# Patient Record
Sex: FEMALE | Race: WHITE | Employment: OTHER | URBAN - NONMETROPOLITAN AREA
[De-identification: names, ages, dates, MRNs, and addresses within clinical notes are randomized per-mention and may not be internally consistent; named-entity substitution may affect disease eponyms.]

---

## 2017-01-09 ENCOUNTER — OFFICE VISIT (OUTPATIENT)
Dept: FAMILY MEDICINE | Facility: OTHER | Age: 57
End: 2017-01-09
Attending: PHYSICIAN ASSISTANT
Payer: COMMERCIAL

## 2017-01-09 VITALS
OXYGEN SATURATION: 98 % | WEIGHT: 195 LBS | SYSTOLIC BLOOD PRESSURE: 118 MMHG | DIASTOLIC BLOOD PRESSURE: 62 MMHG | HEART RATE: 75 BPM | TEMPERATURE: 98.1 F | HEIGHT: 65 IN | BODY MASS INDEX: 32.49 KG/M2

## 2017-01-09 DIAGNOSIS — R13.14 PHARYNGOESOPHAGEAL DYSPHAGIA: Primary | ICD-10-CM

## 2017-01-09 DIAGNOSIS — Z72.0 TOBACCO ABUSE: ICD-10-CM

## 2017-01-09 PROCEDURE — 99212 OFFICE O/P EST SF 10 MIN: CPT

## 2017-01-09 PROCEDURE — 99213 OFFICE O/P EST LOW 20 MIN: CPT | Performed by: PHYSICIAN ASSISTANT

## 2017-01-09 RX ORDER — SUCRALFATE 1 G/1
1 TABLET ORAL 4 TIMES DAILY
Qty: 40 TABLET | Refills: 1 | Status: SHIPPED | OUTPATIENT
Start: 2017-01-09 | End: 2017-03-20

## 2017-01-09 ASSESSMENT — ANXIETY QUESTIONNAIRES
5. BEING SO RESTLESS THAT IT IS HARD TO SIT STILL: MORE THAN HALF THE DAYS
IF YOU CHECKED OFF ANY PROBLEMS ON THIS QUESTIONNAIRE, HOW DIFFICULT HAVE THESE PROBLEMS MADE IT FOR YOU TO DO YOUR WORK, TAKE CARE OF THINGS AT HOME, OR GET ALONG WITH OTHER PEOPLE: VERY DIFFICULT
1. FEELING NERVOUS, ANXIOUS, OR ON EDGE: NEARLY EVERY DAY
GAD7 TOTAL SCORE: 14
7. FEELING AFRAID AS IF SOMETHING AWFUL MIGHT HAPPEN: NOT AT ALL
3. WORRYING TOO MUCH ABOUT DIFFERENT THINGS: NEARLY EVERY DAY
6. BECOMING EASILY ANNOYED OR IRRITABLE: NEARLY EVERY DAY
2. NOT BEING ABLE TO STOP OR CONTROL WORRYING: MORE THAN HALF THE DAYS

## 2017-01-09 ASSESSMENT — PATIENT HEALTH QUESTIONNAIRE - PHQ9: 5. POOR APPETITE OR OVEREATING: SEVERAL DAYS

## 2017-01-09 NOTE — NURSING NOTE
"Chief Complaint   Patient presents with     Abdominal Pain       Initial /62 mmHg  Pulse 75  Temp(Src) 98.1  F (36.7  C) (Tympanic)  Ht 5' 5\" (1.651 m)  Wt 195 lb (88.451 kg)  BMI 32.45 kg/m2  SpO2 98%  Breastfeeding? No Estimated body mass index is 32.45 kg/(m^2) as calculated from the following:    Height as of this encounter: 5' 5\" (1.651 m).    Weight as of this encounter: 195 lb (88.451 kg).  BP completed using cuff size: mitzi Braun LPN      "

## 2017-01-09 NOTE — PATIENT INSTRUCTIONS

## 2017-01-09 NOTE — MR AVS SNAPSHOT
After Visit Summary   1/9/2017    Sharlene Mccord    MRN: [id number]           Patient Information     Date Of Birth          1960        Visit Information        Provider Department      1/9/2017 10:45 AM Concha Hare PA [facility]        Today's Diagnoses     Pharyngoesophageal dysphagia    -  1     Tobacco abuse           Care Instructions      HOW TO QUIT SMOKING  Smoking is one of the hardest habits to break. About half of all those who have ever smoked have been able to quit, and most of those (about 70%) who still smoke want to quit. Here are some of the best ways to stop smoking.     KEEP TRYING:  It takes most smokers about 8 tries before they are finally able to fully quit. So, the more often you try and fail, the better your chance of quitting the next time! So, don't give up!    GO COLD TURKEY:  Most ex-smokers quit cold turkey. Trying to cut back gradually doesn't seem to work as well, perhaps because it continues the smoking habit. Also, it is possible to fool yourself by inhaling more while smoking fewer cigarettes. This results in the same amount of nicotine in your body!    GET SUPPORT:  Support programs can make an important difference, especially for the heavy smoker. These groups offer lectures, methods to change your behavior and peer support. Call the free national Quitline for more information. 800-QUIT-NOW (769-236-1617). Low-cost or free programs are offered by many hospitals, local chapters of the American Lung Association (691-535-9451) and the American Cancer Society (149-596-3085). Support at home is important too. Non-smokers can help by offering praise and encouragement. If the smoker fails to quit, encourage them to try again!    OVER-THE-COUNTER MEDICINES:  For those who can't quit on their own, Nicotine Replacement Therapy (NRT) may make quitting much easier. Certain aids such as the nicotine patch, gum and lozenge are available without a  prescription. However, it is best to use these under the guidance of your doctor. The skin patch provides a steady supply of nicotine to the body. Nicotine gum and lozenge gives temporary bursts of low levels of nicotine. Both methods take the edge off the craving for cigarettes. WARNING: If you feel symptoms of nicotine overdose, such as nausea, vomiting, dizziness, weakness, or fast heartbeat, stop using these and see your doctor.    PRESCRIPTION MEDICINES:  After evaluating your smoking patterns and prior attempts at quitting, your doctor may offer a prescription medicine such as bupropion (Zyban, Wellbutrin), varenicline (Chantix, Champix), a niocotine inhaler or nasal spray. Each has its unique advantage and side effects which your doctor can review with you.    HEALTH BENEFITS OF QUITTING:  The benefits of quitting start right away and keep improving the longer you go without smokin minutes: blood pressure and pulse return to normal  8 hours: oxygen levels return to normal  2 days: ability to smell and taste begins to improve as damaged nerves start to regrow  2-3 weeks: circulation and lung function improves  1-9 months: decreased cough, congestion and shortness of breath; less tired  1 year: risk of heart attack decreases by half  5 years: risk of lung cancer decreases by half; risk of stroke becomes the same as a non-smoker  For information about how to quit smoking, visit the following links:  National [employer] [place] ,   Clearing the Air, Quit Smoking Today   - an online booklet. http://www.smokefree.gov/pubs/clearing_the_air.pdf  Smokefree.gov http://smokefree.gov/  QuitNet http://www.quitnet.com/    8979-3916 Papo Faustin, [address] [place] [address]884. All rights reserved. This information is not intended as a substitute for professional medical care. Always follow your healthcare professional's instructions.    The Benefits of Living Smoke Free  What do you want to gain from  quitting? Check off some reasons to quit.  Health Benefits  ___ Reduce my risk of lung cancer, heart disease, chronic lung disease  ___ Have fewer wrinkles and softer skin  ___ Improve my sense of taste and smell  ___ For pregnant women--reduce the risk of having a miscarriage, stillbirth, premature birth, or low-birth-weight baby  Personal Benefits  ___ Feel more in control of my life  ___ Have better-smelling hair, breath, clothes, home, and car  ___ Save time by not having to take smoke breaks, buy cigarettes, or hunt for a light  ___ Have whiter teeth  Family Benefits  ___ Reduce my children s respiratory tract infections  ___ Set a good example for my children  ___ Reduce my family s cancer risk  Financial Benefits  ___ Save hundreds of dollars each year that would be spent on cigarettes  ___ Save money on medical bills  ___ Save on life, health, and car insurance premiums    Those Dollars Add Up!  Cigarettes are expensive, and getting more expensive all the time. Do you realize how much money you are spending on cigarettes per year? What is the average amount you spend on a pack of cigarettes? What is the average number of packs that you smoke per day? Using your answers to these questions, fill in this formula to help you find out:  ($ _____ per pack) ×  ( _____ number of packs per day) × (365 days) =  $ _____ yearly cost of smoking  Besides tobacco, there are other costs, including extra cleaning bills and replacement costs for clothing and furniture; medical expenses for smoking-related illnesses; and higher health, life, and car insurance premiums.    Cigars and Pipes Count Too!  Cigars and pipes are also dangerous. So are smokeless (chewing) tobacco and snuff. All of these products contain nicotine, a highly addictive substance that has harmful effects on your body. Quitting smoking means giving up all tobacco products.      9703-7326 Papo Faustin, [address] [facility], [place] [address]. All rights  reserved. This information is not intended as a substitute for professional medical care. Always follow your healthcare professional's instructions.        Follow-ups after your visit        Additional Services     GENERAL SURG ADULT REFERRAL       Your provider has referred you to: [facility]: [facility] [place] [phone]   [email or web address]    Please be aware that coverage of these services is subject to the terms and limitations of your health insurance plan.  Call member services at your health plan with any benefit or coverage questions.      Please bring the following with you to your appointment:    (1) Any X-Rays, CTs or MRIs which have been performed.  Contact the facility where they were done to arrange for  prior to your scheduled appointment.   (2) List of current medications   (3) This referral request   (4) Any documents/labs given to you for this referral            QUITPLAN  Referral       [place] TO[employer]ACCO QUITLINES FAX FORM  Fax form to: 1 [phone]    The clinic will facilitate the referral to the quitline.    Provider Information:  ===============================================================  KEILY Cobos  ID#: [id number] [facility] [phone] Fax: [phone]   [email or web address]  Payor: BCBS / Plan: BCBS PLATINUM BLUE / Product Type: PPO /   ===============================================================    The Public Health Service Guideline does not recommend providing over-the-counter nicotine replacement therapy products without physician authorization to patients with the following conditions: pregnancy, uncontrolled high blood pressure, or cardiovascular diseases.     I authorize the [place] Tobacco Quitlines to provide over-the-counter nicotine replacement products for the patient listed below if the patient's health plan benefits  cover NRT or if the patient is eligible for QUITPLAN services.    Patient Consented to:  ===============================================================  - YES - I am ready to quit tobacco and request the above information be given to the quitline so they may contact me.  I understand that one of [place] s Tobacco Quitlines will inform my provider about my participation.  ===============================================================  Please check the BEST 3-hour call window for them to reach you: 2pm - 5pm  May we leave a message?  YES  Language Preference:  English  Phone Number: Home Phone      [phone]  Mobile          none     E-mail Address: [email or web address]    ========================================================================  FOR QUITLINE USE ONLY:  THIS INFORMATION WILL BE PROVIDED BACK TO THE PROVIDER  Contact date: __/ __/__ or ____ Did not reach after three attempts.    Outcome:  __ Enrolled in telephone counseling program  __ Declined  __ Not Reached    Stage of readiness: _______________________  Planned Quit Date: ___/ ___/ ___  Comments:      2011 [facility]   This message funded by [employer] [place], an independent licensee of the Blue Cross and Blue Shield Association. Rev. 11/1/12                  Your next 10 appointments already scheduled     Feb 08, 2017  9:40 AM   (Arrive by 9:25 AM)   Return Visit with Aurelia Odell MD   [facility] [place] ([facility])    [address] [place] [id number]   [phone]            May 23, 2017  9:30 AM   (Arrive by 9:15 AM)   Return Visit with Norman Nichole MD   [facility] [place] ([facility])    [address]  [place] [address]   [phone]              Future tests that were ordered for you today     Open Future Orders        Priority Expected Expires Ordered    QUITPLAN  Referral Routine  3/10/2017 1/9/2017            Who to contact     If you have  "questions or need follow up information about today's clinic visit or your schedule please contact [facility] directly at [phone].  Normal or non-critical lab and imaging results will be communicated to you by Plinkhart, letter or phone within 4 business days after the clinic has received the results. If you do not hear from us within 7 days, please contact the clinic through Plinkhart or phone. If you have a critical or abnormal lab result, we will notify you by phone as soon as possible.  Submit refill requests through VHT or call your pharmacy and they will forward the refill request to us. Please allow 3 business days for your refill to be completed.          Additional Information About Your Visit        [employer] Sponsorship Information     VHT gives you secure access to your electronic health record. If you see a primary care provider, you can also send messages to your care team and make appointments. If you have questions, please call your primary care clinic.  If you do not have a primary care provider, please call [phone] and they will assist you.        Care EveryWhere ID     This is your Care EveryWhere ID. This could be used by other organizations to access your [place] [facility] medical records  [id number]        Your Vitals Were     Pulse Temperature Height BMI (Body Mass Index) Pulse Oximetry Breastfeeding?    75 98.1  F (36.7  C) (Tympanic) 5' 5\" (1.651 m) 32.45 kg/m2 98% No       Blood Pressure from Last 3 Encounters:   01/09/17 118/62   12/16/16 116/66   11/03/16 118/70    Weight from Last 3 Encounters:   01/09/17 195 lb (88.451 kg)   12/16/16 200 lb (90.719 kg)   11/03/16 190 lb (86.183 kg)              We Performed the Following     GENERAL SURG ADULT REFERRAL     Tobacco Cessation - for Health Maintenance          Today's Medication Changes          These changes are accurate as of: 1/9/17 11:32 AM.  If you have any questions, ask your nurse or doctor.               Start taking these " medicines.        Dose/Directions    sucralfate 1 GM tablet   Commonly known as:  CARAFATE   Used for:  Pharyngoesophageal dysphagia   Started by:  Concha Hare PA        Dose:  1 g   Take 1 tablet (1 g) by mouth 4 times daily   Quantity:  40 tablet   Refills:  1         These medicines have changed or have updated prescriptions.        Dose/Directions    traZODone 50 MG tablet   Commonly known as:  DESYREL   This may have changed:  additional instructions   Used for:  Psychophysiological insomnia        TAKE TWO TABLETS BY MOUTH AT BEDTIME AS NEEDED FOR SLEEP   Quantity:  60 tablet   Refills:  11            Where to get your medicines      These medications were sent to [id number] - [place], [place] - [address]   [address] , [place] [address]     Phone:  [phone]    - sucralfate 1 GM tablet             Primary Care Provider Office Phone # Fax #    KEILY Fonseca [phone]       [facility] [place] [facility] [address] [facility] [address]  [place] [address]        Thank you!     Thank you for choosing [facility]  for your care. Our goal is always to provide you with excellent care. Hearing back from our patients is one way we can continue to improve our services. Please take a few minutes to complete the written survey that you may receive in the mail after your visit with us. Thank you!             Your Updated Medication List - Protect others around you: Learn how to safely use, store and throw away your medicines at www.disposemymeds.org.          This list is accurate as of: 1/9/17 11:32 AM.  Always use your most recent med list.                   Brand Name Dispense Instructions for use    buPROPion 200 MG 12 hr tablet    WELLBUTRIN SR    60 tablet    Take 1 tablet (200 mg) by mouth 2 times daily       clonazePAM 1 MG tablet    klonoPIN    60 tablet    Take 1 tablet (1 mg) by mouth 2 times daily as needed for anxiety       cyclobenzaprine 10 MG tablet    FLEXERIL    30  tablet    Take 0.5-1 tablets (5-10 mg) by mouth 3 times daily as needed for muscle spasms       diltiazem 300 MG 24 hr ER beaded capsule    TIAZAC    90 capsule    Take 1 capsule (300 mg) by mouth daily       estradiol 0.5 MG tablet    ESTRACE    60 tablet    TAKE ONE TABLET BY MOUTH TWICE DAILY       hydrOXYzine 25 MG tablet    ATARAX    120 tablet    TAKE ONE TO TWO TABLETS BY MOUTH ONCE DAILY AT BEDTIME FOR NAUSEA       levothyroxine 50 MCG tablet    SYNTHROID/LEVOTHROID    30 tablet    TAKE ONE TABLET BY MOUTH ONCE DAILY       NITROSTAT 0.4 MG sublingual tablet   Generic drug:  nitroglycerin     25 tablet    Place 1 tablet (0.4 mg) under the tongue every 5 minutes as needed for chest pain       PRILOSEC OTC PO          PROCTOSOL HC 2.5 % cream   Generic drug:  hydrocortisone     58 g    APPLY  CREAM TO AFFECTED AREA THREE TIMES DAILY       sucralfate 1 GM tablet    CARAFATE    40 tablet    Take 1 tablet (1 g) by mouth 4 times daily       SUMAtriptan 100 MG tablet    IMITREX    10 tablet    TAKE ONE TABLET BY MOUTH AS NEEDED MIGRAINES       TOPIRAMATE PO      Take 25 mg by mouth       traMADol 50 MG tablet    ULTRAM    180 tablet    TAKE TWO TABLETS BY MOUTH EVERY 8 HOURS AS NEEDED FOR PAIN       traZODone 50 MG tablet    DESYREL    60 tablet    TAKE TWO TABLETS BY MOUTH AT BEDTIME AS NEEDED FOR SLEEP

## 2017-01-09 NOTE — PROGRESS NOTES
"  SUBJECTIVE:                                                    Sharlene Mccord is a 56 year old female who presents to clinic today for the following health issues:      Abdominal Pain      Duration: since started atarax 8/2015    Description (location/character/radiation): heartburn, \"girgle\" \"burn in pit of stomach, spreads under rib cage, weak, nausea and vomiting\"        Associated flank pain: None    Intensity:  moderate    Accompanying signs and symptoms: - can not take a normal breath with pain under rib cage        Fever/Chills: no        Gas/Bloating: no        Nausea/vomitting: YES- both       Diarrhea: has bouts out diarrhea and then will go a couple days without        Dysuria or Hematuria: no     History (previous similar pain/trauma/previous testing): 3 c=sections and explore surgery years ago, gallbladder surgery     Precipitating or alleviating factors:       Pain worse with eating/BM/urination: any type of food upsets her stomach       Pain relieved by BM: no     Therapies tried and outcome: atarax- pt feels it is not working    LMP:  Pt takes estradiol- no periods             Problem list and histories reviewed & adjusted, as indicated.  Additional history: as documented    Patient Active Problem List   Diagnosis     Diverticulitis of sigmoid colon     Hypothyroidism     Anxiety state     Cardiac microvascular disease (H)     Diverticulitis     Aortic valve disorder     ACP (advance care planning)     Chronic pain     Constipation     Memory loss     Major depressive disorder, recurrent episode (H)     Cognitive disorder     Major depressive disorder     Fatigue     Migraine without aura and responsive to treatment     Atypical migraine     Past Surgical History   Procedure Laterality Date     Cl aff surgical pathology  01/02/2007     Thyroid Mass     Laparoscopic cholecystectomy  11/07/2003     Cholelithiasis     Laryngoscopy       Hysterectomy  2001     Laparotomy exploratory       abdominal " pain/fever     Splenectomy       Gyn surgery       c-sections x 3     Gyn surgery       complete hysterectomy     Cardiac surgery  2013     angiogram [facility]:  Normal coronary arteries.  Felt ot have some microvascular disease     Colonoscopy  1/13/2014     Procedure: COLONOSCOPY;  COLONOSCOPY ;  Surgeon: Chasidy Gee DO;  Location: [facility]       Social History   Substance Use Topics     Smoking status: Current Every Day Smoker -- 0.50 packs/day for 40 years     Types: Cigarettes     Smokeless tobacco: Never Used     Alcohol Use: No     Family History   Problem Relation Age of Onset     C.A.D. Father      Hypertension Father      C.A.D. Mother      CEREBROVASCULAR DISEASE Mother      CVA     Hypertension Mother          Current Outpatient Prescriptions   Medication Sig Dispense Refill     TOPIRAMATE PO Take 25 mg by mouth       Omeprazole Magnesium (PRILOSEC OTC PO)        sucralfate (CARAFATE) 1 GM tablet Take 1 tablet (1 g) by mouth 4 times daily 40 tablet 1     hydrOXYzine (ATARAX) 25 MG tablet TAKE ONE TO TWO TABLETS BY MOUTH ONCE DAILY AT BEDTIME FOR NAUSEA 120 tablet 0     clonazePAM (KLONOPIN) 1 MG tablet Take 1 tablet (1 mg) by mouth 2 times daily as needed for anxiety 60 tablet 5     traMADol (ULTRAM) 50 MG tablet TAKE TWO TABLETS BY MOUTH EVERY 8 HOURS AS NEEDED FOR PAIN 180 tablet 0     NITROSTAT 0.4 MG SL tablet Place 1 tablet (0.4 mg) under the tongue every 5 minutes as needed for chest pain 25 tablet 1     estradiol (ESTRACE) 0.5 MG tablet TAKE ONE TABLET BY MOUTH TWICE DAILY 60 tablet 3     PROCTOSOL HC 2.5 % rectal cream APPLY  CREAM TO AFFECTED AREA THREE TIMES DAILY 58 g 1     buPROPion (WELLBUTRIN SR) 200 MG 12 hr tablet Take 1 tablet (200 mg) by mouth 2 times daily 60 tablet 5     cyclobenzaprine (FLEXERIL) 10 MG tablet Take 0.5-1 tablets (5-10 mg) by mouth 3 times daily as needed for muscle spasms 30 tablet 1     diltiazem (TIAZAC) 300 MG 24 hr ER beaded capsule Take 1 capsule (300 mg) by mouth  "daily 90 capsule 3     levothyroxine (SYNTHROID, LEVOTHROID) 50 MCG tablet TAKE ONE TABLET BY MOUTH ONCE DAILY 30 tablet 10     traZODone (DESYREL) 50 MG tablet TAKE TWO TABLETS BY MOUTH AT BEDTIME AS NEEDED FOR SLEEP (Patient taking differently: TAKE one TABLETS BY MOUTH AT BEDTIME AS NEEDED FOR SLEEP) 60 tablet 11     SUMAtriptan (IMITREX) 100 MG tablet TAKE ONE TABLET BY MOUTH AS NEEDED MIGRAINES 10 tablet 5     Allergies   Allergen Reactions     Codeine      Isosorbide Mononitrate Other (See Comments)     Vomiting and headache       Lisinopril      [facility] scan     BP Readings from Last 3 Encounters:   01/09/17 118/62   12/16/16 116/66   11/03/16 118/70    Wt Readings from Last 3 Encounters:   01/09/17 195 lb (88.451 kg)   12/16/16 200 lb (90.719 kg)   11/03/16 190 lb (86.183 kg)                  Problem list, Medication list, Allergies, and Medical/Social/Surgical histories reviewed in [facility] and updated as appropriate.    ROS:  Constitutional, neuro, ENT, endocrine, pulmonary, cardiac, gastrointestinal, genitourinary, musculoskeletal, integument and psychiatric systems are negative, except as otherwise noted.    OBJECTIVE:                                                    /62 mmHg  Pulse 75  Temp(Src) 98.1  F (36.7  C) (Tympanic)  Ht 5' 5\" (1.651 m)  Wt 195 lb (88.451 kg)  BMI 32.45 kg/m2  SpO2 98%  Breastfeeding? No  Body mass index is 32.45 kg/(m^2).  GENERAL APPEARANCE: healthy, alert and no distress  EYES: Eyes grossly normal to inspection, PERRL and conjunctivae and sclerae normal  HENT: ear canals and TM's normal and nose and mouth without ulcers or lesions  NECK: no adenopathy, no asymmetry, masses, or scars and thyroid normal to palpation  RESP: lungs clear to auscultation - no rales, rhonchi or wheezes  CV: regular rates and rhythm, normal S1 S2, no S3 or S4 and no murmur, click or rub  LYMPHATICS: normal ant/post cervical and supraclavicular nodes  ABDOMEN: soft, nontender, without " hepatosplenomegaly or masses and bowel sounds normal  MS: extremities normal- no gross deformities noted  SKIN: no suspicious lesions or rashes  NEURO: Normal strength and tone, mentation intact and speech normal  PSYCH: mentation appears normal and affect normal/bright    Diagnostic test results:  Diagnostic Test Results:  No results found for this or any previous visit (from the past 24 hour(s)).     ASSESSMENT/PLAN:                                                    1. Pharyngoesophageal dysphagia  She has been having nausea and GERD for months.   H pylori negative on stool not responding to PPI and diet changes.  Vomiting more now.   No weight loss.  Smoker.  Can't take bread with out  getting stuck but denies dysphagia.   .at this point needing endoscopy as not responding to medication and changes.        - Tobacco Cessation - for Health Maintenance  - GENERAL SURG ADULT REFERRAL  - sucralfate (CARAFATE) 1 GM tablet; Take 1 tablet (1 g) by mouth 4 times daily  Dispense: 40 tablet; Refill: 1    2. Tobacco abuse  Working on cessation.         See Patient Instructions    KEILY Cobos  [facility] [place]

## 2017-01-10 ENCOUNTER — OFFICE VISIT (OUTPATIENT)
Dept: SURGERY | Facility: OTHER | Age: 57
End: 2017-01-10
Attending: PHYSICIAN ASSISTANT
Payer: COMMERCIAL

## 2017-01-10 VITALS
HEIGHT: 65 IN | TEMPERATURE: 98.6 F | OXYGEN SATURATION: 98 % | BODY MASS INDEX: 32.49 KG/M2 | HEART RATE: 79 BPM | DIASTOLIC BLOOD PRESSURE: 60 MMHG | SYSTOLIC BLOOD PRESSURE: 128 MMHG | WEIGHT: 195 LBS

## 2017-01-10 DIAGNOSIS — K21.9 GASTROESOPHAGEAL REFLUX DISEASE WITHOUT ESOPHAGITIS: ICD-10-CM

## 2017-01-10 DIAGNOSIS — R13.14 PHARYNGOESOPHAGEAL DYSPHAGIA: Primary | ICD-10-CM

## 2017-01-10 DIAGNOSIS — E66.09 NON MORBID OBESITY DUE TO EXCESS CALORIES: ICD-10-CM

## 2017-01-10 DIAGNOSIS — F17.200 TOBACCO DEPENDENCE: ICD-10-CM

## 2017-01-10 PROCEDURE — 99203 OFFICE O/P NEW LOW 30 MIN: CPT

## 2017-01-10 PROCEDURE — 99214 OFFICE O/P EST MOD 30 MIN: CPT

## 2017-01-10 PROCEDURE — 99214 OFFICE O/P EST MOD 30 MIN: CPT | Performed by: SURGERY

## 2017-01-10 ASSESSMENT — ANXIETY QUESTIONNAIRES: GAD7 TOTAL SCORE: 14

## 2017-01-10 ASSESSMENT — PAIN SCALES - GENERAL: PAINLEVEL: NO PAIN (1)

## 2017-01-10 NOTE — NURSING NOTE
"Chief Complaint   Patient presents with     Consult     EGD       Initial /60 mmHg  Pulse 79  Temp(Src) 98.6  F (37  C) (Tympanic)  Ht 5' 5\" (1.651 m)  Wt 195 lb (88.451 kg)  BMI 32.45 kg/m2  SpO2 98% Estimated body mass index is 32.45 kg/(m^2) as calculated from the following:    Height as of this encounter: 5' 5\" (1.651 m).    Weight as of this encounter: 195 lb (88.451 kg).  BP completed using cuff size: tyesha Caldera LPN      "

## 2017-01-10 NOTE — PATIENT INSTRUCTIONS
Thank you for allowing Dr. Ruff and our surgical team to participate in your care.  If you have a scheduling or an appointment question please contact [facility] Coordinator at her direct line [phone].   ALL nursing questions or concerns can be directed to Breanne at: [phone]       You are scheduled for a: EGD  Your procedure date is: 2/3/17      You need a friend or family member available to drive you home AND stay with you for 24 hours after you leave the hospital. You will not be allowed to drive yourself. IF you need to take a taxi or the bus you MUST have a responsible person to ride with you. YOUR PROCEDURE WILL BE CANCELLED IF YOU DO NOT HAVE A RESPONSIBLE ADULT TO DRIVE YOU HOME.       You CANNOT have anything to eat or drink after midnight the night before your surgery, ncluding water and coffee. Your stomach needs to be completely empty. Do NOT chew gum, suck on hard candy, or smoke. You can brush your teeth the morning of surgery.       You need to call our Surgery Education Nurses 1-2 weeks prior to your surgery date at  [phone] or toll free [phone]. Please have you medication and allergy lists ready.      Stop your aspirin or other NSAIDs(Ibuprofen, Motrin, Aleve, Celebrex, Naproxen, etc...) 7 days before your surgery.      Hospital admitting will call you the day before your surgery with your arrival time. If you are scheduled on a Monday admitting will call you the Friday before.      Please call your primary care physician if you should become ill within 24 hours of scheduled surgery. (ex.vomiting, diarrhea, fever)  Surgery Education will contact you the day before your procedure between the hours of noon and 5 pm with the time you need to register in admitting at the hospital. Call Breanne with any questions [phone]

## 2017-01-10 NOTE — PROGRESS NOTES
Surgery Consult Clinic Note      RE: Sharlene Mccord  : 1960  ALISHA: 1/10/2017      Chief Complaint:  Abdominal pain    History of Present Illness:  Sharlene Mccord is a very pleasant 56 year old year old female who I am seeing at the request of KEILY Fonseca for evaluation of abdominal pain and for consideration for EGD. Has had upper abdominal pain for 10 years associated with nausea/vomiting.  Treated with TUMs, but over the last year, the symptoms have become more frequent and severe that she was started on PPI therapy a year ago.  Takes 30-60 minutes before eating.  Hasn't helped.  Pain not associated with eating, but feels that eating will bring on the pain.  Not losing weight. Does report some difficulty swallowing certain foods, breads.  Denies changes in bowel habits.  No fevers, chills, night sweats. Denies changes in voice but admits to dry cough for the past year.  Current smoker, doesn't use alcohol.  Doesn't lay down after eating.  Normal colonoscopy 3 years ago.  She specifically denies fever, chills, nausea, vomiting, chest pain, shortness of breath or palpitations.      Medical history:  Past Medical History   Diagnosis Date     Mitral valve disorders 10/16/2007     Aortic valve disorders 2000     Echocardiogram showin mild/moderate AI.  Normal LV function     Thyroid Nodule 2011     Anxiety state, unspecified 2011     Unspecified hypothyroidism 2011     Tobacco use disorder 2011     Migraine, unspecified, without mention of intractable migraine without mention of status migrainosus 2011     Fibromyalgia 2011     Cardiac microvascular disease (H) 4/10/2013     Based on Cardiac MRI done at       PONV (postoperative nausea and vomiting)      Major depressive disorder, recurrent episode, unspecified 2014       Surgical history:  Past Surgical History   Procedure Laterality Date     Cl aff surgical pathology  2007     Thyroid Mass      Laparoscopic cholecystectomy  11/07/2003     Cholelithiasis     Laryngoscopy       Hysterectomy  2001     Laparotomy exploratory       abdominal pain/fever     Splenectomy       Gyn surgery       c-sections x 3     Gyn surgery       complete hysterectomy     Cardiac surgery  2013     angiogram [facility]:  Normal coronary arteries.  Felt ot have some microvascular disease     Colonoscopy  1/13/2014     Procedure: COLONOSCOPY;  COLONOSCOPY ;  Surgeon: Chasidy Gee DO;  Location: [facility]       Family history:  Family History   Problem Relation Age of Onset     C.A.D. Father      Hypertension Father      C.A.D. Mother      CEREBROVASCULAR DISEASE Mother      CVA     Hypertension Mother        Medications:  Current Outpatient Prescriptions   Medication Sig Dispense Refill     TOPIRAMATE PO Take 25 mg by mouth       Omeprazole Magnesium (PRILOSEC OTC PO)        sucralfate (CARAFATE) 1 GM tablet Take 1 tablet (1 g) by mouth 4 times daily 40 tablet 1     hydrOXYzine (ATARAX) 25 MG tablet TAKE ONE TO TWO TABLETS BY MOUTH ONCE DAILY AT BEDTIME FOR NAUSEA 120 tablet 0     clonazePAM (KLONOPIN) 1 MG tablet Take 1 tablet (1 mg) by mouth 2 times daily as needed for anxiety 60 tablet 5     traMADol (ULTRAM) 50 MG tablet TAKE TWO TABLETS BY MOUTH EVERY 8 HOURS AS NEEDED FOR PAIN 180 tablet 0     NITROSTAT 0.4 MG SL tablet Place 1 tablet (0.4 mg) under the tongue every 5 minutes as needed for chest pain 25 tablet 1     estradiol (ESTRACE) 0.5 MG tablet TAKE ONE TABLET BY MOUTH TWICE DAILY 60 tablet 3     PROCTOSOL HC 2.5 % rectal cream APPLY  CREAM TO AFFECTED AREA THREE TIMES DAILY 58 g 1     buPROPion (WELLBUTRIN SR) 200 MG 12 hr tablet Take 1 tablet (200 mg) by mouth 2 times daily 60 tablet 5     cyclobenzaprine (FLEXERIL) 10 MG tablet Take 0.5-1 tablets (5-10 mg) by mouth 3 times daily as needed for muscle spasms 30 tablet 1     diltiazem (TIAZAC) 300 MG 24 hr ER beaded capsule Take 1 capsule (300 mg) by mouth daily 90 capsule 3      "levothyroxine (SYNTHROID, LEVOTHROID) 50 MCG tablet TAKE ONE TABLET BY MOUTH ONCE DAILY 30 tablet 10     traZODone (DESYREL) 50 MG tablet TAKE TWO TABLETS BY MOUTH AT BEDTIME AS NEEDED FOR SLEEP (Patient taking differently: TAKE one TABLETS BY MOUTH AT BEDTIME AS NEEDED FOR SLEEP) 60 tablet 11     SUMAtriptan (IMITREX) 100 MG tablet TAKE ONE TABLET BY MOUTH AS NEEDED MIGRAINES 10 tablet 5     Allergies:  The patientis allergic to codeine; isosorbide mononitrate; and lisinopril.  .  Social history:  Social History   Substance Use Topics     Smoking status: Current Every Day Smoker -- 0.50 packs/day for 40 years     Types: Cigarettes     Smokeless tobacco: Never Used     Alcohol Use: No     Marital status: .    Review of Systems:    Constitutional: Negative for fever, chills and weight loss.   HENT: Negative for ear pain, nosebleeds, congestion, sore throat, tinnitus and ear discharge.    Eyes: Negative for blurred vision, double vision, photophobia and pain.   Respiratory: Negative for cough, hemoptysis, shortness of breath, wheezing and stridor.    Cardiovascular: Negative for chest pain, palpitations and orthopnea.   Gastrointestinal: Negative for heartburn, nausea, vomiting, abdominal pain and blood in stool.   Genitourinary: Negative for urgency, frequency and hematuria.   Musculoskeletal: Negative for myalgias, back pain and joint pain.   Neurological: Negative for tingling, speech change and headaches.   Endo/Heme/Allergies: Does not bruise/bleed easily.   Psychiatric/Behavioral: Negative for depression, suicidal ideas and hallucinations. The patient is not nervous/anxious.    Physical Examination:  /60 mmHg  Pulse 79  Temp(Src) 98.6  F (37  C) (Tympanic)  Ht 1.651 m (5' 5\")  Wt 88.451 kg (195 lb)  BMI 32.45 kg/m2  SpO2 98%  General: AAOx4, NAD, WN/WD, ambulating without assistance  HEENT:NCAT, EOMI, PERRL Sclerae anicteric; Trachea mideline, no JVD  Chest:   Clear to auscultation " bilaterally.  Cardiac: S1S2 , regular rate and rhythm without additional sounds  Abdomen: Obese, soft, ND/NT  Extremities: Cursory exam unremarkable.  Skin: Warm, dry, < 2 sec cap refill  Neuro: CN 2-12 grossly intact, no focal deficit, GCS 15  Psych: happy, calm, asks appropriate questions      Assessment/Plan:  #1 GERD  #2 Dysphagia  #3 Obesity  #4 Tobacco dependence      The indications, risks, benefits and technical aspects of esophagogastroduodenoscopy were reviewed with her questions asked and answered.  Antral biopsy for histologic examination will be performed and the place of H. pylori in gastritis was discussed.  Preoperative preparation, npo after midnight preceding the date was discussed and a request made to hold aspirin containing agents one week prior to ameliorate antiplatelet effect.  Questions asked and answered - will proceed based on scheduling availability.          Dr Ruff  [facility]  [address] [place] [address], [address]  [place]    [address]    Referring Provider:  KEILY Fonseca  [address]  [place] [address]     Primary Care Provider:  Concha Hare

## 2017-01-11 ENCOUNTER — HOSPITAL ENCOUNTER (OUTPATIENT)
Dept: GENERAL RADIOLOGY | Facility: HOSPITAL | Age: 57
Discharge: HOME | End: 2017-01-11
Attending: SURGERY | Admitting: SURGERY
Payer: MEDICARE

## 2017-01-11 PROCEDURE — 74220 X-RAY XM ESOPHAGUS 1CNTRST: CPT | Mod: TC

## 2017-01-11 NOTE — PROGRESS NOTES
Fluoro time for this case was 2.24 minutes less than 5 min  Was patient held? NO  If yes, by whom? NA and Technologist NAME

## 2017-01-13 DIAGNOSIS — M79.7 FIBROMYALGIA: Primary | ICD-10-CM

## 2017-01-16 ENCOUNTER — TELEPHONE (OUTPATIENT)
Dept: SURGERY | Facility: OTHER | Age: 57
End: 2017-01-16

## 2017-01-16 RX ORDER — TRAMADOL HYDROCHLORIDE 50 MG/1
TABLET ORAL
Qty: 180 TABLET | Refills: 0 | Status: SHIPPED | OUTPATIENT
Start: 2017-01-16 | End: 2017-02-13

## 2017-01-16 NOTE — TELEPHONE ENCOUNTER
Ultram      Last Written Prescription Date:  11/25/2016  Last Fill Quantity: 180,   # refills: 0  Last Office Visit with [facility], [facility] or [facility] prescribing provider: 1/10/2017  Future Office visit:    Next 5 appointments (look out 90 days)     Feb 08, 2017  9:40 AM   (Arrive by 9:25 AM)   Return Visit with Aurelia Odell MD   [facility] ([facility])    [facility] [address] [place] [id number]   [phone]                   Routing refill request to provider for review/approval because:  Drug not on the [facility], [facility] or [facility] refill protocol or controlled substance

## 2017-01-18 NOTE — TELEPHONE ENCOUNTER
Called and notified patient of waiting on results still from surgeon. Will call as soon as I get result note. Patient ok with this.    Beranne Saucedo

## 2017-01-19 NOTE — H&P (VIEW-ONLY)
Surgery Consult Clinic Note      RE: Sharlene Mccord  : 1960  ALISHA: 1/10/2017      Chief Complaint:  Abdominal pain    History of Present Illness:  Sharlene Mccord is a very pleasant 56 year old year old female who I am seeing at the request of KEILY Fonseca for evaluation of abdominal pain and for consideration for EGD. Has had upper abdominal pain for 10 years associated with nausea/vomiting.  Treated with TUMs, but over the last year, the symptoms have become more frequent and severe that she was started on PPI therapy a year ago.  Takes 30-60 minutes before eating.  Hasn't helped.  Pain not associated with eating, but feels that eating will bring on the pain.  Not losing weight. Does report some difficulty swallowing certain foods, breads.  Denies changes in bowel habits.  No fevers, chills, night sweats. Denies changes in voice but admits to dry cough for the past year.  Current smoker, doesn't use alcohol.  Doesn't lay down after eating.  Normal colonoscopy 3 years ago.  She specifically denies fever, chills, nausea, vomiting, chest pain, shortness of breath or palpitations.      Medical history:  Past Medical History   Diagnosis Date     Mitral valve disorders 10/16/2007     Aortic valve disorders 2000     Echocardiogram showin mild/moderate AI.  Normal LV function     Thyroid Nodule 2011     Anxiety state, unspecified 2011     Unspecified hypothyroidism 2011     Tobacco use disorder 2011     Migraine, unspecified, without mention of intractable migraine without mention of status migrainosus 2011     Fibromyalgia 2011     Cardiac microvascular disease (H) 4/10/2013     Based on Cardiac MRI done at       PONV (postoperative nausea and vomiting)      Major depressive disorder, recurrent episode, unspecified 2014       Surgical history:  Past Surgical History   Procedure Laterality Date     Cl aff surgical pathology  2007     Thyroid Mass      Laparoscopic cholecystectomy  11/07/2003     Cholelithiasis     Laryngoscopy       Hysterectomy  2001     Laparotomy exploratory       abdominal pain/fever     Splenectomy       Gyn surgery       c-sections x 3     Gyn surgery       complete hysterectomy     Cardiac surgery  2013     angiogram [facility]:  Normal coronary arteries.  Felt ot have some microvascular disease     Colonoscopy  1/13/2014     Procedure: COLONOSCOPY;  COLONOSCOPY ;  Surgeon: Chasidy Gee DO;  Location: [facility]       Family history:  Family History   Problem Relation Age of Onset     C.A.D. Father      Hypertension Father      C.A.D. Mother      CEREBROVASCULAR DISEASE Mother      CVA     Hypertension Mother        Medications:  Current Outpatient Prescriptions   Medication Sig Dispense Refill     TOPIRAMATE PO Take 25 mg by mouth       Omeprazole Magnesium (PRILOSEC OTC PO)        sucralfate (CARAFATE) 1 GM tablet Take 1 tablet (1 g) by mouth 4 times daily 40 tablet 1     hydrOXYzine (ATARAX) 25 MG tablet TAKE ONE TO TWO TABLETS BY MOUTH ONCE DAILY AT BEDTIME FOR NAUSEA 120 tablet 0     clonazePAM (KLONOPIN) 1 MG tablet Take 1 tablet (1 mg) by mouth 2 times daily as needed for anxiety 60 tablet 5     traMADol (ULTRAM) 50 MG tablet TAKE TWO TABLETS BY MOUTH EVERY 8 HOURS AS NEEDED FOR PAIN 180 tablet 0     NITROSTAT 0.4 MG SL tablet Place 1 tablet (0.4 mg) under the tongue every 5 minutes as needed for chest pain 25 tablet 1     estradiol (ESTRACE) 0.5 MG tablet TAKE ONE TABLET BY MOUTH TWICE DAILY 60 tablet 3     PROCTOSOL HC 2.5 % rectal cream APPLY  CREAM TO AFFECTED AREA THREE TIMES DAILY 58 g 1     buPROPion (WELLBUTRIN SR) 200 MG 12 hr tablet Take 1 tablet (200 mg) by mouth 2 times daily 60 tablet 5     cyclobenzaprine (FLEXERIL) 10 MG tablet Take 0.5-1 tablets (5-10 mg) by mouth 3 times daily as needed for muscle spasms 30 tablet 1     diltiazem (TIAZAC) 300 MG 24 hr ER beaded capsule Take 1 capsule (300 mg) by mouth daily 90 capsule 3      "levothyroxine (SYNTHROID, LEVOTHROID) 50 MCG tablet TAKE ONE TABLET BY MOUTH ONCE DAILY 30 tablet 10     traZODone (DESYREL) 50 MG tablet TAKE TWO TABLETS BY MOUTH AT BEDTIME AS NEEDED FOR SLEEP (Patient taking differently: TAKE one TABLETS BY MOUTH AT BEDTIME AS NEEDED FOR SLEEP) 60 tablet 11     SUMAtriptan (IMITREX) 100 MG tablet TAKE ONE TABLET BY MOUTH AS NEEDED MIGRAINES 10 tablet 5     Allergies:  The patientis allergic to codeine; isosorbide mononitrate; and lisinopril.  .  Social history:  Social History   Substance Use Topics     Smoking status: Current Every Day Smoker -- 0.50 packs/day for 40 years     Types: Cigarettes     Smokeless tobacco: Never Used     Alcohol Use: No     Marital status: .    Review of Systems:    Constitutional: Negative for fever, chills and weight loss.   HENT: Negative for ear pain, nosebleeds, congestion, sore throat, tinnitus and ear discharge.    Eyes: Negative for blurred vision, double vision, photophobia and pain.   Respiratory: Negative for cough, hemoptysis, shortness of breath, wheezing and stridor.    Cardiovascular: Negative for chest pain, palpitations and orthopnea.   Gastrointestinal: Negative for heartburn, nausea, vomiting, abdominal pain and blood in stool.   Genitourinary: Negative for urgency, frequency and hematuria.   Musculoskeletal: Negative for myalgias, back pain and joint pain.   Neurological: Negative for tingling, speech change and headaches.   Endo/Heme/Allergies: Does not bruise/bleed easily.   Psychiatric/Behavioral: Negative for depression, suicidal ideas and hallucinations. The patient is not nervous/anxious.    Physical Examination:  /60 mmHg  Pulse 79  Temp(Src) 98.6  F (37  C) (Tympanic)  Ht 1.651 m (5' 5\")  Wt 88.451 kg (195 lb)  BMI 32.45 kg/m2  SpO2 98%  General: AAOx4, NAD, WN/WD, ambulating without assistance  HEENT:NCAT, EOMI, PERRL Sclerae anicteric; Trachea mideline, no JVD  Chest:   Clear to auscultation " bilaterally.  Cardiac: S1S2 , regular rate and rhythm without additional sounds  Abdomen: Obese, soft, ND/NT  Extremities: Cursory exam unremarkable.  Skin: Warm, dry, < 2 sec cap refill  Neuro: CN 2-12 grossly intact, no focal deficit, GCS 15  Psych: happy, calm, asks appropriate questions      Assessment/Plan:  #1 GERD  #2 Dysphagia  #3 Obesity  #4 Tobacco dependence      The indications, risks, benefits and technical aspects of esophagogastroduodenoscopy were reviewed with her questions asked and answered.  Antral biopsy for histologic examination will be performed and the place of H. pylori in gastritis was discussed.  Preoperative preparation, npo after midnight preceding the date was discussed and a request made to hold aspirin containing agents one week prior to ameliorate antiplatelet effect.  Questions asked and answered - will proceed based on scheduling availability.          Dr Ruff  [facility]  [address] [place] [address], [address]  [place]    [address]    Referring Provider:  KEILY Fonseca  [address]  [place] [address]     Primary Care Provider:  Concha Hare

## 2017-01-23 ENCOUNTER — TELEPHONE (OUTPATIENT)
Dept: SURGERY | Facility: OTHER | Age: 57
End: 2017-01-23

## 2017-01-23 NOTE — TELEPHONE ENCOUNTER
Called to get patient rescheduled for surgery, left message with  to call writer back to discuss a different date.    Breanne Suacedo

## 2017-01-31 ENCOUNTER — TELEPHONE (OUTPATIENT)
Dept: SURGERY | Facility: OTHER | Age: 57
End: 2017-01-31

## 2017-01-31 DIAGNOSIS — K21.9 GASTROESOPHAGEAL REFLUX DISEASE WITHOUT ESOPHAGITIS: Primary | ICD-10-CM

## 2017-01-31 RX ORDER — METOCLOPRAMIDE 5 MG/1
5 TABLET ORAL 4 TIMES DAILY PRN
Qty: 20 TABLET | Refills: 0 | Status: SHIPPED | OUTPATIENT
Start: 2017-01-31 | End: 2017-02-13

## 2017-01-31 NOTE — TELEPHONE ENCOUNTER
Patient is scheduled to have an EGD on 2/9/2017 and she has been miserable with pain and being unable to eat. She is wondering if there is anything she can do between now and then to help with this?

## 2017-01-31 NOTE — TELEPHONE ENCOUNTER
Is she losing weight?  Has she seen her PCP?  We can try to add ranitidine or Reglan to see if that helps. If it is this bad, we should get an abdominal CT

## 2017-02-06 ENCOUNTER — MYC MEDICAL ADVICE (OUTPATIENT)
Dept: FAMILY MEDICINE | Facility: OTHER | Age: 57
End: 2017-02-06

## 2017-02-08 ENCOUNTER — OFFICE VISIT (OUTPATIENT)
Dept: PSYCHIATRY | Facility: OTHER | Age: 57
End: 2017-02-08
Attending: PSYCHIATRY & NEUROLOGY
Payer: COMMERCIAL

## 2017-02-08 ENCOUNTER — TELEPHONE (OUTPATIENT)
Dept: FAMILY MEDICINE | Facility: OTHER | Age: 57
End: 2017-02-08

## 2017-02-08 VITALS
DIASTOLIC BLOOD PRESSURE: 68 MMHG | SYSTOLIC BLOOD PRESSURE: 120 MMHG | TEMPERATURE: 97.2 F | WEIGHT: 195 LBS | HEART RATE: 63 BPM | BODY MASS INDEX: 30.61 KG/M2 | HEIGHT: 67 IN

## 2017-02-08 DIAGNOSIS — F33.1 MAJOR DEPRESSIVE DISORDER, RECURRENT EPISODE, MODERATE (H): Primary | ICD-10-CM

## 2017-02-08 DIAGNOSIS — Z71.84 TRAVEL ADVICE ENCOUNTER: Primary | ICD-10-CM

## 2017-02-08 DIAGNOSIS — F33.2 SEVERE RECURRENT MAJOR DEPRESSION WITHOUT PSYCHOTIC FEATURES (H): ICD-10-CM

## 2017-02-08 PROCEDURE — 99212 OFFICE O/P EST SF 10 MIN: CPT

## 2017-02-08 PROCEDURE — 99214 OFFICE O/P EST MOD 30 MIN: CPT | Performed by: PSYCHIATRY & NEUROLOGY

## 2017-02-08 RX ORDER — BUPROPION HYDROCHLORIDE 200 MG/1
200 TABLET, EXTENDED RELEASE ORAL 2 TIMES DAILY
Qty: 60 TABLET | Refills: 5 | Status: SHIPPED | OUTPATIENT
Start: 2017-02-08 | End: 2017-08-28

## 2017-02-08 RX ORDER — LORAZEPAM 0.5 MG/1
0.5 TABLET ORAL EVERY 8 HOURS PRN
Qty: 30 TABLET | Refills: 0 | Status: SHIPPED | OUTPATIENT
Start: 2017-02-08 | End: 2017-07-05

## 2017-02-08 ASSESSMENT — ANXIETY QUESTIONNAIRES
2. NOT BEING ABLE TO STOP OR CONTROL WORRYING: NEARLY EVERY DAY
IF YOU CHECKED OFF ANY PROBLEMS ON THIS QUESTIONNAIRE, HOW DIFFICULT HAVE THESE PROBLEMS MADE IT FOR YOU TO DO YOUR WORK, TAKE CARE OF THINGS AT HOME, OR GET ALONG WITH OTHER PEOPLE: VERY DIFFICULT
5. BEING SO RESTLESS THAT IT IS HARD TO SIT STILL: SEVERAL DAYS
1. FEELING NERVOUS, ANXIOUS, OR ON EDGE: MORE THAN HALF THE DAYS
7. FEELING AFRAID AS IF SOMETHING AWFUL MIGHT HAPPEN: NEARLY EVERY DAY
6. BECOMING EASILY ANNOYED OR IRRITABLE: NEARLY EVERY DAY
GAD7 TOTAL SCORE: 17
3. WORRYING TOO MUCH ABOUT DIFFERENT THINGS: NEARLY EVERY DAY

## 2017-02-08 ASSESSMENT — PATIENT HEALTH QUESTIONNAIRE - PHQ9: 5. POOR APPETITE OR OVEREATING: MORE THAN HALF THE DAYS

## 2017-02-08 ASSESSMENT — PAIN SCALES - GENERAL: PAINLEVEL: MODERATE PAIN (4)

## 2017-02-08 NOTE — TELEPHONE ENCOUNTER
Patient left a note on my desk asking for a medication to be sent in for her to help calm her down for her up coming trip to [place]. Said she had gotten something for this before. Please advise.  Per med list was on Zanax 0.5mg TID PRN anxiety 1/28/14.  Kortney Smalls LPN

## 2017-02-08 NOTE — PROGRESS NOTES
PSYCHIATRY CLINIC PROGRESS NOTE   20 minutes, med management  more than 50% of time spent counseling patient on medications, medication side effects, symptom history and management   SUBJECTIVE / INTERIM HISTORY                                                                       The pt was last seen in clinic 12/16/16:  Continue Wellbutrin 200 mg bid. Continue Klonopin 1 mg bid. Continue trazodone 50 mg HS.   - week from this coming Friday heading to [place] for mother-in-law visit (her birthday this month)  - lots of physical sx and abdominal pain / nausea gets worse with stress  - doing PT  - developed tremor head and neck past several months  - sick and nausea: no rhyme or reason. Heartburn with it sometimes. Barium swallow week ago. Endoscopy tomorrow. Started on Reglan and Zantac week or so ago and few days ago was last time she threw up... So this is an improvement  - rheumatology consult: (autoimmune tests some pos tested recently) and sounds like no definitive dx made  - relationship issues. Of note 's two sons older 20s and other 31 yo still live at home. One of the boys bought house next door.   - memory: did notice it got little better once off gabapentin. Still is an issue though. Still forgets / repeats herself though  - gets embarrassed in conversations: forgets words, what she is going to say,etc    SUBSTANCE USE- no issues    SYMPTOMS-  Lots of worry and anxiety lately given increase in stressors. Depressed mood, low energy, anhedonia, feelings guilt & overwhelmed, poor sleep. cognitive issues including word finding  MEDICAL ROS-  Sick and nauseated lately , tremor, balance issues, Hypersomnia.  Pain: neck, DDD in neck. Numbness and tingling in legs and feet worse at times when sitting  MEDICAL / SURGICAL HISTORY                 Medical Team:     PMD- Concha Hare     Therapist-none   Patient Active Problem List   Diagnosis     Diverticulitis of sigmoid colon     Hypothyroidism     Anxiety  state     Cardiac microvascular disease (H)     Diverticulitis     Aortic valve disorder     ACP (advance care planning)     Chronic pain     Constipation     Memory loss     Major depressive disorder, recurrent episode (H)     Cognitive disorder     Major depressive disorder     Fatigue     Migraine without aura and responsive to treatment     Atypical migraine     ALLERGY   Codeine; Isosorbide mononitrate; and Lisinopril  MEDICATIONS                                                                                             Current Outpatient Prescriptions   Medication Sig     ranitidine (ZANTAC) 150 MG tablet Take 1 tablet (150 mg) by mouth 2 times daily     metoclopramide (REGLAN) 5 MG tablet Take 1 tablet (5 mg) by mouth 4 times daily as needed     traMADol (ULTRAM) 50 MG tablet TAKE TWO TABLETS BY MOUTH EVERY 8 HOURS AS NEEDED FOR PAIN     sucralfate (CARAFATE) 1 GM tablet Take 1 tablet (1 g) by mouth 4 times daily     hydrOXYzine (ATARAX) 25 MG tablet TAKE ONE TO TWO TABLETS BY MOUTH ONCE DAILY AT BEDTIME FOR NAUSEA     clonazePAM (KLONOPIN) 1 MG tablet Take 1 tablet (1 mg) by mouth 2 times daily as needed for anxiety     estradiol (ESTRACE) 0.5 MG tablet TAKE ONE TABLET BY MOUTH TWICE DAILY     PROCTOSOL HC 2.5 % rectal cream APPLY  CREAM TO AFFECTED AREA THREE TIMES DAILY     buPROPion (WELLBUTRIN SR) 200 MG 12 hr tablet Take 1 tablet (200 mg) by mouth 2 times daily     cyclobenzaprine (FLEXERIL) 10 MG tablet Take 0.5-1 tablets (5-10 mg) by mouth 3 times daily as needed for muscle spasms     diltiazem (TIAZAC) 300 MG 24 hr ER beaded capsule Take 1 capsule (300 mg) by mouth daily     levothyroxine (SYNTHROID, LEVOTHROID) 50 MCG tablet TAKE ONE TABLET BY MOUTH ONCE DAILY     traZODone (DESYREL) 50 MG tablet TAKE TWO TABLETS BY MOUTH AT BEDTIME AS NEEDED FOR SLEEP (Patient taking differently: No sig reported)     SUMAtriptan (IMITREX) 100 MG tablet TAKE ONE TABLET BY MOUTH AS NEEDED MIGRAINES     No current  "facility-administered medications for this visit.       VITALS   /68 mmHg  Pulse 63  Temp(Src) 97.2  F (36.2  C) (Tympanic)  Ht 5' 7\" (1.702 m)  Wt 195 lb (88.451 kg)  BMI 30.53 kg/m2    PHQ9                       PHQ-9 SCORE 11/3/2016 12/16/2016 2/8/2017   Total Score - - -   Total Score 22 22 18     Labs:    Liver Function Studies -   Recent Labs   Lab Test  01/13/16   0941   PROTTOTAL  7.5   ALBUMIN  3.7   BILITOTAL  0.3   ALKPHOS  92   AST  32   ALT  63*     Recent Labs   Lab Test  01/13/16   0941  03/28/14   1008   CHOL  244*  238*   HDL  64  81*   LDL  147*  143*   TRIG  164*  72   CHOLHDLRATIO   --   2.9*       MENTAL STATUS EXAM                                                                                        Alert. Oriented to person, place, and date / time. Well groomed, calm, cooperative with good eye contact. No problems with speech or psychomotor behavior. Mood was described as \"stressed\" and affect was congruent to speech content and restricted.f Thought process, including associations, was unremarkable and thought content was devoid of suicidal and homicidal ideation and psychotic thought. No hallucinations. Insight was good. Judgment was intact and adequate for safety. Fund of knowledge was intact. Pt demonstrates no obvious problems with attention, concentration, language, recent or remote memory although these were not formally tested.     ASSESSMENT                                                                                                      HISTORICAL:  Initial psych consult 12/12/14            Notes:  Ritalin as adjunct to antidepressant: didn't like how made her feel.   Prozac, Celexa ineffective. Now Wellbutrin helping. Zoloft: weight gain. Effexor back while: didn't stay on long and can't recall why. Cymbalta: was on and didn't help. She tried Provigil and SEs.   Neuropsych testing summer 2015:  language: about same, working memory: better than last year. Attention / " concentration worse than last year. Speed processing: improved. Executive function: didn't test well on that front also comparable to last year. Memory: worse off than last year. Depression: similar to last year. Anxiety / emotional anxiety worse. As part of assessment they think should possible have another MRI  CURRENT: This patient provides a history which supports the diagnoses of Major depressive disorder, recurrent, mod and mild neurocognitive disorder. We cross-tapered to Wellbutrin. We have been discussing trying a stimulant as adjunct to antidepressant but wasn't helpful and made her feel mentally off so  d/c. Will continue Wellbutrin 200 mg bid and Klonopin for anxiety. She tried Provigil since I've seen her last and unfortuantely this didn't pan out very well. Today not going to make any med changes..      TREATMENT RISK STATEMENT: The risks, benefits, alternatives and potential adverse effects have been explained and are understood by the pt. The pt agrees to the treatment plan with the ability to do so. The pt knows to call the clinic for any problems or access emergency care if needed.    DIAGNOSES      (Use of Axes system will continue, although absent from DSM 5)           Axis I - Major depressive disorder, recurrent severe wihtout psychotic features  Mild neurocognitive disorder  Axis II - no dx  Axis III - lumbago, fibromyalgia, hx migraines, hx diverticultitis  Axis IV- Psychosocial Stressors include: finances, marital strain  Axis V - Global Assessment of Functioning current: 41- 50 Serious symptoms OR any serious impairment in social, occupational, or school functioning.    PLAN                                                                                                                         1) MEDICATIONS:   -- Continue Wellbutrin 200 mg bid. Continue Klonopin 1 mg bid. Continue trazodone 50 mg HS.    2) THERAPY: no change.     3) LABS: none today    4) Other: Neurology provider Dr. Andre  through [facility]. Neuropsych testing with Dr. Johnathan Min    8)  RTC: ~2-3 months    Pharmacist: [phone]  Customer service [phone]

## 2017-02-09 ENCOUNTER — HOSPITAL ENCOUNTER (OUTPATIENT)
Facility: HOSPITAL | Age: 57
Discharge: HOME | End: 2017-02-09
Attending: SURGERY | Admitting: SURGERY
Payer: MEDICARE

## 2017-02-09 ENCOUNTER — SURGERY (OUTPATIENT)
Age: 57
End: 2017-02-09

## 2017-02-09 ENCOUNTER — ANESTHESIA (OUTPATIENT)
Dept: SURGERY | Facility: HOSPITAL | Age: 57
End: 2017-02-09
Payer: MEDICARE

## 2017-02-09 ENCOUNTER — ANESTHESIA EVENT (OUTPATIENT)
Dept: SURGERY | Facility: HOSPITAL | Age: 57
End: 2017-02-09
Payer: MEDICARE

## 2017-02-09 VITALS
RESPIRATION RATE: 16 BRPM | DIASTOLIC BLOOD PRESSURE: 71 MMHG | WEIGHT: 192 LBS | BODY MASS INDEX: 30.06 KG/M2 | SYSTOLIC BLOOD PRESSURE: 125 MMHG | TEMPERATURE: 96.8 F | OXYGEN SATURATION: 98 %

## 2017-02-09 PROCEDURE — 43239 EGD BIOPSY SINGLE/MULTIPLE: CPT | Performed by: ANESTHESIOLOGY

## 2017-02-09 PROCEDURE — 40000306 ZZH STATISTIC PRE PROC ASSESS II: Performed by: SURGERY

## 2017-02-09 PROCEDURE — 71000027 ZZH RECOVERY PHASE 2 EACH 15 MINS: Performed by: SURGERY

## 2017-02-09 PROCEDURE — 25000125 ZZHC RX 250: Performed by: NURSE ANESTHETIST, CERTIFIED REGISTERED

## 2017-02-09 PROCEDURE — 25800025 ZZH RX 258: Performed by: ANESTHESIOLOGY

## 2017-02-09 PROCEDURE — 37000008 ZZH ANESTHESIA TECHNICAL FEE, 1ST 30 MIN: Performed by: SURGERY

## 2017-02-09 PROCEDURE — 43239 EGD BIOPSY SINGLE/MULTIPLE: CPT | Performed by: NURSE ANESTHETIST, CERTIFIED REGISTERED

## 2017-02-09 PROCEDURE — 43239 EGD BIOPSY SINGLE/MULTIPLE: CPT | Performed by: SURGERY

## 2017-02-09 PROCEDURE — 25000128 H RX IP 250 OP 636: Performed by: NURSE ANESTHETIST, CERTIFIED REGISTERED

## 2017-02-09 PROCEDURE — 36000050 ZZH SURGERY LEVEL 2 1ST 30 MIN: Performed by: SURGERY

## 2017-02-09 PROCEDURE — 27210794 ZZH OR GENERAL SUPPLY STERILE: Performed by: SURGERY

## 2017-02-09 PROCEDURE — 88305 TISSUE EXAM BY PATHOLOGIST: CPT | Mod: TC | Performed by: SURGERY

## 2017-02-09 RX ORDER — FENTANYL CITRATE 50 UG/ML
25-50 INJECTION, SOLUTION INTRAMUSCULAR; INTRAVENOUS EVERY 5 MIN PRN
Status: DISCONTINUED | OUTPATIENT
Start: 2017-02-09 | End: 2017-02-10 | Stop reason: HOSPADM

## 2017-02-09 RX ORDER — NALOXONE HYDROCHLORIDE 0.4 MG/ML
.1-.4 INJECTION, SOLUTION INTRAMUSCULAR; INTRAVENOUS; SUBCUTANEOUS
Status: DISCONTINUED | OUTPATIENT
Start: 2017-02-09 | End: 2017-02-10 | Stop reason: HOSPADM

## 2017-02-09 RX ORDER — LIDOCAINE 40 MG/G
CREAM TOPICAL
Status: DISCONTINUED | OUTPATIENT
Start: 2017-02-09 | End: 2017-02-09

## 2017-02-09 RX ORDER — LIDOCAINE HYDROCHLORIDE 20 MG/ML
INJECTION, SOLUTION INFILTRATION; PERINEURAL PRN
Status: DISCONTINUED | OUTPATIENT
Start: 2017-02-09 | End: 2017-02-10

## 2017-02-09 RX ORDER — ONDANSETRON 4 MG/1
4 TABLET, ORALLY DISINTEGRATING ORAL EVERY 30 MIN PRN
Status: DISCONTINUED | OUTPATIENT
Start: 2017-02-09 | End: 2017-02-10 | Stop reason: HOSPADM

## 2017-02-09 RX ORDER — HYDRALAZINE HYDROCHLORIDE 20 MG/ML
2.5-5 INJECTION INTRAMUSCULAR; INTRAVENOUS EVERY 10 MIN PRN
Status: DISCONTINUED | OUTPATIENT
Start: 2017-02-09 | End: 2017-02-10 | Stop reason: HOSPADM

## 2017-02-09 RX ORDER — PROPOFOL 10 MG/ML
INJECTION, EMULSION INTRAVENOUS PRN
Status: DISCONTINUED | OUTPATIENT
Start: 2017-02-09 | End: 2017-02-10

## 2017-02-09 RX ORDER — SODIUM CHLORIDE, SODIUM LACTATE, POTASSIUM CHLORIDE, CALCIUM CHLORIDE 600; 310; 30; 20 MG/100ML; MG/100ML; MG/100ML; MG/100ML
1000 INJECTION, SOLUTION INTRAVENOUS CONTINUOUS
Status: DISCONTINUED | OUTPATIENT
Start: 2017-02-09 | End: 2017-02-09 | Stop reason: HOSPADM

## 2017-02-09 RX ORDER — FENTANYL CITRATE 50 UG/ML
INJECTION, SOLUTION INTRAMUSCULAR; INTRAVENOUS PRN
Status: DISCONTINUED | OUTPATIENT
Start: 2017-02-09 | End: 2017-02-10

## 2017-02-09 RX ORDER — LIDOCAINE 40 MG/G
CREAM TOPICAL
Status: DISCONTINUED | OUTPATIENT
Start: 2017-02-09 | End: 2017-02-09 | Stop reason: HOSPADM

## 2017-02-09 RX ORDER — DEXAMETHASONE SODIUM PHOSPHATE 4 MG/ML
4 INJECTION, SOLUTION INTRA-ARTICULAR; INTRALESIONAL; INTRAMUSCULAR; INTRAVENOUS; SOFT TISSUE EVERY 10 MIN PRN
Status: DISCONTINUED | OUTPATIENT
Start: 2017-02-09 | End: 2017-02-10 | Stop reason: HOSPADM

## 2017-02-09 RX ORDER — FENTANYL CITRATE 50 UG/ML
25-50 INJECTION, SOLUTION INTRAMUSCULAR; INTRAVENOUS
Status: DISCONTINUED | OUTPATIENT
Start: 2017-02-09 | End: 2017-02-10 | Stop reason: HOSPADM

## 2017-02-09 RX ORDER — SODIUM CHLORIDE, SODIUM LACTATE, POTASSIUM CHLORIDE, CALCIUM CHLORIDE 600; 310; 30; 20 MG/100ML; MG/100ML; MG/100ML; MG/100ML
INJECTION, SOLUTION INTRAVENOUS CONTINUOUS
Status: DISCONTINUED | OUTPATIENT
Start: 2017-02-09 | End: 2017-02-10 | Stop reason: HOSPADM

## 2017-02-09 RX ORDER — ALBUTEROL SULFATE 0.83 MG/ML
2.5 SOLUTION RESPIRATORY (INHALATION) EVERY 4 HOURS PRN
Status: DISCONTINUED | OUTPATIENT
Start: 2017-02-09 | End: 2017-02-10 | Stop reason: HOSPADM

## 2017-02-09 RX ORDER — ONDANSETRON 2 MG/ML
4 INJECTION INTRAMUSCULAR; INTRAVENOUS EVERY 30 MIN PRN
Status: DISCONTINUED | OUTPATIENT
Start: 2017-02-09 | End: 2017-02-10 | Stop reason: HOSPADM

## 2017-02-09 RX ORDER — MEPERIDINE HYDROCHLORIDE 25 MG/ML
12.5 INJECTION INTRAMUSCULAR; INTRAVENOUS; SUBCUTANEOUS
Status: DISCONTINUED | OUTPATIENT
Start: 2017-02-09 | End: 2017-02-10 | Stop reason: HOSPADM

## 2017-02-09 RX ADMIN — PROPOFOL 20 MG: 10 INJECTION, EMULSION INTRAVENOUS at 12:30

## 2017-02-09 RX ADMIN — PROPOFOL 10 MG: 10 INJECTION, EMULSION INTRAVENOUS at 12:37

## 2017-02-09 RX ADMIN — PROPOFOL 20 MG: 10 INJECTION, EMULSION INTRAVENOUS at 12:28

## 2017-02-09 RX ADMIN — PROPOFOL 20 MG: 10 INJECTION, EMULSION INTRAVENOUS at 12:26

## 2017-02-09 RX ADMIN — PROPOFOL 50 MG: 10 INJECTION, EMULSION INTRAVENOUS at 12:24

## 2017-02-09 RX ADMIN — PROPOFOL 10 MG: 10 INJECTION, EMULSION INTRAVENOUS at 12:36

## 2017-02-09 RX ADMIN — PROPOFOL 20 MG: 10 INJECTION, EMULSION INTRAVENOUS at 12:33

## 2017-02-09 RX ADMIN — PROPOFOL 20 MG: 10 INJECTION, EMULSION INTRAVENOUS at 12:31

## 2017-02-09 RX ADMIN — PROPOFOL 20 MG: 10 INJECTION, EMULSION INTRAVENOUS at 12:34

## 2017-02-09 RX ADMIN — PROPOFOL 20 MG: 10 INJECTION, EMULSION INTRAVENOUS at 12:27

## 2017-02-09 RX ADMIN — MIDAZOLAM HYDROCHLORIDE 2 MG: 1 INJECTION, SOLUTION INTRAMUSCULAR; INTRAVENOUS at 12:20

## 2017-02-09 RX ADMIN — PROPOFOL 10 MG: 10 INJECTION, EMULSION INTRAVENOUS at 12:38

## 2017-02-09 RX ADMIN — SODIUM CHLORIDE, POTASSIUM CHLORIDE, SODIUM LACTATE AND CALCIUM CHLORIDE: 600; 310; 30; 20 INJECTION, SOLUTION INTRAVENOUS at 11:18

## 2017-02-09 RX ADMIN — PROPOFOL 10 MG: 10 INJECTION, EMULSION INTRAVENOUS at 12:39

## 2017-02-09 RX ADMIN — PROPOFOL 10 MG: 10 INJECTION, EMULSION INTRAVENOUS at 12:35

## 2017-02-09 RX ADMIN — LIDOCAINE HYDROCHLORIDE 40 MG: 20 INJECTION, SOLUTION INFILTRATION; PERINEURAL at 12:23

## 2017-02-09 RX ADMIN — PROPOFOL 20 MG: 10 INJECTION, EMULSION INTRAVENOUS at 12:32

## 2017-02-09 RX ADMIN — FENTANYL CITRATE 50 MCG: 50 INJECTION, SOLUTION INTRAMUSCULAR; INTRAVENOUS at 12:22

## 2017-02-09 RX ADMIN — PROPOFOL 20 MG: 10 INJECTION, EMULSION INTRAVENOUS at 12:25

## 2017-02-09 ASSESSMENT — PATIENT HEALTH QUESTIONNAIRE - PHQ9: SUM OF ALL RESPONSES TO PHQ QUESTIONS 1-9: 18

## 2017-02-09 ASSESSMENT — ANXIETY QUESTIONNAIRES: GAD7 TOTAL SCORE: 17

## 2017-02-09 ASSESSMENT — LIFESTYLE VARIABLES: TOBACCO_USE: 1

## 2017-02-09 NOTE — DISCHARGE INSTRUCTIONS
UPPER ENDOSCOPY    PURPOSE:  An Upper Endoscopy is a procedure in which the doctor passes a flexible, lighted tube called a gastroscope through your mouth into your stomach in order to:    See the lining of the esophagus, stomach, and duodenum (first part of the small intestine).    Look for bleeding, inflammation (swelling), abnormal tumors or tissue, or ulcers.    Take biopsy specimens.  (Biopsies do not hurt.)    TELL YOUR DOCTOR IF:     You have a heart condition, heart murmur, or have had heart valve surgery.    You have had angioplasty with stents put in within the last year.    You are taking blood thinners - Aspirin, Anti-inflammatory pain pills (like Motrin, ibuprofen, Naproxen, Feldene, Advil, etc.), Coumadin, Plavix.    You have diabetes - contact your regular doctor for management of your insulin.    YOU NEED TO:    Have someone drive you home.  You are not allowed to drive until the next day.  (If you take a taxi, the person staying with you after surgery must ride with you in the taxi.  The  is not responsible for you).    Have someone stay with you for four (4) hours after you leave the hospital.    Know the time to be at admitting:  A nurse will call you with the time the afternoon before your procedure.  If your procedure is on Monday, you will be called on Friday.  If you have not been contacted with the time, call the Admitting Department at [phone] or [phone], ext. [phone] after 5 pm (Admitting is open 24 hours daily).    Talk with the Surgery Patient Education Nurses.  Please call them @ [phone] or toll free [phone] after 8:00 a.m. Monday through Friday.    TO PREPARE FOR THE PROCEDURE:      ONE WEEK BEFORE:    Stop Aspirin, anti-inflammatory pain pills (Motrin, ibuprofen, Naproxen, Feldene, Advil, etc.).  It is OK to take Tylenol (acetaminophen).    Your doctor will tell you what to do if you are on Coumadin or Plavix.     NIGHT BEFORE:    Do not eat or drink  anything after midnight.  Your stomach needs to be empty.     DAY OF YOUR PROCEDURE:    Take your pills you were told to take.  Do not take diabetic pills.  If you are on Insulin, take the dose your doctor told you to take.    Wear loose, comfortable clothing and shoes.    Remove ALL jewelry including wedding rings.  Leave valuables at home.    Come to the hospital Admitting Department located at the [facility] on the lower level.    In Same Day surgery, you will be asked to change into an exam gown.    You will be asked your name, birth date, and what you are having done by every person who is involved with your care.    Your health history, medications, and allergies will be reviewed and verified.    An IV (intravenous line) will be started in your hand.  DURING THE UPPER ENDOSCOPY:    Your doctor and a registered nurse will be with you throughout the procedure which takes about 30 minutes.    You will either lie on your left side or on your back.    Medications will be given to you to help you relax and reduce the gag reflex when swallowing the scope.    Your doctor may need to insert air into your stomach to see better.  This may cause fullness or a cramping sensation.  The air will be removed at the end of the exam.    AFTER THE PROCEDURE    You will return to Same Day Surgery to rest for about an hour before you go home.    The doctor will talk with you and your family.    A family member/friend may visit with you.    You may burp up any air remaining in your stomach.    You may feel dizzy or light-headed from the medicine.    Your nurse will go over the discharge instructions with you and your caregiver and answer any of your questions.      You will be contacted the next day to check on how you are doing.    If biopsies were taken, you will be contacted with the results usually within 3 days.  BACK AT HOME    Rest for an hour or two after you get home.    When your throat is no longer numb and you have a  gag reflex, take a few sips of cool water.  If you can swallow comfortably, you may start eating again.    You may have a mild sore throat for the rest of the day.  WHAT TO WATCH FOR:  Problems rarely occur after the exam, but it is important to be aware of the early signs of a complication.  Call your doctor immediately if you have:    Difficulty swallowing or breathing    Unusual pain in your stomach or chest    Vomiting blood or dark material that looks like coffee grounds    Black or tarry stools    Temperature over 100.6 degrees    MORE QUESTIONS?  Please ask your doctor or nurse before the exam begins  or call your doctor at the clinic.    IF YOU MUST CANCEL YOUR PROCEDURE THE EVENING/NIGHT BEFORE, PLEASE CALL HOSPITAL ADMITTING -[phone] OR TOLL FREE [phone], [phone]. [phone].    Phone Numbers:  Hospital - [phone] [phone]  [facility] - [phone]  Surgery Patient Education - [phone] or toll free [phone]    Post-Anesthesia Patient Instructions    IMMEDIATELY FOLLOWING SURGERY:  Do not drive or operate machinery for the first twenty four hours after surgery.  Do not make any important decisions for twenty four hours after surgery or while taking narcotic pain medications or sedatives.  If you develop intractable nausea and vomiting or a severe headache please notify your doctor immediately.    FOLLOW-UP:  Please make an appointment with your surgeon as instructed. You do not need to follow up with anesthesia unless specifically instructed to do so.    WOUND CARE INSTRUCTIONS (if applicable):  Keep a dry clean dressing on the anesthesia/puncture wound site if there is drainage.  Once the wound has quit draining you may leave it open to air.  Generally you should leave the bandage intact for twenty four hours unless there is drainage.  If the epidural site drains for more than 36-48 hours please call the anesthesia department.    QUESTIONS?:  Please feel free to call your  physician or the hospital  if you have any questions, and they will be happy to assist you.

## 2017-02-09 NOTE — IP AVS SNAPSHOT
MRN:[id number]                      After Visit Summary   2/9/2017    Sharlene Mccord    MRN: [id number]           Thank you!     Thank you for choosing [facility] for your care. Our goal is always to provide you with excellent care. Hearing back from our patients is one way we can continue to improve our services. Please take a few minutes to complete the written survey that you may receive in the mail after you visit with us. Thank you!        Patient Information     Date Of Birth          1960        About your hospital stay     You were admitted on:  February 9, 2017 You last received care in the:  HI Preop/Phase II    You were discharged on:  February 9, 2017       Who to Call     For medical emergencies, please call 911.  For non-urgent questions about your medical care, please call your primary care provider or clinic, [phone]  For questions related to your surgery, please call your surgery clinic        Attending Provider     Provider    Johnathan Ruff DO       Primary Care Provider Office Phone # Fax #    KEILY Fonseca [phone]       [facility] [address] [address]  [place] [address]        Your next 10 appointments already scheduled     Apr 19, 2017 10:20 AM   (Arrive by 10:05 AM)   Return Visit with Aurelia Odell MD   [facility] [place] [facility])    [address] [place] [address]  [place] [address] [phone]            May 23, 2017  9:30 AM   (Arrive by 9:15 AM)   Return Visit with Norman Nichole MD   [facility] ([facility] [place] [facility])    [address] [facility]  [place] [address] [phone]              Further instructions from your care team           UPPER ENDOSCOPY    PURPOSE:  An Upper Endoscopy is a procedure in which the doctor passes a flexible, lighted tube called a gastroscope through your mouth into your stomach in order to:    See the lining of the esophagus, stomach, and duodenum (first part  of the small intestine).    Look for bleeding, inflammation (swelling), abnormal tumors or tissue, or ulcers.    Take biopsy specimens.  (Biopsies do not hurt.)    TELL YOUR DOCTOR IF:     You have a heart condition, heart murmur, or have had heart valve surgery.    You have had angioplasty with stents put in within the last year.    You are taking blood thinners - Aspirin, Anti-inflammatory pain pills (like Motrin, ibuprofen, Naproxen, Feldene, Advil, etc.), Coumadin, Plavix.    You have diabetes - contact your regular doctor for management of your insulin.    YOU NEED TO:    Have someone drive you home.  You are not allowed to drive until the next day.  (If you take a taxi, the person staying with you after surgery must ride with you in the taxi.  The  is not responsible for you).    Have someone stay with you for four (4) hours after you leave the hospital.    Know the time to be at admitting:  A nurse will call you with the time the afternoon before your procedure.  If your procedure is on Monday, you will be called on Friday.  If you have not been contacted with the time, call the Admitting Department at [phone] or [phone], ext. [phone] after 5 pm (Admitting is open 24 hours daily).    Talk with the Surgery Patient Education Nurses.  Please call them @ [phone] or toll free [phone] after 8:00 a.m. Monday through Friday.    TO PREPARE FOR THE PROCEDURE:      ONE WEEK BEFORE:    Stop Aspirin, anti-inflammatory pain pills (Motrin, ibuprofen, Naproxen, Feldene, Advil, etc.).  It is OK to take Tylenol (acetaminophen).    Your doctor will tell you what to do if you are on Coumadin or Plavix.     NIGHT BEFORE:    Do not eat or drink anything after midnight.  Your stomach needs to be empty.     DAY OF YOUR PROCEDURE:    Take your pills you were told to take.  Do not take diabetic pills.  If you are on Insulin, take the dose your doctor told you to take.    Wear loose, comfortable clothing  and shoes.    Remove ALL jewelry including wedding rings.  Leave valuables at home.    Come to the hospital Admitting Department located at the [place] entrance on the lower level.    In Same Day surgery, you will be asked to change into an exam gown.    You will be asked your name, birth date, and what you are having done by every person who is involved with your care.    Your health history, medications, and allergies will be reviewed and verified.    An IV (intravenous line) will be started in your hand.  DURING THE UPPER ENDOSCOPY:    Your doctor and a registered nurse will be with you throughout the procedure which takes about 30 minutes.    You will either lie on your left side or on your back.    Medications will be given to you to help you relax and reduce the gag reflex when swallowing the scope.    Your doctor may need to insert air into your stomach to see better.  This may cause fullness or a cramping sensation.  The air will be removed at the end of the exam.    AFTER THE PROCEDURE    You will return to Same Day Surgery to rest for about an hour before you go home.    The doctor will talk with you and your family.    A family member/friend may visit with you.    You may burp up any air remaining in your stomach.    You may feel dizzy or light-headed from the medicine.    Your nurse will go over the discharge instructions with you and your caregiver and answer any of your questions.      You will be contacted the next day to check on how you are doing.    If biopsies were taken, you will be contacted with the results usually within 3 days.  BACK AT HOME    Rest for an hour or two after you get home.    When your throat is no longer numb and you have a gag reflex, take a few sips of cool water.  If you can swallow comfortably, you may start eating again.    You may have a mild sore throat for the rest of the day.  WHAT TO WATCH FOR:  Problems rarely occur after the exam, but it is important to be aware of  the early signs of a complication.  Call your doctor immediately if you have:    Difficulty swallowing or breathing    Unusual pain in your stomach or chest    Vomiting blood or dark material that looks like coffee grounds    Black or tarry stools    Temperature over 100.6 degrees    MORE QUESTIONS?  Please ask your doctor or nurse before the exam begins  or call your doctor at the clinic.    IF YOU MUST CANCEL YOUR PROCEDURE THE EVENING/NIGHT BEFORE, PLEASE CALL HOSPITAL ADMITTING -[phone] OR TOLL FREE [phone], [phone]. [phone].    Phone Numbers:  Hospital - [phone] [phone]  [facility] - [phone]  Surgery Patient Education - [phone] or toll free [phone]    Post-Anesthesia Patient Instructions    IMMEDIATELY FOLLOWING SURGERY:  Do not drive or operate machinery for the first twenty four hours after surgery.  Do not make any important decisions for twenty four hours after surgery or while taking narcotic pain medications or sedatives.  If you develop intractable nausea and vomiting or a severe headache please notify your doctor immediately.    FOLLOW-UP:  Please make an appointment with your surgeon as instructed. You do not need to follow up with anesthesia unless specifically instructed to do so.    WOUND CARE INSTRUCTIONS (if applicable):  Keep a dry clean dressing on the anesthesia/puncture wound site if there is drainage.  Once the wound has quit draining you may leave it open to air.  Generally you should leave the bandage intact for twenty four hours unless there is drainage.  If the epidural site drains for more than 36-48 hours please call the anesthesia department.    QUESTIONS?:  Please feel free to call your physician or the hospital  if you have any questions, and they will be happy to assist you.       Pending Results     No orders found from 2/8/2017 to 2/10/2017.            Admission Information        Provider Department Dept Phone    2/9/2017 Johnathan MAY  DO [facility]/Phase [phone]      Your Vitals Were     Blood Pressure Temperature Respirations Weight Pulse Oximetry       127/76 mmHg 96.8  F (36  C) (Tympanic) 18 87.091 kg (192 lb) 95%       MyChart Information     [employer] gives you secure access to your electronic health record. If you see a primary care provider, you can also send messages to your care team and make appointments. If you have questions, please call your primary care clinic.  If you do not have a primary care provider, please call [phone] and they will assist you.        Care EveryWhere ID     This is your Care EveryWhere ID. This could be used by other organizations to access your [facility] medical records  [id number]           Review of your medicines      CONTINUE these medicines which may have CHANGED, or have new prescriptions. If we are uncertain of the size of tablets/capsules you have at home, strength may be listed as something that might have changed.        Dose / Directions    traZODone 50 MG tablet   Commonly known as:  DESYREL   This may have changed:  additional instructions   Used for:  Psychophysiological insomnia        TAKE TWO TABLETS BY MOUTH AT BEDTIME AS NEEDED FOR SLEEP   Quantity:  60 tablet   Refills:  11         CONTINUE these medicines which have NOT CHANGED        Dose / Directions    buPROPion 200 MG 12 hr tablet   Commonly known as:  WELLBUTRIN SR   Used for:  Major depressive disorder, recurrent episode, moderate (H)        Dose:  200 mg   Take 1 tablet (200 mg) by mouth 2 times daily   Quantity:  60 tablet   Refills:  5       clonazePAM 1 MG tablet   Commonly known as:  klonoPIN   Used for:  SHAKIR (generalized anxiety disorder)        Dose:  1 mg   Take 1 tablet (1 mg) by mouth 2 times daily as needed for anxiety   Quantity:  60 tablet   Refills:  5       cyclobenzaprine 10 MG tablet   Commonly known as:  FLEXERIL   Used for:  Cervicalgia        Dose:  5-10 mg   Take 0.5-1 tablets (5-10 mg) by  mouth 3 times daily as needed for muscle spasms   Quantity:  30 tablet   Refills:  1       diltiazem 300 MG 24 hr ER beaded capsule   Commonly known as:  TIAZAC   Used for:  Cardiac microvascular disease (H)        Dose:  300 mg   Take 1 capsule (300 mg) by mouth daily   Quantity:  90 capsule   Refills:  3       estradiol 0.5 MG tablet   Commonly known as:  ESTRACE   Used for:  Postmenopausal HRT (hormone replacement therapy)        TAKE ONE TABLET BY MOUTH TWICE DAILY   Quantity:  60 tablet   Refills:  3       hydrOXYzine 25 MG tablet   Commonly known as:  ATARAX   Used for:  Chronic nausea        TAKE ONE TO TWO TABLETS BY MOUTH ONCE DAILY AT BEDTIME FOR NAUSEA   Quantity:  120 tablet   Refills:  0       levothyroxine 50 MCG tablet   Commonly known as:  SYNTHROID/LEVOTHROID   Used for:  Hypothyroidism        TAKE ONE TABLET BY MOUTH ONCE DAILY   Quantity:  30 tablet   Refills:  10       LORazepam 0.5 MG tablet   Commonly known as:  ATIVAN   Used for:  Travel advice encounter        Dose:  0.5 mg   Take 1 tablet (0.5 mg) by mouth every 8 hours as needed for anxiety   Quantity:  30 tablet   Refills:  0       metoclopramide 5 MG tablet   Commonly known as:  REGLAN   Used for:  Gastroesophageal reflux disease without esophagitis        Dose:  5 mg   Take 1 tablet (5 mg) by mouth 4 times daily as needed   Quantity:  20 tablet   Refills:  0       PROCTOSOL HC 2.5 % cream   Used for:  Hemorrhoid   Generic drug:  hydrocortisone        APPLY  CREAM TO AFFECTED AREA THREE TIMES DAILY   Quantity:  58 g   Refills:  1       ranitidine 150 MG tablet   Commonly known as:  ZANTAC   Used for:  Gastroesophageal reflux disease without esophagitis        Dose:  150 mg   Take 1 tablet (150 mg) by mouth 2 times daily   Quantity:  60 tablet   Refills:  0       sucralfate 1 GM tablet   Commonly known as:  CARAFATE   Used for:  Pharyngoesophageal dysphagia        Dose:  1 g   Take 1 tablet (1 g) by mouth 4 times daily   Quantity:  40  tablet   Refills:  1       SUMAtriptan 100 MG tablet   Commonly known as:  IMITREX   Used for:  Migraine headache        TAKE ONE TABLET BY MOUTH AS NEEDED MIGRAINES   Quantity:  10 tablet   Refills:  5       traMADol 50 MG tablet   Commonly known as:  ULTRAM   Used for:  Fibromyalgia        TAKE TWO TABLETS BY MOUTH EVERY 8 HOURS AS NEEDED FOR PAIN   Quantity:  180 tablet   Refills:  0                Protect others around you: Learn how to safely use, store and throw away your medicines at www.disposemymeds.org.             Medication List: This is a list of all your medications and when to take them. Check marks below indicate your daily home schedule. Keep this list as a reference.      Medications           Morning Afternoon Evening Bedtime As Needed    buPROPion 200 MG 12 hr tablet   Commonly known as:  WELLBUTRIN SR   Take 1 tablet (200 mg) by mouth 2 times daily                                clonazePAM 1 MG tablet   Commonly known as:  klonoPIN   Take 1 tablet (1 mg) by mouth 2 times daily as needed for anxiety                                cyclobenzaprine 10 MG tablet   Commonly known as:  FLEXERIL   Take 0.5-1 tablets (5-10 mg) by mouth 3 times daily as needed for muscle spasms                                diltiazem 300 MG 24 hr ER beaded capsule   Commonly known as:  TIAZAC   Take 1 capsule (300 mg) by mouth daily                                estradiol 0.5 MG tablet   Commonly known as:  ESTRACE   TAKE ONE TABLET BY MOUTH TWICE DAILY                                hydrOXYzine 25 MG tablet   Commonly known as:  ATARAX   TAKE ONE TO TWO TABLETS BY MOUTH ONCE DAILY AT BEDTIME FOR NAUSEA                                levothyroxine 50 MCG tablet   Commonly known as:  SYNTHROID/LEVOTHROID   TAKE ONE TABLET BY MOUTH ONCE DAILY                                LORazepam 0.5 MG tablet   Commonly known as:  ATIVAN   Take 1 tablet (0.5 mg) by mouth every 8 hours as needed for anxiety                                 metoclopramide 5 MG tablet   Commonly known as:  REGLAN   Take 1 tablet (5 mg) by mouth 4 times daily as needed                                PROCTOSOL HC 2.5 % cream   APPLY  CREAM TO AFFECTED AREA THREE TIMES DAILY   Generic drug:  hydrocortisone                                ranitidine 150 MG tablet   Commonly known as:  ZANTAC   Take 1 tablet (150 mg) by mouth 2 times daily                                sucralfate 1 GM tablet   Commonly known as:  CARAFATE   Take 1 tablet (1 g) by mouth 4 times daily                                SUMAtriptan 100 MG tablet   Commonly known as:  IMITREX   TAKE ONE TABLET BY MOUTH AS NEEDED MIGRAINES                                traMADol 50 MG tablet   Commonly known as:  ULTRAM   TAKE TWO TABLETS BY MOUTH EVERY 8 HOURS AS NEEDED FOR PAIN                                traZODone 50 MG tablet   Commonly known as:  DESYREL   TAKE TWO TABLETS BY MOUTH AT BEDTIME AS NEEDED FOR SLEEP

## 2017-02-09 NOTE — ANESTHESIA PREPROCEDURE EVALUATION
Anesthesia Evaluation     . Pt has had prior anesthetic.     History of anesthetic complications  - PONV    ROS/MED HX    ENT/Pulmonary:     (+)tobacco use, Current use <0.75 PPD packs/day  , . .    Neurologic:     (+)migraines, other neuro Essential Tremor, Mermory Loss    Cardiovascular:     (+) Dyslipidemia, hypertension--CAD, -past MI,-. : . . . :. valvular problems/murmurs type: AI and MR Moderate AI, Mild MR:. Previous cardiac testing Echodate:2014results:1.  Normal left ventricular size and systolic function, ejection fraction at 65%.  2.  Borderline left atrial enlargement.  3.  Normal right ventricle size and function.  4.  Mild mitral regurgitation.  5.  Aortic valve with moderate aortic insufficiency. Pressure half-time is 522 milliseconds, basically unchanged from echo of May 12, 2014.  6.  Trace tricuspid regurgitation.  Peak systolic pressure of the right ventricle is estimated at 20 plus the right atrium.  7.  Diastolic indices show there is a reversal of E to A ratio at 0.89, suggesting a diastolic dysfunction.    Stress Testdate:2013 results:Left ventricular ejection fraction is 67%.  There is small-to-moderate sized fixed perfusion defect of the anteroseptal wall.  This extends to the cardiac apex.  No reversible perfusion defects are present to suggest ischemia. IMPRESSION:   Moderate-sized anteroseptal infarct involving the apex. There is no evidence of cardiac ischemia. date: results: date: results:          METS/Exercise Tolerance:     Hematologic:  - neg hematologic  ROS       Musculoskeletal:   (+) arthritis, , , other musculoskeletal- Fibromyalgia      GI/Hepatic:     (+) GERD Other GI/Hepatic Diverticular Disease      Renal/Genitourinary:  - ROS Renal section negative       Endo:     (+) thyroid problem hypothyroidism, Obesity, .      Psychiatric:     (+) psychiatric history anxiety and depression      Infectious Disease:  - neg infectious disease ROS       Malignancy:      - no  malignancy   Other:    - neg other ROS           Physical Exam  Normal systems: cardiovascular and pulmonary    Airway   Mallampati: II  TM distance: >3 FB  Neck ROM: full    Dental   (+) chipped    Cardiovascular   Rhythm and rate: regular and normal      Pulmonary    breath sounds clear to auscultation                    Anesthesia Plan      History & Physical Review  History and physical reviewed and following examination; no interval change.    ASA Status:  3 .    NPO Status:  > 8 hours    Plan for MAC with Intravenous and Propofol induction. Maintenance will be TIVA.  Reason for MAC:  Procedure to face, neck, head or breast, Extreme anxiety (QS), Chronic cardiopulmonary disease (G9) and Other - see comments  PONV prophylaxis:  Ondansetron (or other 5HT-3)  Surgeon requests deep sedation. Patient is an ASA 3 and has an anxiety d/o treated with anxiolytics. Will provide MAC.      Postoperative Care  Postoperative pain management:  Multi-modal analgesia.      Consents  Anesthetic plan, risks, benefits and alternatives discussed with:  Patient..                          .

## 2017-02-09 NOTE — OR NURSING
Patient arrived via cart. Coughing frequent, dry cough. Sips of water given patient coughing decreased. Oxygen 97% room air. Will continue to monitor.

## 2017-02-09 NOTE — BRIEF OP NOTE
[facility] - Brief Operative Note    Pre-operative diagnosis: Reflux   Post-operative diagnosis Esophagitis, chronic gastritis    Procedure: esophagogastroduodenoscopy   Surgeon: Johnathan Ruff DO   Anesthesia: Monitor Anesthesia Care    Estimated blood loss: 0   Blood transfusion: No transfusion was given during surgery   Drains: 0   Specimens: Duodenum, gastric antrum, GE junction   Findings: Chronic gastritis, irregular GE junction at 40 cm   Complications: None   Condition: Stable   Comments: Details included in dictated operative note.

## 2017-02-09 NOTE — IP AVS SNAPSHOT
HI Preop/Phase II    [address] [address]    Phone:  [phone]                                       After Visit Summary   2/9/2017    Sharlene Mccord    MRN: [id number]           After Visit Summary Signature Page     I have received my discharge instructions, and my questions have been answered. I have discussed any challenges I see with this plan with the nurse or doctor.    ..........................................................................................................................................  Patient/Patient Representative Signature      ..........................................................................................................................................  Patient Representative Print Name and Relationship to Patient    ..................................................               ................................................  Date                                            Time    ..........................................................................................................................................  Reviewed by Signature/Title    ...................................................              ..............................................  Date                                                            Time

## 2017-02-09 NOTE — OP NOTE
DATE OF PROCEDURE:  02/09/2017      PREOPERATIVE DIAGNOSIS:  Gastroesophageal reflux disease.      POSTOPERATIVE DIAGNOSES:  Esophagitis, chronic gastritis.      PROCEDURE:  Esophagogastroduodenoscopy.      INDICATION:  Sharlene Mccord is a 56-year-old female with longstanding history of upper abdominal pain for 10 years with nausea, vomiting.  Started on PPI therapy a year ago with no relief of symptoms.  Normal colonoscopy 3 years ago.  Here for diagnostic endoscopy.      SURGEON:  Johnathan Ruff DO      DESCRIPTION OF PROCEDURE:  The patient was brought into the endoscopy suite and placed in the left lateral decubitus position.  After a preprocedural pause and attended monitored anesthesia was administered, the endoscope was advanced through the oral bite block through the oropharynx, past the cricopharyngeus and easily intubated the esophagus.  Under direct visualization, the endoscope was advanced down the esophagus, through the stomach, past the pylorus and on to the distal portions of the duodenum.  The mucosa of the duodenum was unremarkable.  The entire length of the duodenum including the sweep and bulb were thoroughly evaluated upon slow withdrawal of the endoscope while random biopsies were being obtained using cold biting forceps.  The endoscope then was withdrawn back into the antrum of the stomach.  There was some evidence of oxidized blood flakes within the antrum suggestive of chronic gastritis; however, this was very mild.  Random biopsies were obtained using cold biting forceps and sent to pathology.  Retroflexion into the cardia and fundus was unremarkable.  There was an intermittent very small type 1 hiatal hernia.  The endoscope then was withdrawn to the GE junction at 40 cm.  This was very irregular with islands of esophagitis extending approximately 1-2 cm from the GE junction.  Multiple biopsies were obtained using cold biting forceps and sent to pathology.  The extra air was removed from the  stomach.  The endoscope was slowly withdrawn evaluating the entire length of the esophagus, which was unremarkable.  The endoscope then was completely withdrawn.  The patient tolerated the procedure well and was taken to postanesthesia care unit.         SHARMILA PERALES DO             D: 2017 12:47   T: 2017 13:04   MT: [email or web address]      Name:     STEVEN SZYMANSKI   MRN:      [id number]        Account:        [id number]   :      1960           Procedure Date: 2017      Document: [id number]

## 2017-02-09 NOTE — ANESTHESIA POSTPROCEDURE EVALUATION
Patient: Sharlene Mccord    Procedure(s):  ESOPHAGOGRASTODUODENOSCOPY WITH BIOPSY - Wound Class: II-Clean Contaminated    Diagnosis:REFLUX  Diagnosis Additional Information: No value filed.    Anesthesia Type:  MAC    Note:  Anesthesia Post Evaluation    Patient location during evaluation: Phase 2 and Bedside  Patient participation: Able to fully participate in evaluation  Level of consciousness: awake and alert  Pain management: adequate  Airway patency: patent  Cardiovascular status: acceptable  Respiratory status: acceptable  Hydration status: stable  PONV: none     Anesthetic complications: None          Last vitals:  Filed Vitals:    02/09/17 1255 02/09/17 1300 02/09/17 1305   BP: 127/76 127/79 123/70   Temp:      Resp: 18 16 16   SpO2: 95% 98% 96%         Electronically Signed By: Jamie Chambers MD  February 9, 2017  1:09 PM

## 2017-02-09 NOTE — OR NURSING
Patient and responsible adult given discharge instructions with no questions regarding instructions. Petty score 19. Pain level 0/10.  Discharged from unit via ambulation. Patient discharged to home.

## 2017-02-10 ENCOUNTER — TELEPHONE (OUTPATIENT)
Dept: SURGERY | Facility: OTHER | Age: 57
End: 2017-02-10

## 2017-02-10 NOTE — ANESTHESIA CARE TRANSFER NOTE
Patient: Sharlene Mccord    Procedure(s):  ESOPHAGOGRASTODUODENOSCOPY WITH BIOPSY - Wound Class: II-Clean Contaminated    Diagnosis: REFLUX  Diagnosis Additional Information: No value filed.    Anesthesia Type:   MAC     Note:  Airway :Nasal Cannula  Patient transferred to:Phase II        Vitals: (Last set prior to Anesthesia Care Transfer)    CRNA VITALS  2/9/2017 1210 - 2/9/2017 1310      2/9/2017             Pulse: 62    Ht Rate: 62    SpO2: 97 %    Resp Rate (set): 8    EKG: NSR                Electronically Signed By: ENRIQUE Ahuja [facility]  February 10, 2017  10:13 AM

## 2017-02-10 NOTE — ADDENDUM NOTE
Addendum  created 02/10/17 1013 by Michelle Catherine APRN CRNA    Modules edited: Anesthesia Events, Clinical Notes, Narrator    Clinical Notes:  File: [id number]    Narrator:  Narrator: Event Log Edited

## 2017-02-10 NOTE — TELEPHONE ENCOUNTER
Patient called stating after her colonoscopy yesterday she has been having diarrhea and the chills. She has not taken her temp but had chills all night long. SHe also stated she was having some bleeding and fleshy stuff coming out with bowel movements. I offered an appointment with Dr. Redmond this afternoon but patient refused that due to personal reasons, writer instructed her to try and get an appointment with her PCP or else go to the urgent care to be examined. Patient agreed

## 2017-02-13 ENCOUNTER — MYC MEDICAL ADVICE (OUTPATIENT)
Dept: FAMILY MEDICINE | Facility: OTHER | Age: 57
End: 2017-02-13

## 2017-02-13 DIAGNOSIS — K21.9 GASTROESOPHAGEAL REFLUX DISEASE WITHOUT ESOPHAGITIS: ICD-10-CM

## 2017-02-13 LAB — COPATH REPORT: NORMAL

## 2017-02-13 RX ORDER — METOCLOPRAMIDE 5 MG/1
5 TABLET ORAL 4 TIMES DAILY PRN
Qty: 20 TABLET | Refills: 0 | Status: SHIPPED | OUTPATIENT
Start: 2017-02-13 | End: 2017-02-15

## 2017-02-15 RX ORDER — METOCLOPRAMIDE 5 MG/1
5 TABLET ORAL 4 TIMES DAILY PRN
Qty: 60 TABLET | Refills: 0 | Status: SHIPPED | OUTPATIENT
Start: 2017-02-15 | End: 2017-12-26

## 2017-03-10 DIAGNOSIS — Z79.890 POSTMENOPAUSAL HRT (HORMONE REPLACEMENT THERAPY): ICD-10-CM

## 2017-03-13 ENCOUNTER — TRANSFERRED RECORDS (OUTPATIENT)
Dept: HEALTH INFORMATION MANAGEMENT | Facility: HOSPITAL | Age: 57
End: 2017-03-13

## 2017-03-13 RX ORDER — ESTRADIOL 0.5 MG/1
TABLET ORAL
Qty: 60 TABLET | Refills: 3 | Status: SHIPPED | OUTPATIENT
Start: 2017-03-13 | End: 2017-07-10

## 2017-03-20 ENCOUNTER — OFFICE VISIT (OUTPATIENT)
Dept: FAMILY MEDICINE | Facility: OTHER | Age: 57
End: 2017-03-20
Attending: PHYSICIAN ASSISTANT
Payer: COMMERCIAL

## 2017-03-20 VITALS
BODY MASS INDEX: 30.53 KG/M2 | TEMPERATURE: 97.5 F | DIASTOLIC BLOOD PRESSURE: 70 MMHG | HEIGHT: 66 IN | SYSTOLIC BLOOD PRESSURE: 130 MMHG | OXYGEN SATURATION: 96 % | WEIGHT: 190 LBS | HEART RATE: 76 BPM

## 2017-03-20 DIAGNOSIS — F51.04 PSYCHOPHYSIOLOGICAL INSOMNIA: ICD-10-CM

## 2017-03-20 DIAGNOSIS — E03.9 ACQUIRED HYPOTHYROIDISM: ICD-10-CM

## 2017-03-20 DIAGNOSIS — I25.85 CARDIAC MICROVASCULAR DISEASE: Primary | ICD-10-CM

## 2017-03-20 PROCEDURE — 99212 OFFICE O/P EST SF 10 MIN: CPT

## 2017-03-20 PROCEDURE — 99214 OFFICE O/P EST MOD 30 MIN: CPT | Performed by: PHYSICIAN ASSISTANT

## 2017-03-20 RX ORDER — LEVOTHYROXINE SODIUM 50 UG/1
50 TABLET ORAL DAILY
Qty: 30 TABLET | Refills: 10 | Status: SHIPPED | OUTPATIENT
Start: 2017-03-20 | End: 2018-03-07

## 2017-03-20 RX ORDER — DILTIAZEM HYDROCHLORIDE 300 MG/1
300 CAPSULE, EXTENDED RELEASE ORAL DAILY
Qty: 90 CAPSULE | Refills: 3 | Status: SHIPPED | OUTPATIENT
Start: 2017-03-20 | End: 2018-03-29

## 2017-03-20 ASSESSMENT — PAIN SCALES - GENERAL: PAINLEVEL: MODERATE PAIN (5)

## 2017-03-20 NOTE — PROGRESS NOTES
SUBJECTIVE:                                                    Sharlene Mccord is a 56 year old female who presents to clinic today for the following health issues:      Follow up test results       Duration: 2014    Description (location/character/radiation): follow up for test results from 2014    Intensity:  mild    Accompanying signs and symptoms: n/a    History (similar episodes/previous evaluation): patient had echocardiogram in 2014 and is here for follow up of results     Precipitating or alleviating factors: None    Therapies tried and outcome: None       Medication Followup of all medication     Taking Medication as prescribed: yes    Side Effects:  None    Medication Helping Symptoms:  yes       Problem list and histories reviewed & adjusted, as indicated.  Additional history: as documented    Patient Active Problem List   Diagnosis     Diverticulitis of sigmoid colon     Hypothyroidism     Anxiety state     Cardiac microvascular disease (H)     Diverticulitis     Aortic valve disorder     ACP (advance care planning)     Chronic pain     Constipation     Memory loss     Major depressive disorder, recurrent episode (H)     Cognitive disorder     Major depressive disorder     Fatigue     Migraine without aura and responsive to treatment     Atypical migraine     Past Surgical History   Procedure Laterality Date     Cl aff surgical pathology  01/02/2007     Thyroid Mass     Laparoscopic cholecystectomy  11/07/2003     Cholelithiasis     Laryngoscopy       Hysterectomy  2001     Laparotomy exploratory       abdominal pain/fever     Splenectomy       Gyn surgery       c-sections x 3     Gyn surgery       complete hysterectomy     Cardiac surgery  2013     angiogram [facility]:  Normal coronary arteries.  Felt ot have some microvascular disease     Colonoscopy  1/13/2014     Procedure: COLONOSCOPY;  COLONOSCOPY ;  Surgeon: Chasidy Gee DO;  Location: [facility] OR     Esophagoscopy, gastroscopy, duodenoscopy (egd),  combined N/A 2/9/2017     Procedure: COMBINED ESOPHAGOSCOPY, GASTROSCOPY, DUODENOSCOPY (EGD);  Surgeon: Johnathan Ruff DO;  Location: [facility] OR       Social History   Substance Use Topics     Smoking status: Current Every Day Smoker     Packs/day: 0.50     Years: 40.00     Types: Cigarettes     Smokeless tobacco: Never Used      Comment: starts quit plan in march 2017.  Has cut down to one pack a week     Alcohol use No     Family History   Problem Relation Age of Onset     C.A.D. Father      Hypertension Father      C.A.D. Mother      CEREBROVASCULAR DISEASE Mother      CVA     Hypertension Mother          Current Outpatient Prescriptions   Medication Sig Dispense Refill     estradiol (ESTRACE) 0.5 MG tablet TAKE ONE TABLET BY MOUTH TWICE DAILY 60 tablet 3     traMADol (ULTRAM) 50 MG tablet TAKE TWO TABLETS BY MOUTH EVERY 8 HOURS AS NEEDED FOR PAIN 180 tablet 0     ranitidine (ZANTAC) 150 MG tablet Take 1 tablet (150 mg) by mouth 2 times daily 60 tablet 3     metoclopramide (REGLAN) 5 MG tablet Take 1 tablet (5 mg) by mouth 4 times daily as needed 60 tablet 0     buPROPion (WELLBUTRIN SR) 200 MG 12 hr tablet Take 1 tablet (200 mg) by mouth 2 times daily 60 tablet 5     LORazepam (ATIVAN) 0.5 MG tablet Take 1 tablet (0.5 mg) by mouth every 8 hours as needed for anxiety 30 tablet 0     sucralfate (CARAFATE) 1 GM tablet Take 1 tablet (1 g) by mouth 4 times daily 40 tablet 1     hydrOXYzine (ATARAX) 25 MG tablet TAKE ONE TO TWO TABLETS BY MOUTH ONCE DAILY AT BEDTIME FOR NAUSEA 120 tablet 0     clonazePAM (KLONOPIN) 1 MG tablet Take 1 tablet (1 mg) by mouth 2 times daily as needed for anxiety 60 tablet 5     PROCTOSOL HC 2.5 % rectal cream APPLY  CREAM TO AFFECTED AREA THREE TIMES DAILY 58 g 1     cyclobenzaprine (FLEXERIL) 10 MG tablet Take 0.5-1 tablets (5-10 mg) by mouth 3 times daily as needed for muscle spasms 30 tablet 1     diltiazem (TIAZAC) 300 MG 24 hr ER beaded capsule Take 1 capsule (300 mg) by mouth daily  "90 capsule 3     levothyroxine (SYNTHROID, LEVOTHROID) 50 MCG tablet TAKE ONE TABLET BY MOUTH ONCE DAILY 30 tablet 10     traZODone (DESYREL) 50 MG tablet TAKE TWO TABLETS BY MOUTH AT BEDTIME AS NEEDED FOR SLEEP (Patient taking differently: No sig reported) 60 tablet 11     SUMAtriptan (IMITREX) 100 MG tablet TAKE ONE TABLET BY MOUTH AS NEEDED MIGRAINES 10 tablet 5     Allergies   Allergen Reactions     Codeine      Isosorbide Mononitrate Other (See Comments)     Vomiting and headache       Lisinopril      [facility] scan     BP Readings from Last 3 Encounters:   03/20/17 130/70   02/09/17 125/71   02/08/17 120/68    Wt Readings from Last 3 Encounters:   03/20/17 190 lb (86.2 kg)   02/09/17 192 lb (87.1 kg)   02/08/17 195 lb (88.5 kg)                    Reviewed and updated as needed this visit by clinical staff       Reviewed and updated as needed this visit by Provider         ROS:  Constitutional, neuro, ENT, endocrine, pulmonary, cardiac, gastrointestinal, genitourinary, musculoskeletal, integument and psychiatric systems are negative, except as otherwise noted.    OBJECTIVE:                                                    /70 (BP Location: Left arm, Patient Position: Chair, Cuff Size: Adult Large)  Pulse 76  Temp 97.5  F (36.4  C) (Tympanic)  Ht 5' 5.5\" (1.664 m)  Wt 190 lb (86.2 kg)  SpO2 96%  BMI 31.14 kg/m2  Body mass index is 31.14 kg/(m^2).  GENERAL APPEARANCE: healthy, alert and no distress  EYES: Eyes grossly normal to inspection, PERRL and conjunctivae and sclerae normal  HENT: ear canals and TM's normal and nose and mouth without ulcers or lesions  NECK: no adenopathy, no asymmetry, masses, or scars and thyroid normal to palpation  RESP: lungs clear to auscultation - no rales, rhonchi or wheezes  CV: regular rates and rhythm, normal S1 S2, no S3 or S4, 1/6  Murmur on RSB , click or rub and has started with chest pain a few months ago.  Has some chest sx with anxiety.   LYMPHATICS: normal " ant/post cervical and supraclavicular nodes  ABDOMEN: soft, nontender, without hepatosplenomegaly or masses and bowel sounds normal.  Vomiting is much better.   Had endoscopy.  Carafate not really helping.    MS: many joints are painful.   Has fibromyalgia.   Has been increased in her sleep.    SKIN: no suspicious lesions or rashes  NEURO: Normal strength and tone, speech normal and memory is ok.   PSYCH: worried, she is very detailed today.  Has many pains and aches.   Focused today.  Just got back from [place] where her daughters father had killed himself.       Diagnostic test results:  Diagnostic Test Results:  No results found for this or any previous visit (from the past 24 hour(s)).     ASSESSMENT/PLAN:                                                      1. Cardiac microvascular disease (H)  Had a noted change on EKG per her statement.  I do not have the EKG and do not see any concerns.  No chest pain and no concerns with that.   Always has head fog.   - diltiazem (TIAZAC) 300 MG 24 hr ER beaded capsule; Take 1 capsule (300 mg) by mouth daily  Dispense: 90 capsule; Refill: 3    2. Psychophysiological insomnia  Cutting her back on Trazodone.   50 mg per night  For at least 30 days.  Aware that sleep is imperative. Not up at night at all. In a fog all the time.       3. Acquired hypothyroidism  She is no longer getting this medication covered.  Discussed importance.   - levothyroxine (SYNTHROID/LEVOTHROID) 50 MCG tablet; Take 1 tablet (50 mcg) by mouth daily  Dispense: 30 tablet; Refill: 10      See Patient Instructions    Concha Hare PA-C  [facility]

## 2017-03-20 NOTE — MR AVS SNAPSHOT
After Visit Summary   3/20/2017    Sharlene Mccord    MRN: [id number]           Patient Information     Date Of Birth          1960        Visit Information        Provider Department      3/20/2017 8:45 AM Concha Hare PA [facility]        Today's Diagnoses     Cardiac microvascular disease (H)    -  1    Psychophysiological insomnia        Acquired hypothyroidism          Care Instructions    Start taking only 50 mg of Trazodone and stop Carafate.   See us back in 3 months.         Follow-ups after your visit        Follow-up notes from your care team     Return in about 3 months (around 6/20/2017).      Your next 10 appointments already scheduled     Apr 19, 2017 10:20 AM CDT   (Arrive by 10:05 AM)   Return Visit with Aurelia Odell MD   [facility] ([facility])    [address] [place] [address] [id number] [phone]            May 23, 2017  9:30 AM CDT   (Arrive by 9:15 AM)   Return Visit with Norman Nichole MD   [facility] ([facility])    [facility] [id number] [phone]              Future tests that were ordered for you today     Open Future Orders        Priority Expected Expires Ordered    TSH with free T4 reflex Routine  3/21/2018 3/20/2017    Lipid Profile Routine  3/20/2018 3/20/2017    Comprehensive metabolic panel Routine  3/20/2018 3/20/2017    CBC with platelets differential Routine  3/20/2018 3/20/2017            Who to contact     If you have questions or need follow up information about today's clinic visit or your schedule please contact [facility] directly at [phone].  Normal or non-critical lab and imaging results will be communicated to you by MyChart, letter or phone within 4 business days after the clinic has received the results. If you do not hear from us within 7 days, please contact the clinic through MyChart or phone. If you have a critical or abnormal lab  "result, we will notify you by phone as soon as possible.  Submit refill requests through [employer] or call your pharmacy and they will forward the refill request to us. Please allow 3 business days for your refill to be completed.          Additional Information About Your Visit        Tooblahart Information     [employer]bit gives you secure access to your electronic health record. If you see a primary care provider, you can also send messages to your care team and make appointments. If you have questions, please call your primary care clinic.  If you do not have a primary care provider, please call [phone] and they will assist you.        Care EveryWhere ID     This is your Care EveryWhere ID. This could be used by other organizations to access your [facility] medical records  [id number]        Your Vitals Were     Pulse Temperature Height Pulse Oximetry BMI (Body Mass Index)       76 97.5  F (36.4  C) (Tympanic) 5' 5.5\" (1.664 m) 96% 31.14 kg/m2        Blood Pressure from Last 3 Encounters:   03/20/17 130/70   02/09/17 125/71   02/08/17 120/68    Weight from Last 3 Encounters:   03/20/17 190 lb (86.2 kg)   02/09/17 192 lb (87.1 kg)   02/08/17 195 lb (88.5 kg)                 Today's Medication Changes          These changes are accurate as of: 3/20/17  9:49 AM.  If you have any questions, ask your nurse or doctor.               These medicines have changed or have updated prescriptions.        Dose/Directions    levothyroxine 50 MCG tablet   Commonly known as:  SYNTHROID/LEVOTHROID   This may have changed:  See the new instructions.   Used for:  Acquired hypothyroidism   Changed by:  Concha Hare PA        Dose:  50 mcg   Take 1 tablet (50 mcg) by mouth daily   Quantity:  30 tablet   Refills:  10       traZODone 50 MG tablet   Commonly known as:  DESYREL   This may have changed:  additional instructions   Used for:  Psychophysiological insomnia        TAKE TWO TABLETS BY MOUTH AT BEDTIME AS NEEDED FOR SLEEP "   Quantity:  60 tablet   Refills:  11         Stop taking these medicines if you haven't already. Please contact your care team if you have questions.     sucralfate 1 GM tablet   Commonly known as:  CARAFATE   Stopped by:  Concha Hare PA                Where to get your medicines      These medications were sent to [id number] [address] [place] - [address]   [id number] , [place] [address]     Phone:  [phone]     diltiazem 300 MG 24 hr ER beaded capsule    levothyroxine 50 MCG tablet                Primary Care Provider Office Phone # Fax #    KEILY Fonseca [phone]       [facility] [address]  [place] [address]        Thank you!     Thank you for choosing [facility]  for your care. Our goal is always to provide you with excellent care. Hearing back from our patients is one way we can continue to improve our services. Please take a few minutes to complete the written survey that you may receive in the mail after your visit with us. Thank you!             Your Updated Medication List - Protect others around you: Learn how to safely use, store and throw away your medicines at www.disposemymeds.org.          This list is accurate as of: 3/20/17  9:49 AM.  Always use your most recent med list.                   Brand Name Dispense Instructions for use    buPROPion 200 MG 12 hr tablet    WELLBUTRIN SR    60 tablet    Take 1 tablet (200 mg) by mouth 2 times daily       clonazePAM 1 MG tablet    klonoPIN    60 tablet    Take 1 tablet (1 mg) by mouth 2 times daily as needed for anxiety       cyclobenzaprine 10 MG tablet    FLEXERIL    30 tablet    Take 0.5-1 tablets (5-10 mg) by mouth 3 times daily as needed for muscle spasms       diltiazem 300 MG 24 hr ER beaded capsule    TIAZAC    90 capsule    Take 1 capsule (300 mg) by mouth daily       estradiol 0.5 MG tablet    ESTRACE    60 tablet    TAKE ONE TABLET BY MOUTH TWICE DAILY        hydrOXYzine 25 MG tablet    ATARAX    120 tablet    TAKE ONE TO TWO TABLETS BY MOUTH ONCE DAILY AT BEDTIME FOR NAUSEA       levothyroxine 50 MCG tablet    SYNTHROID/LEVOTHROID    30 tablet    Take 1 tablet (50 mcg) by mouth daily       LORazepam 0.5 MG tablet    ATIVAN    30 tablet    Take 1 tablet (0.5 mg) by mouth every 8 hours as needed for anxiety       metoclopramide 5 MG tablet    REGLAN    60 tablet    Take 1 tablet (5 mg) by mouth 4 times daily as needed       PROCTOSOL HC 2.5 % cream   Generic drug:  hydrocortisone     58 g    APPLY  CREAM TO AFFECTED AREA THREE TIMES DAILY       ranitidine 150 MG tablet    ZANTAC    60 tablet    Take 1 tablet (150 mg) by mouth 2 times daily       SUMAtriptan 100 MG tablet    IMITREX    10 tablet    TAKE ONE TABLET BY MOUTH AS NEEDED MIGRAINES       traMADol 50 MG tablet    ULTRAM    180 tablet    TAKE TWO TABLETS BY MOUTH EVERY 8 HOURS AS NEEDED FOR PAIN       traZODone 50 MG tablet    DESYREL    60 tablet    TAKE TWO TABLETS BY MOUTH AT BEDTIME AS NEEDED FOR SLEEP

## 2017-04-03 ENCOUNTER — TELEPHONE (OUTPATIENT)
Dept: CARE COORDINATION | Facility: OTHER | Age: 57
End: 2017-04-03

## 2017-04-03 DIAGNOSIS — I35.9 AORTIC VALVE DISORDER: Primary | ICD-10-CM

## 2017-04-03 NOTE — TELEPHONE ENCOUNTER
Patient is scheduled for follow up with you on 5/23/17.    Per last visit patient needs ECHO prior to this appointment.   Please advise.   Make sure that I put in the correct order in, there were many to choose from..    Thanks,   Tiffany Frazier RN-BSN

## 2017-04-06 ENCOUNTER — HOSPITAL ENCOUNTER (OUTPATIENT)
Dept: ULTRASOUND IMAGING | Facility: HOSPITAL | Age: 57
Discharge: HOME | End: 2017-04-06
Attending: INTERNAL MEDICINE | Admitting: INTERNAL MEDICINE
Payer: MEDICARE

## 2017-04-06 PROCEDURE — 93306 TTE W/DOPPLER COMPLETE: CPT | Mod: TC

## 2017-04-06 PROCEDURE — 93306 TTE W/DOPPLER COMPLETE: CPT | Mod: 26 | Performed by: INTERNAL MEDICINE

## 2017-04-18 DIAGNOSIS — M79.7 FIBROMYALGIA: ICD-10-CM

## 2017-04-19 ENCOUNTER — OFFICE VISIT (OUTPATIENT)
Dept: PSYCHIATRY | Facility: OTHER | Age: 57
End: 2017-04-19
Attending: PSYCHIATRY & NEUROLOGY
Payer: COMMERCIAL

## 2017-04-19 VITALS
SYSTOLIC BLOOD PRESSURE: 122 MMHG | DIASTOLIC BLOOD PRESSURE: 70 MMHG | TEMPERATURE: 97.3 F | HEIGHT: 66 IN | OXYGEN SATURATION: 98 % | HEART RATE: 70 BPM | BODY MASS INDEX: 30.7 KG/M2 | WEIGHT: 191 LBS

## 2017-04-19 DIAGNOSIS — I25.85 CARDIAC MICROVASCULAR DISEASE: ICD-10-CM

## 2017-04-19 DIAGNOSIS — F41.1 GAD (GENERALIZED ANXIETY DISORDER): Primary | ICD-10-CM

## 2017-04-19 DIAGNOSIS — E03.9 ACQUIRED HYPOTHYROIDISM: ICD-10-CM

## 2017-04-19 LAB
ALBUMIN SERPL-MCNC: 3.9 G/DL (ref 3.4–5)
ALP SERPL-CCNC: 90 U/L (ref 40–150)
ALT SERPL W P-5'-P-CCNC: 28 U/L (ref 0–50)
ANION GAP SERPL CALCULATED.3IONS-SCNC: 13 MMOL/L (ref 3–14)
AST SERPL W P-5'-P-CCNC: 15 U/L (ref 0–45)
BASOPHILS # BLD AUTO: 0.1 10E9/L (ref 0–0.2)
BASOPHILS NFR BLD AUTO: 0.9 %
BILIRUB SERPL-MCNC: 0.3 MG/DL (ref 0.2–1.3)
BUN SERPL-MCNC: 14 MG/DL (ref 7–30)
CALCIUM SERPL-MCNC: 8.3 MG/DL (ref 8.5–10.1)
CHLORIDE SERPL-SCNC: 107 MMOL/L (ref 94–109)
CHOLEST SERPL-MCNC: 214 MG/DL
CO2 SERPL-SCNC: 21 MMOL/L (ref 20–32)
CREAT SERPL-MCNC: 0.97 MG/DL (ref 0.52–1.04)
DIFFERENTIAL METHOD BLD: NORMAL
EOSINOPHIL # BLD AUTO: 0.1 10E9/L (ref 0–0.7)
EOSINOPHIL NFR BLD AUTO: 1.8 %
ERYTHROCYTE [DISTWIDTH] IN BLOOD BY AUTOMATED COUNT: 12.4 % (ref 10–15)
GFR SERPL CREATININE-BSD FRML MDRD: 59 ML/MIN/1.7M2
GLUCOSE SERPL-MCNC: 105 MG/DL (ref 70–99)
HCT VFR BLD AUTO: 39.6 % (ref 35–47)
HDLC SERPL-MCNC: 77 MG/DL
HGB BLD-MCNC: 13.6 G/DL (ref 11.7–15.7)
IMM GRANULOCYTES # BLD: 0 10E9/L (ref 0–0.4)
IMM GRANULOCYTES NFR BLD: 0.2 %
LDLC SERPL CALC-MCNC: 104 MG/DL
LYMPHOCYTES # BLD AUTO: 2.4 10E9/L (ref 0.8–5.3)
LYMPHOCYTES NFR BLD AUTO: 42.7 %
MCH RBC QN AUTO: 29.4 PG (ref 26.5–33)
MCHC RBC AUTO-ENTMCNC: 34.3 G/DL (ref 31.5–36.5)
MCV RBC AUTO: 86 FL (ref 78–100)
MONOCYTES # BLD AUTO: 0.3 10E9/L (ref 0–1.3)
MONOCYTES NFR BLD AUTO: 4.7 %
NEUTROPHILS # BLD AUTO: 2.8 10E9/L (ref 1.6–8.3)
NEUTROPHILS NFR BLD AUTO: 49.7 %
NONHDLC SERPL-MCNC: 137 MG/DL
NRBC # BLD AUTO: 0 10*3/UL
NRBC BLD AUTO-RTO: 0 /100
PLATELET # BLD AUTO: 192 10E9/L (ref 150–450)
POTASSIUM SERPL-SCNC: 4.4 MMOL/L (ref 3.4–5.3)
PROT SERPL-MCNC: 7.6 G/DL (ref 6.8–8.8)
RBC # BLD AUTO: 4.62 10E12/L (ref 3.8–5.2)
SODIUM SERPL-SCNC: 141 MMOL/L (ref 133–144)
TRIGL SERPL-MCNC: 165 MG/DL
TSH SERPL DL<=0.005 MIU/L-ACNC: 1.24 MU/L (ref 0.4–4)
WBC # BLD AUTO: 5.6 10E9/L (ref 4–11)

## 2017-04-19 PROCEDURE — 99214 OFFICE O/P EST MOD 30 MIN: CPT | Performed by: PSYCHIATRY & NEUROLOGY

## 2017-04-19 PROCEDURE — 80061 LIPID PANEL: CPT | Performed by: PHYSICIAN ASSISTANT

## 2017-04-19 PROCEDURE — 99212 OFFICE O/P EST SF 10 MIN: CPT

## 2017-04-19 PROCEDURE — 80053 COMPREHEN METABOLIC PANEL: CPT | Performed by: PHYSICIAN ASSISTANT

## 2017-04-19 PROCEDURE — 36415 COLL VENOUS BLD VENIPUNCTURE: CPT | Performed by: PHYSICIAN ASSISTANT

## 2017-04-19 PROCEDURE — 84443 ASSAY THYROID STIM HORMONE: CPT | Performed by: PHYSICIAN ASSISTANT

## 2017-04-19 PROCEDURE — 85025 COMPLETE CBC W/AUTO DIFF WBC: CPT | Performed by: PHYSICIAN ASSISTANT

## 2017-04-19 RX ORDER — BUSPIRONE HYDROCHLORIDE 5 MG/1
5 TABLET ORAL 2 TIMES DAILY
Qty: 120 TABLET | Refills: 3 | Status: SHIPPED | OUTPATIENT
Start: 2017-04-19 | End: 2017-05-23

## 2017-04-19 ASSESSMENT — ANXIETY QUESTIONNAIRES
IF YOU CHECKED OFF ANY PROBLEMS ON THIS QUESTIONNAIRE, HOW DIFFICULT HAVE THESE PROBLEMS MADE IT FOR YOU TO DO YOUR WORK, TAKE CARE OF THINGS AT HOME, OR GET ALONG WITH OTHER PEOPLE: VERY DIFFICULT
3. WORRYING TOO MUCH ABOUT DIFFERENT THINGS: NEARLY EVERY DAY
2. NOT BEING ABLE TO STOP OR CONTROL WORRYING: NEARLY EVERY DAY
5. BEING SO RESTLESS THAT IT IS HARD TO SIT STILL: MORE THAN HALF THE DAYS
6. BECOMING EASILY ANNOYED OR IRRITABLE: NEARLY EVERY DAY
1. FEELING NERVOUS, ANXIOUS, OR ON EDGE: NEARLY EVERY DAY
7. FEELING AFRAID AS IF SOMETHING AWFUL MIGHT HAPPEN: MORE THAN HALF THE DAYS
GAD7 TOTAL SCORE: 18

## 2017-04-19 ASSESSMENT — PAIN SCALES - GENERAL: PAINLEVEL: MODERATE PAIN (5)

## 2017-04-19 ASSESSMENT — PATIENT HEALTH QUESTIONNAIRE - PHQ9: 5. POOR APPETITE OR OVEREATING: MORE THAN HALF THE DAYS

## 2017-04-19 NOTE — NURSING NOTE
"Chief Complaint   Patient presents with     Depression     follow up mental health       Initial /70 (BP Location: Right arm, Patient Position: Chair, Cuff Size: Adult Regular)  Pulse 70  Temp 97.3  F (36.3  C) (Tympanic)  Ht 5' 5.5\" (1.664 m)  Wt 191 lb (86.6 kg)  SpO2 98%  BMI 31.3 kg/m2 Estimated body mass index is 31.3 kg/(m^2) as calculated from the following:    Height as of this encounter: 5' 5.5\" (1.664 m).    Weight as of this encounter: 191 lb (86.6 kg).  Medication Reconciliation: complete     ADIA CHAIDEZ      "

## 2017-04-19 NOTE — PROGRESS NOTES
PSYCHIATRY CLINIC PROGRESS NOTE   20 minutes, med management  more than 50% of time spent counseling patient on medications, medication side effects, symptom history and management   SUBJECTIVE / INTERIM HISTORY                                                                       The pt was last seen in clinic:  Continue Wellbutrin 200 mg bid. Continue Klonopin 1 mg bid. Continue trazodone 50 mg HS.    - not doing well, more anxiety and panic attacks  - tried cut down on meds including trazodone but then sleep got bad  - lots of physical sx and abdominal pain / nausea gets worse with stress  - doing PT  - developed tremor head and neck past several months  - sick and nausea: no rhyme or reason. Heartburn with it sometimes. Barium swallow week ago. Endoscopy tomorrow. Started on Reglan and Zantac week or so ago and few days ago was last time she threw up... So this is an improvement  - rheumatology consult: (autoimmune tests some pos tested recently) and sounds like no definitive dx made  - relationship issues. Of note 's two sons older 20s and other 31 yo still live at home. One of the boys bought house next door.   - memory: did notice it got little better once off gabapentin. Still is an issue though. Still forgets / repeats herself though  - gets embarrassed in conversations: forgets words, what she is going to say,etc    SUBSTANCE USE- no issues    SYMPTOMS-  Lots of worry and anxiety lately given increase in stressors. Depressed mood, low energy, anhedonia, feelings guilt & overwhelmed, poor sleep. cognitive issues including word finding  MEDICAL ROS-  Sick and nauseated lately , tremor, balance issues, Hypersomnia.  Pain: neck, DDD in neck. Numbness and tingling in legs and feet worse at times when sitting  MEDICAL / SURGICAL HISTORY                 Medical Team:     PMD- Concha Hare     Therapist-none   Patient Active Problem List   Diagnosis     Diverticulitis of sigmoid colon     Hypothyroidism      Anxiety state     Cardiac microvascular disease (H)     Diverticulitis     Aortic valve disorder     ACP (advance care planning)     Chronic pain     Constipation     Memory loss     Major depressive disorder, recurrent episode (H)     Cognitive disorder     Major depressive disorder     Fatigue     Migraine without aura and responsive to treatment     Atypical migraine     ALLERGY   Codeine; Isosorbide mononitrate; and Lisinopril  MEDICATIONS                                                                                             Current Outpatient Prescriptions   Medication Sig     diltiazem (TIAZAC) 300 MG 24 hr ER beaded capsule Take 1 capsule (300 mg) by mouth daily     levothyroxine (SYNTHROID/LEVOTHROID) 50 MCG tablet Take 1 tablet (50 mcg) by mouth daily     estradiol (ESTRACE) 0.5 MG tablet TAKE ONE TABLET BY MOUTH TWICE DAILY     traMADol (ULTRAM) 50 MG tablet TAKE TWO TABLETS BY MOUTH EVERY 8 HOURS AS NEEDED FOR PAIN     ranitidine (ZANTAC) 150 MG tablet Take 1 tablet (150 mg) by mouth 2 times daily     metoclopramide (REGLAN) 5 MG tablet Take 1 tablet (5 mg) by mouth 4 times daily as needed     buPROPion (WELLBUTRIN SR) 200 MG 12 hr tablet Take 1 tablet (200 mg) by mouth 2 times daily     LORazepam (ATIVAN) 0.5 MG tablet Take 1 tablet (0.5 mg) by mouth every 8 hours as needed for anxiety     hydrOXYzine (ATARAX) 25 MG tablet TAKE ONE TO TWO TABLETS BY MOUTH ONCE DAILY AT BEDTIME FOR NAUSEA     clonazePAM (KLONOPIN) 1 MG tablet Take 1 tablet (1 mg) by mouth 2 times daily as needed for anxiety     PROCTOSOL HC 2.5 % rectal cream APPLY  CREAM TO AFFECTED AREA THREE TIMES DAILY     cyclobenzaprine (FLEXERIL) 10 MG tablet Take 0.5-1 tablets (5-10 mg) by mouth 3 times daily as needed for muscle spasms     SUMAtriptan (IMITREX) 100 MG tablet TAKE ONE TABLET BY MOUTH AS NEEDED MIGRAINES     No current facility-administered medications for this visit.        VITALS   /70 (BP Location: Right arm,  "Patient Position: Chair, Cuff Size: Adult Regular)  Pulse 70  Temp 97.3  F (36.3  C) (Tympanic)  Ht 5' 5.5\" (1.664 m)  Wt 191 lb (86.6 kg)  SpO2 98%  BMI 31.3 kg/m2    PHQ9                       PHQ-9 SCORE 11/3/2016 12/16/2016 2/8/2017   Total Score - - -   Total Score 22 22 18     Labs:    Liver Function Studies -   Recent Labs   Lab Test  01/13/16   0941   PROTTOTAL  7.5   ALBUMIN  3.7   BILITOTAL  0.3   ALKPHOS  92   AST  32   ALT  63*     Recent Labs   Lab Test  01/13/16   0941  03/28/14   1008   CHOL  244*  238*   HDL  64  81*   LDL  147*  143*   TRIG  164*  72   CHOLHDLRATIO   --   2.9*       MENTAL STATUS EXAM                                                                                        Alert. Oriented to person, place, and date / time. Well groomed, calm, cooperative with good eye contact. No problems with speech or psychomotor behavior. Mood was anxious and depressed  and affect was congruent to speech content and restricted.f Thought process, including associations, was unremarkable and thought content was devoid of suicidal and homicidal ideation and psychotic thought. No hallucinations. Insight was good. Judgment was intact and adequate for safety. Fund of knowledge was intact. Pt demonstrates no obvious problems with attention, concentration, language, recent or remote memory although these were not formally tested.     ASSESSMENT                                                                                                      HISTORICAL:  Initial psych consult 12/12/14            Notes:  Ritalin as adjunct to antidepressant: didn't like how made her feel.   Prozac, Celexa ineffective. Now Wellbutrin helping. Zoloft: weight gain. Effexor back while: didn't stay on long and can't recall why. Cymbalta: was on and didn't help. She tried Provigil and SEs.   Neuropsych testing summer 2015:  language: about same, working memory: better than last year. Attention / concentration worse than " last year. Speed processing: improved. Executive function: didn't test well on that front also comparable to last year. Memory: worse off than last year. Depression: similar to last year. Anxiety / emotional anxiety worse. As part of assessment they think should possible have another MRI  CURRENT: This patient provides a history which supports the diagnoses of Major depressive disorder, recurrent, mod and mild neurocognitive disorder. We cross-tapered to Wellbutrin. We have been discussing trying a stimulant as adjunct to antidepressant but wasn't helpful and made her feel mentally off so  d/c. Will continue Wellbutrin 200 mg bid and Klonopin for anxiety. She tried Provigil since I've seen her last and unfortuantely this didn't pan out very well. Today we discussed adding something new for anxiety and we don't think she has been on buspirone. Discussed potential SEs.      TREATMENT RISK STATEMENT: The risks, benefits, alternatives and potential adverse effects have been explained and are understood by the pt. The pt agrees to the treatment plan with the ability to do so. The pt knows to call the clinic for any problems or access emergency care if needed.    DIAGNOSES      (Use of Axes system will continue, although absent from DSM 5)           Axis I - Major depressive disorder, recurrent severe without psychotic features  Mild neurocognitive disorder  Axis II - no dx  Axis III - lumbago, fibromyalgia, hx migraines, hx diverticultitis  Axis IV- Psychosocial Stressors include: finances, marital strain  Axis V - Global Assessment of Functioning current: 41- 50 Serious symptoms OR any serious impairment in social, occupational, or school functioning.    PLAN                                                                                                                         1) MEDICATIONS:   -- Start buspirone 5 mg twice daily. Continue Wellbutrin 200 mg bid. Continue Klonopin 1 mg bid. Continue trazodone 50 mg  HS.    2) THERAPY: no change.     3) LABS: none today    4) Other: Neurology provider Dr. Andre through [facility]. Neuropsych testing with Dr. Johnathan Min    8)  RTC: ~2-3 months    Pharmacist: [phone]  Customer service [phone]

## 2017-04-19 NOTE — MR AVS SNAPSHOT
After Visit Summary   4/19/2017    Sharlene Mccord    MRN: [id number]           Patient Information     Date Of Birth          1960        Visit Information        Provider Department      4/19/2017 10:20 AM [facility]ell MD [facility]        Today's Diagnoses     SHAKIR (generalized anxiety disorder)    -  1       Follow-ups after your visit        Your next 10 appointments already scheduled     May 23, 2017  9:30 AM CDT   (Arrive by 9:15 AM)   Return Visit with Norman Nichole MD   [facility] [place] ([facility])    [address] [place], [place] [address]  [place] [address]   [phone]            May 31, 2017 10:20 AM CDT   (Arrive by 10:05 AM)   Return Visit with Aurelia Odell MD   [facility] [place] ([facility])    [address] [place] [address]  [place] [address] [phone]            Jun 21, 2017  8:45 AM CDT   (Arrive by 8:30 AM)   SHORT with KEILY Fonseca   [facility] [place] ([facility])    [address]  [place] [address]   [phone]              Who to contact     If you have questions or need follow up information about today's clinic visit or your schedule please contact [facility] directly at [phone].  Normal or non-critical lab and imaging results will be communicated to you by MyChart, letter or phone within 4 business days after the clinic has received the results. If you do not hear from us within 7 days, please contact the clinic through MyChart or phone. If you have a critical or abnormal lab result, we will notify you by phone as soon as possible.  Submit refill requests through [employer] or call your pharmacy and they will forward the refill request to us. Please allow 3 business days for your refill to be completed.          Additional Information About Your Visit        [employer] Information     [employer] gives you secure access to your electronic health record. If you see a primary care provider, you can  "also send messages to your care team and make appointments. If you have questions, please call your primary care clinic.  If you do not have a primary care provider, please call [phone] and they will assist you.        Care EveryWhere ID     This is your Care EveryWhere ID. This could be used by other organizations to access your [facility] medical records  [id number]        Your Vitals Were     Pulse Temperature Height Pulse Oximetry BMI (Body Mass Index)       70 97.3  F (36.3  C) (Tympanic) 5' 5.5\" (1.664 m) 98% 31.3 kg/m2        Blood Pressure from Last 3 Encounters:   04/19/17 122/70   03/20/17 130/70   02/09/17 125/71    Weight from Last 3 Encounters:   04/19/17 191 lb (86.6 kg)   03/20/17 190 lb (86.2 kg)   02/09/17 192 lb (87.1 kg)              Today, you had the following     No orders found for display         Today's Medication Changes          These changes are accurate as of: 4/19/17 11:19 AM.  If you have any questions, ask your nurse or doctor.               Start taking these medicines.        Dose/Directions    busPIRone 5 MG tablet   Commonly known as:  BUSPAR   Used for:  SHAKIR (generalized anxiety disorder)   Started by:  Aurelia Odell MD        Dose:  5 mg   Take 1 tablet (5 mg) by mouth 2 times daily For 2 weeks then increase to 2 tabs twice daily   Quantity:  120 tablet   Refills:  3         Stop taking these medicines if you haven't already. Please contact your care team if you have questions.     traZODone 50 MG tablet   Commonly known as:  DESYREL   Stopped by:  Aurelia Odell MD                Where to get your medicines      These medications were sent to [id number] - [place], [place] - [address]   [address] , [place] [address]     Phone:  [phone]     busPIRone 5 MG tablet                Primary Care Provider Office Phone # Fax #    EKILY Fonseca [phone]       [facility] [address] [facility] [address]  [place] [address]        Thank " you!     Thank you for choosing [facility]  for your care. Our goal is always to provide you with excellent care. Hearing back from our patients is one way we can continue to improve our services. Please take a few minutes to complete the written survey that you may receive in the mail after your visit with us. Thank you!             Your Updated Medication List - Protect others around you: Learn how to safely use, store and throw away your medicines at www.disposemymeds.org.          This list is accurate as of: 4/19/17 11:19 AM.  Always use your most recent med list.                   Brand Name Dispense Instructions for use    buPROPion 200 MG 12 hr tablet    WELLBUTRIN SR    60 tablet    Take 1 tablet (200 mg) by mouth 2 times daily       busPIRone 5 MG tablet    BUSPAR    120 tablet    Take 1 tablet (5 mg) by mouth 2 times daily For 2 weeks then increase to 2 tabs twice daily       clonazePAM 1 MG tablet    klonoPIN    60 tablet    Take 1 tablet (1 mg) by mouth 2 times daily as needed for anxiety       cyclobenzaprine 10 MG tablet    FLEXERIL    30 tablet    Take 0.5-1 tablets (5-10 mg) by mouth 3 times daily as needed for muscle spasms       diltiazem 300 MG 24 hr ER beaded capsule    TIAZAC    90 capsule    Take 1 capsule (300 mg) by mouth daily       estradiol 0.5 MG tablet    ESTRACE    60 tablet    TAKE ONE TABLET BY MOUTH TWICE DAILY       hydrOXYzine 25 MG tablet    ATARAX    120 tablet    TAKE ONE TO TWO TABLETS BY MOUTH ONCE DAILY AT BEDTIME FOR NAUSEA       levothyroxine 50 MCG tablet    SYNTHROID/LEVOTHROID    30 tablet    Take 1 tablet (50 mcg) by mouth daily       LORazepam 0.5 MG tablet    ATIVAN    30 tablet    Take 1 tablet (0.5 mg) by mouth every 8 hours as needed for anxiety       metoclopramide 5 MG tablet    REGLAN    60 tablet    Take 1 tablet (5 mg) by mouth 4 times daily as needed       PROCTOSOL HC 2.5 % cream   Generic drug:  hydrocortisone     58 g    APPLY  CREAM TO  AFFECTED AREA THREE TIMES DAILY       ranitidine 150 MG tablet    ZANTAC    60 tablet    Take 1 tablet (150 mg) by mouth 2 times daily       SUMAtriptan 100 MG tablet    IMITREX    10 tablet    TAKE ONE TABLET BY MOUTH AS NEEDED MIGRAINES       traMADol 50 MG tablet    ULTRAM    180 tablet    TAKE TWO TABLETS BY MOUTH EVERY 8 HOURS AS NEEDED FOR PAIN

## 2017-04-19 NOTE — TELEPHONE ENCOUNTER
Tramadol      Last Written Prescription Date: 2/15/17  Last Fill Quantity: 180,  # refills: 0   Last Office Visit with [facility] or [facility] prescribing provider: 3/20/17                                         Next 5 appointments (look out 90 days)     May 23, 2017  9:30 AM CDT   (Arrive by 9:15 AM)   Return Visit with Norman Nichole MD   [facility]    [address] [place] [address]   [phone]            Jun 21, 2017  8:45 AM CDT   (Arrive by 8:30 AM)   SHORT with KEILY Fonseca   [facility]    [address] [place] [address]   [phone]

## 2017-04-20 RX ORDER — TRAMADOL HYDROCHLORIDE 50 MG/1
TABLET ORAL
Qty: 180 TABLET | Refills: 0 | Status: SHIPPED | OUTPATIENT
Start: 2017-04-20 | End: 2017-05-31

## 2017-04-20 ASSESSMENT — ANXIETY QUESTIONNAIRES: GAD7 TOTAL SCORE: 18

## 2017-05-03 DIAGNOSIS — R11.0 CHRONIC NAUSEA: ICD-10-CM

## 2017-05-05 ENCOUNTER — HOSPITAL ENCOUNTER (EMERGENCY)
Facility: HOSPITAL | Age: 57
Discharge: HOME | End: 2017-05-05
Attending: PHYSICIAN ASSISTANT | Admitting: PHYSICIAN ASSISTANT
Payer: MEDICARE

## 2017-05-05 VITALS
HEART RATE: 74 BPM | RESPIRATION RATE: 18 BRPM | OXYGEN SATURATION: 99 % | SYSTOLIC BLOOD PRESSURE: 152 MMHG | DIASTOLIC BLOOD PRESSURE: 99 MMHG | TEMPERATURE: 97.8 F

## 2017-05-05 DIAGNOSIS — R10.31 RLQ ABDOMINAL PAIN: ICD-10-CM

## 2017-05-05 DIAGNOSIS — R10.32 LLQ ABDOMINAL PAIN: ICD-10-CM

## 2017-05-05 DIAGNOSIS — R19.7 DIARRHEA, UNSPECIFIED TYPE: ICD-10-CM

## 2017-05-05 LAB
ALBUMIN SERPL-MCNC: 3.6 G/DL (ref 3.4–5)
ALBUMIN UR-MCNC: NEGATIVE MG/DL
ALP SERPL-CCNC: 98 U/L (ref 40–150)
ALT SERPL W P-5'-P-CCNC: 38 U/L (ref 0–50)
ANION GAP SERPL CALCULATED.3IONS-SCNC: 11 MMOL/L (ref 3–14)
APPEARANCE UR: CLEAR
AST SERPL W P-5'-P-CCNC: 27 U/L (ref 0–45)
BASOPHILS # BLD AUTO: 0.1 10E9/L (ref 0–0.2)
BASOPHILS NFR BLD AUTO: 0.5 %
BILIRUB SERPL-MCNC: 0.3 MG/DL (ref 0.2–1.3)
BILIRUB UR QL STRIP: NEGATIVE
BUN SERPL-MCNC: 14 MG/DL (ref 7–30)
CALCIUM SERPL-MCNC: 8.7 MG/DL (ref 8.5–10.1)
CHLORIDE SERPL-SCNC: 107 MMOL/L (ref 94–109)
CO2 SERPL-SCNC: 22 MMOL/L (ref 20–32)
COLOR UR AUTO: YELLOW
CREAT SERPL-MCNC: 1.06 MG/DL (ref 0.52–1.04)
CRP SERPL-MCNC: 9 MG/L (ref 0–8)
DIFFERENTIAL METHOD BLD: ABNORMAL
EOSINOPHIL # BLD AUTO: 0.1 10E9/L (ref 0–0.7)
EOSINOPHIL NFR BLD AUTO: 0.5 %
ERYTHROCYTE [DISTWIDTH] IN BLOOD BY AUTOMATED COUNT: 12.5 % (ref 10–15)
GFR SERPL CREATININE-BSD FRML MDRD: 54 ML/MIN/1.7M2
GLUCOSE SERPL-MCNC: 89 MG/DL (ref 70–99)
GLUCOSE UR STRIP-MCNC: NEGATIVE MG/DL
HCT VFR BLD AUTO: 36 % (ref 35–47)
HGB BLD-MCNC: 12.7 G/DL (ref 11.7–15.7)
HGB UR QL STRIP: NEGATIVE
IMM GRANULOCYTES # BLD: 0 10E9/L (ref 0–0.4)
IMM GRANULOCYTES NFR BLD: 0.3 %
KETONES UR STRIP-MCNC: NEGATIVE MG/DL
LEUKOCYTE ESTERASE UR QL STRIP: NEGATIVE
LYMPHOCYTES # BLD AUTO: 1.5 10E9/L (ref 0.8–5.3)
LYMPHOCYTES NFR BLD AUTO: 13.7 %
MCH RBC QN AUTO: 30.1 PG (ref 26.5–33)
MCHC RBC AUTO-ENTMCNC: 35.3 G/DL (ref 31.5–36.5)
MCV RBC AUTO: 85 FL (ref 78–100)
MONOCYTES # BLD AUTO: 1 10E9/L (ref 0–1.3)
MONOCYTES NFR BLD AUTO: 9.1 %
NEUTROPHILS # BLD AUTO: 8.4 10E9/L (ref 1.6–8.3)
NEUTROPHILS NFR BLD AUTO: 75.9 %
NITRATE UR QL: NEGATIVE
NRBC # BLD AUTO: 0 10*3/UL
NRBC BLD AUTO-RTO: 0 /100
PH UR STRIP: 6.5 PH (ref 4.7–8)
PLATELET # BLD AUTO: 191 10E9/L (ref 150–450)
POTASSIUM SERPL-SCNC: 3.9 MMOL/L (ref 3.4–5.3)
PROT SERPL-MCNC: 7.1 G/DL (ref 6.8–8.8)
RBC # BLD AUTO: 4.22 10E12/L (ref 3.8–5.2)
SODIUM SERPL-SCNC: 140 MMOL/L (ref 133–144)
SP GR UR STRIP: 1.01 (ref 1–1.03)
URN SPEC COLLECT METH UR: NORMAL
UROBILINOGEN UR STRIP-MCNC: NORMAL MG/DL (ref 0–2)
WBC # BLD AUTO: 11 10E9/L (ref 4–11)

## 2017-05-05 PROCEDURE — 86140 C-REACTIVE PROTEIN: CPT | Performed by: PHYSICIAN ASSISTANT

## 2017-05-05 PROCEDURE — 96376 TX/PRO/DX INJ SAME DRUG ADON: CPT

## 2017-05-05 PROCEDURE — 85025 COMPLETE CBC W/AUTO DIFF WBC: CPT | Performed by: PHYSICIAN ASSISTANT

## 2017-05-05 PROCEDURE — 96374 THER/PROPH/DIAG INJ IV PUSH: CPT

## 2017-05-05 PROCEDURE — 25000132 ZZH RX MED GY IP 250 OP 250 PS 637: Mod: GY | Performed by: PHYSICIAN ASSISTANT

## 2017-05-05 PROCEDURE — 81003 URINALYSIS AUTO W/O SCOPE: CPT | Performed by: PHYSICIAN ASSISTANT

## 2017-05-05 PROCEDURE — 99284 EMERGENCY DEPT VISIT MOD MDM: CPT | Performed by: PHYSICIAN ASSISTANT

## 2017-05-05 PROCEDURE — A9270 NON-COVERED ITEM OR SERVICE: HCPCS | Mod: GY | Performed by: PHYSICIAN ASSISTANT

## 2017-05-05 PROCEDURE — 99284 EMERGENCY DEPT VISIT MOD MDM: CPT | Mod: 25

## 2017-05-05 PROCEDURE — 25000128 H RX IP 250 OP 636: Performed by: PHYSICIAN ASSISTANT

## 2017-05-05 PROCEDURE — 36415 COLL VENOUS BLD VENIPUNCTURE: CPT | Performed by: PHYSICIAN ASSISTANT

## 2017-05-05 PROCEDURE — 74020 ZZHC X-RAY ABDOMEN COMPLETE: CPT | Mod: TC

## 2017-05-05 PROCEDURE — 80053 COMPREHEN METABOLIC PANEL: CPT | Performed by: PHYSICIAN ASSISTANT

## 2017-05-05 PROCEDURE — 96375 TX/PRO/DX INJ NEW DRUG ADDON: CPT

## 2017-05-05 PROCEDURE — 96361 HYDRATE IV INFUSION ADD-ON: CPT

## 2017-05-05 RX ORDER — MORPHINE SULFATE 2 MG/ML
4 INJECTION, SOLUTION INTRAMUSCULAR; INTRAVENOUS ONCE
Status: COMPLETED | OUTPATIENT
Start: 2017-05-05 | End: 2017-05-05

## 2017-05-05 RX ORDER — IBUPROFEN 600 MG/1
600 TABLET, FILM COATED ORAL ONCE
Status: COMPLETED | OUTPATIENT
Start: 2017-05-05 | End: 2017-05-05

## 2017-05-05 RX ORDER — SULFAMETHOXAZOLE/TRIMETHOPRIM 800-160 MG
1 TABLET ORAL ONCE
Status: COMPLETED | OUTPATIENT
Start: 2017-05-05 | End: 2017-05-05

## 2017-05-05 RX ORDER — HYDROXYZINE HYDROCHLORIDE 25 MG/1
TABLET, FILM COATED ORAL
Qty: 120 TABLET | Refills: 0 | Status: SHIPPED | OUTPATIENT
Start: 2017-05-05 | End: 2017-09-11

## 2017-05-05 RX ORDER — HYDROCODONE BITARTRATE AND ACETAMINOPHEN 5; 325 MG/1; MG/1
1 TABLET ORAL EVERY 6 HOURS PRN
Qty: 10 TABLET | Refills: 0 | Status: SHIPPED | OUTPATIENT
Start: 2017-05-05 | End: 2017-05-23

## 2017-05-05 RX ORDER — ONDANSETRON 2 MG/ML
4 INJECTION INTRAMUSCULAR; INTRAVENOUS ONCE
Status: COMPLETED | OUTPATIENT
Start: 2017-05-05 | End: 2017-05-05

## 2017-05-05 RX ORDER — SULFAMETHOXAZOLE/TRIMETHOPRIM 800-160 MG
1 TABLET ORAL 2 TIMES DAILY
Qty: 14 TABLET | Refills: 0 | Status: SHIPPED | OUTPATIENT
Start: 2017-05-05 | End: 2017-05-12

## 2017-05-05 RX ORDER — ONDANSETRON 4 MG/1
4 TABLET, ORALLY DISINTEGRATING ORAL EVERY 6 HOURS PRN
Qty: 20 TABLET | Refills: 0 | Status: SHIPPED | OUTPATIENT
Start: 2017-05-05 | End: 2017-05-08

## 2017-05-05 RX ADMIN — SODIUM CHLORIDE 1000 ML: 9 INJECTION, SOLUTION INTRAVENOUS at 20:20

## 2017-05-05 RX ADMIN — SULFAMETHOXAZOLE AND TRIMETHOPRIM 1 TABLET: 800; 160 TABLET ORAL at 21:09

## 2017-05-05 RX ADMIN — MORPHINE SULFATE 4 MG: 2 INJECTION, SOLUTION INTRAMUSCULAR; INTRAVENOUS at 20:22

## 2017-05-05 RX ADMIN — MORPHINE SULFATE 4 MG: 2 INJECTION, SOLUTION INTRAMUSCULAR; INTRAVENOUS at 21:18

## 2017-05-05 RX ADMIN — ONDANSETRON 4 MG: 2 INJECTION INTRAMUSCULAR; INTRAVENOUS at 20:20

## 2017-05-05 RX ADMIN — IBUPROFEN 600 MG: 600 TABLET ORAL at 21:09

## 2017-05-05 ASSESSMENT — ENCOUNTER SYMPTOMS
ABDOMINAL PAIN: 1
DIFFICULTY URINATING: 0
FEVER: 0
SHORTNESS OF BREATH: 0
ABDOMINAL DISTENTION: 0
HEMATURIA: 0
BLOOD IN STOOL: 0
ANAL BLEEDING: 0
CONSTIPATION: 0
MUSCULOSKELETAL NEGATIVE: 1
FREQUENCY: 0
APPETITE CHANGE: 0
FLANK PAIN: 0
SORE THROAT: 0
NAUSEA: 0
NEUROLOGICAL NEGATIVE: 1
UNEXPECTED WEIGHT CHANGE: 0
DYSURIA: 0
DIARRHEA: 0
CHILLS: 0
VOMITING: 0

## 2017-05-05 NOTE — ED AVS SNAPSHOT
[facility]    [address] [place] [address]    Phone:  [phone]                                       Sharlene Mccord   MRN: [id number]    Department:  [facility]   Date of Visit:  5/5/2017           After Visit Summary Signature Page     I have received my discharge instructions, and my questions have been answered. I have discussed any challenges I see with this plan with the nurse or doctor.    ..........................................................................................................................................  Patient/Patient Representative Signature      ..........................................................................................................................................  Patient Representative Print Name and Relationship to Patient    ..................................................               ................................................  Date                                            Time    ..........................................................................................................................................  Reviewed by Signature/Title    ...................................................              ..............................................  Date                                                            Time

## 2017-05-05 NOTE — ED AVS SNAPSHOT
[facility]    [address]    [place] [address]    Phone:  [phone]                                       Sharlene Mccord   MRN: [id number]    Department:  [facility]   Date of Visit:  5/5/2017           Patient Information     Date Of Birth          1960        Your diagnoses for this visit were:     Diarrhea, unspecified type     LLQ abdominal pain     RLQ abdominal pain        You were seen by Hayley Moreno PA-C.      Follow-up Information     Follow up with Concha Hare PA In 3 days.    Specialty:  Family Practice    Contact information:    [facility]  [address]  [place] [address] [phone]          Follow up with [facility].    Specialty:  EMERGENCY MEDICINE    Why:  If symptoms worsen    Contact information:    [address]  [place] [address] [phone]    Additional information:    From [place]: Take [address]. Turn left at [address]. Turn left at the first stoplight on [address]. At the first stop sign, take a right onto [address]. Take a left into the parking lot and continue through until you reach the North enterance of the building.       From [place]: Take [address]. Take the [address] ramp towards [place]. Turn left onto [address]. Take a slight right onto [address]. Turn left at the first stoplight on [address]. At the first stop sign, take a right onto [address]. Take a left into the parking lot and continue through until you reach the North enterance of the building.       From [place]: Take [address]. Take a right at [address]. At the first stop sign, take a right onto [address]. Take a left into the parking lot and continue through until you reach the North enterance of the building.         Discharge Instructions       Take your Bactrim as prescribed for your presumed  diverticulitis. Take the Zofran as prescribed for nausea. Take the Norco as prescribed for pain. No driving or drinking while taking this medication. Return here with any new or worsening symptoms. Follow up with primary care in 3 days for re-check.         Discharge References/Attachments     DIVERTICULITIS (ENGLISH)      Future Appointments        Provider Department Dept Phone Center    5/23/2017 9:30 AM Norman Nichole MD [facility] [phone] [place]    5/31/2017 10:20 AM Aurelia Odell MD [facility] [phone] [place]    6/21/2017 8:45 AM KEILY Cobos [facility] [phone] [facility]         Review of your medicines      START taking        Dose / Directions Last dose taken    * HYDROcodone-acetaminophen 5-325 MG   Commonly known as:  NORCO   Dose:  1 tablet   Quantity:  6 tablet        Take 1 tablet by mouth every 6 hours as needed for moderate to severe pain   Refills:  0        * HYDROcodone-acetaminophen 5-325 MG per tablet   Commonly known as:  NORCO   Dose:  1 tablet   Quantity:  10 tablet        Take 1 tablet by mouth every 6 hours as needed for moderate to severe pain   Refills:  0        ondansetron 4 MG ODT tab   Commonly known as:  ZOFRAN ODT   Dose:  4 mg   Quantity:  20 tablet        Take 1 tablet (4 mg) by mouth every 6 hours as needed for nausea   Refills:  0        sulfamethoxazole-trimethoprim 800-160 MG per tablet   Commonly known as:  BACTRIM DS   Dose:  1 tablet   Quantity:  14 tablet        Take 1 tablet by mouth 2 times daily for 7 days   Refills:  0        * Notice:  This list has 2 medication(s) that are the same as other medications prescribed for you. Read the directions carefully, and ask your doctor or other care provider to review them with you.      Our records show that you are taking the medicines listed below. If these are incorrect, please call your family doctor or clinic.        Dose / Directions  Last dose taken    buPROPion 200 MG 12 hr tablet   Commonly known as:  WELLBUTRIN SR   Dose:  200 mg   Quantity:  60 tablet        Take 1 tablet (200 mg) by mouth 2 times daily   Refills:  5        busPIRone 5 MG tablet   Commonly known as:  BUSPAR   Dose:  5 mg   Quantity:  120 tablet        Take 1 tablet (5 mg) by mouth 2 times daily For 2 weeks then increase to 2 tabs twice daily   Refills:  3        clonazePAM 1 MG tablet   Commonly known as:  klonoPIN   Dose:  1 mg   Quantity:  60 tablet        Take 1 tablet (1 mg) by mouth 2 times daily as needed for anxiety   Refills:  5        diltiazem 300 MG 24 hr ER beaded capsule   Commonly known as:  TIAZAC   Dose:  300 mg   Quantity:  90 capsule        Take 1 capsule (300 mg) by mouth daily   Refills:  3        estradiol 0.5 MG tablet   Commonly known as:  ESTRACE   Quantity:  60 tablet        TAKE ONE TABLET BY MOUTH TWICE DAILY   Refills:  3        hydrOXYzine 25 MG tablet   Commonly known as:  ATARAX   Quantity:  120 tablet        TAKE ONE TO TWO TABLETS BY MOUTH AT BEDTIME FOR  NAUSEA   Refills:  0        levothyroxine 50 MCG tablet   Commonly known as:  SYNTHROID/LEVOTHROID   Dose:  50 mcg   Quantity:  30 tablet        Take 1 tablet (50 mcg) by mouth daily   Refills:  10        LORazepam 0.5 MG tablet   Commonly known as:  ATIVAN   Dose:  0.5 mg   Quantity:  30 tablet        Take 1 tablet (0.5 mg) by mouth every 8 hours as needed for anxiety   Refills:  0        metoclopramide 5 MG tablet   Commonly known as:  REGLAN   Dose:  5 mg   Quantity:  60 tablet        Take 1 tablet (5 mg) by mouth 4 times daily as needed   Refills:  0        PROCTOSOL HC 2.5 % cream   Quantity:  58 g   Generic drug:  hydrocortisone        APPLY  CREAM TO AFFECTED AREA THREE TIMES DAILY   Refills:  1        ranitidine 150 MG tablet   Commonly known as:  ZANTAC   Dose:  150 mg   Quantity:  60 tablet        Take 1 tablet (150 mg) by mouth 2 times daily   Refills:  3        SUMAtriptan 100 MG  tablet   Commonly known as:  IMITREX   Quantity:  10 tablet        TAKE ONE TABLET BY MOUTH AS NEEDED MIGRAINES   Refills:  5        traMADol 50 MG tablet   Commonly known as:  ULTRAM   Quantity:  180 tablet        TAKE TWO TABLETS BY MOUTH EVERY 8 HOURS AS NEEDED FOR PAIN   Refills:  0                Prescriptions were sent or printed at these locations (4 Prescriptions)                   [facility] [id number] [id number]    [id number] , [place] [address]    Telephone:  [phone]   Fax:  [phone]   Hours:                  E-Prescribed (2 of 4)         ondansetron (ZOFRAN ODT) 4 MG ODT tab               sulfamethoxazole-trimethoprim (BACTRIM DS) 800-160 MG per tablet                 Printed at Department/Unit printer (2 of 4)         HYDROcodone-acetaminophen (NORCO) 5-325 MG               HYDROcodone-acetaminophen (NORCO) 5-325 MG per tablet                Procedures and tests performed during your visit     CBC with platelets differential    CRP inflammation    Comprehensive metabolic panel    Peripheral IV catheter    UA reflex to Microscopic and Culture    XR Abdomen 2 Views      Orders Needing Specimen Collection     None      Pending Results     Date and Time Order Name Status Description    5/5/2017 2054 XR Abdomen 2 Views In process             Pending Culture Results     No orders found from 5/3/2017 to 5/6/2017.            Thank you for choosing [facility]       Thank you for choosing [facility] for your care. Our goal is always to provide you with excellent care. Hearing back from our patients is one way we can continue to improve our services. Please take a few minutes to complete the written survey that you may receive in the mail after you visit with us. Thank you!        [facility] Information     [facility] gives you secure access to your electronic health record. If you see a primary care provider, you can also send messages to your care team and make appointments. If you have  questions, please call your primary care clinic.  If you do not have a primary care provider, please call [phone] and they will assist you.        Care EveryWhere ID     This is your Care EveryWhere ID. This could be used by other organizations to access your [facility] medical records  [id number]        After Visit Summary       This is your record. Keep this with you and show to your community pharmacist(s) and doctor(s) at your next visit.

## 2017-05-06 NOTE — ED NOTES
"Pt presents to ER with c/o \"sudden onset at approx 1630 this evening, with no precepting factors.\" Denies eating supper, just started have 8/10 lower abd pain, with episodes of diarrhea, did have \"brief episode of nausea when attack first occurred.\"  Accompanied by . See assessments. Call light placed within reach.   "

## 2017-05-06 NOTE — DISCHARGE INSTRUCTIONS
Take your Bactrim as prescribed for your presumed diverticulitis. Take the Zofran as prescribed for nausea. Take the Norco as prescribed for pain. No driving or drinking while taking this medication. Return here with any new or worsening symptoms. Follow up with primary care in 3 days for re-check.

## 2017-05-06 NOTE — ED NOTES
Nauseated. C/o burning in the epigastric area. Able to sip 7 up and able to eat a couple of saltine crackers.

## 2017-05-06 NOTE — ED PROVIDER NOTES
"  History     Chief Complaint   Patient presents with     Abdominal Pain     c/o abd pain and diarrhea, notes nausea earlier. c/o hx diverticulitis     HPI  Sharlene Mccord is a 56 year old female who presents with lower abdominal cramping, diarrhea, and nausea that came on abruptly at 1630 this afternoon. States she has h/o diverticulitis diagnosed on CT scan about 7 years ago, and this feels similar. The stools were \"mud colored and mucousy.\" Denies black/bloody stools. Denies fevers. No recent travel.     I have reviewed the Medications, Allergies, Past Medical and Surgical History, and Social History in the Epic system.    Review of Systems   Constitutional: Negative for appetite change, chills, fever and unexpected weight change.   HENT: Negative for sore throat.    Respiratory: Negative for shortness of breath.    Cardiovascular: Negative for chest pain.   Gastrointestinal: Positive for abdominal pain. Negative for abdominal distention, anal bleeding, blood in stool, constipation, diarrhea, nausea and vomiting.   Genitourinary: Negative.  Negative for difficulty urinating, dyspareunia, dysuria, flank pain, frequency, genital sores, hematuria, pelvic pain, urgency, vaginal bleeding, vaginal discharge and vaginal pain.   Musculoskeletal: Negative.    Skin: Negative.    Neurological: Negative.    All other systems reviewed and are negative.    Past Medical History:   Past Medical History:   Diagnosis Date     Anxiety state, unspecified 03/01/2011     Aortic valve disorders 06/07/2000    Echocardiogram showin mild/moderate AI.  Normal LV function     Cardiac microvascular disease ([facility]) 4/10/2013    Based on Cardiac MRI done at       Fibromyalgia 03/01/2011     Major depressive disorder, recurrent episode, unspecified 12/17/2014     Migraine, unspecified, without mention of intractable migraine without mention of status migrainosus 03/01/2011     Mitral valve disorders 10/16/2007     PONV (postoperative nausea and " vomiting)      Thyroid Nodule 03/01/2011     Tobacco use disorder 03/01/2011     Unspecified hypothyroidism 03/01/2011       Past Surgical History:   Procedure Laterality Date     CARDIAC SURGERY  2013    angiogram [facility]:  Normal coronary arteries.  Felt ot have some microvascular disease     CL AFF SURGICAL PATHOLOGY  01/02/2007    Thyroid Mass     COLONOSCOPY  1/13/2014    Procedure: COLONOSCOPY;  COLONOSCOPY ;  Surgeon: Chasidy Gee DO;  Location: [facility]     ESOPHAGOSCOPY, GASTROSCOPY, DUODENOSCOPY (EGD), COMBINED N/A 2/9/2017    Procedure: COMBINED ESOPHAGOSCOPY, GASTROSCOPY, DUODENOSCOPY (EGD);  Surgeon: Johnathan Ruff DO;  Location: [facility]     GYN SURGERY      c-sections x 3     GYN SURGERY      complete hysterectomy     HYSTERECTOMY  2001     LAPAROSCOPIC CHOLECYSTECTOMY  11/07/2003    Cholelithiasis     LAPAROTOMY EXPLORATORY      abdominal pain/fever     LARYNGOSCOPY       SPLENECTOMY         Social History     Social History     Marital status:      Spouse name: N/A     Number of children: N/A     Years of education: N/A     Occupational History     Manager      Social History Main Topics     Smoking status: Current Every Day Smoker     Packs/day: 0.50     Years: 40.00     Types: Cigarettes     Smokeless tobacco: Never Used      Comment: starts quit plan in march 2017.  Has cut down to one pack a week     Alcohol use No     Drug use: No     Sexual activity: Not on file     Other Topics Concern     Blood Transfusions Yes     Caffeine Concern Yes     Coffee      Parent/Sibling W/ Cabg, Mi Or Angioplasty Before 65f 55m? No     Social History Narrative       Patient's Medications   New Prescriptions    HYDROCODONE-ACETAMINOPHEN (NORCO) 5-325 MG    Take 1 tablet by mouth every 6 hours as needed for moderate to severe pain    HYDROCODONE-ACETAMINOPHEN (NORCO) 5-325 MG PER TABLET    Take 1 tablet by mouth every 6 hours as needed for moderate to severe pain    ONDANSETRON (ZOFRAN ODT) 4 MG ODT TAB     Take 1 tablet (4 mg) by mouth every 6 hours as needed for nausea    SULFAMETHOXAZOLE-TRIMETHOPRIM (BACTRIM DS) 800-160 MG PER TABLET    Take 1 tablet by mouth 2 times daily for 7 days   Previous Medications    BUPROPION (WELLBUTRIN SR) 200 MG 12 HR TABLET    Take 1 tablet (200 mg) by mouth 2 times daily    BUSPIRONE (BUSPAR) 5 MG TABLET    Take 1 tablet (5 mg) by mouth 2 times daily For 2 weeks then increase to 2 tabs twice daily    CLONAZEPAM (KLONOPIN) 1 MG TABLET    Take 1 tablet (1 mg) by mouth 2 times daily as needed for anxiety    DILTIAZEM (TIAZAC) 300 MG 24 HR ER BEADED CAPSULE    Take 1 capsule (300 mg) by mouth daily    ESTRADIOL (ESTRACE) 0.5 MG TABLET    TAKE ONE TABLET BY MOUTH TWICE DAILY    HYDROXYZINE (ATARAX) 25 MG TABLET    TAKE ONE TO TWO TABLETS BY MOUTH AT BEDTIME FOR  NAUSEA    LEVOTHYROXINE (SYNTHROID/LEVOTHROID) 50 MCG TABLET    Take 1 tablet (50 mcg) by mouth daily    LORAZEPAM (ATIVAN) 0.5 MG TABLET    Take 1 tablet (0.5 mg) by mouth every 8 hours as needed for anxiety    METOCLOPRAMIDE (REGLAN) 5 MG TABLET    Take 1 tablet (5 mg) by mouth 4 times daily as needed    PROCTOSOL HC 2.5 % RECTAL CREAM    APPLY  CREAM TO AFFECTED AREA THREE TIMES DAILY    RANITIDINE (ZANTAC) 150 MG TABLET    Take 1 tablet (150 mg) by mouth 2 times daily    SUMATRIPTAN (IMITREX) 100 MG TABLET    TAKE ONE TABLET BY MOUTH AS NEEDED MIGRAINES    TRAMADOL (ULTRAM) 50 MG TABLET    TAKE TWO TABLETS BY MOUTH EVERY 8 HOURS AS NEEDED FOR PAIN   Modified Medications    No medications on file   Discontinued Medications    CYCLOBENZAPRINE (FLEXERIL) 10 MG TABLET    Take 0.5-1 tablets (5-10 mg) by mouth 3 times daily as needed for muscle spasms       Allergies: Codeine; Isosorbide mononitrate; and Lisinopril    Physical Exam   BP: 143/93  Heart Rate: 86  Temp: 97.4  F (36.3  C)  Resp: 18  SpO2: 97 %  Physical Exam   Constitutional: She is oriented to person, place, and time. Vital signs are normal. She appears well-developed  and well-nourished.  Non-toxic appearance. She does not have a sickly appearance. She does not appear ill. No distress.   HENT:   Head: Normocephalic and atraumatic.   Right Ear: External ear normal.   Left Ear: External ear normal.   Nose: Nose normal.   Mouth/Throat: Oropharynx is clear and moist. No oropharyngeal exudate.   Eyes: Conjunctivae are normal. Pupils are equal, round, and reactive to light. Right eye exhibits no discharge. Left eye exhibits no discharge. No scleral icterus.   Neck: Normal range of motion. Neck supple.   Cardiovascular: Normal rate, regular rhythm, normal heart sounds and intact distal pulses.  Exam reveals no gallop and no friction rub.    No murmur heard.  Pulmonary/Chest: Effort normal and breath sounds normal. No respiratory distress. She has no wheezes. She has no rales. She exhibits no tenderness.   Abdominal: Soft. Bowel sounds are normal. She exhibits no distension and no mass. There is generalized tenderness. There is no rebound and no guarding.   Musculoskeletal: Normal range of motion. She exhibits no edema.   Lymphadenopathy:     She has no cervical adenopathy.   Neurological: She is alert and oriented to person, place, and time. No cranial nerve deficit.   Skin: Skin is warm and dry. No rash noted. She is not diaphoretic. No erythema. No pallor.   Psychiatric: She has a normal mood and affect. Her behavior is normal. Judgment and thought content normal.   Nursing note and vitals reviewed.      ED Course     ED Course     Procedures          Labs Ordered and Resulted from Time of ED Arrival Up to the Time of Departure from the ED   CBC WITH PLATELETS DIFFERENTIAL - Abnormal; Notable for the following:        Result Value    Absolute Neutrophil 8.4 (*)     All other components within normal limits   COMPREHENSIVE METABOLIC PANEL - Abnormal; Notable for the following:     Creatinine 1.06 (*)     GFR Estimate 54 (*)     All other components within normal limits   CRP  INFLAMMATION - Abnormal; Notable for the following:     CRP Inflammation 9.0 (*)     All other components within normal limits   UA MACROSCOPIC WITH REFLEX TO MICRO AND CULTURE   PERIPHERAL IV CATHETER       Assessments & Plan (with Medical Decision Making)   Sharlene has h/o diverticulitis and presents today with 4 hour history of lower abdominal pain and diarrhea. She has generalized tenderness on exam, no peritoneal signs. VS are WNL. She was given a 1 liter bolus of NS, MS 4mg IV for pain, and Zofran 4mg IV for nausea prophylaxis. She is feeling much better following. White count is normal and CRP is only 9.0, so doubt significant abdominal infection. CT not indicated at this point. Abd XR is normal. Given h/o diverticulitis, will cover for this with Bactrim DS BID x 7 days, first dose given here. RX for Norco and Zofran for home as well. Pt is stable for discharge home at this time.     Plan: Take your Bactrim as prescribed for your presumed diverticulitis. Take the Zofran as prescribed for nausea. Take the Norco as prescribed for pain. No driving or drinking while taking this medication. Return here with any new or worsening symptoms. Follow up with primary care in 3 days for re-check.     I have reviewed the nursing notes.    I have reviewed the findings, diagnosis, plan and need for follow up with the patient.    New Prescriptions    HYDROCODONE-ACETAMINOPHEN (NORCO) 5-325 MG    Take 1 tablet by mouth every 6 hours as needed for moderate to severe pain    HYDROCODONE-ACETAMINOPHEN (NORCO) 5-325 MG PER TABLET    Take 1 tablet by mouth every 6 hours as needed for moderate to severe pain    ONDANSETRON (ZOFRAN ODT) 4 MG ODT TAB    Take 1 tablet (4 mg) by mouth every 6 hours as needed for nausea    SULFAMETHOXAZOLE-TRIMETHOPRIM (BACTRIM DS) 800-160 MG PER TABLET    Take 1 tablet by mouth 2 times daily for 7 days       Final diagnoses:   Diarrhea, unspecified type   LLQ abdominal pain   RLQ abdominal pain        5/5/2017   [facility]     Hayley Moreno PA-C  05/05/17 2116       Hayley Moreno PA-C  05/05/17 0625

## 2017-05-06 NOTE — ED NOTES
Discharge instructions given. Denies pain. C/o some nausea still but states it's much better. Verbalized understanding of discharge instructions. Signed prescription for Norco and take home bottle of Norco handed to patient. Ambulated out of ED independently with  at side.

## 2017-05-11 DIAGNOSIS — Z12.31 VISIT FOR SCREENING MAMMOGRAM: Primary | ICD-10-CM

## 2017-05-23 ENCOUNTER — OFFICE VISIT (OUTPATIENT)
Dept: CARDIOLOGY | Facility: OTHER | Age: 57
End: 2017-05-23
Attending: INTERNAL MEDICINE
Payer: COMMERCIAL

## 2017-05-23 VITALS
HEIGHT: 67 IN | HEART RATE: 63 BPM | RESPIRATION RATE: 20 BRPM | SYSTOLIC BLOOD PRESSURE: 128 MMHG | BODY MASS INDEX: 30.13 KG/M2 | OXYGEN SATURATION: 97 % | WEIGHT: 192 LBS | DIASTOLIC BLOOD PRESSURE: 69 MMHG | TEMPERATURE: 98 F

## 2017-05-23 DIAGNOSIS — I35.1 NONRHEUMATIC AORTIC VALVE INSUFFICIENCY: ICD-10-CM

## 2017-05-23 DIAGNOSIS — E78.5 DYSLIPIDEMIA: ICD-10-CM

## 2017-05-23 DIAGNOSIS — I73.9 BILATERAL CLAUDICATION OF LOWER LIMB (H): ICD-10-CM

## 2017-05-23 DIAGNOSIS — R07.9 CHEST PAIN, UNSPECIFIED TYPE: ICD-10-CM

## 2017-05-23 DIAGNOSIS — I25.85 CARDIAC MICROVASCULAR DISEASE: Primary | ICD-10-CM

## 2017-05-23 PROCEDURE — 99214 OFFICE O/P EST MOD 30 MIN: CPT | Performed by: INTERNAL MEDICINE

## 2017-05-23 PROCEDURE — 99212 OFFICE O/P EST SF 10 MIN: CPT

## 2017-05-23 RX ORDER — NITROGLYCERIN 0.4 MG/1
0.4 TABLET SUBLINGUAL EVERY 5 MIN PRN
Qty: 25 TABLET | Refills: 11 | Status: SHIPPED | OUTPATIENT
Start: 2017-05-23 | End: 2020-05-12

## 2017-05-23 ASSESSMENT — PAIN SCALES - GENERAL: PAINLEVEL: MODERATE PAIN (5)

## 2017-05-23 NOTE — LETTER
5/23/2017      RE: Sharlene Mccord  [address]  [place] [address]       CC- fatigue    [facility]- Mrs. Mccord has been followed for several years by various cardiologists. She has known AI and has complained of fatigue throughout. She reports recently her fatigue has been much worse. She related that her fatigue was associated with dyspnea on exertion and chest discomfort. Her stress test showed an anterior wall defect that did not fit with her echocardiogram. She also has aortic insufficiency that by echocardiogram did not seem to have progressed. She underwent angiography at [facility]. This demonstrated no epicardial coronary artery disease. Her echocardiogram again showed mild to mod AI. A stress MRI was done that  suggested microvascular disease. She was begun on isosorbide mononitrate but stopped after 4 days due to severe headache. She cannot recall if it helped with her chest pain. She was started on diltiazem but stopped when she was admitted for diverticulitis. She is already on estrogen replacement. On 360 mg of diltiazem she reported the chest pain was better but her BP was low. She also noted numbness and tingling in both arms and both legs. The legs was constant. The arms mostly when she worked over her head. She also felt fatigued. She does not report orthostatic symptoms. Her diltiazem was reduced to 300 mg daily. The chest pain is worse. The fatigue is maybe a little better and the numbness/tingling is unchanged.     June 2014:  At our last visit I discussed with her that I have no other therapies to offer regarding her chest pain. We discuss the possibility of starting a medication such as Neurontin that has been reported to help with chronic pain syndromes. There is some evidence that patients with microvascular angina have abnormal pain sensitivity. She reports the Neurontin was started and it has helped. She is using SL NTG 1-2x/week as opposed to several times a day.    We  had stopped her Diovan because on the increased diltiazem dose she was having relatively low BP. She is worried because she has seen some BP measurements that are high for her. She admits to being stressed. She has just started a w/u for memory loss and a change was seen on her MRI.    She had an echocardiogram done in March, see below, that showed no change in her aortic insufficiency.     [id number]:    She has been doing reasonably well. She is not having as much chest pain. She has been less active due to problems with disk disease in her spine. She has not noted any major changes in her exertional abilities.     [id number]: She has been having more joint pain. She is seeing a Rheumatologist in [place] soon.    Her chest pain is perhaps a bit better. Her legs and back feel heavy and she has sharp pains and aches, especially with climbing stairs. This has not been associated with her chest pain. On level drug she had been walking 2 miles with her sister but she has not done that for several months now.     [id number] Interval history: her chest pain has been doing reasonably well. She hasn't had NTG available for a few months.     She reports a new barking cough over the past 6 months - no fever, no new meds, not productive. The cough is not getting any worse but no better either. She has not had any wheezing that she has noticed.     Her legs feel heavy when she walks.    Current Outpatient Prescriptions   Medication Sig Dispense Refill     hydrOXYzine (ATARAX) 25 MG tablet TAKE ONE TO TWO TABLETS BY MOUTH AT BEDTIME FOR  NAUSEA 120 tablet 0     traMADol (ULTRAM) 50 MG tablet TAKE TWO TABLETS BY MOUTH EVERY 8 HOURS AS NEEDED FOR PAIN 180 tablet 0     diltiazem (TIAZAC) 300 MG 24 hr ER beaded capsule Take 1 capsule (300 mg) by mouth daily 90 capsule 3     levothyroxine (SYNTHROID/LEVOTHROID) 50 MCG tablet Take 1 tablet (50 mcg) by mouth daily 30 tablet 10     estradiol (ESTRACE) 0.5 MG tablet TAKE ONE TABLET BY MOUTH  TWICE DAILY 60 tablet 3     ranitidine (ZANTAC) 150 MG tablet Take 1 tablet (150 mg) by mouth 2 times daily 60 tablet 3     buPROPion (WELLBUTRIN SR) 200 MG 12 hr tablet Take 1 tablet (200 mg) by mouth 2 times daily 60 tablet 5     LORazepam (ATIVAN) 0.5 MG tablet Take 1 tablet (0.5 mg) by mouth every 8 hours as needed for anxiety 30 tablet 0     clonazePAM (KLONOPIN) 1 MG tablet Take 1 tablet (1 mg) by mouth 2 times daily as needed for anxiety 60 tablet 5     PROCTOSOL HC 2.5 % rectal cream APPLY  CREAM TO AFFECTED AREA THREE TIMES DAILY 58 g 1     SUMAtriptan (IMITREX) 100 MG tablet TAKE ONE TABLET BY MOUTH AS NEEDED MIGRAINES 10 tablet 5     metoclopramide (REGLAN) 5 MG tablet Take 1 tablet (5 mg) by mouth 4 times daily as needed (Patient not taking: Reported on 5/23/2017) 60 tablet 0     Allergies   Allergen Reactions     Codeine      Isosorbide Mononitrate Other (See Comments)     Vomiting and headache       Lisinopril      [facility] scan     Past Medical History:   Diagnosis Date     Anxiety state, unspecified 03/01/2011     Aortic valve disorders 06/07/2000    Echocardiogram showin mild/moderate AI.  Normal LV function     Cardiac microvascular disease (H) 4/10/2013    Based on Cardiac MRI done at       Fibromyalgia 03/01/2011     Major depressive disorder, recurrent episode, unspecified 12/17/2014     Migraine, unspecified, without mention of intractable migraine without mention of status migrainosus 03/01/2011     Mitral valve disorders 10/16/2007     PONV (postoperative nausea and vomiting)      Thyroid Nodule 03/01/2011     Tobacco use disorder 03/01/2011     Unspecified hypothyroidism 03/01/2011     Past Surgical History:   Procedure Laterality Date     CARDIAC SURGERY  2013    angiogram [facility]:  Normal coronary arteries.  Felt ot have some microvascular disease     CL AFF SURGICAL PATHOLOGY  01/02/2007    Thyroid Mass     COLONOSCOPY  1/13/2014    Procedure: COLONOSCOPY;  COLONOSCOPY ;  Surgeon:  "Chasidy Gee DO;  Location: [facility] OR     ESOPHAGOSCOPY, GASTROSCOPY, DUODENOSCOPY (EGD), COMBINED N/A 2/9/2017    Procedure: COMBINED ESOPHAGOSCOPY, GASTROSCOPY, DUODENOSCOPY (EGD);  Surgeon: Johnathan Ruff DO;  Location: [facility] OR     GYN SURGERY      c-sections x 3     GYN SURGERY      complete hysterectomy     HYSTERECTOMY  2001     LAPAROSCOPIC CHOLECYSTECTOMY  11/07/2003    Cholelithiasis     LAPAROTOMY EXPLORATORY      abdominal pain/fever     LARYNGOSCOPY       SPLENECTOMY       Social History   Substance Use Topics     Smoking status: Current Every Day Smoker     Packs/day: 0.50     Years: 40.00     Types: Cigarettes     Smokeless tobacco: Never Used      Comment: starts quit plan in march 2017.  Has cut down to one pack a week     Alcohol use No     ROS- the ten system review is negative except as noted in the HPI. [id number]    Physical examination:  /69 (BP Location: Left arm, Cuff Size: Adult Large)  Pulse 63  Temp 98  F (36.7  C) (Tympanic)  Resp 20  Ht 1.702 m (5' 7\")  Wt 87.1 kg (192 lb)  SpO2 97%  BMI 30.07 kg/m2    GENERAL APPEARANCE: healthy, alert and no distress  NECK: no venous distention  RESPIRATORY: lungs clear to auscultation - no rales, rhonchi or wheezes  CARDIOVASCULAR: regular, normal S1 S2, no gallop, soft AI murmur as before  EXTREMITIES: no edema   VASC: pedal pulses are normal    Laboratory:    JESUS study ordered.    BP WT  CHOL Latest Ref Rng & Units 4/19/2017   BP  122/70   Weight  191 lb   BMI  31.37   CHOLESTEROL <200 mg/dL 214 (H)   HDL CHOLESTEROL >49 mg/dL 77   LDL CHOLESTEROL, CALCULATED <100 mg/dL 104 (H)   TRIGLYCERIDES <150 mg/dL 165 (H)     ECHOCARDIOGRAM    M-mode, two-dimensional, color-flow, and Doppler studies were obtained  on this patient.  Standard views were utilized.    ORDERING PHYSICIAN:  Norman Nichole MD    INDICATIONS:  Aortic valve disorder    Cardiac Dimensions                                    Normal  Dimensions  Aortic Root:         "                    31.3 mm      Aortic Root  Diameter: 20-37 mm  Left Atrium:                            34.8 mm      Left Atrium:  19-40 mm  Right Ventricle:                       Not measured      Right  Ventricle: 7-23 mm  Left Ventricle end-diastole:    50.2 mm      Left Ventricle  end-diastole: 35-56 mm  Left Ventricle end-systole:     33.5 mm      Left Ventricle  end-systole: 35-56 mm  IVS end-diastole:                      9.7 mm      IVS end-diastole:  7-11 mm  LVPW:                                      9.7 mm      LVPW: 7-11 mm    Review of the study shows there is no pericardial effusion.  The left  ventricle has normal size and systolic function.  Ejection fraction at  60%.  The left atrium is normal.  Right atrium normal. Right ventricle  is normal on 2-D study.  The mitral valve has a trace amount of mitral  regurgitation.  The aortic valve has no stenosis. There is at least a  moderate amount of aortic insufficiency.  The tricuspid valve has a  trace amount of tricuspid regurgitation. Peak systolic pressure of the  right ventricle is estimated at 22 plus right atrium.  Diastolic  indices show reversal of the E:A ratio at 0.81, suggesting diastolic  dysfunction.      ASSESSMENT:  Echocardiographic study revealing  1.  Normal left ventricular size and systolic function.  Ejection  fraction at 60%.  2.  Normal atrium.  3.  Normal right ventricle size and function.  4.  Trace mitral regurgitation.  5.  Aortic valve without stenosis, but moderate aortic insufficiency  (unchanged from previously).  6.  Trace tricuspid regurgitation.  Peak systolic pressure of the  right ventricle is estimated at 22 plus right atrium.  7.  Evidence of diastolic dysfunction is seen.  Exam Date: Apr 06, 2017 11:51:07 AM  Author: BRAN DELGADO    Prior studies reviewed:  ECHOCARDIOGRAM  M-mode, two-dimensional, color-flow, and Doppler studies were obtained  on this patient. Standard views were utilized.  ORDERING PHYSICIAN:  Dr. Nichole  INDICATIONS: Follow up aortic insufficiency  Cardiac Dimensions Normal  Dimensions  Aortic Root: 33 mm Aortic Root  Diameter: 20-37 mm  Left Atrium: 35.4 mm Left Atrium: 19-40  mm  Right Ventricle: 9.4 mm Right Ventricle:  7-23 mm  Left Ventricle end-diastole: 54.1 mm Left Ventricle  end-diastole: 35-56 mm  Left Ventricle end-systole: 34.4 mm Left Ventricle  end-systole: 35-56 mm  IVS end-diastole: 9.4 mm IVS end-diastole:  7-11 mm  LVPW: 8.7 mm LVPW: 7-11 mm  Review of the study shows there is no pericardial effusion. Left  ventricle has normal size and systolic function normal, ejection  fraction at 65%. Left atrium normal. Right atrium normal. Right  ventricle is normal on 2-dimensional study. The mitral valve has a  trace amount of mitral regurgitation. The aortic valve has no  stenosis. There is at least moderate aortic insufficiency. The  pressure half-time is 552 milliseconds. Aortic deceleration slope  was 2.44 meters per second squared. There is slight tricuspid  regurgitation. Peak systolic pressure of the right ventricle is  estimated at 20 plus the right atrium.    ASSESSMENT: Echocardiographic study revealing  1. Normal left ventricular size and systolic function, ejection  fraction around 65%.  2. Normal atrium.  3. Normal right ventricle size and function.  4. Slight mitral regurgitation.  5. Aortic valve without stenosis. There is at least moderate aortic  insufficiency. Zihs-li-jctw comparison appears to be about the same  on color flow Doppler study. Pressure half-time is a little more  reduced at 552 milliseconds compared to previously. Fajardo is a  little greater also. The left ventricle function, however, is normal  with normal size.  6. Trace tricuspid regurgitation. Peak systolic pressure of the  right ventricle is estimated at 20 plus the right atrium.  7. Diastolic indices show there is a reversal of E to A ratio at  0.89, suggesting a diastolic dysfunction.    Exam Date: May  "12, 2014 08:37:32 AM  Author: BRAN DELGADO  This report is final and signed    FINDINGS: Left ventricular ejection fraction is 67%. There is  small-to-moderate sized fixed perfusion defect of the anteroseptal  wall. This extends to the cardiac apex. No reversible perfusion  defects are present to suggest ischemia.  IMPRESSION: Moderate-sized anteroseptal infarct involving the apex.  There is no evidence of cardiac ischemia    Exam Date: Mar 27, 2013 09:09:41 AM  Author: ANANT LOPEZ  This report is final and signed    Assessment and recommendations:    1) Fatigue/chest pain/ dyspnea on exertion   2) Moderate AI  3) Microvascular angina    - stable exam  - repeat echocardiogram shows no change, LV size normal    Will repeat in one year.    4) Hypertension -  Under good control.    5) Leg \"heaviness - ? Claudication     - JESUS ordered    6) New cough - not on an ACE inhibitor or angiotensin receptor blocker    7) Dyslipidemia - trying diet first    I appreciate the chance to help with Mrs. Suri eugene. Please let me know if you have any questions or concerns. I will see her in one year.     Norman Nichole M.D.    Patient seen by Dr. Nichole 5/23/2017  See note.         Norman Nichole MD  "

## 2017-05-23 NOTE — MR AVS SNAPSHOT
After Visit Summary   5/23/2017    Sharlene Mccord    MRN: [id number]           Patient Information     Date Of Birth          1960        Visit Information        Provider Department      5/23/2017 9:30 AM Norman Nichole MD [facility] [place]        Today's Diagnoses     Cardiac microvascular disease (H)    -  1    Bilateral claudication of lower limb (H)        Nonrheumatic aortic valve insufficiency        Dyslipidemia        Chest pain, unspecified type          Care Instructions    You were seen by Dr. Nichole, 5/23/2017.     1. Continue with current medications.     2. You will have an Echocardiogram in one year.     3. You will have an test to measure the blood flow in the legs called an JESUS, you will be contacted by [facility] to schedule this test.     4. Continue to monitor cholesterol, continue with diet and exercise, follow up with Concha Hare regarding this.     5. Nitroglycerine has been ordered for the treatment of chest pain. Please report to the emergency department if you use 3 doses of Nitro without relief. Please sit when taking the nitro to minimize the risk of fainting.       You will follow up with Dr. Nichole in one year .       Please call the cardiology office with problems, questions, or concerns at [phone].    If you experience chest pain, chest pressure, chest tightness, shortness of breath, fainting, lightheadedness, nausea, vomiting, or other concerning symptoms, please report to the Emergency Department or call 911. These symptoms may be emergent, and best treated in the Emergency Department.       Tiffany PEGUERO RN-BSN  Cardiology   [facility]  [phone]          Follow-ups after your visit        Follow-up notes from your care team     Return in about 1 year (around 5/23/2018).      Your next 10 appointments already scheduled     May 31, 2017 10:20 AM CDT   (Arrive by 10:05 AM)   Return Visit with Aurelia Odell MD  "  [facility] [place] ([facility] [place] [facility])    [address]  [place] [id number] [phone]            Jun 21, 2017  8:45 AM CDT   (Arrive by 8:30 AM)   SHORT with KEILY Fonseca   [facility] [place] ([facility] [place] [facility])    [address]  [place] [address]   [phone]              Who to contact     If you have questions or need follow up information about today's clinic visit or your schedule please contact [facility] directly at [phone].  Normal or non-critical lab and imaging results will be communicated to you by [employer], letter or phone within 4 business days after the clinic has received the results. If you do not hear from us within 7 days, please contact the clinic through [employer] or phone. If you have a critical or abnormal lab result, we will notify you by phone as soon as possible.  Submit refill requests through [employer] or call your pharmacy and they will forward the refill request to us. Please allow 3 business days for your refill to be completed.          Additional Information About Your Visit        MyChart Information     [employer] gives you secure access to your electronic health record. If you see a primary care provider, you can also send messages to your care team and make appointments. If you have questions, please call your primary care clinic.  If you do not have a primary care provider, please call [phone] and they will assist you.        Care EveryWhere ID     This is your Care EveryWhere ID. This could be used by other organizations to access your [place] medical records  [id number]        Your Vitals Were     Pulse Temperature Respirations Height Pulse Oximetry BMI (Body Mass Index)    63 98  F (36.7  C) (Tympanic) 20 1.702 m (5' 7\") 97% 30.07 kg/m2       Blood Pressure from Last 3 Encounters:   05/23/17 128/69   05/05/17 152/99   04/19/17 122/70    Weight from Last 3 Encounters:   05/23/17 87.1 kg (192 lb)   04/19/17 86.6 kg " (191 lb)   03/20/17 86.2 kg (190 lb)              We Performed the Following     Echocardiogram Complete     Vasc JESUS Doppler with Exercise          Today's Medication Changes          These changes are accurate as of: 5/23/17 10:07 AM.  If you have any questions, ask your nurse or doctor.               Start taking these medicines.        Dose/Directions    NITROSTAT 0.4 MG sublingual tablet   Used for:  Chest pain, unspecified type   Generic drug:  nitroglycerin   Started by:  Norman Nichole MD        Dose:  0.4 mg   Place 1 tablet (0.4 mg) under the tongue every 5 minutes as needed for chest pain   Quantity:  25 tablet   Refills:  11            Where to get your medicines      These medications were sent to [id number] - [place], [place] - [address]   [address] , [place] [address]     Phone:  [phone]     NITROSTAT 0.4 MG sublingual tablet                Primary Care Provider Office Phone # Fax #    KEILY Fonseca [phone]       [facility] [address] [place] [address] 2  [place] [address]        Thank you!     Thank you for choosing [facility]  for your care. Our goal is always to provide you with excellent care. Hearing back from our patients is one way we can continue to improve our services. Please take a few minutes to complete the written survey that you may receive in the mail after your visit with us. Thank you!             Your Updated Medication List - Protect others around you: Learn how to safely use, store and throw away your medicines at www.disposemymeds.org.          This list is accurate as of: 5/23/17 10:07 AM.  Always use your most recent med list.                   Brand Name Dispense Instructions for use    buPROPion 200 MG 12 hr tablet    WELLBUTRIN SR    60 tablet    Take 1 tablet (200 mg) by mouth 2 times daily       clonazePAM 1 MG tablet    klonoPIN    60 tablet    Take 1 tablet (1 mg) by mouth 2 times daily as needed for  anxiety       diltiazem 300 MG 24 hr ER beaded capsule    TIAZAC    90 capsule    Take 1 capsule (300 mg) by mouth daily       estradiol 0.5 MG tablet    ESTRACE    60 tablet    TAKE ONE TABLET BY MOUTH TWICE DAILY       hydrOXYzine 25 MG tablet    ATARAX    120 tablet    TAKE ONE TO TWO TABLETS BY MOUTH AT BEDTIME FOR  NAUSEA       levothyroxine 50 MCG tablet    SYNTHROID/LEVOTHROID    30 tablet    Take 1 tablet (50 mcg) by mouth daily       LORazepam 0.5 MG tablet    ATIVAN    30 tablet    Take 1 tablet (0.5 mg) by mouth every 8 hours as needed for anxiety       metoclopramide 5 MG tablet    REGLAN    60 tablet    Take 1 tablet (5 mg) by mouth 4 times daily as needed       NITROSTAT 0.4 MG sublingual tablet   Generic drug:  nitroglycerin     25 tablet    Place 1 tablet (0.4 mg) under the tongue every 5 minutes as needed for chest pain       PROCTOSOL HC 2.5 % cream   Generic drug:  hydrocortisone     58 g    APPLY  CREAM TO AFFECTED AREA THREE TIMES DAILY       ranitidine 150 MG tablet    ZANTAC    60 tablet    Take 1 tablet (150 mg) by mouth 2 times daily       SUMAtriptan 100 MG tablet    IMITREX    10 tablet    TAKE ONE TABLET BY MOUTH AS NEEDED MIGRAINES       traMADol 50 MG tablet    ULTRAM    180 tablet    TAKE TWO TABLETS BY MOUTH EVERY 8 HOURS AS NEEDED FOR PAIN

## 2017-05-23 NOTE — PATIENT INSTRUCTIONS
You were seen by Dr. Nichole, 5/23/2017.     1. Continue with current medications.     2. You will have an Echocardiogram in one year.     3. You will have an test to measure the blood flow in the legs called an JESUS, you will be contacted by [facility] to schedule this test.     4. Continue to monitor cholesterol, continue with diet and exercise, follow up with Concha Hare regarding this.     5. Nitroglycerine has been ordered for the treatment of chest pain. Please report to the emergency department if you use 3 doses of Nitro without relief. Please sit when taking the nitro to minimize the risk of fainting.       You will follow up with Dr. Nichole in one year .       Please call the cardiology office with problems, questions, or concerns at [phone].    If you experience chest pain, chest pressure, chest tightness, shortness of breath, fainting, lightheadedness, nausea, vomiting, or other concerning symptoms, please report to the Emergency Department or call 911. These symptoms may be emergent, and best treated in the Emergency Department.       Tiffany PEGUERO RN-BSN  Cardiology   [facility]  [phone]

## 2017-05-23 NOTE — PROGRESS NOTES
CC- fatigue    HPI- Mrs. Mccord has been followed for several years by various cardiologists. She has known AI and has complained of fatigue throughout. She reports recently her fatigue has been much worse. She related that her fatigue was associated with dyspnea on exertion and chest discomfort. Her stress test showed an anterior wall defect that did not fit with her echocardiogram. She also has aortic insufficiency that by echocardiogram did not seem to have progressed. She underwent angiography at [facility]. This demonstrated no epicardial coronary artery disease. Her echocardiogram again showed mild to mod AI. A stress MRI was done that  suggested microvascular disease. She was begun on isosorbide mononitrate but stopped after 4 days due to severe headache. She cannot recall if it helped with her chest pain. She was started on diltiazem but stopped when she was admitted for diverticulitis. She is already on estrogen replacement. On 360 mg of diltiazem she reported the chest pain was better but her BP was low. She also noted numbness and tingling in both arms and both legs. The legs was constant. The arms mostly when she worked over her head. She also felt fatigued. She does not report orthostatic symptoms. Her diltiazem was reduced to 300 mg daily. The chest pain is worse. The fatigue is maybe a little better and the numbness/tingling is unchanged.     June 2014:  At our last visit I discussed with her that I have no other therapies to offer regarding her chest pain. We discuss the possibility of starting a medication such as Neurontin that has been reported to help with chronic pain syndromes. There is some evidence that patients with microvascular angina have abnormal pain sensitivity. She reports the Neurontin was started and it has helped. She is using SL NTG 1-2x/week as opposed to several times a day.    We had stopped her Diovan because on the increased diltiazem dose she was  having relatively low BP. She is worried because she has seen some BP measurements that are high for her. She admits to being stressed. She has just started a w/u for memory loss and a change was seen on her MRI.    She had an echocardiogram done in March, see below, that showed no change in her aortic insufficiency.     [id number]:    She has been doing reasonably well. She is not having as much chest pain. She has been less active due to problems with disk disease in her spine. She has not noted any major changes in her exertional abilities.     [id number]: She has been having more joint pain. She is seeing a Rheumatologist in [place] soon.    Her chest pain is perhaps a bit better. Her legs and back feel heavy and she has sharp pains and aches, especially with climbing stairs. This has not been associated with her chest pain. On level drug she had been walking 2 miles with her sister but she has not done that for several months now.     [id number] Interval history: her chest pain has been doing reasonably well. She hasn't had NTG available for a few months.     She reports a new barking cough over the past 6 months - no fever, no new meds, not productive. The cough is not getting any worse but no better either. She has not had any wheezing that she has noticed.     Her legs feel heavy when she walks.    Current Outpatient Prescriptions   Medication Sig Dispense Refill     hydrOXYzine (ATARAX) 25 MG tablet TAKE ONE TO TWO TABLETS BY MOUTH AT BEDTIME FOR  NAUSEA 120 tablet 0     traMADol (ULTRAM) 50 MG tablet TAKE TWO TABLETS BY MOUTH EVERY 8 HOURS AS NEEDED FOR PAIN 180 tablet 0     diltiazem (TIAZAC) 300 MG 24 hr ER beaded capsule Take 1 capsule (300 mg) by mouth daily 90 capsule 3     levothyroxine (SYNTHROID/LEVOTHROID) 50 MCG tablet Take 1 tablet (50 mcg) by mouth daily 30 tablet 10     estradiol (ESTRACE) 0.5 MG tablet TAKE ONE TABLET BY MOUTH TWICE DAILY 60 tablet 3     ranitidine (ZANTAC) 150 MG tablet Take 1  tablet (150 mg) by mouth 2 times daily 60 tablet 3     buPROPion (WELLBUTRIN SR) 200 MG 12 hr tablet Take 1 tablet (200 mg) by mouth 2 times daily 60 tablet 5     LORazepam (ATIVAN) 0.5 MG tablet Take 1 tablet (0.5 mg) by mouth every 8 hours as needed for anxiety 30 tablet 0     clonazePAM (KLONOPIN) 1 MG tablet Take 1 tablet (1 mg) by mouth 2 times daily as needed for anxiety 60 tablet 5     PROCTOSOL HC 2.5 % rectal cream APPLY  CREAM TO AFFECTED AREA THREE TIMES DAILY 58 g 1     SUMAtriptan (IMITREX) 100 MG tablet TAKE ONE TABLET BY MOUTH AS NEEDED MIGRAINES 10 tablet 5     metoclopramide (REGLAN) 5 MG tablet Take 1 tablet (5 mg) by mouth 4 times daily as needed (Patient not taking: Reported on 5/23/2017) 60 tablet 0     Allergies   Allergen Reactions     Codeine      Isosorbide Mononitrate Other (See Comments)     Vomiting and headache       Lisinopril      [facility] scan     Past Medical History:   Diagnosis Date     Anxiety state, unspecified 03/01/2011     Aortic valve disorders 06/07/2000    Echocardiogram showin mild/moderate AI.  Normal LV function     Cardiac microvascular disease (H) 4/10/2013    Based on Cardiac MRI done at       Fibromyalgia 03/01/2011     Major depressive disorder, recurrent episode, unspecified 12/17/2014     Migraine, unspecified, without mention of intractable migraine without mention of status migrainosus 03/01/2011     Mitral valve disorders 10/16/2007     PONV (postoperative nausea and vomiting)      Thyroid Nodule 03/01/2011     Tobacco use disorder 03/01/2011     Unspecified hypothyroidism 03/01/2011     Past Surgical History:   Procedure Laterality Date     CARDIAC SURGERY  2013    angiogram [facility]:  Normal coronary arteries.  Felt ot have some microvascular disease     CL AFF SURGICAL PATHOLOGY  01/02/2007    Thyroid Mass     COLONOSCOPY  1/13/2014    Procedure: COLONOSCOPY;  COLONOSCOPY ;  Surgeon: Chasidy Gee DO;  Location: [facility]     ESOPHAGOSCOPY, GASTROSCOPY,  "DUODENOSCOPY (EGD), COMBINED N/A 2/9/2017    Procedure: COMBINED ESOPHAGOSCOPY, GASTROSCOPY, DUODENOSCOPY (EGD);  Surgeon: Johnathan Ruff DO;  Location: [facility] OR     GYN SURGERY      c-sections x 3     GYN SURGERY      complete hysterectomy     HYSTERECTOMY  2001     LAPAROSCOPIC CHOLECYSTECTOMY  11/07/2003    Cholelithiasis     LAPAROTOMY EXPLORATORY      abdominal pain/fever     LARYNGOSCOPY       SPLENECTOMY       Social History   Substance Use Topics     Smoking status: Current Every Day Smoker     Packs/day: 0.50     Years: 40.00     Types: Cigarettes     Smokeless tobacco: Never Used      Comment: starts quit plan in march 2017.  Has cut down to one pack a week     Alcohol use No     ROS- the ten system review is negative except as noted in the HPI. [id number]    Physical examination:  /69 (BP Location: Left arm, Cuff Size: Adult Large)  Pulse 63  Temp 98  F (36.7  C) (Tympanic)  Resp 20  Ht 1.702 m (5' 7\")  Wt 87.1 kg (192 lb)  SpO2 97%  BMI 30.07 kg/m2    GENERAL APPEARANCE: healthy, alert and no distress  NECK: no venous distention  RESPIRATORY: lungs clear to auscultation - no rales, rhonchi or wheezes  CARDIOVASCULAR: regular, normal S1 S2, no gallop, soft AI murmur as before  EXTREMITIES: no edema   VASC: pedal pulses are normal    Laboratory:    JESUS study ordered.    BP WT  CHOL Latest Ref Rng & Units 4/19/2017   BP  122/70   Weight  191 lb   BMI  31.37   CHOLESTEROL <200 mg/dL 214 (H)   HDL CHOLESTEROL >49 mg/dL 77   LDL CHOLESTEROL, CALCULATED <100 mg/dL 104 (H)   TRIGLYCERIDES <150 mg/dL 165 (H)     ECHOCARDIOGRAM    M-mode, two-dimensional, color-flow, and Doppler studies were obtained  on this patient.  Standard views were utilized.    ORDERING PHYSICIAN:  Norman Nichole MD    INDICATIONS:  Aortic valve disorder    Cardiac Dimensions                                    Normal  Dimensions  Aortic Root:                            31.3 mm      Aortic Root  Diameter: 20-37 mm  Left " Atrium:                            34.8 mm      Left Atrium:  19-40 mm  Right Ventricle:                       Not measured      Right  Ventricle: 7-23 mm  Left Ventricle end-diastole:    50.2 mm      Left Ventricle  end-diastole: 35-56 mm  Left Ventricle end-systole:     33.5 mm      Left Ventricle  end-systole: 35-56 mm  IVS end-diastole:                      9.7 mm      IVS end-diastole:  7-11 mm  LVPW:                                      9.7 mm      LVPW: 7-11 mm    Review of the study shows there is no pericardial effusion.  The left  ventricle has normal size and systolic function.  Ejection fraction at  60%.  The left atrium is normal.  Right atrium normal. Right ventricle  is normal on 2-D study.  The mitral valve has a trace amount of mitral  regurgitation.  The aortic valve has no stenosis. There is at least a  moderate amount of aortic insufficiency.  The tricuspid valve has a  trace amount of tricuspid regurgitation. Peak systolic pressure of the  right ventricle is estimated at 22 plus right atrium.  Diastolic  indices show reversal of the E:A ratio at 0.81, suggesting diastolic  dysfunction.      ASSESSMENT:  Echocardiographic study revealing  1.  Normal left ventricular size and systolic function.  Ejection  fraction at 60%.  2.  Normal atrium.  3.  Normal right ventricle size and function.  4.  Trace mitral regurgitation.  5.  Aortic valve without stenosis, but moderate aortic insufficiency  (unchanged from previously).  6.  Trace tricuspid regurgitation.  Peak systolic pressure of the  right ventricle is estimated at 22 plus right atrium.  7.  Evidence of diastolic dysfunction is seen.  Exam Date: Apr 06, 2017 11:51:07 AM  Author: BRAN DELGADO    Prior studies reviewed:  ECHOCARDIOGRAM  M-mode, two-dimensional, color-flow, and Doppler studies were obtained  on this patient. Standard views were utilized.  ORDERING PHYSICIAN: Dr. Nichole  INDICATIONS: Follow up aortic insufficiency  Cardiac  Dimensions Normal  Dimensions  Aortic Root: 33 mm Aortic Root  Diameter: 20-37 mm  Left Atrium: 35.4 mm Left Atrium: 19-40  mm  Right Ventricle: 9.4 mm Right Ventricle:  7-23 mm  Left Ventricle end-diastole: 54.1 mm Left Ventricle  end-diastole: 35-56 mm  Left Ventricle end-systole: 34.4 mm Left Ventricle  end-systole: 35-56 mm  IVS end-diastole: 9.4 mm IVS end-diastole:  7-11 mm  LVPW: 8.7 mm LVPW: 7-11 mm  Review of the study shows there is no pericardial effusion. Left  ventricle has normal size and systolic function normal, ejection  fraction at 65%. Left atrium normal. Right atrium normal. Right  ventricle is normal on 2-dimensional study. The mitral valve has a  trace amount of mitral regurgitation. The aortic valve has no  stenosis. There is at least moderate aortic insufficiency. The  pressure half-time is 552 milliseconds. Aortic deceleration slope  was 2.44 meters per second squared. There is slight tricuspid  regurgitation. Peak systolic pressure of the right ventricle is  estimated at 20 plus the right atrium.    ASSESSMENT: Echocardiographic study revealing  1. Normal left ventricular size and systolic function, ejection  fraction around 65%.  2. Normal atrium.  3. Normal right ventricle size and function.  4. Slight mitral regurgitation.  5. Aortic valve without stenosis. There is at least moderate aortic  insufficiency. [id number] comparison appears to be about the same  on color flow Doppler study. Pressure half-time is a little more  reduced at 552 milliseconds compared to previously. [place] is a  little greater also. The left ventricle function, however, is normal  with normal size.  6. Trace tricuspid regurgitation. Peak systolic pressure of the  right ventricle is estimated at 20 plus the right atrium.  7. Diastolic indices show there is a reversal of E to A ratio at  0.89, suggesting a diastolic dysfunction.    Exam Date: May 12, 2014 08:37:32 AM  Author: BRAN DELGADO  This report is final  "and signed    FINDINGS: Left ventricular ejection fraction is 67%. There is  small-to-moderate sized fixed perfusion defect of the anteroseptal  wall. This extends to the cardiac apex. No reversible perfusion  defects are present to suggest ischemia.  IMPRESSION: Moderate-sized anteroseptal infarct involving the apex.  There is no evidence of cardiac ischemia    Exam Date: Mar 27, 2013 09:09:41 AM  Author: ANANT LOPEZ  This report is final and signed    Assessment and recommendations:    1) Fatigue/chest pain/ dyspnea on exertion   2) Moderate AI  3) Microvascular angina    - stable exam  - repeat echocardiogram shows no change, LV size normal    Will repeat in one year.    4) Hypertension -  Under good control.    5) Leg \"heaviness - ? Claudication     - JESUS ordered    6) New cough - not on an ACE inhibitor or angiotensin receptor blocker    7) Dyslipidemia - trying diet first    I appreciate the chance to help with Mrs. Suri eugene. Please let me know if you have any questions or concerns. I will see her in one year.     Norman Nichole M.D.  "

## 2017-05-23 NOTE — NURSING NOTE
"Chief Complaint   Patient presents with     Consult     follow-up echo, patient of Concha MICHAUD       Initial /69 (BP Location: Left arm, Cuff Size: Adult Large)  Pulse 63  Temp 98  F (36.7  C) (Tympanic)  Resp 20  Ht 1.702 m (5' 7\")  Wt 87.1 kg (192 lb)  SpO2 97%  BMI 30.07 kg/m2 Estimated body mass index is 30.07 kg/(m^2) as calculated from the following:    Height as of this encounter: 1.702 m (5' 7\").    Weight as of this encounter: 87.1 kg (192 lb).  Medication Reconciliation: complete   Olivia Hylton      "

## 2017-05-31 ENCOUNTER — OFFICE VISIT (OUTPATIENT)
Dept: PSYCHIATRY | Facility: OTHER | Age: 57
End: 2017-05-31
Attending: PSYCHIATRY & NEUROLOGY
Payer: COMMERCIAL

## 2017-05-31 VITALS
SYSTOLIC BLOOD PRESSURE: 120 MMHG | TEMPERATURE: 97.4 F | WEIGHT: 192 LBS | HEART RATE: 64 BPM | HEIGHT: 67 IN | BODY MASS INDEX: 30.13 KG/M2 | DIASTOLIC BLOOD PRESSURE: 60 MMHG

## 2017-05-31 DIAGNOSIS — M79.7 FIBROMYALGIA: ICD-10-CM

## 2017-05-31 DIAGNOSIS — F41.1 GAD (GENERALIZED ANXIETY DISORDER): ICD-10-CM

## 2017-05-31 PROCEDURE — 99213 OFFICE O/P EST LOW 20 MIN: CPT | Performed by: PSYCHIATRY & NEUROLOGY

## 2017-05-31 PROCEDURE — 99212 OFFICE O/P EST SF 10 MIN: CPT

## 2017-05-31 RX ORDER — CLONAZEPAM 1 MG/1
1 TABLET ORAL 2 TIMES DAILY PRN
Qty: 60 TABLET | Refills: 5 | Status: SHIPPED | OUTPATIENT
Start: 2017-05-31 | End: 2017-06-13

## 2017-05-31 ASSESSMENT — ANXIETY QUESTIONNAIRES
1. FEELING NERVOUS, ANXIOUS, OR ON EDGE: MORE THAN HALF THE DAYS
2. NOT BEING ABLE TO STOP OR CONTROL WORRYING: SEVERAL DAYS
3. WORRYING TOO MUCH ABOUT DIFFERENT THINGS: NEARLY EVERY DAY
5. BEING SO RESTLESS THAT IT IS HARD TO SIT STILL: SEVERAL DAYS
6. BECOMING EASILY ANNOYED OR IRRITABLE: NEARLY EVERY DAY
IF YOU CHECKED OFF ANY PROBLEMS ON THIS QUESTIONNAIRE, HOW DIFFICULT HAVE THESE PROBLEMS MADE IT FOR YOU TO DO YOUR WORK, TAKE CARE OF THINGS AT HOME, OR GET ALONG WITH OTHER PEOPLE: EXTREMELY DIFFICULT
GAD7 TOTAL SCORE: 14
7. FEELING AFRAID AS IF SOMETHING AWFUL MIGHT HAPPEN: MORE THAN HALF THE DAYS

## 2017-05-31 ASSESSMENT — PATIENT HEALTH QUESTIONNAIRE - PHQ9: 5. POOR APPETITE OR OVEREATING: MORE THAN HALF THE DAYS

## 2017-05-31 ASSESSMENT — PAIN SCALES - GENERAL: PAINLEVEL: MODERATE PAIN (4)

## 2017-05-31 NOTE — NURSING NOTE
"Chief Complaint   Patient presents with     RECHECK     Mental Health.       Initial /60 (BP Location: Right arm, Patient Position: Chair, Cuff Size: Adult Regular)  Pulse 64  Temp 97.4  F (36.3  C) (Tympanic)  Ht 5' 7\" (1.702 m)  Wt 192 lb (87.1 kg)  BMI 30.07 kg/m2 Estimated body mass index is 30.07 kg/(m^2) as calculated from the following:    Height as of this encounter: 5' 7\" (1.702 m).    Weight as of this encounter: 192 lb (87.1 kg).  Medication Reconciliation: complete   TRAVON JUAN      "

## 2017-05-31 NOTE — PROGRESS NOTES
"  PSYCHIATRY CLINIC PROGRESS NOTE   20 minutes, med management  more than 50% of time spent counseling patient on medications, medication side effects, symptom history and management   SUBJECTIVE / INTERIM HISTORY                                                                       The pt was last seen in clinic 4/19/17: Start buspirone 5 mg twice daily. Continue Wellbutrin 200 mg bid. Continue Klonopin 1 mg bid. Continue trazodone 50 mg HS.  - buspar d/c: headaches  - tried cut down on meds including trazodone but then sleep got bad  - endoscopy: hernia on med that helps  - no longer developed tremor head and neck past several months  - rheumatology consult: (autoimmune tests some pos tested recently) and sounds like no definitive dx made  - relationship issues. Of note 's two sons older 20s and other 29 yo still live at home. One of the boys bought house next door.   - memory: did notice it got little better once off gabapentin. Still is an issue though. Still forgets / repeats herself though  - gets embarrassed in conversations: forgets words, what she is going to say,etc    SUBSTANCE USE- no issues    SYMPTOMS-  Lots of worry and anxiety lately given increase in stressors. Depressed mood, low energy, anhedonia, feelings guilt & overwhelmed, poor sleep. cognitive issues including word finding  MEDICAL ROS- nausea nd vomiting is better, with moving / exercise leg \"heaviness\" and pain, had tremor for while but resolved, balance issues, Hypersomnia.  Pain: neck, DDD in neck. Numbness and tingling in legs and feet worse at times when sitting  MEDICAL / SURGICAL HISTORY                 Medical Team:     PMD- Concha Hare     Therapist-none   Patient Active Problem List   Diagnosis     Diverticulitis of sigmoid colon     Hypothyroidism     Anxiety state     Cardiac microvascular disease (H)     Diverticulitis     Aortic valve disorder     ACP (advance care planning)     Chronic pain     Constipation     " "Memory loss     Major depressive disorder, recurrent episode (H)     Cognitive disorder     Major depressive disorder     Fatigue     Migraine without aura and responsive to treatment     Atypical migraine     ALLERGY   Codeine; Isosorbide mononitrate; and Lisinopril  MEDICATIONS                                                                                             Current Outpatient Prescriptions   Medication Sig     NITROSTAT 0.4 MG sublingual tablet Place 1 tablet (0.4 mg) under the tongue every 5 minutes as needed for chest pain     hydrOXYzine (ATARAX) 25 MG tablet TAKE ONE TO TWO TABLETS BY MOUTH AT BEDTIME FOR  NAUSEA     traMADol (ULTRAM) 50 MG tablet TAKE TWO TABLETS BY MOUTH EVERY 8 HOURS AS NEEDED FOR PAIN     diltiazem (TIAZAC) 300 MG 24 hr ER beaded capsule Take 1 capsule (300 mg) by mouth daily     levothyroxine (SYNTHROID/LEVOTHROID) 50 MCG tablet Take 1 tablet (50 mcg) by mouth daily     estradiol (ESTRACE) 0.5 MG tablet TAKE ONE TABLET BY MOUTH TWICE DAILY     ranitidine (ZANTAC) 150 MG tablet Take 1 tablet (150 mg) by mouth 2 times daily     metoclopramide (REGLAN) 5 MG tablet Take 1 tablet (5 mg) by mouth 4 times daily as needed     buPROPion (WELLBUTRIN SR) 200 MG 12 hr tablet Take 1 tablet (200 mg) by mouth 2 times daily     LORazepam (ATIVAN) 0.5 MG tablet Take 1 tablet (0.5 mg) by mouth every 8 hours as needed for anxiety     clonazePAM (KLONOPIN) 1 MG tablet Take 1 tablet (1 mg) by mouth 2 times daily as needed for anxiety     PROCTOSOL HC 2.5 % rectal cream APPLY  CREAM TO AFFECTED AREA THREE TIMES DAILY     SUMAtriptan (IMITREX) 100 MG tablet TAKE ONE TABLET BY MOUTH AS NEEDED MIGRAINES     No current facility-administered medications for this visit.        VITALS   /60 (BP Location: Right arm, Patient Position: Chair, Cuff Size: Adult Regular)  Pulse 64  Temp 97.4  F (36.3  C) (Tympanic)  Ht 5' 7\" (1.702 m)  Wt 192 lb (87.1 kg)  BMI 30.07 kg/m2    PHQ9                   "     PHQ-9 SCORE 12/16/2016 2/8/2017 5/31/2017   Total Score - - -   Total Score 22 18 21     Labs:    Liver Function Studies -   Recent Labs   Lab Test  01/13/16   0941   PROTTOTAL  7.5   ALBUMIN  3.7   BILITOTAL  0.3   ALKPHOS  92   AST  32   ALT  63*     Recent Labs   Lab Test  01/13/16   0941  03/28/14   1008   CHOL  244*  238*   HDL  64  81*   LDL  147*  143*   TRIG  164*  72   CHOLHDLRATIO   --   2.9*       MENTAL STATUS EXAM                                                                                        Alert. Oriented to person, place, and date / time. Well groomed, calm, cooperative with good eye contact. No problems with speech or psychomotor behavior. Mood was anxious and depressed  and affect was congruent to speech content and restricted.f Thought process, including associations, was unremarkable and thought content was devoid of suicidal and homicidal ideation and psychotic thought. No hallucinations. Insight was good. Judgment was intact and adequate for safety. Fund of knowledge was intact. Pt demonstrates no obvious problems with attention, concentration, language, recent or remote memory although these were not formally tested.     ASSESSMENT                                                                                                      HISTORICAL:  Initial psych consult 12/12/14            Notes:  Ritalin as adjunct to antidepressant: didn't like how made her feel.   Prozac, Celexa ineffective. Now Wellbutrin helping. Zoloft: weight gain. Effexor back while: didn't stay on long and can't recall why. Cymbalta: was on and didn't help. She tried Provigil and SEs. Buspar: HAs  Neuropsych testing summer 2015:  language: about same, working memory: better than last year. Attention / concentration worse than last year. Speed processing: improved. Executive function: didn't test well on that front also comparable to last year. Memory: worse off than last year. Depression: similar to last year.  Anxiety / emotional anxiety worse. As part of assessment they think should possible have another MRI  CURRENT: This patient provides a history which supports the diagnoses of Major depressive disorder, recurrent, mod and mild neurocognitive disorder. We cross-tapered to Wellbutrin. We have been discussing trying a stimulant as adjunct to antidepressant but wasn't helpful and made her feel mentally off so  d/c. Will continue Wellbutrin 200 mg bid and Klonopin for anxiety. She tried the buspar we started last visit and : HAs so d/c. Today we agreed on no med chagnes.      TREATMENT RISK STATEMENT: The risks, benefits, alternatives and potential adverse effects have been explained and are understood by the pt. The pt agrees to the treatment plan with the ability to do so. The pt knows to call the clinic for any problems or access emergency care if needed.    DIAGNOSES      (Use of Axes system will continue, although absent from DSM 5)           Axis I - Major depressive disorder, recurrent severe without psychotic features  Mild neurocognitive disorder  Axis II - no dx  Axis III - lumbago, fibromyalgia, hx migraines, hx diverticultitis  Axis IV- Psychosocial Stressors include: finances, marital strain  Axis V - Global Assessment of Functioning current: 41- 50 Serious symptoms OR any serious impairment in social, occupational, or school functioning.    PLAN                                                                                                                         1) MEDICATIONS:   -- She d/c buspar as had SEs. . Continue Wellbutrin 200 mg bid. Continue Klonopin 1 mg bid. Continue trazodone 50 mg HS.    2) THERAPY: no change.     3) LABS: none today    4) Other: Neurology provider Dr. Andre through [facility]. Neuropsych testing with Dr. Johnathan Min    8)  RTC: ~2-3 months    Pharmacist: [phone]  Customer service [phone]

## 2017-05-31 NOTE — MR AVS SNAPSHOT
After Visit Summary   5/31/2017    Sharlene Mccord    MRN: [id number]           Patient Information     Date Of Birth          1960        Visit Information        Provider Department      5/31/2017 10:20 AM Aurelia Odell MD [facility]        Today's Diagnoses     SHAKIR (generalized anxiety disorder)           Follow-ups after your visit        Your next 10 appointments already scheduled     Jun 02, 2017  2:30 PM CDT   Radiology with HI UNTRASOUND 1   [facility] Ultrasound ([facility] )    [address] [place] [address]   [phone]            Jun 21, 2017  8:45 AM CDT   (Arrive by 8:30 AM)   SHORT with KEILY Fonseca   [facility] ([facility])    [address] [facility] [address]   [phone]            Aug 29, 2017 10:20 AM CDT   (Arrive by 10:05 AM)   Return Visit [facility]ell [facility]   [facility])    [address] [place] [id number] [phone]            May 22, 2018  9:30 AM CDT   (Arrive by 9:15 AM)   Return Visit with Norman Nichole MD   [facility] ([facility])    [address] [facility] [address]   [phone]              Who to contact     If you have questions or need follow up information about today's clinic visit or your schedule please contact [facility] directly at [phone].  Normal or non-critical lab and imaging results will be communicated to you by MyChart, letter or phone within 4 business days after the clinic has received the results. If you do not hear from us within 7 days, please contact the clinic through MyChart or phone. If you have a critical or abnormal lab result, we will notify you by phone as soon as possible.  Submit refill requests through [employer] or call your pharmacy and they will forward the refill request to us. Please allow 3 business days for your refill to be completed.          Additional  "Information About Your Visit        MyChart Information     [employer] gives you secure access to your electronic health record. If you see a primary care provider, you can also send messages to your care team and make appointments. If you have questions, please call your primary care clinic.  If you do not have a primary care provider, please call [phone] and they will assist you.        Care EveryWhere ID     This is your Care EveryWhere ID. This could be used by other organizations to access your [place] medical records  [id number]        Your Vitals Were     Pulse Temperature Height BMI (Body Mass Index)          64 97.4  F (36.3  C) (Tympanic) 5' 7\" (1.702 m) 30.07 kg/m2         Blood Pressure from Last 3 Encounters:   05/31/17 120/60   05/23/17 128/69   05/05/17 152/99    Weight from Last 3 Encounters:   05/31/17 192 lb (87.1 kg)   05/23/17 192 lb (87.1 kg)   04/19/17 191 lb (86.6 kg)              Today, you had the following     No orders found for display         Where to get your medicines      Some of these will need a paper prescription and others can be bought over the counter.  Ask your nurse if you have questions.     Bring a paper prescription for each of these medications     clonazePAM 1 MG tablet          Primary Care Provider Office Phone # Fax #    KEILY Fonseca [phone]       [facility] [address] [place] [address]        Thank you!     Thank you for choosing [facility]  for your care. Our goal is always to provide you with excellent care. Hearing back from our patients is one way we can continue to improve our services. Please take a few minutes to complete the written survey that you may receive in the mail after your visit with us. Thank you!             Your Updated Medication List - Protect others around you: Learn how to safely use, store and throw away your medicines at www.disposemymeds.org.          This list is " accurate as of: 5/31/17 11:10 AM.  Always use your most recent med list.                   Brand Name Dispense Instructions for use    buPROPion 200 MG 12 hr tablet    WELLBUTRIN SR    60 tablet    Take 1 tablet (200 mg) by mouth 2 times daily       clonazePAM 1 MG tablet    klonoPIN    60 tablet    Take 1 tablet (1 mg) by mouth 2 times daily as needed for anxiety       diltiazem 300 MG 24 hr ER beaded capsule    TIAZAC    90 capsule    Take 1 capsule (300 mg) by mouth daily       estradiol 0.5 MG tablet    ESTRACE    60 tablet    TAKE ONE TABLET BY MOUTH TWICE DAILY       hydrOXYzine 25 MG tablet    ATARAX    120 tablet    TAKE ONE TO TWO TABLETS BY MOUTH AT BEDTIME FOR  NAUSEA       levothyroxine 50 MCG tablet    SYNTHROID/LEVOTHROID    30 tablet    Take 1 tablet (50 mcg) by mouth daily       LORazepam 0.5 MG tablet    ATIVAN    30 tablet    Take 1 tablet (0.5 mg) by mouth every 8 hours as needed for anxiety       metoclopramide 5 MG tablet    REGLAN    60 tablet    Take 1 tablet (5 mg) by mouth 4 times daily as needed       NITROSTAT 0.4 MG sublingual tablet   Generic drug:  nitroglycerin     25 tablet    Place 1 tablet (0.4 mg) under the tongue every 5 minutes as needed for chest pain       PROCTOSOL HC 2.5 % cream   Generic drug:  hydrocortisone     58 g    APPLY  CREAM TO AFFECTED AREA THREE TIMES DAILY       ranitidine 150 MG tablet    ZANTAC    60 tablet    Take 1 tablet (150 mg) by mouth 2 times daily       SUMAtriptan 100 MG tablet    IMITREX    10 tablet    TAKE ONE TABLET BY MOUTH AS NEEDED MIGRAINES       traMADol 50 MG tablet    ULTRAM    180 tablet    TAKE TWO TABLETS BY MOUTH EVERY 8 HOURS AS NEEDED FOR PAIN

## 2017-06-01 ASSESSMENT — ANXIETY QUESTIONNAIRES: GAD7 TOTAL SCORE: 14

## 2017-06-01 ASSESSMENT — PATIENT HEALTH QUESTIONNAIRE - PHQ9: SUM OF ALL RESPONSES TO PHQ QUESTIONS 1-9: 21

## 2017-06-02 ENCOUNTER — HOSPITAL ENCOUNTER (OUTPATIENT)
Dept: ULTRASOUND IMAGING | Facility: HOSPITAL | Age: 57
Discharge: HOME | End: 2017-06-02
Attending: INTERNAL MEDICINE | Admitting: INTERNAL MEDICINE
Payer: MEDICARE

## 2017-06-02 PROCEDURE — 93922 UPR/L XTREMITY ART 2 LEVELS: CPT | Mod: TC

## 2017-06-02 RX ORDER — TRAMADOL HYDROCHLORIDE 50 MG/1
TABLET ORAL
Qty: 180 TABLET | Refills: 0 | Status: SHIPPED | OUTPATIENT
Start: 2017-06-02 | End: 2017-07-05

## 2017-06-05 ENCOUNTER — OFFICE VISIT (OUTPATIENT)
Dept: FAMILY MEDICINE | Facility: OTHER | Age: 57
End: 2017-06-05
Attending: PHYSICIAN ASSISTANT
Payer: MEDICARE

## 2017-06-05 VITALS
HEART RATE: 74 BPM | TEMPERATURE: 97.9 F | BODY MASS INDEX: 29.76 KG/M2 | OXYGEN SATURATION: 96 % | WEIGHT: 190 LBS | DIASTOLIC BLOOD PRESSURE: 68 MMHG | SYSTOLIC BLOOD PRESSURE: 122 MMHG

## 2017-06-05 DIAGNOSIS — R76.8 ELEVATED ANTINUCLEAR ANTIBODY (ANA) LEVEL: ICD-10-CM

## 2017-06-05 DIAGNOSIS — G89.4 CHRONIC PAIN SYNDROME: Primary | ICD-10-CM

## 2017-06-05 LAB — CRP SERPL-MCNC: 5.4 MG/L (ref 0–8)

## 2017-06-05 PROCEDURE — 36415 COLL VENOUS BLD VENIPUNCTURE: CPT | Mod: ZL | Performed by: PHYSICIAN ASSISTANT

## 2017-06-05 PROCEDURE — 86140 C-REACTIVE PROTEIN: CPT | Mod: ZL | Performed by: PHYSICIAN ASSISTANT

## 2017-06-05 PROCEDURE — 99000 SPECIMEN HANDLING OFFICE-LAB: CPT | Mod: ZL | Performed by: PHYSICIAN ASSISTANT

## 2017-06-05 PROCEDURE — 86038 ANTINUCLEAR ANTIBODIES: CPT | Mod: ZL | Performed by: PHYSICIAN ASSISTANT

## 2017-06-05 PROCEDURE — 99212 OFFICE O/P EST SF 10 MIN: CPT

## 2017-06-05 PROCEDURE — 80307 DRUG TEST PRSMV CHEM ANLYZR: CPT | Mod: ZL | Performed by: PHYSICIAN ASSISTANT

## 2017-06-05 PROCEDURE — 99214 OFFICE O/P EST MOD 30 MIN: CPT | Performed by: PHYSICIAN ASSISTANT

## 2017-06-05 RX ORDER — PREDNISONE 10 MG/1
TABLET ORAL
Qty: 21 TABLET | Refills: 0 | Status: SHIPPED | OUTPATIENT
Start: 2017-06-05 | End: 2017-07-05

## 2017-06-05 RX ORDER — ASPIRIN 81 MG
TABLET, DELAYED RELEASE (ENTERIC COATED) ORAL
Qty: 120 TABLET | Refills: 3 | Status: SHIPPED | OUTPATIENT
Start: 2017-06-05 | End: 2017-11-10

## 2017-06-05 ASSESSMENT — ANXIETY QUESTIONNAIRES
5. BEING SO RESTLESS THAT IT IS HARD TO SIT STILL: SEVERAL DAYS
6. BECOMING EASILY ANNOYED OR IRRITABLE: NEARLY EVERY DAY
GAD7 TOTAL SCORE: 13
7. FEELING AFRAID AS IF SOMETHING AWFUL MIGHT HAPPEN: SEVERAL DAYS
2. NOT BEING ABLE TO STOP OR CONTROL WORRYING: MORE THAN HALF THE DAYS
1. FEELING NERVOUS, ANXIOUS, OR ON EDGE: MORE THAN HALF THE DAYS
IF YOU CHECKED OFF ANY PROBLEMS ON THIS QUESTIONNAIRE, HOW DIFFICULT HAVE THESE PROBLEMS MADE IT FOR YOU TO DO YOUR WORK, TAKE CARE OF THINGS AT HOME, OR GET ALONG WITH OTHER PEOPLE: VERY DIFFICULT
3. WORRYING TOO MUCH ABOUT DIFFERENT THINGS: MORE THAN HALF THE DAYS

## 2017-06-05 ASSESSMENT — PATIENT HEALTH QUESTIONNAIRE - PHQ9: 5. POOR APPETITE OR OVEREATING: MORE THAN HALF THE DAYS

## 2017-06-05 ASSESSMENT — PAIN SCALES - GENERAL: PAINLEVEL: MODERATE PAIN (5)

## 2017-06-05 NOTE — MR AVS SNAPSHOT
After Visit Summary   6/5/2017    Sharlene Mccord    MRN: [id number]           Patient Information     Date Of Birth          1960        Visit Information        Provider Department      6/5/2017 3:15 PM Concha Hare PA [facility]        Today's Diagnoses     Chronic pain syndrome    -  1    Elevated antinuclear antibody (FARHEEN) level          Care Instructions      Thank you for choosing [facility].   I have office hours 8:00 am to 4:30 pm on Monday's, Wednesday's, Thursday's and Friday's. My nurse and I are out of the office every Tuesday.    Following your visit, when your labs and diagnostic testing have returned, I will review then and you will be contacted by my nurse.  If you are on My Chart, you can also view results there.    For refills, notify your pharmacy regarding what you need and the pharmacy will generate a refill request. Do not call my nurse as she is unable to process refill request. Please plan ahead and allow 3-5 days for refill requests.    You will generally receive a reminder call the day prior to your appointment.  If you cannot attend your appointment, please cancel your appointment with as much notice as possible.  If there is a pattern of failure to present for your appointments, I cannot provide consistent, meaningful, ongoing care for you. It is very important to me that you come in for your care, so we can best assist you with your health care needs.    IMPORTANT:  Please note that it is my standard of practice to NOT participate in prescribing ongoing requested Narcotic Analgesic therapy, and/or participate in the prescribing of other controlled substances.  My nurse and I am happy to assist you with the process of referral for alternative pain management as needed, and other treatment modalities including but not limited to:  Physical Therapy, Physical Medicine and Rehab, Counseling, Chiropractic Care, Orthopedic Care, and non-narcotic  medication management.     In the event that you need to be seen for emergent concerns and I am out of office,  please see one of my colleagues for acute concerns.  You may also present to UC or ER.  I appreciate the opportunity to serve you and look forward to supporting your healthcare needs in the future. Please contact me with any questions or concerns that you may have.    Sincerely,      Concha Hare RN, PA-C               Follow-ups after your visit        Additional Services     RHEUMATOLOGY REFERRAL       Your provider has referred you to: [facility] was seeing Jacqui     Please be aware that coverage of these services is subject to the terms and limitations of your health insurance plan.  Call member services at your health plan with any benefit or coverage questions.      Please bring the following with you to your appointment:    (1) Any X-Rays, CTs or MRIs which have been performed.  Contact the facility where they were done to arrange for  prior to your scheduled appointment.    (2) List of current medications   (3) This referral request   (4) Any documents/labs given to you for this referral                  Your next 10 appointments already scheduled     Jun 14, 2017 12:00 PM CDT   MAMMOGRAM with  [facility]AMMOGRAM [facility]    [address] [facility] [address]   [phone]           Do not wear any body powder, lotions, deodorant or perfume the day of the exam. Bring a list of all medications, especially hormones.  If your last mammogram was not done at [place], please bring your mammogram films. We will need the name of your MD/PA to send a copy of your report.            Jun 21, 2017  8:45 AM CDT   (Arrive by 8:30 AM)   SHORT with KEILY [facility]sec[facility]   [facility]    [address] [address]   [phone]            Aug 29, 2017 10:20 AM CDT   (Arrive by 10:05 AM)   Return Visit with Aurelia Odell MD    [facility] ([facility] [facility])    [address] [place] [address]  [place] [address] [phone]            May 22, 2018  9:30 AM CDT   (Arrive by 9:15 AM)   Return Visit with Norman Nichole MD   [facility] [place] ([facility] [facility])    [address] [place] [address]   [phone]              Who to contact     If you have questions or need follow up information about today's clinic visit or your schedule please contact [facility] directly at [phone].  Normal or non-critical lab and imaging results will be communicated to you by [employer], letter or phone within 4 business days after the clinic has received the results. If you do not hear from us within 7 days, please contact the clinic through [employer] or phone. If you have a critical or abnormal lab result, we will notify you by phone as soon as possible.  Submit refill requests through [employer] or call your pharmacy and they will forward the refill request to us. Please allow 3 business days for your refill to be completed.          Additional Information About Your Visit        MyChart Information     [employer] gives you secure access to your electronic health record. If you see a primary care provider, you can also send messages to your care team and make appointments. If you have questions, please call your primary care clinic.  If you do not have a primary care provider, please call [phone] and they will assist you.        Care EveryWhere ID     This is your Care EveryWhere ID. This could be used by other organizations to access your [place] medical records  [id number]        Your Vitals Were     Pulse Temperature Pulse Oximetry Breastfeeding? BMI (Body Mass Index)       74 97.9  F (36.6  C) (Tympanic) 96% No 29.76 kg/m2        Blood Pressure from Last 3 Encounters:   06/05/17 122/68   05/31/17 120/60   05/23/17 128/69    Weight from Last 3 Encounters:   06/05/17 190 lb (86.2 kg)   05/31/17 192 lb (87.1  kg)   05/23/17 192 lb (87.1 kg)              We Performed the Following     Antinuclear antibody screen by EIA     CRP, inflammation     Pain Drug Scr UR W Rptd Meds     RHEUMATOLOGY REFERRAL          Today's Medication Changes          These changes are accurate as of: 6/5/17  4:14 PM.  If you have any questions, ask your nurse or doctor.               Start taking these medicines.        Dose/Directions    docusate sodium 100 MG tablet   Commonly known as:  COLACE   Used for:  Chronic pain syndrome   Started by:  Concha Hare PA        Take 2 a.m. And 2 pm   Quantity:  120 tablet   Refills:  3       predniSONE 10 MG tablet   Commonly known as:  DELTASONE   Used for:  Chronic pain syndrome   Started by:  Concha Hare PA        Take 3 today with food then 2 per day for 5 days then 1 per day for 5 days then 1/2 per day for 5 days.   Quantity:  21 tablet   Refills:  0            Where to get your medicines      These medications were sent to [id number] [place] - [address]   [address], [place] [address]     Phone:  [phone]     docusate sodium 100 MG tablet    predniSONE 10 MG tablet                Primary Care Provider Office Phone # Fax #    KEILY Fonseca [phone]       [facility] [address] [place] [address] [address]  [place] [address]        Thank you!     Thank you for choosing [facility]  for your care. Our goal is always to provide you with excellent care. Hearing back from our patients is one way we can continue to improve our services. Please take a few minutes to complete the written survey that you may receive in the mail after your visit with us. Thank you!             Your Updated Medication List - Protect others around you: Learn how to safely use, store and throw away your medicines at www.disposemymeds.org.          This list is accurate as of: 6/5/17  4:14 PM.  Always use your most recent med list.                   Brand Name  Dispense Instructions for use    buPROPion 200 MG 12 hr tablet    WELLBUTRIN SR    60 tablet    Take 1 tablet (200 mg) by mouth 2 times daily       clonazePAM 1 MG tablet    klonoPIN    60 tablet    Take 1 tablet (1 mg) by mouth 2 times daily as needed for anxiety       diltiazem 300 MG 24 hr ER beaded capsule    TIAZAC    90 capsule    Take 1 capsule (300 mg) by mouth daily       docusate sodium 100 MG tablet    COLACE    120 tablet    Take 2 a.m. And 2 pm       estradiol 0.5 MG tablet    ESTRACE    60 tablet    TAKE ONE TABLET BY MOUTH TWICE DAILY       hydrOXYzine 25 MG tablet    ATARAX    120 tablet    TAKE ONE TO TWO TABLETS BY MOUTH AT BEDTIME FOR  NAUSEA       levothyroxine 50 MCG tablet    SYNTHROID/LEVOTHROID    30 tablet    Take 1 tablet (50 mcg) by mouth daily       LORazepam 0.5 MG tablet    ATIVAN    30 tablet    Take 1 tablet (0.5 mg) by mouth every 8 hours as needed for anxiety       metoclopramide 5 MG tablet    REGLAN    60 tablet    Take 1 tablet (5 mg) by mouth 4 times daily as needed       NITROSTAT 0.4 MG sublingual tablet   Generic drug:  nitroglycerin     25 tablet    Place 1 tablet (0.4 mg) under the tongue every 5 minutes as needed for chest pain       predniSONE 10 MG tablet    DELTASONE    21 tablet    Take 3 today with food then 2 per day for 5 days then 1 per day for 5 days then 1/2 per day for 5 days.       PROCTOSOL HC 2.5 % cream   Generic drug:  hydrocortisone     58 g    APPLY  CREAM TO AFFECTED AREA THREE TIMES DAILY       ranitidine 150 MG tablet    ZANTAC    60 tablet    Take 1 tablet (150 mg) by mouth 2 times daily       SUMAtriptan 100 MG tablet    IMITREX    10 tablet    TAKE ONE TABLET BY MOUTH AS NEEDED MIGRAINES       traMADol 50 MG tablet    ULTRAM    180 tablet    TAKE TWO TABLETS BY MOUTH EVERY 8 HOURS AS NEEDED FOR PAIN

## 2017-06-05 NOTE — PATIENT INSTRUCTIONS
Thank you for choosing [facility].   I have office hours 8:00 am to 4:30 pm on Monday's, Wednesday's, Thursday's and Friday's. My nurse and I are out of the office every Tuesday.    Following your visit, when your labs and diagnostic testing have returned, I will review then and you will be contacted by my nurse.  If you are on My Chart, you can also view results there.    For refills, notify your pharmacy regarding what you need and the pharmacy will generate a refill request. Do not call my nurse as she is unable to process refill request. Please plan ahead and allow 3-5 days for refill requests.    You will generally receive a reminder call the day prior to your appointment.  If you cannot attend your appointment, please cancel your appointment with as much notice as possible.  If there is a pattern of failure to present for your appointments, I cannot provide consistent, meaningful, ongoing care for you. It is very important to me that you come in for your care, so we can best assist you with your health care needs.    IMPORTANT:  Please note that it is my standard of practice to NOT participate in prescribing ongoing requested Narcotic Analgesic therapy, and/or participate in the prescribing of other controlled substances.  My nurse and I am happy to assist you with the process of referral for alternative pain management as needed, and other treatment modalities including but not limited to:  Physical Therapy, Physical Medicine and Rehab, Counseling, Chiropractic Care, Orthopedic Care, and non-narcotic medication management.     In the event that you need to be seen for emergent concerns and I am out of office,  please see one of my colleagues for acute concerns.  You may also present to  or ER.  I appreciate the opportunity to serve you and look forward to supporting your healthcare needs in the future. Please contact me with any questions or concerns that you may  have.    Sincerely,      Concha Hare RN, PA-C

## 2017-06-05 NOTE — PROGRESS NOTES
SUBJECTIVE:                                                    Sharlene Mccord is a 56 year old female who presents to clinic today for the following health issues:        Musculoskeletal problem/pain      Duration: 3 month     Description  Location: knee joints, fingers, toes, and overall body pain     Intensity:  mild, moderate, severe depending on the day     Accompanying signs and symptoms: thighs and knees feel heavy, constant dull to sharp pain, burning feelings, numbness, and tingling     History  Previous similar problem: YES, saw Rheumatology in [facility] and had positive FARHEEN and   Previous evaluation:  x-ray and ultrasound, brain MRI for concern on MS.     Precipitating or alleviating factors:  Trauma or overuse: no   Aggravating factors include: sitting, standing, walking, climbing stairs, lifting, exercise and overuse    Therapies tried and outcome: tramadol outcome: not effective            Problem list and histories reviewed & adjusted, as indicated.  Additional history: as documented    Patient Active Problem List   Diagnosis     Diverticulitis of sigmoid colon     Hypothyroidism     Anxiety state     Cardiac microvascular disease (H)     Diverticulitis     Aortic valve disorder     ACP (advance care planning)     Chronic pain     Constipation     Memory loss     Major depressive disorder, recurrent episode (H)     Cognitive disorder     Major depressive disorder     Fatigue     Migraine without aura and responsive to treatment     Atypical migraine     Past Surgical History:   Procedure Laterality Date     CARDIAC SURGERY  2013    angiogram [facility]:  Normal coronary arteries.  Felt ot have some microvascular disease     CL AFF SURGICAL PATHOLOGY  01/02/2007    Thyroid Mass     COLONOSCOPY  1/13/2014    Procedure: COLONOSCOPY;  COLONOSCOPY ;  Surgeon: Chasidy Gee DO;  Location: [facility]     ESOPHAGOSCOPY, GASTROSCOPY, DUODENOSCOPY (EGD), COMBINED N/A 2/9/2017    Procedure: COMBINED ESOPHAGOSCOPY,  GASTROSCOPY, DUODENOSCOPY (EGD);  Surgeon: Johnathan Ruff DO;  Location: [facility]     GYN SURGERY      c-sections x 3     GYN SURGERY      complete hysterectomy     HYSTERECTOMY  2001     LAPAROSCOPIC CHOLECYSTECTOMY  11/07/2003    Cholelithiasis     LAPAROTOMY EXPLORATORY      abdominal pain/fever     LARYNGOSCOPY       SPLENECTOMY         Social History   Substance Use Topics     Smoking status: Current Every Day Smoker     Packs/day: 0.50     Years: 40.00     Types: Cigarettes     Smokeless tobacco: Never Used      Comment: starts quit plan in march 2017.  Has cut down to one pack a week     Alcohol use No     Family History   Problem Relation Age of Onset     C.A.D. Father      Hypertension Father      C.A.D. Mother      CEREBROVASCULAR DISEASE Mother      CVA     Hypertension Mother          Current Outpatient Prescriptions   Medication Sig Dispense Refill     docusate sodium (COLACE) 100 MG tablet Take 2 a.m. And 2 pm 120 tablet 3     predniSONE (DELTASONE) 10 MG tablet Take 3 today with food then 2 per day for 5 days then 1 per day for 5 days then 1/2 per day for 5 days. 21 tablet 0     traMADol (ULTRAM) 50 MG tablet TAKE TWO TABLETS BY MOUTH EVERY 8 HOURS AS NEEDED FOR PAIN 180 tablet 0     clonazePAM (KLONOPIN) 1 MG tablet Take 1 tablet (1 mg) by mouth 2 times daily as needed for anxiety 60 tablet 5     NITROSTAT 0.4 MG sublingual tablet Place 1 tablet (0.4 mg) under the tongue every 5 minutes as needed for chest pain 25 tablet 11     hydrOXYzine (ATARAX) 25 MG tablet TAKE ONE TO TWO TABLETS BY MOUTH AT BEDTIME FOR  NAUSEA 120 tablet 0     diltiazem (TIAZAC) 300 MG 24 hr ER beaded capsule Take 1 capsule (300 mg) by mouth daily 90 capsule 3     levothyroxine (SYNTHROID/LEVOTHROID) 50 MCG tablet Take 1 tablet (50 mcg) by mouth daily 30 tablet 10     estradiol (ESTRACE) 0.5 MG tablet TAKE ONE TABLET BY MOUTH TWICE DAILY 60 tablet 3     ranitidine (ZANTAC) 150 MG tablet Take 1 tablet (150 mg) by mouth 2  times daily 60 tablet 3     metoclopramide (REGLAN) 5 MG tablet Take 1 tablet (5 mg) by mouth 4 times daily as needed 60 tablet 0     buPROPion (WELLBUTRIN SR) 200 MG 12 hr tablet Take 1 tablet (200 mg) by mouth 2 times daily 60 tablet 5     LORazepam (ATIVAN) 0.5 MG tablet Take 1 tablet (0.5 mg) by mouth every 8 hours as needed for anxiety 30 tablet 0     PROCTOSOL HC 2.5 % rectal cream APPLY  CREAM TO AFFECTED AREA THREE TIMES DAILY 58 g 1     SUMAtriptan (IMITREX) 100 MG tablet TAKE ONE TABLET BY MOUTH AS NEEDED MIGRAINES 10 tablet 5     Allergies   Allergen Reactions     Codeine      Isosorbide Mononitrate Other (See Comments)     Vomiting and headache       Lisinopril      [facility] scan     Recent Labs   Lab Test  05/05/17 2025 04/19/17   1133  05/09/16   0930  01/13/16   0941   LDL   --   104*  127*  147*   HDL   --   77  70  64   TRIG   --   165*  204*  164*   ALT  38  28  44  63*   CR  1.06*  0.97  0.94  0.93   GFRESTIMATED  54*  59*  62  63   GFRESTBLACK  65  71  75  76   POTASSIUM  3.9  4.4  4.1  4.8   TSH   --   1.24  3.12  2.58      BP Readings from Last 3 Encounters:   06/05/17 122/68   05/31/17 120/60   05/23/17 128/69    Wt Readings from Last 3 Encounters:   06/05/17 190 lb (86.2 kg)   05/31/17 192 lb (87.1 kg)   05/23/17 192 lb (87.1 kg)                    Reviewed and updated as needed this visit by clinical staff  Tobacco  Allergies  Meds  Med Hx  Surg Hx  Fam Hx  Soc Hx      Reviewed and updated as needed this visit by Provider         ROS:  Constitutional, neuro, ENT, endocrine, pulmonary, cardiac, gastrointestinal, genitourinary, musculoskeletal, integument and psychiatric systems are negative, except as otherwise noted.    OBJECTIVE:                                                    /68 (BP Location: Left arm, Patient Position: Chair, Cuff Size: Adult Large)  Pulse 74  Temp 97.9  F (36.6  C) (Tympanic)  Wt 190 lb (86.2 kg)  SpO2 96%  Breastfeeding? No  BMI 29.76  kg/m2  Body mass index is 29.76 kg/(m^2).  GENERAL APPEARANCE: healthy, alert and no distress  EYES: Eyes grossly normal to inspection, PERRL and conjunctivae and sclerae normal  HENT: ear canals and TM's normal and nose and mouth without ulcers or lesions  NECK: no adenopathy, no asymmetry, masses, or scars and thyroid normal to palpation  RESP: lungs clear to auscultation - no rales, rhonchi or wheezes  CV: regular rates and rhythm, normal S1 S2, no S3 or S4 and no murmur, click or rub  LYMPHATICS: normal ant/post cervical and supraclavicular nodes  ABDOMEN: soft, nontender, without hepatosplenomegaly or masses and bowel sounds normal  MS: extremities normal- no gross deformities noted  SKIN: no suspicious lesions or rashes  NEURO: Normal strength and tone, mentation intact and speech normal  PSYCH: mentation appears normal and affect normal/bright    Diagnostic test results:  Diagnostic Test Results:  No results found for this or any previous visit (from the past 24 hour(s)).  Results for orders placed or performed in visit on 05/23/17   [facility] JESUS Doppler No Exercise    Narrative    ANKLE BRACHIAL INDEX    FINDINGS:  The ankle brachial index measured 1.2 bilaterally.  Absolute ankle pressures were 160 which are adequate for wound  healing.    IMPRESSION:  NO EVIDENCE OF ARTERIAL OCCLUSIVE DISEASE IN EITHER LOWER  EXTREMITY.  Exam Date: Jun 02, 2017 03:10:00 PM  Author: LIS CRYSTAL  This report is final and signed          ASSESSMENT/PLAN:                                                    1. Chronic pain syndrome  She is going to be given below. With her Fibromyalgia there is a mixed picture.  Note on and off swelling in joints and but no warmth.  Morning stiffness lasts many hours.  Her emotional state underlying is poor right now and coping is difficult.   We did try a short course of prednisone to see if this might help her.  She is not seeking narcotics and we will not give more than tramadol and NSAID.   If this truly is a Rheumatology + disease she should get on something that might benefit her.   - Pain Drug Scr UR W Rptd Meds  - CRP, inflammation  - Antinuclear antibody screen by EIA  - docusate sodium (COLACE) 100 MG tablet; Take 2 a.m. And 2 pm  Dispense: 120 tablet; Refill: 3  - predniSONE (DELTASONE) 10 MG tablet; Take 3 today with food then 2 per day for 5 days then 1 per day for 5 days then 1/2 per day for 5 days.  Dispense: 21 tablet; Refill: 0        2. Elevated antinuclear antibody (FARHEEN) level  She saw Rheumatology last year and had elevated FARHEEN and they felt she was moving to either RA or Lupus.   She is in pain all the time, foggy, burning ext remedies. She will be having a referral back to see them. Sister who has MS said she acts just like her.   Saw Neurology and they did not feeling repeating was necessary as the last one in 2014 did not show any progression of borderline MRI.   She will see Rheumatology again.     - RHEUMATOLOGY REFERRAL    See Patient Instructions    KEILY Cobos  [facility]

## 2017-06-05 NOTE — NURSING NOTE
"Chief Complaint   Patient presents with     Musculoskeletal Problem     follow up on joint and muscle pain        Initial /68 (BP Location: Left arm, Patient Position: Chair, Cuff Size: Adult Large)  Pulse 74  Temp 97.9  F (36.6  C) (Tympanic)  Wt 190 lb (86.2 kg)  SpO2 96%  Breastfeeding? No  BMI 29.76 kg/m2 Estimated body mass index is 29.76 kg/(m^2) as calculated from the following:    Height as of 5/31/17: 5' 7\" (1.702 m).    Weight as of this encounter: 190 lb (86.2 kg).  Medication Reconciliation: complete   Melina Armas CMA(AAMA)   "

## 2017-06-05 NOTE — LETTER
[facility]    06/05/17    Patient: Sharlene Mccord  YOB: 1960  Medical Record Number: [id number]                                                                  Controlled Substance Agreement  I understand that my care provider has prescribed controlled substances (narcotics, tranquilizers, and/or stimulants) to help manage my condition(s).  I am taking this medicine to help me function or work.  I know that this is strong medicine.  It could have serious side effects and even cause a dependency on the drug.  If I stop these medicines suddenly, I could have severe withdrawal symptoms.    The risks, benefits, and side effects of these medication(s) were explained to me.  I agree that:  1. I will take part in other treatments as advised by my provider.  This may be psychiatry or counseling, physical therapy, behavioral therapy, group treatment, or a referral to a pain clinic.  I will reduce or stop my medicine when my provider tells me to do so.   2. I will take my medicines as prescribed.  I will not change the dose or schedule unless my provider tells me to.  There will be no refills if I  run out early.   I may be contacted at any time without warning and asked to complete a drug test or pill count.   3. I will keep all my appointments at the clinic.  If I miss appointments or fail to follow instructions, my provider may stop my medicine.  4. I will not ask other providers to prescribe controlled substances. And I will not accept controlled substances from other people. If I need another prescribed controlled substance for a new reason, I will notify my provider within one business day.  5. If I enroll in the [place] Medical Marijuana program, I will tell my provider.  I will also sign an agreement to share my medical records with my provider.  6. I will use one pharmacy to fill all of my controlled substance prescriptions.  If my prescription is mailed to my pharmacy, it may take 5 to 7  days for my medicine to be ready.  7. I understand that my provider, clinic care team, and pharmacy can track controlled substance prescriptions from other providers through a central database (prescription monitoring program).  8. I will bring in my list of medications (or my medicine bottles) each time I come to the clinic.  REV-  04/2016                                                                                                                                            Page 1 of 2      [facility]    06/05/17    Patient: Sharlene Mccord  YOB: 1960  Medical Record Number: [id number]    9. Refills of controlled substances will be made only during office hours.  It is up to me to make sure that I do not run out of my medicines on weekends or holidays.    10. I am responsible for my prescriptions.  If the medicine is lost or stolen, it will not be replaced.   I also agree not to share these medicines with anyone.  11. I agree to not use ANY illegal or recreational drugs.  This includes marijuana, cocaine, bath salts or other drugs.  I agree not to use alcohol unless my provider says I may.  I agree to give urine samples whenever asked.  If I fail to give a urine sample, the provider may stop my medicine.     12. I will tell my nurse or provider right away if I become pregnant or have a new medical problem treated outside of [facility].  13. I understand that this medicine can affect my thinking and judgment.  It may be unsafe for me to drive, use machinery and do dangerous tasks.  I will not do any of these things until I know how the medicine affects me.  If my dose changes, I will wait to see how it affects me.  I will contact my provider if I have concerns about medicine side effects.  I understand that if I do not follow any of the conditions above, my prescriptions or treatment may be stopped.    I agree that my provider, clinic care team, and pharmacy may work with any [facility], state  or federal law enforcement agency that investigates the misuse, sale, or other diversion of my controlled medicine. I will allow my provider to discuss my care with or share a copy of this agreement with any other treating provider, pharmacy or emergency room where I receive care.  I agree to give up (waive) any right of privacy or confidentiality with respect to these authorizations.   I have read this agreement and have asked questions about anything I did not understand.   ___________________________________    ___________________________  Patient Signature                                                           Date and Time  ___________________________________     ____________________________  Witness                                                                            Date and Time  ___________________________________  KEILY Cobos  REV-  04/2016                                                                                                                                                                 Page 2 of 2

## 2017-06-05 NOTE — LETTER
[facility]    06/05/17    Patient: Sharelne Mccord  YOB: 1960  Medical Record Number: [id number]                                                                  Controlled Substance Agreement  I understand that my care provider has prescribed controlled substances (narcotics, tranquilizers, and/or stimulants) to help manage my condition(s).  I am taking this medicine to help me function or work.  I know that this is strong medicine.  It could have serious side effects and even cause a dependency on the drug.  If I stop these medicines suddenly, I could have severe withdrawal symptoms.    The risks, benefits, and side effects of these medication(s) were explained to me.  I agree that:  1. I will take part in other treatments as advised by my provider.  This may be psychiatry or counseling, physical therapy, behavioral therapy, group treatment, or a referral to a pain clinic.  I will reduce or stop my medicine when my provider tells me to do so.   2. I will take my medicines as prescribed.  I will not change the dose or schedule unless my provider tells me to.  There will be no refills if I  run out early.   I may be contacted at any time without warning and asked to complete a drug test or pill count.   3. I will keep all my appointments at the clinic.  If I miss appointments or fail to follow instructions, my provider may stop my medicine.  4. I will not ask other providers to prescribe controlled substances. And I will not accept controlled substances from other people. If I need another prescribed controlled substance for a new reason, I will notify my provider within one business day.  5. If I enroll in the [place] Medical Marijuana program, I will tell my provider.  I will also sign an agreement to share my medical records with my provider.  6. I will use one pharmacy to fill all of my controlled substance prescriptions.  If my prescription is mailed to my pharmacy, it may take 5 to 7  days for my medicine to be ready.  7. I understand that my provider, clinic care team, and pharmacy can track controlled substance prescriptions from other providers through a central database (prescription monitoring program).  8. I will bring in my list of medications (or my medicine bottles) each time I come to the clinic.  REV-  04/2016                                                                                                                                            Page 1 of 2      [facility]    06/05/17    Patient: Sharlene Mccord  YOB: 1960  Medical Record Number: [id number]    9. Refills of controlled substances will be made only during office hours.  It is up to me to make sure that I do not run out of my medicines on weekends or holidays.    10. I am responsible for my prescriptions.  If the medicine is lost or stolen, it will not be replaced.   I also agree not to share these medicines with anyone.  11. I agree to not use ANY illegal or recreational drugs.  This includes marijuana, cocaine, bath salts or other drugs.  I agree not to use alcohol unless my provider says I may.  I agree to give urine samples whenever asked.  If I fail to give a urine sample, the provider may stop my medicine.     12. I will tell my nurse or provider right away if I become pregnant or have a new medical problem treated outside of [facility].  13. I understand that this medicine can affect my thinking and judgment.  It may be unsafe for me to drive, use machinery and do dangerous tasks.  I will not do any of these things until I know how the medicine affects me.  If my dose changes, I will wait to see how it affects me.  I will contact my provider if I have concerns about medicine side effects.  I understand that if I do not follow any of the conditions above, my prescriptions or treatment may be stopped.    I agree that my provider, clinic care team, and pharmacy may work with any [facility], state  or federal law enforcement agency that investigates the misuse, sale, or other diversion of my controlled medicine. I will allow my provider to discuss my care with or share a copy of this agreement with any other treating provider, pharmacy or emergency room where I receive care.  I agree to give up (waive) any right of privacy or confidentiality with respect to these authorizations.   I have read this agreement and have asked questions about anything I did not understand.   ___________________________________    ___________________________  Patient Signature                                                           Date and Time  ___________________________________     ____________________________  Witness                                                                            Date and Time  ___________________________________  KEILY Cobos  REV-  04/2016                                                                                                                                                                 Page 2 of 2

## 2017-06-06 ASSESSMENT — ANXIETY QUESTIONNAIRES: GAD7 TOTAL SCORE: 13

## 2017-06-06 ASSESSMENT — PATIENT HEALTH QUESTIONNAIRE - PHQ9: SUM OF ALL RESPONSES TO PHQ QUESTIONS 1-9: 19

## 2017-06-07 PROBLEM — G89.4 CHRONIC PAIN SYNDROME: Status: ACTIVE | Noted: 2017-06-07

## 2017-06-07 LAB — ANA SER QL IA: 3.2

## 2017-06-11 LAB — PAIN DRUG SCR UR W RPTD MEDS: NORMAL

## 2017-06-13 ENCOUNTER — TELEPHONE (OUTPATIENT)
Dept: PSYCHIATRY | Facility: OTHER | Age: 57
End: 2017-06-13

## 2017-06-13 NOTE — TELEPHONE ENCOUNTER
CJ can you please Sharlene tell her I no longer am prescribing Klonopin. Our clinic is becoming more protocol - focused and her primary ordered a comprehensive drug screen and the Klonopin was not in there. RN just came downstairs to clinic and showed me the report negative for Klonopin yet refills beginning of May and the end of May....

## 2017-06-14 ENCOUNTER — TELEPHONE (OUTPATIENT)
Dept: PSYCHIATRY | Facility: OTHER | Age: 57
End: 2017-06-14

## 2017-06-14 DIAGNOSIS — F41.1 GAD (GENERALIZED ANXIETY DISORDER): Primary | ICD-10-CM

## 2017-06-14 DIAGNOSIS — Z12.31 VISIT FOR SCREENING MAMMOGRAM: Primary | ICD-10-CM

## 2017-06-14 PROCEDURE — G0202 SCR MAMMO BI INCL CAD: HCPCS | Mod: TC

## 2017-06-14 PROCEDURE — 77063 BREAST TOMOSYNTHESIS BI: CPT | Mod: TC

## 2017-06-15 NOTE — TELEPHONE ENCOUNTER
Tried to reach by phone # listed in Epic. (x 2)  Was not able to contact or leave a msg.  No answering machine.

## 2017-06-26 NOTE — TELEPHONE ENCOUNTER
Contacted and notified patient that Dr. Odell will no longer prescribe Klonopin.  Explained that pcp ordered a comprehensive drug screen and Klonopin was not detected in the screen.  The patient states that she does not understand this because she takes them religiously.  Wondering if there is such a thing as a false negative.  I explained that I would think it would be in there if she picked up the medication beginning and end of May.  Thank you.  Any thoughts?  Has f/u 8-29-17.

## 2017-06-27 RX ORDER — CLONAZEPAM 1 MG/1
0.5-1 TABLET ORAL 2 TIMES DAILY PRN
Qty: 60 TABLET | Refills: 3 | Status: SHIPPED | OUTPATIENT
Start: 2017-06-28 | End: 2017-08-29

## 2017-06-27 NOTE — TELEPHONE ENCOUNTER
I sent my chart to Sharlene today noting I will continue for now and look more into this. We will discuss further at her next visit. Last fill was 5/31/17 so about due I will send to [facility] script dated can start tomorrow 6/28/17

## 2017-07-05 ENCOUNTER — OFFICE VISIT (OUTPATIENT)
Dept: FAMILY MEDICINE | Facility: OTHER | Age: 57
End: 2017-07-05
Attending: PHYSICIAN ASSISTANT
Payer: COMMERCIAL

## 2017-07-05 VITALS
HEIGHT: 66 IN | HEART RATE: 64 BPM | WEIGHT: 193 LBS | SYSTOLIC BLOOD PRESSURE: 118 MMHG | BODY MASS INDEX: 31.02 KG/M2 | DIASTOLIC BLOOD PRESSURE: 66 MMHG | OXYGEN SATURATION: 97 % | TEMPERATURE: 97.8 F

## 2017-07-05 DIAGNOSIS — F33.1 MODERATE EPISODE OF RECURRENT MAJOR DEPRESSIVE DISORDER (H): Primary | ICD-10-CM

## 2017-07-05 DIAGNOSIS — M79.7 FIBROMYALGIA: ICD-10-CM

## 2017-07-05 DIAGNOSIS — M35.1 MIXED CONNECTIVE TISSUE DISEASE (H): ICD-10-CM

## 2017-07-05 PROCEDURE — 99214 OFFICE O/P EST MOD 30 MIN: CPT | Performed by: PHYSICIAN ASSISTANT

## 2017-07-05 PROCEDURE — 99212 OFFICE O/P EST SF 10 MIN: CPT

## 2017-07-05 RX ORDER — HYDROXYCHLOROQUINE SULFATE 200 MG/1
200 TABLET, FILM COATED ORAL
COMMUNITY
Start: 2017-06-15 | End: 2017-08-29

## 2017-07-05 RX ORDER — TRAMADOL HYDROCHLORIDE 50 MG/1
TABLET ORAL
Qty: 180 TABLET | Refills: 0 | Status: SHIPPED | OUTPATIENT
Start: 2017-07-05 | End: 2017-08-31

## 2017-07-05 ASSESSMENT — ANXIETY QUESTIONNAIRES
5. BEING SO RESTLESS THAT IT IS HARD TO SIT STILL: SEVERAL DAYS
6. BECOMING EASILY ANNOYED OR IRRITABLE: MORE THAN HALF THE DAYS
2. NOT BEING ABLE TO STOP OR CONTROL WORRYING: NEARLY EVERY DAY
IF YOU CHECKED OFF ANY PROBLEMS ON THIS QUESTIONNAIRE, HOW DIFFICULT HAVE THESE PROBLEMS MADE IT FOR YOU TO DO YOUR WORK, TAKE CARE OF THINGS AT HOME, OR GET ALONG WITH OTHER PEOPLE: VERY DIFFICULT
1. FEELING NERVOUS, ANXIOUS, OR ON EDGE: MORE THAN HALF THE DAYS
3. WORRYING TOO MUCH ABOUT DIFFERENT THINGS: NEARLY EVERY DAY
GAD7 TOTAL SCORE: 14
7. FEELING AFRAID AS IF SOMETHING AWFUL MIGHT HAPPEN: SEVERAL DAYS

## 2017-07-05 ASSESSMENT — PAIN SCALES - GENERAL: PAINLEVEL: SEVERE PAIN (6)

## 2017-07-05 ASSESSMENT — PATIENT HEALTH QUESTIONNAIRE - PHQ9: 5. POOR APPETITE OR OVEREATING: MORE THAN HALF THE DAYS

## 2017-07-05 NOTE — NURSING NOTE
"Chief Complaint   Patient presents with     Clinic Care Coordination - Follow-up     Follow up on medication Hydroxychlor, her fibromyalgia, and go over results from .       Initial /66 (BP Location: Left arm, Patient Position: Chair, Cuff Size: Adult Large)  Pulse 64  Temp 97.8  F (36.6  C) (Tympanic)  Ht 5' 6\" (1.676 m)  Wt 193 lb (87.5 kg)  SpO2 97%  BMI 31.15 kg/m2 Estimated body mass index is 31.15 kg/(m^2) as calculated from the following:    Height as of this encounter: 5' 6\" (1.676 m).    Weight as of this encounter: 193 lb (87.5 kg).  Medication Reconciliation: reese Mccurdy LPN    "

## 2017-07-05 NOTE — PROGRESS NOTES
SUBJECTIVE:                                                    Sharlene Mccord is a 56 year old female who presents to clinic today for the following health issues:      Fibromyalgia Follow Up      Duration: Ongoing    Description (location/character/radiation): Upper legs, back and neck    Intensity:  Moderate to severe    Accompanying signs and symptoms: Fatigued, pain, hard to walk, hard to go up stairs, arms and hands get sore    History (similar episodes/previous evaluation): Ongoing    Precipitating or alleviating factors: Activities make it worse.    Therapies tried and outcome: Laying down and resting.        Renal function discussion      Duration: labs from [facility]     Description (location/character/radiation): has creatine of 0.47 on their check but was NPO    Intensity:      Accompanying signs and symptoms: none has been taking Ibuprofen    History (similar episodes/previous evaluation): None    Precipitating or alleviating factors: None    Therapies tried and outcome: None       Problem list and histories reviewed & adjusted, as indicated.  Additional history: as documented    Patient Active Problem List   Diagnosis     Diverticulitis of sigmoid colon     Hypothyroidism     Anxiety state     Cardiac microvascular disease (H)     Diverticulitis     Aortic valve disorder     ACP (advance care planning)     Chronic pain     Constipation     Memory loss     Major depressive disorder, recurrent episode (H)     Cognitive disorder     Major depressive disorder     Fatigue     Migraine without aura and responsive to treatment     Atypical migraine     Chronic pain syndrome     Past Surgical History:   Procedure Laterality Date     CARDIAC SURGERY  2013    angiogram [facility]:  Normal coronary arteries.  Felt ot have some microvascular disease     CL AFF SURGICAL PATHOLOGY  01/02/2007    Thyroid Mass     COLONOSCOPY  1/13/2014    Procedure: COLONOSCOPY;  COLONOSCOPY ;  Surgeon: Chasidy Gee DO;  Location: [facility] OR      ESOPHAGOSCOPY, GASTROSCOPY, DUODENOSCOPY (EGD), COMBINED N/A 2/9/2017    Procedure: COMBINED ESOPHAGOSCOPY, GASTROSCOPY, DUODENOSCOPY (EGD);  Surgeon: Johnathan Ruff DO;  Location: [facility] OR     GYN SURGERY      c-sections x 3     GYN SURGERY      complete hysterectomy     HYSTERECTOMY  2001     LAPAROSCOPIC CHOLECYSTECTOMY  11/07/2003    Cholelithiasis     LAPAROTOMY EXPLORATORY      abdominal pain/fever     LARYNGOSCOPY       SPLENECTOMY         Social History   Substance Use Topics     Smoking status: Current Every Day Smoker     Packs/day: 0.50     Years: 40.00     Types: Cigarettes     Smokeless tobacco: Never Used      Comment: starts quit plan in march 2017.  Has cut down to one pack a week     Alcohol use No     Family History   Problem Relation Age of Onset     C.A.D. Father      Hypertension Father      C.A.D. Mother      CEREBROVASCULAR DISEASE Mother      CVA     Hypertension Mother      Coronary Artery Disease Mother      DIABETES Paternal Grandmother      DIABETES Paternal Grandfather      DIABETES Sister      Breast Cancer Sister          Current Outpatient Prescriptions   Medication Sig Dispense Refill     clonazePAM (KLONOPIN) 1 MG tablet Take 0.5-1 tablets (0.5-1 mg) by mouth 2 times daily as needed for anxiety 60 tablet 3     docusate sodium (COLACE) 100 MG tablet Take 2 a.m. And 2 pm 120 tablet 3     traMADol (ULTRAM) 50 MG tablet TAKE TWO TABLETS BY MOUTH EVERY 8 HOURS AS NEEDED FOR PAIN 180 tablet 0     NITROSTAT 0.4 MG sublingual tablet Place 1 tablet (0.4 mg) under the tongue every 5 minutes as needed for chest pain 25 tablet 11     hydrOXYzine (ATARAX) 25 MG tablet TAKE ONE TO TWO TABLETS BY MOUTH AT BEDTIME FOR  NAUSEA 120 tablet 0     diltiazem (TIAZAC) 300 MG 24 hr ER beaded capsule Take 1 capsule (300 mg) by mouth daily 90 capsule 3     levothyroxine (SYNTHROID/LEVOTHROID) 50 MCG tablet Take 1 tablet (50 mcg) by mouth daily 30 tablet 10     estradiol (ESTRACE) 0.5 MG tablet TAKE  "ONE TABLET BY MOUTH TWICE DAILY 60 tablet 3     ranitidine (ZANTAC) 150 MG tablet Take 1 tablet (150 mg) by mouth 2 times daily 60 tablet 3     metoclopramide (REGLAN) 5 MG tablet Take 1 tablet (5 mg) by mouth 4 times daily as needed 60 tablet 0     buPROPion (WELLBUTRIN SR) 200 MG 12 hr tablet Take 1 tablet (200 mg) by mouth 2 times daily 60 tablet 5     PROCTOSOL HC 2.5 % rectal cream APPLY  CREAM TO AFFECTED AREA THREE TIMES DAILY 58 g 1     SUMAtriptan (IMITREX) 100 MG tablet TAKE ONE TABLET BY MOUTH AS NEEDED MIGRAINES 10 tablet 5     predniSONE (DELTASONE) 10 MG tablet Take 3 today with food then 2 per day for 5 days then 1 per day for 5 days then 1/2 per day for 5 days. (Patient not taking: Reported on 7/5/2017) 21 tablet 0     LORazepam (ATIVAN) 0.5 MG tablet Take 1 tablet (0.5 mg) by mouth every 8 hours as needed for anxiety (Patient not taking: Reported on 7/5/2017) 30 tablet 0     Allergies   Allergen Reactions     Codeine      Isosorbide Mononitrate Other (See Comments)     Vomiting and headache       Lisinopril      [facility] scan     BP Readings from Last 3 Encounters:   07/05/17 118/66   06/05/17 122/68   05/31/17 120/60    Wt Readings from Last 3 Encounters:   07/05/17 193 lb (87.5 kg)   06/05/17 190 lb (86.2 kg)   05/31/17 192 lb (87.1 kg)                    Reviewed and updated as needed this visit by clinical staff  Tobacco  Allergies  Meds  Med Hx  Surg Hx  Fam Hx  Soc Hx      Reviewed and updated as needed this visit by Provider         ROS:  Constitutional, neuro, ENT, endocrine, pulmonary, cardiac, gastrointestinal, genitourinary, musculoskeletal, integument and psychiatric systems are negative, except as otherwise noted.    OBJECTIVE:                                                    /66 (BP Location: Left arm, Patient Position: Chair, Cuff Size: Adult Large)  Pulse 64  Temp 97.8  F (36.6  C) (Tympanic)  Ht 5' 6\" (1.676 m)  Wt 193 lb (87.5 kg)  SpO2 97%  BMI 31.15 " kg/m2  Body mass index is 31.15 kg/(m^2).  GENERAL APPEARANCE: healthy, alert and no distress  EYES: Eyes grossly normal to inspection, PERRL and conjunctivae and sclerae normal  HENT: ear canals and TM's normal and nose and mouth without ulcers or lesions  NECK: no adenopathy, no asymmetry, masses, or scars and thyroid normal to palpation  RESP: lungs clear to auscultation - no rales, rhonchi or wheezes  CV: regular rates and rhythm, normal S1 S2, no S3 or S4 and no murmur, click or rub  LYMPHATICS: normal ant/post cervical and supraclavicular nodes  MS: extremities normal- no gross deformities noted  SKIN: no suspicious lesions or rashes  NEURO: Normal strength and tone, mentation intact and speech normal  PSYCH: mentation appears normal and affect normal/bright    Diagnostic test results:  Diagnostic Test Results:  No results found for this or any previous visit (from the past 24 hour(s)).  Results for orders placed or performed in visit on 06/05/17   Pain Drug Scr UR W Rptd Meds   Result Value Ref Range    Pain Drug SCR UR W RPTD Meds       FINAL  (Note)  ====================================================================  TO[facility] COMP DRUG ANALYSIS,UR  ====================================================================  Test                             Result       Flag       Units  Drug Present and Declared for Prescription Verification   Tramadol                       PRESENT      EXPECTED   O-Desmethyltramadol            PRESENT      EXPECTED   N-Desmethyltramadol            PRESENT      EXPECTED    Source of tramadol is a prescription medication.    O-desmethyltramadol and N-desmethyltramadol are expected    metabolites of tramadol.      Drug Present not Declared for Prescription Verification   Bupropion                      PRESENT      UNEXPECTED   Hydroxyzine                    PRESENT      UNEXPECTED   Diltiazem                      PRESENT      UNEXPECTED    Drug Absent but Declared for  Prescription Verification   Clonazepam                     Not Detected UNEXPECTED ng/mg creat  =========================== =========================================  Test                      Result    Flag   Units      Ref Range   Creatinine              130              mg/dL      >=20  ====================================================================  Declared Medications:  The flagging and interpretation on this report are based on the  following declared medications.  Unexpected results may arise from  inaccuracies in the declared medications.    **Note: The testing scope of this panel includes these medications:    Clonazepam (Klonopin)  Tramadol (Ultram)  ====================================================================  For clinical consultation, please call [phone].  ====================================================================  Analysis performed by [employer] [place] [address]     CRP, inflammation   Result Value Ref Range    CRP Inflammation 5.4 0.0 - 8.0 mg/L   Antinuclear antibody screen by EIA   Result Value Ref Range    FARHEEN Screen by EIA 3.2 (H) <1.0        ASSESSMENT/PLAN:                                                      1. Moderate episode of recurrent major depressive disorder (H)  She is working with Aurelia, having some issues with her urine screening.  I do believe she is taking her medications daily and to have them not show up is concerning for both parties.  Encouraged her to change pharmacy if needed.   Speak with Nurse on how this could be and what confirmation we can do to figure this out.  Getting the medication each month.       2. Fibromyalgia  Now has a mixed picture.  Has Rheumatological sx and working with Rheumatology. See us back as needed.     3. Mixed connective tissue disease (H)  As above.  They checked her urine and shows lower Creatine than we got just 3 months ago.  Was fasting avoid any NSAIDS and see us back we will continue to  monitor 6 months.     See Patient Instructions    Concha Hare, PA  [facility]

## 2017-07-05 NOTE — PATIENT INSTRUCTIONS
Thank you for choosing [facility].   I have office hours 8:00 am to 4:30 pm on Monday's, Wednesday's, Thursday's and Friday's. My nurse and I are out of the office every Tuesday.    Following your visit, when your labs and diagnostic testing have returned, I will review then and you will be contacted by my nurse.  If you are on My Chart, you can also view results there.    For refills, notify your pharmacy regarding what you need and the pharmacy will generate a refill request. Do not call my nurse as she is unable to process refill request. Please plan ahead and allow 3-5 days for refill requests.    You will generally receive a reminder call the day prior to your appointment.  If you cannot attend your appointment, please cancel your appointment with as much notice as possible.  If there is a pattern of failure to present for your appointments, I cannot provide consistent, meaningful, ongoing care for you. It is very important to me that you come in for your care, so we can best assist you with your health care needs.    IMPORTANT:  Please note that it is my standard of practice to NOT participate in prescribing ongoing requested Narcotic Analgesic therapy, and/or participate in the prescribing of other controlled substances.  My nurse and I am happy to assist you with the process of referral for alternative pain management as needed, and other treatment modalities including but not limited to:  Physical Therapy, Physical Medicine and Rehab, Counseling, Chiropractic Care, Orthopedic Care, and non-narcotic medication management.     In the event that you need to be seen for emergent concerns and I am out of office,  please see one of my colleagues for acute concerns.  You may also present to  or ER.  I appreciate the opportunity to serve you and look forward to supporting your healthcare needs in the future. Please contact me with any questions or concerns that you may  have.    Sincerely,      Concha Hare RN, PA-C

## 2017-07-05 NOTE — MR AVS SNAPSHOT
After Visit Summary   7/5/2017    Sharlene Mccord    MRN: [id number]           Patient Information     Date Of Birth          1960        Visit Information        Provider Department      7/5/2017 8:45 AM Concha Hare PA [facility]        Today's Diagnoses     Moderate episode of recurrent major depressive disorder (H)    -  1    Fibromyalgia        Mixed connective tissue disease (H)          Care Instructions      Thank you for choosing [facility].   I have office hours 8:00 am to 4:30 pm on Monday's, Wednesday's, Thursday's and Friday's. My nurse and I are out of the office every Tuesday.    Following your visit, when your labs and diagnostic testing have returned, I will review then and you will be contacted by my nurse.  If you are on My Chart, you can also view results there.    For refills, notify your pharmacy regarding what you need and the pharmacy will generate a refill request. Do not call my nurse as she is unable to process refill request. Please plan ahead and allow 3-5 days for refill requests.    You will generally receive a reminder call the day prior to your appointment.  If you cannot attend your appointment, please cancel your appointment with as much notice as possible.  If there is a pattern of failure to present for your appointments, I cannot provide consistent, meaningful, ongoing care for you. It is very important to me that you come in for your care, so we can best assist you with your health care needs.    IMPORTANT:  Please note that it is my standard of practice to NOT participate in prescribing ongoing requested Narcotic Analgesic therapy, and/or participate in the prescribing of other controlled substances.  My nurse and I am happy to assist you with the process of referral for alternative pain management as needed, and other treatment modalities including but not limited to:  Physical Therapy, Physical Medicine and Rehab, Counseling,  Chiropractic Care, Orthopedic Care, and non-narcotic medication management.     In the event that you need to be seen for emergent concerns and I am out of office,  please see one of my colleagues for acute concerns.  You may also present to  or ER.  I appreciate the opportunity to serve you and look forward to supporting your healthcare needs in the future. Please contact me with any questions or concerns that you may have.    Sincerely,      Concha Hare RN, PA-C               Follow-ups after your visit        Your next 10 appointments already scheduled     Aug 29, 2017 10:20 AM CDT   (Arrive by 10:05 AM)   Return Visit with A[facility]lia Odell MD   [facility]    [address] [place] [address] [id number] [phone]            May 22, 2018  9:30 AM CDT   (Arrive by 9:15 AM)   Return Visit with Norman Nichole MD   [facility] )    [address] [address]   [phone]              Who to contact     If you have questions or need follow up information about today's clinic visit or your schedule please contact [facility] directly at [phone].  Normal or non-critical lab and imaging results will be communicated to you by [facility], letter or phone within 4 business days after the clinic has received the results. If you do not hear from us within 7 days, please contact the clinic through [facility] or phone. If you have a critical or abnormal lab result, we will notify you by phone as soon as possible.  Submit refill requests through [employer] or call your pharmacy and they will forward the refill request to us. Please allow 3 business days for your refill to be completed.          Additional Information About Your Visit        [email or web address] Information     [employer] gives you secure access to your electronic health record. If you see a primary care provider, you can also send messages to your  "care team and make appointments. If you have questions, please call your primary care clinic.  If you do not have a primary care provider, please call [phone] and they will assist you.        Care EveryWhere ID     This is your Care EveryWhere ID. This could be used by other organizations to access your [facility] medical records  [id number]        Your Vitals Were     Pulse Temperature Height Pulse Oximetry BMI (Body Mass Index)       64 97.8  F (36.6  C) (Tympanic) 5' 6\" (1.676 m) 97% 31.15 kg/m2        Blood Pressure from Last 3 Encounters:   07/05/17 118/66   06/05/17 122/68   05/31/17 120/60    Weight from Last 3 Encounters:   07/05/17 193 lb (87.5 kg)   06/05/17 190 lb (86.2 kg)   05/31/17 192 lb (87.1 kg)              Today, you had the following     No orders found for display         Today's Medication Changes          These changes are accurate as of: 7/5/17  9:43 AM.  If you have any questions, ask your nurse or doctor.               Stop taking these medicines if you haven't already. Please contact your care team if you have questions.     LORazepam 0.5 MG tablet   Commonly known as:  ATIVAN   Stopped by:  Concha Hare PA           predniSONE 10 MG tablet   Commonly known as:  DELTASONE   Stopped by:  Concha Hare PA                    Primary Care Provider Office Phone # Fax #    KEILY Fonseca [phone]       [place] [address]        Equal Access to Services     NANCY SANCHEZ AH: Hadii hansel lomas hadasho Soomaali, waaxda luqadaha, qaybta kaalmada venessa, victor m okeefe. So [facility] [phone].    ATENCIÓN: Si habla español, tiene a meneses disposición servicios gratuitos de asistencia lingüística. Llame al [phone].    We comply with applicable federal civil rights laws and [place] laws. We do not discriminate on the basis of race, color, national origin, age, disability sex, sexual orientation " or gender identity.            Thank you!     Thank you for choosing [facility]  for your care. Our goal is always to provide you with excellent care. Hearing back from our patients is one way we can continue to improve our services. Please take a few minutes to complete the written survey that you may receive in the mail after your visit with us. Thank you!             Your Updated Medication List - Protect others around you: Learn how to safely use, store and throw away your medicines at www.disposemymeds.org.          This list is accurate as of: 7/5/17  9:43 AM.  Always use your most recent med list.                   Brand Name Dispense Instructions for use Diagnosis    buPROPion 200 MG 12 hr tablet    WELLBUTRIN SR    60 tablet    Take 1 tablet (200 mg) by mouth 2 times daily    Major depressive disorder, recurrent episode, moderate (H)       clonazePAM 1 MG tablet    klonoPIN    60 tablet    Take 0.5-1 tablets (0.5-1 mg) by mouth 2 times daily as needed for anxiety    SHAKIR (generalized anxiety disorder)       diltiazem 300 MG 24 hr ER beaded capsule    TIAZAC    90 capsule    Take 1 capsule (300 mg) by mouth daily    Cardiac microvascular disease (H)       docusate sodium 100 MG tablet    COLACE    120 tablet    Take 2 a.m. And 2 pm    Chronic pain syndrome       estradiol 0.5 MG tablet    ESTRACE    60 tablet    TAKE ONE TABLET BY MOUTH TWICE DAILY    Postmenopausal HRT (hormone replacement therapy)       hydroxychloroquine 200 MG tablet    PLAQUENIL     Take 200 mg by mouth        hydrOXYzine 25 MG tablet    ATARAX    120 tablet    TAKE ONE TO TWO TABLETS BY MOUTH AT BEDTIME FOR  NAUSEA    Chronic nausea       levothyroxine 50 MCG tablet    SYNTHROID/LEVOTHROID    30 tablet    Take 1 tablet (50 mcg) by mouth daily    Acquired hypothyroidism       metoclopramide 5 MG tablet    REGLAN    60 tablet    Take 1 tablet (5 mg) by mouth 4 times daily as needed    Gastroesophageal reflux disease without  esophagitis       NITROSTAT 0.4 MG sublingual tablet   Generic drug:  nitroGLYcerin     25 tablet    Place 1 tablet (0.4 mg) under the tongue every 5 minutes as needed for chest pain    Chest pain, unspecified type       PROCTOSOL HC 2.5 % cream   Generic drug:  hydrocortisone     58 g    APPLY  CREAM TO AFFECTED AREA THREE TIMES DAILY    Hemorrhoid       ranitidine 150 MG tablet    ZANTAC    60 tablet    Take 1 tablet (150 mg) by mouth 2 times daily    Gastroesophageal reflux disease without esophagitis       SUMAtriptan 100 MG tablet    IMITREX    10 tablet    TAKE ONE TABLET BY MOUTH AS NEEDED MIGRAINES    Migraine headache       traMADol 50 MG tablet    ULTRAM    180 tablet    TAKE TWO TABLETS BY MOUTH EVERY 8 HOURS AS NEEDED FOR PAIN    Fibromyalgia

## 2017-07-06 ASSESSMENT — ANXIETY QUESTIONNAIRES: GAD7 TOTAL SCORE: 14

## 2017-07-06 ASSESSMENT — PATIENT HEALTH QUESTIONNAIRE - PHQ9: SUM OF ALL RESPONSES TO PHQ QUESTIONS 1-9: 20

## 2017-07-10 DIAGNOSIS — Z79.890 POSTMENOPAUSAL HRT (HORMONE REPLACEMENT THERAPY): ICD-10-CM

## 2017-07-11 ENCOUNTER — MYC MEDICAL ADVICE (OUTPATIENT)
Dept: FAMILY MEDICINE | Facility: OTHER | Age: 57
End: 2017-07-11

## 2017-07-12 RX ORDER — ESTRADIOL 0.5 MG/1
TABLET ORAL
Qty: 60 TABLET | Refills: 11 | Status: SHIPPED | OUTPATIENT
Start: 2017-07-12 | End: 2018-07-09

## 2017-07-15 ENCOUNTER — HEALTH MAINTENANCE LETTER (OUTPATIENT)
Age: 57
End: 2017-07-15

## 2017-08-07 DIAGNOSIS — K21.9 GASTROESOPHAGEAL REFLUX DISEASE WITHOUT ESOPHAGITIS: ICD-10-CM

## 2017-08-28 DIAGNOSIS — F33.1 MAJOR DEPRESSIVE DISORDER, RECURRENT EPISODE, MODERATE (H): ICD-10-CM

## 2017-08-29 ENCOUNTER — OFFICE VISIT (OUTPATIENT)
Dept: PSYCHIATRY | Facility: OTHER | Age: 57
End: 2017-08-29
Attending: PSYCHIATRY & NEUROLOGY
Payer: COMMERCIAL

## 2017-08-29 VITALS
DIASTOLIC BLOOD PRESSURE: 60 MMHG | SYSTOLIC BLOOD PRESSURE: 124 MMHG | WEIGHT: 192 LBS | TEMPERATURE: 97 F | HEIGHT: 67 IN | HEART RATE: 60 BPM | BODY MASS INDEX: 30.13 KG/M2

## 2017-08-29 DIAGNOSIS — F41.1 GAD (GENERALIZED ANXIETY DISORDER): ICD-10-CM

## 2017-08-29 DIAGNOSIS — F33.1 MAJOR DEPRESSIVE DISORDER, RECURRENT EPISODE, MODERATE (H): ICD-10-CM

## 2017-08-29 PROCEDURE — 99212 OFFICE O/P EST SF 10 MIN: CPT

## 2017-08-29 PROCEDURE — 99213 OFFICE O/P EST LOW 20 MIN: CPT | Performed by: PSYCHIATRY & NEUROLOGY

## 2017-08-29 RX ORDER — BUPROPION HYDROCHLORIDE 200 MG/1
200 TABLET, EXTENDED RELEASE ORAL 2 TIMES DAILY
Qty: 60 TABLET | Refills: 11 | Status: SHIPPED | OUTPATIENT
Start: 2017-08-29 | End: 2018-01-31

## 2017-08-29 RX ORDER — CLONAZEPAM 1 MG/1
0.5-1 TABLET ORAL 2 TIMES DAILY PRN
Qty: 60 TABLET | Refills: 3 | Status: SHIPPED | OUTPATIENT
Start: 2017-09-20 | End: 2018-02-01

## 2017-08-29 RX ORDER — BUPROPION HYDROCHLORIDE 200 MG/1
TABLET, EXTENDED RELEASE ORAL
Qty: 60 TABLET | Refills: 3 | Status: SHIPPED | OUTPATIENT
Start: 2017-08-29 | End: 2017-08-29

## 2017-08-29 ASSESSMENT — PAIN SCALES - GENERAL: PAINLEVEL: SEVERE PAIN (6)

## 2017-08-29 NOTE — NURSING NOTE
"Chief Complaint   Patient presents with     RECHECK     Mental health.  Patient c/o feeling \"panicky\" again this time of the year.       Initial /60 (BP Location: Left arm, Patient Position: Sitting, Cuff Size: Adult Regular)  Pulse 60  Temp 97  F (36.1  C) (Tympanic)  Ht 5' 7\" (1.702 m)  Wt 192 lb (87.1 kg)  BMI 30.07 kg/m2 Estimated body mass index is 30.07 kg/(m^2) as calculated from the following:    Height as of this encounter: 5' 7\" (1.702 m).    Weight as of this encounter: 192 lb (87.1 kg).  Medication Reconciliation: complete     MARCELO DASILVA      "

## 2017-08-29 NOTE — PROGRESS NOTES
"  PSYCHIATRY CLINIC PROGRESS NOTE   20 minutes, med management  more than 50% of time spent counseling patient on medications, medication side effects, symptom history and management   SUBJECTIVE / INTERIM HISTORY                                                                       Last clinic visit:  She d/c buspar as had SEs. . Continue Wellbutrin 200 mg bid. Continue Klonopin 1 mg bid. Continue trazodone 50 mg HS.  - really wishes she could go back to work...  - tried cut down on meds including trazodone but then sleep got bad  - endoscopy: hernia on med that helps  - no longer developed tremor head and neck past several months  - rheumatology consult: (autoimmune tests some pos tested recently) and sounds like no definitive dx made  - relationship issues. Of note 's two sons older 20s and other 29 yo still live at home. One of the boys bought house next door.   - memory: did notice it got little better once off gabapentin. Still is an issue though. Still forgets / repeats herself though  - gets embarrassed in conversations: forgets words, what she is going to say,etc    SUBSTANCE USE- no issues    SYMPTOMS-  Lots of worry and anxiety lately given increase in stressors. Depressed mood, low energy, anhedonia, feelings guilt & overwhelmed, poor sleep. cognitive issues including word finding  MEDICAL ROS- nausea nd vomiting is better, with moving / exercise leg \"heaviness\" and pain, had tremor for while but resolved, balance issues, Hypersomnia.  Pain: neck, DDD in neck. Numbness and tingling in legs and feet worse at times when sitting  MEDICAL / SURGICAL HISTORY                 Medical Team:     PMD- Concha Hare     Therapist-none   Patient Active Problem List   Diagnosis     Diverticulitis of sigmoid colon     Hypothyroidism     Anxiety state     Cardiac microvascular disease (H)     Diverticulitis     Aortic valve disorder     ACP (advance care planning)     Chronic pain     Constipation     Memory " "loss     Major depressive disorder, recurrent episode (H)     Cognitive disorder     Major depressive disorder     Fatigue     Migraine without aura and responsive to treatment     Atypical migraine     Fibromyalgia     Mixed connective tissue disease (H)     ALLERGY   Codeine; Isosorbide mononitrate; and Lisinopril  MEDICATIONS                                                                                             Current Outpatient Prescriptions   Medication Sig     buPROPion (WELLBUTRIN SR) 200 MG 12 hr tablet TAKE ONE TABLET BY MOUTH TWICE DAILY     ranitidine (ZANTAC) 150 MG tablet TAKE ONE TABLET BY MOUTH TWICE DAILY     estradiol (ESTRACE) 0.5 MG tablet TAKE ONE TABLET BY MOUTH TWICE DAILY     traMADol (ULTRAM) 50 MG tablet TAKE TWO TABLETS BY MOUTH EVERY 8 HOURS AS NEEDED FOR PAIN     clonazePAM (KLONOPIN) 1 MG tablet Take 0.5-1 tablets (0.5-1 mg) by mouth 2 times daily as needed for anxiety     docusate sodium (COLACE) 100 MG tablet Take 2 a.m. And 2 pm     NITROSTAT 0.4 MG sublingual tablet Place 1 tablet (0.4 mg) under the tongue every 5 minutes as needed for chest pain     hydrOXYzine (ATARAX) 25 MG tablet TAKE ONE TO TWO TABLETS BY MOUTH AT BEDTIME FOR  NAUSEA     diltiazem (TIAZAC) 300 MG 24 hr ER beaded capsule Take 1 capsule (300 mg) by mouth daily     levothyroxine (SYNTHROID/LEVOTHROID) 50 MCG tablet Take 1 tablet (50 mcg) by mouth daily     metoclopramide (REGLAN) 5 MG tablet Take 1 tablet (5 mg) by mouth 4 times daily as needed     PROCTOSOL HC 2.5 % rectal cream APPLY  CREAM TO AFFECTED AREA THREE TIMES DAILY     SUMAtriptan (IMITREX) 100 MG tablet TAKE ONE TABLET BY MOUTH AS NEEDED MIGRAINES     No current facility-administered medications for this visit.        VITALS   /60 (BP Location: Left arm, Patient Position: Sitting, Cuff Size: Adult Regular)  Pulse 60  Temp 97  F (36.1  C) (Tympanic)  Ht 5' 7\" (1.702 m)  Wt 192 lb (87.1 kg)  BMI 30.07 kg/m2    PHQ9                   "     PHQ-9 SCORE 5/31/2017 6/5/2017 7/5/2017   Total Score - - -   Total Score 21 19 20     Labs:    Liver Function Studies -   Recent Labs   Lab Test  01/13/16   0941   PROTTOTAL  7.5   ALBUMIN  3.7   BILITOTAL  0.3   ALKPHOS  92   AST  32   ALT  63*     Recent Labs   Lab Test  01/13/16   0941  03/28/14   1008   CHOL  244*  238*   HDL  64  81*   LDL  147*  143*   TRIG  164*  72   CHOLHDLRATIO   --   2.9*       MENTAL STATUS EXAM                                                                                        Alert. Oriented to person, place, and date / time. Well groomed, calm, cooperative with good eye contact. No problems with speech or psychomotor behavior. Mood was anxious and depressed  and affect was congruent to speech content and restricted.f Thought process, including associations, was unremarkable and thought content was devoid of suicidal and homicidal ideation and psychotic thought. No hallucinations. Insight was good. Judgment was intact and adequate for safety. Fund of knowledge was intact. Pt demonstrates no obvious problems with attention, concentration, language, recent or remote memory although these were not formally tested.     ASSESSMENT                                                                                                      HISTORICAL:  Initial psych consult 12/12/14            Notes:  Ritalin as adjunct to antidepressant: didn't like how made her feel.   Prozac, Celexa ineffective. Now Wellbutrin helping. Zoloft: weight gain. Effexor back while: didn't stay on long and can't recall why. Cymbalta: was on and didn't help. She tried Provigil and SEs. Buspar: HAs.   Neuropsych testing summer 2015:  language: about same, working memory: better than last year. Attention / concentration worse than last year. Speed processing: improved. Executive function: didn't test well on that front also comparable to last year. Memory: worse off than last year. Depression: similar to last year.  Anxiety / emotional anxiety worse. As part of assessment they think should possible have another MRI  CURRENT: This patient provides a history which supports the diagnoses of Major depressive disorder, recurrent, mod and mild neurocognitive disorder. We cross-tapered to Wellbutrin. We have been discussing trying a stimulant as adjunct to antidepressant but wasn't helpful and made her feel mentally off so  d/c. Will continue Wellbutrin 200 mg bid and Klonopin for anxiety. She tried the buspar we started last visit and : HAs so d/c. Today we agreed on no med chagnes.    Cannot take Aleve, Motrin, ASA thus Tramadol. We discussed combination of benzos and opoioids and the guidelines. In terms of risks vs. Benefits Sharlene and I do NOT feel would be good idea to take her off Klonopin. We feel she would get to point she would be unable to get out of her house.    I'm thinking about donepezil for dementia... LI'm reviewing some studies, reports in literature.       TREATMENT RISK STATEMENT: The risks, benefits, alternatives and potential adverse effects have been explained and are understood by the pt. The pt agrees to the treatment plan with the ability to do so. The pt knows to call the clinic for any problems or access emergency care if needed.    DIAGNOSES      (Use of Axes system will continue, although absent from DSM 5)           Axis I - Major depressive disorder, recurrent severe without psychotic features  Mild neurocognitive disorder  Axis II - no dx  Axis III - lumbago, fibromyalgia, hx migraines, hx diverticultitis  Axis IV- Psychosocial Stressors include: finances, marital strain  Axis V - Global Assessment of Functioning current: 41- 50 Serious symptoms OR any serious impairment in social, occupational, or school functioning.    PLAN                                                                                                                         1) MEDICATIONS:   -- Considering donepezil.. Continue  Wellbutrin 200 mg bid. Continue Klonopin 1 mg bid. Continue trazodone 50 mg HS.    2) THERAPY: no change.     3) LABS: none today    4) Other: Neurology provider Dr. Andre through [facility]. Neuropsych testing with Dr. Johnathan Min    8)  RTC: ~4-6 weeks    Pharmacist: [phone]  Customer service [phone]

## 2017-08-29 NOTE — MR AVS SNAPSHOT
After Visit Summary   8/29/2017    Sharlene Mccord    MRN: [id number]           Patient Information     Date Of Birth          1960        Visit Information        Provider Department      8/29/2017 10:20 AM Aurelia Odell MD [facility]        Today's Diagnoses     SHAKIR (generalized anxiety disorder)        Major depressive disorder, recurrent episode, moderate (H)           Follow-ups after your visit        Your next 10 appointments already scheduled     Sep 11, 2017 10:00 AM CDT   (Arrive by 9:45 AM)   SHORT with KEILY Fonseca   [facility] ([facility] [place] )    [address]  [place] [address]   [phone]            May 22, 2018  9:30 AM CDT   (Arrive by 9:15 AM)   Return Visit with Norman Nichole MD   [facility] ([facility] )    [address]  [place] [address]   [phone]              Who to contact     If you have questions or need follow up information about today's clinic visit or your schedule please contact [facility] directly at [phone].  Normal or non-critical lab and imaging results will be communicated to you by MyChart, letter or phone within 4 business days after the clinic has received the results. If you do not hear from us within 7 days, please contact the clinic through [employer] or phone. If you have a critical or abnormal lab result, we will notify you by phone as soon as possible.  Submit refill requests through [employer] or call your pharmacy and they will forward the refill request to us. Please allow 3 business days for your refill to be completed.          Additional Information About Your Visit        MyChart Information     [employer] gives you secure access to your electronic health record. If you see a primary care provider, you can also send messages to your care team and make appointments. If you have questions, please call your primary care clinic.   "If you do not have a primary care provider, please call [phone] and they will assist you.        Care EveryWhere ID     This is your Care EveryWhere ID. This could be used by other organizations to access your [facility] medical records  [id number]        Your Vitals Were     Pulse Temperature Height BMI (Body Mass Index)          60 97  F (36.1  C) (Tympanic) 5' 7\" (1.702 m) 30.07 kg/m2         Blood Pressure from Last 3 Encounters:   08/29/17 124/60   07/05/17 118/66   06/05/17 122/68    Weight from Last 3 Encounters:   08/29/17 192 lb (87.1 kg)   07/05/17 193 lb (87.5 kg)   06/05/17 190 lb (86.2 kg)              Today, you had the following     No orders found for display         Today's Medication Changes          These changes are accurate as of: 8/29/17 11:05 AM.  If you have any questions, ask your nurse or doctor.               These medicines have changed or have updated prescriptions.        Dose/Directions    buPROPion 200 MG 12 hr tablet   Commonly known as:  WELLBUTRIN SR   This may have changed:  See the new instructions.   Used for:  Major depressive disorder, recurrent episode, moderate (H)   Changed by:  Aurelia Odell MD        Dose:  200 mg   Take 1 tablet (200 mg) by mouth 2 times daily   Quantity:  60 tablet   Refills:  11            Where to get your medicines      These medications were sent to [id number] - [place], [place] - [address]  [id number] , [place] [address]     Phone:  [phone]     buPROPion 200 MG 12 hr tablet         Some of these will need a paper prescription and others can be bought over the counter.  Ask your nurse if you have questions.     Bring a paper prescription for each of these medications     clonazePAM 1 MG tablet                Primary Care Provider Office Phone # Fax #    KEILY Fonseca [phone]       [facility] [address]  [place] [address]        Equal Access to Services     RADHA SANCHEZ AH: " Hadii aad ku hadcharlotteo Soelizabethali, waaxda luqadaha, qaybta kaalmatilde clifford, victor m okeefe. So [facility] [phone].    ATENCIÓN: Si suzan le, tiene a meneses disposición servicios gratuitos de asistencia lingüística. Llame al [phone].    We comply with applicable federal civil rights laws and [place] laws. We do not discriminate on the basis of race, color, national origin, age, disability sex, sexual orientation or gender identity.            Thank you!     Thank you for choosing [facility]  for your care. Our goal is always to provide you with excellent care. Hearing back from our patients is one way we can continue to improve our services. Please take a few minutes to complete the written survey that you may receive in the mail after your visit with us. Thank you!             Your Updated Medication List - Protect others around you: Learn how to safely use, store and throw away your medicines at www.disposemymeds.org.          This list is accurate as of: 8/29/17 11:05 AM.  Always use your most recent med list.                   Brand Name Dispense Instructions for use Diagnosis    buPROPion 200 MG 12 hr tablet    WELLBUTRIN SR    60 tablet    Take 1 tablet (200 mg) by mouth 2 times daily    Major depressive disorder, recurrent episode, moderate (H)       clonazePAM 1 MG tablet   Start taking on:  9/20/2017    klonoPIN    60 tablet    Take 0.5-1 tablets (0.5-1 mg) by mouth 2 times daily as needed for anxiety    SHAKIR (generalized anxiety disorder)       diltiazem 300 MG 24 hr ER beaded capsule    TIAZAC    90 capsule    Take 1 capsule (300 mg) by mouth daily    Cardiac microvascular disease (H)       docusate sodium 100 MG tablet    COLACE    120 tablet    Take 2 a.m. And 2 pm    Chronic pain syndrome       estradiol 0.5 MG tablet    ESTRACE    60 tablet    TAKE ONE TABLET BY MOUTH TWICE DAILY    Postmenopausal HRT (hormone replacement therapy)       hydrOXYzine 25 MG  tablet    ATARAX    120 tablet    TAKE ONE TO TWO TABLETS BY MOUTH AT BEDTIME FOR  NAUSEA    Chronic nausea       levothyroxine 50 MCG tablet    SYNTHROID/LEVOTHROID    30 tablet    Take 1 tablet (50 mcg) by mouth daily    Acquired hypothyroidism       metoclopramide 5 MG tablet    REGLAN    60 tablet    Take 1 tablet (5 mg) by mouth 4 times daily as needed    Gastroesophageal reflux disease without esophagitis       NITROSTAT 0.4 MG sublingual tablet   Generic drug:  nitroGLYcerin     25 tablet    Place 1 tablet (0.4 mg) under the tongue every 5 minutes as needed for chest pain    Chest pain, unspecified type       PROCTOSOL HC 2.5 % cream   Generic drug:  hydrocortisone     58 g    APPLY  CREAM TO AFFECTED AREA THREE TIMES DAILY    Hemorrhoid       ranitidine 150 MG tablet    ZANTAC    60 tablet    TAKE ONE TABLET BY MOUTH TWICE DAILY    Gastroesophageal reflux disease without esophagitis       SUMAtriptan 100 MG tablet    IMITREX    10 tablet    TAKE ONE TABLET BY MOUTH AS NEEDED MIGRAINES    Migraine headache       traMADol 50 MG tablet    ULTRAM    180 tablet    TAKE TWO TABLETS BY MOUTH EVERY 8 HOURS AS NEEDED FOR PAIN    Fibromyalgia

## 2017-08-31 DIAGNOSIS — M79.7 FIBROMYALGIA: ICD-10-CM

## 2017-09-01 RX ORDER — TRAMADOL HYDROCHLORIDE 50 MG/1
TABLET ORAL
Qty: 180 TABLET | Refills: 0 | Status: SHIPPED | OUTPATIENT
Start: 2017-09-01 | End: 2017-10-11

## 2017-09-01 NOTE — TELEPHONE ENCOUNTER
Controlled Substance Refill Request for Tramadol  Problem List Complete:  No     PROVIDER TO CONSIDER COMPLETION OF PROBLEM LIST AND OVERVIEW/CONTROLLED SUBSTANCE AGREEMENT    Last Written Prescription Date:  7/5/17   Last Fill Quantity: 180,   # refills: 0    Last Office Visit with [facility] primary care provider: 7/5/17    Future Office visit:   Next 5 appointments (look out 90 days)     Sep 11, 2017 10:00 AM CDT   (Arrive by 9:45 AM)   SHORT with KEILY Fonseca   [facility] ([facility] )    [address] [place] [address]   [phone]            Nov 28, 2017 10:40 AM CST   (Arrive by 10:25 AM)   Return Visit with Aurelia Odell MD   [facility] ([facility] )    [address] [place] [id number] [phone]                  Controlled substance agreement on file: No.     Processing:  Fax Rx to [facility] pharmacy    PCP Ananya.

## 2017-09-11 ENCOUNTER — OFFICE VISIT (OUTPATIENT)
Dept: FAMILY MEDICINE | Facility: OTHER | Age: 57
End: 2017-09-11
Attending: PHYSICIAN ASSISTANT
Payer: COMMERCIAL

## 2017-09-11 VITALS
TEMPERATURE: 98.7 F | BODY MASS INDEX: 30.38 KG/M2 | OXYGEN SATURATION: 98 % | SYSTOLIC BLOOD PRESSURE: 112 MMHG | WEIGHT: 194 LBS | HEART RATE: 72 BPM | DIASTOLIC BLOOD PRESSURE: 72 MMHG

## 2017-09-11 DIAGNOSIS — E03.9 ACQUIRED HYPOTHYROIDISM: ICD-10-CM

## 2017-09-11 DIAGNOSIS — K21.9 GASTROESOPHAGEAL REFLUX DISEASE WITHOUT ESOPHAGITIS: ICD-10-CM

## 2017-09-11 DIAGNOSIS — G43.009 ATYPICAL MIGRAINE: ICD-10-CM

## 2017-09-11 DIAGNOSIS — R11.0 CHRONIC NAUSEA: ICD-10-CM

## 2017-09-11 DIAGNOSIS — M35.1 MIXED CONNECTIVE TISSUE DISEASE (H): Primary | ICD-10-CM

## 2017-09-11 DIAGNOSIS — Z71.89 ACP (ADVANCE CARE PLANNING): Chronic | ICD-10-CM

## 2017-09-11 LAB
ALBUMIN SERPL-MCNC: 3.6 G/DL (ref 3.4–5)
ALBUMIN UR-MCNC: NEGATIVE MG/DL
ALP SERPL-CCNC: 84 U/L (ref 40–150)
ALT SERPL W P-5'-P-CCNC: 27 U/L (ref 0–50)
ANION GAP SERPL CALCULATED.3IONS-SCNC: 5 MMOL/L (ref 3–14)
APPEARANCE UR: CLEAR
AST SERPL W P-5'-P-CCNC: 15 U/L (ref 0–45)
BILIRUB SERPL-MCNC: 0.3 MG/DL (ref 0.2–1.3)
BILIRUB UR QL STRIP: NEGATIVE
BUN SERPL-MCNC: 8 MG/DL (ref 7–30)
CALCIUM SERPL-MCNC: 8.8 MG/DL (ref 8.5–10.1)
CHLORIDE SERPL-SCNC: 110 MMOL/L (ref 94–109)
CO2 SERPL-SCNC: 24 MMOL/L (ref 20–32)
COLOR UR AUTO: YELLOW
CREAT SERPL-MCNC: 1.05 MG/DL (ref 0.52–1.04)
GFR SERPL CREATININE-BSD FRML MDRD: 54 ML/MIN/1.7M2
GLUCOSE SERPL-MCNC: 101 MG/DL (ref 70–99)
GLUCOSE UR STRIP-MCNC: NEGATIVE MG/DL
HGB UR QL STRIP: NEGATIVE
KETONES UR STRIP-MCNC: NEGATIVE MG/DL
LEUKOCYTE ESTERASE UR QL STRIP: NEGATIVE
NITRATE UR QL: NEGATIVE
PH UR STRIP: 5.5 PH (ref 4.7–8)
POTASSIUM SERPL-SCNC: 4.3 MMOL/L (ref 3.4–5.3)
PROT SERPL-MCNC: 7 G/DL (ref 6.8–8.8)
SODIUM SERPL-SCNC: 139 MMOL/L (ref 133–144)
SOURCE: NORMAL
SP GR UR STRIP: 1.02 (ref 1–1.03)
TSH SERPL DL<=0.005 MIU/L-ACNC: 1.17 MU/L (ref 0.4–4)
UROBILINOGEN UR STRIP-MCNC: NORMAL MG/DL (ref 0–2)

## 2017-09-11 PROCEDURE — 84443 ASSAY THYROID STIM HORMONE: CPT | Mod: ZL | Performed by: PHYSICIAN ASSISTANT

## 2017-09-11 PROCEDURE — 99214 OFFICE O/P EST MOD 30 MIN: CPT | Performed by: PHYSICIAN ASSISTANT

## 2017-09-11 PROCEDURE — 81003 URINALYSIS AUTO W/O SCOPE: CPT | Mod: ZL | Performed by: PHYSICIAN ASSISTANT

## 2017-09-11 PROCEDURE — 99212 OFFICE O/P EST SF 10 MIN: CPT

## 2017-09-11 PROCEDURE — 80053 COMPREHEN METABOLIC PANEL: CPT | Mod: ZL | Performed by: PHYSICIAN ASSISTANT

## 2017-09-11 PROCEDURE — 36415 COLL VENOUS BLD VENIPUNCTURE: CPT | Mod: ZL | Performed by: PHYSICIAN ASSISTANT

## 2017-09-11 RX ORDER — METHYLPREDNISOLONE 4 MG
TABLET, DOSE PACK ORAL
Qty: 21 TABLET | Refills: 0 | Status: SHIPPED | OUTPATIENT
Start: 2017-09-11 | End: 2017-11-10

## 2017-09-11 RX ORDER — HYDROXYZINE HYDROCHLORIDE 25 MG/1
TABLET, FILM COATED ORAL
Qty: 120 TABLET | Refills: 0 | Status: SHIPPED | OUTPATIENT
Start: 2017-09-11 | End: 2018-06-17

## 2017-09-11 ASSESSMENT — ANXIETY QUESTIONNAIRES
4. TROUBLE RELAXING: NEARLY EVERY DAY
IF YOU CHECKED OFF ANY PROBLEMS ON THIS QUESTIONNAIRE, HOW DIFFICULT HAVE THESE PROBLEMS MADE IT FOR YOU TO DO YOUR WORK, TAKE CARE OF THINGS AT HOME, OR GET ALONG WITH OTHER PEOPLE: VERY DIFFICULT
3. WORRYING TOO MUCH ABOUT DIFFERENT THINGS: NEARLY EVERY DAY
GAD7 TOTAL SCORE: 17
6. BECOMING EASILY ANNOYED OR IRRITABLE: NEARLY EVERY DAY
7. FEELING AFRAID AS IF SOMETHING AWFUL MIGHT HAPPEN: SEVERAL DAYS
5. BEING SO RESTLESS THAT IT IS HARD TO SIT STILL: SEVERAL DAYS
2. NOT BEING ABLE TO STOP OR CONTROL WORRYING: NEARLY EVERY DAY
1. FEELING NERVOUS, ANXIOUS, OR ON EDGE: NEARLY EVERY DAY

## 2017-09-11 ASSESSMENT — PATIENT HEALTH QUESTIONNAIRE - PHQ9: SUM OF ALL RESPONSES TO PHQ QUESTIONS 1-9: 16

## 2017-09-11 ASSESSMENT — PAIN SCALES - GENERAL: PAINLEVEL: SEVERE PAIN (6)

## 2017-09-11 NOTE — NURSING NOTE
"Chief Complaint   Patient presents with     Anxiety     Depression     *_* Health Care Directive *_*     packet given        Initial /72 (BP Location: Left arm, Patient Position: Chair, Cuff Size: Adult Large)  Pulse 72  Temp 98.7  F (37.1  C) (Tympanic)  Wt 194 lb (88 kg)  SpO2 98%  Breastfeeding? No  BMI 30.38 kg/m2 Estimated body mass index is 30.38 kg/(m^2) as calculated from the following:    Height as of 8/29/17: 5' 7\" (1.702 m).    Weight as of this encounter: 194 lb (88 kg).  Medication Reconciliation: complete   Melina Armas CMA(AAMA)     "

## 2017-09-11 NOTE — PROGRESS NOTES
SUBJECTIVE:   Sharlene Mccord is a 57 year old female who presents to clinic today for the following health issues:        Depression and Anxiety Follow-Up    Status since last visit: Worsened     Other associated symptoms:None    Complicating factors: chronic pain. Has mixed connective tissue disorder.  Recent flare in pain. Seeing Psychiatry.     Significant life event: many      Current substance abuse: None    PHQ-9 SCORE 5/31/2017 6/5/2017 7/5/2017   Total Score - - -   Total Score 21 19 20     SHAKIR-7 SCORE 5/31/2017 6/5/2017 7/5/2017   Total Score 14 13 14       PHQ-9  English  PHQ-9   Any Language  GAD7      Amount of exercise or physical activity: 2-3 days/week for an average of 30-45 minutes    Problems taking medications regularly: No    Medication side effects: none  Diet: regular (no restrictions)      Hypothyroidism Follow-up    Since last visit, patient describes the following symptoms: Weight stable, no hair loss, no skin changes, no constipation, no loose stools  Chronic Pain Follow-Up       Type / Location of Pain: all over body pain   Analgesia/pain control:       Recent changes:  same      Overall control: Tolerable with discomfort  Activity level/function:      Daily activities:  Able to do light housework, cooking    Work:  Pain is making it so patient can not work   Adverse effects:  No  Adherance    Taking medication as directed?  Yes    Participating in other treatments: not applicable  Risk Factors:    Sleep:  Fair    Mood/anxiety:  worsened    Recent family or social stressors:  death in family:  A few months back     Other aggravating factors: prolonged sitting, prolonged standing and poor posture, everyday activity   PHQ-9 SCORE 5/31/2017 6/5/2017 7/5/2017   Total Score - - -   Total Score 21 19 20     SHAKIR-7 SCORE 5/31/2017 6/5/2017 7/5/2017   Total Score 14 13 14     Encounter-Level CSA - 06/05/2017:          Controlled Substance Agreement - Scan on 6/6/2017 12:39 PM : CONTROLLED  SUBSTANCE AGREEMENT (below)                      Problem list and histories reviewed & adjusted, as indicated.  Additional history: as documented    Patient Active Problem List   Diagnosis     Diverticulitis of sigmoid colon     Hypothyroidism     Anxiety state     Cardiac microvascular disease (H)     Diverticulitis     Aortic valve disorder     ACP (advance care planning)     Chronic pain     Constipation     Memory loss     Major depressive disorder, recurrent episode (H)     Cognitive disorder     Major depressive disorder     Fatigue     Migraine without aura and responsive to treatment     Atypical migraine     Fibromyalgia     Mixed connective tissue disease (H)     Past Surgical History:   Procedure Laterality Date     APPENDECTOMY  1977     BIOPSY  2017    taken at [facility] from Dr. Nadira cohen. Thyroid     CARDIAC SURGERY  2013    angiogram [facility]:  Normal coronary arteries.  Felt ot have some microvascular disease     CL AFF SURGICAL PATHOLOGY  01/02/2007    Thyroid Mass     COLONOSCOPY  1/13/2014    Procedure: COLONOSCOPY;  COLONOSCOPY ;  Surgeon: Chasidy Gee DO;  Location: [facility]     ESOPHAGOSCOPY, GASTROSCOPY, DUODENOSCOPY (EGD), COMBINED N/A 2/9/2017    Procedure: COMBINED ESOPHAGOSCOPY, GASTROSCOPY, DUODENOSCOPY (EGD);  Surgeon: Johnathan Ruff DO;  Location: [facility] OR     GYN SURGERY      c-sections x 3     GYN SURGERY      complete hysterectomy     HYSTERECTOMY  2001     LAPAROSCOPIC CHOLECYSTECTOMY  11/07/2003    Cholelithiasis     LAPAROTOMY EXPLORATORY      abdominal pain/fever     LARYNGOSCOPY       SPLENECTOMY         Social History   Substance Use Topics     Smoking status: Light Tobacco Smoker     Packs/day: 0.50     Years: 40.00     Types: Cigarettes     Smokeless tobacco: Current User      Comment: starts quit plan in march 2017.  Has cut down to one pack a week     Alcohol use No     Family History   Problem Relation Age of Onset     C.A.D. Father      Hypertension Father       GLORIAACHRIS Mother      CEREBROVASCULAR DISEASE Mother      CVA     Hypertension Mother      Coronary Artery Disease Mother      DIABETES Paternal Grandmother      DIABETES Paternal Grandfather      DIABETES Sister      Breast Cancer Sister      DIABETES Maternal Grandmother      DIABETES Sister      Breast Cancer Sister      Hypertension Sister      Depression Sister      Anxiety Disorder Sister      Depression Sister      Depression Brother      Depression Daughter      Obesity Daughter      Depression Daughter      MENTAL ILLNESS Daughter      bi-polar1     Anxiety Disorder Daughter      Anxiety Disorder Other          Current Outpatient Prescriptions   Medication Sig Dispense Refill     traMADol (ULTRAM) 50 MG tablet TAKE TWO TABLETS BY MOUTH EVERY 8 HOURS AS NEEDED FOR PAIN 180 tablet 0     [START ON 9/20/2017] clonazePAM (KLONOPIN) 1 MG tablet Take 0.5-1 tablets (0.5-1 mg) by mouth 2 times daily as needed for anxiety 60 tablet 3     buPROPion (WELLBUTRIN SR) 200 MG 12 hr tablet Take 1 tablet (200 mg) by mouth 2 times daily 60 tablet 11     ranitidine (ZANTAC) 150 MG tablet TAKE ONE TABLET BY MOUTH TWICE DAILY 60 tablet 3     estradiol (ESTRACE) 0.5 MG tablet TAKE ONE TABLET BY MOUTH TWICE DAILY 60 tablet 11     docusate sodium (COLACE) 100 MG tablet Take 2 a.m. And 2 pm 120 tablet 3     NITROSTAT 0.4 MG sublingual tablet Place 1 tablet (0.4 mg) under the tongue every 5 minutes as needed for chest pain 25 tablet 11     hydrOXYzine (ATARAX) 25 MG tablet TAKE ONE TO TWO TABLETS BY MOUTH AT BEDTIME FOR  NAUSEA 120 tablet 0     diltiazem (TIAZAC) 300 MG 24 hr ER beaded capsule Take 1 capsule (300 mg) by mouth daily 90 capsule 3     levothyroxine (SYNTHROID/LEVOTHROID) 50 MCG tablet Take 1 tablet (50 mcg) by mouth daily 30 tablet 10     metoclopramide (REGLAN) 5 MG tablet Take 1 tablet (5 mg) by mouth 4 times daily as needed 60 tablet 0     PROCTOSOL HC 2.5 % rectal cream APPLY  CREAM TO AFFECTED AREA THREE TIMES DAILY  58 g 1     SUMAtriptan (IMITREX) 100 MG tablet TAKE ONE TABLET BY MOUTH AS NEEDED MIGRAINES 10 tablet 5     Allergies   Allergen Reactions     Codeine      Isosorbide Mononitrate Other (See Comments)     Vomiting and headache       Lisinopril      [facility] scan     Recent Labs   Lab Test  05/05/17 2025 04/19/17   1133  05/09/16   0930  01/13/16   0941   LDL   --   104*  127*  147*   HDL   --   77  70  64   TRIG   --   165*  204*  164*   ALT  38  28  44  63*   CR  1.06*  0.97  0.94  0.93   GFRESTIMATED  54*  59*  62  63   GFRESTBLACK  65  71  75  76   POTASSIUM  3.9  4.4  4.1  4.8   TSH   --   1.24  3.12  2.58      BP Readings from Last 3 Encounters:   09/11/17 112/72   08/29/17 124/60   07/05/17 118/66    Wt Readings from Last 3 Encounters:   09/11/17 194 lb (88 kg)   08/29/17 192 lb (87.1 kg)   07/05/17 193 lb (87.5 kg)                          Reviewed and updated as needed this visit by clinical staff     Reviewed and updated as needed this visit by Provider         ROS:  Constitutional, neuro, ENT, endocrine, pulmonary, cardiac, gastrointestinal, genitourinary, musculoskeletal, integument and psychiatric systems are negative, except as otherwise noted.      OBJECTIVE:                                                    /72 (BP Location: Left arm, Patient Position: Chair, Cuff Size: Adult Large)  Pulse 72  Temp 98.7  F (37.1  C) (Tympanic)  Wt 194 lb (88 kg)  SpO2 98%  Breastfeeding? No  BMI 30.38 kg/m2  Body mass index is 30.38 kg/(m^2).  GENERAL APPEARANCE: healthy, alert and no distress  EYES: Eyes grossly normal to inspection, PERRL and conjunctivae and sclerae normal  HENT: ear canals and TM's normal and nose and mouth without ulcers or lesions  NECK: no adenopathy, no asymmetry, masses, or scars and thyroid normal to palpation  RESP: lungs clear to auscultation - no rales, rhonchi or wheezes  CV: regular rates and rhythm, normal S1 S2, no S3 or S4 and no murmur, click or rub  LYMPHATICS:  normal ant/post cervical and supraclavicular nodes  ABDOMEN: soft, nontender, without hepatosplenomegaly or masses and bowel sounds normal  MS: extremities normal- no gross deformities noted  SKIN: no suspicious lesions or rashes  NEURO: Normal strength and tone, mentation intact and speech normal  PSYCH: mentation appears normal and affect ok, stressed and care taking families.       Diagnostic test results:  Diagnostic Test Results:  No results found for this or any previous visit (from the past 24 hour(s)).       ASSESSMENT/PLAN:                                                      1. ACP (advance care planning)  She is given will a long time ago and given another copy today.     2. Mixed connective tissue disease (H)  Eyes are dry and dry mouth.  As mixed picture and can't tolerate plaquenil due to eye issues.  Muscles feel like they are ripping.    - Comprehensive metabolic panel (BMP + Alb, Alk Phos, ALT, AST, Total. Bili, TP)  - methylPREDNISolone (MEDROL DOSEPAK) 4 MG tablet; Follow package instructions  Dispense: 21 tablet; Refill: 0  - TSH with free T4 reflex    3. Acquired hypothyroidism  Recheck labs has swollen ankles.         4. Atypical migraine  Active with neurology and seeing them for her plan.  Headaches are much better.        See Patient Instructions    KEILY Cobos  [facility]

## 2017-09-11 NOTE — MR AVS SNAPSHOT
After Visit Summary   9/11/2017    Sharlene Mccord    MRN: [id number]           Patient Information     Date Of Birth          1960        Visit Information        Provider Department      9/11/2017 10:00 AM Concha Hare PA [facility]        Today's Diagnoses     Mixed connective tissue disease (H)    -  1    ACP (advance care planning)        Acquired hypothyroidism        Atypical migraine          Care Instructions      Thank you for choosing [facility].   I have office hours 8:00 am to 4:30 pm on Monday's, Wednesday's, Thursday's and Friday's. My nurse and I are out of the office every Tuesday.    Following your visit, when your labs and diagnostic testing have returned, I will review then and you will be contacted by my nurse.  If you are on My Chart, you can also view results there.    For refills, notify your pharmacy regarding what you need and the pharmacy will generate a refill request. Do not call my nurse as she is unable to process refill request. Please plan ahead and allow 3-5 days for refill requests.    You will generally receive a reminder call the day prior to your appointment.  If you cannot attend your appointment, please cancel your appointment with as much notice as possible.  If there is a pattern of failure to present for your appointments, I cannot provide consistent, meaningful, ongoing care for you. It is very important to me that you come in for your care, so we can best assist you with your health care needs.    IMPORTANT:  Please note that it is my standard of practice to NOT participate in prescribing ongoing requested Narcotic Analgesic therapy, and/or participate in the prescribing of other controlled substances.  My nurse and I am happy to assist you with the process of referral for alternative pain management as needed, and other treatment modalities including but not limited to:  Physical Therapy, Physical Medicine and Rehab,  Counseling, Chiropractic Care, Orthopedic Care, and non-narcotic medication management.     In the event that you need to be seen for emergent concerns and I am out of office,  please see one of my colleagues for acute concerns.  You may also present to  or ER.  I appreciate the opportunity to serve you and look forward to supporting your healthcare needs in the future. Please contact me with any questions or concerns that you may have.    Sincerely,      Concha Hare RN, PA-C               Follow-ups after your visit        Your next 10 appointments already scheduled     Nov 28, 2017 10:40 AM CST   (Arrive by 10:25 AM)   Return Visit with A[facility]lia [facility]ell MD   [facility]    [address] [place] [address] [id number] [phone]            May 22, 2018  9:30 AM CDT   (Arrive by 9:15 AM)   Return Visit with Norman Nichole MD   [facility]    [address] [facility] [address]   [phone]              Who to contact     If you have questions or need follow up information about today's clinic visit or your schedule please contact [facility] directly at [phone].  Normal or non-critical lab and imaging results will be communicated to you by AlterGhart, letter or phone within 4 business days after the clinic has received the results. If you do not hear from us within 7 days, please contact the clinic through AlterGhart or phone. If you have a critical or abnormal lab result, we will notify you by phone as soon as possible.  Submit refill requests through Nuzzel or call your pharmacy and they will forward the refill request to us. Please allow 3 business days for your refill to be completed.          Additional Information About Your Visit        AlterGharSantech Information     Nuzzel gives you secure access to your electronic health record. If you see a primary care provider, you can also send messages  to your care team and make appointments. If you have questions, please call your primary care clinic.  If you do not have a primary care provider, please call [phone] and they will assist you.        Care EveryWhere ID     This is your Care EveryWhere ID. This could be used by other organizations to access your [place] medical records  [id number]        Your Vitals Were     Pulse Temperature Pulse Oximetry Breastfeeding? BMI (Body Mass Index)       72 98.7  F (37.1  C) (Tympanic) 98% No 30.38 kg/m2        Blood Pressure from Last 3 Encounters:   09/11/17 112/72   08/29/17 124/60   07/05/17 118/66    Weight from Last 3 Encounters:   09/11/17 194 lb (88 kg)   08/29/17 192 lb (87.1 kg)   07/05/17 193 lb (87.5 kg)              We Performed the Following     Comprehensive metabolic panel (BMP + Alb, Alk Phos, ALT, AST, Total. Bili, TP)     TSH with free T4 reflex     UA reflex to Microscopic and Culture          Today's Medication Changes          These changes are accurate as of: 9/11/17 10:35 AM.  If you have any questions, ask your nurse or doctor.               Start taking these medicines.        Dose/Directions    methylPREDNISolone 4 MG tablet   Commonly known as:  MEDROL DOSEPAK   Used for:  Mixed connective tissue disease (H)   Started by:  Concha Hare PA        Follow package instructions   Quantity:  21 tablet   Refills:  0            Where to get your medicines      These medications were sent to [id number] [address] [place] [address] [address]   [address] , [place] [address]     Phone:  [phone]     methylPREDNISolone 4 MG tablet                Primary Care Provider Office Phone # Fax #    KEILY Fonseca [phone]       [facility] [address]  [place] [address]        Equal Access to Services     RADHA SANCHEZ AH: Hadii hansel ku kadeno Soelizabethali, waaxda luqadaha, qaybta kaalmada adeegyada, waxay clarence okeefe. So [facility]  [phone].    ATENCIÓN: Si suzan le, tiene a meneses disposición servicios gratuitos de asistencia lingüística. Adry solis [phone].    We comply with applicable federal civil rights laws and [place] laws. We do not discriminate on the basis of race, color, national origin, age, disability sex, sexual orientation or gender identity.            Thank you!     Thank you for choosing [facility]  for your care. Our goal is always to provide you with excellent care. Hearing back from our patients is one way we can continue to improve our services. Please take a few minutes to complete the written survey that you may receive in the mail after your visit with us. Thank you!             Your Updated Medication List - Protect others around you: Learn how to safely use, store and throw away your medicines at www.disposemymeds.org.          This list is accurate as of: 9/11/17 10:35 AM.  Always use your most recent med list.                   Brand Name Dispense Instructions for use Diagnosis    buPROPion 200 MG 12 hr tablet    WELLBUTRIN SR    60 tablet    Take 1 tablet (200 mg) by mouth 2 times daily    Major depressive disorder, recurrent episode, moderate (H)       clonazePAM 1 MG tablet   Start taking on:  9/20/2017    klonoPIN    60 tablet    Take 0.5-1 tablets (0.5-1 mg) by mouth 2 times daily as needed for anxiety    SHAKIR (generalized anxiety disorder)       diltiazem 300 MG 24 hr ER beaded capsule    TIAZAC    90 capsule    Take 1 capsule (300 mg) by mouth daily    Cardiac microvascular disease (H)       docusate sodium 100 MG tablet    COLACE    120 tablet    Take 2 a.m. And 2 pm    Chronic pain syndrome       estradiol 0.5 MG tablet    ESTRACE    60 tablet    TAKE ONE TABLET BY MOUTH TWICE DAILY    Postmenopausal HRT (hormone replacement therapy)       hydrOXYzine 25 MG tablet    ATARAX    120 tablet    TAKE ONE TO TWO TABLETS BY MOUTH AT BEDTIME FOR  NAUSEA    Chronic nausea       levothyroxine  50 MCG tablet    SYNTHROID/LEVOTHROID    30 tablet    Take 1 tablet (50 mcg) by mouth daily    Acquired hypothyroidism       methylPREDNISolone 4 MG tablet    MEDROL DOSEPAK    21 tablet    Follow package instructions    Mixed connective tissue disease (H)       metoclopramide 5 MG tablet    REGLAN    60 tablet    Take 1 tablet (5 mg) by mouth 4 times daily as needed    Gastroesophageal reflux disease without esophagitis       NITROSTAT 0.4 MG sublingual tablet   Generic drug:  nitroGLYcerin     25 tablet    Place 1 tablet (0.4 mg) under the tongue every 5 minutes as needed for chest pain    Chest pain, unspecified type       PROCTOSOL HC 2.5 % cream   Generic drug:  hydrocortisone     58 g    APPLY  CREAM TO AFFECTED AREA THREE TIMES DAILY    Hemorrhoid       ranitidine 150 MG tablet    ZANTAC    60 tablet    TAKE ONE TABLET BY MOUTH TWICE DAILY    Gastroesophageal reflux disease without esophagitis       SUMAtriptan 100 MG tablet    IMITREX    10 tablet    TAKE ONE TABLET BY MOUTH AS NEEDED MIGRAINES    Migraine headache       traMADol 50 MG tablet    ULTRAM    180 tablet    TAKE TWO TABLETS BY MOUTH EVERY 8 HOURS AS NEEDED FOR PAIN    Fibromyalgia

## 2017-09-11 NOTE — PATIENT INSTRUCTIONS
Thank you for choosing [facility].   I have office hours 8:00 am to 4:30 pm on Monday's, Wednesday's, Thursday's and Friday's. My nurse and I are out of the office every Tuesday.    Following your visit, when your labs and diagnostic testing have returned, I will review then and you will be contacted by my nurse.  If you are on My Chart, you can also view results there.    For refills, notify your pharmacy regarding what you need and the pharmacy will generate a refill request. Do not call my nurse as she is unable to process refill request. Please plan ahead and allow 3-5 days for refill requests.    You will generally receive a reminder call the day prior to your appointment.  If you cannot attend your appointment, please cancel your appointment with as much notice as possible.  If there is a pattern of failure to present for your appointments, I cannot provide consistent, meaningful, ongoing care for you. It is very important to me that you come in for your care, so we can best assist you with your health care needs.    IMPORTANT:  Please note that it is my standard of practice to NOT participate in prescribing ongoing requested Narcotic Analgesic therapy, and/or participate in the prescribing of other controlled substances.  My nurse and I am happy to assist you with the process of referral for alternative pain management as needed, and other treatment modalities including but not limited to:  Physical Therapy, Physical Medicine and Rehab, Counseling, Chiropractic Care, Orthopedic Care, and non-narcotic medication management.     In the event that you need to be seen for emergent concerns and I am out of office,  please see one of my colleagues for acute concerns.  You may also present to  or ER.  I appreciate the opportunity to serve you and look forward to supporting your healthcare needs in the future. Please contact me with any questions or concerns that you may  have.    Sincerely,      Concha Hare RN, PA-C

## 2017-09-12 ASSESSMENT — ANXIETY QUESTIONNAIRES: GAD7 TOTAL SCORE: 17

## 2017-09-25 ENCOUNTER — MYC MEDICAL ADVICE (OUTPATIENT)
Dept: PSYCHIATRY | Facility: OTHER | Age: 57
End: 2017-09-25

## 2017-10-10 ENCOUNTER — TRANSFERRED RECORDS (OUTPATIENT)
Dept: HEALTH INFORMATION MANAGEMENT | Facility: HOSPITAL | Age: 57
End: 2017-10-10

## 2017-10-11 DIAGNOSIS — M79.7 FIBROMYALGIA: ICD-10-CM

## 2017-10-12 RX ORDER — TRAMADOL HYDROCHLORIDE 50 MG/1
TABLET ORAL
Qty: 180 TABLET | Refills: 0 | Status: SHIPPED | OUTPATIENT
Start: 2017-10-12 | End: 2017-11-27

## 2017-10-12 NOTE — TELEPHONE ENCOUNTER
tramadol      Last Written Prescription Date: 9/1/17  Last Fill Quantity: 180,  # refills: 0   Last Office Visit with [facility], [facility] or [facility] prescribing provider: 9/11/17                                         Next 5 appointments (look out 90 days)     Nov 28, 2017 10:40 AM CST   (Arrive by 10:25 AM)   Return Visit with Aurelia Odell MD   [facility] ([facility] )    [facility] [address] [place] [address] [id number] [phone]

## 2017-10-18 ENCOUNTER — OFFICE VISIT (OUTPATIENT)
Dept: FAMILY MEDICINE | Facility: OTHER | Age: 57
End: 2017-10-18
Attending: PHYSICIAN ASSISTANT
Payer: MEDICARE

## 2017-10-18 ENCOUNTER — RADIANT APPOINTMENT (OUTPATIENT)
Dept: GENERAL RADIOLOGY | Facility: OTHER | Age: 57
End: 2017-10-18
Attending: PHYSICIAN ASSISTANT
Payer: MEDICARE

## 2017-10-18 VITALS
HEIGHT: 67 IN | SYSTOLIC BLOOD PRESSURE: 134 MMHG | WEIGHT: 195 LBS | DIASTOLIC BLOOD PRESSURE: 76 MMHG | BODY MASS INDEX: 30.61 KG/M2 | TEMPERATURE: 97 F | RESPIRATION RATE: 18 BRPM | HEART RATE: 66 BPM | OXYGEN SATURATION: 98 %

## 2017-10-18 DIAGNOSIS — M15.0 PRIMARY OSTEOARTHRITIS INVOLVING MULTIPLE JOINTS: Primary | ICD-10-CM

## 2017-10-18 DIAGNOSIS — Z23 NEED FOR PROPHYLACTIC VACCINATION AND INOCULATION AGAINST INFLUENZA: ICD-10-CM

## 2017-10-18 PROCEDURE — 90686 IIV4 VACC NO PRSV 0.5 ML IM: CPT | Performed by: PHYSICIAN ASSISTANT

## 2017-10-18 PROCEDURE — 90471 IMMUNIZATION ADMIN: CPT | Performed by: PHYSICIAN ASSISTANT

## 2017-10-18 PROCEDURE — 99214 OFFICE O/P EST MOD 30 MIN: CPT | Performed by: PHYSICIAN ASSISTANT

## 2017-10-18 PROCEDURE — 99212 OFFICE O/P EST SF 10 MIN: CPT | Mod: 25

## 2017-10-18 PROCEDURE — 73562 X-RAY EXAM OF KNEE 3: CPT | Mod: TC

## 2017-10-18 RX ORDER — CELECOXIB 100 MG/1
100 CAPSULE ORAL 2 TIMES DAILY
Qty: 60 CAPSULE | Refills: 0 | Status: SHIPPED | OUTPATIENT
Start: 2017-10-18 | End: 2018-01-31

## 2017-10-18 ASSESSMENT — PATIENT HEALTH QUESTIONNAIRE - PHQ9
SUM OF ALL RESPONSES TO PHQ QUESTIONS 1-9: 20
5. POOR APPETITE OR OVEREATING: NEARLY EVERY DAY

## 2017-10-18 ASSESSMENT — ANXIETY QUESTIONNAIRES
7. FEELING AFRAID AS IF SOMETHING AWFUL MIGHT HAPPEN: MORE THAN HALF THE DAYS
3. WORRYING TOO MUCH ABOUT DIFFERENT THINGS: MORE THAN HALF THE DAYS
6. BECOMING EASILY ANNOYED OR IRRITABLE: NEARLY EVERY DAY
IF YOU CHECKED OFF ANY PROBLEMS ON THIS QUESTIONNAIRE, HOW DIFFICULT HAVE THESE PROBLEMS MADE IT FOR YOU TO DO YOUR WORK, TAKE CARE OF THINGS AT HOME, OR GET ALONG WITH OTHER PEOPLE: VERY DIFFICULT
1. FEELING NERVOUS, ANXIOUS, OR ON EDGE: MORE THAN HALF THE DAYS
5. BEING SO RESTLESS THAT IT IS HARD TO SIT STILL: SEVERAL DAYS
2. NOT BEING ABLE TO STOP OR CONTROL WORRYING: MORE THAN HALF THE DAYS
GAD7 TOTAL SCORE: 15

## 2017-10-18 ASSESSMENT — PAIN SCALES - GENERAL: PAINLEVEL: SEVERE PAIN (6)

## 2017-10-18 NOTE — MR AVS SNAPSHOT
After Visit Summary   10/18/2017    Sharlene Mccord    MRN: [id number]           Patient Information     Date Of Birth          1960        Visit Information        Provider Department      10/18/2017 2:45 PM Concha Hare PA [facility]        Today's Diagnoses     Primary osteoarthritis involving multiple joints    -  1      Care Instructions      Thank you for choosing [facility].   I have office hours 8:00 am to 4:30 pm on Monday's, Wednesday's, Thursday's and Friday's. My nurse and I are out of the office every Tuesday.    Following your visit, when your labs and diagnostic testing have returned, I will review then and you will be contacted by my nurse.  If you are on My Chart, you can also view results there.    For refills, notify your pharmacy regarding what you need and the pharmacy will generate a refill request. Do not call my nurse as she is unable to process refill request. Please plan ahead and allow 3-5 days for refill requests.    You will generally receive a reminder call the day prior to your appointment.  If you cannot attend your appointment, please cancel your appointment with as much notice as possible.  If there is a pattern of failure to present for your appointments, I cannot provide consistent, meaningful, ongoing care for you. It is very important to me that you come in for your care, so we can best assist you with your health care needs.    IMPORTANT:  Please note that it is my standard of practice to NOT participate in prescribing ongoing requested Narcotic Analgesic therapy, and/or participate in the prescribing of other controlled substances.  My nurse and I am happy to assist you with the process of referral for alternative pain management as needed, and other treatment modalities including but not limited to:  Physical Therapy, Physical Medicine and Rehab, Counseling, Chiropractic Care, Orthopedic Care, and non-narcotic medication  management.     In the event that you need to be seen for emergent concerns and I am out of office,  please see one of my colleagues for acute concerns.  You may also present to UC or ER.  I appreciate the opportunity to serve you and look forward to supporting your healthcare needs in the future. Please contact me with any questions or concerns that you may have.    Sincerely,      Concha Hare RN, PA-C               Follow-ups after your visit        Additional Services     ORTHOPEDICS ADULT REFERRAL       Your provider has referred you to: ortho for injection.     Please be aware that coverage of these services is subject to the terms and limitations of your health insurance plan.  Call member services at your health plan with any benefit or coverage questions.      Please bring the following to your appointment:    >>   Any x-rays, CTs or MRIs which have been performed.  Contact the facility where they were done to arrange for  prior to your scheduled appointment.    >>   List of current medications   >>   This referral request   >>   Any documents/labs given to you for this referral                  Your next 10 appointments already scheduled     Nov 28, 2017 10:40 AM CST   (Arrive by 10:25 AM)   Return Visit with A[facility] Odell MD   [facility]    [address] [place] [address] [id number] [phone]            May 22, 2018  9:30 AM CDT   (Arrive by 9:15 AM)   Return Visit with Norman Nichole MD   [facility] ([facility] )    [address] [place] [id number]   [phone]              Who to contact     If you have questions or need follow up information about today's clinic visit or your schedule please contact [facility] directly at [phone].  Normal or non-critical lab and imaging results will be communicated to you by MyChart, letter or phone within 4 business days after the clinic  "has received the results. If you do not hear from us within 7 days, please contact the clinic through [facility] or phone. If you have a critical or abnormal lab result, we will notify you by phone as soon as possible.  Submit refill requests through [facility] or call your pharmacy and they will forward the refill request to us. Please allow 3 business days for your refill to be completed.          Additional Information About Your Visit        [employer] Information     [facility] gives you secure access to your electronic health record. If you see a primary care provider, you can also send messages to your care team and make appointments. If you have questions, please call your primary care clinic.  If you do not have a primary care provider, please call [phone] and they will assist you.        Care EveryWhere ID     This is your Care EveryWhere ID. This could be used by other organizations to access your [facility] medical records  [id number]        Your Vitals Were     Pulse Temperature Respirations Height Pulse Oximetry BMI (Body Mass Index)    66 97  F (36.1  C) (Tympanic) 18 5' 7\" (1.702 m) 98% 30.54 kg/m2       Blood Pressure from Last 3 Encounters:   10/18/17 134/76   09/11/17 112/72   08/29/17 124/60    Weight from Last 3 Encounters:   10/18/17 195 lb (88.5 kg)   09/11/17 194 lb (88 kg)   08/29/17 192 lb (87.1 kg)              We Performed the Following     ORTHOPEDICS ADULT REFERRAL     XR Knee Standing Bilateral 3 Views (Clinic Performed)          Today's Medication Changes          These changes are accurate as of: 10/18/17  3:47 PM.  If you have any questions, ask your nurse or doctor.               Start taking these medicines.        Dose/Directions    celecoxib 100 MG capsule   Commonly known as:  celeBREX   Used for:  Primary osteoarthritis involving multiple joints   Started by:  Concha Hare PA        Dose:  100 mg   Take 1 capsule (100 mg) by mouth 2 times daily   Quantity:  60 capsule "   Refills:  0       diclofenac 1 % Gel topical gel   Commonly known as:  VOLTAREN   Used for:  Primary osteoarthritis involving multiple joints   Started by:  Concha Hare PA        Place onto the skin 4 times daily   Quantity:  100 g   Refills:  3            Where to get your medicines      These medications were sent to [id number] [place] - [id number]   [address] , [place] [address]     Phone:  [phone]     celecoxib 100 MG capsule    diclofenac 1 % Gel topical gel                Primary Care Provider Office Phone # Fax #    KEILY Fonseca [phone]       [facility] [address]  [facility] [place] [address]        Equal Access to Services     [facility] : Hadii aad ku hadasho Soomaali, waaxda luqadaha, qaybta kaalmada adeegyada, waxay samanthain haykimn joyce capone . So [facility] [phone].    ATENCIÓN: Si habla español, tiene a meneses disposición servicios gratuitos de asistencia lingüística. Llame al [phone].    We comply with applicable federal civil rights laws and [place] laws. We do not discriminate on the basis of race, color, national origin, age, disability, sex, sexual orientation, or gender identity.            Thank you!     Thank you for choosing [facility]  for your care. Our goal is always to provide you with excellent care. Hearing back from our patients is one way we can continue to improve our services. Please take a few minutes to complete the written survey that you may receive in the mail after your visit with us. Thank you!             Your Updated Medication List - Protect others around you: Learn how to safely use, store and throw away your medicines at www.disposemymeds.org.          This list is accurate as of: 10/18/17  3:47 PM.  Always use your most recent med list.                   Brand Name Dispense Instructions for use Diagnosis    buPROPion 200 MG 12 hr tablet    WELLBUTRIN SR    60 tablet     Take 1 tablet (200 mg) by mouth 2 times daily    Major depressive disorder, recurrent episode, moderate (H)       celecoxib 100 MG capsule    celeBREX    60 capsule    Take 1 capsule (100 mg) by mouth 2 times daily    Primary osteoarthritis involving multiple joints       clonazePAM 1 MG tablet    klonoPIN    60 tablet    Take 0.5-1 tablets (0.5-1 mg) by mouth 2 times daily as needed for anxiety    SHAKIR (generalized anxiety disorder)       diclofenac 1 % Gel topical gel    VOLTAREN    100 g    Place onto the skin 4 times daily    Primary osteoarthritis involving multiple joints       diltiazem 300 MG 24 hr ER beaded capsule    TIAZAC    90 capsule    Take 1 capsule (300 mg) by mouth daily    Cardiac microvascular disease (H)       docusate sodium 100 MG tablet    COLACE    120 tablet    Take 2 a.m. And 2 pm    Chronic pain syndrome       DONEPEZIL HCL PO      Take 5 mg by mouth At Bedtime        estradiol 0.5 MG tablet    ESTRACE    60 tablet    TAKE ONE TABLET BY MOUTH TWICE DAILY    Postmenopausal HRT (hormone replacement therapy)       hydrOXYzine 25 MG tablet    ATARAX    120 tablet    TAKE ONE TO TWO TABLETS BY MOUTH AT BEDTIME FOR  NAUSEA    Chronic nausea       levothyroxine 50 MCG tablet    SYNTHROID/LEVOTHROID    30 tablet    Take 1 tablet (50 mcg) by mouth daily    Acquired hypothyroidism       methylPREDNISolone 4 MG tablet    MEDROL DOSEPAK    21 tablet    Follow package instructions    Mixed connective tissue disease (H)       metoclopramide 5 MG tablet    REGLAN    60 tablet    Take 1 tablet (5 mg) by mouth 4 times daily as needed    Gastroesophageal reflux disease without esophagitis       NITROSTAT 0.4 MG sublingual tablet   Generic drug:  nitroGLYcerin     25 tablet    Place 1 tablet (0.4 mg) under the tongue every 5 minutes as needed for chest pain    Chest pain, unspecified type       PROCTOSOL HC 2.5 % cream   Generic drug:  hydrocortisone     58 g    APPLY  CREAM TO AFFECTED AREA THREE TIMES  DAILY    Hemorrhoid       ranitidine 150 MG tablet    ZANTAC    60 tablet    Take 1 tablet (150 mg) by mouth 2 times daily    Gastroesophageal reflux disease without esophagitis       SUMAtriptan 100 MG tablet    IMITREX    10 tablet    TAKE ONE TABLET BY MOUTH AS NEEDED MIGRAINES    Migraine headache       traMADol 50 MG tablet    ULTRAM    180 tablet    TAKE TWO TABLETS BY MOUTH EVERY 8 HOURS AS NEEDED FOR PAIN    Fibromyalgia

## 2017-10-18 NOTE — PROGRESS NOTES
Injectable Influenza Immunization Documentation    1.  Is the person to be vaccinated sick today?   No    2. Does the person to be vaccinated have an allergy to a component   of the vaccine?   No    3. Has the person to be vaccinated ever had a serious reaction   to influenza vaccine in the past?   No    4. Has the person to be vaccinated ever had Guillain-Barré syndrome?   No    Form completed by Kortney Smalls LPN

## 2017-10-18 NOTE — PROGRESS NOTES
SUBJECTIVE:                                                    Sharlene Mccord is a 57 year old female who presents to clinic today for the following health issues:          Follow up Bone Scan      Duration: bone scan results    Description (location/character/radiation): bone scan    Intensity:  Moderate to severe    Accompanying signs and symptoms: degenerative arthritis of many joints.     History (similar episodes/previous evaluation): seeing Rheumatology.      Precipitating or alleviating factors: not much relief. Starting on Plaquenil.     Therapies tried and outcome: many NSAID'S on Ultram, Prednisone, plaquenil,           Problem list and histories reviewed & adjusted, as indicated.  Additional history: as documented    Patient Active Problem List   Diagnosis     Diverticulitis of sigmoid colon     Hypothyroidism     Anxiety state     Cardiac microvascular disease (H)     Diverticulitis     Aortic valve disorder     ACP (advance care planning)     Chronic pain     Constipation     Memory loss     Major depressive disorder, recurrent episode (H)     Cognitive disorder     Major depressive disorder     Fatigue     Migraine without aura and responsive to treatment     Atypical migraine     Fibromyalgia     Mixed connective tissue disease (H)     Past Surgical History:   Procedure Laterality Date     APPENDECTOMY  1977     BIOPSY  2017    taken at [facility] from Dr. Nadira cohen. Thyroid     CARDIAC SURGERY  2013    angiogram [facility]:  Normal coronary arteries.  Felt ot have some microvascular disease     CL AFF SURGICAL PATHOLOGY  01/02/2007    Thyroid Mass     COLONOSCOPY  1/13/2014    Procedure: COLONOSCOPY;  COLONOSCOPY ;  Surgeon: Chasidy Gee DO;  Location: [facility] OR     ESOPHAGOSCOPY, GASTROSCOPY, DUODENOSCOPY (EGD), COMBINED N/A 2/9/2017    Procedure: COMBINED ESOPHAGOSCOPY, GASTROSCOPY, DUODENOSCOPY (EGD);  Surgeon: Johnathan Ruff DO;  Location: [facility] OR     GYN SURGERY      c-sections x 3     GYN  SURGERY      complete hysterectomy     HYSTERECTOMY  2001     LAPAROSCOPIC CHOLECYSTECTOMY  11/07/2003    Cholelithiasis     LAPAROTOMY EXPLORATORY      abdominal pain/fever     LARYNGOSCOPY       SPLENECTOMY         Social History   Substance Use Topics     Smoking status: Light Tobacco Smoker     Packs/day: 0.50     Years: 40.00     Types: Cigarettes     Smokeless tobacco: Current User      Comment: starts quit plan in march 2017.  Has cut down to one pack a week     Alcohol use No     Family History   Problem Relation Age of Onset     C.A.D. Father      Hypertension Father      C.A.D. Mother      CEREBROVASCULAR DISEASE Mother      CVA     Hypertension Mother      Coronary Artery Disease Mother      DIABETES Paternal Grandmother      DIABETES Paternal Grandfather      DIABETES Sister      Breast Cancer Sister      DIABETES Maternal Grandmother      DIABETES Sister      Breast Cancer Sister      Hypertension Sister      Depression Sister      Anxiety Disorder Sister      Depression Sister      Depression Brother      Depression Daughter      Obesity Daughter      Depression Daughter      MENTAL ILLNESS Daughter      bi-polar1     Anxiety Disorder Daughter      Anxiety Disorder Other          Current Outpatient Prescriptions   Medication Sig Dispense Refill     DONEPEZIL HCL PO Take 5 mg by mouth At Bedtime       traMADol (ULTRAM) 50 MG tablet TAKE TWO TABLETS BY MOUTH EVERY 8 HOURS AS NEEDED FOR PAIN 180 tablet 0     ranitidine (ZANTAC) 150 MG tablet Take 1 tablet (150 mg) by mouth 2 times daily 60 tablet 3     hydrOXYzine (ATARAX) 25 MG tablet TAKE ONE TO TWO TABLETS BY MOUTH AT BEDTIME FOR  NAUSEA 120 tablet 0     clonazePAM (KLONOPIN) 1 MG tablet Take 0.5-1 tablets (0.5-1 mg) by mouth 2 times daily as needed for anxiety 60 tablet 3     buPROPion (WELLBUTRIN SR) 200 MG 12 hr tablet Take 1 tablet (200 mg) by mouth 2 times daily 60 tablet 11     estradiol (ESTRACE) 0.5 MG tablet TAKE ONE TABLET BY MOUTH TWICE  DAILY 60 tablet 11     docusate sodium (COLACE) 100 MG tablet Take 2 a.m. And 2 pm 120 tablet 3     NITROSTAT 0.4 MG sublingual tablet Place 1 tablet (0.4 mg) under the tongue every 5 minutes as needed for chest pain 25 tablet 11     diltiazem (TIAZAC) 300 MG 24 hr ER beaded capsule Take 1 capsule (300 mg) by mouth daily 90 capsule 3     levothyroxine (SYNTHROID/LEVOTHROID) 50 MCG tablet Take 1 tablet (50 mcg) by mouth daily 30 tablet 10     metoclopramide (REGLAN) 5 MG tablet Take 1 tablet (5 mg) by mouth 4 times daily as needed 60 tablet 0     PROCTOSOL HC 2.5 % rectal cream APPLY  CREAM TO AFFECTED AREA THREE TIMES DAILY 58 g 1     SUMAtriptan (IMITREX) 100 MG tablet TAKE ONE TABLET BY MOUTH AS NEEDED MIGRAINES 10 tablet 5     methylPREDNISolone (MEDROL DOSEPAK) 4 MG tablet Follow package instructions (Patient not taking: Reported on 10/18/2017) 21 tablet 0     Allergies   Allergen Reactions     Codeine      Isosorbide Mononitrate Other (See Comments)     Vomiting and headache       Lisinopril      [facility] scan     BP Readings from Last 3 Encounters:   10/18/17 134/76   09/11/17 112/72   08/29/17 124/60    Wt Readings from Last 3 Encounters:   10/18/17 195 lb (88.5 kg)   09/11/17 194 lb (88 kg)   08/29/17 192 lb (87.1 kg)                        ROS:  Constitutional, HEENT, cardiovascular, pulmonary, gi and gu systems are negative, except as otherwise noted.  CONSTITUTIONAL:NEGATIVE for fever, chills, change in weight  INTEGUMENTARY/SKIN: NEGATIVE for worrisome rashes, moles or lesions  EYES: NEGATIVE for vision changes or irritation  RESP:NEGATIVE for significant cough or SOB  CV: NEGATIVE for chest pain, palpitations or peripheral edema  MUSCULOSKELETAL: all joints are sore.   NEURO: NEGATIVE for weakness, dizziness or paresthesias  PSYCHIATRIC:       OBJECTIVE:                                                    /76 (BP Location: Left arm, Patient Position: Sitting, Cuff Size: Adult Regular)  Pulse  "66  Temp 97  F (36.1  C) (Tympanic)  Resp 18  Ht 5' 7\" (1.702 m)  Wt 195 lb (88.5 kg)  SpO2 98%  BMI 30.54 kg/m2  Body mass index is 30.54 kg/(m^2).  GENERAL APPEARANCE: healthy, alert and no distress  EYES: Eyes grossly normal to inspection, PERRL and conjunctivae and sclerae normal  HENT: ear canals and TM's normal and nose and mouth without ulcers or lesions  NECK: no adenopathy, no asymmetry, masses, or scars and thyroid normal to palpation  RESP: lungs clear to auscultation - no rales, rhonchi or wheezes  CV: regular rates and rhythm, normal S1 S2, no S3 or S4 and no murmur, click or rub  LYMPHATICS: normal ant/post cervical and supraclavicular nodes  ABDOMEN: soft, nontender, without hepatosplenomegaly or masses and bowel sounds normal  MS: extremities normal- no gross deformities noted  SKIN: no suspicious lesions or rashes  NEURO: Normal strength and tone, mentation intact and speech normal  PSYCH: mentation appears normal and affect normal/bright    Diagnostic test results:  Diagnostic Test Results:  No results found for this or any previous visit (from the past 24 hour(s)).  Results for orders placed or performed in visit on 09/11/17   Comprehensive metabolic panel (BMP + Alb, Alk Phos, ALT, AST, Total. Bili, TP)   Result Value Ref Range    Sodium 139 133 - 144 mmol/L    Potassium 4.3 3.4 - 5.3 mmol/L    Chloride 110 (H) 94 - 109 mmol/L    Carbon Dioxide 24 20 - 32 mmol/L    Anion Gap 5 3 - 14 mmol/L    Glucose 101 (H) 70 - 99 mg/dL    Urea Nitrogen 8 7 - 30 mg/dL    Creatinine 1.05 (H) 0.52 - 1.04 mg/dL    GFR Estimate 54 (L) >60 mL/min/1.7m2    GFR Estimate If Black 65 >60 mL/min/1.7m2    Calcium 8.8 8.5 - 10.1 mg/dL    Bilirubin Total 0.3 0.2 - 1.3 mg/dL    Albumin 3.6 3.4 - 5.0 g/dL    Protein Total 7.0 6.8 - 8.8 g/dL    Alkaline Phosphatase 84 40 - 150 U/L    ALT 27 0 - 50 U/L    AST 15 0 - 45 U/L   TSH with free T4 reflex   Result Value Ref Range    TSH 1.17 0.40 - 4.00 mU/L   UA reflex to " Microscopic and Culture   Result Value Ref Range    Color Urine Yellow     Appearance Urine Clear     Glucose Urine Negative NEG^Negative mg/dL    Bilirubin Urine Negative NEG^Negative    Ketones Urine Negative NEG^Negative mg/dL    Specific Gravity Urine 1.016 1.003 - 1.035    Blood Urine Negative NEG^Negative    pH Urine 5.5 4.7 - 8.0 pH    Protein Albumin Urine Negative NEG^Negative mg/dL    Urobilinogen mg/dL Normal 0.0 - 2.0 mg/dL    Nitrite Urine Negative NEG^Negative    Leukocyte Esterase Urine Negative NEG^Negative    Source Midstream Urine      Has a positive bone scan for poly articular degenerative disease.        ASSESSMENT/PLAN:                                                    1. Primary osteoarthritis involving multiple joints  She is really miserable.  Has ok Kidneys GFR at 54 and her heart has microvascular disease.  Have been trying to spare her Nsaids due to this.  We will get her to see ortho.  They can inject her knees.  She is worried about this. Working with Rheumatology in [place] right now. We will try to get better pain management with out narcotics.  She will buy some patches and a OTC TENS unit and PT can show her how to use this.   - diclofenac (VOLTAREN) 1 % GEL topical gel; Place onto the skin 4 times daily  Dispense: 100 g; Refill: 3  - celecoxib (CELEBREX) 100 MG capsule; Take 1 capsule (100 mg) by mouth 2 times daily  Dispense: 60 capsule; Refill: 0    35 minutes in visit over 50 % in face to face counseling.   See Patient Instructions    KEILY Cobos  [facility]

## 2017-10-18 NOTE — PATIENT INSTRUCTIONS
Thank you for choosing [facility].   I have office hours 8:00 am to 4:30 pm on Monday's, Wednesday's, Thursday's and Friday's. My nurse and I are out of the office every Tuesday.    Following your visit, when your labs and diagnostic testing have returned, I will review then and you will be contacted by my nurse.  If you are on My Chart, you can also view results there.    For refills, notify your pharmacy regarding what you need and the pharmacy will generate a refill request. Do not call my nurse as she is unable to process refill request. Please plan ahead and allow 3-5 days for refill requests.    You will generally receive a reminder call the day prior to your appointment.  If you cannot attend your appointment, please cancel your appointment with as much notice as possible.  If there is a pattern of failure to present for your appointments, I cannot provide consistent, meaningful, ongoing care for you. It is very important to me that you come in for your care, so we can best assist you with your health care needs.    IMPORTANT:  Please note that it is my standard of practice to NOT participate in prescribing ongoing requested Narcotic Analgesic therapy, and/or participate in the prescribing of other controlled substances.  My nurse and I am happy to assist you with the process of referral for alternative pain management as needed, and other treatment modalities including but not limited to:  Physical Therapy, Physical Medicine and Rehab, Counseling, Chiropractic Care, Orthopedic Care, and non-narcotic medication management.     In the event that you need to be seen for emergent concerns and I am out of office,  please see one of my colleagues for acute concerns.  You may also present to  or ER.  I appreciate the opportunity to serve you and look forward to supporting your healthcare needs in the future. Please contact me with any questions or concerns that you may  have.    Sincerely,      Concha Hare RN, PA-C

## 2017-10-18 NOTE — NURSING NOTE
"Chief Complaint   Patient presents with     RECHECK     results from bone scan       Initial /76 (BP Location: Left arm, Patient Position: Sitting, Cuff Size: Adult Regular)  Pulse 66  Temp 97  F (36.1  C) (Tympanic)  Resp 18  Ht 5' 7\" (1.702 m)  Wt 195 lb (88.5 kg)  SpO2 98%  BMI 30.54 kg/m2 Estimated body mass index is 30.54 kg/(m^2) as calculated from the following:    Height as of this encounter: 5' 7\" (1.702 m).    Weight as of this encounter: 195 lb (88.5 kg).  Medication Reconciliation: complete   Karo Urias MA  "

## 2017-10-19 ASSESSMENT — ANXIETY QUESTIONNAIRES: GAD7 TOTAL SCORE: 15

## 2017-11-07 ENCOUNTER — HOSPITAL ENCOUNTER (OUTPATIENT)
Dept: PHYSICAL THERAPY | Facility: HOSPITAL | Age: 57
Setting detail: THERAPIES SERIES
End: 2017-11-07
Attending: PHYSICIAN ASSISTANT
Payer: MEDICARE

## 2017-11-07 DIAGNOSIS — M15.0 PRIMARY OSTEOARTHRITIS INVOLVING MULTIPLE JOINTS: Primary | ICD-10-CM

## 2017-11-07 PROCEDURE — G8978 MOBILITY CURRENT STATUS: HCPCS | Mod: GP,CK

## 2017-11-07 PROCEDURE — G8980 MOBILITY D/C STATUS: HCPCS | Mod: GP,CK

## 2017-11-07 PROCEDURE — 40000718 ZZHC STATISTIC PT DEPARTMENT ORTHO VISIT

## 2017-11-07 PROCEDURE — G8979 MOBILITY GOAL STATUS: HCPCS | Mod: GP,CK

## 2017-11-07 PROCEDURE — 97163 PT EVAL HIGH COMPLEX 45 MIN: CPT | Mod: GP

## 2017-11-07 NOTE — PROGRESS NOTES
11/07/17 1021   General Information   Type of Visit Initial OP Ortho PT Evaluation   Start of Care Date 11/07/17   Referring Physician KEILY Fonseca   Orders Evaluate and Treat   Date of Order 10/19/17   Insurance Type Blue Cross;Medicare   Medical Diagnosis Primary osteoarthritis multiple joints   Surgical/Medical history reviewed Yes   Precautions/Limitations no known precautions/limitations   Body Part(s)   Body Part(s) Lumbar Spine/SI   Presentation and Etiology   Pertinent history of current problem (include personal factors and/or comorbidities that impact the POC) C/O multiple joint issues, especially involving LB, knees and the tops of her feet. Since she was here last she was diagnosed with osteoarthritis and they are looking for a connective tissue disease. Her every day life has become a challenge. Things like putting on sox, pants, doing dishes, going down steps. Everything is really difficult.    Impairments A. Pain;C. Swelling;E. Decreased flexibility;F. Decreased strength and endurance;J. Burning;M. Locking or catching   Functional Limitations perform activities of daily living   How/Where did it occur From Degenerative Joint Disease   Onset date of current episode/exacerbation 10/07/17   Chronicity Chronic   Pain rating (0-10 point scale) Best (/10);Worst (/10);Other   Best (/10) 1   Worst (/10) 10   Pain rating comment 3/10 right now, really good    Pain quality A. Sharp;B. Dull;C. Aching;D. Burning   Frequency of pain/symptoms A. Constant   Pain/symptoms exacerbated by B. Walking;E. Rest;G. Certain positions;H. Overhead reach;I. Bending;J. ADL;K. Home tasks   Pain/symptoms eased by G. Heat;C. Rest   Current Level of Function   Patient role/employment history G. Disabled   Fall Risk Screen   Fall screen completed by PT   Per patient - Fall 2 or more times in past year? No   Per patient - Fall with injury in past year? No   Is patient a fall risk? No   System Outcome Measures   Outcome  Measures Low Back Pain (see Oswestry and Damon)   Lumbar Spine/SI Objective Findings   Palpation Bilateral forefoot and rearfoot varus. Leg lengths equal, pelvis level and SI jts equally mobile. Sacral, lumbar, thoracic, cervical, cranial and LE segments WFL of mobility and alignment.    Clinical Impression   Criteria for Skilled Therapeutic Interventions Met evaluation only   Clinical Presentation Evolving/Changing   Clinical Presentation Rationale clinical judgement   Clinical Impression Comments Mechanical dysfunction we identified and treated in a prior episode of care are holding. Physical therapy treatment is not recommended at the present time, however patient would likely benefit from having custom cast foot orthotics to control hyperpronation and resulting rotations throughout the lower extremities, pelvis, and spine, that increase wear and tear on all joints.     Barbara Bundy, KENYA    I certify the need for these services furnished under this plan of treatment and while under my care. (Physician co-signature of this document indicates review and certification of the therapy plan).

## 2017-11-10 ENCOUNTER — OFFICE VISIT (OUTPATIENT)
Dept: ORTHOPEDICS | Facility: OTHER | Age: 57
End: 2017-11-10
Attending: PHYSICIAN ASSISTANT
Payer: MEDICARE

## 2017-11-10 VITALS
WEIGHT: 195 LBS | BODY MASS INDEX: 30.54 KG/M2 | DIASTOLIC BLOOD PRESSURE: 70 MMHG | SYSTOLIC BLOOD PRESSURE: 110 MMHG | TEMPERATURE: 97.7 F | OXYGEN SATURATION: 97 % | HEART RATE: 62 BPM

## 2017-11-10 DIAGNOSIS — M17.0 PRIMARY OSTEOARTHRITIS OF BOTH KNEES: Primary | ICD-10-CM

## 2017-11-10 PROCEDURE — 20610 DRAIN/INJ JOINT/BURSA W/O US: CPT | Performed by: ORTHOPAEDIC SURGERY

## 2017-11-10 PROCEDURE — 99203 OFFICE O/P NEW LOW 30 MIN: CPT | Mod: 25 | Performed by: ORTHOPAEDIC SURGERY

## 2017-11-10 PROCEDURE — 99213 OFFICE O/P EST LOW 20 MIN: CPT | Mod: 25

## 2017-11-10 RX ORDER — DEXAMETHASONE SODIUM PHOSPHATE 4 MG/ML
4 INJECTION, SOLUTION INTRA-ARTICULAR; INTRALESIONAL; INTRAMUSCULAR; INTRAVENOUS; SOFT TISSUE ONCE
Qty: 2 ML | Refills: 0 | OUTPATIENT
Start: 2017-11-10 | End: 2017-11-17

## 2017-11-10 ASSESSMENT — PAIN SCALES - GENERAL: PAINLEVEL: MODERATE PAIN (4)

## 2017-11-10 NOTE — MR AVS SNAPSHOT
After Visit Summary   11/10/2017    Sharlene Mccord    MRN: [id number]           Patient Information     Date Of Birth          1960        Visit Information        Provider Department      11/10/2017 1:00 PM Jurgen Crawford MD  ORTH[facility]ED[facility]        Today's Diagnoses     Primary osteoarthritis of both knees    -  1       Follow-ups after your visit        Follow-up notes from your care team     Return in about 3 months (around 2/10/2018).      Your next 10 appointments already scheduled     Nov 17, 2017 10:45 AM CST   (Arrive by 10:30 AM)   Office Visit with KEILY Fonseca   [facility] ([facility] [place] )    [address]  [place] [address]   [phone]            Nov 28, 2017 10:40 AM CST   (Arrive by 10:25 AM)   Return Visit with Aurelia Odell MD   [facility]    [address] [place] [address]  [place] [address]   [phone]            Feb 15, 2018  2:20 PM CST   (Arrive by 2:00 PM)   Return Visit with Jurgen Crawford MD    OR[facility] ([facility] )    [address] [place] [address]  [place] [address]   [phone]            May 22, 2018  9:30 AM CDT   (Arrive by 9:15 AM)   Return Visit with Norman Nichole MD   [facility] ([facility] )    [address] [facility] [place] [address]   [phone]              Who to contact     If you have questions or need follow up information about today's clinic visit or your schedule please contact  ORTHOPEDICS directly at [phone].  Normal or non-critical lab and imaging results will be communicated to you by MyChart, letter or phone within 4 business days after the clinic has received the results. If you do not hear from us within 7 days, please contact the clinic through MyChart or phone. If you have a critical or abnormal lab result, we will notify you by phone as soon as possible.  Submit refill requests  through [employer] or call your pharmacy and they will forward the refill request to us. Please allow 3 business days for your refill to be completed.          Additional Information About Your Visit        [employer] Information     [employer] gives you secure access to your electronic health record. If you see a primary care provider, you can also send messages to your care team and make appointments. If you have questions, please call your primary care clinic.  If you do not have a primary care provider, please call [phone] and they will assist you.        Care EveryWhere ID     This is your Care EveryWhere ID. This could be used by other organizations to access your [facility] medical records  [id number]        Your Vitals Were     Pulse Temperature Pulse Oximetry BMI (Body Mass Index)          62 97.7  F (36.5  C) (Tympanic) 97% 30.54 kg/m2         Blood Pressure from Last 3 Encounters:   11/10/17 110/70   10/18/17 134/76   09/11/17 112/72    Weight from Last 3 Encounters:   11/10/17 195 lb (88.5 kg)   10/18/17 195 lb (88.5 kg)   09/11/17 194 lb (88 kg)              We Performed the Following     DEXAMETHASONE SODIUM PHOS PER 1 MG     Large Joint/Bursa injection and/or drainage (Shoulder, Knee)          Today's Medication Changes          These changes are accurate as of: 11/10/17  1:54 PM.  If you have any questions, ask your nurse or doctor.               Start taking these medicines.        Dose/Directions    dexamethasone 4 MG/ML injection   Commonly known as:  DECADRON   Used for:  Primary osteoarthritis of both knees   Started by:  Jurgen Crawford MD        Dose:  4 mg   Inject 1 mL (4 mg) as directed once for 1 dose Use 4 mg or dose determined by provider for iontophoresis.   Quantity:  2 mL   Refills:  0         Stop taking these medicines if you haven't already. Please contact your care team if you have questions.     docusate sodium 100 MG tablet   Commonly known as:  COLACE   Stopped by:  Ty  Jurgen CARREON MD           methylPREDNISolone 4 MG tablet   Commonly known as:  MEDROL DOSEPAK   Stopped by:  Jurgen Crawford MD                Where to get your medicines      Some of these will need a paper prescription and others can be bought over the counter.  Ask your nurse if you have questions.     You don't need a prescription for these medications     dexamethasone 4 MG/ML injection                Primary Care Provider Office Phone # Fax #    Concha LOTT KEILY Hare [phone]       [facility] [address]  [facility] [address]        Equal Access to Services     [facility]: Hadii aad ku hadasho Soomaali, waaxda luqadaha, qaybta kaalmada adeegyada, waxay samanthain haydouglas capone . So [facility] [phone].    ATENCIÓN: Si habla español, tiene a meneses disposición servicios gratuitos de asistencia lingüística. [facility] [phone].    We comply with applicable federal civil rights laws and [place] laws. We do not discriminate on the basis of race, color, national origin, age, disability, sex, sexual orientation, or gender identity.            Thank you!     Thank you for choosing  ORTHOPEDICS  for your care. Our goal is always to provide you with excellent care. Hearing back from our patients is one way we can continue to improve our services. Please take a few minutes to complete the written survey that you may receive in the mail after your visit with us. Thank you!             Your Updated Medication List - Protect others around you: Learn how to safely use, store and throw away your medicines at www.disposemymeds.org.          This list is accurate as of: 11/10/17  1:54 PM.  Always use your most recent med list.                   Brand Name Dispense Instructions for use Diagnosis    buPROPion 200 MG 12 hr tablet    WELLBUTRIN SR    60 tablet    Take 1 tablet (200 mg) by mouth 2 times daily    Major depressive disorder, recurrent episode, moderate (H)        celecoxib 100 MG capsule    celeBREX    60 capsule    Take 1 capsule (100 mg) by mouth 2 times daily    Primary osteoarthritis involving multiple joints       clonazePAM 1 MG tablet    klonoPIN    60 tablet    Take 0.5-1 tablets (0.5-1 mg) by mouth 2 times daily as needed for anxiety    SHAKIR (generalized anxiety disorder)       dexamethasone 4 MG/ML injection    DECADRON    2 mL    Inject 1 mL (4 mg) as directed once for 1 dose Use 4 mg or dose determined by provider for iontophoresis.    Primary osteoarthritis of both knees       diclofenac 1 % Gel topical gel    VOLTAREN    100 g    Place onto the skin 4 times daily    Primary osteoarthritis involving multiple joints       diltiazem 300 MG 24 hr ER beaded capsule    TIAZAC    90 capsule    Take 1 capsule (300 mg) by mouth daily    Cardiac microvascular disease (H)       DONEPEZIL HCL PO      Take 5 mg by mouth At Bedtime        estradiol 0.5 MG tablet    ESTRACE    60 tablet    TAKE ONE TABLET BY MOUTH TWICE DAILY    Postmenopausal HRT (hormone replacement therapy)       hydrOXYzine 25 MG tablet    ATARAX    120 tablet    TAKE ONE TO TWO TABLETS BY MOUTH AT BEDTIME FOR  NAUSEA    Chronic nausea       levothyroxine 50 MCG tablet    SYNTHROID/LEVOTHROID    30 tablet    Take 1 tablet (50 mcg) by mouth daily    Acquired hypothyroidism       metoclopramide 5 MG tablet    REGLAN    60 tablet    Take 1 tablet (5 mg) by mouth 4 times daily as needed    Gastroesophageal reflux disease without esophagitis       NITROSTAT 0.4 MG sublingual tablet   Generic drug:  nitroGLYcerin     25 tablet    Place 1 tablet (0.4 mg) under the tongue every 5 minutes as needed for chest pain    Chest pain, unspecified type       PROCTOSOL HC 2.5 % cream   Generic drug:  hydrocortisone     58 g    APPLY  CREAM TO AFFECTED AREA THREE TIMES DAILY    Hemorrhoid       ranitidine 150 MG tablet    ZANTAC    60 tablet    Take 1 tablet (150 mg) by mouth 2 times daily    Gastroesophageal reflux disease  without esophagitis       SUMAtriptan 100 MG tablet    IMITREX    10 tablet    TAKE ONE TABLET BY MOUTH AS NEEDED MIGRAINES    Migraine headache       traMADol 50 MG tablet    ULTRAM    180 tablet    TAKE TWO TABLETS BY MOUTH EVERY 8 HOURS AS NEEDED FOR PAIN    Fibromyalgia

## 2017-11-10 NOTE — PROGRESS NOTES
New Patient Visit    Chief Complaint: Bilateral knee pain    Patient Profile: 57-year-old right-handed retired patient of Concha MICHAUD.  She has a history of fibromyalgia, osteoarthritis, and mixed connective tissue disease.  She is FARHEEN positive.  She has been followed by Dr. King (rheumatology) in [place] however that  recently left the area and the patient is scheduled to see a new rheumatologist in the near future.  She has a history of renal insufficiency, She smokes but is not interested in quitting.     History of Present Illness/Injury: Sharlene's bilateral  knee pain began about 7 years ago, insidiously.  The pain is located globally around each knee.  It is intermittent, sharp and dull and burning, and moderate in severity, but worse on the right.    Work up up to now has included a nuclear medicine bone scan performed on 10/10/17 at [facility] in [place].  The report states that there was uptake consistent with polyarticular degenerative arthritis including the knees, ankles, feet and hands.    Treatment up to now is consisted of Celebrex and Plaquenil, both only prescribed recently, with no improvement in her symptoms, yet.  She also uses topical Voltaren gel which she feels helps.  She recently had an evaluation by a physical therapist for her spine.  The therapist felt that therapy had nothing to offer her spine.  This was not a referral for knee pain.    A review of the patient's complete medical/family/social  history, medications, allergies and a two (neurological and musculoskeletal) system review of systems are noncontributory for the presenting problem other than what is listed above. She smokes and is not interested in   quitting.    Examination: Overweight female in no obvious distress.  She is apprehensive today.  The skin around each knee is intact and free of erythema or ecchymosis.  In stance neither knee has a deformity, but she has flexible flat feet.  Neither knee has a shmuel  effusion however the synovium of the right knee feels boggy to palpation.  There is palpable tenderness along both joint lines and in the popliteal fossa of both knees.  Range of motion of the left knee was 0-120 , and that of the right 0-115 .  Both knees are stable to ligamentous stressing, demonstrated patellofemoral crepitus on ranging, and had negative Abigail's maneuvers.  The bilateral hip and ankle exams are unremarkable.  The neurovascular status of both legs was intact.    X-ray; plain films of both knees taken today are unremarkable.  The bone scan taken on 10/10/17 was read as showing polyarticular DJD including the knees.    Labs: On 9/11/17 her BUN was 8, creatinine 1.05, ALT 27, AST 15, and GFR 54.    Impression:   #1.  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  Relative NSAID contraindication: Renal  #3.  Tobacco abuse    Plan: At present she has no surgical indications.  The mainstay of her treatment will be medical through her PCP and her rheumatologist.  Intra-articular steroid injections may also help.  I therefore offered to inject both knees today.  She consented.    Bilateral Procedure: After obtaining written informed consent and sterilely prepping the sites a timeout was held.  Sterile technique was employed as each [default value] was separately injected each with 4 mg of dexamethasone mixed with 3mL of Carbocaine.  Topical ethyl chloride spray was used as a local anesthetic.  The patient tolerated the procedures well and there were no complications.       She'll return in 3 months for recheck.  Future options include repeating the steroid injections or trying Visco supplementation.  Physical therapy may also be of benefit.

## 2017-11-10 NOTE — NURSING NOTE
"Chief Complaint   Patient presents with     Musculoskeletal Problem     Bilateral Knee Pain/ wants injections       Initial /70 (BP Location: Left arm, Patient Position: Sitting, Cuff Size: Adult Large)  Pulse 62  Temp 97.7  F (36.5  C) (Tympanic)  Wt 195 lb (88.5 kg)  SpO2 97%  BMI 30.54 kg/m2 Estimated body mass index is 30.54 kg/(m^2) as calculated from the following:    Height as of 10/18/17: 5' 7\" (1.702 m).    Weight as of this encounter: 195 lb (88.5 kg).  Medication Reconciliation: complete   Stacey Aquino      "

## 2017-11-17 ENCOUNTER — OFFICE VISIT (OUTPATIENT)
Dept: FAMILY MEDICINE | Facility: OTHER | Age: 57
End: 2017-11-17
Attending: PHYSICIAN ASSISTANT
Payer: MEDICARE

## 2017-11-17 VITALS
SYSTOLIC BLOOD PRESSURE: 108 MMHG | OXYGEN SATURATION: 97 % | WEIGHT: 205 LBS | HEART RATE: 64 BPM | DIASTOLIC BLOOD PRESSURE: 64 MMHG | BODY MASS INDEX: 32.11 KG/M2 | TEMPERATURE: 97.2 F

## 2017-11-17 DIAGNOSIS — L72.3 SEBACEOUS CYST: Primary | ICD-10-CM

## 2017-11-17 DIAGNOSIS — D22.9 ATYPICAL NEVUS: ICD-10-CM

## 2017-11-17 PROCEDURE — 99212 OFFICE O/P EST SF 10 MIN: CPT

## 2017-11-17 PROCEDURE — 88305 TISSUE EXAM BY PATHOLOGIST: CPT | Mod: TC | Performed by: PHYSICIAN ASSISTANT

## 2017-11-17 PROCEDURE — 11100 HC BIOPSY SKIN/SUBQ/MUC MEM, SINGLE LESION: CPT | Performed by: PHYSICIAN ASSISTANT

## 2017-11-17 PROCEDURE — 11305 SHAVE SKIN LESION 0.5 CM/<: CPT | Mod: 59 | Performed by: PHYSICIAN ASSISTANT

## 2017-11-17 RX ORDER — HYDROXYCHLOROQUINE SULFATE 200 MG/1
200 TABLET, FILM COATED ORAL 2 TIMES DAILY
Qty: 60 TABLET | Refills: 1 | COMMUNITY
Start: 2017-11-17 | End: 2018-01-31

## 2017-11-17 ASSESSMENT — ANXIETY QUESTIONNAIRES
6. BECOMING EASILY ANNOYED OR IRRITABLE: MORE THAN HALF THE DAYS
4. TROUBLE RELAXING: MORE THAN HALF THE DAYS
2. NOT BEING ABLE TO STOP OR CONTROL WORRYING: SEVERAL DAYS
1. FEELING NERVOUS, ANXIOUS, OR ON EDGE: MORE THAN HALF THE DAYS
3. WORRYING TOO MUCH ABOUT DIFFERENT THINGS: NEARLY EVERY DAY
IF YOU CHECKED OFF ANY PROBLEMS ON THIS QUESTIONNAIRE, HOW DIFFICULT HAVE THESE PROBLEMS MADE IT FOR YOU TO DO YOUR WORK, TAKE CARE OF THINGS AT HOME, OR GET ALONG WITH OTHER PEOPLE: SOMEWHAT DIFFICULT
7. FEELING AFRAID AS IF SOMETHING AWFUL MIGHT HAPPEN: SEVERAL DAYS
GAD7 TOTAL SCORE: 12
5. BEING SO RESTLESS THAT IT IS HARD TO SIT STILL: SEVERAL DAYS

## 2017-11-17 ASSESSMENT — PAIN SCALES - GENERAL: PAINLEVEL: MODERATE PAIN (4)

## 2017-11-17 ASSESSMENT — PATIENT HEALTH QUESTIONNAIRE - PHQ9: SUM OF ALL RESPONSES TO PHQ QUESTIONS 1-9: 19

## 2017-11-17 NOTE — LETTER
My Migraine Action Plan      Date: 11/17/2017     My Name: Sharlene Mccord   YOB: 1960  My Pharmacy: [id number] - [place], [place] - [address]        My (Preventative) Control Medicine: none         My Rescue Medicine: Imitrex    My Doctor: Concha Hare     My Clinic: [facility]  [address]  [place] [address]  [phone]        GREEN ZONE = Good Control    My headache plan is working.   I can do what I need to do.           I WILL:     ? Keep managing my triggers.  ? Write in my migraine diary each time I have a headache.  ? Keep taking my preventive (controller) medicine daily.  ? Take my relief and rescue medicine as needed.             YELLOW ZONE = Not Enough Control    My headache plan isn t always working.   My headaches keep me from doing   some of the things I need to do.       I WILL:     ? Set goals to control my triggers and act on them.  ? Write in my migraine diary each time I have a headache and review it for                      patterns or new triggers.  ? Keep taking my preventive (controller) medicine daily.  ? Take my relief and rescue medicine as needed.  ? Call my doctor or clinic at if I stay in the Yellow Zone.             RED ZONE = Poor or No Control    My headache plan has  failed. I can t do anything  when I have one. My  medicines aren t working.           I WILL:   ? Set goals to control my triggers and act on them.  ? Write in my migraine diary each time I have a headache and review it for                      patterns or new triggers.  ? Keep taking my preventive (controller) medicine daily.  ? Take my relief and rescue medicine as needed.  ? Call my doctor or clinic or go to urgent care or an ER if I m having the worst                  headache of my life.  ? Call my doctor or clinic or go to urgent care or an ER if my medicine doesn t work.  ? Let my doctor or clinic know within 2 weeks if I have gone to an urgent care or              emergency department.          Provider specific instructions:

## 2017-11-17 NOTE — PATIENT INSTRUCTIONS
Wound Closure and Wound Care  What is wound closure?   Wounds heal more quickly and with less risk of infection and scarring when the wound is cleaned and the wound edges are held together (closed). Scrapes, scratches, puncture wounds, and shallow cuts may need only cleaning, ointment, and a bandage. Some cuts may need to be closed with tape strips called Steri-Strips or tissue adhesive liquid (skin glue). If a cut or surgical incision is deep, very long, jagged, or under a lot of tension (such as a cut over a joint), stitches (also called sutures) or staples may be needed to close the wound.   How do I take care of my wound and sutures?   If you get an accidental cut, put pressure on the wound with a gauze pad or clean cloth right away to stop the bleeding. Then gently but thoroughly wash it with soap and cool water. Soapy water can be used around, but not in the cut. Try to remove all dirt and debris but do not scrub vigorously. If you decide to get medical treatment, cover the wound and apply pressure as needed to control bleeding while traveling to your healthcare provider's office, urgent care clinic, or emergency room.   After a wound is closed by your healthcare provider, the wound and the area around it must be kept clean and dry. The care of a stapled wound is similar to the care of a sutured wound. There are minor differences in caring for a wound closed with skin glue.   Do not let a wound closed with stitches or staples get wet for the first 24 hours. After 24 hours, you can shower or you can clean the wound with hydrogen peroxide or gently wash it with soap and warm water twice a day.   If your wound was closed with skin glue, keep the wound dry for the first 4 hours after the skin glue was put on. After the first 4 hours, you may occasionally and briefly wet the wound in the shower. You can clean the wound with hydrogen peroxide or gently wash it with soap and water twice a day.   If  "your wound is closed with Steri-Strips, they may be more likely to separate if they get wet. Keep them dry for the first few days while you're in the shower or bath.   Do not soak or scrub the wound. Do not take a bath, go swimming, or use a hot tub.   If recommended by your healthcare provider, you may put a small amount of antibiotic ointment on the wound each time you clean it. This can prevent infection. It will also help keep bandages from sticking to the wound. If a rash appears, stop using the ointment. If your wound is closed with skin glue, do not put liquid, antibiotic ointment, or any other product on the wound while the adhesive is in place. It may loosen the film before the wound is healed.   Make sure the wound is kept dry between washings. After showering or bathing, gently pat the wound dry with a soft towel.   Your healthcare provider may recommend that you cover the wound with gauze or a new, bandage to keep it from getting dirty. Be sure to keep the bandage dry. Put on a new bandage after cleaning the wound of if the old one gets dirty or wet.   Your healthcare provider may recommend leaving the wound \"open to air\" and not covered by a bandage while you sleep to help speed up the healing process. If the wound was closed with skin glue, you do not need a bandage.   For the first 1 or 2 days keep the area propped up higher than your heart. This will help lessen your pain and any swelling.   Protect the wound from repeat injury until the skin has had time to heal.   Protect the wound from a lot of exposure to sunlight or tanning lamps while skin glue is in place. Wounds exposed to the sun can become red, while scars that have not been exposed to the sun usually turn white after a period of time.   Do not scratch, rub, or pick at your stitches, staples, or skin glue. This may cause them to loosen before the wound is healed.   Avoid activities that will make you sweat a lot until the skin glue has " naturally fallen off or the stitches or staples have been removed.   Any wound can become infected. When you are cleaning your wound, look for these signs of infection:   increased redness   red streaks   increased swelling   increased pain or tenderness   pus or other drainage   warmth in the area of the wound   fever.   Contact your provider if you see any signs of infection.   If your wound was accidental, be sure to ask if a tetanus booster is needed. Treatment of accidental wounds may include taking an oral antibiotic to help prevent infection. Be sure to take the medicine until it is completely gone. Do not stop taking it just because the wound looks like it is healing well.   When are stitches, staples, or other types of wound closures removed?   Steri-Strips are usually left on until they fall off. If they have not fallen off after 2 weeks, they should be removed. Skin glue usually falls off on its own in 5 to 10 days.   For deep cuts the first stitches are placed under the skin. These stitches are made of materials that dissolve and do not need to be removed. Sutures or staples on the surface of the skin need to be removed by your healthcare provider 5 to 14 days after they are put in. The length of time depends on where the cut is. Sutures in wounds on the face usually can be removed after 5 to 7 days. In areas of high stress, such as hands, knees, or elbows, the sutures must stay in 10 to 14 days. Your provider will tell you when you should come to the office for removal of your sutures or staples. Do NOT remove sutures or staples yourself unless your provider instructs you to do so. Staples are removed using a special tool. If you don't have the tool, don't try to remove the staples.   When should I call my healthcare provider?   Some swelling, redness, and pain are common with all wounds and normally go away as the wound heals.   Call your provider right away if:   You start to have any signs or  symptoms of infection. These include:   Your skin is redder or more painful.   You have red streaks from the wound going toward your heart.   The wound area is very warm to touch.   You have pus or other fluid coming from the wound area.   You have a fever higher than 101.5? F (38.6? C).   You have chills, nausea, vomiting, or muscle aches.   The wound seems to be opening up or you notice any drainage.   The wound bleeds for more than 10 minutes.   The stitches or staples are loose.   The skin glue is loosening before it is supposed to.   You have any symptoms that worry you.     Published by [employer].  This content is reviewed periodically and is subject to change as new health information becomes available. The information is intended to inform and educate and is not a replacement for medical evaluation, advice, diagnosis or treatment by a healthcare professional.   Written by Garrett Murphy MD.   ? 2010 [employer] and/or its affiliates. All Rights Reserved.   Copyright   [employer] 2011

## 2017-11-17 NOTE — PROGRESS NOTES
SUBJECTIVE:   Sharlene Mccord is a 57 year old female who presents to clinic today for the following health issues:        Dermatology     Duration: ongoing for years     Description (location/character/radiation): patient states to marks on neck that might be turning into a moles and patient would like to discuss removal today     Intensity:  4/10    Accompanying signs and symptoms: changing in shape and size but not painful     History (similar episodes/previous evaluation): None    Precipitating or alleviating factors: None    Therapies tried and outcome: None             Problem list and histories reviewed & adjusted, as indicated.  Additional history: as documented    Patient Active Problem List   Diagnosis     Diverticulitis of sigmoid colon     Hypothyroidism     Anxiety state     Cardiac microvascular disease (H)     Diverticulitis     Aortic valve disorder     ACP (advance care planning)     Chronic pain     Constipation     Memory loss     Major depressive disorder, recurrent episode (H)     Cognitive disorder     Major depressive disorder     Fatigue     Migraine without aura and responsive to treatment     Atypical migraine     Fibromyalgia     Mixed connective tissue disease (H)     Past Surgical History:   Procedure Laterality Date     APPENDECTOMY  1977     BIOPSY  2017    taken at [facility] from Dr. Nadira cohen. Thyroid     CARDIAC SURGERY  2013    angiogram [facility]:  Normal coronary arteries.  Felt ot have some microvascular disease     CL AFF SURGICAL PATHOLOGY  01/02/2007    Thyroid Mass     COLONOSCOPY  1/13/2014    Procedure: COLONOSCOPY;  COLONOSCOPY ;  Surgeon: Chasidy Gee DO;  Location: [facility] OR     ESOPHAGOSCOPY, GASTROSCOPY, DUODENOSCOPY (EGD), COMBINED N/A 2/9/2017    Procedure: COMBINED ESOPHAGOSCOPY, GASTROSCOPY, DUODENOSCOPY (EGD);  Surgeon: Johnathan Ruff DO;  Location: [facility] OR     GYN SURGERY      c-sections x 3     GYN SURGERY      complete hysterectomy     HYSTERECTOMY  2001      LAPAROSCOPIC CHOLECYSTECTOMY  11/07/2003    Cholelithiasis     LAPAROTOMY EXPLORATORY      abdominal pain/fever     LARYNGOSCOPY       SPLENECTOMY         Social History   Substance Use Topics     Smoking status: Light Tobacco Smoker     Years: 40.00     Types: Cigarettes     Smokeless tobacco: Never Used      Comment: 5 cigs daily     Alcohol use No     Family History   Problem Relation Age of Onset     C.A.D. Father      Hypertension Father      C.A.D. Mother      CEREBROVASCULAR DISEASE Mother      CVA     Hypertension Mother      Coronary Artery Disease Mother      DIABETES Paternal Grandmother      DIABETES Paternal Grandfather      DIABETES Sister      Breast Cancer Sister      DIABETES Maternal Grandmother      DIABETES Sister      Breast Cancer Sister      Hypertension Sister      Depression Sister      Anxiety Disorder Sister      Depression Sister      Depression Brother      Depression Daughter      Obesity Daughter      Depression Daughter      MENTAL ILLNESS Daughter      bi-[id number]     Anxiety Disorder Daughter      Anxiety Disorder Other          Current Outpatient Prescriptions   Medication Sig Dispense Refill     hydroxychloroquine (PLAQUENIL) 200 MG tablet Take 1 tablet (200 mg) by mouth 2 times daily 60 tablet 1     DONEPEZIL HCL PO Take 5 mg by mouth At Bedtime       diclofenac (VOLTAREN) 1 % GEL topical gel Place onto the skin 4 times daily 100 g 3     celecoxib (CELEBREX) 100 MG capsule Take 1 capsule (100 mg) by mouth 2 times daily 60 capsule 0     traMADol (ULTRAM) 50 MG tablet TAKE TWO TABLETS BY MOUTH EVERY 8 HOURS AS NEEDED FOR PAIN 180 tablet 0     ranitidine (ZANTAC) 150 MG tablet Take 1 tablet (150 mg) by mouth 2 times daily 60 tablet 3     hydrOXYzine (ATARAX) 25 MG tablet TAKE ONE TO TWO TABLETS BY MOUTH AT BEDTIME FOR  NAUSEA 120 tablet 0     clonazePAM (KLONOPIN) 1 MG tablet Take 0.5-1 tablets (0.5-1 mg) by mouth 2 times daily as needed for anxiety 60 tablet 3     buPROPion  (WELLBUTRIN SR) 200 MG 12 hr tablet Take 1 tablet (200 mg) by mouth 2 times daily 60 tablet 11     estradiol (ESTRACE) 0.5 MG tablet TAKE ONE TABLET BY MOUTH TWICE DAILY 60 tablet 11     NITROSTAT 0.4 MG sublingual tablet Place 1 tablet (0.4 mg) under the tongue every 5 minutes as needed for chest pain 25 tablet 11     diltiazem (TIAZAC) 300 MG 24 hr ER beaded capsule Take 1 capsule (300 mg) by mouth daily 90 capsule 3     levothyroxine (SYNTHROID/LEVOTHROID) 50 MCG tablet Take 1 tablet (50 mcg) by mouth daily 30 tablet 10     metoclopramide (REGLAN) 5 MG tablet Take 1 tablet (5 mg) by mouth 4 times daily as needed 60 tablet 0     PROCTOSOL HC 2.5 % rectal cream APPLY  CREAM TO AFFECTED AREA THREE TIMES DAILY 58 g 1     SUMAtriptan (IMITREX) 100 MG tablet TAKE ONE TABLET BY MOUTH AS NEEDED MIGRAINES 10 tablet 5     Allergies   Allergen Reactions     Codeine      Isosorbide Mononitrate Other (See Comments)     Vomiting and headache       Lisinopril      [facility] scan     Recent Labs   Lab Test  09/11/17   1043  05/05/17 2025  04/19/17   1133  05/09/16   0930  01/13/16   0941   LDL   --    --   104*  127*  147*   HDL   --    --   77  70  64   TRIG   --    --   165*  204*  164*   ALT  27  38  28  44  63*   CR  1.05*  1.06*  0.97  0.94  0.93   GFRESTIMATED  54*  54*  59*  62  63   GFRESTBLACK  65  65  71  75  76   POTASSIUM  4.3  3.9  4.4  4.1  4.8   TSH  1.17   --   1.24  3.12  2.58      BP Readings from Last 3 Encounters:   11/17/17 108/64   11/10/17 110/70   10/18/17 134/76    Wt Readings from Last 3 Encounters:   11/17/17 205 lb (93 kg)   11/10/17 195 lb (88.5 kg)   10/18/17 195 lb (88.5 kg)              Reviewed and updated as needed this visit [facility]  M[facility]       Reviewed and updated as needed this visit by Provider           Subjective: Sharlene Mccord a 57 year old female who presents today for lesion removal. The lesion(s) is/are located on the neck, number 2 and measures 0.6cm She The  patient reports the lesion is painfull and denies other significant symptoms on ROS. Medications reviewed.    Pause for the cause has been completed prior to the prceedure.   1. Sharlene was identified by both name and date of birth   2. The correct site was identified   3. Site was marked by provider    4. Written informed consent correct and signed or verbal authorization  to proceed was obtained   5. Verifed necessary supplies, equipment, and diagnostics are available    6. Time out was performed immediately prior to procedure    Objective: The lesion(s) is/are of the above mentioned size and location and is/are typical. The area was prepped and appropriately anesthetized. Using the usual technique, punch biopsy size 6 mm and shave excision was performed. An appropriate dressing was applied. The procedure was well tolerated and without complications.     Assessment: intradermal nevus, sebaceous cyst    Plan: Follow up: The patient may return prn.. Wound care instructions provided.  Patient was instructed to be alert for any signs of cutaneous infection.

## 2017-11-17 NOTE — NURSING NOTE
"Chief Complaint   Patient presents with     Derm Problem     lesion removal from neck        Initial /64 (BP Location: Left arm, Patient Position: Chair, Cuff Size: Adult Large)  Pulse 64  Temp 97.2  F (36.2  C) (Tympanic)  Wt 205 lb (93 kg)  SpO2 97%  Breastfeeding? No  BMI 32.11 kg/m2 Estimated body mass index is 32.11 kg/(m^2) as calculated from the following:    Height as of 10/18/17: 5' 7\" (1.702 m).    Weight as of this encounter: 205 lb (93 kg).  Medication Reconciliation: complete   Melina Armas CMA(AAMA)   "

## 2017-11-17 NOTE — MR AVS SNAPSHOT
After Visit Summary   11/17/2017    Sharlene Mccord    MRN: [id number]           Patient Information     Date Of Birth          1960        Visit Information        Provider Department      11/17/2017 10:45 AM Concha Hare PA [facility]        Today's Diagnoses     Sebaceous cyst    -  1    Atypical nevus          Care Instructions                   Wound Closure and Wound Care  What is wound closure?   Wounds heal more quickly and with less risk of infection and scarring when the wound is cleaned and the wound edges are held together (closed). Scrapes, scratches, puncture wounds, and shallow cuts may need only cleaning, ointment, and a bandage. Some cuts may need to be closed with tape strips called Steri-Strips or tissue adhesive liquid (skin glue). If a cut or surgical incision is deep, very long, jagged, or under a lot of tension (such as a cut over a joint), stitches (also called sutures) or staples may be needed to close the wound.   How do I take care of my wound and sutures?   If you get an accidental cut, put pressure on the wound with a gauze pad or clean cloth right away to stop the bleeding. Then gently but thoroughly wash it with soap and cool water. Soapy water can be used around, but not in the cut. Try to remove all dirt and debris but do not scrub vigorously. If you decide to get medical treatment, cover the wound and apply pressure as needed to control bleeding while traveling to your healthcare provider's office, urgent care clinic, or emergency room.   After a wound is closed by your healthcare provider, the wound and the area around it must be kept clean and dry. The care of a stapled wound is similar to the care of a sutured wound. There are minor differences in caring for a wound closed with skin glue.   Do not let a wound closed with stitches or staples get wet for the first 24 hours. After 24 hours, you can shower or you can clean the wound with hydrogen  "peroxide or gently wash it with soap and warm water twice a day.   If your wound was closed with skin glue, keep the wound dry for the first 4 hours after the skin glue was put on. After the first 4 hours, you may occasionally and briefly wet the wound in the shower. You can clean the wound with hydrogen peroxide or gently wash it with soap and water twice a day.   If your wound is closed with Steri-Strips, they may be more likely to separate if they get wet. Keep them dry for the first few days while you're in the shower or bath.   Do not soak or scrub the wound. Do not take a bath, go swimming, or use a hot tub.   If recommended by your healthcare provider, you may put a small amount of antibiotic ointment on the wound each time you clean it. This can prevent infection. It will also help keep bandages from sticking to the wound. If a rash appears, stop using the ointment. If your wound is closed with skin glue, do not put liquid, antibiotic ointment, or any other product on the wound while the adhesive is in place. It may loosen the film before the wound is healed.   Make sure the wound is kept dry between washings. After showering or bathing, gently pat the wound dry with a soft towel.   Your healthcare provider may recommend that you cover the wound with gauze or a new, bandage to keep it from getting dirty. Be sure to keep the bandage dry. Put on a new bandage after cleaning the wound of if the old one gets dirty or wet.   Your healthcare provider may recommend leaving the wound \"open to air\" and not covered by a bandage while you sleep to help speed up the healing process. If the wound was closed with skin glue, you do not need a bandage.   For the first 1 or 2 days keep the area propped up higher than your heart. This will help lessen your pain and any swelling.   Protect the wound from repeat injury until the skin has had time to heal.   Protect the wound from a lot of exposure to sunlight or tanning lamps " while skin glue is in place. Wounds exposed to the sun can become red, while scars that have not been exposed to the sun usually turn white after a period of time.   Do not scratch, rub, or pick at your stitches, staples, or skin glue. This may cause them to loosen before the wound is healed.   Avoid activities that will make you sweat a lot until the skin glue has naturally fallen off or the stitches or staples have been removed.   Any wound can become infected. When you are cleaning your wound, look for these signs of infection:   increased redness   red streaks   increased swelling   increased pain or tenderness   pus or other drainage   warmth in the area of the wound   fever.   Contact your provider if you see any signs of infection.   If your wound was accidental, be sure to ask if a tetanus booster is needed. Treatment of accidental wounds may include taking an oral antibiotic to help prevent infection. Be sure to take the medicine until it is completely gone. Do not stop taking it just because the wound looks like it is healing well.   When are stitches, staples, or other types of wound closures removed?   Steri-Strips are usually left on until they fall off. If they have not fallen off after 2 weeks, they should be removed. Skin glue usually falls off on its own in 5 to 10 days.   For deep cuts the first stitches are placed under the skin. These stitches are made of materials that dissolve and do not need to be removed. Sutures or staples on the surface of the skin need to be removed by your healthcare provider 5 to 14 days after they are put in. The length of time depends on where the cut is. Sutures in wounds on the face usually can be removed after 5 to 7 days. In areas of high stress, such as hands, knees, or elbows, the sutures must stay in 10 to 14 days. Your provider will tell you when you should come to the office for removal of your sutures or staples. Do NOT remove sutures or staples yourself  unless your provider instructs you to do so. Staples are removed using a special tool. If you don't have the tool, don't try to remove the staples.   When should I call my healthcare provider?   Some swelling, redness, and pain are common with all wounds and normally go away as the wound heals.   Call your provider right away if:   You start to have any signs or symptoms of infection. These include:   Your skin is redder or more painful.   You have red streaks from the wound going toward your heart.   The wound area is very warm to touch.   You have pus or other fluid coming from the wound area.   You have a fever higher than 101.5? F (38.6? C).   You have chills, nausea, vomiting, or muscle aches.   The wound seems to be opening up or you notice any drainage.   The wound bleeds for more than 10 minutes.   The stitches or staples are loose.   The skin glue is loosening before it is supposed to.   You have any symptoms that worry you.     Published by [employer].  This content is reviewed periodically and is subject to change as new health information becomes available. The information is intended to inform and educate and is not a replacement for medical evaluation, advice, diagnosis or treatment by a healthcare professional.   Written by Garrett Murphy MD.   ? 2010 [employer] and/or its affiliates. All Rights Reserved.   Copyright   [employer] 2011                Follow-ups after your visit        Follow-up notes from your care team     Return in about 10 days (around 11/27/2017) for suture removal.      Your next 10 appointments already scheduled     Nov 28, 2017 10:40 AM CST   (Arrive by 10:25 AM)   [facility]   [facility] )    [address] [place] [address]   [phone]            Feb 15, 2018  2:20 PM CST   (Arrive by 2:00 PM)   Return Visit with Jurgen Crawford MD    [facility] ([facility] [place] )    [address]   [place] [id number] [phone]            May 22, 2018  9:30 AM CDT   (Arrive by 9:15 AM)   Return Visit with Norman Nihcole MD   [facility] )    [address] [place] [address]   [phone]              Who to contact     If you have questions or need follow up information about today's clinic visit or your schedule please contact [facility] directly at [phone].  Normal or non-critical lab and imaging results will be communicated to you by MyChart, letter or phone within 4 business days after the clinic has received the results. If you do not hear from us within 7 days, please contact the clinic through [employer] or phone. If you have a critical or abnormal lab result, we will notify you by phone as soon as possible.  Submit refill requests through [employer] or call your pharmacy and they will forward the refill request to us. Please allow 3 business days for your refill to be completed.          Additional Information About Your Visit        MyChart Information     [employer] gives you secure access to your electronic health record. If you see a primary care provider, you can also send messages to your care team and make appointments. If you have questions, please call your primary care clinic.  If you do not have a primary care provider, please call [phone] and they will assist you.        Care EveryWhere ID     This is your Care EveryWhere ID. This could be used by other organizations to access your [facility] medical records  [id number]        Your Vitals Were     Pulse Temperature Pulse Oximetry Breastfeeding? BMI (Body Mass Index)       64 97.2  F (36.2  C) (Tympanic) 97% No 32.11 kg/m2        Blood Pressure from Last 3 Encounters:   11/17/17 108/64   11/10/17 110/70   10/18/17 134/76    Weight from Last 3 Encounters:   11/17/17 205 lb (93 kg)   11/10/17 195 lb (88.5 kg)   10/18/17 195 lb  (88.5 kg)              We Performed the Following     scalp, neck, hands, feet, genitalia, other     Single Lesion        Primary Care Provider Office Phone # Fax #    KEILY Fonseca [phone]       [facility] [address]  [facility] [address]        Equal Access to Services     [facility] : Hadii aad ku hadasho Soomaali, waaxda luqadaha, qaybta kaalmada adeegyada, waxay idiin hayaan adeeg kharash la'kimn . So [facility] [phone].    ATENCIÓN: Si habla español, tiene a meneses disposición servicios gratuitos de asistencia lingüística. Adry al [phone].    We comply with applicable federal civil rights laws and [place] laws. We do not discriminate on the basis of race, color, national origin, age, disability, sex, sexual orientation, or gender identity.            Thank you!     Thank you for choosing [facility]  for your care. Our goal is always to provide you with excellent care. Hearing back from our patients is one way we can continue to improve our services. Please take a few minutes to complete the written survey that you may receive in the mail after your visit with us. Thank you!             Your Updated Medication List - Protect others around you: Learn how to safely use, store and throw away your medicines at www.disposemymeds.org.          This list is accurate as of: 11/17/17 12:20 PM.  Always use your most recent med list.                   Brand Name Dispense Instructions for use Diagnosis    buPROPion 200 MG 12 hr tablet    WELLBUTRIN SR    60 tablet    Take 1 tablet (200 mg) by mouth 2 times daily    Major depressive disorder, recurrent episode, moderate (H)       celecoxib 100 MG capsule    celeBREX    60 capsule    Take 1 capsule (100 mg) by mouth 2 times daily    Primary osteoarthritis involving multiple joints       clonazePAM 1 MG tablet    klonoPIN    60 tablet    Take 0.5-1 tablets (0.5-1 mg) by mouth 2 times daily as needed for anxiety     SHAKIR (generalized anxiety disorder)       diclofenac 1 % Gel topical gel    VOLTAREN    100 g    Place onto the skin 4 times daily    Primary osteoarthritis involving multiple joints       diltiazem 300 MG 24 hr ER beaded capsule    TIAZAC    90 capsule    Take 1 capsule (300 mg) by mouth daily    Cardiac microvascular disease (H)       DONEPEZIL HCL PO      Take 5 mg by mouth At Bedtime        estradiol 0.5 MG tablet    ESTRACE    60 tablet    TAKE ONE TABLET BY MOUTH TWICE DAILY    Postmenopausal HRT (hormone replacement therapy)       hydrOXYzine 25 MG tablet    ATARAX    120 tablet    TAKE ONE TO TWO TABLETS BY MOUTH AT BEDTIME FOR  NAUSEA    Chronic nausea       levothyroxine 50 MCG tablet    SYNTHROID/LEVOTHROID    30 tablet    Take 1 tablet (50 mcg) by mouth daily    Acquired hypothyroidism       metoclopramide 5 MG tablet    REGLAN    60 tablet    Take 1 tablet (5 mg) by mouth 4 times daily as needed    Gastroesophageal reflux disease without esophagitis       NITROSTAT 0.4 MG sublingual tablet   Generic drug:  nitroGLYcerin     25 tablet    Place 1 tablet (0.4 mg) under the tongue every 5 minutes as needed for chest pain    Chest pain, unspecified type       PLAQUENIL 200 MG tablet   Generic drug:  hydroxychloroquine     60 tablet    Take 1 tablet (200 mg) by mouth 2 times daily        PROCTOSOL HC 2.5 % cream   Generic drug:  hydrocortisone     58 g    APPLY  CREAM TO AFFECTED AREA THREE TIMES DAILY    Hemorrhoid       ranitidine 150 MG tablet    ZANTAC    60 tablet    Take 1 tablet (150 mg) by mouth 2 times daily    Gastroesophageal reflux disease without esophagitis       SUMAtriptan 100 MG tablet    IMITREX    10 tablet    TAKE ONE TABLET BY MOUTH AS NEEDED MIGRAINES    Migraine headache       traMADol 50 MG tablet    ULTRAM    180 tablet    TAKE TWO TABLETS BY MOUTH EVERY 8 HOURS AS NEEDED FOR PAIN    Fibromyalgia

## 2017-11-18 ASSESSMENT — ANXIETY QUESTIONNAIRES: GAD7 TOTAL SCORE: 12

## 2017-11-21 LAB — COPATH REPORT: NORMAL

## 2017-11-27 ENCOUNTER — ALLIED HEALTH/NURSE VISIT (OUTPATIENT)
Dept: FAMILY MEDICINE | Facility: OTHER | Age: 57
End: 2017-11-27
Attending: PHYSICIAN ASSISTANT
Payer: MEDICARE

## 2017-11-27 DIAGNOSIS — Z48.02 VISIT FOR SUTURE REMOVAL: Primary | ICD-10-CM

## 2017-11-27 DIAGNOSIS — M79.7 FIBROMYALGIA: ICD-10-CM

## 2017-11-27 NOTE — MR AVS SNAPSHOT
After Visit Summary   11/27/2017    Sharlene Mccord    MRN: [id number]           Patient Information     Date Of Birth          1960        Visit Information        Provider Department      11/27/2017 10:30 AM HC FP NURSE [facility]        Today's Diagnoses     Visit for suture removal    -  1       Follow-ups after your visit        Your next 10 appointments already scheduled     Nov 28, 2017 10:40 AM CST   (Arrive by 10:25 AM)   Return Visit with Aurelia Odell MD   [facility] ([facility] )    [address] [place] [address]   [phone]            Feb 15, 2018  2:20 PM CST   (Arrive by 2:00 PM)   Return Visit with Jurgen Crawford MD    ORTHOPEDICS ([facility] )    [address] [place] [address]   [phone]            May 22, 2018  9:30 AM CDT   (Arrive by 9:15 AM)   Return Visit with Norman Nichole MD   [facility] )    [address] [facility] [address]   [phone]              Who to contact     If you have questions or need follow up information about today's clinic visit or your schedule please contact [facility] directly at [phone].  Normal or non-critical lab and imaging results will be communicated to you by MyChart, letter or phone within 4 business days after the clinic has received the results. If you do not hear from us within 7 days, please contact the clinic through MyChart or phone. If you have a critical or abnormal lab result, we will notify you by phone as soon as possible.  Submit refill requests through [employer] or call your pharmacy and they will forward the refill request to us. Please allow 3 business days for your refill to be completed.          Additional Information About Your Visit        MyChart Information     [employer] gives you secure access to your electronic health record. If you see a primary  care provider, you can also send messages to your care team and make appointments. If you have questions, please call your primary care clinic.  If you do not have a primary care provider, please call [phone] and they will assist you.        Care EveryWhere ID     This is your Care EveryWhere ID. This could be used by other organizations to access your [place] medical records  [id number]         Blood Pressure from Last 3 Encounters:   11/17/17 108/64   11/10/17 110/70   10/18/17 134/76    Weight from Last 3 Encounters:   11/17/17 205 lb (93 kg)   11/10/17 195 lb (88.5 kg)   10/18/17 195 lb (88.5 kg)              Today, you had the following     No orders found for display       Primary Care Provider Office Phone # Fax #    KEILY Fonseca [phone]       [facility] [place] [address]  [place] [address]        Equal Access to Services     [facility] : Hadii aad ku hadasho Soomaali, waaxda luqadaha, qaybta kaalmada adeegyada, waxay idiin hayaan adeeg colinaraalfreda donahue'douglas . So [facility] [phone].    ATENCIÓN: Si habla español, tiene a meneses disposición servicios gratuitos de asistencia lingüística. Llame al [phone].    We comply with applicable federal civil rights laws and [place] laws. We do not discriminate on the basis of race, color, national origin, age, disability, sex, sexual orientation, or gender identity.            Thank you!     Thank you for choosing [facility] [place]  for your care. Our goal is always to provide you with excellent care. Hearing back from our patients is one way we can continue to improve our services. Please take a few minutes to complete the written survey that you may receive in the mail after your visit with us. Thank you!             Your Updated Medication List - Protect others around you: Learn how to safely use, store and throw away your medicines at www.disposemymeds.org.          This list is accurate as of: 11/27/17 10:54  AM.  Always use your most recent med list.                   Brand Name Dispense Instructions for use Diagnosis    buPROPion 200 MG 12 hr tablet    WELLBUTRIN SR    60 tablet    Take 1 tablet (200 mg) by mouth 2 times daily    Major depressive disorder, recurrent episode, moderate (H)       celecoxib 100 MG capsule    celeBREX    60 capsule    Take 1 capsule (100 mg) by mouth 2 times daily    Primary osteoarthritis involving multiple joints       clonazePAM 1 MG tablet    klonoPIN    60 tablet    Take 0.5-1 tablets (0.5-1 mg) by mouth 2 times daily as needed for anxiety    SHAKIR (generalized anxiety disorder)       diclofenac 1 % Gel topical gel    VOLTAREN    100 g    Place onto the skin 4 times daily    Primary osteoarthritis involving multiple joints       diltiazem 300 MG 24 hr ER beaded capsule    TIAZAC    90 capsule    Take 1 capsule (300 mg) by mouth daily    Cardiac microvascular disease (H)       DONEPEZIL HCL PO      Take 5 mg by mouth At Bedtime        estradiol 0.5 MG tablet    ESTRACE    60 tablet    TAKE ONE TABLET BY MOUTH TWICE DAILY    Postmenopausal HRT (hormone replacement therapy)       hydrOXYzine 25 MG tablet    ATARAX    120 tablet    TAKE ONE TO TWO TABLETS BY MOUTH AT BEDTIME FOR  NAUSEA    Chronic nausea       levothyroxine 50 MCG tablet    SYNTHROID/LEVOTHROID    30 tablet    Take 1 tablet (50 mcg) by mouth daily    Acquired hypothyroidism       metoclopramide 5 MG tablet    REGLAN    60 tablet    Take 1 tablet (5 mg) by mouth 4 times daily as needed    Gastroesophageal reflux disease without esophagitis       NITROSTAT 0.4 MG sublingual tablet   Generic drug:  nitroGLYcerin     25 tablet    Place 1 tablet (0.4 mg) under the tongue every 5 minutes as needed for chest pain    Chest pain, unspecified type       PLAQUENIL 200 MG tablet   Generic drug:  hydroxychloroquine     60 tablet    Take 1 tablet (200 mg) by mouth 2 times daily        PROCTOSOL HC 2.5 % cream   Generic drug:   hydrocortisone     58 g    APPLY  CREAM TO AFFECTED AREA THREE TIMES DAILY    Hemorrhoid       ranitidine 150 MG tablet    ZANTAC    60 tablet    Take 1 tablet (150 mg) by mouth 2 times daily    Gastroesophageal reflux disease without esophagitis       SUMAtriptan 100 MG tablet    IMITREX    10 tablet    TAKE ONE TABLET BY MOUTH AS NEEDED MIGRAINES    Migraine headache       traMADol 50 MG tablet    ULTRAM    180 tablet    TAKE TWO TABLETS BY MOUTH EVERY 8 HOURS AS NEEDED FOR PAIN    Fibromyalgia

## 2017-11-28 ENCOUNTER — OFFICE VISIT (OUTPATIENT)
Dept: PSYCHIATRY | Facility: OTHER | Age: 57
End: 2017-11-28
Attending: PSYCHIATRY & NEUROLOGY
Payer: COMMERCIAL

## 2017-11-28 VITALS
BODY MASS INDEX: 32.11 KG/M2 | DIASTOLIC BLOOD PRESSURE: 70 MMHG | WEIGHT: 205 LBS | TEMPERATURE: 97.3 F | HEART RATE: 67 BPM | SYSTOLIC BLOOD PRESSURE: 128 MMHG

## 2017-11-28 DIAGNOSIS — F33.1 MAJOR DEPRESSIVE DISORDER, RECURRENT EPISODE, MODERATE (H): Primary | ICD-10-CM

## 2017-11-28 PROCEDURE — 99213 OFFICE O/P EST LOW 20 MIN: CPT | Performed by: PSYCHIATRY & NEUROLOGY

## 2017-11-28 PROCEDURE — 99212 OFFICE O/P EST SF 10 MIN: CPT

## 2017-11-28 ASSESSMENT — PAIN SCALES - GENERAL: PAINLEVEL: MODERATE PAIN (4)

## 2017-11-28 ASSESSMENT — ANXIETY QUESTIONNAIRES
IF YOU CHECKED OFF ANY PROBLEMS ON THIS QUESTIONNAIRE, HOW DIFFICULT HAVE THESE PROBLEMS MADE IT FOR YOU TO DO YOUR WORK, TAKE CARE OF THINGS AT HOME, OR GET ALONG WITH OTHER PEOPLE: VERY DIFFICULT
3. WORRYING TOO MUCH ABOUT DIFFERENT THINGS: MORE THAN HALF THE DAYS
5. BEING SO RESTLESS THAT IT IS HARD TO SIT STILL: SEVERAL DAYS
1. FEELING NERVOUS, ANXIOUS, OR ON EDGE: NEARLY EVERY DAY
6. BECOMING EASILY ANNOYED OR IRRITABLE: NEARLY EVERY DAY
2. NOT BEING ABLE TO STOP OR CONTROL WORRYING: MORE THAN HALF THE DAYS
GAD7 TOTAL SCORE: 14
7. FEELING AFRAID AS IF SOMETHING AWFUL MIGHT HAPPEN: SEVERAL DAYS

## 2017-11-28 ASSESSMENT — PATIENT HEALTH QUESTIONNAIRE - PHQ9
5. POOR APPETITE OR OVEREATING: MORE THAN HALF THE DAYS
SUM OF ALL RESPONSES TO PHQ QUESTIONS 1-9: 19

## 2017-11-28 NOTE — MR AVS SNAPSHOT
After Visit Summary   11/28/2017    Sharlene Mccord    MRN: [id number]           Patient Information     Date Of Birth          1960        Visit Information        Provider Department      11/28/2017 10:40 AM Aurelia Odell MD [facility]         Follow-ups after your visit        Your next 10 appointments already scheduled     Feb 15, 2018  2:20 PM CST   (Arrive by 2:00 PM)   Return Visit with Jurgen Crawford MD    ORTHOPEDICS ([facility] )    [address] [facility] [place] [address] [phone]            May 22, 2018  9:30 AM CDT   (Arrive by 9:15 AM)   Return Visit with Norman Nichole MD   [facility] [place] ([facility] )    [address]  [facility] [address] [phone]              Who to contact     If you have questions or need follow up information about today's clinic visit or your schedule please contact [facility] directly at [phone].  Normal or non-critical lab and imaging results will be communicated to you by Zentrichart, letter or phone within 4 business days after the clinic has received the results. If you do not hear from us within 7 days, please contact the clinic through Zentrichart or phone. If you have a critical or abnormal lab result, we will notify you by phone as soon as possible.  Submit refill requests through Philtro or call your pharmacy and they will forward the refill request to us. Please allow 3 business days for your refill to be completed.          Additional Information About Your Visit        Zentrichart Information     Philtro gives you secure access to your electronic health record. If you see a primary care provider, you can also send messages to your care team and make appointments. If you have questions, please call your primary care clinic.  If you do not have a primary care provider, please call [phone] and they will assist you.        Care EveryWhere ID     This  is your Care EveryWhere ID. This could be used by other organizations to access your [place] medical records  [id number]        Your Vitals Were     Pulse Temperature BMI (Body Mass Index)             67 97.3  F (36.3  C) (Tympanic) 32.11 kg/m2          Blood Pressure from Last 3 Encounters:   11/28/17 128/70   11/17/17 108/64   11/10/17 110/70    Weight from Last 3 Encounters:   11/28/17 205 lb (93 kg)   11/17/17 205 lb (93 kg)   11/10/17 195 lb (88.5 kg)              Today, you had the following     No orders found for display       Primary Care Provider Office Phone # Fax #    KEILY Fonseca [phone]       [facility] [address] [facility] [address]  [facility] [address]        Equal Access to Services     RADHA SANCHEZ : Hadii aad ku hadasho Soomaali, waaxda luqadaha, qaybta kaalmada adeegyada, victor m singhin hayaan joyce capone . So [facility] [phone].    ATENCIÓN: Si habla español, tiene a meneses disposición servicios gratuitos de asistencia lingüística. Adry al [phone].    We comply with applicable federal civil rights laws and [place] laws. We do not discriminate on the basis of race, color, national origin, age, disability, sex, sexual orientation, or gender identity.            Thank you!     Thank you for choosing [facility]  for your care. Our goal is always to provide you with excellent care. Hearing back from our patients is one way we can continue to improve our services. Please take a few minutes to complete the written survey that you may receive in the mail after your visit with us. Thank you!             Your Updated Medication List - Protect others around you: Learn how to safely use, store and throw away your medicines at www.disposemymeds.org.          This list is accurate as of: 11/28/17 10:57 AM.  Always use your most recent med list.                   Brand Name Dispense Instructions for use Diagnosis    buPROPion 200 MG 12 hr tablet     WELLBUTRIN SR    60 tablet    Take 1 tablet (200 mg) by mouth 2 times daily    Major depressive disorder, recurrent episode, moderate (H)       celecoxib 100 MG capsule    celeBREX    60 capsule    Take 1 capsule (100 mg) by mouth 2 times daily    Primary osteoarthritis involving multiple joints       clonazePAM 1 MG tablet    klonoPIN    60 tablet    Take 0.5-1 tablets (0.5-1 mg) by mouth 2 times daily as needed for anxiety    SHAKIR (generalized anxiety disorder)       diclofenac 1 % Gel topical gel    VOLTAREN    100 g    Place onto the skin 4 times daily    Primary osteoarthritis involving multiple joints       diltiazem 300 MG 24 hr ER beaded capsule    TIAZAC    90 capsule    Take 1 capsule (300 mg) by mouth daily    Cardiac microvascular disease (H)       DONEPEZIL HCL PO      Take 5 mg by mouth At Bedtime        estradiol 0.5 MG tablet    ESTRACE    60 tablet    TAKE ONE TABLET BY MOUTH TWICE DAILY    Postmenopausal HRT (hormone replacement therapy)       hydrOXYzine 25 MG tablet    ATARAX    120 tablet    TAKE ONE TO TWO TABLETS BY MOUTH AT BEDTIME FOR  NAUSEA    Chronic nausea       levothyroxine 50 MCG tablet    SYNTHROID/LEVOTHROID    30 tablet    Take 1 tablet (50 mcg) by mouth daily    Acquired hypothyroidism       metoclopramide 5 MG tablet    REGLAN    60 tablet    Take 1 tablet (5 mg) by mouth 4 times daily as needed    Gastroesophageal reflux disease without esophagitis       NITROSTAT 0.4 MG sublingual tablet   Generic drug:  nitroGLYcerin     25 tablet    Place 1 tablet (0.4 mg) under the tongue every 5 minutes as needed for chest pain    Chest pain, unspecified type       PLAQUENIL 200 MG tablet   Generic drug:  hydroxychloroquine     60 tablet    Take 1 tablet (200 mg) by mouth 2 times daily        PROCTOSOL HC 2.5 % cream   Generic drug:  hydrocortisone     58 g    APPLY  CREAM TO AFFECTED AREA THREE TIMES DAILY    Hemorrhoid       ranitidine 150 MG tablet    ZANTAC    60 tablet    Take 1 tablet  (150 mg) by mouth 2 times daily    Gastroesophageal reflux disease without esophagitis       SUMAtriptan 100 MG tablet    IMITREX    10 tablet    TAKE ONE TABLET BY MOUTH AS NEEDED MIGRAINES    Migraine headache       traMADol 50 MG tablet    ULTRAM    180 tablet    TAKE TWO TABLETS BY MOUTH EVERY 8 HOURS AS NEEDED FOR PAIN    Fibromyalgia

## 2017-11-28 NOTE — PROGRESS NOTES
"  PSYCHIATRY CLINIC PROGRESS NOTE   20 minutes, med management  more than 50% of time spent counseling patient on medications, medication side effects, symptom history and management   SUBJECTIVE / INTERIM HISTORY                                                                       Last clinic visit 8/29/17: Considering donepezil.. Continue Wellbutrin 200 mg bid. Continue Klonopin 1 mg bid. Continue trazodone 50 mg HS.   - on donepezil. Thinks has some days she feels more sharp. Has felt more \"edgy\" but not sure if it is necessarily the med...   - Dec 20th with Dr. Lopez  - still wishes could work but realizes there's no way she could physically or mentally \"reality slaps me in the face\". Even housework she is down for hours after -- pain  - tried cut down on meds including trazodone but then sleep got bad  - endoscopy: hernia on med that helps  - no longer developed tremor head and neck past several months  - rheumatology consult: (autoimmune tests some pos tested recently) and sounds like no definitive dx made  - relationship issues. Of note 's two sons older 20s and other 31 yo still live at home. One of the boys bought house next door.   - memory: did notice it got little better once off gabapentin. Still is an issue though. Still forgets / repeats herself though  - gets embarrassed in conversations: forgets words, what she is going to say,etc    SUBSTANCE USE- no issues    SYMPTOMS-  Lots of worry and anxiety lately given increase in stressors. Depressed mood, low energy, anhedonia, feelings guilt & overwhelmed, poor sleep. cognitive issues including word finding  MEDICAL ROS- nausea nd vomiting is better, with moving / exercise leg \"heaviness\" and pain, had tremor for while but resolved, balance issues, Hypersomnia.  Pain: neck, DDD in neck. Numbness and tingling in legs and feet worse at times when sitting  MEDICAL / SURGICAL HISTORY                 Medical Team:     PMD- Concha Hare     " Therapist-none   Patient Active Problem List   Diagnosis     Diverticulitis of sigmoid colon     Hypothyroidism     Anxiety state     Cardiac microvascular disease (H)     Diverticulitis     Aortic valve disorder     ACP (advance care planning)     Chronic pain     Constipation     Memory loss     Major depressive disorder, recurrent episode (H)     Cognitive disorder     Major depressive disorder     Fatigue     Migraine without aura and responsive to treatment     Atypical migraine     Fibromyalgia     Mixed connective tissue disease (H)     ALLERGY   Codeine; Isosorbide mononitrate; and Lisinopril  MEDICATIONS                                                                                             Current Outpatient Prescriptions   Medication Sig     hydroxychloroquine (PLAQUENIL) 200 MG tablet Take 1 tablet (200 mg) by mouth 2 times daily     DONEPEZIL HCL PO Take 5 mg by mouth At Bedtime     diclofenac (VOLTAREN) 1 % GEL topical gel Place onto the skin 4 times daily     celecoxib (CELEBREX) 100 MG capsule Take 1 capsule (100 mg) by mouth 2 times daily     traMADol (ULTRAM) 50 MG tablet TAKE TWO TABLETS BY MOUTH EVERY 8 HOURS AS NEEDED FOR PAIN     ranitidine (ZANTAC) 150 MG tablet Take 1 tablet (150 mg) by mouth 2 times daily     hydrOXYzine (ATARAX) 25 MG tablet TAKE ONE TO TWO TABLETS BY MOUTH AT BEDTIME FOR  NAUSEA     clonazePAM (KLONOPIN) 1 MG tablet Take 0.5-1 tablets (0.5-1 mg) by mouth 2 times daily as needed for anxiety     buPROPion (WELLBUTRIN SR) 200 MG 12 hr tablet Take 1 tablet (200 mg) by mouth 2 times daily     estradiol (ESTRACE) 0.5 MG tablet TAKE ONE TABLET BY MOUTH TWICE DAILY     NITROSTAT 0.4 MG sublingual tablet Place 1 tablet (0.4 mg) under the tongue every 5 minutes as needed for chest pain     diltiazem (TIAZAC) 300 MG 24 hr ER beaded capsule Take 1 capsule (300 mg) by mouth daily     levothyroxine (SYNTHROID/LEVOTHROID) 50 MCG tablet Take 1 tablet (50 mcg) by mouth daily      metoclopramide (REGLAN) 5 MG tablet Take 1 tablet (5 mg) by mouth 4 times daily as needed     PROCTOSOL HC 2.5 % rectal cream APPLY  CREAM TO AFFECTED AREA THREE TIMES DAILY     SUMAtriptan (IMITREX) 100 MG tablet TAKE ONE TABLET BY MOUTH AS NEEDED MIGRAINES     No current facility-administered medications for this visit.        VITALS   /70 (BP Location: Right arm, Patient Position: Sitting, Cuff Size: Adult Regular)  Pulse 67  Temp 97.3  F (36.3  C) (Tympanic)  Wt 205 lb (93 kg)  BMI 32.11 kg/m2    PHQ9                       PHQ-9 SCORE 10/18/2017 11/17/2017 11/28/2017   Total Score - - -   Total Score 20 19 19     Labs:    Liver Function Studies -   Recent Labs   Lab Test  01/13/16   0941   PROTTOTAL  7.5   ALBUMIN  3.7   BILITOTAL  0.3   ALKPHOS  92   AST  32   ALT  63*     Recent Labs   Lab Test  01/13/16   0941  03/28/14   1008   CHOL  244*  238*   HDL  64  81*   LDL  147*  143*   TRIG  164*  72   CHOLHDLRATIO   --   2.9*       MENTAL STATUS EXAM                                                                                        Alert. Oriented to person, place, and date / time. Well groomed, calm, cooperative with good eye contact. No problems with speech or psychomotor behavior. Mood was anxious and depressed  and affect was congruent to speech content and restricted.f Thought process, including associations, was unremarkable and thought content was devoid of suicidal and homicidal ideation and psychotic thought. No hallucinations. Insight was good. Judgment was intact and adequate for safety. Fund of knowledge was intact. Pt demonstrates no obvious problems with attention, concentration, language, recent or remote memory although these were not formally tested.     ASSESSMENT                                                                                                      HISTORICAL:  Initial psych consult 12/12/14            Notes:  Ritalin as adjunct to antidepressant: didn't like how made  her feel.   Prozac, Celexa ineffective. Now Wellbutrin helping. Zoloft: weight gain. Effexor back while: didn't stay on long and can't recall why. Cymbalta: was on and didn't help. She tried Provigil and SEs. Buspar: HAs.   Neuropsych testing summer 2015:  language: about same, working memory: better than last year. Attention / concentration worse than last year. Speed processing: improved. Executive function: didn't test well on that front also comparable to last year. Memory: worse off than last year. Depression: similar to last year. Anxiety / emotional anxiety worse. As part of assessment they think should possible have another MRI  CURRENT: This patient provides a history which supports the diagnoses of Major depressive disorder, recurrent, mod and mild neurocognitive disorder. We cross-tapered to Wellbutrin. We have been discussing trying a stimulant as adjunct to antidepressant but wasn't helpful and made her feel mentally off so  d/c. WE tried buspar for anxiety but had HAs. Will continue Wellbutrin 200 mg bid and Klonopin for anxiety.     Cannot take Aleve, Motrin, ASA thus Tramadol. We discussed combination of benzos and opoioids and the guidelines. In terms of risks vs. Benefits Sharlene and I do NOT feel would be good idea to take her off Klonopin. We feel she would get to point she would be unable to get out of her house.    She started on donepezil: not sure yet how much helping      TREATMENT RISK STATEMENT: The risks, benefits, alternatives and potential adverse effects have been explained and are understood by the pt. The pt agrees to the treatment plan with the ability to do so. The pt knows to call the clinic for any problems or access emergency care if needed.    DIAGNOSES      (Use of Axes system will continue, although absent from DSM 5)           Axis I - Major depressive disorder, recurrent severe without psychotic features  Mild neurocognitive disorder  Axis II - no dx  Axis III - lumbago,  fibromyalgia, hx migraines, hx diverticultitis  Axis IV- Psychosocial Stressors include: finances, marital strain  Axis V - Global Assessment of Functioning current: 41- 50 Serious symptoms OR any serious impairment in social, occupational, or school functioning.    PLAN                                                                                                                         1) MEDICATIONS:   -- continue donepezil, Continue Wellbutrin 200 mg bid. Continue Klonopin 1 mg bid. Continue trazodone 50 mg HS.    2) THERAPY: no change.     3) LABS: none today    4) Other: Neurology provider Dr. Andre through [facility]. Neuropsych testing with Dr. Johnathan Min    8)  RTC: ~4-6 weeks    Pharmacist: [phone]  Customer service [phone]

## 2017-11-29 RX ORDER — TRAMADOL HYDROCHLORIDE 50 MG/1
TABLET ORAL
Qty: 180 TABLET | Refills: 0 | Status: SHIPPED | OUTPATIENT
Start: 2017-11-29 | End: 2018-01-03

## 2017-11-29 ASSESSMENT — ANXIETY QUESTIONNAIRES: GAD7 TOTAL SCORE: 14

## 2017-12-26 DIAGNOSIS — K21.9 GASTROESOPHAGEAL REFLUX DISEASE WITHOUT ESOPHAGITIS: ICD-10-CM

## 2017-12-27 RX ORDER — METOCLOPRAMIDE 5 MG/1
TABLET ORAL
Qty: 60 TABLET | Refills: 2 | Status: SHIPPED | OUTPATIENT
Start: 2017-12-27 | End: 2019-05-08

## 2017-12-27 NOTE — TELEPHONE ENCOUNTER
reglan  Last Written Prescription Date: 2/15/17  Last Fill Quantity: 60,  # refills: 0   Last Office Visit with G, [facility] or [facility] prescribing provider: 11/17/17                                         Next 5 appointments (look out 90 days)     Jan 31, 2018  9:40 AM CST   (Arrive by 9:25 AM)   Return Visit with Aurelia Odell MD   [facility] ([facility] )    [address] [place] [address] [phone]            Feb 15, 2018  2:20 PM CST   (Arrive by 2:00 PM)   Return Visit with Jurgen Crawford MD    ORTHOPEDICS ([facility] )    [address] [place] [address] [phone]

## 2018-01-03 DIAGNOSIS — M79.7 FIBROMYALGIA: ICD-10-CM

## 2018-01-03 RX ORDER — TRAMADOL HYDROCHLORIDE 50 MG/1
TABLET ORAL
Qty: 180 TABLET | Refills: 0 | Status: SHIPPED | OUTPATIENT
Start: 2018-01-03 | End: 2018-03-07

## 2018-01-03 NOTE — TELEPHONE ENCOUNTER
Controlled Substance Refill Request for Ultram  Problem List Complete:  Yes    Last Written Prescription Date:  11/29/17  Last Fill Quantity: 180,   # refills: 0    Last Office Visit with [facility] primary care provider: 11/17/17    Clinic visit frequency required: not notes     Future Office visit:   Next 5 appointments (look out 90 days)     Jan 08, 2018 10:30 AM CST   (Arrive by 10:15 AM)   Short Office Visit - My Chart with KEILY Fonseca   [facility] [place] ([facility] [place] )    [address] [place] [address] [place]  [place] [address]   [phone]            Jan 31, 2018  9:40 AM CST   (Arrive by 9:25 AM)   Return Visit with Aurelia Odell MD   [facility] [place] ([facility] [place] )    [address] [place] [address]  [place] [id number] [phone]                  Controlled substance agreement on file: No.     Processing:  [facility] [place]

## 2018-01-12 ENCOUNTER — OFFICE VISIT (OUTPATIENT)
Dept: FAMILY MEDICINE | Facility: OTHER | Age: 58
End: 2018-01-12
Attending: PHYSICIAN ASSISTANT
Payer: COMMERCIAL

## 2018-01-12 VITALS
DIASTOLIC BLOOD PRESSURE: 64 MMHG | SYSTOLIC BLOOD PRESSURE: 106 MMHG | BODY MASS INDEX: 33.91 KG/M2 | HEART RATE: 64 BPM | TEMPERATURE: 97.9 F | RESPIRATION RATE: 16 BRPM | WEIGHT: 211 LBS | HEIGHT: 66 IN

## 2018-01-12 DIAGNOSIS — E03.9 ACQUIRED HYPOTHYROIDISM: ICD-10-CM

## 2018-01-12 DIAGNOSIS — M35.1 MIXED CONNECTIVE TISSUE DISEASE (H): ICD-10-CM

## 2018-01-12 DIAGNOSIS — K21.00 GASTROESOPHAGEAL REFLUX DISEASE WITH ESOPHAGITIS: Primary | ICD-10-CM

## 2018-01-12 DIAGNOSIS — G89.4 CHRONIC PAIN SYNDROME: ICD-10-CM

## 2018-01-12 LAB — TSH SERPL DL<=0.005 MIU/L-ACNC: 1.24 MU/L (ref 0.4–4)

## 2018-01-12 PROCEDURE — 84443 ASSAY THYROID STIM HORMONE: CPT | Mod: ZL | Performed by: PHYSICIAN ASSISTANT

## 2018-01-12 PROCEDURE — 36415 COLL VENOUS BLD VENIPUNCTURE: CPT | Mod: ZL | Performed by: PHYSICIAN ASSISTANT

## 2018-01-12 PROCEDURE — G0463 HOSPITAL OUTPT CLINIC VISIT: HCPCS

## 2018-01-12 PROCEDURE — 99214 OFFICE O/P EST MOD 30 MIN: CPT | Performed by: PHYSICIAN ASSISTANT

## 2018-01-12 RX ORDER — PANTOPRAZOLE SODIUM 20 MG/1
TABLET, DELAYED RELEASE ORAL
Qty: 30 TABLET | Refills: 3 | Status: SHIPPED | OUTPATIENT
Start: 2018-01-12 | End: 2020-01-23

## 2018-01-12 ASSESSMENT — PATIENT HEALTH QUESTIONNAIRE - PHQ9
SUM OF ALL RESPONSES TO PHQ QUESTIONS 1-9: 11
5. POOR APPETITE OR OVEREATING: SEVERAL DAYS

## 2018-01-12 ASSESSMENT — ANXIETY QUESTIONNAIRES
2. NOT BEING ABLE TO STOP OR CONTROL WORRYING: SEVERAL DAYS
IF YOU CHECKED OFF ANY PROBLEMS ON THIS QUESTIONNAIRE, HOW DIFFICULT HAVE THESE PROBLEMS MADE IT FOR YOU TO DO YOUR WORK, TAKE CARE OF THINGS AT HOME, OR GET ALONG WITH OTHER PEOPLE: SOMEWHAT DIFFICULT
7. FEELING AFRAID AS IF SOMETHING AWFUL MIGHT HAPPEN: SEVERAL DAYS
3. WORRYING TOO MUCH ABOUT DIFFERENT THINGS: MORE THAN HALF THE DAYS
GAD7 TOTAL SCORE: 9
1. FEELING NERVOUS, ANXIOUS, OR ON EDGE: MORE THAN HALF THE DAYS
5. BEING SO RESTLESS THAT IT IS HARD TO SIT STILL: SEVERAL DAYS
6. BECOMING EASILY ANNOYED OR IRRITABLE: SEVERAL DAYS

## 2018-01-12 ASSESSMENT — PAIN SCALES - GENERAL: PAINLEVEL: MILD PAIN (3)

## 2018-01-12 NOTE — PROGRESS NOTES
SUBJECTIVE:   Sharlene Mccord is a 57 year old female who presents to clinic today for the following health issues:      Chronic Pain Follow-Up/ Fibromyalgia     Type / Location of Pain: across back into legs and neck  Analgesia/pain control:       Recent changes:  improved      Overall control: Comfortably manageable  Activity level/function:      Daily activities:  Able to do light housework, cooking, Able to do moderate activities and Can do most things most days, with some rest    Work:  not applicable and on disability  Adverse effects:  No  Adherance    Taking medication as directed?  Yes    Participating in other treatments: praveen   Risk Factors:    Sleep:  Good    Mood/anxiety:  improved    Recent family or social stressors:  Family stress    Other aggravating factors: over doing it, stairs   PHQ-9 SCORE 10/18/2017 11/17/2017 11/28/2017   Total Score - - -   Total Score 20 19 19     SHAKIR-7 SCORE 10/18/2017 11/17/2017 11/28/2017   Total Score 15 12 14     Encounter-Level CSA - 06/05/2017:          Controlled Substance Agreement - Scan on 6/6/2017 12:39 PM : CONTROLLED SUBSTANCE AGREEMENT (below)                  Amount of exercise or physical activity: None    Problems taking medications regularly: No    Medication side effects: none    Diet: regular (no restrictions)    GERD/Heartburn      Duration: couple years    Description (location/character/radiation): burning and sourness up into throat    Intensity:  moderate, getting worse    Accompanying signs and symptoms:  food getting stuck: YES  nausea/vomiting/blood: no   abdominal pain: no   black/tarry or bloody stools: no :    History (similar episodes/previous evaluation): had testing done last May. Has sliding hernia    Precipitating or alleviating factors:  worse with spicy foods and water, caffine.  current NSAID/Aspirin use: no     Therapies tried and outcome: Zantac (Ranitidine) and Reglan, Omeprazole              Problem list and histories reviewed  & adjusted, as indicated.  Additional history: as documented    Patient Active Problem List   Diagnosis     Diverticulitis of sigmoid colon     Hypothyroidism     Anxiety state     Cardiac microvascular disease (H)     Diverticulitis     Aortic valve disorder     ACP (advance care planning)     Chronic pain     Constipation     Memory loss     Major depressive disorder, recurrent episode (H)     Cognitive disorder     Major depressive disorder     Fatigue     Migraine without aura and responsive to treatment     Atypical migraine     Fibromyalgia     Mixed connective tissue disease (H)     Past Surgical History:   Procedure Laterality Date     APPENDECTOMY  1977     BIOPSY  2017    taken at [facility] from Dr. Nadira cohen. Thyroid     CARDIAC SURGERY  2013    angiogram [facility]:  Normal coronary arteries.  Felt ot have some microvascular disease     CL AFF SURGICAL PATHOLOGY  01/02/2007    Thyroid Mass     COLONOSCOPY  1/13/2014    Procedure: COLONOSCOPY;  COLONOSCOPY ;  Surgeon: Chasidy Gee DO;  Location: [facility]     ESOPHAGOSCOPY, GASTROSCOPY, DUODENOSCOPY (EGD), COMBINED N/A 2/9/2017    Procedure: COMBINED ESOPHAGOSCOPY, GASTROSCOPY, DUODENOSCOPY (EGD);  Surgeon: Johnathan Ruff DO;  Location: [facility] OR     GYN SURGERY      c-sections x 3     GYN SURGERY      complete hysterectomy     HYSTERECTOMY  2001     LAPAROSCOPIC CHOLECYSTECTOMY  11/07/2003    Cholelithiasis     LAPAROTOMY EXPLORATORY      abdominal pain/fever     LARYNGOSCOPY       SPLENECTOMY         Social History   Substance Use Topics     Smoking status: Light Tobacco Smoker     Packs/day: 0.25     Years: 40.00     Types: Cigarettes     Start date: 1/1/1975     Smokeless tobacco: Never Used      Comment: 5 cigs daily     Alcohol use No     Family History   Problem Relation Age of Onset     C.A.D. Father      Hypertension Father      C.A.D. Mother      CEREBROVASCULAR DISEASE Mother      CVA     Hypertension Mother      Coronary Artery Disease Mother       DIABETES Paternal Grandmother      DIABETES Paternal Grandfather      DIABETES Sister      Breast Cancer Sister      DIABETES Maternal Grandmother      DIABETES Sister      Breast Cancer Sister      Hypertension Sister      Depression Sister      Anxiety Disorder Sister      Depression Sister      Depression Brother      Depression Daughter      Obesity Daughter      Depression Daughter      MENTAL ILLNESS Daughter      bi-polar1     Anxiety Disorder Daughter      Anxiety Disorder Other          Current Outpatient Prescriptions   Medication Sig Dispense Refill     UNABLE TO FIND MEDICATION NAME: Cannabis as needed       pantoprazole (PROTONIX) 20 MG EC tablet Take by mouth 30-60 minutes before a meal. 30 tablet 3     traMADol (ULTRAM) 50 MG tablet TAKE TWO TABLETS BY MOUTH EVERY 8 HOURS AS NEEDED FOR PAIN 180 tablet 0     metoclopramide (REGLAN) 5 MG tablet TAKE ONE TABLET BY MOUTH 4 TIMES DAILY BEFORE MEAL(S) AT BEDTIME AS NEEDED 60 tablet 2     DONEPEZIL HCL PO Take 5 mg by mouth At Bedtime       diclofenac (VOLTAREN) 1 % GEL topical gel Place onto the skin 4 times daily 100 g 3     celecoxib (CELEBREX) 100 MG capsule Take 1 capsule (100 mg) by mouth 2 times daily 60 capsule 0     ranitidine (ZANTAC) 150 MG tablet Take 1 tablet (150 mg) by mouth 2 times daily 60 tablet 3     hydrOXYzine (ATARAX) 25 MG tablet TAKE ONE TO TWO TABLETS BY MOUTH AT BEDTIME FOR  NAUSEA 120 tablet 0     clonazePAM (KLONOPIN) 1 MG tablet Take 0.5-1 tablets (0.5-1 mg) by mouth 2 times daily as needed for anxiety 60 tablet 3     buPROPion (WELLBUTRIN SR) 200 MG 12 hr tablet Take 1 tablet (200 mg) by mouth 2 times daily 60 tablet 11     estradiol (ESTRACE) 0.5 MG tablet TAKE ONE TABLET BY MOUTH TWICE DAILY 60 tablet 11     NITROSTAT 0.4 MG sublingual tablet Place 1 tablet (0.4 mg) under the tongue every 5 minutes as needed for chest pain 25 tablet 11     diltiazem (TIAZAC) 300 MG 24 hr ER beaded capsule Take 1 capsule (300 mg) by mouth  "daily 90 capsule 3     levothyroxine (SYNTHROID/LEVOTHROID) 50 MCG tablet Take 1 tablet (50 mcg) by mouth daily 30 tablet 10     PROCTOSOL HC 2.5 % rectal cream APPLY  CREAM TO AFFECTED AREA THREE TIMES DAILY 58 g 1     SUMAtriptan (IMITREX) 100 MG tablet TAKE ONE TABLET BY MOUTH AS NEEDED MIGRAINES 10 tablet 5     hydroxychloroquine (PLAQUENIL) 200 MG tablet Take 1 tablet (200 mg) by mouth 2 times daily 60 tablet 1     Allergies   Allergen Reactions     Codeine      Isosorbide Mononitrate Other (See Comments)     Vomiting and headache       Lisinopril      [facility] scan     Recent Labs   Lab Test  09/11/17   1043  05/05/17   2025  04/19/17   1133  05/09/16   0930  01/13/16   0941   LDL   --    --   104*  127*  147*   HDL   --    --   77  70  64   TRIG   --    --   165*  204*  164*   ALT  27  38  28  44  63*   CR  1.05*  1.06*  0.97  0.94  0.93   GFRESTIMATED  54*  54*  59*  62  63   GFRESTBLACK  65  65  71  75  76   POTASSIUM  4.3  3.9  4.4  4.1  4.8   TSH  1.17   --   1.24  3.12  2.58      BP Readings from Last 3 Encounters:   01/12/18 106/64   11/28/17 128/70   11/17/17 108/64    Wt Readings from Last 3 Encounters:   01/12/18 211 lb (95.7 kg)   11/28/17 205 lb (93 kg)   11/17/17 205 lb (93 kg)                          Reviewed and updated as needed this visit by clinical staff     Reviewed and updated as needed this visit by Provider         ROS:  Constitutional, neuro, ENT, endocrine, pulmonary, cardiac, gastrointestinal, genitourinary, musculoskeletal, integument and psychiatric systems are negative, except as otherwise noted.      OBJECTIVE:                                                    /64 (BP Location: Left arm, Patient Position: Sitting, Cuff Size: Adult Regular)  Pulse 64  Temp 97.9  F (36.6  C) (Tympanic)  Resp 16  Ht 5' 5.5\" (1.664 m)  Wt 211 lb (95.7 kg)  BMI 34.58 kg/m2  Body mass index is 34.58 kg/(m^2).  GENERAL APPEARANCE: healthy, alert and no distress  EYES: Eyes grossly " normal to inspection, PERRL and conjunctivae and sclerae normal  HENT: ear canals and TM's normal and nose and mouth without ulcers or lesions  NECK: no adenopathy, no asymmetry, masses, or scars and thyroid normal to palpation  RESP: lungs clear to auscultation - no rales, rhonchi or wheezes  CV: regular rates and rhythm, normal S1 S2, no S3 or S4 and no murmur, click or rub  LYMPHATICS: normal ant/post cervical and supraclavicular nodes  ABDOMEN: soft, nontender, without hepatosplenomegaly or masses and bowel sounds normal  MS: extremities normal- no gross deformities noted  SKIN: no suspicious lesions or rashes  NEURO: Normal strength and tone, mentation intact and speech normal  PSYCH: mentation appears normal and affect normal/bright. Her sleeping is much better. Mood is better in out look.  Less stress on her face.  The type of pain is better and tolerable.   Is gotten her license.     Diagnostic test results:  Diagnostic Test Results:  Results for orders placed or performed in visit on 01/12/18   TSH with free T4 reflex   Result Value Ref Range    TSH 1.24 0.40 - 4.00 mU/L        ASSESSMENT/PLAN:                                                    1. Gastroesophageal reflux disease with esophagitis  We spoke to her about long term use nd risk for loss of calcium absorption.  She is very into her fitness if she can be.   Recommended to increase calcium and limit her trigger foods and use weight loss and exercise.   Let us know if nor doin better.     - pantoprazole (PROTONIX) 20 MG EC tablet; Take by mouth 30-60 minutes before a meal.  Dispense: 30 tablet; Refill: 3    2. 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  She feels better.  Pain is controlled better.   Up to 1.5 mg and Rheumatology is also also is working on her pain.     3. Mixed connective tissue disease (H)  Seeing rheumatology.  Working on her diagnosis.  Right now not specific but they are sure she has inflammatory  arthritis.     4. Acquired hypothyroidism  Labs re in line. Recheck in 6 months.   - TSH with free T4 reflex        See Patient Instructions    KEILY Cobos  [facility]

## 2018-01-12 NOTE — MR AVS SNAPSHOT
After Visit Summary   1/12/2018    Sharlene Mccord    MRN: [id number]           Patient Information     Date Of Birth          1960        Visit Information        Provider Department      1/12/2018 9:45 AM Concha Hare PA [facility]        Today's Diagnoses     Gastroesophageal reflux disease with esophagitis    -  1    Chronic pain syndrome        Mixed connective tissue disease (H)        Acquired hypothyroidism          Care Instructions      Thank you for choosing [facility].   I have office hours 8:00 am to 4:30 pm on Monday's, Wednesday's, Thursday's and Friday's. My nurse and I are out of the office every Tuesday.    Following your visit, when your labs and diagnostic testing have returned, I will review then and you will be contacted by my nurse.  If you are on My Chart, you can also view results there.    For refills, notify your pharmacy regarding what you need and the pharmacy will generate a refill request. Do not call my nurse as she is unable to process refill request. Please plan ahead and allow 3-5 days for refill requests.    You will generally receive a reminder call the day prior to your appointment.  If you cannot attend your appointment, please cancel your appointment with as much notice as possible.  If there is a pattern of failure to present for your appointments, I cannot provide consistent, meaningful, ongoing care for you. It is very important to me that you come in for your care, so we can best assist you with your health care needs.    IMPORTANT:  Please note that it is my standard of practice to NOT participate in prescribing ongoing requested Narcotic Analgesic therapy, and/or participate in the prescribing of other controlled substances.  My nurse and I am happy to assist you with the process of referral for alternative pain management as needed, and other treatment modalities including but not limited to:  Physical Therapy, Physical  Medicine and Rehab, Counseling, Chiropractic Care, Orthopedic Care, and non-narcotic medication management.     In the event that you need to be seen for emergent concerns and I am out of office,  please see one of my colleagues for acute concerns.  You may also present to  or ER.  I appreciate the opportunity to serve you and look forward to supporting your healthcare needs in the future. Please contact me with any questions or concerns that you may have.    Sincerely,      Concha Hare RN, PA-C               Follow-ups after your visit        Follow-up notes from your care team     Return in about 3 months (around 4/12/2018).      Your next 10 appointments already scheduled     Jan 31, 2018  9:40 AM CST   (Arrive by 9:25 AM)   Return Visit with Aurelia Odell MD   [facility] ([facility] )    [address] [place] [address] [id number] [phone]            May 22, 2018  9:30 AM CDT   (Arrive by 9:15 AM)   Return Visit with Norman Nichole MD   [facility] ([facility] )    [address] [place] [address]   [phone]              Who to contact     If you have questions or need follow up information about today's clinic visit or your schedule please contact [facility] directly at [phone].  Normal or non-critical lab and imaging results will be communicated to you by MyChart, letter or phone within 4 business days after the clinic has received the results. If you do not hear from us within 7 days, please contact the clinic through [employer] or phone. If you have a critical or abnormal lab result, we will notify you by phone as soon as possible.  Submit refill requests through [email or web address] or call your pharmacy and they will forward the refill request to us. Please allow 3 business days for your refill to be completed.          Additional Information About Your Visit        MyChart Information     [email or web address] gives you  "secure access to your electronic health record. If you see a primary care provider, you can also send messages to your care team and make appointments. If you have questions, please call your primary care clinic.  If you do not have a primary care provider, please call [phone] and they will assist you.        Care EveryWhere ID     This is your Care EveryWhere ID. This could be used by other organizations to access your [place] medical records  [id number]        Your Vitals Were     Pulse Temperature Respirations Height BMI (Body Mass Index)       64 97.9  F (36.6  C) (Tympanic) 16 5' 5.5\" (1.664 m) 34.58 kg/m2        Blood Pressure from Last 3 Encounters:   01/12/18 106/64   11/28/17 128/70   11/17/17 108/64    Weight from Last 3 Encounters:   01/12/18 211 lb (95.7 kg)   11/28/17 205 lb (93 kg)   11/17/17 205 lb (93 kg)              We Performed the Following     TSH with free T4 reflex          Today's Medication Changes          These changes are accurate as of: 1/12/18 11:24 AM.  If you have any questions, ask your nurse or doctor.               Start taking these medicines.        Dose/Directions    pantoprazole 20 MG EC tablet   Commonly known as:  PROTONIX   Used for:  Gastroesophageal reflux disease with esophagitis   Started by:  Concha Hare PA        Take by mouth 30-60 minutes before a meal.   Quantity:  30 tablet   Refills:  3            Where to get your medicines      These medications were sent to [id number] - [place] - [address] [address]  [address] [address], [place] [address]     Phone:  [phone]     pantoprazole 20 MG EC tablet                Primary Care Provider Office Phone # Fax #    KEILY Fonseca [phone]       [facility] [address]  [place] [address]        Equal Access to Services     NANCY SANCHEZ AH: Hadii hansel ku hadasho Soomaali, waaxda luqadaha, qaybta kaalmada adeegyada, waxay clarence okeefe. So " [facility] [phone].    ATENCIÓN: Si suzan le, tiene a meneses disposición servicios gratuitos de asistencia lingüística. Adry solis [phone].    We comply with applicable federal civil rights laws and [place] laws. We do not discriminate on the basis of race, color, national origin, age, disability, sex, sexual orientation, or gender identity.            Thank you!     Thank you for choosing [facility]  for your care. Our goal is always to provide you with excellent care. Hearing back from our patients is one way we can continue to improve our services. Please take a few minutes to complete the written survey that you may receive in the mail after your visit with us. Thank you!             Your Updated Medication List - Protect others around you: Learn how to safely use, store and throw away your medicines at www.disposemymeds.org.          This list is accurate as of: 1/12/18 11:24 AM.  Always use your most recent med list.                   Brand Name Dispense Instructions for use Diagnosis    buPROPion 200 MG 12 hr tablet    WELLBUTRIN SR    60 tablet    Take 1 tablet (200 mg) by mouth 2 times daily    Major depressive disorder, recurrent episode, moderate (H)       celecoxib 100 MG capsule    celeBREX    60 capsule    Take 1 capsule (100 mg) by mouth 2 times daily    Primary osteoarthritis involving multiple joints       clonazePAM 1 MG tablet    klonoPIN    60 tablet    Take 0.5-1 tablets (0.5-1 mg) by mouth 2 times daily as needed for anxiety    SHAKIR (generalized anxiety disorder)       diclofenac 1 % Gel topical gel    VOLTAREN    100 g    Place onto the skin 4 times daily    Primary osteoarthritis involving multiple joints       diltiazem 300 MG 24 hr ER beaded capsule    TIAZAC    90 capsule    Take 1 capsule (300 mg) by mouth daily    Cardiac microvascular disease (H)       DONEPEZIL HCL PO      Take 5 mg by mouth At Bedtime        estradiol 0.5 MG tablet    ESTRACE    60 tablet    TAKE ONE  TABLET BY MOUTH TWICE DAILY    Postmenopausal HRT (hormone replacement therapy)       hydrOXYzine 25 MG tablet    ATARAX    120 tablet    TAKE ONE TO TWO TABLETS BY MOUTH AT BEDTIME FOR  NAUSEA    Chronic nausea       levothyroxine 50 MCG tablet    SYNTHROID/LEVOTHROID    30 tablet    Take 1 tablet (50 mcg) by mouth daily    Acquired hypothyroidism       metoclopramide 5 MG tablet    REGLAN    60 tablet    TAKE ONE TABLET BY MOUTH 4 TIMES DAILY BEFORE MEAL(S) AT BEDTIME AS NEEDED    Gastroesophageal reflux disease without esophagitis       NITROSTAT 0.4 MG sublingual tablet   Generic drug:  nitroGLYcerin     25 tablet    Place 1 tablet (0.4 mg) under the tongue every 5 minutes as needed for chest pain    Chest pain, unspecified type       pantoprazole 20 MG EC tablet    PROTONIX    30 tablet    Take by mouth 30-60 minutes before a meal.    Gastroesophageal reflux disease with esophagitis       PLAQUENIL 200 MG tablet   Generic drug:  hydroxychloroquine     60 tablet    Take 1 tablet (200 mg) by mouth 2 times daily        PROCTOSOL HC 2.5 % cream   Generic drug:  hydrocortisone     58 g    APPLY  CREAM TO AFFECTED AREA THREE TIMES DAILY    Hemorrhoid       ranitidine 150 MG tablet    ZANTAC    60 tablet    Take 1 tablet (150 mg) by mouth 2 times daily    Gastroesophageal reflux disease without esophagitis       SUMAtriptan 100 MG tablet    IMITREX    10 tablet    TAKE ONE TABLET BY MOUTH AS NEEDED MIGRAINES    Migraine headache       traMADol 50 MG tablet    ULTRAM    180 tablet    TAKE TWO TABLETS BY MOUTH EVERY 8 HOURS AS NEEDED FOR PAIN    Fibromyalgia       UNABLE TO FIND      MEDICATION NAME: Cannabis as needed

## 2018-01-12 NOTE — LETTER
My Depression Action Plan  Name: Sharlene Mccord   Date of Birth 1960  Date: 1/12/2018    My doctor: Concha Hare   My clinic: [facility] [place]  [place] [address]  [phone]          GREEN    ZONE   Good Control    What it looks like:     Things are going generally well. You have normal up s and down s. You may even feel depressed from time to time, but bad moods usually last less than a day.   What you need to do:  1. Continue to care for yourself (see self care plan)  2. Check your depression survival kit and update it as needed  3. Follow your physician s recommendations including any medication.  4. Do not stop taking medication unless you consult with your physician first.           YELLOW         ZONE Getting Worse    What it looks like:     Depression is starting to interfere with your life.     It may be hard to get out of bed; you may be starting to isolate yourself from others.    Symptoms of depression are starting to last most all day and this has happened for several days.     You may have suicidal thoughts but they are not constant.   What you need to do:     1. Call your care team, your response to treatment will improve if you keep your care team informed of your progress. Yellow periods are signs an adjustment may need to be made.     2. Continue your self-care, even if you have to fake it!    3. Talk to someone in your support network    4. Open up your depression survival kit           RED    ZONE Medical Alert - Get Help    What it looks like:     Depression is seriously interfering with your life.     You may experience these or other symptoms: You can t get out of bed most days, can t work or engage in other necessary activities, you have trouble taking care of basic hygiene, or basic responsibilities, thoughts of suicide or death that will not go away, self-injurious behavior.     What you need to do:  1. Call your care team and request a  same-day appointment. If they are not available (weekends or after hours) call your local crisis line, emergency room or 911.      Electronically signed by: Kortney Smalls, January 12, 2018    Depression Self Care Plan / Survival Kit    Self-Care for Depression  Here s the deal. Your body and mind are really not as separate as most people think.  What you do and think affects how you feel and how you feel influences what you do and think. This means if you do things that people who feel good do, it will help you feel better.  Sometimes this is all it takes.  There is also a place for medication and therapy depending on how severe your depression is, so be sure to consult with your medical provider and/ or Behavioral Health Consultant if your symptoms are worsening or not improving.     In order to better manage my stress, I will:    Exercise  Get some form of exercise, every day. This will help reduce pain and release endorphins, the  feel good  chemicals in your brain. This is almost as good as taking antidepressants!  This is not the same as joining a gym and then never going! (they count on that by the way ) It can be as simple as just going for a walk or doing some gardening, anything that will get you moving.      Hygiene   Maintain good hygiene (Get out of bed in the morning, Make your bed, Brush your teeth, Take a shower, and Get dressed like you were going to work, even if you are unemployed).  If your clothes don't fit try to get ones that do.    Diet  I will strive to eat foods that are good for me, drink plenty of water, and avoid excessive sugar, caffeine, alcohol, and other mood-altering substances.  Some foods that are helpful in depression are: complex carbohydrates, B vitamins, flaxseed, fish or fish oil, fresh fruits and vegetables.    Psychotherapy  I agree to participate in Individual Therapy (if recommended).    Medication  If prescribed medications, I agree to take them.  Missing doses can  result in serious side effects.  I understand that drinking alcohol, or other illicit drug use, may cause potential side effects.  I will not stop my medication abruptly without first discussing it with my provider.    Staying Connected With Others  I will stay in touch with my friends, family members, and my primary care provider/team.    Use your imagination  Be creative.  We all have a creative side; it doesn t matter if it s oil painting, sand castles, or mud pies! This will also kick up the endorphins.    Witness Beauty  (AKA stop and smell the roses) Take a look outside, even in mid-winter. Notice colors, textures. Watch the squirrels and birds.     Service to others  Be of service to others.  There is always someone else in need.  By helping others we can  get out of ourselves  and remember the really important things.  This also provides opportunities for practicing all the other parts of the program.    Humor  Laugh and be silly!  Adjust your TV habits for less news and crime-drama and more comedy.    Control your stress  Try breathing deep, massage therapy, biofeedback, and meditation. Find time to relax each day.     My support system    Clinic Contact:  Phone number:    Contact 1:  Phone number:    Contact 2:  Phone number:    Anabaptist/:  Phone number:    Therapist:  Phone number:    Local crisis center:    Phone number:    Other community support:  Phone number:

## 2018-01-12 NOTE — PATIENT INSTRUCTIONS
Thank you for choosing [facility].   I have office hours 8:00 am to 4:30 pm on Monday's, Wednesday's, Thursday's and Friday's. My nurse and I are out of the office every Tuesday.    Following your visit, when your labs and diagnostic testing have returned, I will review then and you will be contacted by my nurse.  If you are on My Chart, you can also view results there.    For refills, notify your pharmacy regarding what you need and the pharmacy will generate a refill request. Do not call my nurse as she is unable to process refill request. Please plan ahead and allow 3-5 days for refill requests.    You will generally receive a reminder call the day prior to your appointment.  If you cannot attend your appointment, please cancel your appointment with as much notice as possible.  If there is a pattern of failure to present for your appointments, I cannot provide consistent, meaningful, ongoing care for you. It is very important to me that you come in for your care, so we can best assist you with your health care needs.    IMPORTANT:  Please note that it is my standard of practice to NOT participate in prescribing ongoing requested Narcotic Analgesic therapy, and/or participate in the prescribing of other controlled substances.  My nurse and I am happy to assist you with the process of referral for alternative pain management as needed, and other treatment modalities including but not limited to:  Physical Therapy, Physical Medicine and Rehab, Counseling, Chiropractic Care, Orthopedic Care, and non-narcotic medication management.     In the event that you need to be seen for emergent concerns and I am out of office,  please see one of my colleagues for acute concerns.  You may also present to  or ER.  I appreciate the opportunity to serve you and look forward to supporting your healthcare needs in the future. Please contact me with any questions or concerns that you may  have.    Sincerely,      Concha Hare RN, PA-C

## 2018-01-12 NOTE — NURSING NOTE
"Chief Complaint   Patient presents with     Gastrophageal Reflux     Fibromyalgia       Initial /64 (BP Location: Left arm, Patient Position: Sitting, Cuff Size: Adult Regular)  Pulse 64  Temp 97.9  F (36.6  C) (Tympanic)  Resp 16  Ht 5' 5.5\" (1.664 m)  Wt 211 lb (95.7 kg)  BMI 34.58 kg/m2 Estimated body mass index is 34.58 kg/(m^2) as calculated from the following:    Height as of this encounter: 5' 5.5\" (1.664 m).    Weight as of this encounter: 211 lb (95.7 kg).  Medication Reconciliation: complete   Kortney Smalls LPN    "

## 2018-01-13 ASSESSMENT — ANXIETY QUESTIONNAIRES: GAD7 TOTAL SCORE: 9

## 2018-01-15 ENCOUNTER — TRANSFERRED RECORDS (OUTPATIENT)
Dept: HEALTH INFORMATION MANAGEMENT | Facility: CLINIC | Age: 58
End: 2018-01-15

## 2018-01-20 ENCOUNTER — HEALTH MAINTENANCE LETTER (OUTPATIENT)
Age: 58
End: 2018-01-20

## 2018-01-31 ENCOUNTER — OFFICE VISIT (OUTPATIENT)
Dept: PSYCHIATRY | Facility: OTHER | Age: 58
End: 2018-01-31
Attending: PSYCHIATRY & NEUROLOGY
Payer: COMMERCIAL

## 2018-01-31 VITALS
DIASTOLIC BLOOD PRESSURE: 70 MMHG | SYSTOLIC BLOOD PRESSURE: 118 MMHG | HEART RATE: 70 BPM | WEIGHT: 211 LBS | BODY MASS INDEX: 34.58 KG/M2 | TEMPERATURE: 97.5 F

## 2018-01-31 DIAGNOSIS — F33.1 MAJOR DEPRESSIVE DISORDER, RECURRENT EPISODE, MODERATE (H): ICD-10-CM

## 2018-01-31 PROCEDURE — 99214 OFFICE O/P EST MOD 30 MIN: CPT | Performed by: PSYCHIATRY & NEUROLOGY

## 2018-01-31 PROCEDURE — G0463 HOSPITAL OUTPT CLINIC VISIT: HCPCS

## 2018-01-31 RX ORDER — BUPROPION HYDROCHLORIDE 200 MG/1
TABLET, EXTENDED RELEASE ORAL
Qty: 60 TABLET | Refills: 11 | COMMUNITY
Start: 2018-01-31 | End: 2018-03-26

## 2018-01-31 ASSESSMENT — PATIENT HEALTH QUESTIONNAIRE - PHQ9: 5. POOR APPETITE OR OVEREATING: MORE THAN HALF THE DAYS

## 2018-01-31 ASSESSMENT — ANXIETY QUESTIONNAIRES
6. BECOMING EASILY ANNOYED OR IRRITABLE: MORE THAN HALF THE DAYS
5. BEING SO RESTLESS THAT IT IS HARD TO SIT STILL: SEVERAL DAYS
IF YOU CHECKED OFF ANY PROBLEMS ON THIS QUESTIONNAIRE, HOW DIFFICULT HAVE THESE PROBLEMS MADE IT FOR YOU TO DO YOUR WORK, TAKE CARE OF THINGS AT HOME, OR GET ALONG WITH OTHER PEOPLE: VERY DIFFICULT
2. NOT BEING ABLE TO STOP OR CONTROL WORRYING: SEVERAL DAYS
GAD7 TOTAL SCORE: 12
7. FEELING AFRAID AS IF SOMETHING AWFUL MIGHT HAPPEN: MORE THAN HALF THE DAYS
1. FEELING NERVOUS, ANXIOUS, OR ON EDGE: MORE THAN HALF THE DAYS
3. WORRYING TOO MUCH ABOUT DIFFERENT THINGS: MORE THAN HALF THE DAYS

## 2018-01-31 ASSESSMENT — PAIN SCALES - GENERAL: PAINLEVEL: MODERATE PAIN (4)

## 2018-01-31 NOTE — MR AVS SNAPSHOT
After Visit Summary   1/31/2018    Sharlene Mccord    MRN: [id number]           Patient Information     Date Of Birth          1960        Visit Information        Provider Department      1/31/2018 9:40 AM Aurelia Odell MD [facility]        Today's Diagnoses     Major depressive disorder, recurrent episode, moderate (H)           Follow-ups after your visit        Your next 10 appointments already scheduled     Mar 26, 2018 10:20 AM CDT   (Arrive by 10:05 AM)   Return Visit with Aurelia Odell MD   [facility] ([facility] [place] )    [address] [place] [address]  [place] [id number] [phone]            May 22, 2018  9:30 AM CDT   (Arrive by 9:15 AM)   Return Visit with Norman Nichole MD   [facility]    [address] [place] [address]   [phone]              Who to contact     If you have questions or need follow up information about today's clinic visit or your schedule please contact [facility] directly at [phone].  Normal or non-critical lab and imaging results will be communicated to you by MyChart, letter or phone within 4 business days after the clinic has received the results. If you do not hear from us within 7 days, please contact the clinic through [employer] or phone. If you have a critical or abnormal lab result, we will notify you by phone as soon as possible.  Submit refill requests through V2contact or call your pharmacy and they will forward the refill request to us. Please allow 3 business days for your refill to be completed.          Additional Information About Your Visit        MyChart Information     V2contact gives you secure access to your electronic health record. If you see a primary care provider, you can also send messages to your care team and make appointments. If you have questions, please call your primary care clinic.  If you do not have a primary  care provider, please call [phone] and they will assist you.        Care EveryWhere ID     This is your Care EveryWhere ID. This could be used by other organizations to access your [place] medical records  [id number]        Your Vitals Were     Pulse Temperature BMI (Body Mass Index)             70 97.5  F (36.4  C) (Tympanic) 34.58 kg/m2          Blood Pressure from Last 3 Encounters:   01/31/18 118/70   01/12/18 106/64   11/28/17 128/70    Weight from Last 3 Encounters:   01/31/18 211 lb (95.7 kg)   01/12/18 211 lb (95.7 kg)   11/28/17 205 lb (93 kg)              Today, you had the following     No orders found for display         Today's Medication Changes          These changes are accurate as of 1/31/18 10:37 AM.  If you have any questions, ask your nurse or doctor.               These medicines have changed or have updated prescriptions.        Dose/Directions    buPROPion 200 MG 12 hr tablet   Commonly known as:  WELLBUTRIN SR   This may have changed:    - how much to take  - how to take this  - when to take this  - additional instructions   Used for:  Major depressive disorder, recurrent episode, moderate (H)   Changed by:  Aurelia Odell MD        Take 1/2 tablet daily for 1 week then discontinue   Quantity:  60 tablet   Refills:  11                Primary Care Provider Office Phone # Fax #    Concha LOTT KEILY Hare [phone]       [place] [address]        Equal Access to Services     NANCY SANCHEZ AH: Hadii hansel ku hadasho Soomaali, waaxda luqadaha, qaybta kaalmada adeegyada, victor m okeefe. So [facility] [phone].    ATENCIÓN: Si habla español, tiene a meneses disposición servicios gratuitos de asistencia lingüística. Llame al [phone].    We comply with applicable federal civil rights laws and [place] laws. We do not discriminate on the basis of race, color, national origin, age, disability, sex, sexual  orientation, or gender identity.            Thank you!     Thank you [facility]  for your [facility]. Our goal is always to provide you with excellent care. Hearing back from our patients is one way we can continue to improve our services. Please take a few minutes to complete the written survey that you may receive in the mail after your visit with us. Thank you!             Your Updated Medication List - Protect others around you: Learn how to safely use, store and throw away your medicines at www.disposemymeds.org.          This list is accurate as of 1/31/18 10:37 AM.  Always use your most recent med list.                   Brand Name Dispense Instructions for use Diagnosis    buPROPion 200 MG 12 hr tablet    WELLBUTRIN SR    60 tablet    Take 1/2 tablet daily for 1 week then discontinue    Major depressive disorder, recurrent episode, moderate (H)       clonazePAM 1 MG tablet    klonoPIN    60 tablet    Take 0.5-1 tablets (0.5-1 mg) by mouth 2 times daily as needed for anxiety    SHAKIR (generalized anxiety disorder)       diclofenac 1 % Gel topical gel    VOLTAREN    100 g    Place onto the skin 4 times daily    Primary osteoarthritis involving multiple joints       diltiazem 300 MG 24 hr ER beaded capsule    TIAZAC    90 capsule    Take 1 capsule (300 mg) by mouth daily    Cardiac microvascular disease (H)       DONEPEZIL HCL PO      Take 5 mg by mouth At Bedtime        estradiol 0.5 MG tablet    ESTRACE    60 tablet    TAKE ONE TABLET BY MOUTH TWICE DAILY    Postmenopausal HRT (hormone replacement therapy)       hydrOXYzine 25 MG tablet    ATARAX    120 tablet    TAKE ONE TO TWO TABLETS BY MOUTH AT BEDTIME FOR  NAUSEA    Chronic nausea       levothyroxine 50 MCG tablet    SYNTHROID/LEVOTHROID    30 tablet    Take 1 tablet (50 mcg) by mouth daily    Acquired hypothyroidism       metoclopramide 5 MG tablet    REGLAN    60 tablet    TAKE ONE TABLET BY MOUTH 4 TIMES DAILY BEFORE MEAL(S) AT  BEDTIME AS NEEDED    Gastroesophageal reflux disease without esophagitis       NITROSTAT 0.4 MG sublingual tablet   Generic drug:  nitroGLYcerin     25 tablet    Place 1 tablet (0.4 mg) under the tongue every 5 minutes as needed for chest pain    Chest pain, unspecified type       pantoprazole 20 MG EC tablet    PROTONIX    30 tablet    Take by mouth 30-60 minutes before a meal.    Gastroesophageal reflux disease with esophagitis       PROCTOSOL HC 2.5 % cream   Generic drug:  hydrocortisone     58 g    APPLY  CREAM TO AFFECTED AREA THREE TIMES DAILY    Hemorrhoid       ranitidine 150 MG tablet    ZANTAC    60 tablet    Take 1 tablet (150 mg) by mouth 2 times daily    Gastroesophageal reflux disease without esophagitis       SUMAtriptan 100 MG tablet    IMITREX    10 tablet    TAKE ONE TABLET BY MOUTH AS NEEDED MIGRAINES    Migraine headache       traMADol 50 MG tablet    ULTRAM    180 tablet    TAKE TWO TABLETS BY MOUTH EVERY 8 HOURS AS NEEDED FOR PAIN    Fibromyalgia       * UNABLE TO FIND      MEDICATION NAME: Cannabis as needed   [place] Vapor take one puff to the count of two at bedtime.  If needed in the middle of the night        * UNABLE TO FIND      1.5 mg 3 times daily MEDICATION NAME: Oral Cannabis  Oral suspension  Taken morning, afternoon and bedtime        * Notice:  This list has 2 medication(s) that are the same as other medications prescribed for you. Read the directions carefully, and ask your doctor or other care provider to review them with you.

## 2018-01-31 NOTE — PROGRESS NOTES
"  PSYCHIATRY CLINIC PROGRESS NOTE   20 minutes, med management  more than 50% of time spent counseling patient on medications, medication side effects, symptom history and management   SUBJECTIVE / INTERIM HISTORY                                                                       Last clinic visit 8/29/17: Considering donepezil.. Continue Wellbutrin 200 mg bid. Continue Klonopin 1 mg bid. Continue trazodone 50 mg HS.      - Dec 20th with Dr. Lopez went well. Medical MJ thus far not helping with sleep or anxiety. In fact feels like anxiety is worse.   - depression - wise: maybe little better  - leaving for [place] in about week to see his 's mother. Then Sharlene is staying in [place] for few weeks with her sister  - would like to go off Wellbutrin  - thinks donepezil is helping with memory  - still wishes could work but realizes there's no way she could physically or mentally \"reality slaps me in the face\". Even housework she is down for hours after -- pain  - tried cut down on meds including trazodone but then sleep got bad  - endoscopy: hernia on med that helps  - no longer developed tremor head and neck past several months  - rheumatology consult: (autoimmune tests some pos tested recently) and sounds like no definitive dx made  - relationship issues. Of note 's two sons older 20s and other 31 yo still live at home. One of the boys bought house next door.   - memory: did notice it got little better once off gabapentin. Still is an issue though. Still forgets / repeats herself though  - gets embarrassed in conversations: forgets words, what she is going to say,etc    SUBSTANCE USE- no issues    SYMPTOMS-  Lots of worry and anxiety lately given increase in stressors. Depressed mood, low energy, anhedonia, feelings guilt & overwhelmed, poor sleep. cognitive issues including word finding  MEDICAL ROS- nausea nd vomiting is better, with moving / exercise leg \"heaviness\" and pain, had tremor for while but " resolved, balance issues, Hypersomnia.  Pain: neck, DDD in neck. Numbness and tingling in legs and feet worse at times when sitting  MEDICAL / SURGICAL HISTORY                 Medical Team:     TIEN- Concha Hare     Therapist-none   Patient Active Problem List   Diagnosis     Diverticulitis of sigmoid colon     Hypothyroidism     Anxiety state     Cardiac microvascular disease (H)     Diverticulitis     Aortic valve disorder     ACP (advance care planning)     Chronic pain     Constipation     Memory loss     Major depressive disorder, recurrent episode (H)     Cognitive disorder     Major depressive disorder     Fatigue     Migraine without aura and responsive to treatment     Atypical migraine     Fibromyalgia     Mixed connective tissue disease (H)     ALLERGY   Codeine; Isosorbide mononitrate; and Lisinopril  MEDICATIONS                                                                                             Current Outpatient Prescriptions   Medication Sig     UNABLE TO FIND 1.5 mg 3 times daily MEDICATION NAME: Oral Cannabis    Oral suspension  Taken morning, afternoon and bedtime     UNABLE TO FIND MEDICATION NAME: Cannabis as needed     [place] Vapor take one puff to the count of two at bedtime.  If needed in the middle of the night     pantoprazole (PROTONIX) 20 MG EC tablet Take by mouth 30-60 minutes before a meal.     traMADol (ULTRAM) 50 MG tablet TAKE TWO TABLETS BY MOUTH EVERY 8 HOURS AS NEEDED FOR PAIN     metoclopramide (REGLAN) 5 MG tablet TAKE ONE TABLET BY MOUTH 4 TIMES DAILY BEFORE MEAL(S) AT BEDTIME AS NEEDED     DONEPEZIL HCL PO Take 5 mg by mouth At Bedtime     diclofenac (VOLTAREN) 1 % GEL topical gel Place onto the skin 4 times daily     ranitidine (ZANTAC) 150 MG tablet Take 1 tablet (150 mg) by mouth 2 times daily     hydrOXYzine (ATARAX) 25 MG tablet TAKE ONE TO TWO TABLETS BY MOUTH AT BEDTIME FOR  NAUSEA     clonazePAM (KLONOPIN) 1 MG tablet Take 0.5-1 tablets (0.5-1 mg) by mouth 2  times daily as needed for anxiety     buPROPion (WELLBUTRIN SR) 200 MG 12 hr tablet Take 1 tablet (200 mg) by mouth 2 times daily     estradiol (ESTRACE) 0.5 MG tablet TAKE ONE TABLET BY MOUTH TWICE DAILY     NITROSTAT 0.4 MG sublingual tablet Place 1 tablet (0.4 mg) under the tongue every 5 minutes as needed for chest pain     diltiazem (TIAZAC) 300 MG 24 hr ER beaded capsule Take 1 capsule (300 mg) by mouth daily     levothyroxine (SYNTHROID/LEVOTHROID) 50 MCG tablet Take 1 tablet (50 mcg) by mouth daily     PROCTOSOL HC 2.5 % rectal cream APPLY  CREAM TO AFFECTED AREA THREE TIMES DAILY     SUMAtriptan (IMITREX) 100 MG tablet TAKE ONE TABLET BY MOUTH AS NEEDED MIGRAINES     No current facility-administered medications for this visit.        VITALS   /70 (BP Location: Right arm, Patient Position: Sitting, Cuff Size: Adult Regular)  Pulse 70  Temp 97.5  F (36.4  C) (Tympanic)  Wt 211 lb (95.7 kg)  BMI 34.58 kg/m2    PHQ9                       PHQ-9 SCORE 11/28/2017 1/12/2018 1/31/2018   Total Score - - -   Total Score 19 11 19     Labs:    Liver Function Studies -   Recent Labs   Lab Test  01/13/16   0941   PROTTOTAL  7.5   ALBUMIN  3.7   BILITOTAL  0.3   ALKPHOS  92   AST  32   ALT  63*     Recent Labs   Lab Test  01/13/16   0941  03/28/14   1008   CHOL  244*  238*   HDL  64  81*   LDL  147*  143*   TRIG  164*  72   CHOLHDLRATIO   --   2.9*       MENTAL STATUS EXAM                                                                                        Alert. Oriented to person, place, and date / time. Well groomed, calm, cooperative with good eye contact. No problems with speech or psychomotor behavior. Mood was anxious and depressed  and affect was congruent to speech content and restricted.f Thought process, including associations, was unremarkable and thought content was devoid of suicidal and homicidal ideation and psychotic thought. No hallucinations. Insight was good. Judgment was intact and adequate  for safety. Fund of knowledge was intact. Pt demonstrates no obvious problems with attention, concentration, language, recent or remote memory although these were not formally tested.     ASSESSMENT                                                                                                      HISTORICAL:  Initial psych consult 12/12/14            Notes:  Ritalin as adjunct to antidepressant: didn't like how made her feel.   Prozac, Celexa ineffective. Now Wellbutrin helping. Zoloft: weight gain. Effexor back while: didn't stay on long and can't recall why. Cymbalta: was on and didn't help. She tried Provigil and SEs. Buspar: HAs.   Neuropsych testing summer 2015:  language: about same, working memory: better than last year. Attention / concentration worse than last year. Speed processing: improved. Executive function: didn't test well on that front also comparable to last year. Memory: worse off than last year. Depression: similar to last year. Anxiety / emotional anxiety worse. As part of assessment they think should possible have another MRI  CURRENT: This patient provides a history which supports the diagnoses of Major depressive disorder, recurrent, mod and mild neurocognitive disorder. We cross-tapered to Wellbutrin. We have been discussing trying a stimulant as adjunct to antidepressant but wasn't helpful and made her feel mentally off so  d/c. WE tried buspar for anxiety but had HAs. Will continue Wellbutrin 200 mg bid and Klonopin for anxiety.     Cannot take Aleve, Motrin, ASA thus Tramadol. We discussed combination of benzos and opoioids and the guidelines. In terms of risks vs. Benefits Sharlene and I do NOT feel would be good idea to take her off Klonopin. We feel she would get to point she would be unable to get out of her house.    She started on donepezil: does feel at this point it is now helping!! She would like try go off wellbutrin as $ and we came up with taper plan.      TREATMENT RISK  STATEMENT: The risks, benefits, alternatives and potential adverse effects have been explained and are understood by the pt. The pt agrees to the treatment plan with the ability to do so. The pt knows to call the clinic for any problems or access emergency care if needed.    DIAGNOSES      (Use of Axes system will continue, although absent from DSM 5)           Axis I - Major depressive disorder, recurrent severe without psychotic features  Mild neurocognitive disorder  Axis II - no dx  Axis III - lumbago, fibromyalgia, hx migraines, hx diverticultitis  Axis IV- Psychosocial Stressors include: finances, marital strain  Axis V - Global Assessment of Functioning current: 41- 50 Serious symptoms OR any serious impairment in social, occupational, or school functioning.    PLAN                                                                                                                         1) MEDICATIONS:   -- continue donepezil, decrease Wellbutrin 200 mg bid to 100 mg bid for 1 wek then d/c. . Continue Klonopin 1 mg bid. Continue trazodone 50 mg HS.    2) THERAPY: no change.     3) LABS: none today    4) Other: Neurology provider Dr. Andre through [facility]. Neuropsych testing with Dr. Johnathan Min    8)  RTC: ~2 months    Pharmacist: [phone]  Customer service [phone]

## 2018-02-01 DIAGNOSIS — F41.1 GAD (GENERALIZED ANXIETY DISORDER): ICD-10-CM

## 2018-02-01 ASSESSMENT — ANXIETY QUESTIONNAIRES: GAD7 TOTAL SCORE: 12

## 2018-02-01 ASSESSMENT — PATIENT HEALTH QUESTIONNAIRE - PHQ9: SUM OF ALL RESPONSES TO PHQ QUESTIONS 1-9: 19

## 2018-02-01 NOTE — TELEPHONE ENCOUNTER
Controlled Substance Refill Request for Klonopin  Problem List Complete:  Yes    Last Written Prescription Date:  9.20.17  Last Fill Quantity: 60,   # refills: 3    Last Office Visit with [facility] primary care provider: 1.31.18    Clinic visit frequency required: ??     Future Office visit:   Next 5 appointments (look out 90 days)     Mar 26, 2018 10:20 AM CDT   (Arrive by 10:05 AM)   Return Visit with Aurelia Odell MD   [facility] ([facility] )    [facility] [address] [place] [id number] [phone]                  Controlled substance agreement on file: Yes:  Date ??.     Processing:  Fax Rx to [facility] pharmacy   checked in past 6 months?  No, route to RN

## 2018-02-02 RX ORDER — CLONAZEPAM 1 MG/1
TABLET ORAL
Qty: 60 TABLET | Refills: 3 | Status: SHIPPED | OUTPATIENT
Start: 2018-02-27 | End: 2018-03-26

## 2018-02-02 NOTE — TELEPHONE ENCOUNTER
Reviewed [place]  1/30/18 was last filled Klonopin so I will set to fill end of FEbruary. Thanks. I'll set fill for 2/27/18

## 2018-03-07 DIAGNOSIS — M79.7 FIBROMYALGIA: ICD-10-CM

## 2018-03-07 DIAGNOSIS — E03.9 ACQUIRED HYPOTHYROIDISM: ICD-10-CM

## 2018-03-08 RX ORDER — LEVOTHYROXINE SODIUM 50 UG/1
TABLET ORAL
Qty: 30 TABLET | Refills: 10 | Status: SHIPPED | OUTPATIENT
Start: 2018-03-08 | End: 2019-02-04

## 2018-03-08 RX ORDER — TRAMADOL HYDROCHLORIDE 50 MG/1
TABLET ORAL
Qty: 180 TABLET | Refills: 0 | Status: SHIPPED | OUTPATIENT
Start: 2018-03-08 | End: 2018-04-23

## 2018-03-08 NOTE — TELEPHONE ENCOUNTER
Controlled Substance Refill Request for Ultram  Problem List Complete:  Yes    Last Written Prescription Date:  1.3.18  Last Fill Quantity: 180,   # refills: 0    Last Office Visit with [facility] primary care provider: 1.12.18    Clinic visit frequency required: ??     Future Office visit:   Next 5 appointments (look out 90 days)     Mar 26, 2018 10:20 AM CDT   (Arrive by 10:05 AM)   Return Visit with Aurelia Odell MD   [facility] ([facility] )    [address] [place] [id number] [phone]            May 22, 2018  9:30 AM CDT   (Arrive by 9:15 AM)   Return Visit with Norman Nichole MD   [facility] ([facility] )    [facility] [address]   [phone]                  Controlled substance agreement on file: No.     Processing:  Fax Rx to [facility] pharmacy   checked in past 6 months?  No, route to RN

## 2018-03-26 ENCOUNTER — OFFICE VISIT (OUTPATIENT)
Dept: PSYCHIATRY | Facility: OTHER | Age: 58
End: 2018-03-26
Attending: PSYCHIATRY & NEUROLOGY
Payer: COMMERCIAL

## 2018-03-26 VITALS
HEART RATE: 59 BPM | BODY MASS INDEX: 32.78 KG/M2 | WEIGHT: 200 LBS | DIASTOLIC BLOOD PRESSURE: 58 MMHG | TEMPERATURE: 97.5 F | SYSTOLIC BLOOD PRESSURE: 130 MMHG

## 2018-03-26 DIAGNOSIS — F41.1 GAD (GENERALIZED ANXIETY DISORDER): ICD-10-CM

## 2018-03-26 PROCEDURE — G0463 HOSPITAL OUTPT CLINIC VISIT: HCPCS

## 2018-03-26 PROCEDURE — 99213 OFFICE O/P EST LOW 20 MIN: CPT | Performed by: PSYCHIATRY & NEUROLOGY

## 2018-03-26 RX ORDER — CLONAZEPAM 1 MG/1
TABLET ORAL
Qty: 60 TABLET | Refills: 5 | Status: SHIPPED | OUTPATIENT
Start: 2018-03-26 | End: 2018-10-08

## 2018-03-26 ASSESSMENT — ANXIETY QUESTIONNAIRES
7. FEELING AFRAID AS IF SOMETHING AWFUL MIGHT HAPPEN: SEVERAL DAYS
2. NOT BEING ABLE TO STOP OR CONTROL WORRYING: SEVERAL DAYS
1. FEELING NERVOUS, ANXIOUS, OR ON EDGE: MORE THAN HALF THE DAYS
GAD7 TOTAL SCORE: 9
IF YOU CHECKED OFF ANY PROBLEMS ON THIS QUESTIONNAIRE, HOW DIFFICULT HAVE THESE PROBLEMS MADE IT FOR YOU TO DO YOUR WORK, TAKE CARE OF THINGS AT HOME, OR GET ALONG WITH OTHER PEOPLE: SOMEWHAT DIFFICULT
6. BECOMING EASILY ANNOYED OR IRRITABLE: MORE THAN HALF THE DAYS
5. BEING SO RESTLESS THAT IT IS HARD TO SIT STILL: SEVERAL DAYS
3. WORRYING TOO MUCH ABOUT DIFFERENT THINGS: SEVERAL DAYS

## 2018-03-26 ASSESSMENT — PATIENT HEALTH QUESTIONNAIRE - PHQ9: 5. POOR APPETITE OR OVEREATING: SEVERAL DAYS

## 2018-03-26 ASSESSMENT — PAIN SCALES - GENERAL: PAINLEVEL: MILD PAIN (2)

## 2018-03-26 NOTE — MR AVS SNAPSHOT
After Visit Summary   3/26/2018    Sharlene Mccord    MRN: [id number]           Patient Information     Date Of Birth          1960        Visit Information        Provider Department      3/26/2018 10:20 AM Aurelia Odell MD [facility]        Today's Diagnoses     SHAKIR (generalized anxiety disorder)           Follow-ups after your visit        Your next 10 appointments already scheduled     May 22, 2018  9:30 AM CDT   (Arrive by 9:15 AM)   Return Visit with Norman Nichole MD   [facility] ([facility] )    [address] [place] [address]   [phone]              Who to contact     If you have questions or need follow up information about today's clinic visit or your schedule please contact [facility] directly at [phone].  Normal or non-critical lab and imaging results will be communicated to you by MyChart, letter or phone within 4 business days after the clinic has received the results. If you do not hear from us within 7 days, please contact the clinic through MyChart or phone. If you have a critical or abnormal lab result, we will notify you by phone as soon as possible.  Submit refill requests through [employer] or call your pharmacy and they will forward the refill request to us. Please allow 3 business days for your refill to be completed.          Additional Information About Your Visit        MyChart Information     [employer] gives you secure access to your electronic health record. If you see a primary care provider, you can also send messages to your care team and make appointments. If you have questions, please call your primary care clinic.  If you do not have a primary care provider, please call [phone] and they will assist you.        Care EveryWhere ID     This is your Care EveryWhere ID. This could be used by other organizations to access your [facility] medical records  [id number]        Your Vitals  Were     Pulse Temperature BMI (Body Mass Index)             59 97.5  F (36.4  C) (Tympanic) 32.78 kg/m2          Blood Pressure from Last 3 Encounters:   03/26/18 130/58   01/31/18 118/70   01/12/18 106/64    Weight from Last 3 Encounters:   03/26/18 200 lb (90.7 kg)   01/31/18 211 lb (95.7 kg)   01/12/18 211 lb (95.7 kg)              Today, you had the following     No orders found for display         Today's Medication Changes          These changes are accurate as of 3/26/18 11:28 AM.  If you have any questions, ask your nurse or doctor.               These medicines have changed or have updated prescriptions.        Dose/Directions    clonazePAM 1 MG tablet   Commonly known as:  klonoPIN   This may have changed:  See the new instructions.   Used for:  SHAKIR (generalized anxiety disorder)   Changed by:  Aurelia Odell MD        TAKE ONE-HALF TO ONE TABLET BY MOUTH TWICE DAILY AS NEEDED FOR ANXIETY   Quantity:  60 tablet   Refills:  5            Where to get your medicines      Some of these will need a paper prescription and others can be bought over the counter.  Ask your nurse if you have questions.     Bring a paper prescription for each of these medications     clonazePAM 1 MG tablet                Primary Care Provider Office Phone # Fax #    KEILY Fonseca [phone]       [address] [address]        Equal Access to Services     RADHA SANCHEZ AH: Hadii hansel lomas hadasho Sokody, waaxda luqadaha, qaybta kaalmada joyceyada, victor m okeefe. So [facility] [phone].    ATENCIÓN: Si habla español, tiene a meneses disposición servicios gratuitos de asistencia lingüística. Adry al [phone].    We comply with applicable federal civil rights laws and [place] laws. We do not discriminate on the basis of race, color, national origin, age, disability, sex, sexual orientation, or gender identity.            Thank you!     Thank you  for choosing [facility]  for your care. Our goal is always to provide you with excellent care. Hearing back from our patients is one way we can continue to improve our services. Please take a few minutes to complete the written survey that you may receive in the mail after your visit with us. Thank you!             Your Updated Medication List - Protect others around you: Learn how to safely use, store and throw away your medicines at www.disposemymeds.org.          This list is accurate as of 3/26/18 11:28 AM.  Always use your most recent med list.                   Brand Name Dispense Instructions for use Diagnosis    clonazePAM 1 MG tablet    klonoPIN    60 tablet    TAKE ONE-HALF TO ONE TABLET BY MOUTH TWICE DAILY AS NEEDED FOR ANXIETY    SHAKIR (generalized anxiety disorder)       diclofenac 1 % Gel topical gel    VOLTAREN    100 g    Place onto the skin 4 times daily    Primary osteoarthritis involving multiple joints       diltiazem 300 MG 24 hr ER beaded capsule    TIAZAC    90 capsule    Take 1 capsule (300 mg) by mouth daily    Cardiac microvascular disease (H)       DONEPEZIL HCL PO      Take 5 mg by mouth At Bedtime        estradiol 0.5 MG tablet    ESTRACE    60 tablet    TAKE ONE TABLET BY MOUTH TWICE DAILY    Postmenopausal HRT (hormone replacement therapy)       hydrOXYzine 25 MG tablet    ATARAX    120 tablet    TAKE ONE TO TWO TABLETS BY MOUTH AT BEDTIME FOR  NAUSEA    Chronic nausea       levothyroxine 50 MCG tablet    SYNTHROID/LEVOTHROID    30 tablet    TAKE ONE TABLET BY MOUTH ONCE DAILY    Acquired hypothyroidism       metoclopramide 5 MG tablet    REGLAN    60 tablet    TAKE ONE TABLET BY MOUTH 4 TIMES DAILY BEFORE MEAL(S) AT BEDTIME AS NEEDED    Gastroesophageal reflux disease without esophagitis       NITROSTAT 0.4 MG sublingual tablet   Generic drug:  nitroGLYcerin     25 tablet    Place 1 tablet (0.4 mg) under the tongue every 5 minutes as needed for chest pain    Chest pain,  unspecified type       pantoprazole 20 MG EC tablet    PROTONIX    30 tablet    Take by mouth 30-60 minutes before a meal.    Gastroesophageal reflux disease with esophagitis       PROCTOSOL HC 2.5 % cream   Generic drug:  hydrocortisone     58 g    APPLY  CREAM TO AFFECTED AREA THREE TIMES DAILY    Hemorrhoid       ranitidine 150 MG tablet    ZANTAC    60 tablet    Take 1 tablet (150 mg) by mouth 2 times daily    Gastroesophageal reflux disease without esophagitis       SUMAtriptan 100 MG tablet    IMITREX    10 tablet    TAKE ONE TABLET BY MOUTH AS NEEDED MIGRAINES    Migraine headache       traMADol 50 MG tablet    ULTRAM    180 tablet    TAKE TWO TABLETS BY MOUTH EVERY 8 HOURS AS NEEDED FOR PAIN    Fibromyalgia       * UNABLE TO FIND      MEDICATION NAME: Cannabis as needed   [place] Vapor take one puff to the count of two at bedtime.  If needed in the middle of the night        * UNABLE TO FIND      1.5 mg 3 times daily MEDICATION NAME: Oral Cannabis  Oral suspension  Taken morning, afternoon and bedtime        * Notice:  This list has 2 medication(s) that are the same as other medications prescribed for you. Read the directions carefully, and ask your doctor or other care provider to review them with you.

## 2018-03-26 NOTE — PROGRESS NOTES
"  PSYCHIATRY CLINIC PROGRESS NOTE   20 minutes, med management  more than 50% of time spent counseling patient on medications, medication side effects, symptom history and management   SUBJECTIVE / INTERIM HISTORY                                                                       Last clinic visit : -- continue donepezil, decrease Wellbutrin 200 mg bid to 100 mg bid for 1 wek then d/c. . Continue Klonopin 1 mg bid. Continue trazodone 50 mg HS.     - Dec 20th with Dr. Lopez went well. Medical MJ now IS helping. Pain still is an issue  - weaned off Wellbutrin and that went okay so she tried going off tramadol and this did NOT go well. Pain got worse and found herself having to take more OTC meds   - panic / anxiety : especially with driving. Last was while she was in [place]  -- feels mood is better and better outlook. Ex) camping, cabin.  -- been hanging out with her sister - sister's  with stage IV  - thinks donepezil is helping with memory  - rheumatology consult: (autoimmune tests some pos tested recently) and sounds like no definitive dx made  - relationship issues. Of note 's two sons older 20s and other 31 yo still live at home. One of the boys bought house next door.     SUBSTANCE USE- no issues    SYMPTOMS-  Lots of worry and anxiety lately given increase in stressors. Depressed mood, low energy, anhedonia, feelings guilt & overwhelmed, poor sleep. cognitive issues including word finding  MEDICAL ROS- nausea nd vomiting is better, with moving / exercise leg \"heaviness\" and pain, had tremor for while but resolved, balance issues, Hypersomnia.  Pain: neck, DDD in neck. Numbness and tingling in legs and feet worse at times when sitting  MEDICAL / SURGICAL HISTORY                 Medical Team:     PMD- Concha Hare     Therapist-none   Patient Active Problem List   Diagnosis     Diverticulitis of sigmoid colon     Hypothyroidism     Anxiety state     Cardiac microvascular disease (H)     " Diverticulitis     Aortic valve disorder     ACP (advance care planning)     Chronic pain     Constipation     Memory loss     Major depressive disorder, recurrent episode (H)     Cognitive disorder     Major depressive disorder     Fatigue     Migraine without aura and responsive to treatment     Atypical migraine     Fibromyalgia     Mixed connective tissue disease (H)     ALLERGY   Codeine; Isosorbide mononitrate; and Lisinopril  MEDICATIONS                                                                                             Current Outpatient Prescriptions   Medication Sig     traMADol (ULTRAM) 50 MG tablet TAKE TWO TABLETS BY MOUTH EVERY 8 HOURS AS NEEDED FOR PAIN     levothyroxine (SYNTHROID/LEVOTHROID) 50 MCG tablet TAKE ONE TABLET BY MOUTH ONCE DAILY     clonazePAM (KLONOPIN) 1 MG tablet TAKE ONE-HALF TO ONE TABLET BY MOUTH TWICE DAILY AS NEEDED FOR ANXIETY     UNABLE TO FIND 1.5 mg 3 times daily MEDICATION NAME: Oral Cannabis    Oral suspension  Taken morning, afternoon and bedtime     UNABLE TO FIND MEDICATION NAME: Cannabis as needed     [place] Vapor take one puff to the count of two at bedtime.  If needed in the middle of the night     pantoprazole (PROTONIX) 20 MG EC tablet Take by mouth 30-60 minutes before a meal.     metoclopramide (REGLAN) 5 MG tablet TAKE ONE TABLET BY MOUTH 4 TIMES DAILY BEFORE MEAL(S) AT BEDTIME AS NEEDED     DONEPEZIL HCL PO Take 5 mg by mouth At Bedtime     diclofenac (VOLTAREN) 1 % GEL topical gel Place onto the skin 4 times daily     ranitidine (ZANTAC) 150 MG tablet Take 1 tablet (150 mg) by mouth 2 times daily     hydrOXYzine (ATARAX) 25 MG tablet TAKE ONE TO TWO TABLETS BY MOUTH AT BEDTIME FOR  NAUSEA     estradiol (ESTRACE) 0.5 MG tablet TAKE ONE TABLET BY MOUTH TWICE DAILY     NITROSTAT 0.4 MG sublingual tablet Place 1 tablet (0.4 mg) under the tongue every 5 minutes as needed for chest pain     diltiazem (TIAZAC) 300 MG 24 hr ER beaded capsule Take 1 capsule  "(300 mg) by mouth daily     PROCTOSOL HC 2.5 % rectal cream APPLY  CREAM TO AFFECTED AREA THREE TIMES DAILY     SUMAtriptan (IMITREX) 100 MG tablet TAKE ONE TABLET BY MOUTH AS NEEDED MIGRAINES     No current facility-administered medications for this visit.        VITALS   /58 (BP Location: Left arm, Patient Position: Sitting, Cuff Size: Adult Regular)  Pulse 59  Temp 97.5  F (36.4  C) (Tympanic)  Wt 200 lb (90.7 kg)  BMI 32.78 kg/m2    PHQ9                       PHQ-9 SCORE 1/12/2018 1/31/2018 3/26/2018   Total Score - - -   Total Score 11 19 14     Labs:    Liver Function Studies -   Recent Labs   Lab Test  01/13/16   0941   PROTTOTAL  7.5   ALBUMIN  3.7   BILITOTAL  0.3   ALKPHOS  92   AST  32   ALT  63*     Recent Labs   Lab Test  01/13/16   0941  03/28/14   1008   CHOL  244*  238*   HDL  64  81*   LDL  147*  143*   TRIG  164*  72   CHOLHDLRATIO   --   2.9*       MENTAL STATUS EXAM                                                                                        Alert. Oriented to person, place, and date / time. Well groomed, calm, cooperative with good eye contact. No problems with speech or psychomotor behavior. Mood was anxious and depressed but \"better\" and affect was congruent to speech content and full .  Thought process, including associations, was unremarkable and thought content was devoid of suicidal and homicidal ideation and psychotic thought. No hallucinations. Insight was good. Judgment was intact and adequate for safety. Fund of knowledge was intact. Pt demonstrates no obvious problems with attention, concentration, language, recent or remote memory although these were not formally tested.     ASSESSMENT                                                                                                      HISTORICAL:  Initial psych consult 12/12/14            Notes:  Ritalin as adjunct to antidepressant: didn't like how made her feel.   Prozac, Celexa ineffective. Now Wellbutrin " "helping. Zoloft: weight gain. Effexor back while: didn't stay on long and can't recall why. Cymbalta: was on and didn't help. She tried Provigil and SEs. Buspar: HAs.   Neuropsych testing summer 2015:  language: about same, working memory: better than last year. Attention / concentration worse than last year. Speed processing: improved. Executive function: didn't test well on that front also comparable to last year. Memory: worse off than last year. Depression: similar to last year. Anxiety / emotional anxiety worse. As part of assessment they think should possible have another MRI  CURRENT: This patient provides a history which supports the diagnoses of Major depressive disorder, recurrent, mod and mild neurocognitive disorder. We cross-tapered to Wellbutrin. We have been discussing trying a stimulant as adjunct to antidepressant but wasn't helpful and made her feel mentally off so  d/c. WE tried buspar for anxiety but had HAs. Will continue Wellbutrin 200 mg bid and Klonopin for anxiety.     Cannot take Aleve, Motrin, ASA thus Tramadol. We discussed combination of benzos and opoioids and the guidelines. In terms of risks vs. Benefits Sharlene and I do NOT feel would be good idea to take her off Klonopin. We feel she would get to point she would be unable to get out of her house.    She started on donepezil: does feel at this point it is now helping!! Also she feels medical MJ is helping. This is the first perhaps time I've heard her say \"good\" when I asked how she is doing. She tapered off wellbutrin and no issues so we certainly not going to add it or anything new on. Will continue donepezil and KLonpin.         TREATMENT RISK STATEMENT: The risks, benefits, alternatives and potential adverse effects have been explained and are understood by the pt. The pt agrees to the treatment plan with the ability to do so. The pt knows to call the clinic for any problems or access emergency care if needed.    DIAGNOSES      " (Use of Axes system will continue, although absent from DSM 5)           Axis I - Major depressive disorder, recurrent moderate  Mild neurocognitive disorder  Axis II - no dx  Axis III - lumbago, fibromyalgia, hx migraines, hx diverticultitis  Axis IV- Psychosocial Stressors include: finances, marital strain  Axis V - Global Assessment of Functioning current: 41- 50 Serious symptoms OR any serious impairment in social, occupational, or school functioning.    PLAN                                                                                                                         1) MEDICATION:   -- continue donepezil, she is now off of Wellbutrin. Continue Klonopin 1 mg bid.     2) THERAPY: no change.     3) LABS: none today    4) Other: Neurology provider Dr. Andre through [facility]. Neuropsych testing with Dr. Johnathan Min    8)  RTC: ~5 months or so: she will call.    Pharmacist: [phone]  Customer service [phone]

## 2018-03-26 NOTE — NURSING NOTE
"Chief Complaint   Patient presents with     RECHECK     Mental health.       Initial /58 (BP Location: Left arm, Patient Position: Sitting, Cuff Size: Adult Regular)  Pulse 59  Temp 97.5  F (36.4  C) (Tympanic)  Wt 200 lb (90.7 kg)  BMI 32.78 kg/m2 Estimated body mass index is 32.78 kg/(m^2) as calculated from the following:    Height as of 1/12/18: 5' 5.5\" (1.664 m).    Weight as of this encounter: 200 lb (90.7 kg).  Medication Reconciliation: complete     MARCELO DASILVA      "

## 2018-03-27 ASSESSMENT — PATIENT HEALTH QUESTIONNAIRE - PHQ9: SUM OF ALL RESPONSES TO PHQ QUESTIONS 1-9: 14

## 2018-03-27 ASSESSMENT — ANXIETY QUESTIONNAIRES: GAD7 TOTAL SCORE: 9

## 2018-03-29 DIAGNOSIS — I25.85 CARDIAC MICROVASCULAR DISEASE: ICD-10-CM

## 2018-03-30 RX ORDER — DILTIAZEM HYDROCHLORIDE 300 MG/1
CAPSULE, EXTENDED RELEASE ORAL
Qty: 90 CAPSULE | Refills: 1 | Status: SHIPPED | OUTPATIENT
Start: 2018-03-30 | End: 2018-09-18

## 2018-04-11 DIAGNOSIS — K64.9 HEMORRHOID: ICD-10-CM

## 2018-04-11 NOTE — TELEPHONE ENCOUNTER
PROCTO-MED HC 2.5% CRE    Last Written Prescription Date:  9/20/2016  Last Fill Quantity: 58g,   # refills: 1  Last Office Visit: 1/12/2018  Future Office visit:    Next 5 appointments (look out 90 days)     May 22, 2018  9:30 AM CDT   (Arrive by 9:15 AM)   Return Visit with Norman Nichole MD   [facility] ([facility] [place] )    [address] [place] [address]   [phone]

## 2018-04-13 DIAGNOSIS — K21.9 GASTROESOPHAGEAL REFLUX DISEASE WITHOUT ESOPHAGITIS: ICD-10-CM

## 2018-04-23 DIAGNOSIS — M79.7 FIBROMYALGIA: ICD-10-CM

## 2018-04-24 RX ORDER — TRAMADOL HYDROCHLORIDE 50 MG/1
TABLET ORAL
Qty: 180 TABLET | Refills: 0 | Status: SHIPPED | OUTPATIENT
Start: 2018-04-24 | End: 2018-06-06

## 2018-04-24 NOTE — TELEPHONE ENCOUNTER
Controlled Substance Refill Request for Tramadol  Problem List Complete:  Yes    Last Written Prescription Date:  3/8/18  Last Fill Quantity: 180,   # refills: 0    Last Office Visit with [facility] primary care provider: 1/12/18    Chronic pain         Problem Detail      Noted:  3/12/2014      Priority:  Medium                  Last Encounter with Chronic pain       Visit Information         Provider Department Center     1/12/2018 KEILY Cobos [facility]       Diagnoses         Codes Comments     Gastroesophageal reflux disease with esophagitis  - Primary K21.0      Chronic pain syndrome  G89.4      Mixed connective tissue disease (H)  M35.1      Acquired hypothyroidism  E03.9        Notes      Concha Hare PA at 1/12/2018  9:45 AM      Status: Signed         Expand All Collapse All       SUBJECTIVE:   Sharlene Mccord is a 57 year old female who presents to clinic today for the following health issues:        Chronic Pain Follow-Up/ Fibromyalgia     Type / Location of Pain: across back into legs and neck  Analgesia/pain control:       Recent changes:  improved      Overall control: Comfortably manageable  Activity level/function:      Daily activities:  Able to do light housework, cooking, Able to do moderate activities and Can do most things most days, with some rest    Work:  not applicable and on disability  Adverse effects:  No  Adherance    Taking medication as directed?  Yes    Participating in other treatments: cannibis   Risk Factors:    Sleep:  Good    Mood/anxiety:  improved    Recent family or social stressors:  Family stress    Other aggravating factors: over doing it, stairs   PHQ-9 SCORE 10/18/2017 11/17/2017 11/28/2017   Total Score - - -   Total Score 20 19 19      SHAKIR-7 SCORE 10/18/2017 11/17/2017 11/28/2017   Total Score 15 12 14      Encounter-Level CSA - 06/05/2017:            Controlled Substance Agreement - Scan on 6/6/2017 12:39 PM : CONTROLLED SUBSTANCE AGREEMENT  (below)                    Amount of exercise or physical activity: None    Problems taking medications regularly: No    Medication side effects: none    Diet: regular (no restrictions)     GERD/Heartburn       Duration: couple years    Description (location/character/radiation): burning and sourness up into throat    Intensity:  moderate, getting worse    Accompanying signs and symptoms:  food getting stuck: YES  nausea/vomiting/blood: no   abdominal pain: no   black/tarry or bloody stools: no :    History (similar episodes/previous evaluation): had testing done last May. Has sliding hernia    Precipitating or alleviating factors:  worse with spicy foods and water, caffine.  current NSAID/Aspirin use: no     Therapies tried and outcome: Zantac (Ranitidine) and Reglan, Omeprazole                    Problem list and histories reviewed & adjusted, as indicated.  Additional history: as documented         Patient Active Problem List   Diagnosis     Diverticulitis of sigmoid colon     Hypothyroidism     Anxiety state     Cardiac microvascular disease (H)     Diverticulitis     Aortic valve disorder     ACP (advance care planning)     Chronic pain     Constipation     Memory loss     Major depressive disorder, recurrent episode (H)     Cognitive disorder     Major depressive disorder     Fatigue     Migraine without aura and responsive to treatment     Atypical migraine     Fibromyalgia     Mixed connective tissue disease (H)      Past Surgical History    Past Surgical History:   Procedure Laterality Date     APPENDECTOMY   1977     BIOPSY   2017     taken at [facility] from Dr. Nadira cohen. Thyroid     CARDIAC SURGERY   2013     angiogram [facility]:  Normal coronary arteries.  Felt ot have some microvascular disease     CL AFF SURGICAL PATHOLOGY   01/02/2007     Thyroid Mass     COLONOSCOPY   1/13/2014     Procedure: COLONOSCOPY;  COLONOSCOPY ;  Surgeon: Chasidy Gee DO;  Location: [facility] OR     ESOPHAGOSCOPY, GASTROSCOPY,  DUODENOSCOPY (EGD), COMBINED N/A 2/9/2017     Procedure: COMBINED ESOPHAGOSCOPY, GASTROSCOPY, DUODENOSCOPY (EGD);  Surgeon: Johnathan Ruff DO;  Location: [facility] OR     GYN SURGERY         c-sections x 3     GYN SURGERY         complete hysterectomy     HYSTERECTOMY   2001     LAPAROSCOPIC CHOLECYSTECTOMY   11/07/2003     Cholelithiasis     LAPAROTOMY EXPLORATORY         abdominal pain/fever     LARYNGOSCOPY         SPLENECTOMY                      Social History   Substance Use Topics     Smoking status: Light Tobacco Smoker       Packs/day: 0.25       Years: 40.00       Types: Cigarettes       Start date: 1/1/1975     Smokeless tobacco: Never Used         Comment: 5 cigs daily     Alcohol use No      Family History             Family History   Problem Relation Age of Onset     C.A.D. Father       Hypertension Father       C.A.D. Mother       CEREBROVASCULAR DISEASE Mother         CVA     Hypertension Mother       Coronary Artery Disease Mother       DIABETES Paternal Grandmother       DIABETES Paternal Grandfather       DIABETES Sister       Breast Cancer Sister       DIABETES Maternal Grandmother       DIABETES Sister       Breast Cancer Sister       Hypertension Sister       Depression Sister       Anxiety Disorder Sister       Depression Sister       Depression Brother       Depression Daughter       Obesity Daughter       Depression Daughter       MENTAL ILLNESS Daughter         bi-polar1     Anxiety Disorder Daughter       Anxiety Disorder Other                Current Outpatient Prescriptions           Current Outpatient Prescriptions   Medication Sig Dispense Refill     UNABLE TO FIND MEDICATION NAME: Cannabis as needed         pantoprazole (PROTONIX) 20 MG EC tablet Take by mouth 30-60 minutes before a meal. 30 tablet 3     traMADol (ULTRAM) 50 MG tablet TAKE TWO TABLETS BY MOUTH EVERY 8 HOURS AS NEEDED FOR PAIN 180 tablet 0     metoclopramide (REGLAN) 5 MG tablet TAKE ONE TABLET BY MOUTH 4 TIMES DAILY  BEFORE MEAL(S) AT BEDTIME AS NEEDED 60 tablet 2     DONEPEZIL HCL PO Take 5 mg by mouth At Bedtime         diclofenac (VOLTAREN) 1 % GEL topical gel Place onto the skin 4 times daily 100 g 3     celecoxib (CELEBREX) 100 MG capsule Take 1 capsule (100 mg) by mouth 2 times daily 60 capsule 0     ranitidine (ZANTAC) 150 MG tablet Take 1 tablet (150 mg) by mouth 2 times daily 60 tablet 3     hydrOXYzine (ATARAX) 25 MG tablet TAKE ONE TO TWO TABLETS BY MOUTH AT BEDTIME FOR  NAUSEA 120 tablet 0     clonazePAM (KLONOPIN) 1 MG tablet Take 0.5-1 tablets (0.5-1 mg) by mouth 2 times daily as needed for anxiety 60 tablet 3     buPROPion (WELLBUTRIN SR) 200 MG 12 hr tablet Take 1 tablet (200 mg) by mouth 2 times daily 60 tablet 11     estradiol (ESTRACE) 0.5 MG tablet TAKE ONE TABLET BY MOUTH TWICE DAILY 60 tablet 11     NITROSTAT 0.4 MG sublingual tablet Place 1 tablet (0.4 mg) under the tongue every 5 minutes as needed for chest pain 25 tablet 11     diltiazem (TIAZAC) 300 MG 24 hr ER beaded capsule Take 1 capsule (300 mg) by mouth daily 90 capsule 3     levothyroxine (SYNTHROID/LEVOTHROID) 50 MCG tablet Take 1 tablet (50 mcg) by mouth daily 30 tablet 10     PROCTOSOL HC 2.5 % rectal cream APPLY  CREAM TO AFFECTED AREA THREE TIMES DAILY 58 g 1     SUMAtriptan (IMITREX) 100 MG tablet TAKE ONE TABLET BY MOUTH AS NEEDED MIGRAINES 10 tablet 5     hydroxychloroquine (PLAQUENIL) 200 MG tablet Take 1 tablet (200 mg) by mouth 2 times daily 60 tablet 1               Allergies   Allergen Reactions     Codeine       Isosorbide Mononitrate Other (See Comments)       Vomiting and headache     Lisinopril         [facility] scan              Recent Labs   Lab Test  09/11/17   1043  05/05/17 2025 04/19/17   1133  05/09/16   0930  01/13/16   0941   LDL   --    --   104*  127*  147*   HDL   --    --   77  70  64   TRIG   --    --   165*  204*  164*   ALT  27  38  28  44  63*   CR  1.05*  1.06*  0.97  0.94  0.93   GFRESTIMATED  54*  54*   "59*  62  63   GFRESTBLACK  65  65  71  75  76   POTASSIUM  4.3  3.9  4.4  4.1  4.8   TSH  1.17   --   1.24  3.12  2.58          BP Readings from Last 3 Encounters:   01/12/18 106/64   11/28/17 128/70   11/17/17 108/64         Wt Readings from Last 3 Encounters:   01/12/18 211 lb (95.7 kg)   11/28/17 205 lb (93 kg)   11/17/17 205 lb (93 kg)                                  Reviewed and updated as needed this visit by clinical staff     Reviewed and updated as needed this visit by Provider        ROS:  Constitutional, neuro, ENT, endocrine, pulmonary, cardiac, gastrointestinal, genitourinary, musculoskeletal, integument and psychiatric systems are negative, except as otherwise noted.        OBJECTIVE:      /64 (BP Location: Left arm, Patient Position: Sitting, Cuff Size: Adult Regular)  Pulse 64  Temp 97.9  F (36.6  C) (Tympanic)  Resp 16  Ht 5' 5.5\" (1.664 m)  Wt 211 lb (95.7 kg)  BMI 34.58 kg/m2  Body mass index is 34.58 kg/(m^2).  GENERAL APPEARANCE: healthy, alert and no distress  EYES: Eyes grossly normal to inspection, PERRL and conjunctivae and sclerae normal  HENT: ear canals and TM's normal and nose and mouth without ulcers or lesions  NECK: no adenopathy, no asymmetry, masses, or scars and thyroid normal to palpation  RESP: lungs clear to auscultation - no rales, rhonchi or wheezes  CV: regular rates and rhythm, normal S1 S2, no S3 or S4 and no murmur, click or rub  LYMPHATICS: normal ant/post cervical and supraclavicular nodes  ABDOMEN: soft, nontender, without hepatosplenomegaly or masses and bowel sounds normal  MS: extremities normal- no gross deformities noted  SKIN: no suspicious lesions or rashes  NEURO: Normal strength and tone, mentation intact and speech normal  PSYCH: mentation appears normal and affect normal/bright. Her sleeping is much better. Mood is better in out look.  Less stress on her face.  The type of pain is better and tolerable.   Is gotten her license.      Diagnostic " test results:  Diagnostic Test Results:        Results for orders placed or performed in visit on 01/12/18   TSH with free T4 reflex   Result Value Ref Range     TSH 1.24 0.40 - 4.00 mU/L          ASSESSMENT/PLAN:      1. Gastroesophageal reflux disease with esophagitis  We spoke to her about long term use nd risk for loss of calcium absorption.  She is very into her fitness if she can be.   Recommended to increase calcium and limit her trigger foods and use weight loss and exercise.   Let us know if nor doin better.     - pantoprazole (PROTONIX) 20 MG EC tablet; Take by mouth 30-60 minutes before a meal.  Dispense: 30 tablet; Refill: 3     2. 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  She feels better.  Pain is controlled better.   Up to 1.5 mg and Rheumatology is also also is working on her pain.      3. Mixed connective tissue disease (H)  Seeing rheumatology.  Working on her diagnosis.  Right now not specific but they are sure she has inflammatory arthritis.      4. Acquired hypothyroidism  Labs re in line. Recheck in 6 months.   - TSH with free T4 reflex           See Patient Instructions     KEILY Cobos  [facility]            Revision History          Date/Time User Action     > 2/11/2018  5:54 PM Concha Hare PA Sign      1/12/2018 10:12 AM Kortney Smalls LPN Sign at close encounter                         Future Office visit:   Next 5 appointments (look out 90 days)     May 22, 2018  9:30 AM CDT   (Arrive by 9:15 AM)   Return Visit with Norman Nichole MD   [facility] ([facility] )    [address] [place] [address]   [phone]                    Processing:  Fax Rx to [facility]

## 2018-05-04 ENCOUNTER — HOSPITAL ENCOUNTER (OUTPATIENT)
Dept: CARDIOLOGY | Facility: HOSPITAL | Age: 58
Discharge: HOME | End: 2018-05-04
Attending: INTERNAL MEDICINE | Admitting: INTERNAL MEDICINE
Payer: MEDICARE

## 2018-05-04 PROCEDURE — 93306 TTE W/DOPPLER COMPLETE: CPT | Mod: TC

## 2018-05-04 PROCEDURE — 93306 TTE W/DOPPLER COMPLETE: CPT | Mod: 26 | Performed by: INTERNAL MEDICINE

## 2018-05-22 ENCOUNTER — OFFICE VISIT (OUTPATIENT)
Dept: CARDIOLOGY | Facility: OTHER | Age: 58
End: 2018-05-22
Attending: INTERNAL MEDICINE
Payer: COMMERCIAL

## 2018-05-22 VITALS
HEART RATE: 53 BPM | RESPIRATION RATE: 16 BRPM | DIASTOLIC BLOOD PRESSURE: 63 MMHG | SYSTOLIC BLOOD PRESSURE: 125 MMHG | OXYGEN SATURATION: 98 % | HEIGHT: 67 IN | BODY MASS INDEX: 29.82 KG/M2 | TEMPERATURE: 97.8 F | WEIGHT: 190 LBS

## 2018-05-22 DIAGNOSIS — I25.85 CARDIAC MICROVASCULAR DISEASE: ICD-10-CM

## 2018-05-22 DIAGNOSIS — Z72.0 TOBACCO ABUSE: ICD-10-CM

## 2018-05-22 DIAGNOSIS — I35.9 AORTIC VALVE DISORDER: ICD-10-CM

## 2018-05-22 PROCEDURE — G0463 HOSPITAL OUTPT CLINIC VISIT: HCPCS

## 2018-05-22 PROCEDURE — 99214 OFFICE O/P EST MOD 30 MIN: CPT | Performed by: INTERNAL MEDICINE

## 2018-05-22 ASSESSMENT — PAIN SCALES - GENERAL: PAINLEVEL: EXTREME PAIN (8)

## 2018-05-22 NOTE — PROGRESS NOTES
CC- fatigue    HPI- Mrs. Mccord has been followed for several years by various cardiologists. She has known AI and has complained of fatigue throughout. She reports recently her fatigue has been much worse. She related that her fatigue was associated with dyspnea on exertion and chest discomfort. Her stress test showed an anterior wall defect that did not fit with her echocardiogram. She also has aortic insufficiency that by echocardiogram did not seem to have progressed. She underwent angiography at [facility]. This demonstrated no epicardial coronary artery disease. Her echocardiogram again showed mild to mod AI. A stress MRI was done that  suggested microvascular disease. She was begun on isosorbide mononitrate but stopped after 4 days due to severe headache. She cannot recall if it helped with her chest pain. She was started on diltiazem but stopped when she was admitted for diverticulitis. She is already on estrogen replacement. On 360 mg of diltiazem she reported the chest pain was better but her BP was low. She also noted numbness and tingling in both arms and both legs. The legs was constant. The arms mostly when she worked over her head. She also felt fatigued. She does not report orthostatic symptoms. Her diltiazem was reduced to 300 mg daily. The chest pain is worse. The fatigue is maybe a little better and the numbness/tingling is unchanged.     June 2014:  At our last visit I discussed with her that I have no other therapies to offer regarding her chest pain. We discuss the possibility of starting a medication such as Neurontin that has been reported to help with chronic pain syndromes. There is some evidence that patients with microvascular angina have abnormal pain sensitivity. She reports the Neurontin was started and it has helped. She is using SL NTG 1-2x/week as opposed to several times a day.    We had stopped her Diovan because on the increased diltiazem dose she was  "having relatively low BP. She is worried because she has seen some BP measurements that are high for her. She admits to being stressed. She has just started a w/u for memory loss and a change was seen on her MRI.    She had an echocardiogram done in March, see below, that showed no change in her aortic insufficiency.     [id number]:    She has been doing reasonably well. She is not having as much chest pain. She has been less active due to problems with disk disease in her spine. She has not noted any major changes in her exertional abilities.     [id number]: She has been having more joint pain. She is seeing a Rheumatologist in [place] soon.    Her chest pain is perhaps a bit better. Her legs and back feel heavy and she has sharp pains and aches, especially with climbing stairs. This has not been associated with her chest pain. On level drug she had been walking 2 miles with her sister but she has not done that for several months now.     [id number]: her chest pain has been doing reasonably well. She hasn't had NTG available for a few months.     She reports a new barking cough over the past 6 months - no fever, no new meds, not productive. The cough is not getting any worse but no better either. She has not had any wheezing that she has noticed.     Her legs feel heavy when she walks.    [id number] Interval history: She reports no real change in her chest pain or exertional abilities.  She has been troubled by chronic pain \"all over her body\".  She reports that rheumatology felt that she had a mixed connective tissue disorder.  She tried Plaquenil for a time but has stopped that and has been trying medical cannabis.  She reports the cannabis has not helped particularly.    Her recent echocardiogram showed no change in her aortic insufficiency.  Her systolic function remains good.  Left ventricular size remains normal.    Current Outpatient Prescriptions   Medication Sig Dispense Refill     clonazePAM (KLONOPIN) 1 MG " tablet TAKE ONE-HALF TO ONE TABLET BY MOUTH TWICE DAILY AS NEEDED FOR ANXIETY 60 tablet 5     diclofenac (VOLTAREN) 1 % GEL topical gel Place onto the skin 4 times daily 100 g 3     diltiazem (TIAZAC) 300 MG 24 hr ER beaded capsule TAKE ONE CAPSULE BY MOUTH ONCE DAILY 90 capsule 1     DONEPEZIL HCL PO Take 5 mg by mouth At Bedtime       estradiol (ESTRACE) 0.5 MG tablet TAKE ONE TABLET BY MOUTH TWICE DAILY 60 tablet 11     hydrocortisone (ANUSOL-HC) 2.5 % cream APPLY  CREAM TO AFFECTED AREA THREE TIMES DAILY 60 g 1     levothyroxine (SYNTHROID/LEVOTHROID) 50 MCG tablet TAKE ONE TABLET BY MOUTH ONCE DAILY 30 tablet 10     metoclopramide (REGLAN) 5 MG tablet TAKE ONE TABLET BY MOUTH 4 TIMES DAILY BEFORE MEAL(S) AT BEDTIME AS NEEDED 60 tablet 2     ranitidine (ZANTAC) 150 MG tablet TAKE ONE TABLET BY MOUTH TWICE DAILY 60 tablet 3     traMADol (ULTRAM) 50 MG tablet TAKE 2 TABLETS BY MOUTH EVERY 8 HOURS AS NEEDED FOR PAIN 180 tablet 0     UNABLE TO FIND MEDICATION NAME: Cannabis as needed     [place] Vapor take one puff to the count of two at bedtime.  If needed in the middle of the night       UNABLE TO FIND 1.5 mg 3 times daily MEDICATION NAME: Oral Cannabis    Oral suspension  Taken morning, afternoon and bedtime       hydrOXYzine (ATARAX) 25 MG tablet TAKE ONE TO TWO TABLETS BY MOUTH AT BEDTIME FOR  NAUSEA (Patient not taking: Reported on 5/22/2018) 120 tablet 0     NITROSTAT 0.4 MG sublingual tablet Place 1 tablet (0.4 mg) under the tongue every 5 minutes as needed for chest pain (Patient not taking: Reported on 5/22/2018) 25 tablet 11     pantoprazole (PROTONIX) 20 MG EC tablet Take by mouth 30-60 minutes before a meal. (Patient not taking: Reported on 5/22/2018) 30 tablet 3     SUMAtriptan (IMITREX) 100 MG tablet TAKE ONE TABLET BY MOUTH AS NEEDED MIGRAINES (Patient not taking: Reported on 5/22/2018) 10 tablet 5     Allergies   Allergen Reactions     Codeine      Isosorbide Mononitrate Other (See Comments)      Vomiting and headache       Lisinopril      [facility] scan     Past Medical History:   Diagnosis Date     Anxiety state, unspecified 03/01/2011     Aortic valve disorders 06/07/2000    Echocardiogram showin mild/moderate AI.  Normal LV function     Cardiac microvascular disease (H) 4/10/2013    Based on Cardiac MRI done at       Depressive disorder 1997     Fatigue      Fibromyalgia 03/01/2011     Hypertension      Major depressive disorder, recurrent episode, unspecified 12/17/2014     Migraine, unspecified, without mention of intractable migraine without mention of status migrainosus 03/01/2011     Mitral valve disorders(424.0) 10/16/2007     PONV (postoperative nausea and vomiting)      Thyroid Nodule 03/01/2011     Tobacco use disorder 03/01/2011     Unspecified hypothyroidism 03/01/2011     Past Surgical History:   Procedure Laterality Date     APPENDECTOMY  1977     BIOPSY  2017    taken at [facility] from Dr. Nadira cohen. Thyroid     CARDIAC SURGERY  2013    angiogram [facility]:  Normal coronary arteries.  Felt ot have some microvascular disease     CL AFF SURGICAL PATHOLOGY  01/02/2007    Thyroid Mass     COLONOSCOPY  1/13/2014    Procedure: COLONOSCOPY;  COLONOSCOPY ;  Surgeon: Chasidy Gee DO;  Location: [facility] OR     ESOPHAGOSCOPY, GASTROSCOPY, DUODENOSCOPY (EGD), COMBINED N/A 2/9/2017    Procedure: COMBINED ESOPHAGOSCOPY, GASTROSCOPY, DUODENOSCOPY (EGD);  Surgeon: Johnathan Ruff DO;  Location: [facility] OR     GYN SURGERY      c-sections x 3     GYN SURGERY      complete hysterectomy     HYSTERECTOMY  2001     LAPAROSCOPIC CHOLECYSTECTOMY  11/07/2003    Cholelithiasis     LAPAROTOMY EXPLORATORY      abdominal pain/fever     LARYNGOSCOPY       SPLENECTOMY       Social History   Substance Use Topics     Smoking status: Light Tobacco Smoker     Packs/day: 0.25     Years: 40.00     Types: Cigarettes     Start date: 1/1/1975     Smokeless tobacco: Never Used      Comment: 5 cigs daily  Patient will work on  "smoking cessation (3-26-18)     Alcohol use No     ROS- the ten system review is negative except as noted in the HPI. [id number]    Physical examination:  /63 (BP Location: Left arm, Patient Position: Chair, Cuff Size: Adult Large)  Pulse 53  Temp 97.8  F (36.6  C) (Tympanic)  Resp 16  Ht 1.702 m (5' 7\")  Wt 86.2 kg (190 lb)  SpO2 98%  BMI 29.76 kg/m2    GENERAL APPEARANCE: healthy, alert and no distress  NECK: no venous distention  RESPIRATORY: lungs clear to auscultation - no rales, rhonchi or wheezes  CARDIOVASCULAR: regular, normal S1 S2, no gallop, soft AI murmur as before  EXTREMITIES: no edema   VASC: pedal pulses are normal    Laboratory:    Results for STEVEN SZYMANSKI (MRN [id number]) as of 2018 09:37   Ref. Range 2018 11:30   TSH Latest Ref Range: 0.40 - 4.00 mU/L 1.24       [facility]  Echocardiography Laboratory  [address] [place] [address]     Name: STEVEN SZYMANSKI  MRN: [id number]  : 1960  Study Date: 2018 10:07 AM  Age: 57 yrs  Gender: Female  Patient Location: [facility]  Reason For Study: , Nonrheumatic aortic valve insufficiency  History: AI  Ordering Physician: YOHAN CANTOR  Referring Physician: YOHAN CANTOR  Performed By: Earnestine Tipton     BSA: 2.0 m2  Height: 67 in  Weight: 197 lb  _____________________________________________________________________________  __        Procedure  Echocardiogram with two-dimensional, color and spectral Doppler performed.  _____________________________________________________________________________  __        Interpretation Summary  No pericardial effusion is present.  Global and regional left ventricular function is normal with an EF of 55-60%.  Grade I or early diastolic dysfunction.  The right ventricle is normal size.  Global right ventricular function is normal.  Both atria appear normal.  Trace mitral insufficiency is present.  Moderate aortic insufficiency is present.  Trace " to mild tricuspid insufficiency is present.  Right ventricular systolic pressure is 18.7mmHg above the right atrial  pressure.  The pulmonic valve is normal.  The aorta root is normal.  _____________________________________________________________________________  __        Left Ventricle  Global and regional left ventricular function is normal with an EF of 55-60%.  Grade I or early diastolic dysfunction.     Right Ventricle  The right ventricle is normal size. Global right ventricular function is  normal.     Atria  Both atria appear normal.     Mitral Valve  Trace mitral insufficiency is present.     Aortic Valve  Aortic valve is normal in structure and function. Moderate aortic  insufficiency is present.        Tricuspid Valve  Trace to mild tricuspid insufficiency is present. Right ventricular systolic  pressure is 18.7mmHg above the right atrial pressure.     Pulmonic Valve  The pulmonic valve is normal.     Vessels  The aorta root is normal.     Pericardium  No pericardial effusion is present.     _____________________________________________________________________________  __  MMode/2D Measurements & Calculations  RVDd: 0.71 cm  IVSd: 0.81 cm  IVSs: 1.3 cm  LVIDd: 5.6 cm  LVIDs: 3.6 cm  LVPWd: 0.81 cm  LVPWs: 1.3 cm  FS: 35.9 %  LV mass(C)d: 167.2 grams  LV mass(C)dI: 83.2 grams/m2  LV mass(C)sI: 84.1 grams/m2  Ao root diam: 3.0 cm  LA dimension: 3.9 cm  LA/Ao: 1.3  LVOT diam: 2.0 cm  LVOT area: 3.0 cm2     RWT: 0.29     Time Measurements  Pulm. HR: 148.2 BPM  MM HR: 60.0 BPM     Doppler Measurements & Calculations  MV E max baldemar: 100.4 cm/sec  MV A max baldemar: 120.0 cm/sec  MV E/A: 0.84  MV dec slope: 558.9 cm/sec2  MV dec time: 0.18 sec  Ao V2 max: 136.1 cm/sec  Ao max P.4 mmHg  Ao V2 mean: 84.3 cm/sec  Ao mean PG: 3.4 mmHg  Ao V2 VTI: 29.4 cm  JAVY(I,D): 2.7 cm2  JAVY(V,D): 2.8 cm2  AI P1/2t: 699.3 msec  LV V1 max P.5 mmHg  LV V1 max: 127.9 cm/sec  LV V1 VTI: 26.4 cm  CO(LVOT): 13.7 l/min  CI(LVOT):  6.8 l/min/m2  SV(LVOT): 79.5 ml  SI(LVOT): 39.6 ml/m2  TV max P.8 mmHg  PA V2 max: 97.3 cm/sec  PA max PG: 3.8 mmHg  PA mean P.9 mmHg  PA V2 VTI: 26.1 cm  TR max stefan: 215.4 cm/sec  TR max P.6 mmHg     AV Stefan Ratio (DI): 0.94  JAVY Index (cm2/m2): 1.3  Peak E' Stefan: 8.1 cm/sec     _____________________________________________________________________________  __           Report approved by: Shayy Steel 2018 12:23 PM      ANKLE BRACHIAL INDEX     FINDINGS:  The ankle brachial index measured 1.2 bilaterally.  Absolute ankle pressures were 160 which are adequate for wound  healing.     IMPRESSION:  NO EVIDENCE OF ARTERIAL OCCLUSIVE DISEASE IN EITHER LOWER  EXTREMITY.  Exam Date: 2017 03:10:00 PM  Author: LIS CRYSTAL  This report is final and signed    Previous studies reviewed today:  ECHOCARDIOGRAM    M-mode, two-dimensional, color-flow, and Doppler studies were obtained  on this patient.  Standard views were utilized.    ORDERING PHYSICIAN:  Norman Nichole MD    INDICATIONS:  Aortic valve disorder    Cardiac Dimensions                                    Normal  Dimensions  Aortic Root:                            31.3 mm      Aortic Root  Diameter: 20-37 mm  Left Atrium:                            34.8 mm      Left Atrium:  19-40 mm  Right Ventricle:                       Not measured      Right  Ventricle: 7-23 mm  Left Ventricle end-diastole:    50.2 mm      Left Ventricle  end-diastole: 35-56 mm  Left Ventricle end-systole:     33.5 mm      Left Ventricle  end-systole: 35-56 mm  IVS end-diastole:                      9.7 mm      IVS end-diastole:  7-11 mm  LVPW:                                      9.7 mm      LVPW: 7-11 mm    Review of the study shows there is no pericardial effusion.  The left  ventricle has normal size and systolic function.  Ejection fraction at  60%.  The left atrium is normal.  Right atrium normal. Right ventricle  is normal on 2-D study.  The mitral  "valve has a trace amount of mitral  regurgitation.  The aortic valve has no stenosis. There is at least a  moderate amount of aortic insufficiency.  The tricuspid valve has a  trace amount of tricuspid regurgitation. Peak systolic pressure of the  right ventricle is estimated at 22 plus right atrium.  Diastolic  indices show reversal of the E:A ratio at 0.81, suggesting diastolic  dysfunction.      ASSESSMENT:  Echocardiographic study revealing  1.  Normal left ventricular size and systolic function.  Ejection  fraction at 60%.  2.  Normal atrium.  3.  Normal right ventricle size and function.  4.  Trace mitral regurgitation.  5.  Aortic valve without stenosis, but moderate aortic insufficiency  (unchanged from previously).  6.  Trace tricuspid regurgitation.  Peak systolic pressure of the  right ventricle is estimated at 22 plus right atrium.  7.  Evidence of diastolic dysfunction is seen.  Exam Date: Apr 06, 2017 11:51:07 AM  Author: BRAN DELGADO      Assessment and recommendations:    1) Fatigue/chest pain/ dyspnea on exertion   2) Moderate AI  3) Microvascular angina    - stable exam  - repeat echocardiogram shows no change, LV size normal    Given his been no change now in her echocardiograms for several years I will see her back in 1 year but will hold off on repeating the echo unless there is been a change in her exam or functional status.    4) Hypertension -  Under good control.    5) Leg \"heaviness - JESUS was normal      I appreciate the chance to help with Mrs. Suri eugene. Please let me know if you have any questions or concerns. I will see her in one year.     Norman Nichole M.D.  "

## 2018-05-22 NOTE — PATIENT INSTRUCTIONS
You were seen by Dr. Nichole, 5/22/2018.     1.  Everything looked good on your Echocardiogram.  We will do a repeat Echocardiogram in 2 years, unless you are having issues such as a change in your heart murmur noted by Concha Hare or you experience an increase in shortness of breath, difficulties lying flat when sleeping, etc.  The grade 1 diastolic dysfunction means the heart tissue is stiffening and doesn't relax as well.  The treatment for this is basically the things you are already doing such as limiting your daily salt intake, exercising, and controlling your blood pressure.         2. You can wear compression stockings when you are expecting to be inactive for a period of time.  Or you can lie on the floor and elevate your legs above your heart.  It will help with the swelling.    You will follow up with Dr. Nichole in 1 year unless you have issues or concerns.  Your annual visit is scheduled for May 14, 2019, at 9:15 am.       Please call the cardiology office with problems, questions, or concerns at [phone].    If you experience chest pain, chest pressure, chest tightness, shortness of breath, fainting, lightheadedness, nausea, vomiting, or other concerning symptoms, please report to the Emergency Department or call 911. These symptoms may be emergent, and best treated in the Emergency Department.     Nataly Domingo RN  Cardiology   [facility]  [phone]      HOW TO QUIT SMOKING  Smoking is one of the hardest habits to break. About half of all those who have ever smoked have been able to quit, and most of those (about 70%) who still smoke want to quit. Here are some of the best ways to stop smoking.     KEEP TRYING:  It takes most smokers about 8 tries before they are finally able to fully quit. So, the more often you try and fail, the better your chance of quitting the next time! So, don't give up!    GO COLD TURKEY:  Most ex-smokers quit cold turkey. Trying to cut back  gradually doesn't seem to work as well, perhaps because it continues the smoking habit. Also, it is possible to fool yourself by inhaling more while smoking fewer cigarettes. This results in the same amount of nicotine in your body!    GET SUPPORT:  Support programs can make an important difference, especially for the heavy smoker. These groups offer lectures, methods to change your behavior and peer support. Call the free national Quitline for more information. 800-QUIT-NOW (530-831-7029). Low-cost or free programs are offered by many hospitals, local chapters of the American Lung Association (610-927-4467) and the American Cancer Society (199-412-5433). Support at home is important too. Non-smokers can help by offering praise and encouragement. If the smoker fails to quit, encourage them to try again!    OVER-THE-COUNTER MEDICINES:  For those who can't quit on their own, Nicotine Replacement Therapy (NRT) may make quitting much easier. Certain aids such as the nicotine patch, gum and lozenge are available without a prescription. However, it is best to use these under the guidance of your doctor. The skin patch provides a steady supply of nicotine to the body. Nicotine gum and lozenge gives temporary bursts of low levels of nicotine. Both methods take the edge off the craving for cigarettes. WARNING: If you feel symptoms of nicotine overdose, such as nausea, vomiting, dizziness, weakness, or fast heartbeat, stop using these and see your doctor.    PRESCRIPTION MEDICINES:  After evaluating your smoking patterns and prior attempts at quitting, your doctor may offer a prescription medicine such as bupropion (Zyban, Wellbutrin), varenicline (Chantix, Champix), a niocotine inhaler or nasal spray. Each has its unique advantage and side effects which your doctor can review with you.    HEALTH BENEFITS OF QUITTING:  The benefits of quitting start right away and keep improving the longer you go without smokin minutes:  blood pressure and pulse return to normal  8 hours: oxygen levels return to normal  2 days: ability to smell and taste begins to improve as damaged nerves start to regrow  2-3 weeks: circulation and lung function improves  1-9 months: decreased cough, congestion and shortness of breath; less tired  1 year: risk of heart attack decreases by half  5 years: risk of lung cancer decreases by half; risk of stroke becomes the same as a non-smoker  For information about how to quit smoking, visit the following links:  National [employer] [place] ,   Clearing the Air, Quit Smoking Today   - an online booklet. http://www.smokefree.gov/pubs/clearing_the_air.pdf  Smokefree.gov http://smokefree.gov/  QuitNet http://www.quitnet.com/    9150-1152 [employer], [address] [place] [address]. All rights reserved. This information is not intended as a substitute for professional medical care. Always follow your healthcare professional's instructions.    The Benefits of Living Smoke Free  What do you want to gain from quitting? Check off some reasons to quit.  Health Benefits  ___ Reduce my risk of lung cancer, heart disease, chronic lung disease  ___ Have fewer wrinkles and softer skin  ___ Improve my sense of taste and smell  ___ For pregnant women--reduce the risk of having a miscarriage, stillbirth, premature birth, or low-birth-weight baby  Personal Benefits  ___ Feel more in control of my life  ___ Have better-smelling hair, breath, clothes, home, and car  ___ Save time by not having to take smoke breaks, buy cigarettes, or hunt for a light  ___ Have whiter teeth  Family Benefits  ___ Reduce my children s respiratory tract infections  ___ Set a good example for my children  ___ Reduce my family s cancer risk  Financial Benefits  ___ Save hundreds of dollars each year that would be spent on cigarettes  ___ Save money on medical bills  ___ Save on life, health, and car insurance premiums    Those Dollars Add  Up!  Cigarettes are expensive, and getting more expensive all the time. Do you realize how much money you are spending on cigarettes per year? What is the average amount you spend on a pack of cigarettes? What is the average number of packs that you smoke per day? Using your answers to these questions, fill in this formula to help you find out:  ($ _____ per pack) ×  ( _____ number of packs per day) × (365 days) =  $ _____ yearly cost of smoking  Besides tobacco, there are other costs, including extra cleaning bills and replacement costs for clothing and furniture; medical expenses for smoking-related illnesses; and higher health, life, and car insurance premiums.    Cigars and Pipes Count Too!  Cigars and pipes are also dangerous. So are smokeless (chewing) tobacco and snuff. All of these products contain nicotine, a highly addictive substance that has harmful effects on your body. Quitting smoking means giving up all tobacco products.      1181-8623 [facility], [address] [place] [address], [place] [address]. All rights reserved. This information is not intended as a substitute for professional medical care. Always follow your healthcare professional's instructions.

## 2018-05-22 NOTE — MR AVS SNAPSHOT
After Visit Summary   5/22/2018    Sharlene Mccord    MRN: [id number]           Patient Information     Date Of Birth          1960        Visit Information        Provider Department      5/22/2018 9:30 AM Norman Nichole MD [facility]        Today's Diagnoses     Tobacco abuse        Tobacco abuse counseling          Care Instructions    You were seen by Dr. Nichole, 5/22/2018.     1.  Everything looked good on your Echocardiogram.  We will do a repeat Echocardiogram in 2 years, unless you are having issues such as a change in your heart murmur noted by Concha Hare or you experience an increase in shortness of breath, difficulties lying flat when sleeping, etc.  The grade 1 diastolic dysfunction means the heart tissue is stiffening and doesn't relax as well.  The treatment for this is basically the things you are already doing such as limiting your daily salt intake, exercising, and controlling your blood pressure.         2. You can wear compression stockings when you are expecting to be inactive for a period of time.  Or you can lie on the floor and elevate your legs above your heart.  It will help with the swelling.    You will follow up with Dr. Nichole in 1 year unless you have issues or concerns.  Your annual visit is scheduled for May 14, 2019, at 9:15 am.       Please call the cardiology office with problems, questions, or concerns at [phone].    If you experience chest pain, chest pressure, chest tightness, shortness of breath, fainting, lightheadedness, nausea, vomiting, or other concerning symptoms, please report to the Emergency Department or call 911. These symptoms may be emergent, and best treated in the Emergency Department.     Nataly Domingo RN  Cardiology   [facility]  [phone]      HOW TO QUIT SMOKING  Smoking is one of the hardest habits to break. About half of all those who have ever smoked have been able to quit, and most of  those (about 70%) who still smoke want to quit. Here are some of the best ways to stop smoking.     KEEP TRYING:  It takes most smokers about 8 tries before they are finally able to fully quit. So, the more often you try and fail, the better your chance of quitting the next time! So, don't give up!    GO COLD TURKEY:  Most ex-smokers quit cold turkey. Trying to cut back gradually doesn't seem to work as well, perhaps because it continues the smoking habit. Also, it is possible to fool yourself by inhaling more while smoking fewer cigarettes. This results in the same amount of nicotine in your body!    GET SUPPORT:  Support programs can make an important difference, especially for the heavy smoker. These groups offer lectures, methods to change your behavior and peer support. Call the free national Quitline for more information. 800-QUIT-NOW (798-695-4943). Low-cost or free programs are offered by many hospitals, local chapters of the American Lung Association (203-698-1141) and the American Cancer Society (951-675-5713). Support at home is important too. Non-smokers can help by offering praise and encouragement. If the smoker fails to quit, encourage them to try again!    OVER-THE-COUNTER MEDICINES:  For those who can't quit on their own, Nicotine Replacement Therapy (NRT) may make quitting much easier. Certain aids such as the nicotine patch, gum and lozenge are available without a prescription. However, it is best to use these under the guidance of your doctor. The skin patch provides a steady supply of nicotine to the body. Nicotine gum and lozenge gives temporary bursts of low levels of nicotine. Both methods take the edge off the craving for cigarettes. WARNING: If you feel symptoms of nicotine overdose, such as nausea, vomiting, dizziness, weakness, or fast heartbeat, stop using these and see your doctor.    PRESCRIPTION MEDICINES:  After evaluating your smoking patterns and prior attempts at quitting, your  doctor may offer a prescription medicine such as bupropion (Zyban, Wellbutrin), varenicline (Chantix, Champix), a niocotine inhaler or nasal spray. Each has its unique advantage and side effects which your doctor can review with you.    HEALTH BENEFITS OF QUITTING:  The benefits of quitting start right away and keep improving the longer you go without smokin minutes: blood pressure and pulse return to normal  8 hours: oxygen levels return to normal  2 days: ability to smell and taste begins to improve as damaged nerves start to regrow  2-3 weeks: circulation and lung function improves  1-9 months: decreased cough, congestion and shortness of breath; less tired  1 year: risk of heart attack decreases by half  5 years: risk of lung cancer decreases by half; risk of stroke becomes the same as a non-smoker  For information about how to quit smoking, visit the following links:  National Cancer [employer] ,   Clearing the Air, Quit Smoking Today   - an online booklet. http://www.smokefree.gov/pubs/clearing_the_air.pdf  Smokefree.gov http://smokefree.gov/  QuitNet http://www.quitnet.com/    8784-4621 [facility], [address] [place] [address]. All rights reserved. This information is not intended as a substitute for professional medical care. Always follow your healthcare professional's instructions.    The Benefits of Living Smoke Free  What do you want to gain from quitting? Check off some reasons to quit.  Health Benefits  ___ Reduce my risk of lung cancer, heart disease, chronic lung disease  ___ Have fewer wrinkles and softer skin  ___ Improve my sense of taste and smell  ___ For pregnant women--reduce the risk of having a miscarriage, stillbirth, premature birth, or low-birth-weight baby  Personal Benefits  ___ Feel more in control of my life  ___ Have better-smelling hair, breath, clothes, home, and car  ___ Save time by not having to take smoke breaks, buy cigarettes, or hunt for a light  ___  Have whiter teeth  Family Benefits  ___ Reduce my children s respiratory tract infections  ___ Set a good example for my children  ___ Reduce my family s cancer risk  Financial Benefits  ___ Save hundreds of dollars each year that would be spent on cigarettes  ___ Save money on medical bills  ___ Save on life, health, and car insurance premiums    Those Dollars Add Up!  Cigarettes are expensive, and getting more expensive all the time. Do you realize how much money you are spending on cigarettes per year? What is the average amount you spend on a pack of cigarettes? What is the average number of packs that you smoke per day? Using your answers to these questions, fill in this formula to help you find out:  ($ _____ per pack) ×  ( _____ number of packs per day) × (365 days) =  $ _____ yearly cost of smoking  Besides tobacco, there are other costs, including extra cleaning bills and replacement costs for clothing and furniture; medical expenses for smoking-related illnesses; and higher health, life, and car insurance premiums.    Cigars and Pipes Count Too!  Cigars and pipes are also dangerous. So are smokeless (chewing) tobacco and snuff. All of these products contain nicotine, a highly addictive substance that has harmful effects on your body. Quitting smoking means giving up all tobacco products.      7251-1077 [place] [address]. All rights reserved. This information is not intended as a substitute for professional medical care. Always follow your healthcare professional's instructions.          Follow-ups after your visit        Your next 10 appointments already scheduled     May 14, 2019  9:30 AM CDT   (Arrive by 9:15 AM)   Return Visit with Norman Nichole MD   [facility] ([facility] )    [address] [place] [id number] [phone]              Who to contact     If you have questions or need follow up information about  "today's clinic visit or your schedule please contact [facility] directly at [phone].  Normal or non-critical lab and imaging results will be communicated to you by MyChart, letter or phone within 4 business days after the clinic has received the results. If you do not hear from us within 7 days, please contact the clinic through [employer] or phone. If you have a critical or abnormal lab result, we will notify you by phone as soon as possible.  Submit refill requests through [employer] or call your pharmacy and they will forward the refill request to us. Please allow 3 business days for your refill to be completed.          Additional Information About Your Visit        [employer] [employer] Information     [employer] gives you secure access to your electronic health record. If you see a primary care provider, you can also send messages to your care team and make appointments. If you have questions, please call your primary care clinic.  If you do not have a primary care provider, please call [phone] and they will assist you.        Care EveryWhere ID     This is your Care EveryWhere ID. This could be used by other organizations to access your [place] medical records  [id number]        Your Vitals Were     Pulse Temperature Respirations Height Pulse Oximetry BMI (Body Mass Index)    53 97.8  F (36.6  C) (Tympanic) 16 1.702 m (5' 7\") 98% 29.76 kg/m2       Blood Pressure from Last 3 Encounters:   05/22/18 125/63   03/26/18 130/58   01/31/18 118/70    Weight from Last 3 Encounters:   05/22/18 86.2 kg (190 lb)   03/26/18 90.7 kg (200 lb)   01/31/18 95.7 kg (211 lb)              Today, you had the following     No orders found for display       Primary Care Provider Office Phone # Fax #    KEILY Fonseca [phone]       [address] [place] [id number]        Equal Access to Services     RADHA SANCHEZ AH: Samantha Rosa, waarletteda luqdavid, qaybta kavictor m dawson " clarence youssefaleaxndra colindeanna donahue'aan ah. So [facility] [phone].    ATENCIÓN: Si suzan le, tiene a meneses disposición servicios gratuitos de asistencia lingüística. Adry al [phone].    We comply with applicable federal civil rights laws and [place] laws. We do not discriminate on the basis of race, color, national origin, age, disability, sex, sexual orientation, or gender identity.            Thank you!     Thank you for choosing [facility]  for your care. Our goal is always to provide you with excellent care. Hearing back from our patients is one way we can continue to improve our services. Please take a few minutes to complete the written survey that you may receive in the mail after your visit with us. Thank you!             Your Updated Medication List - Protect others around you: Learn how to safely use, store and throw away your medicines at www.disposemymeds.org.          This list is accurate as of 5/22/18 10:02 AM.  Always use your most recent med list.                   Brand Name Dispense Instructions for use Diagnosis    clonazePAM 1 MG tablet    klonoPIN    60 tablet    TAKE ONE-HALF TO ONE TABLET BY MOUTH TWICE DAILY AS NEEDED FOR ANXIETY    SHAKIR (generalized anxiety disorder)       diclofenac 1 % Gel topical gel    VOLTAREN    100 g    Place onto the skin 4 times daily    Primary osteoarthritis involving multiple joints       diltiazem 300 MG 24 hr ER beaded capsule    TIAZAC    90 capsule    TAKE ONE CAPSULE BY MOUTH ONCE DAILY    Cardiac microvascular disease (H)       DONEPEZIL HCL PO      Take 5 mg by mouth At Bedtime        estradiol 0.5 MG tablet    ESTRACE    60 tablet    TAKE ONE TABLET BY MOUTH TWICE DAILY    Postmenopausal HRT (hormone replacement therapy)       hydrocortisone 2.5 % cream    ANUSOL-HC    60 g    APPLY  CREAM TO AFFECTED AREA THREE TIMES DAILY    Hemorrhoid       hydrOXYzine 25 MG tablet    ATARAX    120 tablet    TAKE ONE TO TWO TABLETS BY MOUTH AT BEDTIME  FOR  NAUSEA    Chronic nausea       levothyroxine 50 MCG tablet    SYNTHROID/LEVOTHROID    30 tablet    TAKE ONE TABLET BY MOUTH ONCE DAILY    Acquired hypothyroidism       metoclopramide 5 MG tablet    REGLAN    60 tablet    TAKE ONE TABLET BY MOUTH 4 TIMES DAILY BEFORE MEAL(S) AT BEDTIME AS NEEDED    Gastroesophageal reflux disease without esophagitis       NITROSTAT 0.4 MG sublingual tablet   Generic drug:  nitroGLYcerin     25 tablet    Place 1 tablet (0.4 mg) under the tongue every 5 minutes as needed for chest pain    Chest pain, unspecified type       pantoprazole 20 MG EC tablet    PROTONIX    30 tablet    Take by mouth 30-60 minutes before a meal.    Gastroesophageal reflux disease with esophagitis       ranitidine 150 MG tablet    ZANTAC    60 tablet    TAKE ONE TABLET BY MOUTH TWICE DAILY    Gastroesophageal reflux disease without esophagitis       SUMAtriptan 100 MG tablet    IMITREX    10 tablet    TAKE ONE TABLET BY MOUTH AS NEEDED MIGRAINES    Migraine headache       traMADol 50 MG tablet    ULTRAM    180 tablet    TAKE 2 TABLETS BY MOUTH EVERY 8 HOURS AS NEEDED FOR PAIN    Fibromyalgia       * UNABLE TO FIND      MEDICATION NAME: Cannabis as needed   [place] Vapor take one puff to the count of two at bedtime.  If needed in the middle of the night        * UNABLE TO FIND      1.5 mg 3 times daily MEDICATION NAME: Oral Cannabis  Oral suspension  Taken morning, afternoon and bedtime        * Notice:  This list has 2 medication(s) that are the same as other medications prescribed for you. Read the directions carefully, and ask your doctor or other care provider to review them with you.

## 2018-05-22 NOTE — NURSING NOTE
"Chief Complaint   Patient presents with     RECHECK     1 year follow-up       Initial /63 (BP Location: Left arm, Patient Position: Chair, Cuff Size: Adult Large)  Pulse 53  Temp 97.8  F (36.6  C) (Tympanic)  Resp 16  Ht 1.702 m (5' 7\")  Wt 86.2 kg (190 lb)  SpO2 98%  BMI 29.76 kg/m2 Estimated body mass index is 29.76 kg/(m^2) as calculated from the following:    Height as of this encounter: 1.702 m (5' 7\").    Weight as of this encounter: 86.2 kg (190 lb).  Medication Reconciliation: complete    Olivia Hylton LPN    "

## 2018-06-04 NOTE — PROGRESS NOTES
SUBJECTIVE:   Sharlene Mccord is a 57 year old female who presents to clinic today for the following health issues:        Depression and Anxiety Follow-Up    Status since last visit: Improved better spirits    Other associated symptoms:None    Complicating factors:     Significant life event: Yes- 2 Brother in law hospice care  1 open heart surgery.    Current substance abuse: None    PHQ-9 1/12/2018 1/31/2018 3/26/2018   Total Score 11 19 14   Q9: Suicide Ideation Not at all Not at all Not at all     SHAKIR-7 SCORE 1/12/2018 1/31/2018 3/26/2018   Total Score 9 12 9       PHQ-9  English  PHQ-9   Any Language  SHAKIR-7  Suicide Assessment Five-step Evaluation and Treatment (SAFE-T)  Hypothyroidism Follow-up      Since last visit, patient describes the following symptoms: weight loss of today 188 lbs and 1/12/2018 211 lbs and constipation.      Amount of exercise or physical activity: 2-3 days/week for an average of 30-45 minutes    Problems taking medications regularly: No    Medication side effects: none    Diet: regular (no restrictions)        hemorrhoids      Duration: many years    Description (location/character/radiation): internal and external    Intensity:  moderate    Accompanying signs and symptoms: bleeding pain    History (similar episodes/previous evaluation): has been on proctosol daily.     Precipitating or alleviating factors: None    Therapies tried and outcome: None       Problem list and histories reviewed & adjusted, as indicated.  Additional history: as documented    Patient Active Problem List   Diagnosis     Diverticulitis of sigmoid colon     Hypothyroidism     Anxiety state     Cardiac microvascular disease (H)     Diverticulitis     Aortic valve disorder     ACP (advance care planning)     Chronic pain     Constipation     Memory loss     Major depressive disorder, recurrent episode (H)     Cognitive disorder     Major depressive disorder     Fatigue     Migraine without aura and responsive to  treatment     Atypical migraine     Fibromyalgia     Mixed connective tissue disease (H)     Past Surgical History:   Procedure Laterality Date     APPENDECTOMY  1977     BIOPSY  2017    taken at [facility] from Dr. Nadira cohen. Thyroid     CARDIAC SURGERY  2013    angiogram [facility]:  Normal coronary arteries.  Felt ot have some microvascular disease     CL AFF SURGICAL PATHOLOGY  01/02/2007    Thyroid Mass     COLONOSCOPY  1/13/2014    Procedure: COLONOSCOPY;  COLONOSCOPY ;  Surgeon: Chasidy Gee DO;  Location: [facility]     ESOPHAGOSCOPY, GASTROSCOPY, DUODENOSCOPY (EGD), COMBINED N/A 2/9/2017    Procedure: COMBINED ESOPHAGOSCOPY, GASTROSCOPY, DUODENOSCOPY (EGD);  Surgeon: Johnathan Ruff DO;  Location: [facility] OR     GYN SURGERY      c-sections x 3     GYN SURGERY      complete hysterectomy     HYSTERECTOMY  2001     LAPAROSCOPIC CHOLECYSTECTOMY  11/07/2003    Cholelithiasis     LAPAROTOMY EXPLORATORY      abdominal pain/fever     LARYNGOSCOPY       SPLENECTOMY         Social History   Substance Use Topics     Smoking status: Light Tobacco Smoker     Packs/day: 0.25     Years: 40.00     Types: Cigarettes     Start date: 1/1/1975     Smokeless tobacco: Never Used      Comment: 5 cigs daily  Patient will work on smoking cessation (3-26-18)     Alcohol use No     Family History   Problem Relation Age of Onset     C.A.D. Father      Hypertension Father      C.A.D. Mother      CEREBROVASCULAR DISEASE Mother      CVA     Hypertension Mother      Coronary Artery Disease Mother      DIABETES Paternal Grandmother      DIABETES Paternal Grandfather      DIABETES Sister      Breast Cancer Sister      DIABETES Maternal Grandmother      DIABETES Sister      Breast Cancer Sister      Hypertension Sister      Depression Sister      Anxiety Disorder Sister      Depression Sister      Depression Brother      Depression Daughter      Obesity Daughter      Depression Daughter      MENTAL ILLNESS Daughter      [id number]-[id number]     Anxiety  Disorder Daughter      Anxiety Disorder Other          Current Outpatient Prescriptions   Medication Sig Dispense Refill     clonazePAM (KLONOPIN) 1 MG tablet TAKE ONE-HALF TO ONE TABLET BY MOUTH TWICE DAILY AS NEEDED FOR ANXIETY 60 tablet 5     diclofenac (VOLTAREN) 1 % GEL topical gel Place onto the skin 4 times daily 100 g 3     diltiazem (TIAZAC) 300 MG 24 hr ER beaded capsule TAKE ONE CAPSULE BY MOUTH ONCE DAILY 90 capsule 1     DONEPEZIL HCL PO Take 5 mg by mouth At Bedtime       estradiol (ESTRACE) 0.5 MG tablet TAKE ONE TABLET BY MOUTH TWICE DAILY 60 tablet 11     hydrocortisone (ANUSOL-HC) 2.5 % cream APPLY  CREAM TO AFFECTED AREA THREE TIMES DAILY 60 g 1     hydrOXYzine (ATARAX) 25 MG tablet TAKE ONE TO TWO TABLETS BY MOUTH AT BEDTIME FOR  NAUSEA 120 tablet 0     levothyroxine (SYNTHROID/LEVOTHROID) 50 MCG tablet TAKE ONE TABLET BY MOUTH ONCE DAILY 30 tablet 10     metoclopramide (REGLAN) 5 MG tablet TAKE ONE TABLET BY MOUTH 4 TIMES DAILY BEFORE MEAL(S) AT BEDTIME AS NEEDED 60 tablet 2     NITROSTAT 0.4 MG sublingual tablet Place 1 tablet (0.4 mg) under the tongue every 5 minutes as needed for chest pain 25 tablet 11     pantoprazole (PROTONIX) 20 MG EC tablet Take by mouth 30-60 minutes before a meal. 30 tablet 3     ranitidine (ZANTAC) 150 MG tablet TAKE ONE TABLET BY MOUTH TWICE DAILY 60 tablet 3     STATIN NOT PRESCRIBED, INTENTIONAL, 1 each as needed for other Please choose reason not prescribed, below       SUMAtriptan (IMITREX) 100 MG tablet TAKE ONE TABLET BY MOUTH AS NEEDED MIGRAINES 10 tablet 5     traMADol (ULTRAM) 50 MG tablet TAKE 2 TABLETS BY MOUTH EVERY 8 HOURS AS NEEDED FOR PAIN 180 tablet 0     UNABLE TO FIND 1.5 mg 3 times daily MEDICATION NAME: Oral Cannabis    Oral suspension  Taken morning, afternoon and bedtime       UNABLE TO FIND MEDICATION NAME: Cannabis as needed     [place] Vapor take one puff to the count of two at bedtime.  If needed in the middle of the night       Allergies  "  Allergen Reactions     Codeine      Isosorbide Mononitrate Other (See Comments)     Vomiting and headache       Lisinopril      [facility] scan     Recent Labs   Lab Test  01/12/18   1130  09/11/17   1043  05/05/17 2025  04/19/17   1133  05/09/16   0930  01/13/16   0941   LDL   --    --    --   104*  127*  147*   HDL   --    --    --   77  70  64   TRIG   --    --    --   165*  204*  164*   ALT   --   27  38  28  44  63*   CR   --   1.05*  1.06*  0.97  0.94  0.93   GFRESTIMATED   --   54*  54*  59*  62  63   GFRESTBLACK   --   65  65  71  75  76   POTASSIUM   --   4.3  3.9  4.4  4.1  4.8   TSH  1.24  1.17   --   1.24  3.12  2.58      BP Readings from Last 3 Encounters:   06/06/18 120/64   05/22/18 125/63   03/26/18 130/58    Wt Readings from Last 3 Encounters:   06/06/18 188 lb (85.3 kg)   05/22/18 190 lb (86.2 kg)   03/26/18 200 lb (90.7 kg)                    Reviewed and updated as needed this visit by clinical staff       Reviewed and updated as needed this visit by Provider         ROS:  Constitutional, neuro, ENT, endocrine, pulmonary, cardiac, gastrointestinal, genitourinary, musculoskeletal, integument and psychiatric systems are negative, except as otherwise noted.    OBJECTIVE:                                                    /64  Pulse 53  Temp 97.5  F (36.4  C)  Ht 5' 7\" (1.702 m)  Wt 188 lb (85.3 kg)  SpO2 97%  BMI 29.44 kg/m2  Body mass index is 29.44 kg/(m^2).  GENERAL APPEARANCE: healthy, alert and no distress  EYES: Eyes grossly normal to inspection, PERRL and conjunctivae and sclerae normal  HENT: ear canals and TM's normal and nose and mouth without ulcers or lesions  NECK: no adenopathy, no asymmetry, masses, or scars and thyroid normal to palpation  RESP: lungs clear to auscultation - no rales, rhonchi or wheezes  CV: regular rates and rhythm, normal S1 S2, no S3 or S4 and no murmur, click or rub  LYMPHATICS: no cervical adenopathy  ABDOMEN: soft, nontender, without " hepatosplenomegaly or masses and bowel sounds normal  MS: extremities normal- no gross deformities noted  SKIN: no suspicious lesions or rashes  NEURO: Normal strength and tone, mentation intact and speech normal  PSYCH: mentation appears normal and affect normal/bright    Diagnostic test results:  Diagnostic Test Results:  Results for orders placed or performed in visit on 06/06/18   Hepatitis C antibody   Result Value Ref Range    Hepatitis C Antibody Nonreactive NR^Nonreactive   TSH with free T4 reflex   Result Value Ref Range    TSH 1.67 0.40 - 4.00 mU/L   HIV Antigen Antibody Combo   Result Value Ref Range    HIV Antigen Antibody Combo Nonreactive NR^Nonreactive       CBC with platelets   Result Value Ref Range    WBC 7.7 4.0 - 11.0 10e9/L    RBC Count 4.96 3.8 - 5.2 10e12/L    Hemoglobin 14.7 11.7 - 15.7 g/dL    Hematocrit 43.0 35.0 - 47.0 %    MCV 87 78 - 100 fl    MCH 29.6 26.5 - 33.0 pg    MCHC 34.2 31.5 - 36.5 g/dL    RDW 12.9 10.0 - 15.0 %    Platelet Count 197 150 - 450 10e9/L   Comprehensive metabolic panel   Result Value Ref Range    Sodium 140 133 - 144 mmol/L    Potassium 4.5 3.4 - 5.3 mmol/L    Chloride 109 94 - 109 mmol/L    Carbon Dioxide 25 20 - 32 mmol/L    Anion Gap 6 3 - 14 mmol/L    Glucose 97 70 - 99 mg/dL    Urea Nitrogen 15 7 - 30 mg/dL    Creatinine 0.95 0.52 - 1.04 mg/dL    GFR Estimate 61 >60 mL/min/1.7m2    GFR Estimate If Black 73 >60 mL/min/1.7m2    Calcium 8.9 8.5 - 10.1 mg/dL    Bilirubin Total 0.3 0.2 - 1.3 mg/dL    Albumin 3.9 3.4 - 5.0 g/dL    Protein Total 7.6 6.8 - 8.8 g/dL    Alkaline Phosphatase 86 40 - 150 U/L    ALT 26 0 - 50 U/L    AST 19 0 - 45 U/L        ASSESSMENT/PLAN:                                                    1. Tobacco abuse  Not ready.  But thinking about it.   - Tobacco Cessation - Order to Satisfy Health Maintenance    2. Tobacco abuse counseling  Not ready.     3. Encounter for hepatitis C screening test for low risk patient  Due for screening  -  Hepatitis C antibody    4. Encounter for screening mammogram for breast cancer  Due for mammogram is on estrogen. BSE not done. Discussion on need for this.   - MA Screening Digital Bilateral; Future    5. External hemorrhoids  Change over to 2 twice a day.  Has had some constipation. Options discussed on her care with sitz bath.  Not bearing down.  Dong more walking. Increase fiber and stool softeners.     6. Acquired hypothyroidism  Check level due to constipation.   - TSH with free T4 reflex        7. Fibromyalgia  Still having issues at night with her legs. Will increase to two pills with tylenol.   - traMADol (ULTRAM) 50 MG tablet; TAKE 2 TABLETS BY MOUTH EVERY 8 HOURS AS NEEDED FOR PAIN  Dispense: 180 tablet; Refill: 0  - CBC with platelets  - Comprehensive metabolic panel    8. Encounter for screening for HIV  Due for this.   - HIV Antigen Antibody Combo  - CBC with platelets  - Comprehensive metabolic panel    9. Leg pain, bilateral  Check for anemia.  Has been an issue in the past.    - CBC with platelets    10. Mild episode of recurrent major depressive disorder (H)  Chronic and recurrent.  He mood is much better on medical cannabis.  Will continue if she can afford it. So far we have seen a drastic improvement in her phone calls and her anxiety levels.     Follow up with Provider - 3 months.      KEILY Cobos  [facility]

## 2018-06-04 NOTE — PATIENT INSTRUCTIONS

## 2018-06-06 ENCOUNTER — OFFICE VISIT (OUTPATIENT)
Dept: FAMILY MEDICINE | Facility: OTHER | Age: 58
End: 2018-06-06
Attending: PHYSICIAN ASSISTANT
Payer: MEDICARE

## 2018-06-06 VITALS
TEMPERATURE: 97.5 F | SYSTOLIC BLOOD PRESSURE: 120 MMHG | HEIGHT: 67 IN | BODY MASS INDEX: 29.51 KG/M2 | DIASTOLIC BLOOD PRESSURE: 64 MMHG | HEART RATE: 53 BPM | OXYGEN SATURATION: 97 % | WEIGHT: 188 LBS

## 2018-06-06 DIAGNOSIS — F33.0 MILD EPISODE OF RECURRENT MAJOR DEPRESSIVE DISORDER (H): Primary | ICD-10-CM

## 2018-06-06 DIAGNOSIS — K64.4 EXTERNAL HEMORRHOIDS: ICD-10-CM

## 2018-06-06 DIAGNOSIS — E03.9 ACQUIRED HYPOTHYROIDISM: ICD-10-CM

## 2018-06-06 DIAGNOSIS — Z11.4 ENCOUNTER FOR SCREENING FOR HIV: ICD-10-CM

## 2018-06-06 DIAGNOSIS — Z72.0 TOBACCO ABUSE: ICD-10-CM

## 2018-06-06 DIAGNOSIS — M79.7 FIBROMYALGIA: ICD-10-CM

## 2018-06-06 DIAGNOSIS — M79.604 LEG PAIN, BILATERAL: ICD-10-CM

## 2018-06-06 DIAGNOSIS — Z71.6 TOBACCO ABUSE COUNSELING: ICD-10-CM

## 2018-06-06 DIAGNOSIS — Z12.31 ENCOUNTER FOR SCREENING MAMMOGRAM FOR BREAST CANCER: ICD-10-CM

## 2018-06-06 DIAGNOSIS — Z11.59 ENCOUNTER FOR HEPATITIS C SCREENING TEST FOR LOW RISK PATIENT: ICD-10-CM

## 2018-06-06 DIAGNOSIS — M79.605 LEG PAIN, BILATERAL: ICD-10-CM

## 2018-06-06 LAB
ALBUMIN SERPL-MCNC: 3.9 G/DL (ref 3.4–5)
ALP SERPL-CCNC: 86 U/L (ref 40–150)
ALT SERPL W P-5'-P-CCNC: 26 U/L (ref 0–50)
ANION GAP SERPL CALCULATED.3IONS-SCNC: 6 MMOL/L (ref 3–14)
AST SERPL W P-5'-P-CCNC: 19 U/L (ref 0–45)
BILIRUB SERPL-MCNC: 0.3 MG/DL (ref 0.2–1.3)
BUN SERPL-MCNC: 15 MG/DL (ref 7–30)
CALCIUM SERPL-MCNC: 8.9 MG/DL (ref 8.5–10.1)
CHLORIDE SERPL-SCNC: 109 MMOL/L (ref 94–109)
CO2 SERPL-SCNC: 25 MMOL/L (ref 20–32)
CREAT SERPL-MCNC: 0.95 MG/DL (ref 0.52–1.04)
ERYTHROCYTE [DISTWIDTH] IN BLOOD BY AUTOMATED COUNT: 12.9 % (ref 10–15)
GFR SERPL CREATININE-BSD FRML MDRD: 61 ML/MIN/1.7M2
GLUCOSE SERPL-MCNC: 97 MG/DL (ref 70–99)
HCT VFR BLD AUTO: 43 % (ref 35–47)
HGB BLD-MCNC: 14.7 G/DL (ref 11.7–15.7)
MCH RBC QN AUTO: 29.6 PG (ref 26.5–33)
MCHC RBC AUTO-ENTMCNC: 34.2 G/DL (ref 31.5–36.5)
MCV RBC AUTO: 87 FL (ref 78–100)
PLATELET # BLD AUTO: 197 10E9/L (ref 150–450)
POTASSIUM SERPL-SCNC: 4.5 MMOL/L (ref 3.4–5.3)
PROT SERPL-MCNC: 7.6 G/DL (ref 6.8–8.8)
RBC # BLD AUTO: 4.96 10E12/L (ref 3.8–5.2)
SODIUM SERPL-SCNC: 140 MMOL/L (ref 133–144)
TSH SERPL DL<=0.005 MIU/L-ACNC: 1.67 MU/L (ref 0.4–4)
WBC # BLD AUTO: 7.7 10E9/L (ref 4–11)

## 2018-06-06 PROCEDURE — 86803 HEPATITIS C AB TEST: CPT | Mod: ZL | Performed by: PHYSICIAN ASSISTANT

## 2018-06-06 PROCEDURE — 85027 COMPLETE CBC AUTOMATED: CPT | Mod: ZL | Performed by: PHYSICIAN ASSISTANT

## 2018-06-06 PROCEDURE — 36415 COLL VENOUS BLD VENIPUNCTURE: CPT | Mod: ZL | Performed by: PHYSICIAN ASSISTANT

## 2018-06-06 PROCEDURE — 84443 ASSAY THYROID STIM HORMONE: CPT | Mod: ZL | Performed by: PHYSICIAN ASSISTANT

## 2018-06-06 PROCEDURE — 99214 OFFICE O/P EST MOD 30 MIN: CPT | Performed by: PHYSICIAN ASSISTANT

## 2018-06-06 PROCEDURE — 87389 HIV-1 AG W/HIV-1&-2 AB AG IA: CPT | Mod: ZL | Performed by: PHYSICIAN ASSISTANT

## 2018-06-06 PROCEDURE — 80053 COMPREHEN METABOLIC PANEL: CPT | Mod: ZL | Performed by: PHYSICIAN ASSISTANT

## 2018-06-06 PROCEDURE — G0463 HOSPITAL OUTPT CLINIC VISIT: HCPCS

## 2018-06-06 PROCEDURE — 99000 SPECIMEN HANDLING OFFICE-LAB: CPT | Performed by: PHYSICIAN ASSISTANT

## 2018-06-06 RX ORDER — TRAMADOL HYDROCHLORIDE 50 MG/1
TABLET ORAL
Qty: 180 TABLET | Refills: 0 | Status: SHIPPED | OUTPATIENT
Start: 2018-06-06 | End: 2018-07-19

## 2018-06-06 ASSESSMENT — ANXIETY QUESTIONNAIRES
GAD7 TOTAL SCORE: 11
6. BECOMING EASILY ANNOYED OR IRRITABLE: MORE THAN HALF THE DAYS
5. BEING SO RESTLESS THAT IT IS HARD TO SIT STILL: SEVERAL DAYS
4. TROUBLE RELAXING: SEVERAL DAYS
1. FEELING NERVOUS, ANXIOUS, OR ON EDGE: MORE THAN HALF THE DAYS
IF YOU CHECKED OFF ANY PROBLEMS ON THIS QUESTIONNAIRE, HOW DIFFICULT HAVE THESE PROBLEMS MADE IT FOR YOU TO DO YOUR WORK, TAKE CARE OF THINGS AT HOME, OR GET ALONG WITH OTHER PEOPLE: SOMEWHAT DIFFICULT
7. FEELING AFRAID AS IF SOMETHING AWFUL MIGHT HAPPEN: SEVERAL DAYS
2. NOT BEING ABLE TO STOP OR CONTROL WORRYING: MORE THAN HALF THE DAYS
3. WORRYING TOO MUCH ABOUT DIFFERENT THINGS: MORE THAN HALF THE DAYS

## 2018-06-06 ASSESSMENT — PAIN SCALES - GENERAL: PAINLEVEL: MILD PAIN (3)

## 2018-06-06 NOTE — NURSING NOTE
"Chief Complaint   Patient presents with     Thyroid Problem     Depression       Initial /64  Pulse 53  Temp 97.5  F (36.4  C)  Ht 5' 7\" (1.702 m)  Wt 188 lb (85.3 kg)  SpO2 97%  BMI 29.44 kg/m2 Estimated body mass index is 29.44 kg/(m^2) as calculated from the following:    Height as of this encounter: 5' 7\" (1.702 m).    Weight as of this encounter: 188 lb (85.3 kg).  Medication Reconciliation: complete    YOAV Ramos    "

## 2018-06-06 NOTE — LETTER
6/7/2018     Sharlene Mccord  [address]  [place] [address]      Dear Sharlene:    Thank you for allowing me to participate in your care. Your recent test results were reviewed and listed below.      Your results are provided below for your review  Results for orders placed or performed in visit on 06/06/18   Hepatitis C antibody   Result Value Ref Range    Hepatitis C Antibody Nonreactive NR^Nonreactive   TSH with free T4 reflex   Result Value Ref Range    TSH 1.67 0.40 - 4.00 mU/L   HIV Antigen Antibody Combo   Result Value Ref Range    HIV Antigen Antibody Combo Nonreactive NR^Nonreactive       CBC with platelets   Result Value Ref Range    WBC 7.7 4.0 - 11.0 10e9/L    RBC Count 4.96 3.8 - 5.2 10e12/L    Hemoglobin 14.7 11.7 - 15.7 g/dL    Hematocrit 43.0 35.0 - 47.0 %    MCV 87 78 - 100 fl    MCH 29.6 26.5 - 33.0 pg    MCHC 34.2 31.5 - 36.5 g/dL    RDW 12.9 10.0 - 15.0 %    Platelet Count 197 150 - 450 10e9/L   Comprehensive metabolic panel   Result Value Ref Range    Sodium 140 133 - 144 mmol/L    Potassium 4.5 3.4 - 5.3 mmol/L    Chloride 109 94 - 109 mmol/L    Carbon Dioxide 25 20 - 32 mmol/L    Anion Gap 6 3 - 14 mmol/L    Glucose 97 70 - 99 mg/dL    Urea Nitrogen 15 7 - 30 mg/dL    Creatinine 0.95 0.52 - 1.04 mg/dL    GFR Estimate 61 >60 mL/min/1.7m2    GFR Estimate If Black 73 >60 mL/min/1.7m2    Calcium 8.9 8.5 - 10.1 mg/dL    Bilirubin Total 0.3 0.2 - 1.3 mg/dL    Albumin 3.9 3.4 - 5.0 g/dL    Protein Total 7.6 6.8 - 8.8 g/dL    Alkaline Phosphatase 86 40 - 150 U/L    ALT 26 0 - 50 U/L    AST 19 0 - 45 U/L            As a result, please continue with the treatment plan discussed in the office. Return as discussed or sooner if symptoms worsens or fail to improve. If you have any further questions or concerns, please do not hesitate to contact us.      Sincerely,    Office of [facility]  [address]  [place] [address]  Phone: [phone]

## 2018-06-06 NOTE — MR AVS SNAPSHOT
After Visit Summary   6/6/2018    Sharlene Mccord    MRN: [id number]           Patient Information     Date Of Birth          1960        Visit Information        Provider Department      6/6/2018 9:00 AM Concha Hare PA [facility]        Today's Diagnoses     Encounter for hepatitis C screening test for low risk patient    -  1    Tobacco abuse        Tobacco abuse counseling        Encounter for screening mammogram for breast cancer        External hemorrhoids        Acquired hypothyroidism        Fibromyalgia        Encounter for screening for HIV        Leg pain, bilateral          Care Instructions      HOW TO QUIT SMOKING  Smoking is one of the hardest habits to break. About half of all those who have ever smoked have been able to quit, and most of those (about 70%) who still smoke want to quit. Here are some of the best ways to stop smoking.     KEEP TRYING:  It takes most smokers about 8 tries before they are finally able to fully quit. So, the more often you try and fail, the better your chance of quitting the next time! So, don't give up!    GO COLD TURKEY:  Most ex-smokers quit cold turkey. Trying to cut back gradually doesn't seem to work as well, perhaps because it continues the smoking habit. Also, it is possible to fool yourself by inhaling more while smoking fewer cigarettes. This results in the same amount of nicotine in your body!    GET SUPPORT:  Support programs can make an important difference, especially for the heavy smoker. These groups offer lectures, methods to change your behavior and peer support. Call the free national Quitline for more information. 800-QUIT-NOW (629-617-8365). Low-cost or free programs are offered by many hospitals, local chapters of the American Lung Association (131-060-6292) and the American Cancer Society (495-248-6041). Support at home is important too. Non-smokers can help by offering praise and encouragement. If the smoker  fails to quit, encourage them to try again!    OVER-THE-COUNTER MEDICINES:  For those who can't quit on their own, Nicotine Replacement Therapy (NRT) may make quitting much easier. Certain aids such as the nicotine patch, gum and lozenge are available without a prescription. However, it is best to use these under the guidance of your doctor. The skin patch provides a steady supply of nicotine to the body. Nicotine gum and lozenge gives temporary bursts of low levels of nicotine. Both methods take the edge off the craving for cigarettes. WARNING: If you feel symptoms of nicotine overdose, such as nausea, vomiting, dizziness, weakness, or fast heartbeat, stop using these and see your doctor.    PRESCRIPTION MEDICINES:  After evaluating your smoking patterns and prior attempts at quitting, your doctor may offer a prescription medicine such as bupropion (Zyban, Wellbutrin), varenicline (Chantix, Champix), a niocotine inhaler or nasal spray. Each has its unique advantage and side effects which your doctor can review with you.    HEALTH BENEFITS OF QUITTING:  The benefits of quitting start right away and keep improving the longer you go without smokin minutes: blood pressure and pulse return to normal  8 hours: oxygen levels return to normal  2 days: ability to smell and taste begins to improve as damaged nerves start to regrow  2-3 weeks: circulation and lung function improves  1-9 months: decreased cough, congestion and shortness of breath; less tired  1 year: risk of heart attack decreases by half  5 years: risk of lung cancer decreases by half; risk of stroke becomes the same as a non-smoker  For information about how to quit smoking, visit the following links:  National [employer] [place] ,   Clearing the Air, Quit Smoking Today   - an online booklet. http://www.smokefree.gov/pubs/clearing_the_air.pdf  Smokefree.gov http://smokefree.gov/  QuitNet http://www.quitnet.com/    5079-9634 Pap[employer] Faust[employer], [address] [place] [address]  [facility], [place] [address]. All rights reserved. This information is not intended as a substitute for professional medical care. Always follow your healthcare professional's instructions.    The Benefits of Living Smoke Free  What do you want to gain from quitting? Check off some reasons to quit.  Health Benefits  ___ Reduce my risk of lung cancer, heart disease, chronic lung disease  ___ Have fewer wrinkles and softer skin  ___ Improve my sense of taste and smell  ___ For pregnant women--reduce the risk of having a miscarriage, stillbirth, premature birth, or low-birth-weight baby  Personal Benefits  ___ Feel more in control of my life  ___ Have better-smelling hair, breath, clothes, home, and car  ___ Save time by not having to take smoke breaks, buy cigarettes, or hunt for a light  ___ Have whiter teeth  Family Benefits  ___ Reduce my children s respiratory tract infections  ___ Set a good example for my children  ___ Reduce my family s cancer risk  Financial Benefits  ___ Save hundreds of dollars each year that would be spent on cigarettes  ___ Save money on medical bills  ___ Save on life, health, and car insurance premiums    Those Dollars Add Up!  Cigarettes are expensive, and getting more expensive all the time. Do you realize how much money you are spending on cigarettes per year? What is the average amount you spend on a pack of cigarettes? What is the average number of packs that you smoke per day? Using your answers to these questions, fill in this formula to help you find out:  ($ _____ per pack) ×  ( _____ number of packs per day) × (365 days) =  $ _____ yearly cost of smoking  Besides tobacco, there are other costs, including extra cleaning bills and replacement costs for clothing and furniture; medical expenses for smoking-related illnesses; and higher health, life, and car insurance premiums.    Cigars and Pipes Count Too!  Cigars and pipes are also dangerous. So are smokeless (chewing) tobacco  and snuff. All of these products contain nicotine, a highly addictive substance that has harmful effects on your body. Quitting smoking means giving up all tobacco products.      4740-9504 [employer], [address] [facility], [place] [address]. All rights reserved. This information is not intended as a substitute for professional medical care. Always follow your healthcare professional's instructions.          Follow-ups after your visit        Your next 10 appointments already scheduled     May 14, 2019  9:30 AM CDT   (Arrive by 9:15 AM)   Return Visit with Norman Nichole MD   [facility] [place] ([facility] [place] )    [address]  [place] [address]   [phone]              Future tests that were ordered for you today     Open Future Orders        Priority Expected Expires Ordered    MA Screening Digital Bilateral Routine  6/6/2019 6/6/2018            Who to contact     If you have questions or need follow up information about today's clinic visit or your schedule please contact [facility] directly at [phone].  Normal or non-critical lab and imaging results will be communicated to you by MyChart, letter or phone within 4 business days after the clinic has received the results. If you do not hear from us within 7 days, please contact the clinic through [employer] or phone. If you have a critical or abnormal lab result, we will notify you by phone as soon as possible.  Submit refill requests through [employer] or call your pharmacy and they will forward the refill request to us. Please allow 3 business days for your refill to be completed.          Additional Information About Your Visit        [employer] Information     [employer] gives you secure access to your electronic health record. If you see a primary care provider, you can also send messages to your care team and make appointments. If you have questions, please call your primary care clinic.  If you do not have a  "primary care provider, please call [phone] and they will assist you.        Care EveryWhere ID     This is your Care EveryWhere ID. This could be used by other organizations to access your [place] medical records  [id number]        Your Vitals Were     Pulse Temperature Height Pulse Oximetry BMI (Body Mass Index)       53 97.5  F (36.4  C) 5' 7\" (1.702 m) 97% 29.44 kg/m2        Blood Pressure from Last 3 Encounters:   06/06/18 120/64   05/22/18 125/63   03/26/18 130/58    Weight from Last 3 Encounters:   06/06/18 188 lb (85.3 kg)   05/22/18 190 lb (86.2 kg)   03/26/18 200 lb (90.7 kg)              We Performed the Following     CBC with platelets     Comprehensive metabolic panel     Hepatitis C antibody     HIV Antigen Antibody Combo     Tobacco Cessation - Order to Satisfy Health Maintenance     TSH with free T4 reflex          Where to get your medicines      Some of these will need a paper prescription and others can be bought over the counter.  Ask your nurse if you have questions.     Bring a paper prescription for each of these medications     traMADol 50 MG tablet         Information about OPIOIDS     PRESCRIPTION OPIOIDS: WHAT YOU NEED TO KNOW   You have a prescription for an opioid (narcotic) pain medicine. Opioids can cause addiction. If you have a history of chemical dependency of any type, you are at a higher risk of becoming addicted to opioids. Only take this medicine after all other options have been tried. Take it for as short a time and as few doses as possible.     Do not:    Drive. If you drive while taking these medicines, you could be arrested for driving under the influence (DUI).    Operate heavy machinery    Do any other dangerous activities while taking these medicines.     Drink any alcohol while taking these medicines.      Take with any other medicines that contain acetaminophen. Read all labels carefully. Look for the word  acetaminophen  or  Tylenol.  Ask your pharmacist if you " have questions or are unsure.    Store your pills in a secure place, locked if possible. We will not replace any lost or stolen medicine. If you don t finish your medicine, please throw away (dispose) as directed by your pharmacist. The [place] Pollution Control Agency has more information about safe disposal: https://www.pca.[facility].[place].us/living-green/managing-unwanted-medications    All opioids tend to cause constipation. Drink plenty of water and eat foods that have a lot of fiber, such as fruits, vegetables, prune juice, apple juice and high-fiber cereal. Take a laxative (Miralax, milk of magnesia, Colace, Senna) if you don t move your bowels at least every other day.          Primary Care Provider Office Phone # Fax #    KEILY Fonseca [phone]       [address] [place], [place] [id number]        Equal Access to Services     RADHA [facility] : Hadii aad ku hadasho Sokody, waaxda luqadaha, qaybta kaalmada adealexandrayada, victor m capone . So [facility] [phone].    ATENCIÓN: Si habla español, tiene a meneses disposición servicios gratuitos de asistencia lingüística. Adry al [phone].    We comply with applicable federal civil rights laws and [place] laws. We do not discriminate on the basis of race, color, national origin, age, disability, sex, sexual orientation, or gender identity.            Thank you!     Thank you for choosing [facility]  for your care. Our goal is always to provide you with excellent care. Hearing back from our patients is one way we can continue to improve our services. Please take a few minutes to complete the written survey that you may receive in the mail after your visit with us. Thank you!             Your Updated Medication List - Protect others around you: Learn how to safely use, store and throw away your medicines at www.disposemymeds.org.          This list is accurate as of 6/6/18  9:51 AM.  Always use your most  recent med list.                   Brand Name Dispense Instructions for use Diagnosis    clonazePAM 1 MG tablet    klonoPIN    60 tablet    TAKE ONE-HALF TO ONE TABLET BY MOUTH TWICE DAILY AS NEEDED FOR ANXIETY    SHAKIR (generalized anxiety disorder)       diclofenac 1 % Gel topical gel    VOLTAREN    100 g    Place onto the skin 4 times daily    Primary osteoarthritis involving multiple joints       diltiazem 300 MG 24 hr ER beaded capsule    TIAZAC    90 capsule    TAKE ONE CAPSULE BY MOUTH ONCE DAILY    Cardiac microvascular disease (H)       DONEPEZIL HCL PO      Take 5 mg by mouth At Bedtime        estradiol 0.5 MG tablet    ESTRACE    60 tablet    TAKE ONE TABLET BY MOUTH TWICE DAILY    Postmenopausal HRT (hormone replacement therapy)       hydrocortisone 2.5 % cream    ANUSOL-HC    60 g    APPLY  CREAM TO AFFECTED AREA THREE TIMES DAILY    Hemorrhoid       hydrOXYzine 25 MG tablet    ATARAX    120 tablet    TAKE ONE TO TWO TABLETS BY MOUTH AT BEDTIME FOR  NAUSEA    Chronic nausea       levothyroxine 50 MCG tablet    SYNTHROID/LEVOTHROID    30 tablet    TAKE ONE TABLET BY MOUTH ONCE DAILY    Acquired hypothyroidism       metoclopramide 5 MG tablet    REGLAN    60 tablet    TAKE ONE TABLET BY MOUTH 4 TIMES DAILY BEFORE MEAL(S) AT BEDTIME AS NEEDED    Gastroesophageal reflux disease without esophagitis       NITROSTAT 0.4 MG sublingual tablet   Generic drug:  nitroGLYcerin     25 tablet    Place 1 tablet (0.4 mg) under the tongue every 5 minutes as needed for chest pain    Chest pain, unspecified type       pantoprazole 20 MG EC tablet    PROTONIX    30 tablet    Take by mouth 30-60 minutes before a meal.    Gastroesophageal reflux disease with esophagitis       ranitidine 150 MG tablet    ZANTAC    60 tablet    TAKE ONE TABLET BY MOUTH TWICE DAILY    Gastroesophageal reflux disease without esophagitis       STATIN NOT PRESCRIBED (INTENTIONAL)      1 each as needed for other Please choose reason not prescribed,  below    Cardiac microvascular disease (H)       SUMAtriptan 100 MG tablet    IMITREX    10 tablet    TAKE ONE TABLET BY MOUTH AS NEEDED MIGRAINES    Migraine headache       traMADol 50 MG tablet    ULTRAM    180 tablet    TAKE 2 TABLETS BY MOUTH EVERY 8 HOURS AS NEEDED FOR PAIN    Fibromyalgia       * UNABLE TO FIND      MEDICATION NAME: Cannabis as needed   [place] Vapor take one puff to the count of two at bedtime.  If needed in the middle of the night        * UNABLE TO FIND      1.5 mg 3 times daily MEDICATION NAME: Oral Cannabis  Oral suspension  Taken morning, afternoon and bedtime        * Notice:  This list has 2 medication(s) that are the same as other medications prescribed for you. Read the directions carefully, and ask your doctor or other care provider to review them with you.

## 2018-06-07 LAB
HCV AB SERPL QL IA: NONREACTIVE
HIV 1+2 AB+HIV1 P24 AG SERPL QL IA: NONREACTIVE

## 2018-06-07 ASSESSMENT — PATIENT HEALTH QUESTIONNAIRE - PHQ9: SUM OF ALL RESPONSES TO PHQ QUESTIONS 1-9: 13

## 2018-06-07 ASSESSMENT — ANXIETY QUESTIONNAIRES: GAD7 TOTAL SCORE: 11

## 2018-06-08 ENCOUNTER — RADIANT APPOINTMENT (OUTPATIENT)
Dept: MAMMOGRAPHY | Facility: OTHER | Age: 58
End: 2018-06-08
Attending: PHYSICIAN ASSISTANT
Payer: MEDICARE

## 2018-06-08 DIAGNOSIS — Z12.31 VISIT FOR SCREENING MAMMOGRAM: ICD-10-CM

## 2018-06-08 PROCEDURE — 77063 BREAST TOMOSYNTHESIS BI: CPT | Mod: TC

## 2018-06-26 NOTE — NURSING NOTE
"Chief Complaint   Patient presents with     RECHECK     Mental health.       Initial /68 mmHg  Pulse 63  Temp(Src) 97.2  F (36.2  C) (Tympanic)  Ht 5' 7\" (1.702 m)  Wt 195 lb (88.451 kg)  BMI 30.53 kg/m2 Estimated body mass index is 30.53 kg/(m^2) as calculated from the following:    Height as of this encounter: 5' 7\" (1.702 m).    Weight as of this encounter: 195 lb (88.451 kg).  Medication Reconciliation: reese DASILVA      "
Normal rate, regular rhythm.  Heart sounds S1, S2.  No murmurs, rubs or gallops.

## 2018-07-09 DIAGNOSIS — Z79.890 POSTMENOPAUSAL HRT (HORMONE REPLACEMENT THERAPY): ICD-10-CM

## 2018-07-09 RX ORDER — ESTRADIOL 0.5 MG/1
TABLET ORAL
Qty: 60 TABLET | Refills: 11 | Status: SHIPPED | OUTPATIENT
Start: 2018-07-09 | End: 2019-07-16

## 2018-07-09 NOTE — TELEPHONE ENCOUNTER
estradiol      Last Written Prescription Date:  7/12/17  Last Fill Quantity: 60,   # refills: 11  Last Office Visit: 6/6/18  Future Office visit: none

## 2018-07-19 DIAGNOSIS — M79.7 FIBROMYALGIA: ICD-10-CM

## 2018-07-19 RX ORDER — TRAMADOL HYDROCHLORIDE 50 MG/1
TABLET ORAL
Qty: 180 TABLET | Refills: 0 | Status: SHIPPED | OUTPATIENT
Start: 2018-07-19 | End: 2018-09-04

## 2018-07-19 NOTE — TELEPHONE ENCOUNTER
Controlled Substance Refill Request for Ultram    Problem List Complete:  No     PROVIDER TO CONSIDER COMPLETION OF PROBLEM LIST AND OVERVIEW/CONTROLLED SUBSTANCE AGREEMENT    Last Written Prescription Date:  6.6.18  Last Fill Quantity: 180,   # refills: 0    [place] Office Visit with [facility] [place]: 6.6.18    Future Office visit:     Controlled substance agreement on file: [facility]     Processing:   checked in past 3 months?  No, route to RN

## 2018-08-07 DIAGNOSIS — K21.9 GASTROESOPHAGEAL REFLUX DISEASE WITHOUT ESOPHAGITIS: ICD-10-CM

## 2018-08-08 NOTE — TELEPHONE ENCOUNTER
ranitidine (ZANTAC) 150 MG tablet     Last Written Prescription Date: 4/13/2018  Last Fill Quantity: 60,   # refills: 3  Last Office Visit: 6/6/2018  Future Office visit:   0

## 2018-08-20 ENCOUNTER — MYC MEDICAL ADVICE (OUTPATIENT)
Dept: CARDIOLOGY | Facility: OTHER | Age: 58
End: 2018-08-20

## 2018-08-30 ENCOUNTER — MYC MEDICAL ADVICE (OUTPATIENT)
Dept: PSYCHIATRY | Facility: OTHER | Age: 58
End: 2018-08-30

## 2018-08-30 DIAGNOSIS — G31.84 MILD NEUROCOGNITIVE DISORDER: Primary | ICD-10-CM

## 2018-08-31 RX ORDER — DONEPEZIL HYDROCHLORIDE 5 MG/1
5 TABLET, FILM COATED ORAL AT BEDTIME
Qty: 30 TABLET | Refills: 6 | Status: SHIPPED | OUTPATIENT
Start: 2018-08-31 | End: 2019-05-08

## 2018-08-31 NOTE — TELEPHONE ENCOUNTER
Patient is requesting a refill on the Donepezil.  Please see my-chart note of 8-30-18.  Thank you    DONEPEZIL HCL PO   About This Medication     Instructions: Take 5 mg by mouth at bedtime. It did not give me a option to request a refill on this medication. Please let me know if you can refill it.   Sincerely,   Sharlene Mccord   [birth year]   [phone]

## 2018-09-04 DIAGNOSIS — M79.7 FIBROMYALGIA: ICD-10-CM

## 2018-09-04 NOTE — TELEPHONE ENCOUNTER
Ultram   Last Written Prescription Date: 7/19/18  Last Fill Quantity: 180 # of Refills: 0  Last Office Visit: 6/6/18

## 2018-09-06 RX ORDER — TRAMADOL HYDROCHLORIDE 50 MG/1
TABLET ORAL
Qty: 180 TABLET | Refills: 0 | Status: SHIPPED | OUTPATIENT
Start: 2018-09-06 | End: 2018-10-21

## 2018-09-20 ENCOUNTER — OFFICE VISIT (OUTPATIENT)
Dept: FAMILY MEDICINE | Facility: OTHER | Age: 58
End: 2018-09-20
Attending: PHYSICIAN ASSISTANT
Payer: MEDICARE

## 2018-09-20 VITALS
WEIGHT: 176.4 LBS | HEART RATE: 58 BPM | OXYGEN SATURATION: 98 % | BODY MASS INDEX: 27.63 KG/M2 | TEMPERATURE: 97.5 F | SYSTOLIC BLOOD PRESSURE: 114 MMHG | DIASTOLIC BLOOD PRESSURE: 64 MMHG

## 2018-09-20 DIAGNOSIS — D50.9 IRON DEFICIENCY ANEMIA, UNSPECIFIED IRON DEFICIENCY ANEMIA TYPE: ICD-10-CM

## 2018-09-20 DIAGNOSIS — R53.82 CHRONIC FATIGUE: ICD-10-CM

## 2018-09-20 DIAGNOSIS — F09 COGNITIVE DISORDER: Primary | ICD-10-CM

## 2018-09-20 DIAGNOSIS — Z23 NEED FOR PROPHYLACTIC VACCINATION AND INOCULATION AGAINST INFLUENZA: ICD-10-CM

## 2018-09-20 DIAGNOSIS — I25.85 CARDIAC MICROVASCULAR DISEASE: ICD-10-CM

## 2018-09-20 LAB
CRP SERPL-MCNC: <2.9 MG/L (ref 0–8)
ERYTHROCYTE [DISTWIDTH] IN BLOOD BY AUTOMATED COUNT: 12.4 % (ref 10–15)
FERRITIN SERPL-MCNC: 82 NG/ML (ref 8–252)
HCT VFR BLD AUTO: 40.5 % (ref 35–47)
HGB BLD-MCNC: 14.1 G/DL (ref 11.7–15.7)
MCH RBC QN AUTO: 29.7 PG (ref 26.5–33)
MCHC RBC AUTO-ENTMCNC: 34.8 G/DL (ref 31.5–36.5)
MCV RBC AUTO: 85 FL (ref 78–100)
PLATELET # BLD AUTO: 197 10E9/L (ref 150–450)
RBC # BLD AUTO: 4.75 10E12/L (ref 3.8–5.2)
WBC # BLD AUTO: 7.9 10E9/L (ref 4–11)

## 2018-09-20 PROCEDURE — 36415 COLL VENOUS BLD VENIPUNCTURE: CPT | Mod: ZL | Performed by: PHYSICIAN ASSISTANT

## 2018-09-20 PROCEDURE — 86140 C-REACTIVE PROTEIN: CPT | Mod: ZL | Performed by: PHYSICIAN ASSISTANT

## 2018-09-20 PROCEDURE — 90682 RIV4 VACC RECOMBINANT DNA IM: CPT | Performed by: PHYSICIAN ASSISTANT

## 2018-09-20 PROCEDURE — 90471 IMMUNIZATION ADMIN: CPT | Performed by: PHYSICIAN ASSISTANT

## 2018-09-20 PROCEDURE — 82728 ASSAY OF FERRITIN: CPT | Mod: ZL | Performed by: PHYSICIAN ASSISTANT

## 2018-09-20 PROCEDURE — G0463 HOSPITAL OUTPT CLINIC VISIT: HCPCS | Mod: 25

## 2018-09-20 PROCEDURE — G0463 HOSPITAL OUTPT CLINIC VISIT: HCPCS

## 2018-09-20 PROCEDURE — 99213 OFFICE O/P EST LOW 20 MIN: CPT | Performed by: PHYSICIAN ASSISTANT

## 2018-09-20 PROCEDURE — 85027 COMPLETE CBC AUTOMATED: CPT | Mod: ZL | Performed by: PHYSICIAN ASSISTANT

## 2018-09-20 RX ORDER — DILTIAZEM HYDROCHLORIDE 300 MG/1
CAPSULE, EXTENDED RELEASE ORAL
Qty: 90 CAPSULE | Refills: 1 | Status: SHIPPED | OUTPATIENT
Start: 2018-09-20 | End: 2019-09-11

## 2018-09-20 ASSESSMENT — ANXIETY QUESTIONNAIRES
1. FEELING NERVOUS, ANXIOUS, OR ON EDGE: MORE THAN HALF THE DAYS
GAD7 TOTAL SCORE: 11
5. BEING SO RESTLESS THAT IT IS HARD TO SIT STILL: MORE THAN HALF THE DAYS
6. BECOMING EASILY ANNOYED OR IRRITABLE: MORE THAN HALF THE DAYS
4. TROUBLE RELAXING: MORE THAN HALF THE DAYS
IF YOU CHECKED OFF ANY PROBLEMS ON THIS QUESTIONNAIRE, HOW DIFFICULT HAVE THESE PROBLEMS MADE IT FOR YOU TO DO YOUR WORK, TAKE CARE OF THINGS AT HOME, OR GET ALONG WITH OTHER PEOPLE: VERY DIFFICULT
3. WORRYING TOO MUCH ABOUT DIFFERENT THINGS: MORE THAN HALF THE DAYS
2. NOT BEING ABLE TO STOP OR CONTROL WORRYING: SEVERAL DAYS
7. FEELING AFRAID AS IF SOMETHING AWFUL MIGHT HAPPEN: NOT AT ALL

## 2018-09-20 ASSESSMENT — PAIN SCALES - GENERAL: PAINLEVEL: MODERATE PAIN (4)

## 2018-09-20 NOTE — MR AVS SNAPSHOT
After Visit Summary   9/20/2018    Sharlene Mccord    MRN: [id number]           Patient Information     Date Of Birth          1960        Visit Information        Provider Department      9/20/2018 3:00 PM Concha Hare PA [facility]        Today's Diagnoses     Cognitive disorder    -  1    Chronic fatigue        Iron deficiency anemia, unspecified iron deficiency anemia type        Cardiac microvascular disease (H)          Care Instructions      Thank you for choosing [facility].   I have office hours 8:00 am to 4:30 pm on Monday's, Wednesday's, Thursday's and Friday's. My nurse and I are out of the office every Tuesday.    Following your visit, when your labs and diagnostic testing have returned, I will review then and you will be contacted by my nurse.  If you are on My Chart, you can also view results there.    For refills, notify your pharmacy regarding what you need and the pharmacy will generate a refill request. Do not call my nurse as she is unable to process refill request. Please plan ahead and allow 3-5 days for refill requests.    You will generally receive a reminder call the day prior to your appointment.  If you cannot attend your appointment, please cancel your appointment with as much notice as possible.  If there is a pattern of failure to present for your appointments, I cannot provide consistent, meaningful, ongoing care for you. It is very important to me that you come in for your care, so we can best assist you with your health care needs.    IMPORTANT:  Please note that it is my standard of practice to NOT participate in prescribing ongoing requested Narcotic Analgesic therapy, and/or participate in the prescribing of other controlled substances.  My nurse and I am happy to assist you with the process of referral for alternative pain management as needed, and other treatment modalities including but not limited to:  Physical Therapy,  Physical Medicine and Rehab, Counseling, Chiropractic Care, Orthopedic Care, and non-narcotic medication management.     In the event that you need to be seen for emergent concerns and I am out of office,  please see one of my colleagues for acute concerns.  You may also present to  or ER.  I appreciate the opportunity to serve you and look forward to supporting your healthcare needs in the future. Please contact me with any questions or concerns that you may have.    Sincerely,      Concha Hare RN, PA-C               Follow-ups after your visit        Your next 10 appointments already scheduled     May 14, 2019  9:30 AM CDT   (Arrive by 9:15 AM)   [facility]    [address]  [place] [address] [phone]              Who to contact     If you have questions or need follow up information about today's clinic visit or your schedule please contact [facility] directly at [phone].  Normal or non-critical lab and imaging results will be communicated to you by MyChart, letter or phone within 4 business days after the clinic has received the results. If you do not hear from us within 7 days, please contact the clinic through [employer] or phone. If you have a critical or abnormal lab result, we will notify you by phone as soon as possible.  Submit refill requests through [employer] or call your pharmacy and they will forward the refill request to us. Please allow 3 business days for your refill to be completed.          Additional Information About Your Visit        [employer] Information     [employer] gives you secure access to your electronic health record. If you see a primary care provider, you can also send messages to your care team and make appointments. If you have questions, please call your primary care clinic.  If you do not have a primary care provider, please call [phone] and  they will assist you.        Care EveryWhere ID     This is your Care EveryWhere ID. This could be used by other organizations to access your [place] medical records  [id number]        Your Vitals Were     Pulse Temperature Pulse Oximetry BMI (Body Mass Index)          58 97.5  F (36.4  C) (Tympanic) 98% 27.63 kg/m2         Blood Pressure from Last 3 Encounters:   09/20/18 114/64   06/06/18 120/64   05/22/18 125/63    Weight from Last 3 Encounters:   09/20/18 176 lb 6.4 oz (80 kg)   06/06/18 188 lb (85.3 kg)   05/22/18 190 lb (86.2 kg)              We Performed the Following     CBC with platelets     CRP inflammation     Ferritin          Today's Medication Changes          These changes are accurate as of 9/20/18  3:47 PM.  If you have any questions, ask your nurse or doctor.               These medicines have changed or have updated prescriptions.        Dose/Directions    donepezil 5 MG tablet   Commonly known as:  ARICEPT   This may have changed:  Another medication with the same name was removed. Continue taking this medication, and follow the directions you see here.   Used for:  Mild neurocognitive disorder   Changed by:  Concha Hare PA        Dose:  5 mg   Take 1 tablet (5 mg) by mouth At Bedtime   Quantity:  30 tablet   Refills:  6            Where to get your medicines      These medications were sent to [id number] - [place] - [id number]   [address] [address]     Phone:  [phone]     diltiazem 300 MG 24 hr ER beaded capsule                Primary Care Provider Office Phone # Fax #    KEILY Fonseca [phone]       [address] [place] [address]        Equal Access to Services     NANCY SANCHEZ : Hadii hansel kwon Sokody, waaxda luqadaha, qaybta kaalmada joyceyada, victor m okeefe. So [facility] [phone].    ATENCIÓN: Si habla español, tiene a meneses disposición servicios gratuitos de asistencia  lingüística. Adry al [phone].    We comply with applicable federal civil rights laws and [place] laws. We do not discriminate on the basis of race, color, national origin, age, disability, sex, sexual orientation, or gender identity.            Thank you!     Thank you for choosing [facility]  for your care. Our goal is always to provide you with excellent care. Hearing back from our patients is one way we can continue to improve our services. Please take a few minutes to complete the written survey that you may receive in the mail after your visit with us. Thank you!             Your Updated Medication List - Protect others around you: Learn how to safely use, store and throw away your medicines at www.disposemymeds.org.          This list is accurate as of 9/20/18  3:47 PM.  Always use your most recent med list.                   Brand Name Dispense Instructions for use Diagnosis    clonazePAM 1 MG tablet    klonoPIN    60 tablet    TAKE ONE-HALF TO ONE TABLET BY MOUTH TWICE DAILY AS NEEDED FOR ANXIETY    SHAKIR (generalized anxiety disorder)       diclofenac 1 % Gel topical gel    VOLTAREN    100 g    Place onto the skin 4 times daily    Primary osteoarthritis involving multiple joints       diltiazem 300 MG 24 hr ER beaded capsule    TIAZAC    90 capsule    TAKE 1 CAPSULE BY MOUTH ONCE DAILY    Cardiac microvascular disease (H)       donepezil 5 MG tablet    ARICEPT    30 tablet    Take 1 tablet (5 mg) by mouth At Bedtime    Mild neurocognitive disorder       estradiol 0.5 MG tablet    ESTRACE    60 tablet    TAKE ONE TABLET BY MOUTH TWICE DAILY    Postmenopausal HRT (hormone replacement therapy)       hydrocortisone 2.5 % cream    ANUSOL-HC    60 g    APPLY  CREAM TO AFFECTED AREA THREE TIMES DAILY    Hemorrhoid       hydrOXYzine 25 MG tablet    ATARAX    120 tablet    TAKE ONE TO TWO TABLETS BY MOUTH AT BEDTIME FOR NAUSEA    Chronic nausea       levothyroxine 50 MCG tablet     SYNTHROID/LEVOTHROID    30 tablet    TAKE ONE TABLET BY MOUTH ONCE DAILY    Acquired hypothyroidism       metoclopramide 5 MG tablet    REGLAN    60 tablet    TAKE ONE TABLET BY MOUTH 4 TIMES DAILY BEFORE MEAL(S) AT BEDTIME AS NEEDED    Gastroesophageal reflux disease without esophagitis       NITROSTAT 0.4 MG sublingual tablet   Generic drug:  nitroGLYcerin     25 tablet    Place 1 tablet (0.4 mg) under the tongue every 5 minutes as needed for chest pain    Chest pain, unspecified type       pantoprazole 20 MG EC tablet    PROTONIX    30 tablet    Take by mouth 30-60 minutes before a meal.    Gastroesophageal reflux disease with esophagitis       ranitidine 150 MG tablet    ZANTAC    180 tablet    TAKE 1 TABLET BY MOUTH TWICE DAILY    Gastroesophageal reflux disease without esophagitis       STATIN NOT PRESCRIBED (INTENTIONAL)      1 each as needed for other Please choose reason not prescribed, below    Cardiac microvascular disease (H)       SUMAtriptan 100 MG tablet    IMITREX    10 tablet    TAKE ONE TABLET BY MOUTH AS NEEDED MIGRAINES    Migraine headache       traMADol 50 MG tablet    ULTRAM    180 tablet    TAKE 2 TABLETS BY MOUTH EVERY 8 HOURS AS NEEDED FOR PAIN    Fibromyalgia       * UNABLE TO FIND      MEDICATION NAME: Cannabis as needed   [place] Vapor take one puff to the count of two at bedtime.  If needed in the middle of the night        * UNABLE TO FIND      1.5 mg 3 times daily MEDICATION NAME: Oral Cannabis  Oral suspension  Taken morning, afternoon and bedtime        * Notice:  This list has 2 medication(s) that are the same as other medications prescribed for you. Read the directions carefully, and ask your doctor or other care provider to review them with you.

## 2018-09-20 NOTE — PROGRESS NOTES
SUBJECTIVE:   Sharlene Mccord is a 58 year old female who presents to clinic today for the following health issues:      Release to go back to work       Duration: pt has been out of work for a few years and is wanting to return to work     Description (location/character/radiation): states she has had a great summer and is ready to try working again, cardiologist ok'd her with stipulations     Intensity:  NA    Accompanying signs and symptoms: NA    History (similar episodes/previous evaluation): None    Precipitating or alleviating factors: None    Therapies tried and outcome: None           Problem list and histories reviewed & adjusted, as indicated.  Additional history: as documented    Patient Active Problem List   Diagnosis     Diverticulitis of sigmoid colon     Hypothyroidism     Anxiety state     Cardiac microvascular disease (H)     Diverticulitis     Aortic valve disorder     ACP (advance care planning)     Chronic pain     Constipation     Memory loss     Major depressive disorder, recurrent episode (H)     Cognitive disorder     Major depressive disorder     Fatigue     Migraine without aura and responsive to treatment     Atypical migraine     Fibromyalgia     Mixed connective tissue disease (H)     Past Surgical History:   Procedure Laterality Date     APPENDECTOMY  1977     BIOPSY  2017    taken at [facility] from Dr. Nadira cohen. Thyroid     CARDIAC SURGERY  2013    angiogram [facility]:  Normal coronary arteries.  Felt ot have some microvascular disease     CL AFF SURGICAL PATHOLOGY  01/02/2007    Thyroid Mass     COLONOSCOPY  1/13/2014    Procedure: COLONOSCOPY;  COLONOSCOPY ;  Surgeon: Chasidy Gee DO;  Location: [facility] OR     ESOPHAGOSCOPY, GASTROSCOPY, DUODENOSCOPY (EGD), COMBINED N/A 2/9/2017    Procedure: COMBINED ESOPHAGOSCOPY, GASTROSCOPY, DUODENOSCOPY (EGD);  Surgeon: Johnathan Ruff DO;  Location: [facility] OR     GYN SURGERY      c-sections x 3     HYSTERECTOMY TOTAL ABDOMINAL, BILATERAL  SALPINGO-OOPHORECTOMY, COMBINED N/A 01/01/2001     LAPAROSCOPIC CHOLECYSTECTOMY  11/07/2003    Cholelithiasis     LAPAROTOMY EXPLORATORY      abdominal pain/fever     LARYNGOSCOPY       SPLENECTOMY         Social History   Substance Use Topics     Smoking status: Light Tobacco Smoker     Packs/day: 0.25     Years: 40.00     Types: Cigarettes     Start date: 1/1/1975     Smokeless tobacco: Never Used      Comment: 5 cigs daily  Patient will work on smoking cessation (3-26-18)     Alcohol use No     Family History   Problem Relation Age of Onset     C.A.D. Father      Hypertension Father      C.A.D. Mother      Cerebrovascular Disease Mother      CVA     Hypertension Mother      Coronary Artery Disease Mother      Diabetes Paternal Grandmother      Diabetes Paternal Grandfather      Diabetes Sister      Breast Cancer Sister      Diabetes Maternal Grandmother      Diabetes Sister      Breast Cancer Sister      Hypertension Sister      Depression Sister      Anxiety Disorder Sister      Depression Sister      Depression Brother      Depression Daughter      Obesity Daughter      Depression Daughter      Mental Illness Daughter      [email or web address]     Anxiety Disorder Daughter      Anxiety Disorder Other          Current Outpatient Prescriptions   Medication Sig Dispense Refill     clonazePAM (KLONOPIN) 1 MG tablet TAKE ONE-HALF TO ONE TABLET BY MOUTH TWICE DAILY AS NEEDED FOR ANXIETY 60 tablet 5     diclofenac (VOLTAREN) 1 % GEL topical gel Place onto the skin 4 times daily 100 g 3     diltiazem (TIAZAC) 300 MG 24 hr ER beaded capsule TAKE 1 CAPSULE BY MOUTH ONCE DAILY 90 capsule 1     donepezil (ARICEPT) 5 MG tablet Take 1 tablet (5 mg) by mouth At Bedtime 30 tablet 6     estradiol (ESTRACE) 0.5 MG tablet TAKE ONE TABLET BY MOUTH TWICE DAILY 60 tablet 11     hydrocortisone (ANUSOL-HC) 2.5 % cream APPLY  CREAM TO AFFECTED AREA THREE TIMES DAILY 60 g 1     hydrOXYzine (ATARAX) 25 MG tablet TAKE ONE TO TWO TABLETS BY MOUTH AT  BEDTIME FOR NAUSEA 120 tablet 1     levothyroxine (SYNTHROID/LEVOTHROID) 50 MCG tablet TAKE ONE TABLET BY MOUTH ONCE DAILY 30 tablet 10     metoclopramide (REGLAN) 5 MG tablet TAKE ONE TABLET BY MOUTH 4 TIMES DAILY BEFORE MEAL(S) AT BEDTIME AS NEEDED 60 tablet 2     NITROSTAT 0.4 MG sublingual tablet Place 1 tablet (0.4 mg) under the tongue every 5 minutes as needed for chest pain 25 tablet 11     pantoprazole (PROTONIX) 20 MG EC tablet Take by mouth 30-60 minutes before a meal. 30 tablet 3     ranitidine (ZANTAC) 150 MG tablet TAKE 1 TABLET BY MOUTH TWICE DAILY 180 tablet 1     STATIN NOT PRESCRIBED, INTENTIONAL, 1 each as needed for other Please choose reason not prescribed, below       SUMAtriptan (IMITREX) 100 MG tablet TAKE ONE TABLET BY MOUTH AS NEEDED MIGRAINES 10 tablet 5     traMADol (ULTRAM) 50 MG tablet TAKE 2 TABLETS BY MOUTH EVERY 8 HOURS AS NEEDED FOR PAIN 180 tablet 0     UNABLE TO FIND 1.5 mg 3 times daily MEDICATION NAME: Oral Cannabis    Oral suspension  Taken morning, afternoon and bedtime       UNABLE TO FIND MEDICATION NAME: Cannabis as needed     [place] Vapor take one puff to the count of two at bedtime.  If needed in the middle of the night       Allergies   Allergen Reactions     Codeine      Isosorbide Mononitrate Other (See Comments)     Vomiting and headache       Lisinopril      [facility] scan     Recent Labs   Lab Test  06/06/18   0958  01/12/18   1130  09/11/17   1043  05/05/17   2025  04/19/17   1133  05/09/16   0930  01/13/16   0941   LDL   --    --    --    --   104*  127*  147*   HDL   --    --    --    --   77  70  64   TRIG   --    --    --    --   165*  204*  164*   ALT  26   --   27  38  28  44  63*   CR  0.95   --   1.05*  1.06*  0.97  0.94  0.93   GFRESTIMATED  61   --   54*  54*  59*  62  63   GFRESTBLACK  73   --   65  65  71  75  76   POTASSIUM  4.5   --   4.3  3.9  4.4  4.1  4.8   TSH  1.67  1.24  1.17   --   1.24  3.12  2.58      BP Readings from Last 3 Encounters:    09/20/18 114/64   06/06/18 120/64   05/22/18 125/63    Wt Readings from Last 3 Encounters:   09/20/18 176 lb 6.4 oz (80 kg)   06/06/18 188 lb (85.3 kg)   05/22/18 190 lb (86.2 kg)                    Reviewed and updated as needed this visit by clinical staff       Reviewed and updated as needed this visit by Provider         ROS:  Constitutional, neuro, ENT, endocrine, pulmonary, cardiac, gastrointestinal, genitourinary, musculoskeletal, integument and psychiatric systems are negative, except as otherwise noted.    OBJECTIVE:                                                    /64 (Patient Position: Sitting)  Pulse 58  Temp 97.5  F (36.4  C) (Tympanic)  Wt 176 lb 6.4 oz (80 kg)  SpO2 98%  BMI 27.63 kg/m2  Body mass index is 27.63 kg/(m^2).  GENERAL APPEARANCE: healthy, alert and no distress  MS: extremities normal- no gross deformities noted  SKIN: no suspicious lesions or rashes  PSYCH: talking about work.  Trying to think about a few hours a day maybe twice a week. Wanting more money.     Diagnostic test results:  Diagnostic Test Results:  No results found for this or any previous visit (from the past 24 hour(s)).     ASSESSMENT/PLAN:                                                    1. Cognitive disorder  We are very apprehensive about letting her return to work. Her mood if not able to do this might take a big hit.  But she is bored and really wanting to go back to her job.  She is wanting to be more social.        See Patient Instructions    KEILY Cobos  [facility]

## 2018-09-20 NOTE — PROGRESS NOTES

## 2018-09-20 NOTE — LETTER
[facility] [place]  [address]  [place] [address]  Phone: [phone]    September 20, 2018        Sharlene Mccord  [address]  [place] [address]          To whom it may concern:    RE: Sharlene Mccord    Pt may return to a limited work schedule.  No more than 4 hours in a day and no more than 12 hours in a week.   Needs frequent breaks, limited standing and sitting alternating.  Push pull of 11 to 20 lbs. No more than 20 lbs to lift and this should be very limited.       Please contact me for questions or concerns.      Sincerely,        KEILY Cobos

## 2018-09-20 NOTE — NURSING NOTE
"Chief Complaint   Patient presents with     Fibromyalgia       Initial /64 (Patient Position: Sitting)  Pulse 58  Temp 97.5  F (36.4  C) (Tympanic)  Wt 176 lb 6.4 oz (80 kg)  SpO2 98%  BMI 27.63 kg/m2 Estimated body mass index is 27.63 kg/(m^2) as calculated from the following:    Height as of 6/6/18: 5' 7\" (1.702 m).    Weight as of this encounter: 176 lb 6.4 oz (80 kg).  Medication Reconciliation: complete    Patricia Jensen LPN  "

## 2018-09-20 NOTE — NURSING NOTE
The following medication was given:     MEDICATION: Influenza Vaccine Flublok Quadrivalent  ROUTE: IM  SITE: Deltoid - Left  DOSE: 0.5 mL  LOT #: [id number]  :  [employer].  EXPIRATION DATE:  6/30/2019  NDC#: 26152-873-49    Prior to injection verified patient identity using patient's name and date of birth.    Alla Burks LPN

## 2018-09-20 NOTE — PATIENT INSTRUCTIONS
Thank you for choosing [facility].   I have office hours 8:00 am to 4:30 pm on Monday's, Wednesday's, Thursday's and Friday's. My nurse and I are out of the office every Tuesday.    Following your visit, when your labs and diagnostic testing have returned, I will review then and you will be contacted by my nurse.  If you are on My Chart, you can also view results there.    For refills, notify your pharmacy regarding what you need and the pharmacy will generate a refill request. Do not call my nurse as she is unable to process refill request. Please plan ahead and allow 3-5 days for refill requests.    You will generally receive a reminder call the day prior to your appointment.  If you cannot attend your appointment, please cancel your appointment with as much notice as possible.  If there is a pattern of failure to present for your appointments, I cannot provide consistent, meaningful, ongoing care for you. It is very important to me that you come in for your care, so we can best assist you with your health care needs.    IMPORTANT:  Please note that it is my standard of practice to NOT participate in prescribing ongoing requested Narcotic Analgesic therapy, and/or participate in the prescribing of other controlled substances.  My nurse and I am happy to assist you with the process of referral for alternative pain management as needed, and other treatment modalities including but not limited to:  Physical Therapy, Physical Medicine and Rehab, Counseling, Chiropractic Care, Orthopedic Care, and non-narcotic medication management.     In the event that you need to be seen for emergent concerns and I am out of office,  please see one of my colleagues for acute concerns.  You may also present to  or ER.  I appreciate the opportunity to serve you and look forward to supporting your healthcare needs in the future. Please contact me with any questions or concerns that you may  have.    Sincerely,      Concha Hare RN, PA-C

## 2018-09-21 ASSESSMENT — PATIENT HEALTH QUESTIONNAIRE - PHQ9: SUM OF ALL RESPONSES TO PHQ QUESTIONS 1-9: 13

## 2018-09-21 ASSESSMENT — ANXIETY QUESTIONNAIRES: GAD7 TOTAL SCORE: 11

## 2018-10-21 DIAGNOSIS — M79.7 FIBROMYALGIA: ICD-10-CM

## 2018-10-22 RX ORDER — TRAMADOL HYDROCHLORIDE 50 MG/1
TABLET ORAL
Qty: 180 TABLET | Refills: 0 | Status: SHIPPED | OUTPATIENT
Start: 2018-10-22 | End: 2018-11-21

## 2018-10-22 NOTE — TELEPHONE ENCOUNTER
Tramadol       Last Written Prescription Date:  9/6/18  Last Fill Quantity: 180,   # refills: 0  Last Office Visit: 9/20/18  Future Office visit:

## 2018-10-22 NOTE — TELEPHONE ENCOUNTER
Controlled Substance Refill Request for Tramadol  Problem List Complete:  Yes    Last Written Prescription Date:  See note below.  Last Fill Quantity: ,   # refills:     Last Office Visit with [facility] [place] [place] primary care provider:   Chronic pain         Problem Detail      Noted:  3/12/2014      Priority:  Medium                  Last Encounter with Chronic pain       Visit Information         Provider Department Center     1/12/2018 KEILY Cobos [facility]       Diagnoses         Codes Comments     Gastroesophageal reflux disease with esophagitis  - Primary K21.0      Chronic pain syndrome  G89.4      Mixed connective tissue disease (H)  M35.1      Acquired hypothyroidism  E03.9        Notes      Concha Hare PA at 1/12/2018  9:45 AM      Status: Signed         Expand All Collapse All             Processing:  Fax Rx to [facility] pharmacy

## 2018-11-19 NOTE — PROGRESS NOTES
SUBJECTIVE:   Sharlene Mccord is a 58 year old female who presents to clinic today for the following health issues:      Depression and Anxiety Follow-Up    Status since last visit: No change    Other associated symptoms:None    Complicating factors:     Significant life event: Yes-  Family members passing away, another family member on hospice      Current substance abuse: None    PHQ 3/26/2018 6/6/2018 9/20/2018   PHQ-9 Total Score 14 13 13   Q9: Suicide Ideation Not at all Not at all Not at all     SHAKIR-7 SCORE 3/26/2018 6/6/2018 9/20/2018   Total Score 9 11 11       PHQ-9  English  PHQ-9   Any Language  SHAKIR-7  Suicide Assessment Five-step Evaluation and Treatment (SAFE-T)  Chronic Pain Follow-Up       Type / Location of Pain: back, legs and neck   Analgesia/pain control:       Recent changes:  worse      Overall control: Tolerable with discomfort  Activity level/function:      Daily activities:  Can do most things most days, with some rest    Work:  Able to work part time with limitations  Adverse effects:  No  Adherance    Taking medication as directed?  Yes    Participating in other treatments: yes  Risk Factors:    Sleep:  Poor    Mood/anxiety:  worsened    Recent family or social stressors:  death in family:  Family member on hospice     Other aggravating factors: moving around   PHQ-9 SCORE 3/26/2018 6/6/2018 9/20/2018   Total Score - - -   Total Score 14 13 13     SHAKIR-7 SCORE 3/26/2018 6/6/2018 9/20/2018   Total Score 9 11 11     Encounter-Level CSA - 06/05/2017:          Controlled Substance Agreement - Scan on 6/6/2017 12:39 PM : CONTROLLED SUBSTANCE AGREEMENT (below)                Amount of exercise or physical activity: 6-7 days/week for an average of 45-60 minutes    Problems taking medications regularly: No    Medication side effects: nausea    Diet: regular (no restrictions)            Problem list and histories reviewed & adjusted, as indicated.  Additional history: as documented    Patient Active  Problem List   Diagnosis     Diverticulitis of sigmoid colon     Hypothyroidism     Anxiety state     Cardiac microvascular disease     Diverticulitis     Aortic valve disorder     ACP (advance care planning)     Chronic pain     Constipation     Memory loss     Major depressive disorder, recurrent episode (H)     Cognitive disorder     Major depressive disorder     Fatigue     Migraine without aura and responsive to treatment     Atypical migraine     Fibromyalgia     Mixed connective tissue disease (H)     Past Surgical History:   Procedure Laterality Date     APPENDECTOMY  1977     BIOPSY  2017    taken at [facility] from Dr. Nadira cohen. Thyroid     CARDIAC SURGERY  2013    angiogram [facility]:  Normal coronary arteries.  Felt ot have some microvascular disease     CL AFF SURGICAL PATHOLOGY  01/02/2007    Thyroid Mass     COLONOSCOPY  1/13/2014    Procedure: COLONOSCOPY;  COLONOSCOPY ;  Surgeon: Chasidy Gee DO;  Location: [facility]     ESOPHAGOSCOPY, GASTROSCOPY, DUODENOSCOPY (EGD), COMBINED N/A 2/9/2017    Procedure: COMBINED ESOPHAGOSCOPY, GASTROSCOPY, DUODENOSCOPY (EGD);  Surgeon: Johnathan Ruff DO;  Location: [facility] OR     GYN SURGERY      c-sections x 3     HYSTERECTOMY TOTAL ABDOMINAL, BILATERAL SALPINGO-OOPHORECTOMY, COMBINED N/A 01/01/2001     LAPAROSCOPIC CHOLECYSTECTOMY  11/07/2003    Cholelithiasis     LAPAROTOMY EXPLORATORY      abdominal pain/fever     LARYNGOSCOPY       SPLENECTOMY         Social History   Substance Use Topics     Smoking status: Light Tobacco Smoker     Packs/day: 0.25     Years: 40.00     Types: Cigarettes     Start date: 1/1/1975     Smokeless tobacco: Never Used      Comment: 5 cigs daily  Patient will work on smoking cessation (3-26-18)     Alcohol use No     Family History   Problem Relation Age of Onset     C.A.D. Father      Hypertension Father      C.A.D. Mother      Cerebrovascular Disease Mother      CVA     Hypertension Mother      Coronary Artery Disease Mother       Diabetes Paternal Grandmother      Diabetes Paternal Grandfather      Diabetes Sister      Breast Cancer Sister      Diabetes Maternal Grandmother      Diabetes Sister      Breast Cancer Sister      Hypertension Sister      Depression Sister      Anxiety Disorder Sister      Depression Sister      Depression Brother      Depression Daughter      Obesity Daughter      Depression Daughter      Mental Illness Daughter      [id number]     Anxiety Disorder Daughter      Anxiety Disorder Other          Current Outpatient Prescriptions   Medication Sig Dispense Refill     clonazePAM (KLONOPIN) 1 MG tablet TAKE 1/2 TO 1 (ONE-HALF TO ONE) TABLET BY MOUTH TWICE DAILY AS NEEDED FOR ANXIETY 60 tablet 5     diclofenac (VOLTAREN) 1 % GEL topical gel Place onto the skin 4 times daily 100 g 3     diltiazem (TIAZAC) 300 MG 24 hr ER beaded capsule TAKE 1 CAPSULE BY MOUTH ONCE DAILY 90 capsule 1     donepezil (ARICEPT) 5 MG tablet Take 1 tablet (5 mg) by mouth At Bedtime 30 tablet 6     estradiol (ESTRACE) 0.5 MG tablet TAKE ONE TABLET BY MOUTH TWICE DAILY 60 tablet 11     hydrocortisone (ANUSOL-HC) 2.5 % cream APPLY  CREAM TO AFFECTED AREA THREE TIMES DAILY 60 g 1     hydrOXYzine (ATARAX) 25 MG tablet TAKE ONE TO TWO TABLETS BY MOUTH AT BEDTIME FOR NAUSEA 120 tablet 1     levothyroxine (SYNTHROID/LEVOTHROID) 50 MCG tablet TAKE ONE TABLET BY MOUTH ONCE DAILY 30 tablet 10     metoclopramide (REGLAN) 5 MG tablet TAKE ONE TABLET BY MOUTH 4 TIMES DAILY BEFORE MEAL(S) AT BEDTIME AS NEEDED 60 tablet 2     NITROSTAT 0.4 MG sublingual tablet Place 1 tablet (0.4 mg) under the tongue every 5 minutes as needed for chest pain 25 tablet 11     pantoprazole (PROTONIX) 20 MG EC tablet Take by mouth 30-60 minutes before a meal. 30 tablet 3     ranitidine (ZANTAC) 150 MG tablet TAKE 1 TABLET BY MOUTH TWICE DAILY 180 tablet 1     STATIN NOT PRESCRIBED, INTENTIONAL, 1 each as needed for other Please choose reason not prescribed, below       SUMAtriptan  (IMITREX) 100 MG tablet TAKE ONE TABLET BY MOUTH AS NEEDED MIGRAINES 10 tablet 5     traMADol (ULTRAM) 50 MG tablet TAKE 2 TABLETS BY MOUTH EVERY 8 HOURS AS NEEDED FOR PAIN 180 tablet 0     UNABLE TO FIND 1.5 mg 3 times daily MEDICATION NAME: Oral Cannabis    Oral suspension  Taken morning, afternoon and bedtime       UNABLE TO FIND MEDICATION NAME: Cannabis as needed     [place] Vapor take one puff to the count of two at bedtime.  If needed in the middle of the night       Allergies   Allergen Reactions     Codeine      Isosorbide Mononitrate Other (See Comments)     Vomiting and headache       Lisinopril      [facility] scan     Recent Labs   Lab Test  06/06/18   0958  01/12/18   1130  09/11/17   1043  05/05/17 2025 04/19/17   1133  05/09/16   0930  01/13/16   0941   LDL   --    --    --    --   104*  127*  147*   HDL   --    --    --    --   77  70  64   TRIG   --    --    --    --   165*  204*  164*   ALT  26   --   27  38  28  44  63*   CR  0.95   --   1.05*  1.06*  0.97  0.94  0.93   GFRESTIMATED  61   --   54*  54*  59*  62  63   GFRESTBLACK  73   --   65  65  71  75  76   POTASSIUM  4.5   --   4.3  3.9  4.4  4.1  4.8   TSH  1.67  1.24  1.17   --   1.24  3.12  2.58      BP Readings from Last 3 Encounters:   11/21/18 122/58   09/20/18 114/64   06/06/18 120/64    Wt Readings from Last 3 Encounters:   11/21/18 166 lb (75.3 kg)   09/20/18 176 lb 6.4 oz (80 kg)   06/06/18 188 lb (85.3 kg)                    Reviewed and updated as needed this visit by clinical staff       Reviewed and updated as needed this visit by Provider         ROS:  Constitutional, neuro, ENT, endocrine, pulmonary, cardiac, gastrointestinal, genitourinary, musculoskeletal, integument and psychiatric systems are negative, except as otherwise noted.    OBJECTIVE:                                                    /58 (Patient Position: Sitting)  Temp 98.3  F (36.8  C) (Tympanic)  Wt 166 lb (75.3 kg)  SpO2 97%  BMI 26  kg/m2  Body mass index is 26 kg/(m^2).  GENERAL APPEARANCE: healthy, alert and no distress  EYES: Eyes grossly normal to inspection, PERRL and conjunctivae and sclerae normal  HENT: ear canals and TM's normal and nose and mouth without ulcers or lesions  NECK: no adenopathy, no asymmetry, masses, or scars and thyroid normal to palpation  RESP: lungs clear to auscultation - no rales, rhonchi or wheezes  CV: regular rates and rhythm, normal S1 S2, no S3 or S4 and no murmur, click or rub  LYMPHATICS: no cervical adenopathy  ABDOMEN: soft, nontender, without hepatosplenomegaly or masses and bowel sounds normal  MS: moving slowly and purposefully.   SKIN: no suspicious lesions or rashes  NEURO: Normal strength and tone, mentation intact and speech normal  PSYCH: mood is improved feels more relaxed.     Diagnostic test results:  Diagnostic Test Results:  No results found for this or any previous visit (from the past 24 hour(s)).     Results for orders placed or performed in visit on 11/21/18 (from the past 24 hour(s))   TSH with free T4 reflex   Result Value Ref Range    TSH 1.31 0.40 - 4.00 mU/L   T4, free   Result Value Ref Range    T4 Free 1.27 0.76 - 1.46 ng/dL   CBC with platelets and differential   Result Value Ref Range    WBC 8.9 4.0 - 11.0 10e9/L    RBC Count 4.81 3.8 - 5.2 10e12/L    Hemoglobin 14.4 11.7 - 15.7 g/dL    Hematocrit 41.8 35.0 - 47.0 %    MCV 87 78 - 100 fl    MCH 29.9 26.5 - 33.0 pg    MCHC 34.4 31.5 - 36.5 g/dL    RDW 12.4 10.0 - 15.0 %    Platelet Count 197 150 - 450 10e9/L    Diff Method Automated Method     % Neutrophils 58.8 %    % Lymphocytes 33.3 %    % Monocytes 5.8 %    % Eosinophils 1.3 %    % Basophils 0.6 %    % Immature Granulocytes 0.2 %    Nucleated RBCs 0 0 /100    Absolute Neutrophil 5.3 1.6 - 8.3 10e9/L    Absolute Lymphocytes 3.0 0.8 - 5.3 10e9/L    Absolute Monocytes 0.5 0.0 - 1.3 10e9/L    Absolute Eosinophils 0.1 0.0 - 0.7 10e9/L    Absolute Basophils 0.1 0.0 - 0.2 10e9/L     Abs Immature Granulocytes 0.0 0 - 0.4 10e9/L    Absolute Nucleated RBC 0.0    CRP inflammation   Result Value Ref Range    CRP Inflammation <2.9 0.0 - 8.0 mg/L   Erythrocyte sedimentation rate auto   Result Value Ref Range    Sed Rate 10 0 - 30 mm/h   Comprehensive metabolic panel   Result Value Ref Range    Sodium 138 133 - 144 mmol/L    Potassium 4.2 3.4 - 5.3 mmol/L    Chloride 107 94 - 109 mmol/L    Carbon Dioxide 24 20 - 32 mmol/L    Anion Gap 7 3 - 14 mmol/L    Glucose 95 70 - 99 mg/dL    Urea Nitrogen 14 7 - 30 mg/dL    Creatinine 1.05 (H) 0.52 - 1.04 mg/dL    GFR Estimate 54 (L) >60 mL/min/1.7m2    GFR Estimate If Black 65 >60 mL/min/1.7m2    Calcium 8.7 8.5 - 10.1 mg/dL    Bilirubin Total 0.2 0.2 - 1.3 mg/dL    Albumin 3.7 3.4 - 5.0 g/dL    Protein Total 7.6 6.8 - 8.8 g/dL    Alkaline Phosphatase 91 40 - 150 U/L    ALT 21 0 - 50 U/L    AST 13 0 - 45 U/L   UA reflex to Microscopic and Culture - [place]   Result Value Ref Range    Color Urine Yellow     Appearance Urine Slightly Cloudy     Glucose Urine Negative NEG^Negative mg/dL    Bilirubin Urine Small (A) NEG^Negative    Ketones Urine 10 (A) NEG^Negative mg/dL    Specific Gravity Urine 1.031 1.003 - 1.035    Blood Urine Negative NEG^Negative    pH Urine 5.5 4.7 - 8.0 pH    Protein Albumin Urine 30 (A) NEG^Negative mg/dL    Urobilinogen mg/dL 4.0 (H) 0.0 - 2.0 mg/dL    Nitrite Urine Negative NEG^Negative    Leukocyte Esterase Urine Moderate (A) NEG^Negative    Source Midstream Urine     RBC Urine 5 (H) 0 - 2 /HPF    WBC Urine 11 (H) 0 - 5 /HPF    Bacteria Urine Few (A) NEG^Negative /HPF    Squamous Epithelial /HPF Urine 5 (H) 0 - 1 /HPF    Mucous Urine Present (A) NEG^Negative /LPF    Hyaline Casts 1 (A) OTO2^O - 2 /LPF    Calcium Oxalate Moderate (A) NEG^Negative /HPF       ASSESSMENT/PLAN:                                                    1. Chronic pain syndrome  She is on the medical program and doing well.     2. Primary osteoarthritis involving  multiple joints  Given this for her and takes Ultram.   - diclofenac (VOLTAREN) 1 % topical gel; Place onto the skin 4 times daily  Dispense: 100 g; Refill: 3    3. Fibromyalgia  2 bid on most days.   - traMADol (ULTRAM) 50 MG tablet; TAKE 2 TABLETS BY MOUTH EVERY 8 HOURS AS NEEDED FOR PAIN  Dispense: 180 tablet; Refill: 0    4. Acquired hypothyroidism  Recheck labs.   - TSH with free T4 reflex  - T4, free    5. Mixed connective tissue disease (H)  Has GI SX again when pain goes up.         See Patient Instructions    KEILY Cobos  [facility]

## 2018-11-21 ENCOUNTER — TELEPHONE (OUTPATIENT)
Dept: FAMILY MEDICINE | Facility: OTHER | Age: 58
End: 2018-11-21

## 2018-11-21 ENCOUNTER — OFFICE VISIT (OUTPATIENT)
Dept: FAMILY MEDICINE | Facility: OTHER | Age: 58
End: 2018-11-21
Attending: PHYSICIAN ASSISTANT
Payer: MEDICARE

## 2018-11-21 VITALS
OXYGEN SATURATION: 97 % | TEMPERATURE: 98.3 F | SYSTOLIC BLOOD PRESSURE: 122 MMHG | BODY MASS INDEX: 26 KG/M2 | WEIGHT: 166 LBS | DIASTOLIC BLOOD PRESSURE: 58 MMHG

## 2018-11-21 DIAGNOSIS — M35.1 MIXED CONNECTIVE TISSUE DISEASE (H): ICD-10-CM

## 2018-11-21 DIAGNOSIS — R10.84 ABDOMINAL PAIN, GENERALIZED: ICD-10-CM

## 2018-11-21 DIAGNOSIS — R82.90 ABNORMAL URINE FINDINGS: ICD-10-CM

## 2018-11-21 DIAGNOSIS — E03.9 ACQUIRED HYPOTHYROIDISM: ICD-10-CM

## 2018-11-21 DIAGNOSIS — G89.4 CHRONIC PAIN SYNDROME: Primary | ICD-10-CM

## 2018-11-21 DIAGNOSIS — E03.9 ACQUIRED HYPOTHYROIDISM: Primary | ICD-10-CM

## 2018-11-21 DIAGNOSIS — M15.0 PRIMARY OSTEOARTHRITIS INVOLVING MULTIPLE JOINTS: ICD-10-CM

## 2018-11-21 DIAGNOSIS — M79.7 FIBROMYALGIA: ICD-10-CM

## 2018-11-21 LAB
ALBUMIN SERPL-MCNC: 3.7 G/DL (ref 3.4–5)
ALBUMIN UR-MCNC: 30 MG/DL
ALP SERPL-CCNC: 91 U/L (ref 40–150)
ALT SERPL W P-5'-P-CCNC: 21 U/L (ref 0–50)
ANION GAP SERPL CALCULATED.3IONS-SCNC: 7 MMOL/L (ref 3–14)
APPEARANCE UR: ABNORMAL
AST SERPL W P-5'-P-CCNC: 13 U/L (ref 0–45)
BACTERIA #/AREA URNS HPF: ABNORMAL /HPF
BASOPHILS # BLD AUTO: 0.1 10E9/L (ref 0–0.2)
BASOPHILS NFR BLD AUTO: 0.6 %
BILIRUB SERPL-MCNC: 0.2 MG/DL (ref 0.2–1.3)
BILIRUB UR QL STRIP: ABNORMAL
BUN SERPL-MCNC: 14 MG/DL (ref 7–30)
CALCIUM SERPL-MCNC: 8.7 MG/DL (ref 8.5–10.1)
CAOX CRY #/AREA URNS HPF: ABNORMAL /HPF
CHLORIDE SERPL-SCNC: 107 MMOL/L (ref 94–109)
CO2 SERPL-SCNC: 24 MMOL/L (ref 20–32)
COLOR UR AUTO: YELLOW
CREAT SERPL-MCNC: 1.05 MG/DL (ref 0.52–1.04)
CRP SERPL-MCNC: <2.9 MG/L (ref 0–8)
DIFFERENTIAL METHOD BLD: NORMAL
EOSINOPHIL # BLD AUTO: 0.1 10E9/L (ref 0–0.7)
EOSINOPHIL NFR BLD AUTO: 1.3 %
ERYTHROCYTE [DISTWIDTH] IN BLOOD BY AUTOMATED COUNT: 12.4 % (ref 10–15)
ERYTHROCYTE [SEDIMENTATION RATE] IN BLOOD BY WESTERGREN METHOD: 10 MM/H (ref 0–30)
GFR SERPL CREATININE-BSD FRML MDRD: 54 ML/MIN/1.7M2
GLUCOSE SERPL-MCNC: 95 MG/DL (ref 70–99)
GLUCOSE UR STRIP-MCNC: NEGATIVE MG/DL
HCT VFR BLD AUTO: 41.8 % (ref 35–47)
HGB BLD-MCNC: 14.4 G/DL (ref 11.7–15.7)
HGB UR QL STRIP: NEGATIVE
HYALINE CASTS #/AREA URNS LPF: 1 /LPF
IMM GRANULOCYTES # BLD: 0 10E9/L (ref 0–0.4)
IMM GRANULOCYTES NFR BLD: 0.2 %
KETONES UR STRIP-MCNC: 10 MG/DL
LEUKOCYTE ESTERASE UR QL STRIP: ABNORMAL
LYMPHOCYTES # BLD AUTO: 3 10E9/L (ref 0.8–5.3)
LYMPHOCYTES NFR BLD AUTO: 33.3 %
MCH RBC QN AUTO: 29.9 PG (ref 26.5–33)
MCHC RBC AUTO-ENTMCNC: 34.4 G/DL (ref 31.5–36.5)
MCV RBC AUTO: 87 FL (ref 78–100)
MONOCYTES # BLD AUTO: 0.5 10E9/L (ref 0–1.3)
MONOCYTES NFR BLD AUTO: 5.8 %
MUCOUS THREADS #/AREA URNS LPF: PRESENT /LPF
NEUTROPHILS # BLD AUTO: 5.3 10E9/L (ref 1.6–8.3)
NEUTROPHILS NFR BLD AUTO: 58.8 %
NITRATE UR QL: NEGATIVE
NRBC # BLD AUTO: 0 10*3/UL
NRBC BLD AUTO-RTO: 0 /100
PH UR STRIP: 5.5 PH (ref 4.7–8)
PLATELET # BLD AUTO: 197 10E9/L (ref 150–450)
POTASSIUM SERPL-SCNC: 4.2 MMOL/L (ref 3.4–5.3)
PROT SERPL-MCNC: 7.6 G/DL (ref 6.8–8.8)
RBC # BLD AUTO: 4.81 10E12/L (ref 3.8–5.2)
RBC #/AREA URNS AUTO: 5 /HPF (ref 0–2)
SODIUM SERPL-SCNC: 138 MMOL/L (ref 133–144)
SOURCE: ABNORMAL
SP GR UR STRIP: 1.03 (ref 1–1.03)
SQUAMOUS #/AREA URNS AUTO: 5 /HPF (ref 0–1)
T4 FREE SERPL-MCNC: 1.27 NG/DL (ref 0.76–1.46)
TSH SERPL DL<=0.005 MIU/L-ACNC: 1.31 MU/L (ref 0.4–4)
UROBILINOGEN UR STRIP-MCNC: 4 MG/DL (ref 0–2)
WBC # BLD AUTO: 8.9 10E9/L (ref 4–11)
WBC #/AREA URNS AUTO: 11 /HPF (ref 0–5)

## 2018-11-21 PROCEDURE — 36415 COLL VENOUS BLD VENIPUNCTURE: CPT | Mod: ZL | Performed by: PHYSICIAN ASSISTANT

## 2018-11-21 PROCEDURE — 86140 C-REACTIVE PROTEIN: CPT | Mod: ZL | Performed by: PHYSICIAN ASSISTANT

## 2018-11-21 PROCEDURE — 80053 COMPREHEN METABOLIC PANEL: CPT | Mod: ZL | Performed by: PHYSICIAN ASSISTANT

## 2018-11-21 PROCEDURE — 85025 COMPLETE CBC W/AUTO DIFF WBC: CPT | Mod: ZL | Performed by: PHYSICIAN ASSISTANT

## 2018-11-21 PROCEDURE — G0463 HOSPITAL OUTPT CLINIC VISIT: HCPCS

## 2018-11-21 PROCEDURE — 85652 RBC SED RATE AUTOMATED: CPT | Mod: ZL | Performed by: PHYSICIAN ASSISTANT

## 2018-11-21 PROCEDURE — 84443 ASSAY THYROID STIM HORMONE: CPT | Mod: ZL | Performed by: PHYSICIAN ASSISTANT

## 2018-11-21 PROCEDURE — 81001 URINALYSIS AUTO W/SCOPE: CPT | Mod: ZL | Performed by: PHYSICIAN ASSISTANT

## 2018-11-21 PROCEDURE — 87086 URINE CULTURE/COLONY COUNT: CPT | Mod: ZL | Performed by: PHYSICIAN ASSISTANT

## 2018-11-21 PROCEDURE — 99214 OFFICE O/P EST MOD 30 MIN: CPT | Performed by: PHYSICIAN ASSISTANT

## 2018-11-21 PROCEDURE — 84439 ASSAY OF FREE THYROXINE: CPT | Mod: ZL | Performed by: PHYSICIAN ASSISTANT

## 2018-11-21 RX ORDER — SULFAMETHOXAZOLE AND TRIMETHOPRIM 400; 80 MG/1; MG/1
1 TABLET ORAL 2 TIMES DAILY
Qty: 14 TABLET | Refills: 0 | Status: SHIPPED | OUTPATIENT
Start: 2018-11-21 | End: 2019-02-04

## 2018-11-21 RX ORDER — TRAMADOL HYDROCHLORIDE 50 MG/1
TABLET ORAL
Qty: 180 TABLET | Refills: 0 | Status: SHIPPED | OUTPATIENT
Start: 2018-11-21 | End: 2018-12-08

## 2018-11-21 RX ORDER — THYROID 30 MG/1
30 TABLET ORAL DAILY
Qty: 30 TABLET | Refills: 1 | Status: SHIPPED | OUTPATIENT
Start: 2018-11-21 | End: 2019-02-03

## 2018-11-21 ASSESSMENT — ANXIETY QUESTIONNAIRES
7. FEELING AFRAID AS IF SOMETHING AWFUL MIGHT HAPPEN: SEVERAL DAYS
GAD7 TOTAL SCORE: 9
1. FEELING NERVOUS, ANXIOUS, OR ON EDGE: MORE THAN HALF THE DAYS
3. WORRYING TOO MUCH ABOUT DIFFERENT THINGS: SEVERAL DAYS
5. BEING SO RESTLESS THAT IT IS HARD TO SIT STILL: SEVERAL DAYS
2. NOT BEING ABLE TO STOP OR CONTROL WORRYING: SEVERAL DAYS
4. TROUBLE RELAXING: SEVERAL DAYS
6. BECOMING EASILY ANNOYED OR IRRITABLE: MORE THAN HALF THE DAYS

## 2018-11-21 ASSESSMENT — PAIN SCALES - GENERAL: PAINLEVEL: SEVERE PAIN (6)

## 2018-11-21 ASSESSMENT — PATIENT HEALTH QUESTIONNAIRE - PHQ9: SUM OF ALL RESPONSES TO PHQ QUESTIONS 1-9: 13

## 2018-11-21 NOTE — PATIENT INSTRUCTIONS
Thank you for choosing [facility].   I have office hours 8:00 am to 4:30 pm on Monday's, Wednesday's, Thursday's and Friday's. My nurse and I are out of the office every Tuesday.    Following your visit, when your labs and diagnostic testing have returned, I will review then and you will be contacted by my nurse.  If you are on My Chart, you can also view results there.    For refills, notify your pharmacy regarding what you need and the pharmacy will generate a refill request. Do not call my nurse as she is unable to process refill request. Please plan ahead and allow 3-5 days for refill requests.    You will generally receive a reminder call the day prior to your appointment.  If you cannot attend your appointment, please cancel your appointment with as much notice as possible.  If there is a pattern of failure to present for your appointments, I cannot provide consistent, meaningful, ongoing care for you. It is very important to me that you come in for your care, so we can best assist you with your health care needs.    IMPORTANT:  Please note that it is my standard of practice to NOT participate in prescribing ongoing requested Narcotic Analgesic therapy, and/or participate in the prescribing of other controlled substances.  My nurse and I am happy to assist you with the process of referral for alternative pain management as needed, and other treatment modalities including but not limited to:  Physical Therapy, Physical Medicine and Rehab, Counseling, Chiropractic Care, Orthopedic Care, and non-narcotic medication management.     In the event that you need to be seen for emergent concerns and I am out of office,  please see one of my colleagues for acute concerns.  You may also present to  or ER.  I appreciate the opportunity to serve you and look forward to supporting your healthcare needs in the future. Please contact me with any questions or concerns that you may  have.    Sincerely,      Concha Hare RN, PA-C

## 2018-11-21 NOTE — TELEPHONE ENCOUNTER
11/21/2018 - received PA request thru [facility] for diclofenac.  Submitted as urgent request.  Waiting for response.  Concha Bragg, [facility] Specialist.

## 2018-11-21 NOTE — MR AVS SNAPSHOT
After Visit Summary   11/21/2018    Sharlene Mccord    MRN: [id number]           Patient Information     Date Of Birth          1960        Visit Information        Provider Department      11/21/2018 9:20 AM Concha Hare PA [facility]        Today's Diagnoses     Chronic pain syndrome    -  1    Primary osteoarthritis involving multiple joints        Fibromyalgia        Acquired hypothyroidism        Mixed connective tissue disease (H)        Abdominal pain, generalized          Care Instructions      Thank you for choosing [facility].   I have office hours 8:00 am to 4:30 pm on Monday's, Wednesday's, Thursday's and Friday's. My nurse and I are out of the office every Tuesday.    Following your visit, when your labs and diagnostic testing have returned, I will review then and you will be contacted by my nurse.  If you are on My Chart, you can also view results there.    For refills, notify your pharmacy regarding what you need and the pharmacy will generate a refill request. Do not call my nurse as she is unable to process refill request. Please plan ahead and allow 3-5 days for refill requests.    You will generally receive a reminder call the day prior to your appointment.  If you cannot attend your appointment, please cancel your appointment with as much notice as possible.  If there is a pattern of failure to present for your appointments, I cannot provide consistent, meaningful, ongoing care for you. It is very important to me that you come in for your care, so we can best assist you with your health care needs.    IMPORTANT:  Please note that it is my standard of practice to NOT participate in prescribing ongoing requested Narcotic Analgesic therapy, and/or participate in the prescribing of other controlled substances.  My nurse and I am happy to assist you with the process of referral for alternative pain management as needed, and other treatment  modalities including but not limited to:  Physical Therapy, Physical Medicine and Rehab, Counseling, Chiropractic Care, Orthopedic Care, and non-narcotic medication management.     In the event that you need to be seen for emergent concerns and I am out of office,  please see one of my colleagues for acute concerns.  You may also present to  or ER.  I appreciate the opportunity to serve you and look forward to supporting your healthcare needs in the future. Please contact me with any questions or concerns that you may have.    Sincerely,      Concha Hare RN, PA-C               Follow-ups after your visit        Your next 10 appointments already scheduled     May 14, 2019  9:30 AM CDT   (Arrive by 9:15 AM)   [facility]    [address]  [place] [address]   [phone]              Future tests that were ordered for you today     Open Future Orders        Priority Expected Expires Ordered    CT Abdomen Pelvis w Contrast Routine  11/21/2019 11/21/2018            Who to contact     If you have questions or need follow up information about today's clinic visit or your schedule please contact [facility] directly at [phone].  Normal or non-critical lab and imaging results will be communicated to you by MyChart, letter or phone within 4 business days after the clinic has received the results. If you do not hear from us within 7 days, please contact the clinic through Sn[employer] shuttlehart or phone. If you have a critical or abnormal lab result, we will notify you by phone as soon as possible.  Submit refill requests through [employer] or call your pharmacy and they will forward the refill request to us. Please allow 3 business days for your refill to be completed.          Additional Information About Your Visit        Snappy shuttleharLoffles Information     [employer] gives you secure access to your electronic health  record. If you see a primary care provider, you can also send messages to your care team and make appointments. If you have questions, please call your primary care clinic.  If you do not have a primary care provider, please call [phone] and they will assist you.        Care EveryWhere ID     This is your Care EveryWhere ID. This could be used by other organizations to access your [facility] medical records  [id number]        Your Vitals Were     Temperature Pulse Oximetry BMI (Body Mass Index)             98.3  F (36.8  C) (Tympanic) 97% 26 kg/m2          Blood Pressure from Last 3 Encounters:   11/21/18 122/58   09/20/18 114/64   06/06/18 120/64    Weight from Last 3 Encounters:   11/21/18 166 lb (75.3 kg)   09/20/18 176 lb 6.4 oz (80 kg)   06/06/18 188 lb (85.3 kg)              We Performed the Following     CBC with platelets and differential     Comprehensive metabolic panel     CRP inflammation     Erythrocyte sedimentation rate auto     T4, free     TSH with free T4 reflex     UA reflex to Microscopic and Culture - [place]          Where to get your medicines      These medications were sent to [id number] - [place], [place] - [address]   [address] , [place] [address]     Phone:  [phone]     diclofenac 1 % topical gel         Some of these will need a paper prescription and others can be bought over the counter.  Ask your nurse if you have questions.     Bring a paper prescription for each of these medications     traMADol 50 MG tablet         Information about OPIOIDS     PRESCRIPTION OPIOIDS: WHAT YOU NEED TO KNOW   We gave you an opioid (narcotic) pain medicine. It is important to manage your pain, but opioids are not always the best choice. You should first try all the other options your care team gave you. Take this medicine for as short a time (and as few doses) as possible.    Some activities can increase your pain, such as bandage changes or therapy sessions. It may help to take  your pain medicine 30 to 60 minutes before these activities. Reduce your stress by getting enough sleep, working on hobbies you enjoy and practicing relaxation or meditation. Talk to your care team about ways to manage your pain beyond prescription opioids.    These medicines have risks:    DO NOT drive when on new or higher doses of pain medicine. These medicines can affect your alertness and reaction times, and you could be arrested for driving under the influence (DUI). If you need to use opioids long-term, talk to your care team about driving.    DO NOT operate heavy machinery    DO NOT do any other dangerous activities while taking these medicines.    DO NOT drink any alcohol while taking these medicines.     If the opioid prescribed includes acetaminophen, DO NOT take with any other medicines that contain acetaminophen. Read all labels carefully. Look for the word  acetaminophen  or  Tylenol.  Ask your pharmacist if you have questions or are unsure.    You can get addicted to pain medicines, especially if you have a history of addiction (chemical, alcohol or substance dependence). Talk to your care team about ways to reduce this risk.    All opioids tend to cause constipation. Drink plenty of water and eat foods that have a lot of fiber, such as fruits, vegetables, prune juice, apple juice and high-fiber cereal. Take a laxative (Miralax, milk of magnesia, Colace, Senna) if you don t move your bowels at least every other day. Other side effects include upset stomach, sleepiness, dizziness, throwing up, tolerance (needing more of the medicine to have the same effect), physical dependence and slowed breathing.    Store your pills in a secure place, locked if possible. We will not replace any lost or stolen medicine. If you don t finish your medicine, please throw away (dispose) as directed by your pharmacist. The [place] Pollution Control Agency has more information about safe disposal:  [email or web address]         Primary Care Provider Office Phone # Fax #    ConchaKEILY Velasquez [phone]       [address] [place] [address]        Equal Access to Services     RADHA SANCHEZ : Hadii hansel ku hadasho Soomaali, waaxda luqadaha, qaybta kaalmada adeegyada, waxay clarence tarynchuy shawnishaalfreda okeefe. So [facility] [phone].    ATENCIÓN: Si habla español, tiene a meneses disposición servicios gratuitos de asistencia lingüística. Adry al [phone].    We comply with applicable federal civil rights laws and [place] laws. We do not discriminate on the basis of race, color, national origin, age, disability, sex, sexual orientation, or gender identity.            Thank you!     Thank you for choosing [facility]  for your care. Our goal is always to provide you with excellent care. Hearing back from our patients is one way we can continue to improve our services. Please take a few minutes to complete the written survey that you may receive in the mail after your visit with us. Thank you!             Your Updated Medication List - Protect others around you: Learn how to safely use, store and throw away your medicines at www.disposemymeds.org.          This list is accurate as of 11/21/18 10:52 AM.  Always use your most recent med list.                   Brand Name Dispense Instructions for use Diagnosis    clonazePAM 1 MG tablet    klonoPIN    60 tablet    TAKE 1/2 TO 1 (ONE-HALF TO ONE) TABLET BY MOUTH TWICE DAILY AS NEEDED FOR ANXIETY    SHAKIR (generalized anxiety disorder)       diclofenac 1 % topical gel    VOLTAREN    100 g    Place onto the skin 4 times daily    Primary osteoarthritis involving multiple joints       diltiazem 300 MG 24 hr ER beaded capsule    TIAZAC    90 capsule    TAKE 1 CAPSULE BY MOUTH ONCE DAILY    Cardiac microvascular disease       donepezil 5 MG tablet    ARICEPT    30 tablet    Take  1 tablet (5 mg) by mouth At Bedtime    Mild neurocognitive disorder       estradiol 0.5 MG tablet    ESTRACE    60 tablet    TAKE ONE TABLET BY MOUTH TWICE DAILY    Postmenopausal HRT (hormone replacement therapy)       hydrocortisone 2.5 % cream    ANUSOL-HC    60 g    APPLY  CREAM TO AFFECTED AREA THREE TIMES DAILY    Hemorrhoid       hydrOXYzine 25 MG tablet    ATARAX    120 tablet    TAKE ONE TO TWO TABLETS BY MOUTH AT BEDTIME FOR NAUSEA    Chronic nausea       levothyroxine 50 MCG tablet    SYNTHROID/LEVOTHROID    30 tablet    TAKE ONE TABLET BY MOUTH ONCE DAILY    Acquired hypothyroidism       metoclopramide 5 MG tablet    REGLAN    60 tablet    TAKE ONE TABLET BY MOUTH 4 TIMES DAILY BEFORE MEAL(S) AT BEDTIME AS NEEDED    Gastroesophageal reflux disease without esophagitis       NITROSTAT 0.4 MG sublingual tablet   Generic drug:  nitroGLYcerin     25 tablet    Place 1 tablet (0.4 mg) under the tongue every 5 minutes as needed for chest pain    Chest pain, unspecified type       pantoprazole 20 MG EC tablet    PROTONIX    30 tablet    Take by mouth 30-60 minutes before a meal.    Gastroesophageal reflux disease with esophagitis       ranitidine 150 MG tablet    ZANTAC    180 tablet    TAKE 1 TABLET BY MOUTH TWICE DAILY    Gastroesophageal reflux disease without esophagitis       STATIN NOT PRESCRIBED (INTENTIONAL)      1 each as needed for other Please choose reason not prescribed, below    Cardiac microvascular disease       SUMAtriptan 100 MG tablet    IMITREX    10 tablet    TAKE ONE TABLET BY MOUTH AS NEEDED MIGRAINES    Migraine headache       traMADol 50 MG tablet    ULTRAM    180 tablet    TAKE 2 TABLETS BY MOUTH EVERY 8 HOURS AS NEEDED FOR PAIN    Fibromyalgia       * UNABLE TO FIND      MEDICATION NAME: Cannabis as needed   [place] Vapor take one puff to the count of two at bedtime.  If needed in the middle of the night        * UNABLE TO FIND      1.5 mg 3 times daily MEDICATION NAME: Oral Cannabis   Oral suspension  Taken morning, afternoon and bedtime        * Notice:  This list has 2 medication(s) that are the same as other medications prescribed for you. Read the directions carefully, and ask your doctor or other care provider to review them with you.

## 2018-11-21 NOTE — TELEPHONE ENCOUNTER
Please send in armour tablets to [facility] in [place]. Pt will see what cost is and decide whether she would like to pay out of pocket. I did try to contact pharmacy to find out cost beforehand and they said they'd need to know the strength.   Also, canceled pt CT scan

## 2018-11-21 NOTE — TELEPHONE ENCOUNTER
APPROVAL - 11/21/2018 - received APPROVAL from [facility] for diclofenac gel.  Certification #:  [id number].  Approval dates:  08/23/18 thru 11/21/2019.  Pharmacy advised.  Documentation scanned to Epic.  Concha Bragg, HIS Specialist.

## 2018-11-21 NOTE — NURSING NOTE
"Chief Complaint   Patient presents with     Fibromyalgia     Depression     Anxiety       Initial /58 (Patient Position: Sitting)  Temp 98.3  F (36.8  C) (Tympanic)  Wt 166 lb (75.3 kg)  SpO2 97%  BMI 26 kg/m2 Estimated body mass index is 26 kg/(m^2) as calculated from the following:    Height as of 6/6/18: 5' 7\" (1.702 m).    Weight as of this encounter: 166 lb (75.3 kg).  Medication Reconciliation: complete    Patricia Jensen LPN  "

## 2018-11-22 LAB
BACTERIA SPEC CULT: ABNORMAL
SPECIMEN SOURCE: ABNORMAL

## 2018-11-22 ASSESSMENT — ANXIETY QUESTIONNAIRES: GAD7 TOTAL SCORE: 9

## 2018-11-23 ENCOUNTER — MYC MEDICAL ADVICE (OUTPATIENT)
Dept: FAMILY MEDICINE | Facility: OTHER | Age: 58
End: 2018-11-23

## 2018-11-23 DIAGNOSIS — R30.0 DYSURIA: Primary | ICD-10-CM

## 2018-11-23 RX ORDER — DOXYCYCLINE 100 MG/1
100 TABLET ORAL 2 TIMES DAILY
Qty: 14 TABLET | Refills: 0 | Status: SHIPPED | OUTPATIENT
Start: 2018-11-23 | End: 2019-02-04

## 2018-12-03 ENCOUNTER — OFFICE VISIT (OUTPATIENT)
Dept: FAMILY MEDICINE | Facility: OTHER | Age: 58
End: 2018-12-03
Attending: PHYSICIAN ASSISTANT
Payer: MEDICARE

## 2018-12-03 VITALS
TEMPERATURE: 97.4 F | HEIGHT: 67 IN | BODY MASS INDEX: 27.78 KG/M2 | OXYGEN SATURATION: 99 % | WEIGHT: 177 LBS | SYSTOLIC BLOOD PRESSURE: 112 MMHG | HEART RATE: 50 BPM | DIASTOLIC BLOOD PRESSURE: 58 MMHG

## 2018-12-03 DIAGNOSIS — N30.01 ACUTE CYSTITIS WITH HEMATURIA: ICD-10-CM

## 2018-12-03 DIAGNOSIS — Z71.6 TOBACCO ABUSE COUNSELING: ICD-10-CM

## 2018-12-03 DIAGNOSIS — Z72.0 TOBACCO ABUSE: ICD-10-CM

## 2018-12-03 DIAGNOSIS — R30.0 DYSURIA: ICD-10-CM

## 2018-12-03 DIAGNOSIS — G89.4 CHRONIC PAIN SYNDROME: ICD-10-CM

## 2018-12-03 DIAGNOSIS — R10.2 PELVIC PAIN IN FEMALE: Primary | ICD-10-CM

## 2018-12-03 LAB
ALBUMIN UR-MCNC: NEGATIVE MG/DL
APPEARANCE UR: CLEAR
BILIRUB UR QL STRIP: NEGATIVE
COLOR UR AUTO: YELLOW
GLUCOSE UR STRIP-MCNC: NEGATIVE MG/DL
HGB UR QL STRIP: NEGATIVE
KETONES UR STRIP-MCNC: NEGATIVE MG/DL
LEUKOCYTE ESTERASE UR QL STRIP: NEGATIVE
NITRATE UR QL: NEGATIVE
PH UR STRIP: 5 PH (ref 4.7–8)
SOURCE: NORMAL
SP GR UR STRIP: 1.01 (ref 1–1.03)
UROBILINOGEN UR STRIP-MCNC: NORMAL MG/DL (ref 0–2)

## 2018-12-03 PROCEDURE — 99000 SPECIMEN HANDLING OFFICE-LAB: CPT | Performed by: PHYSICIAN ASSISTANT

## 2018-12-03 PROCEDURE — 81003 URINALYSIS AUTO W/O SCOPE: CPT | Mod: ZL | Performed by: PHYSICIAN ASSISTANT

## 2018-12-03 PROCEDURE — 99213 OFFICE O/P EST LOW 20 MIN: CPT | Performed by: PHYSICIAN ASSISTANT

## 2018-12-03 PROCEDURE — 80307 DRUG TEST PRSMV CHEM ANLYZR: CPT | Mod: ZL | Performed by: PHYSICIAN ASSISTANT

## 2018-12-03 PROCEDURE — G0463 HOSPITAL OUTPT CLINIC VISIT: HCPCS

## 2018-12-03 ASSESSMENT — ANXIETY QUESTIONNAIRES
3. WORRYING TOO MUCH ABOUT DIFFERENT THINGS: MORE THAN HALF THE DAYS
7. FEELING AFRAID AS IF SOMETHING AWFUL MIGHT HAPPEN: NOT AT ALL
GAD7 TOTAL SCORE: 11
2. NOT BEING ABLE TO STOP OR CONTROL WORRYING: MORE THAN HALF THE DAYS
5. BEING SO RESTLESS THAT IT IS HARD TO SIT STILL: MORE THAN HALF THE DAYS
1. FEELING NERVOUS, ANXIOUS, OR ON EDGE: MORE THAN HALF THE DAYS
6. BECOMING EASILY ANNOYED OR IRRITABLE: MORE THAN HALF THE DAYS

## 2018-12-03 ASSESSMENT — PAIN SCALES - GENERAL: PAINLEVEL: SEVERE PAIN (7)

## 2018-12-03 ASSESSMENT — PATIENT HEALTH QUESTIONNAIRE - PHQ9
SUM OF ALL RESPONSES TO PHQ QUESTIONS 1-9: 15
5. POOR APPETITE OR OVEREATING: SEVERAL DAYS

## 2018-12-03 NOTE — NURSING NOTE
"Chief Complaint   Patient presents with     UTI       Initial /58 (BP Location: Left arm, Patient Position: Sitting, Cuff Size: Adult Regular)  Pulse 50  Temp 97.4  F (36.3  C) (Tympanic)  Ht 5' 7\" (1.702 m)  Wt 177 lb (80.3 kg)  SpO2 99%  BMI 27.72 kg/m2 Estimated body mass index is 27.72 kg/(m^2) as calculated from the following:    Height as of this encounter: 5' 7\" (1.702 m).    Weight as of this encounter: 177 lb (80.3 kg).  Medication Reconciliation: complete    Sydni Domingo LPN  "

## 2018-12-03 NOTE — PROGRESS NOTES
SUBJECTIVE:   Sharlene Mccord is a 58 year old female who presents to clinic today for the following health issues:        URINARY TRACT SYMPTOMS      Duration: 25 days    Description  hematuria, back pain and follow-up UTI    Intensity:  moderate    Accompanying signs and symptoms:  Fever/chills: no   Flank pain YES  Nausea and vomiting: no   Vaginal symptoms: none  Abdominal/Pelvic Pain: no     History  History of frequent UTI's: no   History of kidney stones: no   Sexually Active: no   Possibility of pregnancy: No    Precipitating or alleviating factors: None    Therapies tried and outcome: course of antibiotics - Doxycycline Monohydrate 100 mg BID   Outcome: Symptoms still present          Problem list and histories reviewed & adjusted, as indicated.  Additional history: as documented    Patient Active Problem List   Diagnosis     Diverticulitis of sigmoid colon     Hypothyroidism     Anxiety state     Cardiac microvascular disease     Diverticulitis     Aortic valve disorder     ACP (advance care planning)     Chronic pain     Constipation     Memory loss     Major depressive disorder, recurrent episode (H)     Cognitive disorder     Major depressive disorder     Fatigue     Migraine without aura and responsive to treatment     Atypical migraine     Fibromyalgia     Mixed connective tissue disease (H)     Past Surgical History:   Procedure Laterality Date     APPENDECTOMY  1977     BIOPSY  2017    taken at [facility] from Dr. Nadira cohen. Thyroid     CARDIAC SURGERY  2013    angiogram [facility]:  Normal coronary arteries.  Felt ot have some microvascular disease     CL AFF SURGICAL PATHOLOGY  01/02/2007    Thyroid Mass     COLONOSCOPY  1/13/2014    Procedure: COLONOSCOPY;  COLONOSCOPY ;  Surgeon: Chasidy Gee DO;  Location: [facility] OR     ESOPHAGOSCOPY, GASTROSCOPY, DUODENOSCOPY (EGD), COMBINED N/A 2/9/2017    Procedure: COMBINED ESOPHAGOSCOPY, GASTROSCOPY, DUODENOSCOPY (EGD);  Surgeon: Johnathan Ruff DO;   Location: [facility] OR     GYN SURGERY      c-sections x 3     HYSTERECTOMY TOTAL ABDOMINAL, BILATERAL SALPINGO-OOPHORECTOMY, COMBINED N/A 01/01/2001     LAPAROSCOPIC CHOLECYSTECTOMY  11/07/2003    Cholelithiasis     LAPAROTOMY EXPLORATORY      abdominal pain/fever     LARYNGOSCOPY       SPLENECTOMY         Social History   Substance Use Topics     Smoking status: Light Tobacco Smoker     Packs/day: 0.25     Years: 40.00     Types: Cigarettes     Start date: 1/1/1975     Smokeless tobacco: Never Used      Comment: 5 cigs daily  Patient will work on smoking cessation (3-26-18)     Alcohol use No     Family History   Problem Relation Age of Onset     C.A.D. Father      Hypertension Father      C.A.D. Mother      Cerebrovascular Disease Mother      CVA     Hypertension Mother      Coronary Artery Disease Mother      Diabetes Paternal Grandmother      Diabetes Paternal Grandfather      Diabetes Sister      Breast Cancer Sister      Diabetes Maternal Grandmother      Diabetes Sister      Breast Cancer Sister      Hypertension Sister      Depression Sister      Anxiety Disorder Sister      Depression Sister      Depression Brother      Depression Daughter      Obesity Daughter      Depression Daughter      Mental Illness Daughter      [email or web address]     Anxiety Disorder Daughter      Anxiety Disorder Other          Current Outpatient Prescriptions   Medication Sig Dispense Refill     clonazePAM (KLONOPIN) 1 MG tablet TAKE 1/2 TO 1 (ONE-HALF TO ONE) TABLET BY MOUTH TWICE DAILY AS NEEDED FOR ANXIETY 60 tablet 5     diclofenac (VOLTAREN) 1 % topical gel Place onto the skin 4 times daily 100 g 3     diltiazem (TIAZAC) 300 MG 24 hr ER beaded capsule TAKE 1 CAPSULE BY MOUTH ONCE DAILY 90 capsule 1     donepezil (ARICEPT) 5 MG tablet Take 1 tablet (5 mg) by mouth At Bedtime 30 tablet 6     estradiol (ESTRACE) 0.5 MG tablet TAKE ONE TABLET BY MOUTH TWICE DAILY 60 tablet 11     hydrocortisone (ANUSOL-HC) 2.5 % cream APPLY  CREAM TO AFFECTED  AREA THREE TIMES DAILY 60 g 1     hydrOXYzine (ATARAX) 25 MG tablet TAKE ONE TO TWO TABLETS BY MOUTH AT BEDTIME FOR NAUSEA 120 tablet 1     levothyroxine (SYNTHROID/LEVOTHROID) 50 MCG tablet TAKE ONE TABLET BY MOUTH ONCE DAILY 30 tablet 10     metoclopramide (REGLAN) 5 MG tablet TAKE ONE TABLET BY MOUTH 4 TIMES DAILY BEFORE MEAL(S) AT BEDTIME AS NEEDED 60 tablet 2     NITROSTAT 0.4 MG sublingual tablet Place 1 tablet (0.4 mg) under the tongue every 5 minutes as needed for chest pain 25 tablet 11     pantoprazole (PROTONIX) 20 MG EC tablet Take by mouth 30-60 minutes before a meal. 30 tablet 3     ranitidine (ZANTAC) 150 MG tablet TAKE 1 TABLET BY MOUTH TWICE DAILY 180 tablet 1     STATIN NOT PRESCRIBED, INTENTIONAL, 1 each as needed for other Please choose reason not prescribed, below       sulfamethoxazole-trimethoprim (BACTRIM/SEPTRA) 400-80 MG per tablet Take 1 tablet by mouth 2 times daily 14 tablet 0     SUMAtriptan (IMITREX) 100 MG tablet TAKE ONE TABLET BY MOUTH AS NEEDED MIGRAINES 10 tablet 5     thyroid (ARMOUR THYROID) 30 MG tablet Take 1 tablet (30 mg) by mouth daily 30 tablet 1     traMADol (ULTRAM) 50 MG tablet TAKE 2 TABLETS BY MOUTH EVERY 8 HOURS AS NEEDED FOR PAIN 180 tablet 0     UNABLE TO FIND 1.5 mg 3 times daily MEDICATION NAME: Oral Cannabis    Oral suspension  Taken morning, afternoon and bedtime       UNABLE TO FIND MEDICATION NAME: Cannabis as needed     [place] Vapor take one puff to the count of two at bedtime.  If needed in the middle of the night       Allergies   Allergen Reactions     Codeine      Isosorbide Mononitrate Other (See Comments)     Vomiting and headache       Lisinopril      [facility] scan     Recent Labs   Lab Test  11/21/18   1055  06/06/18   0958   09/11/17   1043   04/19/17   1133  05/09/16   0930  01/13/16   0941   LDL   --    --    --    --    --   104*  127*  147*   HDL   --    --    --    --    --   77  70  64   TRIG   --    --    --    --    --   165*  204*   "164*   ALT  21  26   --   27   < >  28  44  63*   CR  1.05*  0.95   --   1.05*   < >  0.97  0.94  0.93   GFRESTIMATED  54*  61   --   54*   < >  59*  62  63   GFRESTBLACK  65  73   --   65   < >  71  75  76   POTASSIUM  4.2  4.5   --   4.3   < >  4.4  4.1  4.8   TSH  1.31  1.67   < >  1.17   --   1.24  3.12  2.58    < > = values in this interval not displayed.      BP Readings from Last 3 Encounters:   12/03/18 112/58   11/21/18 122/58   09/20/18 114/64    Wt Readings from Last 3 Encounters:   12/03/18 177 lb (80.3 kg)   11/21/18 166 lb (75.3 kg)   09/20/18 176 lb 6.4 oz (80 kg)                    Reviewed and updated as needed this visit by clinical staff       Reviewed and updated as needed this visit by Provider         ROS:  Constitutional, HEENT, cardiovascular, pulmonary, gi and gu systems are negative, except as otherwise noted.    OBJECTIVE:                                                    /58 (BP Location: Left arm, Patient Position: Sitting, Cuff Size: Adult Regular)  Pulse 50  Temp 97.4  F (36.3  C) (Tympanic)  Ht 5' 7\" (1.702 m)  Wt 177 lb (80.3 kg)  SpO2 99%  BMI 27.72 kg/m2  Body mass index is 27.72 kg/(m^2).  GENERAL APPEARANCE: healthy, alert and no distress  EYES: Eyes grossly normal to inspection, PERRL and conjunctivae and sclerae normal  HENT: ear canals and TM's normal and nose and mouth without ulcers or lesions  NECK: no adenopathy, no asymmetry, masses, or scars and thyroid normal to palpation  RESP: lungs clear to auscultation - no rales, rhonchi or wheezes  MS: extremities normal- no gross deformities noted  SKIN: no suspicious lesions or rashes  NEURO: Normal strength and tone, mentation intact and speech normal  PSYCH: mentation appears normal and affect normal/bright    Diagnostic test results:  Diagnostic Test Results:  No results found for this or any previous visit (from the past 24 hour(s)).     Results for orders placed or performed in visit on 12/03/18 (from the past " 24 hour(s))   UA reflex to Microscopic and Culture - [place]   Result Value Ref Range    Color Urine Yellow     Appearance Urine Clear     Glucose Urine Negative NEG^Negative mg/dL    Bilirubin Urine Negative NEG^Negative    Ketones Urine Negative NEG^Negative mg/dL    Specific Gravity Urine 1.010 1.003 - 1.035    Blood Urine Negative NEG^Negative    pH Urine 5.0 4.7 - 8.0 pH    Protein Albumin Urine Negative NEG^Negative mg/dL    Urobilinogen mg/dL Normal 0.0 - 2.0 mg/dL    Nitrite Urine Negative NEG^Negative    Leukocyte Esterase Urine Negative NEG^Negative    Source Midstream Urine        ASSESSMENT/PLAN:                                                    1. Pelvic pain in female  She hd negative Culture on urine. She is not been on abx and seem to make it worse.   Going to to a CT and look at bones and her kidneys and bladder.  See us back in a few weeks.     2. Dysuria  Recheck this. Urine culture was negative.   - UA reflex to Microscopic and Culture - [place]    3. Chronic pain syndrome  Weaning off Ultram to Medical THC with higher over components.   - Pain Drug Scr UR W Rptd Meds    4. Tobacco abuse  Notify   - Tobacco Cessation - Order to Satisfy Health Maintenance    5. Tobacco abuse counseling  Notify.     6. Acute cystitis with hematuria  Check her CT   - CT Abdomen Pelvis w/o Contrast; Future      See Patient Instructions    KEILY Cobos  [facility] - [place]

## 2018-12-03 NOTE — PATIENT INSTRUCTIONS

## 2018-12-03 NOTE — MR AVS SNAPSHOT
After Visit Summary   12/3/2018    Sharlene Mccord    MRN: [id number]           Patient Information     Date Of Birth          1960        Visit Information        Provider Department      12/3/2018 2:20 PM Concha Hare PA [facility]        Today's Diagnoses     Pelvic pain in female    -  1    Dysuria        Chronic pain syndrome        Tobacco abuse        Tobacco abuse counseling        Acute cystitis with hematuria          Care Instructions      HOW TO QUIT SMOKING  Smoking is one of the hardest habits to break. About half of all those who have ever smoked have been able to quit, and most of those (about 70%) who still smoke want to quit. Here are some of the best ways to stop smoking.     KEEP TRYING:  It takes most smokers about 8 tries before they are finally able to fully quit. So, the more often you try and fail, the better your chance of quitting the next time! So, don't give up!    GO COLD TURKEY:  Most ex-smokers quit cold turkey. Trying to cut back gradually doesn't seem to work as well, perhaps because it continues the smoking habit. Also, it is possible to fool yourself by inhaling more while smoking fewer cigarettes. This results in the same amount of nicotine in your body!    GET SUPPORT:  Support programs can make an important difference, especially for the heavy smoker. These groups offer lectures, methods to change your behavior and peer support. Call the free national Quitline for more information. 800-QUIT-NOW (207-445-7366). Low-cost or free programs are offered by many hospitals, local chapters of the American Lung Association (411-422-1268) and the American Cancer Society (468-841-0908). Support at home is important too. Non-smokers can help by offering praise and encouragement. If the smoker fails to quit, encourage them to try again!    OVER-THE-COUNTER MEDICINES:  For those who can't quit on their own, Nicotine Replacement Therapy (NRT) may  make quitting much easier. Certain aids such as the nicotine patch, gum and lozenge are available without a prescription. However, it is best to use these under the guidance of your doctor. The skin patch provides a steady supply of nicotine to the body. Nicotine gum and lozenge gives temporary bursts of low levels of nicotine. Both methods take the edge off the craving for cigarettes. WARNING: If you feel symptoms of nicotine overdose, such as nausea, vomiting, dizziness, weakness, or fast heartbeat, stop using these and see your doctor.    PRESCRIPTION MEDICINES:  After evaluating your smoking patterns and prior attempts at quitting, your doctor may offer a prescription medicine such as bupropion (Zyban, Wellbutrin), varenicline (Chantix, Champix), a niocotine inhaler or nasal spray. Each has its unique advantage and side effects which your doctor can review with you.    HEALTH BENEFITS OF QUITTING:  The benefits of quitting start right away and keep improving the longer you go without smokin minutes: blood pressure and pulse return to normal  8 hours: oxygen levels return to normal  2 days: ability to smell and taste begins to improve as damaged nerves start to regrow  2-3 weeks: circulation and lung function improves  1-9 months: decreased cough, congestion and shortness of breath; less tired  1 year: risk of heart attack decreases by half  5 years: risk of lung cancer decreases by half; risk of stroke becomes the same as a non-smoker  For information about how to quit smoking, visit the following links:  [employer] ,   Clearing the Air, Quit Smoking Today   - an online booklet. http://www.smokefree.gov/pubs/clearing_the_air.pdf  Smokefree.gov http://smokefree.gov/  QuitNet http://www.quitnet.com/    4273-9106 [employer], [address] [place] [address], [place] [address]. All rights reserved. This information is not intended as a substitute for professional medical care. Always follow your  healthcare professional's instructions.    The Benefits of Living Smoke Free  What do you want to gain from quitting? Check off some reasons to quit.  Health Benefits  ___ Reduce my risk of lung cancer, heart disease, chronic lung disease  ___ Have fewer wrinkles and softer skin  ___ Improve my sense of taste and smell  ___ For pregnant women--reduce the risk of having a miscarriage, stillbirth, premature birth, or low-birth-weight baby  Personal Benefits  ___ Feel more in control of my life  ___ Have better-smelling hair, breath, clothes, home, and car  ___ Save time by not having to take smoke breaks, buy cigarettes, or hunt for a light  ___ Have whiter teeth  Family Benefits  ___ Reduce my children s respiratory tract infections  ___ Set a good example for my children  ___ Reduce my family s cancer risk  Financial Benefits  ___ Save hundreds of dollars each year that would be spent on cigarettes  ___ Save money on medical bills  ___ Save on life, health, and car insurance premiums    Those Dollars Add Up!  Cigarettes are expensive, and getting more expensive all the time. Do you realize how much money you are spending on cigarettes per year? What is the average amount you spend on a pack of cigarettes? What is the average number of packs that you smoke per day? Using your answers to these questions, fill in this formula to help you find out:  ($ _____ per pack) ×  ( _____ number of packs per day) × (365 days) =  $ _____ yearly cost of smoking  Besides tobacco, there are other costs, including extra cleaning bills and replacement costs for clothing and furniture; medical expenses for smoking-related illnesses; and higher health, life, and car insurance premiums.    Cigars and Pipes Count Too!  Cigars and pipes are also dangerous. So are smokeless (chewing) tobacco and snuff. All of these products contain nicotine, a highly addictive substance that has harmful effects on your body. Quitting smoking means giving up  all tobacco products.      7267-2209 [employer] [facility], [address] [place] [address], [place] [address]. All rights reserved. This information is not intended as a substitute for professional medical care. Always follow your healthcare professional's instructions.          Follow-ups after your visit        Follow-up notes from your care team     Return in about 4 weeks (around 12/31/2018).      Your next 10 appointments already scheduled     May 14, 2019  9:30 AM CDT   (Arrive by 9:15 AM)   Return Visit with Norman Nichole MD   [facility] ([facility] )    [address]  [facility] [address]   [phone]              Future tests that were ordered for you today     Open Future Orders        Priority Expected Expires Ordered    CT Abdomen Pelvis w/o Contrast Routine  12/3/2019 12/3/2018            Who to contact     If you have questions or need follow up information about today's clinic visit or your schedule please contact [facility] directly at [phone].  Normal or non-critical lab and imaging results will be communicated to you by MyChart, letter or phone within 4 business days after the clinic has received the results. If you do not hear from us within 7 days, please contact the clinic through MyChart or phone. If you have a critical or abnormal lab result, we will notify you by phone as soon as possible.  Submit refill requests through [employer] or call your pharmacy and they will forward the refill request to us. Please allow 3 business days for your refill to be completed.          Additional Information About Your Visit        MyChart Information     [employer] gives you secure access to your electronic health record. If you see a primary care provider, you can also send messages to your care team and make appointments. If you have questions, please call your primary care clinic.  If you do not have a primary care provider, please call  "[phone] and they will assist you.        Care EveryWhere ID     This is your Care EveryWhere ID. This could be used by other organizations to access your [place] medical records  [id number]        Your Vitals Were     Pulse Temperature Height Pulse Oximetry BMI (Body Mass Index)       50 97.4  F (36.3  C) (Tympanic) 5' 7\" (1.702 m) 99% 27.72 kg/m2        Blood Pressure from Last 3 Encounters:   12/03/18 112/58   11/21/18 122/58   09/20/18 114/64    Weight from Last 3 Encounters:   12/03/18 177 lb (80.3 kg)   11/21/18 166 lb (75.3 kg)   09/20/18 176 lb 6.4 oz (80 kg)              We Performed the Following     Pain Drug Scr UR W Rptd Meds     Tobacco Cessation - Order to Satisfy Health Maintenance     UA reflex to Microscopic and Culture - [facility]        Primary Care Provider Office Phone # Fax #    KEILY Fonseca [phone]       [address] [place] [address]        Equal Access to Services     [facility]: Hadii aad ku hadasho Soomaali, waaxda luqadaha, qaybta kaalmada adenigel, victor m capone . So [facility] [phone].    ATENCIÓN: Si habla español, tiene a meneses disposición servicios gratuitos de asistencia lingüística. [facility] [phone].    We comply with applicable federal civil rights laws and [place] laws. We do not discriminate on the basis of race, color, national origin, age, disability, sex, sexual orientation, or gender identity.            Thank you!     Thank you for choosing [facility]  for your care. Our goal is always to provide you with excellent care. Hearing back from our patients is one way we can continue to improve our services. Please take a few minutes to complete the written survey that you may receive in the mail after your visit with us. Thank you!             Your Updated Medication List - Protect others around you: Learn how to safely use, store and throw away your medicines at " www.disposemymeds.org.          This list is accurate as of 12/3/18  3:24 PM.  Always use your most recent med list.                   Brand Name Dispense Instructions for use Diagnosis    clonazePAM 1 MG tablet    klonoPIN    60 tablet    TAKE 1/2 TO 1 (ONE-HALF TO ONE) TABLET BY MOUTH TWICE DAILY AS NEEDED FOR ANXIETY    SHAKIR (generalized anxiety disorder)       diclofenac 1 % topical gel    VOLTAREN    100 g    Place onto the skin 4 times daily    Primary osteoarthritis involving multiple joints       diltiazem  MG 24 hr ER beaded capsule    TIAZAC    90 capsule    TAKE 1 CAPSULE BY MOUTH ONCE DAILY    Cardiac microvascular disease       donepezil 5 MG tablet    ARICEPT    30 tablet    Take 1 tablet (5 mg) by mouth At Bedtime    Mild neurocognitive disorder       estradiol 0.5 MG tablet    ESTRACE    60 tablet    TAKE ONE TABLET BY MOUTH TWICE DAILY    Postmenopausal HRT (hormone replacement therapy)       hydrocortisone 2.5 % cream    ANUSOL-HC    60 g    APPLY  CREAM TO AFFECTED AREA THREE TIMES DAILY    Hemorrhoid       hydrOXYzine 25 MG tablet    ATARAX    120 tablet    TAKE ONE TO TWO TABLETS BY MOUTH AT BEDTIME FOR NAUSEA    Chronic nausea       levothyroxine 50 MCG tablet    SYNTHROID/LEVOTHROID    30 tablet    TAKE ONE TABLET BY MOUTH ONCE DAILY    Acquired hypothyroidism       metoclopramide 5 MG tablet    REGLAN    60 tablet    TAKE ONE TABLET BY MOUTH 4 TIMES DAILY BEFORE MEAL(S) AT BEDTIME AS NEEDED    Gastroesophageal reflux disease without esophagitis       NITROSTAT 0.4 MG sublingual tablet   Generic drug:  nitroGLYcerin     25 tablet    Place 1 tablet (0.4 mg) under the tongue every 5 minutes as needed for chest pain    Chest pain, unspecified type       pantoprazole 20 MG EC tablet    PROTONIX    30 tablet    Take by mouth 30-60 minutes before a meal.    Gastroesophageal reflux disease with esophagitis       ranitidine 150 MG tablet    ZANTAC    180 tablet    TAKE 1 TABLET BY MOUTH TWICE  DAILY    Gastroesophageal reflux disease without esophagitis       STATIN NOT PRESCRIBED    INTENTIONAL     1 each as needed for other Please choose reason not prescribed, below    Cardiac microvascular disease       sulfamethoxazole-trimethoprim 400-80 MG tablet    BACTRIM/SEPTRA    14 tablet    Take 1 tablet by mouth 2 times daily    Abnormal urine findings       SUMAtriptan 100 MG tablet    IMITREX    10 tablet    TAKE ONE TABLET BY MOUTH AS NEEDED MIGRAINES    Migraine headache       thyroid 30 MG tablet    ARMOUR THYROID    30 tablet    Take 1 tablet (30 mg) by mouth daily    Acquired hypothyroidism       traMADol 50 MG tablet    ULTRAM    180 tablet    TAKE 2 TABLETS BY MOUTH EVERY 8 HOURS AS NEEDED FOR PAIN    Fibromyalgia       * UNABLE TO FIND      MEDICATION NAME: Cannabis as needed   [place] Vapor take one puff to the count of two at bedtime.  If needed in the middle of the night        * UNABLE TO FIND      1.5 mg 3 times daily MEDICATION NAME: Oral Cannabis  Oral suspension  Taken morning, afternoon and bedtime        * Notice:  This list has 2 medication(s) that are the same as other medications prescribed for you. Read the directions carefully, and ask your doctor or other care provider to review them with you.

## 2018-12-04 ASSESSMENT — ANXIETY QUESTIONNAIRES: GAD7 TOTAL SCORE: 11

## 2018-12-05 ENCOUNTER — MYC MEDICAL ADVICE (OUTPATIENT)
Dept: FAMILY MEDICINE | Facility: OTHER | Age: 58
End: 2018-12-05

## 2018-12-05 ENCOUNTER — HOSPITAL ENCOUNTER (OUTPATIENT)
Dept: CT IMAGING | Facility: HOSPITAL | Age: 58
Discharge: HOME | End: 2018-12-05
Attending: PHYSICIAN ASSISTANT | Admitting: PHYSICIAN ASSISTANT
Payer: MEDICARE

## 2018-12-05 DIAGNOSIS — R06.2 WHEEZING: Primary | ICD-10-CM

## 2018-12-05 DIAGNOSIS — N30.01 ACUTE CYSTITIS WITH HEMATURIA: ICD-10-CM

## 2018-12-05 PROCEDURE — 74176 CT ABD & PELVIS W/O CONTRAST: CPT | Mod: TC

## 2018-12-08 DIAGNOSIS — M79.7 FIBROMYALGIA: ICD-10-CM

## 2018-12-08 LAB — PAIN DRUG SCR UR W RPTD MEDS: NORMAL

## 2018-12-10 NOTE — TELEPHONE ENCOUNTER
traMADol (ULTRAM) 50 MG tablet      Last Written Prescription Date:  11/21/18  Last Fill Quantity: 180,   # refills: 0  Last Office Visit: 12/3/18  Future Office visit:       Routing refill request to provider for review/approval because:  Drug not on the [facility], [facility] or [facility] refill protocol or controlled substance

## 2018-12-12 RX ORDER — TRAMADOL HYDROCHLORIDE 50 MG/1
TABLET ORAL
Qty: 180 TABLET | Refills: 0 | Status: SHIPPED | OUTPATIENT
Start: 2018-12-12 | End: 2019-02-04

## 2018-12-31 ENCOUNTER — TELEPHONE (OUTPATIENT)
Dept: FAMILY MEDICINE | Facility: OTHER | Age: 58
End: 2018-12-31

## 2018-12-31 DIAGNOSIS — I10 BENIGN ESSENTIAL HYPERTENSION: Primary | ICD-10-CM

## 2018-12-31 NOTE — TELEPHONE ENCOUNTER
JENII  Patients last office visti 12-3-18  Last lipid panel 4- LDL of 104, STATIN NOT PRESCRIBED INTENTIONALLY  Is noted on chart but she is not taking ASA and currently a smoker    Please advise

## 2019-01-28 ENCOUNTER — MYC MEDICAL ADVICE (OUTPATIENT)
Dept: FAMILY MEDICINE | Facility: OTHER | Age: 59
End: 2019-01-28

## 2019-01-29 NOTE — PROGRESS NOTES
SUBJECTIVE:   Sharlene Mccord is a 58 year old female who presents to clinic today for the following health issues:        Chronic Pain Follow-Up       Type / Location of Pain: Fibromyalgia  Analgesia/pain control:       Recent changes:  worse      Overall control: Inadequate pain control  Activity level/function:      Daily activities:  Able to do light housework, cooking    Work:  Unable to work  Adverse effects:  No  Adherance    Taking medication as directed?  Yes    Participating in other treatments: no   Risk Factors:    Sleep:  Poor    Mood/anxiety:  slightly worsened    Recent family or social stressors:  death in family:  Cousin dying of canceer    Other aggravating factors: prolonged sitting, prolonged standing, poor posture, sedentary lifestyle, repetitive activities - weather and movement  PHQ-9 SCORE 9/20/2018 11/21/2018 12/3/2018   PHQ-9 Total Score - - -   PHQ-9 Total Score 13 13 15     SHAKIR-7 SCORE 9/20/2018 11/21/2018 12/3/2018   Total Score 11 9 11     Encounter-Level CSA - 06/05/2017:    Controlled Substance Agreement - Scan on 6/6/2017 12:39 PM: CONTROLLED SUBSTANCE AGREEMENT (below)       Patient-Level CSA:    There are no patient-level csa.         Amount of exercise or physical activity: None    Problems taking medications regularly: No    Medication side effects: none    Diet: regular (no restrictions)            Problem list and histories reviewed & adjusted, as indicated.  Additional history: as documented    Patient Active Problem List   Diagnosis     Diverticulitis of sigmoid colon     Hypothyroidism     Anxiety state     Cardiac microvascular disease     Diverticulitis     Aortic valve disorder     ACP (advance care planning)     Chronic pain     Constipation     Memory loss     Major depressive disorder, recurrent episode (H)     Cognitive disorder     Major depressive disorder     Fatigue     Migraine without aura and responsive to treatment     Atypical migraine     Fibromyalgia      Mixed connective tissue disease (H)     Past Surgical History:   Procedure Laterality Date     APPENDECTOMY  1977     BIOPSY  2017    [facility] from Dr. Nadira cohen. Thyroid     CARDIAC SURGERY  2013    angiogram [facility]:  Normal coronary arteries.  Felt ot have some microvascular disease     CL AFF SURGICAL PATHOLOGY  01/02/2007    Thyroid Mass     COLONOSCOPY  1/13/2014    Procedure: COLONOSCOPY;  COLONOSCOPY ;  Surgeon: Chasidy Gee DO;  Location: [facility] OR     ESOPHAGOSCOPY, GASTROSCOPY, DUODENOSCOPY (EGD), COMBINED N/A 2/9/2017    Procedure: COMBINED ESOPHAGOSCOPY, GASTROSCOPY, DUODENOSCOPY (EGD);  Surgeon: Johnathan Ruff DO;  Location: [facility] OR     GYN SURGERY      c-sections x 3     HYSTERECTOMY TOTAL ABDOMINAL, BILATERAL SALPINGO-OOPHORECTOMY, COMBINED N/A 01/01/2001     LAPAROSCOPIC CHOLECYSTECTOMY  11/07/2003    Cholelithiasis     LAPAROTOMY EXPLORATORY      abdominal pain/fever     LARYNGOSCOPY       SPLENECTOMY         Social History     Tobacco Use     Smoking status: Light Tobacco Smoker     Packs/day: 0.25     Years: 40.00     Pack years: 10.00     Types: Cigarettes     Start date: 1/1/1975     Smokeless tobacco: Never Used     Tobacco comment: 5 cigs daily  Patient will work on smoking cessation (3-26-18)   Substance Use Topics     Alcohol use: No     Family History   Problem Relation Age of Onset     C.A.D. Father      Hypertension Father      C.A.D. Mother      Cerebrovascular Disease Mother         CVA     Hypertension Mother      Coronary Artery Disease Mother      Diabetes Paternal Grandmother      Diabetes Paternal Grandfather      Diabetes Sister      Breast Cancer Sister      Diabetes Maternal Grandmother      Diabetes Sister      Breast Cancer Sister      Hypertension Sister      Depression Sister      Anxiety Disorder Sister      Depression Sister      Depression Brother      Depression Daughter      Obesity Daughter      Depression Daughter      Mental Illness Daughter          bi-polar1     Anxiety Disorder Daughter      Anxiety Disorder Other          Current Outpatient Medications   Medication Sig Dispense Refill     clonazePAM (KLONOPIN) 1 MG tablet TAKE 1/2 TO 1 (ONE-HALF TO ONE) TABLET BY MOUTH TWICE DAILY AS NEEDED FOR ANXIETY 60 tablet 5     diclofenac (VOLTAREN) 1 % topical gel Place onto the skin 4 times daily 100 g 3     diltiazem (TIAZAC) 300 MG 24 hr ER beaded capsule TAKE 1 CAPSULE BY MOUTH ONCE DAILY 90 capsule 1     donepezil (ARICEPT) 5 MG tablet Take 1 tablet (5 mg) by mouth At Bedtime 30 tablet 6     estradiol (ESTRACE) 0.5 MG tablet TAKE ONE TABLET BY MOUTH TWICE DAILY 60 tablet 11     hydrocortisone (ANUSOL-HC) 2.5 % cream APPLY  CREAM TO AFFECTED AREA THREE TIMES DAILY 60 g 1     hydrOXYzine (ATARAX) 25 MG tablet TAKE ONE TO TWO TABLETS BY MOUTH AT BEDTIME FOR NAUSEA 120 tablet 1     metoclopramide (REGLAN) 5 MG tablet TAKE ONE TABLET BY MOUTH 4 TIMES DAILY BEFORE MEAL(S) AT BEDTIME AS NEEDED 60 tablet 2     NITROSTAT 0.4 MG sublingual tablet Place 1 tablet (0.4 mg) under the tongue every 5 minutes as needed for chest pain 25 tablet 11     pantoprazole (PROTONIX) 20 MG EC tablet Take by mouth 30-60 minutes before a meal. 30 tablet 3     ranitidine (ZANTAC) 150 MG tablet TAKE 1 TABLET BY MOUTH TWICE DAILY 180 tablet 1     STATIN NOT PRESCRIBED (INTENTIONAL) Refused       SUMAtriptan (IMITREX) 100 MG tablet TAKE ONE TABLET BY MOUTH AS NEEDED MIGRAINES 10 tablet 5     thyroid (ARMOUR THYROID) 30 MG tablet Take 1 tablet (30 mg) by mouth daily 30 tablet 1     traMADol (ULTRAM) 50 MG tablet Take 1 tablet (50 mg) by mouth every 6 hours as needed for severe pain 10 tablet 0     traMADol (ULTRAM) 50 MG tablet TAKE 2 TABLETS BY MOUTH EVERY 8 HOURS AS NEEDED FOR PAIN 270 tablet 0     UNABLE TO FIND 1.5 mg 3 times daily MEDICATION NAME: Oral Cannabis    Oral suspension  Taken morning, afternoon and bedtime       UNABLE TO FIND MEDICATION NAME: Cannabis as needed     [place] Vapor  "take one puff to the count of two at bedtime.  If needed in the middle of the night       Allergies   Allergen Reactions     Codeine      Isosorbide Mononitrate Other (See Comments)     Vomiting and headache       Lisinopril      [facility] scan     Recent Labs   Lab Test 11/21/18  1055 06/06/18  0958  09/11/17  1043  04/19/17  1133 05/09/16  0930 01/13/16  0941   LDL  --   --   --   --   --  104* 127* 147*   HDL  --   --   --   --   --  77 70 64   TRIG  --   --   --   --   --  165* 204* 164*   ALT 21 26  --  27   < > 28 44 63*   CR 1.05* 0.95  --  1.05*   < > 0.97 0.94 0.93   GFRESTIMATED 54* 61  --  54*   < > 59* 62 63   GFRESTBLACK 65 73  --  65   < > 71 75 76   POTASSIUM 4.2 4.5  --  4.3   < > 4.4 4.1 4.8   TSH 1.31 1.67   < > 1.17  --  1.24 3.12 2.58    < > = values in this interval not displayed.      BP Readings from Last 3 Encounters:   02/04/19 126/68   12/03/18 112/58   11/21/18 122/58    Wt Readings from Last 3 Encounters:   02/04/19 77.1 kg (170 lb)   12/03/18 80.3 kg (177 lb)   11/21/18 75.3 kg (166 lb)                    Reviewed and updated as needed this visit by clinical staff       Reviewed and updated as needed this visit by Provider         ROS:  Constitutional, neuro, ENT, endocrine, pulmonary, cardiac, gastrointestinal, genitourinary, musculoskeletal, integument and psychiatric systems are negative, except as otherwise noted.    OBJECTIVE:                                                    /68 (BP Location: Left arm, Patient Position: Sitting, Cuff Size: Adult Regular)   Pulse 50   Temp 98  F (36.7  C) (Tympanic)   Ht 1.702 m (5' 7\")   Wt 77.1 kg (170 lb)   SpO2 98%   BMI 26.63 kg/m    Body mass index is 26.63 kg/m .  GENERAL APPEARANCE: healthy, alert and no distress  EYES: Eyes grossly normal to inspection, PERRL and conjunctivae and sclerae normal  HENT: ear canals and TM's normal and nose and mouth without ulcers or lesions  NECK: no adenopathy, no asymmetry, masses, or scars " and thyroid normal to palpation  RESP: lungs clear to auscultation - no rales, rhonchi or wheezes  CV: regular rates and rhythm, normal S1 S2, no S3 or S4 and no murmur, click or rub  MS: extremities normal- no gross deformities noted. Pain on medial and lateral elbow. Flexion and extension are painful.   SKIN: no suspicious lesions or rashes  NEURO: Normal strength and tone, mentation intact and speech normal  PSYCH: mentation appears normal and affect normal/bright    Diagnostic test results:  Diagnostic Test Results:  No results found for this or any previous visit (from the past 24 hour(s)).     ASSESSMENT/PLAN:                                                    1. Tobacco abuse  Down to 1 to 2 cigarettes a day maximum.   - Tobacco Cessation - Order to Satisfy Health Maintenance    2. Tobacco abuse counseling  Is almost quit on her own.     3. Chronic pain syndrome  Her pain is not helped by her cannabis. Her mood however is much much better. I wouldn't encourage her to stop the cannabis. Is going to go to [place] and we will give her a 3 month supply of Ultram but will not take this if not needed.   In the past warmth has been very good for her joints.   - Tobacco Cessation - Order to Satisfy Health Maintenance  - traMADol (ULTRAM) 50 MG tablet; Take 1 tablet (50 mg) by mouth every 6 hours as needed for severe pain  Dispense: 10 tablet; Refill: 0    4. Fibromyalgia  She will be given the below. Her elbows   - traMADol (ULTRAM) 50 MG tablet; TAKE 2 TABLETS BY MOUTH EVERY 8 HOURS AS NEEDED FOR PAIN  Dispense: 270 tablet; Refill: 0      5. 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 Too much pain. Will give her exercises.       See Patient Instructions    KEILY Cobos  [facility]

## 2019-01-30 ENCOUNTER — MYC MEDICAL ADVICE (OUTPATIENT)
Dept: FAMILY MEDICINE | Facility: OTHER | Age: 59
End: 2019-01-30

## 2019-01-31 ENCOUNTER — TELEPHONE (OUTPATIENT)
Dept: PSYCHIATRY | Facility: OTHER | Age: 59
End: 2019-01-31

## 2019-01-31 NOTE — TELEPHONE ENCOUNTER
Please see my-chart msg of 1-28-19.  Patient is leaving for [place] and will not be back until mid April.  Does Sharlene need to see Dr. Odell to get her scripts filled before she leaves for [place]?.  Thank you, please advise.  Last visit was on 3-26-18.  No f/u appt is scheduled.

## 2019-02-03 DIAGNOSIS — E03.9 ACQUIRED HYPOTHYROIDISM: ICD-10-CM

## 2019-02-04 ENCOUNTER — OFFICE VISIT (OUTPATIENT)
Dept: FAMILY MEDICINE | Facility: OTHER | Age: 59
End: 2019-02-04
Attending: PHYSICIAN ASSISTANT
Payer: COMMERCIAL

## 2019-02-04 VITALS
WEIGHT: 170 LBS | HEIGHT: 67 IN | OXYGEN SATURATION: 98 % | BODY MASS INDEX: 26.68 KG/M2 | SYSTOLIC BLOOD PRESSURE: 126 MMHG | DIASTOLIC BLOOD PRESSURE: 68 MMHG | TEMPERATURE: 98 F | HEART RATE: 50 BPM

## 2019-02-04 DIAGNOSIS — G89.4 CHRONIC PAIN SYNDROME: Primary | ICD-10-CM

## 2019-02-04 DIAGNOSIS — Z72.0 TOBACCO ABUSE: ICD-10-CM

## 2019-02-04 DIAGNOSIS — Z71.6 TOBACCO ABUSE COUNSELING: ICD-10-CM

## 2019-02-04 DIAGNOSIS — M77.12 LATERAL EPICONDYLITIS OF BOTH ELBOWS: ICD-10-CM

## 2019-02-04 DIAGNOSIS — M79.7 FIBROMYALGIA: ICD-10-CM

## 2019-02-04 DIAGNOSIS — M77.11 LATERAL EPICONDYLITIS OF BOTH ELBOWS: ICD-10-CM

## 2019-02-04 PROCEDURE — 99214 OFFICE O/P EST MOD 30 MIN: CPT | Performed by: PHYSICIAN ASSISTANT

## 2019-02-04 PROCEDURE — G0463 HOSPITAL OUTPT CLINIC VISIT: HCPCS

## 2019-02-04 RX ORDER — LEVOTHYROXINE, LIOTHYRONINE 19; 4.5 UG/1; UG/1
TABLET ORAL
Qty: 30 TABLET | Refills: 1 | Status: SHIPPED | OUTPATIENT
Start: 2019-02-04 | End: 2019-04-14

## 2019-02-04 RX ORDER — TRAMADOL HYDROCHLORIDE 50 MG/1
50 TABLET ORAL EVERY 6 HOURS PRN
Qty: 10 TABLET | Refills: 0 | Status: SHIPPED | OUTPATIENT
Start: 2019-02-04 | End: 2019-05-08

## 2019-02-04 RX ORDER — TRAMADOL HYDROCHLORIDE 50 MG/1
TABLET ORAL
Qty: 270 TABLET | Refills: 0 | Status: SHIPPED | OUTPATIENT
Start: 2019-02-04 | End: 2019-06-13

## 2019-02-04 ASSESSMENT — ANXIETY QUESTIONNAIRES
7. FEELING AFRAID AS IF SOMETHING AWFUL MIGHT HAPPEN: SEVERAL DAYS
3. WORRYING TOO MUCH ABOUT DIFFERENT THINGS: MORE THAN HALF THE DAYS
2. NOT BEING ABLE TO STOP OR CONTROL WORRYING: MORE THAN HALF THE DAYS
1. FEELING NERVOUS, ANXIOUS, OR ON EDGE: SEVERAL DAYS
GAD7 TOTAL SCORE: 11
5. BEING SO RESTLESS THAT IT IS HARD TO SIT STILL: SEVERAL DAYS
6. BECOMING EASILY ANNOYED OR IRRITABLE: MORE THAN HALF THE DAYS

## 2019-02-04 ASSESSMENT — PATIENT HEALTH QUESTIONNAIRE - PHQ9
SUM OF ALL RESPONSES TO PHQ QUESTIONS 1-9: 14
5. POOR APPETITE OR OVEREATING: MORE THAN HALF THE DAYS

## 2019-02-04 ASSESSMENT — PAIN SCALES - GENERAL: PAINLEVEL: MODERATE PAIN (5)

## 2019-02-04 ASSESSMENT — MIFFLIN-ST. JEOR: SCORE: 1383.74

## 2019-02-04 NOTE — PATIENT INSTRUCTIONS
HOW TO QUIT SMOKING  Smoking is one of the hardest habits to break. About half of all those who have ever smoked have been able to quit, and most of those (about 70%) who still smoke want to quit. Here are some of the best ways to stop smoking.     KEEP TRYING:  It takes most smokers about 8 tries before they are finally able to fully quit. So, the more often you try and fail, the better your chance of quitting the next time! So, don't give up!    GO COLD TURKEY:  Most ex-smokers quit cold turkey. Trying to cut back gradually doesn't seem to work as well, perhaps because it continues the smoking habit. Also, it is possible to fool yourself by inhaling more while smoking fewer cigarettes. This results in the same amount of nicotine in your body!    GET SUPPORT:  Support programs can make an important difference, especially for the heavy smoker. These groups offer lectures, methods to change your behavior and peer support. Call the free national Quitline for more information. 800-QUIT-NOW (895-749-3725). Low-cost or free programs are offered by many hospitals, local chapters of the American Lung Association (613-412-2716) and the American Cancer Society (087-281-7803). Support at home is important too. Non-smokers can help by offering praise and encouragement. If the smoker fails to quit, encourage them to try again!    OVER-THE-COUNTER MEDICINES:  For those who can't quit on their own, Nicotine Replacement Therapy (NRT) may make quitting much easier. Certain aids such as the nicotine patch, gum and lozenge are available without a prescription. However, it is best to use these under the guidance of your doctor. The skin patch provides a steady supply of nicotine to the body. Nicotine gum and lozenge gives temporary bursts of low levels of nicotine. Both methods take the edge off the craving for cigarettes. WARNING: If you feel symptoms of nicotine overdose, such as nausea, vomiting, dizziness, weakness, or fast  heartbeat, stop using these and see your doctor.    PRESCRIPTION MEDICINES:  After evaluating your smoking patterns and prior attempts at quitting, your doctor may offer a prescription medicine such as bupropion (Zyban, Wellbutrin), varenicline (Chantix, Champix), a niocotine inhaler or nasal spray. Each has its unique advantage and side effects which your doctor can review with you.    HEALTH BENEFITS OF QUITTING:  The benefits of quitting start right away and keep improving the longer you go without smokin minutes: blood pressure and pulse return to normal  8 hours: oxygen levels return to normal  2 days: ability to smell and taste begins to improve as damaged nerves start to regrow  2-3 weeks: circulation and lung function improves  1-9 months: decreased cough, congestion and shortness of breath; less tired  1 year: risk of heart attack decreases by half  5 years: risk of lung cancer decreases by half; risk of stroke becomes the same as a non-smoker  For information about how to quit smoking, visit the following links:  National [employer] [place] ,   Clearing the Air, Quit Smoking Today   - an online booklet. http://www.smokefree.gov/pubs/clearing_the_air.pdf  Smokefree.gov http://smokefree.gov/  QuitNet http://www.quitnet.com/    9925-4268 [employer], [address] [place] [address], [place] [address]. All rights reserved. This information is not intended as a substitute for professional medical care. Always follow your healthcare professional's instructions.    The Benefits of Living Smoke Free  What do you want to gain from quitting? Check off some reasons to quit.  Health Benefits  ___ Reduce my risk of lung cancer, heart disease, chronic lung disease  ___ Have fewer wrinkles and softer skin  ___ Improve my sense of taste and smell  ___ For pregnant women--reduce the risk of having a miscarriage, stillbirth, premature birth, or low-birth-weight baby  Personal Benefits  ___ Feel more in control of my  life  ___ Have better-smelling hair, breath, clothes, home, and car  ___ Save time by not having to take smoke breaks, buy cigarettes, or hunt for a light  ___ Have whiter teeth  Family Benefits  ___ Reduce my children s respiratory tract infections  ___ Set a good example for my children  ___ Reduce my family s cancer risk  Financial Benefits  ___ Save hundreds of dollars each year that would be spent on cigarettes  ___ Save money on medical bills  ___ Save on life, health, and car insurance premiums    Those Dollars Add Up!  Cigarettes are expensive, and getting more expensive all the time. Do you realize how much money you are spending on cigarettes per year? What is the average amount you spend on a pack of cigarettes? What is the average number of packs that you smoke per day? Using your answers to these questions, fill in this formula to help you find out:  ($ _____ per pack) ×  ( _____ number of packs per day) × (365 days) =  $ _____ yearly cost of smoking  Besides tobacco, there are other costs, including extra cleaning bills and replacement costs for clothing and furniture; medical expenses for smoking-related illnesses; and higher health, life, and car insurance premiums.    Cigars and Pipes Count Too!  Cigars and pipes are also dangerous. So are smokeless (chewing) tobacco and snuff. All of these products contain nicotine, a highly addictive substance that has harmful effects on your body. Quitting smoking means giving up all tobacco products.      8734-0991 [address], [place] [address]. All rights reserved. This information is not intended as a substitute for professional medical care. Always follow your healthcare professional's instructions.                 Lateral Epicondylitis (Tennis Elbow)   Rehabilitation Exercises   You may do the stretching exercises right away. You may do the strengthening exercises when stretching is nearly painless.   Stretching exercises      Wrist range of motion: Bend your wrist forward and backward as far as you can. Do 3 sets of 10.     Pronation and supination of the forearm: With your elbow bent 90 , turn your palm upward and hold for 5 seconds. Slowly turn your palm downward and hold for 5 seconds. Make sure you keep your elbow at your side and bent 90  throughout this exercise. Do 3 sets of 10.     Elbow range of motion: Gently bring your palm up toward your shoulder and bend your elbow as far as you can. Then straighten your elbow as far as you can 10 times. Do 3 sets of 10.   Strengthening exercises     Wrist flexion exercise: Hold a can or hammer handle in your hand with your palm facing up. Bend your wrist upward. Slowly lower the weight and return to the starting position. Do 3 sets of 10. Gradually increase the weight of the can or weight you are holding.     Wrist extension exercise: Hold a soup can or hammer handle in your hand with your palm facing down. Slowly bend your wrist upward. Slowly lower the weight down into the starting position. Do 3 sets of 10. Gradually increase the weight of the object you are holding.     Wrist radial deviation strengthening: Put your wrist in the sideways position with your thumb up. Hold a can of soup or a hammer handle and gently bend your wrist up, with the thumb reaching toward the ceiling. Slowly lower to the starting position. Do not move your forearm throughout this exercise. Do 3 sets of 10.     Forearm pronation and supination strengthening: Hold a soup can or hammer handle in your hand and bend your elbow 90 . Slowly rotate your hand with your palm upward and then palm down. Do 3 sets of 10.     Wrist extension (with broom handle): Stand up and hold a broom handle in both hands. With your arms at shoulder level, elbows straight and palms down, roll the broom handle backward in your hand as if you are reeling something in using a broom handle. Do 3 sets of 10.   Written by Zuleyma Baca MS,  PT, for [employer].   Published by [employer].   This content is reviewed periodically and is subject to change as new health information becomes available. The information is intended to inform and educate and is not a replacement for medical evaluation, advice, diagnosis or treatment by a healthcare professional.   Sports Medicine Advisor 2003.1 Index  Sports Medicine Advisor 2003.1 Credits   Copyright   2003 [employer]. All rights reserved.                                 Golfer's Elbow (Medial Epicondylitis)                What is golfer's elbow?   Golfer's elbow (medial epicondylitis) is a painful inflammation of the bony bump on the inner side of the elbow.   The elbow joint is made up of the bone in the upper arm (humerus) and one of the bones in the lower arm (ulna). The bony bumps at the bottom of the humerus are called the epicondyles. The bump on the side closest to the body is called the medial epicondyle.   The tendons of the muscles that work to bend your wrist attach at the medial epicondyle. Medial epicondylitis is also referred to as wrist flexor tendinopathy or elbow tendinopathy.   How does it occur?   Golfer's elbow is caused by overuse of the muscles that bend your fingers and wrist. When these muscles are overused, the tendons are repeatedly pulled where they attach to the bone. As a result, the tendons get inflamed. This commonly happens in sports such as golf, in throwing sports, and in racquet sports. It may also happen in work activities like carpentry or typing.   If you have had tendinopathy for a long time, scar tissue may develop in the tendon. This is called tendinosis.   What are the symptoms?   Golfer's elbow causes pain on the bony bump on the inner side of the elbow. You may also have pain along the entire inner side of the forearm when the wrist is bent. You may have pain when you make a fist.   How is it diagnosed?   Your  healthcare provider will examine your elbow and check for tenderness.   How is it treated?   To treat this condition:   Put an ice pack, gel pack, or package of frozen vegetables, wrapped in a cloth on the area every 3 to 4 hours, for up to 20 minutes at a time.   You could also do ice massage. To do this, first freeze water in a Styrofoam cup, then peel the top of the cup away to expose the ice. Hold the bottom of the cup and rub the ice over your elbow for 5 to 10 minutes. Do this 3 to 5 times a day for the first 2 days.   Raise the elbow on a pillow when you are sitting or lying down.   Use an elastic bandage around the elbow, or a tennis elbow strap just below the tender spot on the elbow as directed by your provider.   Take an anti-inflammatory such as ibuprofen, or other medicine as directed by your provider. Nonsteroidal anti-inflammatory medicines (NSAIDs) may cause stomach bleeding and other problems. These risks increase with age. Read the label and take as directed. Unless recommended by your healthcare provider, do not take for more than 10 days.   While you recover from your injury, you will need to change your sport or activity to one that does not make your condition worse. For example, walk instead of playing golf, or write things out by hand instead of typing.   Follow your provider's instructions for doing exercises to help you recover.   In severe cases, you may need surgery.   How long will the effects last?   The length of recovery depends on many factors such as your age, health, and if you have had a previous injury. Recovery time also depends on the severity of the injury. A mild injury may recover within a few weeks, whereas a severe injury may take 6 weeks or longer to recover. This problem can sometimes be long-lasting and can even come back once you are better. You need to stop doing the activities that cause pain until the elbow has healed. If you keep doing activities that cause pain,  your symptoms will return and it will take longer to recover.   When can I return to my normal activities?   Everyone recovers from an injury at a different rate. Return to your activities depends on how soon your elbow recovers, not by how many days or weeks it has been since your injury has occurred. In general, the longer you have symptoms before you start treatment, the longer it will take to get better. The goal is to return to your normal activities as soon as is safely possible. If you return too soon you may worsen your injury.   You may return when you are able to forcefully  a bat or golf club, or do activities such as working at a keyboard without pain in your elbow. It is important that there is no swelling around your injured elbow and that it is as strong as the uninjured elbow. You must have full range of motion of your elbow.   How can it be prevented?   Golfer's elbow occurs because you overuse the muscles that bend your wrist. Slow down activities that cause pain. Wearing a tennis elbow strap and doing elbow stretching exercises may help prevent this problem.          Golfer's Elbow (Medial Epicondylitis) Rehabilitation Exercises              You may do the stretching exercises right away. You may do the strengthening exercises when stretching is nearly painless.   Stretching exercises   Wrist active range of motion: Flexion and extension: Bend the wrist of your injured arm forward and back as far as you can. Do 3 sets of 10.   Wrist stretch: Press the back of the hand on your injured side with your other hand to help bend your wrist. Hold for 15 to 30 seconds. Next, stretch the hand back by pressing the fingers in a backward direction. Hold for 15 to 30 seconds. Keep the arm on your injured side straight during this exercise. Do 3 sets.   Forearm pronation and supination: Bend the elbow of your injured arm 90 degrees, keeping your elbow at your side. Turn your palm up and hold for 5 seconds.  Then slowly turn your palm down and hold for 5 seconds. Make sure you keep your elbow at your side and bent 90 degrees while you do the exercise. Do 3 sets of 10.   Strengthening exercises   Eccentric wrist flexion: Hold a can or hammer handle in the hand of your injured side with your palm up. Use the hand on the side that is not injured to bend your wrist up. Then let go of your wrist and use just your injured side to lower the weight slowly back to the starting position. Do 3 sets of 15. Gradually increase the weight you are holding.   Eccentric wrist extension: Hold a soup can or hammer handle in the hand of your injured side with your palm facing down. Use the hand on the side that is not injured to bend your wrist up. Then let go of your wrist and use just your injured side to lower the weight slowly back to the starting position. Do 3 sets of 15. Gradually increase the weight you are holding.    strengthening: Squeeze a soft rubber ball and hold the squeeze for 5 seconds. Do 3 sets of 10.   Forearm pronation and supination strengthening: Hold a soup can or hammer handle in your hand and bend your elbow 90 degrees. Slowly turn your hand so your palm is up and then down. Do 3 sets of 10.   Resisted elbow flexion and extension: Hold a can of soup with your palm up. Slowly bend your elbow so that your hand is coming toward your shoulder. Then lower it slowly so your arm is completely straight. Do 3 sets of 10. Slowly increase the weight you are using.                Published by [employer].  This content is reviewed periodically and is subject to change as new health information becomes available. The information is intended to inform and educate and is not a replacement for medical evaluation, advice, diagnosis or treatment by a healthcare professional.   Written by Zuleyma Baca, MS, PT, and Natalia Pickering, PT, [facility], [facility], for [employer].   ? 2010 [employer] and/or its affiliates. All Rights Reserved.    Copyright   [employer] 2011

## 2019-02-05 ASSESSMENT — ANXIETY QUESTIONNAIRES: GAD7 TOTAL SCORE: 11

## 2019-05-06 ENCOUNTER — TELEPHONE (OUTPATIENT)
Dept: FAMILY MEDICINE | Facility: OTHER | Age: 59
End: 2019-05-06

## 2019-05-06 ENCOUNTER — MYC MEDICAL ADVICE (OUTPATIENT)
Dept: FAMILY MEDICINE | Facility: OTHER | Age: 59
End: 2019-05-06

## 2019-05-06 NOTE — TELEPHONE ENCOUNTER
8:12 AM    Reason for Call: OVERBOOK    Patient is having the following symptoms: follow up  for 2 weeks.    The patient is requesting an appointment for ASAP with Concha Hare.    Was an appointment offered for this call? No  If yes : Appointment type              Date    Preferred method for responding to this message: Telephone Call  What is your phone number ?[phone]    If we cannot reach you directly, may we leave a detailed response at the number you provided? Yes    Can this message wait until your PCP/provider returns, if unavailable today? Not applicable, pcp is in     [facility]

## 2019-05-08 ENCOUNTER — OFFICE VISIT (OUTPATIENT)
Dept: FAMILY MEDICINE | Facility: OTHER | Age: 59
End: 2019-05-08
Attending: PHYSICIAN ASSISTANT
Payer: COMMERCIAL

## 2019-05-08 VITALS
TEMPERATURE: 96.7 F | SYSTOLIC BLOOD PRESSURE: 128 MMHG | BODY MASS INDEX: 26.94 KG/M2 | OXYGEN SATURATION: 98 % | WEIGHT: 172 LBS | HEART RATE: 50 BPM | DIASTOLIC BLOOD PRESSURE: 60 MMHG

## 2019-05-08 DIAGNOSIS — M79.7 FIBROMYALGIA: ICD-10-CM

## 2019-05-08 DIAGNOSIS — E03.9 ACQUIRED HYPOTHYROIDISM: Primary | ICD-10-CM

## 2019-05-08 DIAGNOSIS — F09 COGNITIVE DISORDER: ICD-10-CM

## 2019-05-08 DIAGNOSIS — Z72.0 TOBACCO ABUSE: ICD-10-CM

## 2019-05-08 LAB — TSH SERPL DL<=0.005 MIU/L-ACNC: 1.32 MU/L (ref 0.4–4)

## 2019-05-08 PROCEDURE — 36415 COLL VENOUS BLD VENIPUNCTURE: CPT | Mod: ZL | Performed by: PHYSICIAN ASSISTANT

## 2019-05-08 PROCEDURE — 99214 OFFICE O/P EST MOD 30 MIN: CPT | Performed by: PHYSICIAN ASSISTANT

## 2019-05-08 PROCEDURE — 84443 ASSAY THYROID STIM HORMONE: CPT | Mod: ZL | Performed by: PHYSICIAN ASSISTANT

## 2019-05-08 PROCEDURE — G0463 HOSPITAL OUTPT CLINIC VISIT: HCPCS

## 2019-05-08 RX ORDER — ERGOCALCIFEROL 1.25 MG/1
50000 CAPSULE, LIQUID FILLED ORAL WEEKLY
Qty: 12 CAPSULE | Refills: 0 | Status: SHIPPED | OUTPATIENT
Start: 2019-05-08 | End: 2019-07-11

## 2019-05-08 ASSESSMENT — ANXIETY QUESTIONNAIRES
GAD7 TOTAL SCORE: 15
2. NOT BEING ABLE TO STOP OR CONTROL WORRYING: MORE THAN HALF THE DAYS
5. BEING SO RESTLESS THAT IT IS HARD TO SIT STILL: MORE THAN HALF THE DAYS
3. WORRYING TOO MUCH ABOUT DIFFERENT THINGS: NEARLY EVERY DAY
7. FEELING AFRAID AS IF SOMETHING AWFUL MIGHT HAPPEN: SEVERAL DAYS
1. FEELING NERVOUS, ANXIOUS, OR ON EDGE: MORE THAN HALF THE DAYS
6. BECOMING EASILY ANNOYED OR IRRITABLE: NEARLY EVERY DAY
4. TROUBLE RELAXING: MORE THAN HALF THE DAYS
IF YOU CHECKED OFF ANY PROBLEMS ON THIS QUESTIONNAIRE, HOW DIFFICULT HAVE THESE PROBLEMS MADE IT FOR YOU TO DO YOUR WORK, TAKE CARE OF THINGS AT HOME, OR GET ALONG WITH OTHER PEOPLE: EXTREMELY DIFFICULT

## 2019-05-08 ASSESSMENT — PAIN SCALES - GENERAL: PAINLEVEL: SEVERE PAIN (7)

## 2019-05-08 ASSESSMENT — PATIENT HEALTH QUESTIONNAIRE - PHQ9: SUM OF ALL RESPONSES TO PHQ QUESTIONS 1-9: 21

## 2019-05-08 NOTE — PATIENT INSTRUCTIONS

## 2019-05-08 NOTE — PROGRESS NOTES
SUBJECTIVE:   Sharlene Mccord is a 58 year old female who presents to clinic today for the following   health issues:        Chronic Pain Follow-Up       Type / Location of Pain: legs,back, neck, feet, toes, arms   Analgesia/pain control:       Recent changes:  worse      Overall control: Tolerable with discomfort  Activity level/function:      Daily activities:  Unable to perform most daily activities - chores, hobbies, social activities, driving and Able to do light housework, cooking    Work:  Able to work part time with limitations  Adverse effects:  No  Adherance    Taking medication as directed?  Yes    Participating in other treatments: yes- cannabis  Risk Factors:    Sleep:  Poor    Mood/anxiety:  slightly worsened    Recent family or social stressors:  none noted    Other aggravating factors: prolonged sitting and movement   PHQ-9 SCORE 11/21/2018 12/3/2018 2/4/2019   PHQ-9 Total Score - - -   PHQ-9 Total Score 13 15 14     SHAKIR-7 SCORE 11/21/2018 12/3/2018 2/4/2019   Total Score 9 11 11     Encounter-Level CSA - 06/05/2017:    Controlled Substance Agreement - Scan on 6/6/2017 12:39 PM: CONTROLLED SUBSTANCE AGREEMENT (below)       Patient-Level CSA:    There are no patient-level csa.         Amount of exercise or physical activity: None    Problems taking medications regularly: No    Medication side effects: none    Diet: regular (no restrictions)            Additional history: as documented    Reviewed  and updated as needed this visit by clinical staff         Reviewed and updated as needed this visit by Provider         Patient Active Problem List   Diagnosis     Diverticulitis of sigmoid colon     Hypothyroidism     Anxiety state     Cardiac microvascular disease     Diverticulitis     Aortic valve disorder     ACP (advance care planning)     Chronic pain     Constipation     Memory loss     Major depressive disorder, recurrent episode (H)     Cognitive disorder     Major depressive disorder     Fatigue      Migraine without aura and responsive to treatment     Atypical migraine     Fibromyalgia     Mixed connective tissue disease (H)     Past Surgical History:   Procedure Laterality Date     APPENDECTOMY  1977     BIOPSY  2017    taken at [facility] from Dr. Nadira cohen. Thyroid     CARDIAC SURGERY  2013    angiogram [facility]:  Normal coronary arteries.  Felt ot have some microvascular disease     CL AFF SURGICAL PATHOLOGY  01/02/2007    Thyroid Mass     COLONOSCOPY  1/13/2014    Procedure: COLONOSCOPY;  COLONOSCOPY ;  Surgeon: Chasidy Gee DO;  Location: [facility]     ESOPHAGOSCOPY, GASTROSCOPY, DUODENOSCOPY (EGD), COMBINED N/A 2/9/2017    Procedure: COMBINED ESOPHAGOSCOPY, GASTROSCOPY, DUODENOSCOPY (EGD);  Surgeon: Johnathan Ruff DO;  Location: [facility] OR     GYN SURGERY      c-sections x 3     HYSTERECTOMY TOTAL ABDOMINAL, BILATERAL SALPINGO-OOPHORECTOMY, COMBINED N/A 01/01/2001     LAPAROSCOPIC CHOLECYSTECTOMY  11/07/2003    Cholelithiasis     LAPAROTOMY EXPLORATORY      abdominal pain/fever     LARYNGOSCOPY       SPLENECTOMY         Social History     Tobacco Use     Smoking status: Light Tobacco Smoker     Packs/day: 0.25     Years: 40.00     Pack years: 10.00     Types: Cigarettes     Start date: 1/1/1975     Smokeless tobacco: Never Used     Tobacco comment: 5 cigs daily  Patient will work on smoking cessation (3-26-18)   Substance Use Topics     Alcohol use: No     Family History   Problem Relation Age of Onset     C.A.D. Father      Hypertension Father      C.A.D. Mother      Cerebrovascular Disease Mother         CVA     Hypertension Mother      Coronary Artery Disease Mother      Diabetes Paternal Grandmother      Diabetes Paternal Grandfather      Diabetes Sister      Breast Cancer Sister      Diabetes Maternal Grandmother      Diabetes Sister      Breast Cancer Sister      Hypertension Sister      Depression Sister      Anxiety Disorder Sister      Obesity Sister      Depression Sister      Breast  Cancer Sister      Obesity Sister      Depression Brother      Depression Daughter      Obesity Daughter      Depression Daughter      Mental Illness Daughter         [email or web address]     Anxiety Disorder Daughter      Mental Illness Daughter      Anxiety Disorder Other      Osteoporosis Other      Thyroid Disease Other      Diabetes Nephew          Current Outpatient Medications   Medication Sig Dispense Refill     clonazePAM (KLONOPIN) 1 MG tablet TAKE 1/2 TO 1 (ONE-HALF TO ONE) TABLET BY MOUTH TWICE DAILY AS NEEDED FOR ANXIETY 60 tablet 2     diclofenac (VOLTAREN) 1 % topical gel Place onto the skin 4 times daily 100 g 3     diltiazem (TIAZAC) 300 MG 24 hr ER beaded capsule TAKE 1 CAPSULE BY MOUTH ONCE DAILY 90 capsule 1     donepezil (ARICEPT) 5 MG tablet Take 1 tablet (5 mg) by mouth At Bedtime 30 tablet 6     estradiol (ESTRACE) 0.5 MG tablet TAKE ONE TABLET BY MOUTH TWICE DAILY 60 tablet 11     hydrocortisone (ANUSOL-HC) 2.5 % cream APPLY  CREAM TO AFFECTED AREA THREE TIMES DAILY 60 g 1     hydrOXYzine (ATARAX) 25 MG tablet TAKE ONE TO TWO TABLETS BY MOUTH AT BEDTIME FOR NAUSEA 120 tablet 1     metoclopramide (REGLAN) 5 MG tablet TAKE ONE TABLET BY MOUTH 4 TIMES DAILY BEFORE MEAL(S) AT BEDTIME AS NEEDED 60 tablet 2     NITROSTAT 0.4 MG sublingual tablet Place 1 tablet (0.4 mg) under the tongue every 5 minutes as needed for chest pain 25 tablet 11     NP THYROID 30 MG tablet TAKE 1 TABLET BY MOUTH ONCE DAILY 90 tablet 1     pantoprazole (PROTONIX) 20 MG EC tablet Take by mouth 30-60 minutes before a meal. 30 tablet 3     ranitidine (ZANTAC) 150 MG tablet TAKE 1 TABLET BY MOUTH TWICE DAILY 180 tablet 1     STATIN NOT PRESCRIBED (INTENTIONAL) Refused       SUMAtriptan (IMITREX) 100 MG tablet TAKE ONE TABLET BY MOUTH AS NEEDED MIGRAINES 10 tablet 5     traMADol (ULTRAM) 50 MG tablet TAKE 2 TABLETS BY MOUTH EVERY 8 HOURS AS NEEDED FOR PAIN 270 tablet 0     UNABLE TO FIND 1.5 mg 3 times daily MEDICATION NAME: Oral  Cannabis    Oral suspension  Taken morning, afternoon and bedtime       UNABLE TO FIND MEDICATION NAME: Cannabis as needed     [place] Vapor take one puff to the count of two at bedtime.  If needed in the middle of the night       Allergies   Allergen Reactions     Codeine      Isosorbide Mononitrate Other (See Comments)     Vomiting and headache       Lisinopril      [facility] scan     Recent Labs   Lab Test 11/21/18  1055 06/06/18  0958  09/11/17  1043  04/19/17  1133 05/09/16  0930 01/13/16  0941   LDL  --   --   --   --   --  104* 127* 147*   HDL  --   --   --   --   --  77 70 64   TRIG  --   --   --   --   --  165* 204* 164*   ALT 21 26  --  27   < > 28 44 63*   CR 1.05* 0.95  --  1.05*   < > 0.97 0.94 0.93   GFRESTIMATED 54* 61  --  54*   < > 59* 62 63   GFRESTBLACK 65 73  --  65   < > 71 75 76   POTASSIUM 4.2 4.5  --  4.3   < > 4.4 4.1 4.8   TSH 1.31 1.67   < > 1.17  --  1.24 3.12 2.58    < > = values in this interval not displayed.      BP Readings from Last 3 Encounters:   05/08/19 128/60   02/04/19 126/68   12/03/18 112/58    Wt Readings from Last 3 Encounters:   05/08/19 78 kg (172 lb)   02/04/19 77.1 kg (170 lb)   12/03/18 80.3 kg (177 lb)                    ROS:  Constitutional, neuro, ENT, endocrine, pulmonary, cardiac, gastrointestinal, genitourinary, musculoskeletal, integument and psychiatric systems are negative, except as otherwise noted.    OBJECTIVE:                                                    /60 (BP Location: Right arm, Patient Position: Chair, Cuff Size: Adult Large)   Pulse 50   Temp 96.7  F (35.9  C) (Tympanic)   Wt 78 kg (172 lb)   SpO2 98%   BMI 26.94 kg/m    Body mass index is 26.94 kg/m .  GENERAL APPEARANCE: healthy, alert and moderate  distress  EYES: Eyes grossly normal to inspection, PERRL and conjunctivae and sclerae normal  HENT: ear canals and TM's normal and nose and mouth without ulcers or lesions  NECK: no adenopathy, no asymmetry, masses, or scars and  thyroid normal to palpation  RESP: lungs clear to auscultation - no rales, rhonchi or wheezes  CV: regular rates and rhythm, normal S1 S2, no S3 or S4 and no murmur, click or rub  LYMPHATICS: no cervical adenopathy  MS: extremities normal- no gross deformities noted. Pain in many trigger points.   SKIN: no suspicious lesions or rashes  NEURO: Normal strength and tone, mentation intact and speech normal  PSYCH: mentation appears normal and affect normal/bright, but very tired and pale looking.     Diagnostic test results:  Diagnostic Test Results:  No results found for this or any previous visit (from the past 24 hour(s)).     ASSESSMENT/PLAN:                                                    1. Tobacco abuse  She is going to be sent on to the quit plan.   - Tobacco Cessation - Order to Satisfy Health Maintenance    2. Acquired hypothyroidism  Recheck, cold sweats weight loss, fatigue, all sx of hers now. Recheck level.   - TSH    3. Fibromyalgia  Flare of pain all over.  She is sad and depressed over not working now again. She is limping and can't bend her knees and toes well.  Mood is not good today. Getting off Aricept and also looking at getting off medical cannabis  as not helping pain and cost prohibitive.       4. Cognitive disorder  She is off her Aricept.  Having hot flashes and mood changes. Can't focus and is on her taks and can't seem to do her work. Gets very overwhelming. Has been trying to care give for others but at this point she is unable to do so. Back up to 4 on ultram when off the Cannabus.       See Patient Instructions    Concha Hare PA-C  [facility]

## 2019-05-08 NOTE — LETTER
[facility] [place]  [address]  [place] [address]  Phone: [phone]    May 8, 2019        Sharlene Mccord  [address]  [place] [address]          To whom it may concern:    RE: Sharlene Mccord    Patient was seen and treated today at our clinic.  She will be unable to work for the next month. May be formable future.  She is having flair on chronic illness.     Please contact me for questions or concerns.      Sincerely,        KEILY Cobos

## 2019-05-08 NOTE — NURSING NOTE
"Chief Complaint   Patient presents with     Pain       Initial /60 (BP Location: Right arm, Patient Position: Chair, Cuff Size: Adult Large)   Pulse 50   Temp 96.7  F (35.9  C) (Tympanic)   Wt 78 kg (172 lb)   SpO2 98%   BMI 26.94 kg/m   Estimated body mass index is 26.94 kg/m  as calculated from the following:    Height as of 2/4/19: 1.702 m (5' 7\").    Weight as of this encounter: 78 kg (172 lb).  Medication Reconciliation: complete    Melina Armas MA  "

## 2019-05-08 NOTE — LETTER
My Depression Action Plan  Name: Sharlene Mccord   Date of Birth 1960  Date: 5/8/2019    My doctor: Concha Hare   My clinic: [facility] [place]  [address]  [place] [address]  [phone]          GREEN    ZONE   Good Control    What it looks like:     Things are going generally well. You have normal up s and down s. You may even feel depressed from time to time, but bad moods usually last less than a day.   What you need to do:  1. Continue to care for yourself (see self care plan)  2. Check your depression survival kit and update it as needed  3. Follow your physician s recommendations including any medication.  4. Do not stop taking medication unless you consult with your physician first.           YELLOW         ZONE Getting Worse    What it looks like:     Depression is starting to interfere with your life.     It may be hard to get out of bed; you may be starting to isolate yourself from others.    Symptoms of depression are starting to last most all day and this has happened for several days.     You may have suicidal thoughts but they are not constant.   What you need to do:     1. Call your care team, your response to treatment will improve if you keep your care team informed of your progress. Yellow periods are signs an adjustment may need to be made.     2. Continue your self-care, even if you have to fake it!    3. Talk to someone in your support network    4. Open up your depression survival kit           RED    ZONE Medical Alert - Get Help    What it looks like:     Depression is seriously interfering with your life.     You may experience these or other symptoms: You can t get out of bed most days, can t work or engage in other necessary activities, you have trouble taking care of basic hygiene, or basic responsibilities, thoughts of suicide or death that will not go away, self-injurious behavior.     What you need to do:  1. Call your care team and request a  same-day appointment. If they are not available (weekends or after hours) call your local crisis line, emergency room or 911.            Depression Self Care Plan / Survival Kit    Self-Care for Depression  Here s the deal. Your body and mind are really not as separate as most people think.  What you do and think affects how you feel and how you feel influences what you do and think. This means if you do things that people who feel good do, it will help you feel better.  Sometimes this is all it takes.  There is also a place for medication and therapy depending on how severe your depression is, so be sure to consult with your medical provider and/ or Behavioral Health Consultant if your symptoms are worsening or not improving.     In order to better manage my stress, I will:    Exercise  Get some form of exercise, every day. This will help reduce pain and release endorphins, the  feel good  chemicals in your brain. This is almost as good as taking antidepressants!  This is not the same as joining a gym and then never going! (they count on that by the way ) It can be as simple as just going for a walk or doing some gardening, anything that will get you moving.      Hygiene   Maintain good hygiene (Get out of bed in the morning, Make your bed, Brush your teeth, Take a shower, and Get dressed like you were going to work, even if you are unemployed).  If your clothes don't fit try to get ones that do.    Diet  I will strive to eat foods that are good for me, drink plenty of water, and avoid excessive sugar, caffeine, alcohol, and other mood-altering substances.  Some foods that are helpful in depression are: complex carbohydrates, B vitamins, flaxseed, fish or fish oil, fresh fruits and vegetables.    Psychotherapy  I agree to participate in Individual Therapy (if recommended).    Medication  If prescribed medications, I agree to take them.  Missing doses can result in serious side effects.  I understand that drinking  alcohol, or other illicit drug use, may cause potential side effects.  I will not stop my medication abruptly without first discussing it with my provider.    Staying Connected With Others  I will stay in touch with my friends, family members, and my primary care provider/team.    Use your imagination  Be creative.  We all have a creative side; it doesn t matter if it s oil painting, sand castles, or mud pies! This will also kick up the endorphins.    Witness Beauty  (AKA stop and smell the roses) Take a look outside, even in mid-winter. Notice colors, textures. Watch the squirrels and birds.     Service to others  Be of service to others.  There is always someone else in need.  By helping others we can  get out of ourselves  and remember the really important things.  This also provides opportunities for practicing all the other parts of the program.    Humor  Laugh and be silly!  Adjust your TV habits for less news and crime-drama and more comedy.    Control your stress  Try breathing deep, massage therapy, biofeedback, and meditation. Find time to relax each day.     My support system    Clinic Contact:  Phone number:    Contact 1:  Phone number:    Contact 2:  Phone number:    Quaker/:  Phone number:    Therapist:  Phone number:    Local crisis center:    Phone number:    Other community support:  Phone number:

## 2019-05-09 ASSESSMENT — ANXIETY QUESTIONNAIRES: GAD7 TOTAL SCORE: 15

## 2019-05-14 ENCOUNTER — OFFICE VISIT (OUTPATIENT)
Dept: CARDIOLOGY | Facility: OTHER | Age: 59
End: 2019-05-14
Attending: INTERNAL MEDICINE
Payer: COMMERCIAL

## 2019-05-14 VITALS
TEMPERATURE: 97.3 F | SYSTOLIC BLOOD PRESSURE: 125 MMHG | HEIGHT: 67 IN | DIASTOLIC BLOOD PRESSURE: 75 MMHG | HEART RATE: 67 BPM | BODY MASS INDEX: 26.82 KG/M2 | WEIGHT: 170.9 LBS

## 2019-05-14 DIAGNOSIS — I25.85 CARDIAC MICROVASCULAR DISEASE: ICD-10-CM

## 2019-05-14 DIAGNOSIS — I10 ESSENTIAL HYPERTENSION: ICD-10-CM

## 2019-05-14 DIAGNOSIS — Z71.6 TOBACCO ABUSE COUNSELING: ICD-10-CM

## 2019-05-14 DIAGNOSIS — Z72.0 TOBACCO ABUSE: ICD-10-CM

## 2019-05-14 DIAGNOSIS — I35.9 AORTIC VALVE DISORDER: Primary | ICD-10-CM

## 2019-05-14 PROCEDURE — G0463 HOSPITAL OUTPT CLINIC VISIT: HCPCS

## 2019-05-14 PROCEDURE — 99214 OFFICE O/P EST MOD 30 MIN: CPT | Performed by: INTERNAL MEDICINE

## 2019-05-14 ASSESSMENT — PATIENT HEALTH QUESTIONNAIRE - PHQ9: SUM OF ALL RESPONSES TO PHQ QUESTIONS 1-9: 20

## 2019-05-14 ASSESSMENT — PAIN SCALES - GENERAL: PAINLEVEL: SEVERE PAIN (7)

## 2019-05-14 ASSESSMENT — MIFFLIN-ST. JEOR: SCORE: 1387.83

## 2019-05-14 NOTE — PATIENT INSTRUCTIONS
You were seen by Dr. Nichole, 5/14/2019.     1.  You will have an echocardiogram performed.  This is an ultrasound of the heart, that evaluates heart function.  The hospital scheduling department will call to schedule you for this test.      2. Please continue all medication as prescribed.     3. If you develop new or worsening symptoms please call the cardiology office as you may need to be seen sooner.     You will follow up with Dr. Nichole in 1 year.       Please call the cardiology office with problems, questions, or concerns at [phone].    If you experience chest pain, chest pressure, chest tightness, shortness of breath, fainting, lightheadedness, nausea, vomiting, or other concerning symptoms, please report to the Emergency Department or call 911. These symptoms may be emergent, and best treated in the Emergency Department.       Tiffany PEGUERO RN-BSN  Cardiology   [facility]  [phone]      HOW TO QUIT SMOKING  Smoking is one of the hardest habits to break. About half of all those who have ever smoked have been able to quit, and most of those (about 70%) who still smoke want to quit. Here are some of the best ways to stop smoking.     KEEP TRYING:  It takes most smokers about 8 tries before they are finally able to fully quit. So, the more often you try and fail, the better your chance of quitting the next time! So, don't give up!    GO COLD TURKEY:  Most ex-smokers quit cold turkey. Trying to cut back gradually doesn't seem to work as well, perhaps because it continues the smoking habit. Also, it is possible to fool yourself by inhaling more while smoking fewer cigarettes. This results in the same amount of nicotine in your body!    GET SUPPORT:  Support programs can make an important difference, especially for the heavy smoker. These groups offer lectures, methods to change your behavior and peer support. Call the free national Quitline for more information. 800-QUIT-NOW  (874.480.7767). Low-cost or free programs are offered by many hospitals, local chapters of the American Lung Association (308-704-2402) and the American Cancer Society (533-448-5208). Support at home is important too. Non-smokers can help by offering praise and encouragement. If the smoker fails to quit, encourage them to try again!    OVER-THE-COUNTER MEDICINES:  For those who can't quit on their own, Nicotine Replacement Therapy (NRT) may make quitting much easier. Certain aids such as the nicotine patch, gum and lozenge are available without a prescription. However, it is best to use these under the guidance of your doctor. The skin patch provides a steady supply of nicotine to the body. Nicotine gum and lozenge gives temporary bursts of low levels of nicotine. Both methods take the edge off the craving for cigarettes. WARNING: If you feel symptoms of nicotine overdose, such as nausea, vomiting, dizziness, weakness, or fast heartbeat, stop using these and see your doctor.    PRESCRIPTION MEDICINES:  After evaluating your smoking patterns and prior attempts at quitting, your doctor may offer a prescription medicine such as bupropion (Zyban, Wellbutrin), varenicline (Chantix, Champix), a niocotine inhaler or nasal spray. Each has its unique advantage and side effects which your doctor can review with you.    HEALTH BENEFITS OF QUITTING:  The benefits of quitting start right away and keep improving the longer you go without smokin minutes: blood pressure and pulse return to normal  8 hours: oxygen levels return to normal  2 days: ability to smell and taste begins to improve as damaged nerves start to regrow  2-3 weeks: circulation and lung function improves  1-9 months: decreased cough, congestion and shortness of breath; less tired  1 year: risk of heart attack decreases by half  5 years: risk of lung cancer decreases by half; risk of stroke becomes the same as a non-smoker  For information about how to quit  smoking, visit the following links:  National Cancer [place] ,   Clearing the Air, Quit Smoking Today   - an online booklet. http://www.smokefree.gov/pubs/clearing_the_air.pdf  Smokefree.gov http://smokefree.gov/  QuitNet http://www.quitnet.com/    7229-7709 [employer], [address] [place] [address], [place] [address]. All rights reserved. This information is not intended as a substitute for professional medical care. Always follow your healthcare professional's instructions.    The Benefits of Living Smoke Free  What do you want to gain from quitting? Check off some reasons to quit.  Health Benefits  ___ Reduce my risk of lung cancer, heart disease, chronic lung disease  ___ Have fewer wrinkles and softer skin  ___ Improve my sense of taste and smell  ___ For pregnant women--reduce the risk of having a miscarriage, stillbirth, premature birth, or low-birth-weight baby  Personal Benefits  ___ Feel more in control of my life  ___ Have better-smelling hair, breath, clothes, home, and car  ___ Save time by not having to take smoke breaks, buy cigarettes, or hunt for a light  ___ Have whiter teeth  Family Benefits  ___ Reduce my children s respiratory tract infections  ___ Set a good example for my children  ___ Reduce my family s cancer risk  Financial Benefits  ___ Save hundreds of dollars each year that would be spent on cigarettes  ___ Save money on medical bills  ___ Save on life, health, and car insurance premiums    Those Dollars Add Up!  Cigarettes are expensive, and getting more expensive all the time. Do you realize how much money you are spending on cigarettes per year? What is the average amount you spend on a pack of cigarettes? What is the average number of packs that you smoke per day? Using your answers to these questions, fill in this formula to help you find out:  ($ _____ per pack) ×  ( _____ number of packs per day) × (365 days) =  $ _____ yearly cost of smoking  Besides tobacco, there are other  costs, including extra cleaning bills and replacement costs for clothing and furniture; medical expenses for smoking-related illnesses; and higher health, life, and car insurance premiums.    Cigars and Pipes Count Too!  Cigars and pipes are also dangerous. So are smokeless (chewing) tobacco and snuff. All of these products contain nicotine, a highly addictive substance that has harmful effects on your body. Quitting smoking means giving up all tobacco products.      2181-1381 [facility], [address] [place] [address], [place] [address]. All rights reserved. This information is not intended as a substitute for professional medical care. Always follow your healthcare professional's instructions.

## 2019-05-14 NOTE — PROGRESS NOTES
CC- fatigue    HPI- Mrs. Mccord has been followed for several years by various cardiologists. She has known AI and has complained of fatigue throughout. She reports recently her fatigue has been much worse. She related that her fatigue was associated with dyspnea on exertion and chest discomfort. Her stress test showed an anterior wall defect that did not fit with her echocardiogram. She also has aortic insufficiency that by echocardiogram did not seem to have progressed. She underwent angiography at [facility]. This demonstrated no epicardial coronary artery disease. Her echocardiogram again showed mild to mod AI. A stress MRI was done that  suggested microvascular disease. She was begun on isosorbide mononitrate but stopped after 4 days due to severe headache. She cannot recall if it helped with her chest pain. She was started on diltiazem but stopped when she was admitted for diverticulitis. She is already on estrogen replacement. On 360 mg of diltiazem she reported the chest pain was better but her BP was low. She also noted numbness and tingling in both arms and both legs. The legs was constant. The arms mostly when she worked over her head. She also felt fatigued. She does not report orthostatic symptoms. Her diltiazem was reduced to 300 mg daily. The chest pain is worse. The fatigue is maybe a little better and the numbness/tingling is unchanged.     June 2014:  At our last visit I discussed with her that I have no other therapies to offer regarding her chest pain. We discuss the possibility of starting a medication such as Neurontin that has been reported to help with chronic pain syndromes. There is some evidence that patients with microvascular angina have abnormal pain sensitivity. She reports the Neurontin was started and it has helped. She is using SL NTG 1-2x/week as opposed to several times a day.    We had stopped her Diovan because on the increased diltiazem dose she was  "having relatively low BP. She is worried because she has seen some BP measurements that are high for her. She admits to being stressed. She has just started a w/u for memory loss and a change was seen on her MRI.    She had an echocardiogram done in March, see below, that showed no change in her aortic insufficiency.     [id number]:    She has been doing reasonably well. She is not having as much chest pain. She has been less active due to problems with disk disease in her spine. She has not noted any major changes in her exertional abilities.     [id number]: She has been having more joint pain. She is seeing a Rheumatologist in [place] soon.    Her chest pain is perhaps a bit better. Her legs and back feel heavy and she has sharp pains and aches, especially with climbing stairs. This has not been associated with her chest pain. On level drug she had been walking 2 miles with her sister but she has not done that for several months now.     [id number]: her chest pain has been doing reasonably well. She hasn't had NTG available for a few months.     She reports a new barking cough over the past 6 months - no fever, no new meds, not productive. The cough is not getting any worse but no better either. She has not had any wheezing that she has noticed.     Her legs feel heavy when she walks.    [id number]: She reports no real change in her chest pain or exertional abilities.  She has been troubled by chronic pain \"all over her body\".  She reports that rheumatology felt that she had a mixed connective tissue disorder.  She tried Plaquenil for a time but has stopped that and has been trying medical cannabis.  She reports the cannabis has not helped particularly.    Her recent echocardiogram showed no change in her aortic insufficiency.  Her systolic function remains good.  Left ventricular size remains normal.    May 14, 2019 Interval history: she reports the chest pain is the same. She feels more shortness of breath and wants " to stop smoking. She has noted hot flashes recently.     Current Outpatient Medications   Medication Sig Dispense Refill     clonazePAM (KLONOPIN) 1 MG tablet TAKE 1/2 TO 1 (ONE-HALF TO ONE) TABLET BY MOUTH TWICE DAILY AS NEEDED FOR ANXIETY 60 tablet 2     diclofenac (VOLTAREN) 1 % topical gel Place onto the skin 4 times daily 100 g 3     diltiazem (TIAZAC) 300 MG 24 hr ER beaded capsule TAKE 1 CAPSULE BY MOUTH ONCE DAILY 90 capsule 1     estradiol (ESTRACE) 0.5 MG tablet TAKE ONE TABLET BY MOUTH TWICE DAILY 60 tablet 11     hydrocortisone (ANUSOL-HC) 2.5 % cream APPLY  CREAM TO AFFECTED AREA THREE TIMES DAILY 60 g 1     hydrOXYzine (ATARAX) 25 MG tablet TAKE ONE TO TWO TABLETS BY MOUTH AT BEDTIME FOR NAUSEA 120 tablet 1     NITROSTAT 0.4 MG sublingual tablet Place 1 tablet (0.4 mg) under the tongue every 5 minutes as needed for chest pain 25 tablet 11     NP THYROID 30 MG tablet TAKE 1 TABLET BY MOUTH ONCE DAILY 90 tablet 1     pantoprazole (PROTONIX) 20 MG EC tablet Take by mouth 30-60 minutes before a meal. 30 tablet 3     ranitidine (ZANTAC) 150 MG tablet TAKE 1 TABLET BY MOUTH TWICE DAILY 180 tablet 1     STATIN NOT PRESCRIBED (INTENTIONAL) Refused       SUMAtriptan (IMITREX) 100 MG tablet TAKE ONE TABLET BY MOUTH AS NEEDED MIGRAINES 10 tablet 5     traMADol (ULTRAM) 50 MG tablet TAKE 2 TABLETS BY MOUTH EVERY 8 HOURS AS NEEDED FOR PAIN 270 tablet 0     vitamin D2 (ERGOCALCIFEROL) 44710 units (1250 mcg) capsule Take 1 capsule (50,000 Units) by mouth once a week 12 capsule 0     UNABLE TO FIND 1.5 mg 3 times daily MEDICATION NAME: Oral Cannabis    Oral suspension  Taken morning, afternoon and bedtime       UNABLE TO FIND MEDICATION NAME: Cannabis as needed     [place] Vapor take one puff to the count of two at bedtime.  If needed in the middle of the night       Allergies   Allergen Reactions     Codeine      Isosorbide Mononitrate Other (See Comments)     Vomiting and headache       Lisinopril      [facility]  scan     Past Medical History:   Diagnosis Date     Anxiety state, unspecified 03/01/2011     Aortic valve disorders 06/07/2000    Echocardiogram showin mild/moderate AI.  Normal LV function     Cardiac microvascular disease 4/10/2013    Based on Cardiac MRI done at       Depressive disorder 1997     Fatigue      Fatigue      Fibromyalgia 03/01/2011     Hypertension      Major depressive disorder, recurrent episode, unspecified 12/17/2014     Migraine, unspecified, without mention of intractable migraine without mention of status migrainosus 03/01/2011     Mitral valve disorders(424.0) 10/16/2007     PONV (postoperative nausea and vomiting)      Thyroid Nodule 03/01/2011     Tobacco use disorder 03/01/2011     Unspecified hypothyroidism 03/01/2011     Past Surgical History:   Procedure Laterality Date     APPENDECTOMY  1977     BIOPSY  2017    taken at [facility] from Dr. Nadira cohen. Thyroid     CARDIAC SURGERY  2013    angiogram [facility]:  Normal coronary arteries.  Felt ot have some microvascular disease     CL AFF SURGICAL PATHOLOGY  01/02/2007    Thyroid Mass     COLONOSCOPY  1/13/2014    Procedure: COLONOSCOPY;  COLONOSCOPY ;  Surgeon: Chasidy Gee DO;  Location: [facility] OR     ESOPHAGOSCOPY, GASTROSCOPY, DUODENOSCOPY (EGD), COMBINED N/A 2/9/2017    Procedure: COMBINED ESOPHAGOSCOPY, GASTROSCOPY, DUODENOSCOPY (EGD);  Surgeon: Johnathan Ruff DO;  Location: [facility] OR     GYN SURGERY      c-sections x 3     HYSTERECTOMY TOTAL ABDOMINAL, BILATERAL SALPINGO-OOPHORECTOMY, COMBINED N/A 01/01/2001     LAPAROSCOPIC CHOLECYSTECTOMY  11/07/2003    Cholelithiasis     LAPAROTOMY EXPLORATORY      abdominal pain/fever     LARYNGOSCOPY       SPLENECTOMY       Social History     Tobacco Use     Smoking status: Light Tobacco Smoker     Packs/day: 0.25     Years: 40.00     Pack years: 10.00     Types: Cigarettes     Start date: 1/1/1975     Smokeless tobacco: Never Used     Tobacco comment: 5 cigs daily  Patient will work on  "smoking cessation (3-26-18)   Substance Use Topics     Alcohol use: No     Drug use: No     ROS- the ten system review is negative except as noted in the HPI. May 14, 2019/woa    Physical examination:  /75 (BP Location: Right arm, Patient Position: Chair, Cuff Size: Adult Large)   Pulse 67   Temp 97.3  F (36.3  C) (Tympanic)   Ht 1.702 m (5' 7\")   Wt 77.5 kg (170 lb 14.4 oz)   BMI 26.77 kg/m      GENERAL APPEARANCE: healthy, alert and no distress  NECK: no venous distention  RESPIRATORY: lungs clear to auscultation - no rales, rhonchi or wheezes  CARDIOVASCULAR: regular, normal S1 S2, no gallop, soft AI murmur as before  EXTREMITIES: no edema   VASC: pedal pulses are normal    Laboratory:    Results for STEVEN SZYMANSKI (MRN [id number]) as of 2019 09:42   Ref. Range 2018 10:55   Sodium Latest Ref Range: 133 - 144 mmol/L 138   Potassium Latest Ref Range: 3.4 - 5.3 mmol/L 4.2   Chloride Latest Ref Range: 94 - 109 mmol/L 107   Carbon Dioxide Latest Ref Range: 20 - 32 mmol/L 24   Urea Nitrogen Latest Ref Range: 7 - 30 mg/dL 14   Creatinine Latest Ref Range: 0.52 - 1.04 mg/dL 1.05 (H)   GFR Estimate Latest Ref Range: >60 mL/min/1.7m2 54 (L)         [facility]  Echocardiography Laboratory  [address] [place] [id number]     Name: STEVEN SZYMANSKI  MRN: [id number]  : 1960  Study Date: 2018 10:07 AM  Age: 57 yrs  Gender: Female  Patient Location: [facility]  Reason For Study: , Nonrheumatic aortic valve insufficiency  History: AI  Ordering Physician: YOHAN CANTOR  Referring Physician: YOHAN CANTOR  Performed By: Earnestine Tipton     BSA: 2.0 m2  Height: 67 in  Weight: 197 lb  _____________________________________________________________________________  __        Procedure  Echocardiogram with two-dimensional, color and spectral Doppler performed.  _____________________________________________________________________________  __        Interpretation " Summary  No pericardial effusion is present.  Global and regional left ventricular function is normal with an EF of 55-60%.  Grade I or early diastolic dysfunction.  The right ventricle is normal size.  Global right ventricular function is normal.  Both atria appear normal.  Trace mitral insufficiency is present.  Moderate aortic insufficiency is present.  Trace to mild tricuspid insufficiency is present.  Right ventricular systolic pressure is 18.7mmHg above the right atrial  pressure.  The pulmonic valve is normal.  The aorta root is normal.  _____________________________________________________________________________  __        Left Ventricle  Global and regional left ventricular function is normal with an EF of 55-60%.  Grade I or early diastolic dysfunction.     Right Ventricle  The right ventricle is normal size. Global right ventricular function is  normal.     Atria  Both atria appear normal.     Mitral Valve  Trace mitral insufficiency is present.     Aortic Valve  Aortic valve is normal in structure and function. Moderate aortic  insufficiency is present.        Tricuspid Valve  Trace to mild tricuspid insufficiency is present. Right ventricular systolic  pressure is 18.7mmHg above the right atrial pressure.     Pulmonic Valve  The pulmonic valve is normal.     Vessels  The aorta root is normal.     Pericardium  No pericardial effusion is present.     _____________________________________________________________________________  __  MMode/2D Measurements & Calculations  RVDd: 0.71 cm  IVSd: 0.81 cm  IVSs: 1.3 cm  LVIDd: 5.6 cm  LVIDs: 3.6 cm  LVPWd: 0.81 cm  LVPWs: 1.3 cm  FS: 35.9 %  LV mass(C)d: 167.2 grams  LV mass(C)dI: 83.2 grams/m2  LV mass(C)sI: 84.1 grams/m2  Ao root diam: 3.0 cm  LA dimension: 3.9 cm  LA/Ao: 1.3  LVOT diam: 2.0 cm  LVOT area: 3.0 cm2     RWT: 0.29     Time Measurements  Pulm. HR: 148.2 BPM  MM HR: 60.0 BPM     Doppler Measurements & Calculations  MV E max baldemar: 100.4 cm/sec  MV A  "max stefan: 120.0 cm/sec  MV E/A: 0.84  MV dec slope: 558.9 cm/sec2  MV dec time: 0.18 sec  Ao V2 max: 136.1 cm/sec  Ao max P.4 mmHg  Ao V2 mean: 84.3 cm/sec  Ao mean PG: 3.4 mmHg  Ao V2 VTI: 29.4 cm  JAVY(I,D): 2.7 cm2  JAVY(V,D): 2.8 cm2  AI P1/2t: 699.3 msec  LV V1 max P.5 mmHg  LV V1 max: 127.9 cm/sec  LV V1 VTI: 26.4 cm  CO(LVOT): 13.7 l/min  CI(LVOT): 6.8 l/min/m2  SV(LVOT): 79.5 ml  SI(LVOT): 39.6 ml/m2  TV max P.8 mmHg  PA V2 max: 97.3 cm/sec  PA max PG: 3.8 mmHg  PA mean P.9 mmHg  PA V2 VTI: 26.1 cm  TR max stefan: 215.4 cm/sec  TR max P.6 mmHg     AV Stefan Ratio (DI): 0.94  JAVY Index (cm2/m2): 1.3  Peak E' Stefan: 8.1 cm/sec     _____________________________________________________________________________  __     Report approved by: Shayy Steel 2018 12:23 PM      ANKLE BRACHIAL INDEX     FINDINGS:  The ankle brachial index measured 1.2 bilaterally.  Absolute ankle pressures were 160 which are adequate for wound  healing.     IMPRESSION:  NO EVIDENCE OF ARTERIAL OCCLUSIVE DISEASE IN EITHER LOWER  EXTREMITY.  Exam Date: 2017 03:10:00 PM  Author: LIS CRYSTAL  This report is final and signed    Assessment and recommendations:    1) Fatigue/chest pain/ dyspnea on exertion   2) Moderate AI  3) Microvascular angina    - stable exam but reports increased shortness of breath/dyspnea on exertion     Given her increase in dyspnea on exertion will repeat an echocardiogram to ensure no change in valve or systolic function.    4) Hypertension -  Under good control.    5) Leg \"heaviness - JESUS was normal    6) Hot flashes - unfortunately this is not something I can help her with other than to reassure her it is not related to her heart.      I appreciate the chance to help with Mrs. Suri eugene. Please let me know if you have any questions or concerns. I will see her in one year.     Norman Nichole M.D.  "

## 2019-05-14 NOTE — NURSING NOTE
"Chief Complaint   Patient presents with     Cardiology Problem       Initial /75 (BP Location: Right arm, Patient Position: Chair, Cuff Size: Adult Large)   Pulse 67   Temp 97.3  F (36.3  C) (Tympanic)   Ht 1.702 m (5' 7\")   Wt 77.5 kg (170 lb 14.4 oz)   BMI 26.77 kg/m   Estimated body mass index is 26.77 kg/m  as calculated from the following:    Height as of this encounter: 1.702 m (5' 7\").    Weight as of this encounter: 77.5 kg (170 lb 14.4 oz).  Medication Reconciliation: complete    Pamela M. Lechevalier, LPN    "

## 2019-05-17 ENCOUNTER — HOSPITAL ENCOUNTER (OUTPATIENT)
Dept: CARDIOLOGY | Facility: HOSPITAL | Age: 59
Discharge: HOME | End: 2019-05-17
Attending: INTERNAL MEDICINE | Admitting: INTERNAL MEDICINE
Payer: MEDICARE

## 2019-05-17 DIAGNOSIS — I25.85 CARDIAC MICROVASCULAR DISEASE: ICD-10-CM

## 2019-05-17 DIAGNOSIS — I10 ESSENTIAL HYPERTENSION: ICD-10-CM

## 2019-05-17 DIAGNOSIS — I35.9 AORTIC VALVE DISORDER: ICD-10-CM

## 2019-05-17 PROCEDURE — 93306 TTE W/DOPPLER COMPLETE: CPT | Mod: TC

## 2019-05-17 PROCEDURE — 93306 TTE W/DOPPLER COMPLETE: CPT | Mod: 26 | Performed by: INTERNAL MEDICINE

## 2019-05-19 DIAGNOSIS — K64.4 EXTERNAL HEMORRHOIDS: ICD-10-CM

## 2019-05-19 DIAGNOSIS — K64.9 HEMORRHOID: ICD-10-CM

## 2019-05-19 DIAGNOSIS — F09 COGNITIVE DISORDER: Primary | ICD-10-CM

## 2019-05-20 ENCOUNTER — MYC MEDICAL ADVICE (OUTPATIENT)
Dept: CARDIOLOGY | Facility: OTHER | Age: 59
End: 2019-05-20

## 2019-05-20 NOTE — TELEPHONE ENCOUNTER
PROCTOZONE-HC 2.5 % cream  Last Written Prescription Date:  NA   Last Fill Quantity: NA,   # refills: NA  Last Office Visit: 5-8-19  Future Office visit:    Next 5 appointments (look out 90 days)    Jun 07, 2019  3:20 PM CDT  (Arrive by 3:05 PM)  SHORT with KEILY Fonseca  [facility] ([facility] ) [address]  [place] [address]  [phone]           Routing refill request to provider for review/approval because:  Drug not active on patient's medication list

## 2019-05-22 PROBLEM — F11.90 CHRONIC, CONTINUOUS USE OF OPIOIDS: Status: ACTIVE | Noted: 2017-06-07

## 2019-05-23 NOTE — PROGRESS NOTES
Subjective     Sharlene Mccord is a 58 year old female who presents to clinic today for the following health issues:    HPI   Chronic Pain Follow-Up       Type / Location of Pain: knees, back and elbows  Analgesia/pain control:       Recent changes:  same      Overall control: Tolerable with discomfort  Activity level/function:      Daily activities:  Unable to perform most daily activities - chores, hobbies, social activities, driving    Work:  Unable to work  Adverse effects:  No  Adherance    Taking medication as directed?  Yes    Participating in other treatments: no -   Risk Factors:    Sleep:  Poor    Mood/anxiety:  worsened    Recent family or social stressors:  conflict between family members    Other aggravating factors: prolonged sitting, prolonged standing and laying down  PHQ-9 SCORE 2/4/2019 5/8/2019 5/14/2019   PHQ-9 Total Score - - -   PHQ-9 Total Score 14 21 20     SHAKIR-7 SCORE 12/3/2018 2/4/2019 5/8/2019   Total Score 11 11 15     Encounter-Level CSA - 06/05/2017:    Controlled Substance Agreement - Scan on 6/6/2017 12:39 PM: CONTROLLED SUBSTANCE AGREEMENT (below)       Patient-Level CSA:    There are no patient-level csa.         Amount of exercise or physical activity: None    Problems taking medications regularly: No    Medication side effects: none    Diet: regular (no restrictions)          Patient Active Problem List   Diagnosis     Diverticulitis of sigmoid colon     Hypothyroidism     Anxiety state     Cardiac microvascular disease     Diverticulitis     Aortic valve disorder     ACP (advance care planning)     Chronic pain     Constipation     Memory loss     Major depressive disorder, recurrent episode (H)     Cognitive disorder     Major depressive disorder     Fatigue     Migraine without aura and responsive to treatment     Atypical migraine     Chronic, continuous use of opioids     Mixed connective tissue disease (H)     Past Surgical History:   Procedure Laterality Date     APPENDECTOMY   1977     BIOPSY  2017    taken at [facility] from Dr. Nadira cohen. Thyroid     CARDIAC SURGERY  2013    angiogram [facility]:  Normal coronary arteries.  Felt ot have some microvascular disease     CL AFF SURGICAL PATHOLOGY  01/02/2007    Thyroid Mass     COLONOSCOPY  1/13/2014    Procedure: COLONOSCOPY;  COLONOSCOPY ;  Surgeon: Chasidy Gee DO;  Location: [facility]     ESOPHAGOSCOPY, GASTROSCOPY, DUODENOSCOPY (EGD), COMBINED N/A 2/9/2017    Procedure: COMBINED ESOPHAGOSCOPY, GASTROSCOPY, DUODENOSCOPY (EGD);  Surgeon: Johnathan Ruff DO;  Location: [facility]     GYN SURGERY      c-sections x 3     HYSTERECTOMY TOTAL ABDOMINAL, BILATERAL SALPINGO-OOPHORECTOMY, COMBINED N/A 01/01/2001     LAPAROSCOPIC CHOLECYSTECTOMY  11/07/2003    Cholelithiasis     LAPAROTOMY EXPLORATORY      abdominal pain/fever     LARYNGOSCOPY       SPLENECTOMY         Social History     Tobacco Use     Smoking status: Light Tobacco Smoker     Packs/day: 0.25     Years: 40.00     Pack years: 10.00     Types: Cigarettes     Start date: 1/1/1975     Smokeless tobacco: Never Used     Tobacco comment: 5 cigs daily  Patient will work on smoking cessation (3-26-18)   Substance Use Topics     Alcohol use: No     Family History   Problem Relation Age of Onset     C.A.D. Father      Hypertension Father      C.A.D. Mother      Cerebrovascular Disease Mother         CVA     Hypertension Mother      Coronary Artery Disease Mother      Diabetes Paternal Grandmother      Diabetes Paternal Grandfather      Diabetes Sister      Breast Cancer Sister      Diabetes Maternal Grandmother      Diabetes Sister      Breast Cancer Sister      Hypertension Sister      Depression Sister      Anxiety Disorder Sister      Obesity Sister      Depression Sister      Breast Cancer Sister      Obesity Sister      Depression Brother      Depression Daughter      Obesity Daughter      Depression Daughter      Mental Illness Daughter         [id number]     Anxiety Disorder Daughter       Mental Illness Daughter      Anxiety Disorder Other      Osteoporosis Other      Thyroid Disease Other      Diabetes Nephew          Current Outpatient Medications   Medication Sig Dispense Refill     clonazePAM (KLONOPIN) 1 MG tablet TAKE 1/2 TO 1 (ONE-HALF TO ONE) TABLET BY MOUTH TWICE DAILY AS NEEDED FOR ANXIETY 60 tablet 2     cyclobenzaprine (FLEXERIL) 10 MG tablet Take 1 tablet (10 mg) by mouth 3 times daily as needed for other (myofacial pain) 90 tablet 3     diclofenac (VOLTAREN) 1 % topical gel Place onto the skin 4 times daily 100 g 3     diltiazem (TIAZAC) 300 MG 24 hr ER beaded capsule TAKE 1 CAPSULE BY MOUTH ONCE DAILY 90 capsule 1     estradiol (ESTRACE) 0.5 MG tablet TAKE ONE TABLET BY MOUTH TWICE DAILY 60 tablet 11     hydrOXYzine (ATARAX) 25 MG tablet TAKE ONE TO TWO TABLETS BY MOUTH AT BEDTIME FOR NAUSEA 120 tablet 1     NITROSTAT 0.4 MG sublingual tablet Place 1 tablet (0.4 mg) under the tongue every 5 minutes as needed for chest pain 25 tablet 11     NP THYROID 30 MG tablet TAKE 1 TABLET BY MOUTH ONCE DAILY 90 tablet 1     pantoprazole (PROTONIX) 20 MG EC tablet Take by mouth 30-60 minutes before a meal. 30 tablet 3     PROCTOZONE-HC 2.5 % cream APPLY CREAM TO AFFECTED AREA THREE TIMES DAILY 45 g 1     ranitidine (ZANTAC) 150 MG tablet TAKE 1 TABLET BY MOUTH TWICE DAILY 180 tablet 1     STATIN NOT PRESCRIBED (INTENTIONAL) Refused       SUMAtriptan (IMITREX) 100 MG tablet TAKE ONE TABLET BY MOUTH AS NEEDED MIGRAINES 10 tablet 5     traMADol (ULTRAM) 50 MG tablet TAKE 2 TABLETS BY MOUTH EVERY 8 HOURS AS NEEDED FOR PAIN 270 tablet 0     UNABLE TO FIND 1.5 mg 3 times daily MEDICATION NAME: Oral Cannabis    Oral suspension  Taken morning, afternoon and bedtime       UNABLE TO FIND MEDICATION NAME: Cannabis as needed     [place] Vapor take one puff to the count of two at bedtime.  If needed in the middle of the night       vitamin D2 (ERGOCALCIFEROL) 39831 units (1250 mcg) capsule Take 1 capsule  "(50,000 Units) by mouth once a week 12 capsule 0     Allergies   Allergen Reactions     Codeine      Isosorbide Mononitrate Other (See Comments)     Vomiting and headache       Lisinopril      [facility] scan     Recent Labs   Lab Test 05/08/19  1506 11/21/18  1055 06/06/18  0958  09/11/17  1043  04/19/17  1133 05/09/16  0930 01/13/16  0941   LDL  --   --   --   --   --   --  104* 127* 147*   HDL  --   --   --   --   --   --  77 70 64   TRIG  --   --   --   --   --   --  165* 204* 164*   ALT  --  21 26  --  27   < > 28 44 63*   CR  --  1.05* 0.95  --  1.05*   < > 0.97 0.94 0.93   GFRESTIMATED  --  54* 61  --  54*   < > 59* 62 63   GFRESTBLACK  --  65 73  --  65   < > 71 75 76   POTASSIUM  --  4.2 4.5  --  4.3   < > 4.4 4.1 4.8   TSH 1.32 1.31 1.67   < > 1.17  --  1.24 3.12 2.58    < > = values in this interval not displayed.      BP Readings from Last 3 Encounters:   05/31/19 132/72   05/14/19 125/75   05/08/19 128/60    Wt Readings from Last 3 Encounters:   05/31/19 77.1 kg (170 lb)   05/14/19 77.5 kg (170 lb 14.4 oz)   05/08/19 78 kg (172 lb)                      Reviewed and updated as needed this visit by Provider         Review of Systems   ROS COMP: Constitutional, HEENT, cardiovascular, pulmonary, GI, , musculoskeletal, neuro, skin, endocrine and psych systems are negative, except as otherwise noted.      Objective    /72 (Patient Position: Sitting)   Pulse 62   Ht 1.702 m (5' 7\")   Wt 77.1 kg (170 lb)   SpO2 98%   BMI 26.63 kg/m    Body mass index is 26.63 kg/m .  Physical Exam   GENERAL: healthy, alert and no distress  NECK: no adenopathy, no asymmetry, masses, or scars and thyroid normal to palpation  RESP: lungs clear to auscultation - no rales, rhonchi or wheezes  CV: regular rate and rhythm, normal S1 S2, no S3 or S4, no murmur, click or rub, no peripheral edema and peripheral pulses strong  ABDOMEN: soft, nontender, no hepatosplenomegaly, no masses and bowel sounds normal  MS: no gross " musculoskeletal defects noted, no edema  SKIN: no suspicious lesions or rashes  NEURO: Normal strength and tone, mentation intact and speech normal  PSYCH: mentation appears normal, affect normal/bright    Diagnostic Test Results:  Labs reviewed in Epic  Results for orders placed or performed during the hospital encounter of 19   Echocardiogram Complete    Narrative    [id number]  [id number]  [id number]  [id number]^DEVAUGHN^YOHAN^VICTORIANO           [facility]  Echocardiography Laboratory  [address] [place] [id number]     Name: STEVEN SZYMANSKI  MRN: [id number]  : 1960  Study Date: 2019 07:58 AM  Age: 58 yrs  Gender: Female  Patient Location: [facility]  Reason For Study: Cardiac microvascular disease, Aortic valve disorder,  Essential  History: AI  Ordering Physician: YOHAN CANTOR  Referring Physician: YOHAN CANTOR  Performed By: Pat Trent     BSA: 1.9 m2  Height: 67 in  Weight: 170 lb  _____________________________________________________________________________  __     _____________________________________________________________________________  __        Interpretation Summary  No pericardial effusion is present.  Global and regional left ventricular function is normal with an EF of 55-60%.  Grade I or early diastolic dysfunction.  The right ventricle is normal size.  Both atria appear normal.  Mild mitral insufficiency is present.  Moderate aortic insufficiency is present.  Mild tricuspid insufficiency is present.  Right ventricular systolic pressure is 18mmHg above the right atrial pressure.  The pulmonic valve is normal.  The aorta root is normal.  _____________________________________________________________________________  __        Left Ventricle  Global and regional left ventricular function is normal with an EF of 55-60%.  Grade I or early diastolic dysfunction.     Right Ventricle  The right ventricle is normal size.     Atria  Both atria appear  normal.     Mitral Valve  Mild mitral insufficiency is present.     Aortic Valve  Moderate aortic insufficiency is present. The mean AoV pressure gradient is  3.5 mmHg.        Tricuspid Valve  Mild tricuspid insufficiency is present. Right ventricular systolic pressure  is 18mmHg above the right atrial pressure.     Pulmonic Valve  The pulmonic valve is normal.     Vessels  The aorta root is normal.     Pericardium  No pericardial effusion is present.     _____________________________________________________________________________  __  MMode/2D Measurements & Calculations  IVSd: 0.99 cm  IVSs: 1.2 cm  LVIDd: 5.4 cm  LVIDs: 3.8 cm  LVPWd: 0.74 cm  LVPWs: 1.2 cm  FS: 30.0 %  LV mass(C)d: 170.3 grams  LV mass(C)dI: 90.2 grams/m2  LV mass(C)sI: 84.0 grams/m2  Ao root diam: 3.0 cm  LA dimension: 3.5 cm  LA/Ao: 1.2  LVOT diam: 1.9 cm  LVOT area: 2.7 cm2     RWT: 0.28     Time Measurements  Pulm. HR: 150.2 BPM     Doppler Measurements & Calculations  MV E max stefan: 80.8 cm/sec  MV A max stefan: 104.3 cm/sec  MV E/A: 0.77  MV dec slope: 304.7 cm/sec2  MV dec time: 0.27 sec  Ao V2 max: 132.2 cm/sec  Ao max P.0 mmHg  Ao V2 mean: 85.6 cm/sec  Ao mean PG: 3.5 mmHg  Ao V2 VTI: 29.6 cm  JAVY(I,D): 2.6 cm2  JAVY(V,D): 2.5 cm2  AI P1/2t: 909.2 msec  LV V1 max P.7 mmHg  LV V1 max: 119.4 cm/sec  LV V1 VTI: 27.6 cm  CO(LVOT): 13.1 l/min  CI(LVOT): 7.0 l/min/m2  SV(LVOT): 75.5 ml  SI(LVOT): 40.0 ml/m2  TV max P.1 mmHg  PA V2 max: 89.8 cm/sec  PA max PG: 3.2 mmHg  PA mean P.7 mmHg  PA V2 VTI: 24.5 cm  TR max stefan: 212.9 cm/sec  TR max P.1 mmHg  AV Stefan Ratio (DI): 0.90     JAVY Index (cm2/m2): 1.4  E/E': 11.7  Peak E' Stefan: 6.9 cm/sec     _____________________________________________________________________________  __           Report approved by: Shayy Steel 2019 04:45 PM              Assessment & Plan     1. Acute myofascial pain  She is going to try flexeril at bedtime for sleep.  Off medical THC now.  "She has pain every day and mood is down. Can't focus and voice is quiet and monotone.   - cyclobenzaprine (FLEXERIL) 10 MG tablet; Take 1 tablet (10 mg) by mouth 3 times daily as needed for other (myofacial pain)  Dispense: 90 tablet; Refill: 3     Tobacco Cessation:   reports that she has been smoking cigarettes.  She started smoking about 44 years ago. She has a 10.00 pack-year smoking history. She has never used smokeless tobacco.  Tobacco Cessation Action Plan: pt is working with the Quit Plan now.       BMI:   Estimated body mass index is 26.63 kg/m  as calculated from the following:    Height as of this encounter: 1.702 m (5' 7\").    Weight as of this encounter: 77.1 kg (170 lb).           See Patient Instructions    No follow-ups on file.    KEILY Cobos  [facility] [place]      "

## 2019-05-31 ENCOUNTER — OFFICE VISIT (OUTPATIENT)
Dept: FAMILY MEDICINE | Facility: OTHER | Age: 59
End: 2019-05-31
Attending: PHYSICIAN ASSISTANT
Payer: COMMERCIAL

## 2019-05-31 VITALS
HEIGHT: 67 IN | WEIGHT: 170 LBS | OXYGEN SATURATION: 98 % | HEART RATE: 62 BPM | SYSTOLIC BLOOD PRESSURE: 132 MMHG | BODY MASS INDEX: 26.68 KG/M2 | DIASTOLIC BLOOD PRESSURE: 72 MMHG

## 2019-05-31 DIAGNOSIS — M79.18 ACUTE MYOFASCIAL PAIN: Primary | ICD-10-CM

## 2019-05-31 PROCEDURE — G0463 HOSPITAL OUTPT CLINIC VISIT: HCPCS

## 2019-05-31 PROCEDURE — 99213 OFFICE O/P EST LOW 20 MIN: CPT | Performed by: PHYSICIAN ASSISTANT

## 2019-05-31 RX ORDER — CYCLOBENZAPRINE HCL 10 MG
10 TABLET ORAL 3 TIMES DAILY PRN
Qty: 90 TABLET | Refills: 3 | Status: SHIPPED | OUTPATIENT
Start: 2019-05-31 | End: 2019-07-10

## 2019-05-31 ASSESSMENT — PAIN SCALES - GENERAL: PAINLEVEL: SEVERE PAIN (6)

## 2019-05-31 ASSESSMENT — MIFFLIN-ST. JEOR: SCORE: 1383.74

## 2019-05-31 NOTE — PATIENT INSTRUCTIONS
Thank you for choosing [facility].   I have office hours 8:00 am to 4:30 pm on Monday's, Wednesday's, Thursday's and Friday's. My nurse and I are out of the office every Tuesday.    Following your visit, when your labs and diagnostic testing have returned, I will review then and you will be contacted by my nurse.  If you are on My Chart, you can also view results there.    For refills, notify your pharmacy regarding what you need and the pharmacy will generate a refill request. Do not call my nurse as she is unable to process refill request. Please plan ahead and allow 3-5 days for refill requests.    You will generally receive a reminder call the day prior to your appointment.  If you cannot attend your appointment, please cancel your appointment with as much notice as possible.  If there is a pattern of failure to present for your appointments, I cannot provide consistent, meaningful, ongoing care for you. It is very important to me that you come in for your care, so we can best assist you with your health care needs.    IMPORTANT:  Please note that it is my standard of practice to NOT participate in prescribing ongoing requested Narcotic Analgesic therapy, and/or participate in the prescribing of other controlled substances.  My nurse and I am happy to assist you with the process of referral for alternative pain management as needed, and other treatment modalities including but not limited to:  Physical Therapy, Physical Medicine and Rehab, Counseling, Chiropractic Care, Orthopedic Care, and non-narcotic medication management.     In the event that you need to be seen for emergent concerns and I am out of office,  please see one of my colleagues for acute concerns.  You may also present to  or ER.  I appreciate the opportunity to serve you and look forward to supporting your healthcare needs in the future. Please contact me with any questions or concerns that you may  have.    Sincerely,      Concha Hare RN, PA-C

## 2019-05-31 NOTE — NURSING NOTE
"Chief Complaint   Patient presents with     Musculoskeletal Problem       Initial /72 (Patient Position: Sitting)   Pulse 62   Ht 1.702 m (5' 7\")   Wt 77.1 kg (170 lb)   SpO2 98%   BMI 26.63 kg/m   Estimated body mass index is 26.63 kg/m  as calculated from the following:    Height as of this encounter: 1.702 m (5' 7\").    Weight as of this encounter: 77.1 kg (170 lb).  Medication Reconciliation: complete    Patricia Jensen LPN  "

## 2019-06-07 ENCOUNTER — TELEPHONE (OUTPATIENT)
Dept: FAMILY MEDICINE | Facility: OTHER | Age: 59
End: 2019-06-07

## 2019-06-07 NOTE — TELEPHONE ENCOUNTER
Received an APPROVAL from [facility] for Cyclobenzaprine. Effective 03/09/2019 to 06/07/2020. Forms scanned to [facility].

## 2019-06-07 NOTE — TELEPHONE ENCOUNTER
[facility]. Submitted on CMM. Waiting for a response.

## 2019-06-13 DIAGNOSIS — M79.7 FIBROMYALGIA: ICD-10-CM

## 2019-06-14 RX ORDER — TRAMADOL HYDROCHLORIDE 50 MG/1
TABLET ORAL
Qty: 270 TABLET | Refills: 0 | Status: SHIPPED | OUTPATIENT
Start: 2019-06-14 | End: 2019-06-27

## 2019-06-14 NOTE — TELEPHONE ENCOUNTER
traMADol (ULTRAM) 50 MG tablet  Last Written Prescription Date:  2/4/19  Last Fill Quantity: 270,   # refills: 0  Last Office Visit: 5/31/19  Future Office visit:    Next 5 appointments (look out 90 days)    Jun 27, 2019 10:40 AM CDT  (Arrive by 10:25 AM)  SHORT with KEILY Fonseca  [facility] [place] ([facility] [place] ) [address]  [place] [address]  [phone]

## 2019-06-21 ENCOUNTER — MYC MEDICAL ADVICE (OUTPATIENT)
Dept: CARDIOLOGY | Facility: OTHER | Age: 59
End: 2019-06-21

## 2019-06-24 NOTE — PROGRESS NOTES
Subjective     Sharlene Mccord is a 58 year old female who presents to clinic today for the following health issues:    HPI   Chronic Pain Follow-Up       Type / Location of Pain: Fibromyalgia , myofacial    Analgesia/pain control:       Recent changes:  improved      Overall control: Tolerable with discomfort  Activity level/function:      Daily activities:  Can do most things most days, with some rest    Work:  Unable to work was recently taken out of work in May 2019   Adverse effects:  No  Adherance    Taking medication as directed?  Yes    Participating in other treatments: yes started back up on the Medical Cannabis last week   Risk Factors:    Sleep:  Poor    Mood/anxiety: no change has stayed the same     Recent family or social stressors:  job change/loss and having to put her mother in an assisted liver , Brother just diagnosed with prostate cancer     Other aggravating factors: prolonged sitting, prolonged standing and poor posture  PHQ-9 SCORE 2/4/2019 5/8/2019 5/14/2019   PHQ-9 Total Score - - -   PHQ-9 Total Score 14 21 20     SHAKIR-7 SCORE 12/3/2018 2/4/2019 5/8/2019   Total Score 11 11 15     Encounter-Level CSA - 06/05/2017:    Controlled Substance Agreement - Scan on 6/6/2017 12:39 PM: CONTROLLED SUBSTANCE AGREEMENT (below)       Patient-Level CSA:    There are no patient-level csa.         Amount of exercise or physical activity: None    Problems taking medications regularly: No    Medication side effects: none    Diet: regular (no restrictions)          Patient Active Problem List   Diagnosis     Diverticulitis of sigmoid colon     Hypothyroidism     Anxiety state     Cardiac microvascular disease     Diverticulitis     Aortic valve disorder     ACP (advance care planning)     Chronic pain     Constipation     Memory loss     Major depressive disorder, recurrent episode (H)     Cognitive disorder     Major depressive disorder     Fatigue     Migraine without aura and responsive to treatment      Atypical migraine     Chronic, continuous use of opioids     Mixed connective tissue disease (H)     Past Surgical History:   Procedure Laterality Date     APPENDECTOMY  1977     BIOPSY  2017    taken at [facility] from Dr. Nadira cohen. Thyroid     CARDIAC SURGERY  2013    angiogram [facility]:  Normal coronary arteries.  Felt ot have some microvascular disease     CL AFF SURGICAL PATHOLOGY  01/02/2007    Thyroid Mass     COLONOSCOPY  1/13/2014    Procedure: COLONOSCOPY;  COLONOSCOPY ;  Surgeon: Chasidy Gee DO;  Location: [facility]     ESOPHAGOSCOPY, GASTROSCOPY, DUODENOSCOPY (EGD), COMBINED N/A 2/9/2017    Procedure: COMBINED ESOPHAGOSCOPY, GASTROSCOPY, DUODENOSCOPY (EGD);  Surgeon: Johnathan Ruff DO;  Location: [facility]     GYN SURGERY      c-sections x 3     HYSTERECTOMY TOTAL ABDOMINAL, BILATERAL SALPINGO-OOPHORECTOMY, COMBINED N/A 01/01/2001     LAPAROSCOPIC CHOLECYSTECTOMY  11/07/2003    Cholelithiasis     LAPAROTOMY EXPLORATORY      abdominal pain/fever     LARYNGOSCOPY       SPLENECTOMY         Social History     Tobacco Use     Smoking status: Light Tobacco Smoker     Packs/day: 0.25     Years: 40.00     Pack years: 10.00     Types: Cigarettes     Start date: 1/1/1975     Smokeless tobacco: Never Used     Tobacco comment: 5 cigs daily  Patient will work on smoking cessation (3-26-18)   Substance Use Topics     Alcohol use: No     Family History   Problem Relation Age of Onset     C.A.D. Father      Hypertension Father      C.A.D. Mother      Cerebrovascular Disease Mother         CVA     Hypertension Mother      Coronary Artery Disease Mother      Diabetes Paternal Grandmother      Diabetes Paternal Grandfather      Diabetes Sister      Breast Cancer Sister      Diabetes Maternal Grandmother      Diabetes Sister      Breast Cancer Sister      Hypertension Sister      Depression Sister      Anxiety Disorder Sister      Obesity Sister      Depression Sister      Breast Cancer Sister      Obesity Sister       Depression Brother      Depression Daughter      Obesity Daughter      Depression Daughter      Mental Illness Daughter         bi-polar1     Anxiety Disorder Daughter      Mental Illness Daughter      Anxiety Disorder Other      Osteoporosis Other      Thyroid Disease Other      Diabetes Nephew          Current Outpatient Medications   Medication Sig Dispense Refill     clonazePAM (KLONOPIN) 1 MG tablet TAKE 1/2 TO 1 (ONE-HALF TO ONE) TABLET BY MOUTH TWICE DAILY AS NEEDED FOR ANXIETY 60 tablet 2     diltiazem (TIAZAC) 300 MG 24 hr ER beaded capsule TAKE 1 CAPSULE BY MOUTH ONCE DAILY 90 capsule 1     estradiol (ESTRACE) 0.5 MG tablet TAKE ONE TABLET BY MOUTH TWICE DAILY 60 tablet 11     hydrOXYzine (ATARAX) 25 MG tablet TAKE ONE TO TWO TABLETS BY MOUTH AT BEDTIME FOR NAUSEA 120 tablet 1     NITROSTAT 0.4 MG sublingual tablet Place 1 tablet (0.4 mg) under the tongue every 5 minutes as needed for chest pain 25 tablet 11     NP THYROID 30 MG tablet TAKE 1 TABLET BY MOUTH ONCE DAILY 90 tablet 1     pantoprazole (PROTONIX) 20 MG EC tablet Take by mouth 30-60 minutes before a meal. 30 tablet 3     PROCTOZONE-HC 2.5 % cream APPLY CREAM TO AFFECTED AREA THREE TIMES DAILY 45 g 1     ranitidine (ZANTAC) 150 MG tablet TAKE 1 TABLET BY MOUTH TWICE DAILY 180 tablet 1     SUMAtriptan (IMITREX) 100 MG tablet TAKE ONE TABLET BY MOUTH AS NEEDED MIGRAINES 10 tablet 5     UNABLE TO FIND 1.5 mg 3 times daily MEDICATION NAME: Oral Cannabis    Oral suspension  Taken morning, afternoon and bedtime       UNABLE TO FIND MEDICATION NAME: Cannabis as needed     [place] Vapor take one puff to the count of two at bedtime.  If needed in the middle of the night       vitamin D2 (ERGOCALCIFEROL) 48264 units (1250 mcg) capsule Take 1 capsule (50,000 Units) by mouth once a week 12 capsule 0     cyclobenzaprine (FLEXERIL) 10 MG tablet Take 1 tablet (10 mg) by mouth 3 times daily as needed for other (myofacial pain) (Patient not taking: Reported on  6/27/2019) 90 tablet 3     Allergies   Allergen Reactions     Codeine      Isosorbide Mononitrate Other (See Comments)     Vomiting and headache       Lisinopril      [facility] scan     Recent Labs   Lab Test 05/08/19  1506 11/21/18  1055 06/06/18  0958  09/11/17  1043  04/19/17  1133 05/09/16  0930 01/13/16  0941   LDL  --   --   --   --   --   --  104* 127* 147*   HDL  --   --   --   --   --   --  77 70 64   TRIG  --   --   --   --   --   --  165* 204* 164*   ALT  --  21 26  --  27   < > 28 44 63*   CR  --  1.05* 0.95  --  1.05*   < > 0.97 0.94 0.93   GFRESTIMATED  --  54* 61  --  54*   < > 59* 62 63   GFRESTBLACK  --  65 73  --  65   < > 71 75 76   POTASSIUM  --  4.2 4.5  --  4.3   < > 4.4 4.1 4.8   TSH 1.32 1.31 1.67   < > 1.17  --  1.24 3.12 2.58    < > = values in this interval not displayed.      BP Readings from Last 3 Encounters:   06/27/19 124/66   05/31/19 132/72   05/14/19 125/75    Wt Readings from Last 3 Encounters:   06/27/19 80.6 kg (177 lb 11.2 oz)   05/31/19 77.1 kg (170 lb)   05/14/19 77.5 kg (170 lb 14.4 oz)                      Reviewed and updated as needed this visit by Provider         Review of Systems   ROS COMP: Constitutional, HEENT, cardiovascular, pulmonary, gi and gu systems are negative, except as otherwise noted.      Objective    There were no vitals taken for this visit.  There is no height or weight on file to calculate BMI.  Physical Exam   GENERAL: healthy, alert and no distress  NECK: no adenopathy, no asymmetry, masses, or scars and thyroid normal to palpation  RESP: lungs clear to auscultation - no rales, rhonchi or wheezes  CV: regular rate and rhythm, normal S1 S2, no S3 or S4, no murmur, click or rub, no peripheral edema and peripheral pulses strong  ABDOMEN: soft, nontender, no hepatosplenomegaly, no masses and bowel sounds normal  MS: no gross musculoskeletal defects noted, no edema    Diagnostic Test Results:  Labs reviewed in Epic  none         Assessment & Plan  "  1. Visit for screening mammogram  Scheduled.   - MA Screening Digital Bilateral; Future    2. Acute bilateral knee pain  Crepitation and pain in her knee. Hx of injection and helpful.  We will get new xray as has been a while and see if injection helps. Discussion on  Treatment.   - XR KNEE RIGHT 3 VIEWS (Clinic Performed); Future  - XR KNEE LEFT 3 VIEWS (Clinic Performed); Future  - ORTHOPEDICS ADULT REFERRAL    3. Other chronic pain  Going back to THC/CBD.  This really helped her most over all. Can hold on to tramadol and just keep. Discontinued from her med list.          Tobacco Cessation:   reports that she has been smoking cigarettes.  She started smoking about 44 years ago. She has a 10.00 pack-year smoking history. She has never used smokeless tobacco.        BMI:   Estimated body mass index is 27.83 kg/m  as calculated from the following:    Height as of this encounter: 1.702 m (5' 7\").    Weight as of this encounter: 80.6 kg (177 lb 11.2 oz).           See Patient Instructions    No follow-ups on file.    KEILY Cobos  [facility] - [place]      "

## 2019-06-24 NOTE — TELEPHONE ENCOUNTER
Norman Nichole MD  You 3 days ago      It's generic I can't think of anything cheaper. She might check with a different pharmacy. [facility] used to offer $4/month scripts for generic meds.     Norman    Routing comment

## 2019-06-27 ENCOUNTER — OFFICE VISIT (OUTPATIENT)
Dept: FAMILY MEDICINE | Facility: OTHER | Age: 59
End: 2019-06-27
Attending: PHYSICIAN ASSISTANT
Payer: MEDICARE

## 2019-06-27 ENCOUNTER — ANCILLARY PROCEDURE (OUTPATIENT)
Dept: GENERAL RADIOLOGY | Facility: OTHER | Age: 59
End: 2019-06-27
Attending: PHYSICIAN ASSISTANT
Payer: MEDICARE

## 2019-06-27 VITALS
OXYGEN SATURATION: 98 % | WEIGHT: 177.7 LBS | SYSTOLIC BLOOD PRESSURE: 124 MMHG | HEART RATE: 60 BPM | BODY MASS INDEX: 27.89 KG/M2 | DIASTOLIC BLOOD PRESSURE: 66 MMHG | HEIGHT: 67 IN

## 2019-06-27 DIAGNOSIS — M25.562 ACUTE BILATERAL KNEE PAIN: ICD-10-CM

## 2019-06-27 DIAGNOSIS — G89.29 OTHER CHRONIC PAIN: ICD-10-CM

## 2019-06-27 DIAGNOSIS — M25.561 ACUTE BILATERAL KNEE PAIN: ICD-10-CM

## 2019-06-27 DIAGNOSIS — Z12.31 VISIT FOR SCREENING MAMMOGRAM: Primary | ICD-10-CM

## 2019-06-27 PROCEDURE — 73564 X-RAY EXAM KNEE 4 OR MORE: CPT | Mod: TC,50

## 2019-06-27 PROCEDURE — G0463 HOSPITAL OUTPT CLINIC VISIT: HCPCS

## 2019-06-27 PROCEDURE — 99213 OFFICE O/P EST LOW 20 MIN: CPT | Performed by: PHYSICIAN ASSISTANT

## 2019-06-27 ASSESSMENT — ANXIETY QUESTIONNAIRES
2. NOT BEING ABLE TO STOP OR CONTROL WORRYING: NEARLY EVERY DAY
7. FEELING AFRAID AS IF SOMETHING AWFUL MIGHT HAPPEN: MORE THAN HALF THE DAYS
GAD7 TOTAL SCORE: 17
6. BECOMING EASILY ANNOYED OR IRRITABLE: NEARLY EVERY DAY
5. BEING SO RESTLESS THAT IT IS HARD TO SIT STILL: MORE THAN HALF THE DAYS
IF YOU CHECKED OFF ANY PROBLEMS ON THIS QUESTIONNAIRE, HOW DIFFICULT HAVE THESE PROBLEMS MADE IT FOR YOU TO DO YOUR WORK, TAKE CARE OF THINGS AT HOME, OR GET ALONG WITH OTHER PEOPLE: VERY DIFFICULT
3. WORRYING TOO MUCH ABOUT DIFFERENT THINGS: NEARLY EVERY DAY
1. FEELING NERVOUS, ANXIOUS, OR ON EDGE: MORE THAN HALF THE DAYS

## 2019-06-27 ASSESSMENT — MIFFLIN-ST. JEOR: SCORE: 1418.67

## 2019-06-27 ASSESSMENT — PATIENT HEALTH QUESTIONNAIRE - PHQ9
SUM OF ALL RESPONSES TO PHQ QUESTIONS 1-9: 21
5. POOR APPETITE OR OVEREATING: MORE THAN HALF THE DAYS

## 2019-06-27 ASSESSMENT — PAIN SCALES - GENERAL: PAINLEVEL: MODERATE PAIN (4)

## 2019-06-27 NOTE — NURSING NOTE
"Chief Complaint   Patient presents with     Fibromyalgia       Initial /66 (BP Location: Left arm, Patient Position: Chair, Cuff Size: Adult Regular)   Pulse 60   Ht 1.702 m (5' 7\")   Wt 80.6 kg (177 lb 11.2 oz)   SpO2 98%   BMI 27.83 kg/m   Estimated body mass index is 27.83 kg/m  as calculated from the following:    Height as of this encounter: 1.702 m (5' 7\").    Weight as of this encounter: 80.6 kg (177 lb 11.2 oz).  Medication Reconciliation: complete  "

## 2019-06-28 ASSESSMENT — ANXIETY QUESTIONNAIRES: GAD7 TOTAL SCORE: 17

## 2019-07-10 ENCOUNTER — HOSPITAL ENCOUNTER (EMERGENCY)
Facility: HOSPITAL | Age: 59
Discharge: HOME | End: 2019-07-10
Attending: PHYSICIAN ASSISTANT | Admitting: PHYSICIAN ASSISTANT
Payer: MEDICARE

## 2019-07-10 VITALS
RESPIRATION RATE: 16 BRPM | DIASTOLIC BLOOD PRESSURE: 64 MMHG | OXYGEN SATURATION: 99 % | SYSTOLIC BLOOD PRESSURE: 124 MMHG | TEMPERATURE: 97.7 F

## 2019-07-10 DIAGNOSIS — N39.0 UTI (URINARY TRACT INFECTION): Primary | ICD-10-CM

## 2019-07-10 DIAGNOSIS — N39.0 URINARY TRACT INFECTION WITHOUT HEMATURIA, SITE UNSPECIFIED: ICD-10-CM

## 2019-07-10 LAB
ALBUMIN UR-MCNC: 10 MG/DL
APPEARANCE UR: CLEAR
BACTERIA #/AREA URNS HPF: ABNORMAL /HPF
BILIRUB UR QL STRIP: NEGATIVE
COLOR UR AUTO: YELLOW
GLUCOSE UR STRIP-MCNC: NEGATIVE MG/DL
HGB UR QL STRIP: NEGATIVE
HYALINE CASTS #/AREA URNS LPF: 26 /LPF
KETONES UR STRIP-MCNC: 5 MG/DL
LEUKOCYTE ESTERASE UR QL STRIP: ABNORMAL
MUCOUS THREADS #/AREA URNS LPF: PRESENT /LPF
NITRATE UR QL: NEGATIVE
PH UR STRIP: 5.5 PH (ref 4.7–8)
RBC #/AREA URNS AUTO: 1 /HPF (ref 0–2)
SOURCE: ABNORMAL
SP GR UR STRIP: 1.02 (ref 1–1.03)
SQUAMOUS #/AREA URNS AUTO: 2 /HPF (ref 0–1)
UROBILINOGEN UR STRIP-MCNC: 2 MG/DL (ref 0–2)
WBC #/AREA URNS AUTO: 2 /HPF (ref 0–5)

## 2019-07-10 PROCEDURE — 99213 OFFICE O/P EST LOW 20 MIN: CPT | Mod: Z6 | Performed by: PHYSICIAN ASSISTANT

## 2019-07-10 PROCEDURE — 81001 URINALYSIS AUTO W/SCOPE: CPT | Performed by: FAMILY MEDICINE

## 2019-07-10 PROCEDURE — G0463 HOSPITAL OUTPT CLINIC VISIT: HCPCS

## 2019-07-10 RX ORDER — CIPROFLOXACIN 250 MG/1
250 TABLET, FILM COATED ORAL 2 TIMES DAILY
Qty: 10 TABLET | Refills: 0 | Status: SHIPPED | OUTPATIENT
Start: 2019-07-10 | End: 2019-09-05

## 2019-07-10 ASSESSMENT — ENCOUNTER SYMPTOMS
FEVER: 0
ABDOMINAL PAIN: 0
DIARRHEA: 0
DYSURIA: 1
FLANK PAIN: 0
NAUSEA: 0
FREQUENCY: 1
VOMITING: 0
DIFFICULTY URINATING: 0

## 2019-07-10 NOTE — ED AVS SNAPSHOT
[facility]  [address] [place] [id number]  Phone:  [phone]                                    Sharlene Mccord   MRN: [id number]    Department:  [facility]   Date of Visit:  7/10/2019           After Visit Summary Signature Page    I have received my discharge instructions, and my questions have been answered. I have discussed any challenges I see with this plan with the nurse or doctor.    ..........................................................................................................................................  Patient/Patient Representative Signature      ..........................................................................................................................................  Patient Representative Print Name and Relationship to Patient    ..................................................               ................................................  Date                                   Time    ..........................................................................................................................................  Reviewed by Signature/Title    ...................................................              ..............................................  Date                                               Time          22EPIC Rev 08/18

## 2019-07-10 NOTE — ED TRIAGE NOTES
Patient states shew as recently camping; since this weekend she has been having dysuria, and pressure; concerned she may have an UTI

## 2019-07-10 NOTE — ED PROVIDER NOTES
History     Chief Complaint   Patient presents with     Rule out Urinary Tract Infection     HPI  Sharlene Mccord is a 58 year old female who presents to urgent care for evaluation of possible UTI.  Patient states that over the past 5 days she has had burning, frequency, urgency, bladder pressure.  She denies any fevers, hematuria, CVA pain, or any other associated symptoms.  Patient has not taken anything over-the-counter.    Allergies:  Allergies   Allergen Reactions     Codeine      Isosorbide Mononitrate Other (See Comments)     Vomiting and headache       Lisinopril      [facility] scan       Problem List:    Patient Active Problem List    Diagnosis Date Noted     ACP (advance care planning) 09/11/2017     Priority: Medium     Advance Care Planning 9/11/2017: ACP Review of Chart / Resources Provided:  Reviewed chart for advance care plan.  Sharlene Mccord has been provided information and resources to begin or update their advance care plan.  Added by Melina Armas        Advance Care Planning 7/11/2016: ACP Review of Chart / Resources Provided:  Reviewed chart for advance care plan.  Sharlene Mccord has been provided information and resources to begin or update their advance care plan.  Added by Kortney Smalls             Mixed connective tissue disease (H) 07/05/2017     Priority: Medium     Chronic, continuous use of opioids 06/07/2017     Priority: Medium     Patient is followed by KEILY Cobos for ongoing prescription of pain medication.  All refills should only be approved by this provider, or covering partner.    Medication(s): Ultram 50mg.   Maximum quantity per month: #180  Clinic visit frequency required: Q 3 months     Controlled substance agreement:  Encounter-Level CSA - 06/05/2017:                 Controlled Substance Agreement - Scan on 6/6/2017 12:39 PM : CONTROLLED SUBSTANCE AGREEMENT (below)            Pain Clinic evaluation in the past: No    DIRE Total Score(s):  No flowsheet  data found.    Last [facility] website verification:  done on 6.2.17   [email or web address]         Migraine without aura and responsive to treatment 11/23/2015     Priority: Medium     Major depressive disorder, recurrent episode (H) 12/17/2014     Priority: Medium     Problem list name updated by automated process. Provider to review       Cognitive disorder 07/08/2014     Priority: Medium     Major depressive disorder 07/08/2014     Priority: Medium     Memory loss 07/02/2014     Priority: Medium     Constipation 04/28/2014     Priority: Medium     Chronic pain 03/12/2014     Priority: Medium     Aortic valve disorder 08/13/2013     Priority: Medium     Problem list name updated by automated process. Provider to review       Diverticulitis of sigmoid colon 05/11/2013     Priority: Medium     Diverticulitis 05/11/2013     Priority: Medium     Cardiac microvascular disease 04/10/2013     Priority: Medium     Based on Cardiac MRI done at         Hypothyroidism 03/01/2011     Priority: Medium     Problem list name updated by automated process. Provider to review       Anxiety state 03/01/2011     Priority: Medium     Problem list name updated by automated process. Provider to review       Fatigue 07/01/2008     Priority: Medium     Atypical migraine 07/01/2008     Priority: Medium        Past Medical History:    Past Medical History:   Diagnosis Date     Anxiety state, unspecified 03/01/2011     Aortic valve disorders 06/07/2000     Cardiac microvascular disease 4/10/2013     Depressive disorder 1997     Fatigue      Fatigue      Fibromyalgia 03/01/2011     Hypertension      Major depressive disorder, recurrent episode, unspecified 12/17/2014     Migraine, unspecified, without mention of intractable migraine without mention of status migrainosus 03/01/2011     Mitral valve disorders(424.0) 10/16/2007     PONV (postoperative nausea and vomiting)      Thyroid Nodule 03/01/2011     Tobacco use disorder 03/01/2011      Unspecified hypothyroidism 03/01/2011       Past Surgical History:    Past Surgical History:   Procedure Laterality Date     APPENDECTOMY  1977     BIOPSY  2017    taken at [facility] from Dr. Nadira cohen. Thyroid     CARDIAC SURGERY  2013    angiogram [facility]:  Normal coronary arteries.  Felt ot have some microvascular disease     CL AFF SURGICAL PATHOLOGY  01/02/2007    Thyroid Mass     COLONOSCOPY  1/13/2014    Procedure: COLONOSCOPY;  COLONOSCOPY ;  Surgeon: Chasidy Gee DO;  Location: [facility]     ESOPHAGOSCOPY, GASTROSCOPY, DUODENOSCOPY (EGD), COMBINED N/A 2/9/2017    Procedure: COMBINED ESOPHAGOSCOPY, GASTROSCOPY, DUODENOSCOPY (EGD);  Surgeon: Johnathan Ruff DO;  Location: [facility]     GYN SURGERY      c-sections x 3     HYSTERECTOMY TOTAL ABDOMINAL, BILATERAL SALPINGO-OOPHORECTOMY, COMBINED N/A 01/01/2001     LAPAROSCOPIC CHOLECYSTECTOMY  11/07/2003    Cholelithiasis     LAPAROTOMY EXPLORATORY      abdominal pain/fever     LARYNGOSCOPY       SPLENECTOMY         Family History:    Family History   Problem Relation Age of Onset     C.A.D. Father      Hypertension Father      C.A.D. Mother      Cerebrovascular Disease Mother         CVA     Hypertension Mother      Coronary Artery Disease Mother      Diabetes Paternal Grandmother      Diabetes Paternal Grandfather      Diabetes Sister      Breast Cancer Sister      Diabetes Maternal Grandmother      Diabetes Sister      Breast Cancer Sister      Hypertension Sister      Depression Sister      Anxiety Disorder Sister      Obesity Sister      Depression Sister      Breast Cancer Sister      Obesity Sister      Depression Brother      Depression Daughter      Obesity Daughter      Depression Daughter      Mental Illness Daughter         bi-polar1     Anxiety Disorder Daughter      Mental Illness Daughter      Anxiety Disorder Other      Osteoporosis Other      Thyroid Disease Other      Diabetes Nephew        Social History:  Marital Status:    [2]  Social History     Tobacco Use     Smoking status: Light Tobacco Smoker     Packs/day: 0.25     Years: 40.00     Pack years: 10.00     Types: Cigarettes     Start date: 1/1/1975     Smokeless tobacco: Never Used     Tobacco comment: 5 cigs daily  Patient will work on smoking cessation (3-26-18)   Substance Use Topics     Alcohol use: No     Drug use: No        Medications:      ciprofloxacin (CIPRO) 250 MG tablet   clonazePAM (KLONOPIN) 1 MG tablet   diltiazem (TIAZAC) 300 MG 24 hr ER beaded capsule   estradiol (ESTRACE) 0.5 MG tablet   hydrOXYzine (ATARAX) 25 MG tablet   NP THYROID 30 MG tablet   pantoprazole (PROTONIX) 20 MG EC tablet   ranitidine (ZANTAC) 150 MG tablet   SUMAtriptan (IMITREX) 100 MG tablet   UNABLE TO FIND   vitamin D2 (ERGOCALCIFEROL) 10508 units (1250 mcg) capsule   NITROSTAT 0.4 MG sublingual tablet   PROCTOZONE-HC 2.5 % cream   UNABLE TO FIND         Review of Systems   Constitutional: Negative for fever.   Gastrointestinal: Negative for abdominal pain, diarrhea, nausea and vomiting.   Genitourinary: Positive for dysuria, frequency and urgency. Negative for decreased urine volume, difficulty urinating, flank pain and pelvic pain.       Physical Exam   BP: 124/64  Heart Rate: 61  Temp: 97.7  F (36.5  C)  Resp: 16  SpO2: 99 %      Physical Exam   Constitutional: She is oriented to person, place, and time. She appears well-developed and well-nourished. No distress.   HENT:   Head: Normocephalic.   Cardiovascular: Normal rate, regular rhythm and normal heart sounds.   Pulmonary/Chest: Effort normal and breath sounds normal. No respiratory distress.   Abdominal: There is no tenderness. There is no CVA tenderness.   Neurological: She is alert and oriented to person, place, and time.   Vitals reviewed.      ED Course        Procedures              Critical Care time:               Results for orders placed or performed during the hospital encounter of 07/10/19 (from the past 24 hour(s))   UA  reflex to Microscopic and Culture   Result Value Ref Range    Color Urine Yellow     Appearance Urine Clear     Glucose Urine Negative NEG^Negative mg/dL    Bilirubin Urine Negative NEG^Negative    Ketones Urine 5 (A) NEG^Negative mg/dL    Specific Gravity Urine 1.025 1.003 - 1.035    Blood Urine Negative NEG^Negative    pH Urine 5.5 4.7 - 8.0 pH    Protein Albumin Urine 10 (A) NEG^Negative mg/dL    Urobilinogen mg/dL 2.0 0.0 - 2.0 mg/dL    Nitrite Urine Negative NEG^Negative    Leukocyte Esterase Urine Trace (A) NEG^Negative    Source Midstream Urine     RBC Urine 1 0 - 2 /HPF    WBC Urine 2 0 - 5 /HPF    Bacteria Urine Few (A) NEG^Negative /HPF    Squamous Epithelial /HPF Urine 2 (H) 0 - 1 /HPF    Mucous Urine Present (A) NEG^Negative /LPF    Hyaline Casts 26 (A) OTO2^O - 2 /LPF       Medications - No data to display    Assessments & Plan (with Medical Decision Making)   #1.  UTI    Discussed exam findings as well as UA results with patient.  Patient is prescribed Cipro 250 mg twice daily x5 days.  Patient has a urine culture pending and will be notified of need in change in antibiotic.  Supportive cares were discussed at length with patient.  Any further concerns please return to urgent care or follow-up with primary provider.  Patient verbalizes understanding and plan.      I have reviewed the nursing notes.    I have reviewed the findings, diagnosis, plan and need for follow up with the patient.         Medication List      Started    ciprofloxacin 250 MG tablet  Commonly known as:  CIPRO  250 mg, Oral, 2 TIMES DAILY            Final diagnoses:   UTI (urinary tract infection)       7/10/2019   [facility]     Kvng Collins PA-C  07/10/19 [id number]

## 2019-07-11 ENCOUNTER — OFFICE VISIT (OUTPATIENT)
Dept: ORTHOPEDICS | Facility: OTHER | Age: 59
End: 2019-07-11
Attending: PHYSICIAN ASSISTANT
Payer: MEDICARE

## 2019-07-11 ENCOUNTER — ANCILLARY PROCEDURE (OUTPATIENT)
Dept: MAMMOGRAPHY | Facility: OTHER | Age: 59
End: 2019-07-11
Attending: PHYSICIAN ASSISTANT
Payer: MEDICARE

## 2019-07-11 VITALS
RESPIRATION RATE: 16 BRPM | BODY MASS INDEX: 27.78 KG/M2 | OXYGEN SATURATION: 98 % | WEIGHT: 177 LBS | SYSTOLIC BLOOD PRESSURE: 122 MMHG | HEIGHT: 67 IN | HEART RATE: 60 BPM | DIASTOLIC BLOOD PRESSURE: 60 MMHG

## 2019-07-11 DIAGNOSIS — M25.562 ACUTE BILATERAL KNEE PAIN: ICD-10-CM

## 2019-07-11 DIAGNOSIS — M25.561 ACUTE BILATERAL KNEE PAIN: ICD-10-CM

## 2019-07-11 DIAGNOSIS — M17.0 PRIMARY OSTEOARTHRITIS OF BOTH KNEES: Primary | ICD-10-CM

## 2019-07-11 DIAGNOSIS — Z12.39 BREAST SCREENING, UNSPECIFIED: ICD-10-CM

## 2019-07-11 PROCEDURE — 20610 DRAIN/INJ JOINT/BURSA W/O US: CPT | Mod: 50 | Performed by: ORTHOPAEDIC SURGERY

## 2019-07-11 PROCEDURE — 77063 BREAST TOMOSYNTHESIS BI: CPT | Mod: TC

## 2019-07-11 PROCEDURE — G0463 HOSPITAL OUTPT CLINIC VISIT: HCPCS

## 2019-07-11 PROCEDURE — 20610 DRAIN/INJ JOINT/BURSA W/O US: CPT | Mod: 50

## 2019-07-11 RX ORDER — DEXAMETHASONE SODIUM PHOSPHATE 4 MG/ML
4 INJECTION, SOLUTION INTRA-ARTICULAR; INTRALESIONAL; INTRAMUSCULAR; INTRAVENOUS; SOFT TISSUE ONCE
Status: COMPLETED | OUTPATIENT
Start: 2019-07-11 | End: 2019-07-11

## 2019-07-11 RX ADMIN — DEXAMETHASONE SODIUM PHOSPHATE 4 MG: 4 INJECTION, SOLUTION INTRAMUSCULAR; INTRAVENOUS at 15:50

## 2019-07-11 RX ADMIN — DEXAMETHASONE SODIUM PHOSPHATE 4 MG: 4 INJECTION, SOLUTION INTRAMUSCULAR; INTRAVENOUS at 15:44

## 2019-07-11 ASSESSMENT — PAIN SCALES - GENERAL: PAINLEVEL: MILD PAIN (3)

## 2019-07-11 ASSESSMENT — MIFFLIN-ST. JEOR: SCORE: 1415.5

## 2019-07-11 NOTE — PROGRESS NOTES
Patient presents today requesting injection treatments for her knees.  She has a mixed component of mild degenerative arthritis and inflammatory arthritis with an FARHEEN positive.  Anti-inflammatory medications are no longer relieving her symptoms to her satisfaction.  Examination of the knees demonstrates a slight effusion in both knees, patient has near full range of motion.  She has moderate tenderness over medial and lateral joint line as well as moderate tenderness with compression of the patella.  Her x-rays are reviewed and demonstrate some slight joint space narrowing with bipartite patella on the left.  The skin over the knees is in excellent condition with rashes, lesions or infection.    Procedure note: Corticosteroid injection, bilateral knees.    After the patient was counseled regarding risks and benefits of the procedure, her consent was obtained.  Both knees were prepped with alcohol.  Both knees were injected through the medial approach with lidocaine anesthesia.  Once this is taken its effect, a mixture of Marcaine and 4 mg of dexamethasone was injected first into the right knee, followed by an injection of Marcaine and 4 mg of dexamethasone into the left knee.  The patient tolerated the injections with no apparent ill effects, was instructed in aftercare and will follow-up on an as-needed basis.

## 2019-07-11 NOTE — NURSING NOTE
"Chief Complaint   Patient presents with     Consult     Bilateral knee pain.       Initial /60   Pulse 60   Resp 16   Ht 1.702 m (5' 7\")   Wt 80.3 kg (177 lb)   SpO2 98%   BMI 27.72 kg/m   Estimated body mass index is 27.72 kg/m  as calculated from the following:    Height as of this encounter: 1.702 m (5' 7\").    Weight as of this encounter: 80.3 kg (177 lb).  Medication Reconciliation: complete    "

## 2019-07-18 NOTE — NURSING NOTE
Clinic Administered Medication Documentation      Injection Documentation     Injection was administered by provider (please see MAR for given by information). Please see MAR and medication order for additional information.     Site: Joint injection   Medication Used: Dexamethasone    Exp: 6/30/2020

## 2019-08-12 DIAGNOSIS — K21.9 GASTROESOPHAGEAL REFLUX DISEASE WITHOUT ESOPHAGITIS: ICD-10-CM

## 2019-08-12 DIAGNOSIS — F41.1 GAD (GENERALIZED ANXIETY DISORDER): ICD-10-CM

## 2019-08-13 RX ORDER — CLONAZEPAM 1 MG/1
TABLET ORAL
Qty: 60 TABLET | Refills: 0 | Status: SHIPPED | OUTPATIENT
Start: 2019-08-13 | End: 2019-09-11

## 2019-08-13 NOTE — TELEPHONE ENCOUNTER
Klonopin - ELDA reviewed  Last visit: 3.26.18  Last refill: 7.15.19 #30    Future appointments:

## 2019-09-04 ENCOUNTER — NURSE TRIAGE (OUTPATIENT)
Dept: FAMILY MEDICINE | Facility: OTHER | Age: 59
End: 2019-09-04

## 2019-09-04 ENCOUNTER — TELEPHONE (OUTPATIENT)
Dept: FAMILY MEDICINE | Facility: OTHER | Age: 59
End: 2019-09-04

## 2019-09-04 NOTE — TELEPHONE ENCOUNTER
"This Patient has requested an appointment for today or tomorrow with PCP [facility]    Patient is having the following symptoms rash around left side of mouth and lips- itching and burning    Patient has been having these symptoms for the following Duration:roughly a month, sent PCP a mychart message and she states she needs to be seen.   Rash around left side of mouth, no new allergens/soaps/exposures, states she is experiencing more than normal stress. States it itches and burns. Describes them as \"little pimples\", anti itch creams have been applied such as calamine with minimal relief.  Pt declines appt in [place] stating she has anxiety when driving. Would like to be seen by PCP today or tomorrow    Please contact the patient at the following phone number [phone] ok to leave message.        "

## 2019-09-04 NOTE — TELEPHONE ENCOUNTER
"Rash around left side of mouth, no new allergens/soaps/exposures, states she is experiencing more than normal stress. States it itches and burns. Describes them as \"little pimples\", anti itch creams have been applied such as calamine with minimal relief.  Pt declines appt in [place] stating she has anxiety when driving. Would like to be seen by PCP today or tomorrow. Sending request for overbook.     Reason for Disposition    [1] Localized rash is very painful AND [2] no fever    Additional Information    Negative: [1] Sudden onset of rash (within last 2 hours) AND [2] difficulty with breathing or swallowing    Negative: Sounds like a life-threatening emergency to the triager    Negative: Poison ivy, oak, or sumac contact suspected    Negative: Insect bite(s) suspected    Negative: Ringworm suspected (i.e., round pink patch, sometimes looks like ring, usually 1/2 to 1 inch [12-25 mm],  in size, slowly increasing in size)    Negative: Athlete's Foot suspected (i.e., itchy rash between the toes)    Negative: Jock Itch suspected (i.e., itchy rash on inner thighs near genital area)    Negative: Wound infection suspected (i.e., pain, spreading redness, or pus; in a cut, puncture, scrape or sutured wound)    Negative: Impetigo suspected  (i.e., painless infected superficial small sores, less than 1 inch or 2.5 cm, often covered by a soft, yellow-brown scab or crust; sometimes occurring near nasal openings)    Negative: Shingles suspected (i.e., painful rash, multiple small blisters grouped together in one area of body; dermatomal distribution)    Negative: Rash of external female genital area (vulva)    Negative: Rash of penis or scrotum    Negative: Small spot, skin growth, or mole    Negative: Sores or skin ulcer, not a rash    Negative: Localized lump (or swelling) without redness or rash    Negative: [1] Localized purple or blood-colored spots or dots AND [2] not from injury or friction AND [3] fever    Negative: " "Patient sounds very sick or weak to the triager    Negative: [1] Red area or streak AND [2] fever    Negative: [1] Rash is painful to touch AND [2] fever    Negative: [1] Looks infected (spreading redness, pus) AND [2] large red area (> 2 in. or 5 cm)    Negative: [1] Looks infected (spreading redness, pus) AND [2] diabetes mellitus or weak immune system (e.g., HIV positive,  cancer chemotherapy, chronic steroid treatment, splenectomy)    Negative: [1] Localized purple or blood-colored spots or dots AND [2] not from injury or friction AND [3] no fever    Negative: [1] Looks infected (spreading redness, pus) AND [2] no fever    Negative: Looks like a boil, infected sore, deep ulcer or other infected rash    Answer Assessment - Initial Assessment Questions  1. APPEARANCE of RASH: \"Describe the rash.\"       Oozing has stopped, looks like little pimples, itches,   2. LOCATION: \"Where is the rash located?\"       Around left side of the mouth   3. NUMBER: \"How many spots are there?\"       several  4. SIZE: \"How big are the spots?\" (Inches, centimeters or compare to size of a coin)       Little spots like pimples  5. ONSET: \"When did the rash start?\"       A month ago  6. ITCHING: \"Does the rash itch?\" If so, ask: \"How bad is the itch?\"  (Scale 1-10; or mild, moderate, severe)      Mild to moderate  7. PAIN: \"Does the rash hurt?\" If so, ask: \"How bad is the pain?\"  (Scale 1-10; or mild, moderate, severe)      Itches and burns  8. OTHER SYMPTOMS: \"Do you have any other symptoms?\" (e.g., fever)     Bouts of diarrhea but states she has IBS  9. PREGNANCY: \"Is there any chance you are pregnant?\" \"When was your last menstrual period?\"      no    Protocols used: RASH OR REDNESS - [id number]      "

## 2019-09-05 ENCOUNTER — OFFICE VISIT (OUTPATIENT)
Dept: FAMILY MEDICINE | Facility: OTHER | Age: 59
End: 2019-09-05
Attending: PHYSICIAN ASSISTANT
Payer: MEDICARE

## 2019-09-05 VITALS
HEART RATE: 60 BPM | BODY MASS INDEX: 28.09 KG/M2 | HEIGHT: 67 IN | DIASTOLIC BLOOD PRESSURE: 66 MMHG | TEMPERATURE: 97.6 F | WEIGHT: 179 LBS | SYSTOLIC BLOOD PRESSURE: 102 MMHG | OXYGEN SATURATION: 98 %

## 2019-09-05 DIAGNOSIS — E03.9 ACQUIRED HYPOTHYROIDISM: ICD-10-CM

## 2019-09-05 DIAGNOSIS — Z87.891 PERSONAL HISTORY OF TOBACCO USE: Primary | ICD-10-CM

## 2019-09-05 DIAGNOSIS — L71.0 PERIORAL DERMATITIS: ICD-10-CM

## 2019-09-05 PROCEDURE — G0463 HOSPITAL OUTPT CLINIC VISIT: HCPCS | Mod: 25

## 2019-09-05 PROCEDURE — 99213 OFFICE O/P EST LOW 20 MIN: CPT | Mod: 25 | Performed by: PHYSICIAN ASSISTANT

## 2019-09-05 PROCEDURE — G0296 VISIT TO DETERM LDCT ELIG: HCPCS | Performed by: PHYSICIAN ASSISTANT

## 2019-09-05 RX ORDER — LEVOTHYROXINE SODIUM 75 UG/1
75 TABLET ORAL DAILY
Qty: 90 TABLET | Refills: 0 | Status: SHIPPED | OUTPATIENT
Start: 2019-09-05 | End: 2019-11-20

## 2019-09-05 RX ORDER — DOXYCYCLINE 50 MG/1
50 TABLET ORAL 2 TIMES DAILY
Qty: 120 TABLET | Refills: 0 | Status: SHIPPED | OUTPATIENT
Start: 2019-09-05 | End: 2020-01-23

## 2019-09-05 ASSESSMENT — PAIN SCALES - GENERAL: PAINLEVEL: MODERATE PAIN (5)

## 2019-09-05 ASSESSMENT — MIFFLIN-ST. JEOR: SCORE: 1424.57

## 2019-09-05 NOTE — PATIENT INSTRUCTIONS

## 2019-09-05 NOTE — NURSING NOTE
"Chief Complaint   Patient presents with     Derm Problem       Initial /66 (BP Location: Left arm, Patient Position: Sitting, Cuff Size: Adult Regular)   Pulse 60   Temp 97.6  F (36.4  C) (Tympanic)   Ht 1.702 m (5' 7\")   Wt 81.2 kg (179 lb)   SpO2 98%   BMI 28.04 kg/m   Estimated body mass index is 28.04 kg/m  as calculated from the following:    Height as of this encounter: 1.702 m (5' 7\").    Weight as of this encounter: 81.2 kg (179 lb).  Medication Reconciliation: complete  "

## 2019-09-05 NOTE — PROGRESS NOTES
Subjective     Sharlene Mccord is a 58 year old female who presents to clinic today for the following health issues:    HPI   Rash      Duration: 5 weeks    Description  Location: L side of face  Itching: mild    Intensity:  mild    Accompanying signs and symptoms: Itches, burns    History (similar episodes/previous evaluation): None    Precipitating or alleviating factors:  New exposures:  None  Recent travel: no      Therapies tried and outcome: none        Patient Active Problem List   Diagnosis     Diverticulitis of sigmoid colon     Hypothyroidism     Anxiety state     Cardiac microvascular disease     Diverticulitis     Aortic valve disorder     ACP (advance care planning)     Chronic pain     Constipation     Memory loss     Major depressive disorder, recurrent episode (H)     Cognitive disorder     Major depressive disorder     Fatigue     Migraine without aura and responsive to treatment     Atypical migraine     Chronic, continuous use of opioids     Mixed connective tissue disease (H)     Past Surgical History:   Procedure Laterality Date     APPENDECTOMY  1977     BIOPSY  2017    taken at [facility] from Dr. Nadira cohen. Thyroid     CARDIAC SURGERY  2013    angiogram [facility]:  Normal coronary arteries.  Felt ot have some microvascular disease     CL AFF SURGICAL PATHOLOGY  01/02/2007    Thyroid Mass     COLONOSCOPY  1/13/2014    Procedure: COLONOSCOPY;  COLONOSCOPY ;  Surgeon: Chasidy Gee DO;  Location: [facility]     ESOPHAGOSCOPY, GASTROSCOPY, DUODENOSCOPY (EGD), COMBINED N/A 2/9/2017    Procedure: COMBINED ESOPHAGOSCOPY, GASTROSCOPY, DUODENOSCOPY (EGD);  Surgeon: Johnathan Ruff DO;  Location: [facility] OR     GYN SURGERY      c-sections x 3     HYSTERECTOMY TOTAL ABDOMINAL, BILATERAL SALPINGO-OOPHORECTOMY, COMBINED N/A 01/01/2001     LAPAROSCOPIC CHOLECYSTECTOMY  11/07/2003    Cholelithiasis     LAPAROTOMY EXPLORATORY      abdominal pain/fever     LARYNGOSCOPY       SPLENECTOMY         Social History      Tobacco Use     Smoking status: Light Tobacco Smoker     Packs/day: 0.25     Years: 40.00     Pack years: 10.00     Types: Cigarettes     Start date: 1/1/1975     Smokeless tobacco: Never Used     Tobacco comment: 5 cigs daily  Patient will work on smoking cessation (3-26-18)   Substance Use Topics     Alcohol use: No     Family History   Problem Relation Age of Onset     C.A.D. Father      Hypertension Father      C.A.D. Mother      Cerebrovascular Disease Mother         CVA     Hypertension Mother      Coronary Artery Disease Mother      Diabetes Paternal Grandmother      Diabetes Paternal Grandfather      Diabetes Sister      Breast Cancer Sister      Diabetes Maternal Grandmother      Diabetes Sister      Breast Cancer Sister      Hypertension Sister      Depression Sister      Anxiety Disorder Sister      Obesity Sister      Depression Sister      Breast Cancer Sister      Obesity Sister      Depression Brother      Depression Daughter      Obesity Daughter      Depression Daughter      Mental Illness Daughter         [email or web address]     Anxiety Disorder Daughter      Mental Illness Daughter      Anxiety Disorder Other      Osteoporosis Other      Thyroid Disease Other      Diabetes Nephew          Current Outpatient Medications   Medication Sig Dispense Refill     clonazePAM (KLONOPIN) 1 MG tablet TAKE 1/2 TO 1 (ONE-HALF TO ONE) TABLET BY MOUTH TWICE DAILY AS NEEDED FOR ANXIETY **PLEASE MAKE FOLLOW-UP APPOINTMENT [phone]** 60 tablet 0     diltiazem (TIAZAC) 300 MG 24 hr ER beaded capsule TAKE 1 CAPSULE BY MOUTH ONCE DAILY 90 capsule 1     doxycycline monohydrate (ADOXA) 50 MG tablet Take 1 tablet (50 mg) by mouth 2 times daily After one month reduce to daily for 60 days. 120 tablet 0     estradiol (ESTRACE) 0.5 MG tablet TAKE 1 TABLET BY MOUTH TWICE DAILY 60 tablet 5     hydrOXYzine (ATARAX) 25 MG tablet TAKE ONE TO TWO TABLETS BY MOUTH AT BEDTIME FOR NAUSEA 120 tablet 1     levothyroxine (SYNTHROID/LEVOTHROID)  75 MCG tablet Take 1 tablet (75 mcg) by mouth daily 90 tablet 0     NITROSTAT 0.4 MG sublingual tablet Place 1 tablet (0.4 mg) under the tongue every 5 minutes as needed for chest pain 25 tablet 11     NP THYROID 30 MG tablet TAKE 1 TABLET BY MOUTH ONCE DAILY 90 tablet 1     pantoprazole (PROTONIX) 20 MG EC tablet Take by mouth 30-60 minutes before a meal. 30 tablet 3     PROCTOZONE-HC 2.5 % cream APPLY CREAM TO AFFECTED AREA THREE TIMES DAILY 45 g 1     ranitidine (ZANTAC) 150 MG tablet TAKE 1 TABLET BY MOUTH TWICE DAILY 180 tablet 1     SUMAtriptan (IMITREX) 100 MG tablet TAKE ONE TABLET BY MOUTH AS NEEDED MIGRAINES 10 tablet 5     UNABLE TO FIND 1.5 mg 3 times daily MEDICATION NAME: Oral Cannabis    Oral suspension  Taken morning, afternoon and bedtime       UNABLE TO FIND MEDICATION NAME: Cannabis as needed     [place] Vapor take one puff to the count of two at bedtime.  If needed in the middle of the night       Allergies   Allergen Reactions     Codeine      Isosorbide Mononitrate Other (See Comments)     Vomiting and headache       Lisinopril      [facility] scan     Recent Labs   Lab Test 05/08/19  1506 11/21/18  1055 06/06/18  0958  09/11/17  1043  04/19/17  1133 05/09/16  0930 01/13/16  0941   LDL  --   --   --   --   --   --  104* 127* 147*   HDL  --   --   --   --   --   --  77 70 64   TRIG  --   --   --   --   --   --  165* 204* 164*   ALT  --  21 26  --  27   < > 28 44 63*   CR  --  1.05* 0.95  --  1.05*   < > 0.97 0.94 0.93   GFRESTIMATED  --  54* 61  --  54*   < > 59* 62 63   GFRESTBLACK  --  65 73  --  65   < > 71 75 76   POTASSIUM  --  4.2 4.5  --  4.3   < > 4.4 4.1 4.8   TSH 1.32 1.31 1.67   < > 1.17  --  1.24 3.12 2.58    < > = values in this interval not displayed.      BP Readings from Last 3 Encounters:   09/05/19 102/66   07/11/19 122/60   07/10/19 124/64    Wt Readings from Last 3 Encounters:   09/05/19 81.2 kg (179 lb)   07/11/19 80.3 kg (177 lb)   06/27/19 80.6 kg (177 lb 11.2 oz)     "                  Reviewed and updated as needed this visit by Provider         Review of Systems   ROS COMP: Constitutional, HEENT, cardiovascular, pulmonary, gi and gu systems are negative, except as otherwise noted.      Objective    /66 (BP Location: Left arm, Patient Position: Sitting, Cuff Size: Adult Regular)   Pulse 60   Temp 97.6  F (36.4  C) (Tympanic)   Ht 1.702 m (5' 7\")   Wt 81.2 kg (179 lb)   SpO2 98%   BMI 28.04 kg/m    Body mass index is 28.04 kg/m .  Physical Exam   GENERAL: healthy, alert and no distress  EYES: Eyes grossly normal to inspection, PERRL and conjunctivae and sclerae normal  HENT: ear canals and TM's normal, nose and mouth without ulcers or lesions  NECK: no adenopathy, no asymmetry, masses, or scars and thyroid normal to palpation  RESP: lungs clear to auscultation - no rales, rhonchi or wheezes  CV: regular rate and rhythm, normal S1 S2, no S3 or S4, no murmur, click or rub, no peripheral edema and peripheral pulses strong  ABDOMEN: soft, nontender, no hepatosplenomegaly, no masses and bowel sounds normal  MS: no gross musculoskeletal defects noted, no edema    Diagnostic Test Results:  Labs reviewed in Epic        Assessment & Plan     1. Personal history of tobacco use  She is recommended to have CT due to continue long term smoking.   - Prof fee: Shared Decisionmaking for Lung Cancer Screening  - CT Chest Lung Cancer Scrn Low Dose wo; Future    2. Acquired hypothyroidism  Change back to Levothroid, did not see the difference in her armour thyroid.    - levothyroxine (SYNTHROID/LEVOTHROID) 75 MCG tablet; Take 1 tablet (75 mcg) by mouth daily  Dispense: 90 tablet; Refill: 0  - TSH with free T4 reflex; Future    3. Perioral dermatitis  Treat with doxy   - doxycycline monohydrate (ADOXA) 50 MG tablet; Take 1 tablet (50 mg) by mouth 2 times daily After one month reduce to daily for 60 days.  Dispense: 120 tablet; Refill: 0     Tobacco Cessation:   reports that she has been " "smoking cigarettes.  She started smoking about 44 years ago. She has a 10.00 pack-year smoking history. She has never used smokeless tobacco.  Tobacco Cessation Action Plan: Information offered: Patient not interested at this time      BMI:   Estimated body mass index is 28.04 kg/m  as calculated from the following:    Height as of this encounter: 1.702 m (5' 7\").    Weight as of this encounter: 81.2 kg (179 lb).           See Patient Instructions    No follow-ups on file.    KEILY Cobos  [facility]      Lung Cancer Screening Shared Decision Making Visit     Sharlene Mccord is eligible for lung cancer screening on the basis of the information provided in my signed lung cancer screening order.     I have discussed with patient the risks and benefits of screening for lung cancer with low-dose CT.     The risks include:  radiation exposure: one low dose chest CT has as much ionizing radiation as about 15 chest x-rays or 6 months of background radiation living in [place]    false positives: 96% of positive findings/nodules are NOT cancer, but some might still require additional diagnostic evaluation, including biopsy  over-diagnosis: some slow growing cancers that might never have been clinically significant will be detected and treated unnecessarily     The benefit of early detection of lung cancer is contingent upon adherence to annual screening or more frequent follow up if indicated.     Furthermore, reaping the benefits of screening requires Sharlene Mccord to be willing and physically able to undergo diagnostic procedures, if indicated. Although no specific guide is available for determining severity of comorbidities, it is reasonable to withhold screening in patients who have greater mortality risk from other diseases.     We did discuss that the only way to prevent lung cancer is to not smoke. Smoking cessation assistance was offered.    I did offer risk estimation using a " calculator such as this one:    ShouldIScreen

## 2019-09-11 DIAGNOSIS — F41.1 GAD (GENERALIZED ANXIETY DISORDER): ICD-10-CM

## 2019-09-11 RX ORDER — CLONAZEPAM 1 MG/1
TABLET ORAL
Qty: 60 TABLET | Refills: 0 | Status: SHIPPED | OUTPATIENT
Start: 2019-09-11 | End: 2019-10-18

## 2019-09-11 NOTE — TELEPHONE ENCOUNTER
Klonopin -  reviewed  Last visit: 3.26.18  Last refill: 8.13.19 #60    Future appointments:   Next 5 appointments (look out 90 days)    Sep 25, 2019 11:00 AM CDT  (Arrive by 10:45 AM)  Return Visit with Aurelia Odell MD  [facility] [place] [facility] ([facility] [place] [facility] ) [address] [place] [id number] [phone]

## 2019-09-12 ENCOUNTER — HOSPITAL ENCOUNTER (OUTPATIENT)
Dept: CT IMAGING | Facility: HOSPITAL | Age: 59
Discharge: HOME | End: 2019-09-12
Attending: PHYSICIAN ASSISTANT | Admitting: PHYSICIAN ASSISTANT
Payer: MEDICARE

## 2019-09-12 DIAGNOSIS — Z87.891 PERSONAL HISTORY OF TOBACCO USE: ICD-10-CM

## 2019-09-12 PROCEDURE — G0297 LDCT FOR LUNG CA SCREEN: HCPCS | Mod: TC

## 2019-09-25 ENCOUNTER — OFFICE VISIT (OUTPATIENT)
Dept: PSYCHIATRY | Facility: OTHER | Age: 59
End: 2019-09-25
Attending: PSYCHIATRY & NEUROLOGY
Payer: MEDICARE

## 2019-09-25 VITALS
TEMPERATURE: 97.5 F | BODY MASS INDEX: 28.25 KG/M2 | WEIGHT: 180 LBS | HEIGHT: 67 IN | HEART RATE: 56 BPM | DIASTOLIC BLOOD PRESSURE: 58 MMHG | SYSTOLIC BLOOD PRESSURE: 118 MMHG | OXYGEN SATURATION: 98 %

## 2019-09-25 DIAGNOSIS — Z79.899 ENCOUNTER FOR LONG-TERM (CURRENT) USE OF MEDICATIONS: Primary | ICD-10-CM

## 2019-09-25 PROCEDURE — G0463 HOSPITAL OUTPT CLINIC VISIT: HCPCS

## 2019-09-25 PROCEDURE — 80307 DRUG TEST PRSMV CHEM ANLYZR: CPT | Mod: ZL | Performed by: PSYCHIATRY & NEUROLOGY

## 2019-09-25 PROCEDURE — 99213 OFFICE O/P EST LOW 20 MIN: CPT | Performed by: PSYCHIATRY & NEUROLOGY

## 2019-09-25 PROCEDURE — 99000 SPECIMEN HANDLING OFFICE-LAB: CPT | Performed by: PSYCHIATRY & NEUROLOGY

## 2019-09-25 ASSESSMENT — ANXIETY QUESTIONNAIRES
1. FEELING NERVOUS, ANXIOUS, OR ON EDGE: MORE THAN HALF THE DAYS
IF YOU CHECKED OFF ANY PROBLEMS ON THIS QUESTIONNAIRE, HOW DIFFICULT HAVE THESE PROBLEMS MADE IT FOR YOU TO DO YOUR WORK, TAKE CARE OF THINGS AT HOME, OR GET ALONG WITH OTHER PEOPLE: VERY DIFFICULT
2. NOT BEING ABLE TO STOP OR CONTROL WORRYING: MORE THAN HALF THE DAYS
5. BEING SO RESTLESS THAT IT IS HARD TO SIT STILL: MORE THAN HALF THE DAYS
6. BECOMING EASILY ANNOYED OR IRRITABLE: MORE THAN HALF THE DAYS
7. FEELING AFRAID AS IF SOMETHING AWFUL MIGHT HAPPEN: SEVERAL DAYS
3. WORRYING TOO MUCH ABOUT DIFFERENT THINGS: MORE THAN HALF THE DAYS
GAD7 TOTAL SCORE: 12

## 2019-09-25 ASSESSMENT — PATIENT HEALTH QUESTIONNAIRE - PHQ9
5. POOR APPETITE OR OVEREATING: SEVERAL DAYS
SUM OF ALL RESPONSES TO PHQ QUESTIONS 1-9: 19

## 2019-09-25 ASSESSMENT — PAIN SCALES - GENERAL: PAINLEVEL: MODERATE PAIN (4)

## 2019-09-25 ASSESSMENT — MIFFLIN-ST. JEOR: SCORE: 1424.1

## 2019-09-25 NOTE — NURSING NOTE
"Chief Complaint   Patient presents with     RECHECK     Depression.  Medication management.       Initial /58 (BP Location: Right arm, Patient Position: Sitting, Cuff Size: Adult Regular)   Pulse 56   Temp 97.5  F (36.4  C) (Tympanic)   Ht 1.702 m (5' 7\")   Wt 81.6 kg (180 lb)   SpO2 98%   BMI 28.19 kg/m   Estimated body mass index is 28.19 kg/m  as calculated from the following:    Height as of this encounter: 1.702 m (5' 7\").    Weight as of this encounter: 81.6 kg (180 lb).  Medication Reconciliation: complete  MARCELO DASILVA LPN    "

## 2019-09-25 NOTE — PROGRESS NOTES
"  PSYCHIATRY CLINIC PROGRESS NOTE   20 minutes, med management  more than 50% of time spent counseling patient on medications, medication side effects, symptom history and management   SUBJECTIVE / INTERIM HISTORY                                                                       Last clinic visit March 2018:  continue donepezil, she is now off of Wellbutrin. Continue Klonopin 1 mg bid.      - Medical MJ is helping with mood. A bit with anxiety. Not helping with pain. Went off it for while took tramadol instead and Sharlene noted she felt depression coming back in couple weeks. So went back on medical MJ.   - hasn't been talking with her daughters much except for April  - [place], [place]  - panic / anxiety : especially with driving. Last was while she was in [place]  -- feels mood is better and better outlook. Ex) camping, cabin.  -- off lot of meds including no longer taking donepezil. Memory she still has family members say things like she is repeating things  - rheumatology consult: (autoimmune tests some pos tested recently) and sounds like no definitive dx made  - relationship issues. Of note step son's : 31 yo lives with them and works. The other one lives next door    SUBSTANCE USE- no issues    SYMPTOMS- Depressed mood, low energy, anhedonia, feelings guilt & overwhelmed, poor sleep. cognitive issues including word finding  MEDICAL ROS- nausea and vomiting is better, with moving / exercise leg \"heaviness\" and pain, had tremor for while but resolved, balance issues, Hypersomnia.  Pain: neck, DDD in neck. Numbness and tingling in legs and feet worse at times when sitting  MEDICAL / SURGICAL HISTORY                 Medical Team:     PMD- Concha Hare     Therapist-none   Patient Active Problem List   Diagnosis     Diverticulitis of sigmoid colon     Hypothyroidism     Anxiety state     Cardiac microvascular disease     Diverticulitis     Aortic valve disorder     ACP (advance care planning)     Chronic pain     " Constipation     Memory loss     Major depressive disorder, recurrent episode (H)     Cognitive disorder     Major depressive disorder     Fatigue     Migraine without aura and responsive to treatment     Atypical migraine     Chronic, continuous use of opioids     Mixed connective tissue disease (H)     ALLERGY   Codeine; Isosorbide mononitrate; and Lisinopril  MEDICATIONS                                                                                             Current Outpatient Medications   Medication Sig     clonazePAM (KLONOPIN) 1 MG tablet TAKE 1/2 TO 1 (ONE-HALF TO ONE) TABLET BY MOUTH TWICE DAILY AS NEEDED FOR ANXIETY     diltiazem ER (TIAZAC) 300 MG 24 hr ER beaded capsule TAKE 1 CAPSULE BY MOUTH ONCE DAILY     doxycycline monohydrate (ADOXA) 50 MG tablet Take 1 tablet (50 mg) by mouth 2 times daily After one month reduce to daily for 60 days.     estradiol (ESTRACE) 0.5 MG tablet TAKE 1 TABLET BY MOUTH TWICE DAILY     hydrOXYzine (ATARAX) 25 MG tablet TAKE ONE TO TWO TABLETS BY MOUTH AT BEDTIME FOR NAUSEA     levothyroxine (SYNTHROID/LEVOTHROID) 75 MCG tablet Take 1 tablet (75 mcg) by mouth daily     NITROSTAT 0.4 MG sublingual tablet Place 1 tablet (0.4 mg) under the tongue every 5 minutes as needed for chest pain     pantoprazole (PROTONIX) 20 MG EC tablet Take by mouth 30-60 minutes before a meal.     PROCTOZONE-HC 2.5 % cream APPLY CREAM TO AFFECTED AREA THREE TIMES DAILY     ranitidine (ZANTAC) 150 MG tablet TAKE 1 TABLET BY MOUTH TWICE DAILY     SUMAtriptan (IMITREX) 100 MG tablet TAKE ONE TABLET BY MOUTH AS NEEDED MIGRAINES     UNABLE TO FIND 1.5 mg 3 times daily MEDICATION NAME: Oral Cannabis    Oral suspension  Taken morning, afternoon and bedtime     UNABLE TO FIND MEDICATION NAME: Cannabis as needed     [place] Vapor take one puff to the count of two at bedtime.  If needed in the middle of the night     NP THYROID 30 MG tablet TAKE 1 TABLET BY MOUTH ONCE DAILY     No current  "facility-administered medications for this visit.        VITALS   /58 (BP Location: Right arm, Patient Position: Sitting, Cuff Size: Adult Regular)   Pulse 56   Temp 97.5  F (36.4  C) (Tympanic)   Ht 1.702 m (5' 7\")   Wt 81.6 kg (180 lb)   SpO2 98%   BMI 28.19 kg/m      PHQ9                       PHQ-9 SCORE 5/14/2019 6/27/2019 9/25/2019   PHQ-9 Total Score - - -   PHQ-9 Total Score 20 21 19     Labs:    Liver Function Studies -   Recent Labs   Lab Test  01/13/16   0941   PROTTOTAL  7.5   ALBUMIN  3.7   BILITOTAL  0.3   ALKPHOS  92   AST  32   ALT  63*     Recent Labs   Lab Test  01/13/16   0941  03/28/14   1008   CHOL  244*  238*   HDL  64  81*   LDL  147*  143*   TRIG  164*  72   CHOLHDLRATIO   --   2.9*       MENTAL STATUS EXAM                                                                                        Alert. Oriented to person, place, and date / time. Well groomed, calm, cooperative with good eye contact. No problems with speech or psychomotor behavior. Mood was anxious and depressed and affect was congruent to speech content and full .  Thought process, including associations, was unremarkable and thought content was devoid of suicidal and homicidal ideation and psychotic thought. No hallucinations. Insight was good. Judgment was intact and adequate for safety. Fund of knowledge was intact. Pt demonstrates no obvious problems with attention, concentration, language, recent or remote memory although these were not formally tested.     ASSESSMENT                                                                                                      HISTORICAL:  Initial psych consult 12/12/14            Notes:  Ritalin as adjunct to antidepressant: didn't like how made her feel.   Prozac, Celexa ineffective. Now Wellbutrin helping. Zoloft: weight gain. Effexor back while: didn't stay on long and can't recall why. Cymbalta: was on and didn't help. She tried Provigil and SEs. Buspar: HAs. "   Neuropsych testing summer 2015:  language: about same, working memory: better than last year. Attention / concentration worse than last year. Speed processing: improved. Executive function: didn't test well on that front also comparable to last year. Memory: worse off than last year. Depression: similar to last year. Anxiety / emotional anxiety worse. As part of assessment they think should possible have another MRI  CURRENT: This patient provides a history which supports the diagnoses of Major depressive disorder, recurrent, mod and mild neurocognitive disorder. So glad to see her doing bit better! She has come off lot of meds even.    in terms of risks vs. Benefits Sharlene and I do NOT feel would be good idea to take her off Klonopin. We feel she would get to point she would be unable to get out of her house.    Today we reviewed controlled substance contract she signed. UDS as part of.       TREATMENT RISK STATEMENT: The risks, benefits, alternatives and potential adverse effects have been explained and are understood by the pt. The pt agrees to the treatment plan with the ability to do so. The pt knows to call the clinic for any problems or access emergency care if needed.    DIAGNOSES             Major depressive disorder, recurrent moderate  Mild neurocognitive disorder      PLAN                                                                                                                         1) MEDICATION:   -- Continue Klonopin 1 mg bid.     2) THERAPY: no change.     3) LABS: UDS today    4) Other: Neuropsych testing with Dr. Johnathan Min in past    8)  RTC: ~6 months    Pharmacist: [phone]  Customer service [phone]

## 2019-09-25 NOTE — LETTER
[facility]  09/25/19    Patient: Sharlene Mccord  YOB: 1960  Medical Record Number: [id number]  CSN: [id number]                                                                              Non-opioid Controlled Substance Agreement    I understand that my care provider has prescribed a controlled substance to help manage my condition(s). I am taking this medicine to help me function or work. I know this is strong medicine, and that it can cause serious side effects. Controlled substances can be sedating, addicting and may cause a dependency on the drug. They can affect my ability to drive or think, and cause depression. They need to be taken exactly as prescribed. Combining controlled substances with certain medicines or chemicals (such as cocaine, sedatives and tranquilizers, sleeping pills, meth) can be dangerous or even fatal. Also, if I stop controlled substances suddenly, I may have severe withdrawal symptoms.  If not helpful, I may be asked to stop them.    The risks, benefits, and side effects of these medicine(s) were explained to me. I agree that:    1. I will take part in other treatments as advised by my care team. This may be psychiatry or counseling, physical therapy, behavioral therapy, group treatment or a referral to a pain clinic. I will reduce or stop my medicine when my care team tells me to do so.  2. I will take my medicines as prescribed. I will not change the dose or schedule unless my care team tells me to. There will be no refills if I  run out early.   I may be contactedwithout warning and asked to complete a urine drug test or pill count at any time.   3. I will keep all my appointments, and understand this is part of the monitoring of controlled substances. My care team may require an office visit for EVERY controlled substance refill. If I miss appointments or don t follow instructions, my care team may stop my medicine.  4. I will not ask other providers to prescribe  controlled substances, and I will not accept controlled substances from other people. If I need another prescribed controlled substance for a new reason, I will tell my care team within 1 business day.  5. I will use one pharmacy to fill all of my controlled substance prescriptions, and it is up to me to make sure that I do not run out of my medicines on weekends or holidays. If my care team is willing to refill my controlled substance prescription without a visit, I must request refills only during office hours, refills may take up to 3 days to process, and it may take up to 5 to 7 days for my medicine to be mailed and ready at my pharmacy. Prescriptions will not be mailed anywhere except my pharmacy.    6. I am responsible for my prescriptions. If the medicine/prescription is lost or stolen, it will not be replaced. I also agree not to share controlled substance medicines with anyone.              [facility]  09/25/19  Patient:  Sharlene Mccord  YOB: 1960  Medical Record Number: [id number]  CSN: [id number]    7. I agree to not use ANY illegal or recreational drugs. This includes marijuana, cocaine, bath salts or other drugs. I agree not to use alcohol unless my care team says I may. I agree to give urine samples whenever asked. If I don t give a urine sample, the care team may stop my medicine.    8. If I enroll in the [place] Medical Marijuana program, I will tell my care team. I will also sign an agreement to share my medical records with my care team.    9. I will bring in my list of medicines (or my medicine bottles) each time I come to the clinic.   10. I will tell my care team right away if I become pregnant or have a new medical problem treated outside of my regular clinic.  11. I understand that this medicine can affect my thinking and judgment. It may be unsafe for me to drive, use machinery and do dangerous tasks. I will not do any of these things until I know how the medicine affects  me. If my dose changes, I will wait to see how it affects me. I will contact my care team if I have concerns about medicine side effects.    I understand that if I do not follow any of the conditions above, my prescriptions or treatment may be stopped.      I agree that my provider, clinic care team, and pharmacy may work with any city, state or federal law enforcement agency that investigates the misuse, sale, or other diversion of my controlled medicine. I will allow my provider to discuss my care with or share a copy of this agreement with any other treating provider, pharmacy or emergency room where I receive care. I agree to give up (waive) any right of privacy or confidentiality with respect to these consents.   I have read this agreement and have asked questions about anything I did not understand.    ____________________________________________________    ____________  ________  Patient signature                                                         Date      Time    ____________________________________________________     ____________  ________  Witness                                                          Date      Time    ____________________________________________________  Provider signature

## 2019-09-26 ASSESSMENT — ANXIETY QUESTIONNAIRES: GAD7 TOTAL SCORE: 12

## 2019-10-01 LAB — PAIN DRUG SCR UR W RPTD MEDS: NORMAL

## 2019-10-02 PROBLEM — F41.1 GAD (GENERALIZED ANXIETY DISORDER): Status: ACTIVE | Noted: 2019-10-02

## 2019-10-15 DIAGNOSIS — F41.1 GAD (GENERALIZED ANXIETY DISORDER): ICD-10-CM

## 2019-10-15 NOTE — TELEPHONE ENCOUNTER
clonazePAM (KLONOPIN) 1 MG tablet  Last Written Prescription Date:  9-11-19  Last Fill Quantity: 60,   # refills: 0  Last Office Visit: 9-5-19  Future Office visit:

## 2019-10-18 RX ORDER — CLONAZEPAM 1 MG/1
TABLET ORAL
Qty: 60 TABLET | Refills: 0 | Status: SHIPPED | OUTPATIENT
Start: 2019-10-18 | End: 2019-11-16

## 2019-11-03 ENCOUNTER — HEALTH MAINTENANCE LETTER (OUTPATIENT)
Age: 59
End: 2019-11-03

## 2019-11-11 DIAGNOSIS — K21.9 GASTROESOPHAGEAL REFLUX DISEASE WITHOUT ESOPHAGITIS: ICD-10-CM

## 2019-11-11 NOTE — TELEPHONE ENCOUNTER
Ranitdine      Last Written Prescription Date:  8/13/19  Last Fill Quantity: 180,   # refills: 0  Last Office Visit: 9/5/19  Future Office visit:       Routing refill request to provider for review/approval because:  Drug not on the [facility], [facility] or [facility] refill protocol or controlled substance

## 2019-11-16 DIAGNOSIS — F41.1 GAD (GENERALIZED ANXIETY DISORDER): ICD-10-CM

## 2019-11-18 RX ORDER — CLONAZEPAM 1 MG/1
TABLET ORAL
Qty: 60 TABLET | Refills: 0 | Status: SHIPPED | OUTPATIENT
Start: 2019-11-18 | End: 2019-12-16

## 2019-11-19 DIAGNOSIS — E03.9 ACQUIRED HYPOTHYROIDISM: ICD-10-CM

## 2019-11-19 LAB — TSH SERPL DL<=0.005 MIU/L-ACNC: 0.67 MU/L (ref 0.4–4)

## 2019-11-19 PROCEDURE — 84443 ASSAY THYROID STIM HORMONE: CPT | Mod: ZL | Performed by: PHYSICIAN ASSISTANT

## 2019-11-19 PROCEDURE — 36415 COLL VENOUS BLD VENIPUNCTURE: CPT | Mod: ZL | Performed by: PHYSICIAN ASSISTANT

## 2019-11-29 ENCOUNTER — MYC MEDICAL ADVICE (OUTPATIENT)
Dept: FAMILY MEDICINE | Facility: OTHER | Age: 59
End: 2019-11-29

## 2019-11-29 DIAGNOSIS — E03.9 ACQUIRED HYPOTHYROIDISM: Primary | ICD-10-CM

## 2019-12-02 DIAGNOSIS — E03.9 ACQUIRED HYPOTHYROIDISM: ICD-10-CM

## 2019-12-02 LAB — TSH SERPL DL<=0.005 MIU/L-ACNC: 1.06 MU/L (ref 0.4–4)

## 2019-12-02 PROCEDURE — 36415 COLL VENOUS BLD VENIPUNCTURE: CPT | Mod: ZL | Performed by: PHYSICIAN ASSISTANT

## 2019-12-02 PROCEDURE — 84443 ASSAY THYROID STIM HORMONE: CPT | Mod: ZL | Performed by: PHYSICIAN ASSISTANT

## 2019-12-16 DIAGNOSIS — F41.1 GAD (GENERALIZED ANXIETY DISORDER): ICD-10-CM

## 2019-12-16 DIAGNOSIS — Z79.890 POSTMENOPAUSAL HRT (HORMONE REPLACEMENT THERAPY): ICD-10-CM

## 2019-12-16 RX ORDER — CLONAZEPAM 1 MG/1
TABLET ORAL
Qty: 60 TABLET | Refills: 0 | Status: SHIPPED | OUTPATIENT
Start: 2019-12-16 | End: 2020-01-20

## 2019-12-17 NOTE — TELEPHONE ENCOUNTER
estradiol (ESTRACE) 0.5 MG tablet  Last visit date with prescribing provider: 9-5-2019  Last refill date: 3-  Quantity: 60, Refills: 5    Olivia Ruiz LPN

## 2019-12-20 RX ORDER — ESTRADIOL 0.5 MG/1
TABLET ORAL
Qty: 60 TABLET | Refills: 2 | Status: SHIPPED | OUTPATIENT
Start: 2019-12-20 | End: 2020-01-23

## 2019-12-23 DIAGNOSIS — I25.85 CARDIAC MICROVASCULAR DISEASE: ICD-10-CM

## 2019-12-24 RX ORDER — DILTIAZEM HYDROCHLORIDE 300 MG/1
CAPSULE, EXTENDED RELEASE ORAL
Qty: 90 CAPSULE | Refills: 0 | Status: SHIPPED | OUTPATIENT
Start: 2019-12-24 | End: 2020-03-13

## 2020-01-19 DIAGNOSIS — F41.1 GAD (GENERALIZED ANXIETY DISORDER): ICD-10-CM

## 2020-01-20 ENCOUNTER — HOSPITAL ENCOUNTER (EMERGENCY)
Facility: HOSPITAL | Age: 60
Discharge: HOME | End: 2020-01-20
Attending: PHYSICIAN ASSISTANT | Admitting: PHYSICIAN ASSISTANT
Payer: MEDICARE

## 2020-01-20 VITALS
RESPIRATION RATE: 16 BRPM | TEMPERATURE: 97.5 F | SYSTOLIC BLOOD PRESSURE: 154 MMHG | BODY MASS INDEX: 28.28 KG/M2 | WEIGHT: 180.56 LBS | OXYGEN SATURATION: 97 % | DIASTOLIC BLOOD PRESSURE: 68 MMHG

## 2020-01-20 DIAGNOSIS — F17.210 CIGARETTE SMOKER: ICD-10-CM

## 2020-01-20 DIAGNOSIS — J06.9 UPPER RESPIRATORY TRACT INFECTION, UNSPECIFIED TYPE: ICD-10-CM

## 2020-01-20 DIAGNOSIS — J06.9 URI (UPPER RESPIRATORY INFECTION): ICD-10-CM

## 2020-01-20 LAB
DEPRECATED S PYO AG THROAT QL EIA: NORMAL
SPECIMEN SOURCE: NORMAL

## 2020-01-20 PROCEDURE — G0463 HOSPITAL OUTPT CLINIC VISIT: HCPCS

## 2020-01-20 PROCEDURE — 99213 OFFICE O/P EST LOW 20 MIN: CPT | Mod: Z6 | Performed by: PHYSICIAN ASSISTANT

## 2020-01-20 PROCEDURE — 87081 CULTURE SCREEN ONLY: CPT | Performed by: FAMILY MEDICINE

## 2020-01-20 PROCEDURE — 87880 STREP A ASSAY W/OPTIC: CPT | Performed by: FAMILY MEDICINE

## 2020-01-20 RX ORDER — CLONAZEPAM 1 MG/1
TABLET ORAL
Qty: 60 TABLET | Refills: 0 | Status: SHIPPED | OUTPATIENT
Start: 2020-01-20 | End: 2020-02-19

## 2020-01-20 ASSESSMENT — ENCOUNTER SYMPTOMS
STRIDOR: 0
SORE THROAT: 1
SHORTNESS OF BREATH: 0
CHILLS: 1
COUGH: 1
FEVER: 0
WHEEZING: 0
RHINORRHEA: 0
NAUSEA: 0
DIARRHEA: 0
VOMITING: 1

## 2020-01-20 NOTE — ED PROVIDER NOTES
History     Chief Complaint   Patient presents with     Pharyngitis     HPI  Sharlene Mccord is a 59 year old female who presents to urgent care for evaluation of cold symptoms.  Patient states that for approximately 1 week she has had cough, sore throat, ear pain, chills, 2 episodes of vomiting.  Patient states that she has a white spot in the back of her throat.  She has taken Tylenol which did help for sore throat.    Allergies:  Allergies   Allergen Reactions     Codeine      Isosorbide Mononitrate Other (See Comments)     Vomiting and headache       Lisinopril      [facility] scan       Problem List:    Patient Active Problem List    Diagnosis Date Noted     SHAKIR (generalized anxiety disorder) 10/02/2019     Priority: Medium     Patient is followed by  for ongoing prescription of benzodiazepines.  All refills should be approved by this provider, or covering partner.    Medication(s): Klonopin 1 mg.   Maximum quantity per month: 60  Clinic visit frequency required: Q 3 months     Controlled substance agreement on file: Yes       Date(s): 9.25.19  Benzodiazepine use reviewed by psychiatry:  Yes       Date(s): 9.25.19    Last [place] website verification:  done on 9.25.19  [email or web address]           ACP (advance care planning) 09/11/2017     Priority: Medium     Advance Care Planning 9/11/2017: ACP Review of Chart / Resources Provided:  Reviewed chart for advance care plan.  Sharlene Mccord has been provided information and resources to begin or update their advance care plan.  Added by Melina Armas        Advance Care Planning 7/11/2016: ACP Review of Chart / Resources Provided:  Reviewed chart for advance care plan.  Sharlene Mccord has been provided information and resources to begin or update their advance care plan.  Added by Kortney Smalls             Mixed connective tissue disease (H) 07/05/2017     Priority: Medium     Chronic, continuous use of opioids 06/07/2017      Priority: Medium     Patient is followed by KEILY Cobos for ongoing prescription of pain medication.  All refills should only be approved by this provider, or covering partner.    Medication(s): Ultram 50mg.   Maximum quantity per month: #180  Clinic visit frequency required: Q 3 months     Controlled substance agreement:  Encounter-Level CSA - 06/05/2017:                 Controlled Substance Agreement - Scan on 6/6/2017 12:39 PM : CONTROLLED SUBSTANCE AGREEMENT (below)            Pain Clinic evaluation in the past: No    DIRE Total Score(s):  No flowsheet data found.    Last [facility] website verification:  done on 6.2.17   [email or web address]         Migraine without aura and responsive to treatment 11/23/2015     Priority: Medium     Major depressive disorder, recurrent episode (H) 12/17/2014     Priority: Medium     Problem list name updated by automated process. Provider to review       Cognitive disorder 07/08/2014     Priority: Medium     Major depressive disorder 07/08/2014     Priority: Medium     Memory loss 07/02/2014     Priority: Medium     Constipation 04/28/2014     Priority: Medium     Chronic pain 03/12/2014     Priority: Medium     Aortic valve disorder 08/13/2013     Priority: Medium     Problem list name updated by automated process. Provider to review       Diverticulitis of sigmoid colon 05/11/2013     Priority: Medium     Diverticulitis 05/11/2013     Priority: Medium     Cardiac microvascular disease 04/10/2013     Priority: Medium     Based on Cardiac MRI done at [facility] 03/01/2011     Priority: Medium     Problem list name updated by automated process. Provider to review       Anxiety state 03/01/2011     Priority: Medium     Problem list name updated by automated process. Provider to review       Fatigue 07/01/2008     Priority: Medium     Atypical migraine 07/01/2008     Priority: Medium        Past Medical History:    Past Medical History:   Diagnosis Date      Anxiety state, unspecified 03/01/2011     Aortic valve disorders 06/07/2000     Cardiac microvascular disease 4/10/2013     Depressive disorder 1997     Fatigue      Fatigue      Fibromyalgia 03/01/2011     Hypertension      Major depressive disorder, recurrent episode, unspecified 12/17/2014     Migraine, unspecified, without mention of intractable migraine without mention of status migrainosus 03/01/2011     Mitral valve disorders(424.0) 10/16/2007     PONV (postoperative nausea and vomiting)      Thyroid Nodule 03/01/2011     Tobacco use disorder 03/01/2011     Unspecified hypothyroidism 03/01/2011       Past Surgical History:    Past Surgical History:   Procedure Laterality Date     APPENDECTOMY  1977     BIOPSY  2017    taken at [facility] from Dr. Nadira cohen. Thyroid     CARDIAC SURGERY  2013    angiogram [facility]:  Normal coronary arteries.  Felt ot have some microvascular disease     CL AFF SURGICAL PATHOLOGY  01/02/2007    Thyroid Mass     COLONOSCOPY  1/13/2014    Procedure: COLONOSCOPY;  COLONOSCOPY ;  Surgeon: Chasidy Gee DO;  Location: [facility] OR     ESOPHAGOSCOPY, GASTROSCOPY, DUODENOSCOPY (EGD), COMBINED N/A 2/9/2017    Procedure: COMBINED ESOPHAGOSCOPY, GASTROSCOPY, DUODENOSCOPY (EGD);  Surgeon: Johnathan Ruff DO;  Location: [facility] OR     GYN SURGERY      c-sections x 3     HYSTERECTOMY TOTAL ABDOMINAL, BILATERAL SALPINGO-OOPHORECTOMY, COMBINED N/A 01/01/2001     LAPAROSCOPIC CHOLECYSTECTOMY  11/07/2003    Cholelithiasis     LAPAROTOMY EXPLORATORY      abdominal pain/fever     LARYNGOSCOPY       SPLENECTOMY         Family History:    Family History   Problem Relation Age of Onset     C.A.D. Father      Hypertension Father      C.A.D. Mother      Cerebrovascular Disease Mother         CVA     Hypertension Mother      Coronary Artery Disease Mother      Diabetes Paternal Grandmother      Diabetes Paternal Grandfather      Diabetes Sister      Breast Cancer Sister      Diabetes Maternal Grandmother       Diabetes Sister      Breast Cancer Sister      Hypertension Sister      Depression Sister      Anxiety Disorder Sister      Obesity Sister      Depression Sister      Breast Cancer Sister      Obesity Sister      Depression Brother      Depression Daughter      Obesity Daughter      Depression Daughter      Mental Illness Daughter         [email or web address]     Anxiety Disorder Daughter      Mental Illness Daughter      Anxiety Disorder Other      Osteoporosis Other      Thyroid Disease Other      Diabetes Nephew        Social History:  Marital Status:   [2]  Social History     Tobacco Use     Smoking status: Light Tobacco Smoker     Packs/day: 0.25     Years: 40.00     Pack years: 10.00     Types: Cigarettes     Start date: 1/1/1975     Smokeless tobacco: Never Used     Tobacco comment: 5 cigs daily  Patient will work on smoking cessation (3-26-18)   Substance Use Topics     Alcohol use: No     Drug use: No        Medications:    clonazePAM (KLONOPIN) 1 MG tablet  diltiazem ER (TIAZAC) 300 MG 24 hr ER beaded capsule  doxycycline monohydrate (ADOXA) 50 MG tablet  estradiol (ESTRACE) 0.5 MG tablet  EUTHYROX 75 MCG tablet  hydrOXYzine (ATARAX) 25 MG tablet  NITROSTAT 0.4 MG sublingual tablet  NP THYROID 30 MG tablet  pantoprazole (PROTONIX) 20 MG EC tablet  PROCTOZONE-HC 2.5 % cream  ranitidine (ZANTAC) 150 MG tablet  SUMAtriptan (IMITREX) 100 MG tablet  UNABLE TO FIND  UNABLE TO FIND          Review of Systems   Constitutional: Positive for chills. Negative for fever.   HENT: Positive for ear pain and sore throat. Negative for rhinorrhea.    Respiratory: Positive for cough. Negative for shortness of breath, wheezing and stridor.    Gastrointestinal: Positive for vomiting. Negative for diarrhea and nausea.   All other systems reviewed and are negative.      Physical Exam   BP: 154/68  Heart Rate: 62  Temp: 97.5  F (36.4  C)  Resp: 16  Weight: 81.9 kg (180 lb 8.9 oz)  SpO2: 97 %      Physical Exam  Vitals signs and  nursing note reviewed.   Constitutional:       General: She is not in acute distress.     Appearance: She is well-developed. She is not ill-appearing or toxic-appearing.   HENT:      Right Ear: Tympanic membrane normal.      Left Ear: Tympanic membrane normal.      Mouth/Throat:      Pharynx: Oropharyngeal exudate present.      Tonsils: No tonsillar abscesses.   Eyes:      Pupils: Pupils are equal, round, and reactive to light.   Neck:      Musculoskeletal: Neck supple.   Cardiovascular:      Rate and Rhythm: Regular rhythm.   Pulmonary:      Breath sounds: Normal breath sounds.   Lymphadenopathy:      Cervical: No cervical adenopathy.   Neurological:      Mental Status: She is alert and oriented to person, place, and time.         ED Course        Procedures              Critical Care time:               Results for orders placed or performed during the hospital encounter of 01/20/20 (from the past 24 hour(s))   Rapid strep screen   Result Value Ref Range    Specimen Description Throat     Rapid Strep A Screen       NEGATIVE: No Group A streptococcal antigen detected by immunoassay, await culture report.       Medications - No data to display    Assessments & Plan (with Medical Decision Making)   #1.  URI    Discussed exam findings as well as negative rapid strep with patient.  Patient has a throat culture pending and will be notified of any abnormal results.  Supportive cares are discussed at length with patient.  Push fluids and rest.  Tylenol or ibuprofen as directed for pain or fever.  Any further concern please return to urgent care or follow-up with primary care provider.  Patient verbalizes understanding and agreement of plan.    I have reviewed the nursing notes.    I have reviewed the findings, diagnosis, plan and need for follow up with the patient.      New Prescriptions    No medications on file       Final diagnoses:   URI (upper respiratory infection)       1/20/2020   [facility]      Kvng Collins PA-C  01/20/20 1107       Kvng Collins PA-C  01/31/20 1121

## 2020-01-20 NOTE — ED AVS SNAPSHOT
[facility]  [address] [place] [address]  [place] [address]  Phone:  [phone]                                    Sharlene Mccord   MRN: [id number]    Department:  [facility]   Date of Visit:  1/20/2020           After Visit Summary Signature Page    I have received my discharge instructions, and my questions have been answered. I have discussed any challenges I see with this plan with the nurse or doctor.    ..........................................................................................................................................  Patient/Patient Representative Signature      ..........................................................................................................................................  Patient Representative Print Name and Relationship to Patient    ..................................................               ................................................  Date                                   Time    ..........................................................................................................................................  Reviewed by Signature/Title    ...................................................              ..............................................  Date                                               Time          22EPIC Rev 08/18

## 2020-01-20 NOTE — PROGRESS NOTES
Subjective     Sharlene Mccord is a 59 year old female who presents to clinic today for the following health issues:    HPI   Depression and Anxiety Follow-Up    How are you doing with your depression since your last visit? No change    How are you doing with your anxiety since your last visit?  Worsened     Are you having other symptoms that might be associated with depression or anxiety? No    Have you had a significant life event? No     Do you have any concerns with your use of alcohol or other drugs? No    Social History     Tobacco Use     Smoking status: Light Tobacco Smoker     Packs/day: 0.25     Years: 40.00     Pack years: 10.00     Types: Cigarettes     Start date: 1/1/1975     Smokeless tobacco: Never Used     Tobacco comment: 5 cigs daily  Patient will work on smoking cessation (3-26-18)   Substance Use Topics     Alcohol use: No     Drug use: No     PHQ 6/27/2019 9/25/2019 1/23/2020   PHQ-9 Total Score 21 19 16   Q9: Thoughts of better off dead/self-harm past 2 weeks Not at all Not at all Not at all     SHAKIR-7 SCORE 6/27/2019 9/25/2019 1/23/2020   Total Score 17 12 14     Last PHQ-9 1/23/2020   1.  Little interest or pleasure in doing things 2   2.  Feeling down, depressed, or hopeless 1   3.  Trouble falling or staying asleep, or sleeping too much 3   4.  Feeling tired or having little energy 2   5.  Poor appetite or overeating 1   6.  Feeling bad about yourself 2   7.  Trouble concentrating 3   8.  Moving slowly or restless 2   Q9: Thoughts of better off dead/self-harm past 2 weeks 0   PHQ-9 Total Score 16   Difficulty at work, home, or with people Somewhat difficult     SHAKIR-7  1/23/2020   1. Feeling nervous, anxious, or on edge 2   2. Not being able to stop or control worrying 3   3. Worrying too much about different things 2   4. Trouble relaxing 1   5. Being so restless that it is hard to sit still 1   6. Becoming easily annoyed or irritable 3   7. Feeling afraid, as if something awful might happen  2   SHAKIR-7 Total Score 14   If you checked any problems, how difficult have they made it for you to do your work, take care of things at home, or get along with other people? Somewhat difficult         Suicide Assessment Five-step Evaluation and Treatment (SAFE-T)  Chronic Pain Follow-Up       Type / Location of Pain: All over, fibromyalgia   Analgesia/pain control:       Recent changes:  worse      Overall control: Tolerable with discomfort  Activity level/function:      Daily activities:  Can do most things most days, with some rest    Work:  Able to work part time with limitations  Adverse effects:  No  Adherance    Taking medication as directed?  Yes    Participating in other treatments: no   Risk Factors:    Sleep:  Poor    Mood/anxiety:  controlled    Recent family or social stressors:  none noted and started working again    Other aggravating factors: prolonged sitting, prolonged standing and repetitive activities - bending  PHQ-9 SCORE 6/27/2019 9/25/2019 1/23/2020   PHQ-9 Total Score - - -   PHQ-9 Total Score 21 19 16     SHAKIR-7 SCORE 6/27/2019 9/25/2019 1/23/2020   Total Score 17 12 14     Encounter-Level CSA - 06/05/2017:    Controlled Substance Agreement - Scan on 6/6/2017 12:39 PM: CONTROLLED SUBSTANCE AGREEMENT     Patient-Level CSA:    Controlled Substance Agreement - Non - Opioid - Scan on 9/30/2019  1:49 PM: NON-OPIOID CONTROLLED SUBSTANCE AGREEMENT       How many days per week do you miss taking your medication? 0       Patient Active Problem List   Diagnosis     Diverticulitis of sigmoid colon     Hypothyroidism     Anxiety state     Cardiac microvascular disease     Diverticulitis     Aortic valve disorder     ACP (advance care planning)     Chronic pain     Constipation     Memory loss     Major depressive disorder, recurrent episode (H)     Cognitive disorder     Major depressive disorder     Fatigue     Migraine without aura and responsive to treatment     Atypical migraine     Chronic,  continuous use of opioids     Mixed connective tissue disease (H)     SHAKIR (generalized anxiety disorder)     Past Surgical History:   Procedure Laterality Date     APPENDECTOMY  1977     BIOPSY  2017    taken at [facility] from Dr. Nadira cohen. Thyroid     CARDIAC SURGERY  2013    angiogram [facility]:  Normal coronary arteries.  Felt ot have some microvascular disease     CL AFF SURGICAL PATHOLOGY  01/02/2007    Thyroid Mass     COLONOSCOPY  1/13/2014    Procedure: COLONOSCOPY;  COLONOSCOPY ;  Surgeon: Chasidy Gee DO;  Location: [facility]     ESOPHAGOSCOPY, GASTROSCOPY, DUODENOSCOPY (EGD), COMBINED N/A 2/9/2017    Procedure: COMBINED ESOPHAGOSCOPY, GASTROSCOPY, DUODENOSCOPY (EGD);  Surgeon: Johnathan Ruff DO;  Location: [facility] OR     GYN SURGERY      c-sections x 3     HYSTERECTOMY TOTAL ABDOMINAL, BILATERAL SALPINGO-OOPHORECTOMY, COMBINED N/A 01/01/2001     LAPAROSCOPIC CHOLECYSTECTOMY  11/07/2003    Cholelithiasis     LAPAROTOMY EXPLORATORY      abdominal pain/fever     LARYNGOSCOPY       SPLENECTOMY         Social History     Tobacco Use     Smoking status: Light Tobacco Smoker     Packs/day: 0.25     Years: 40.00     Pack years: 10.00     Types: Cigarettes     Start date: 1/1/1975     Smokeless tobacco: Never Used     Tobacco comment: 5 cigs daily  Patient will work on smoking cessation (3-26-18)   Substance Use Topics     Alcohol use: No     Family History   Problem Relation Age of Onset     C.A.D. Father      Hypertension Father      C.A.D. Mother      Cerebrovascular Disease Mother         CVA     Hypertension Mother      Coronary Artery Disease Mother      Diabetes Paternal Grandmother      Diabetes Paternal Grandfather      Diabetes Sister      Breast Cancer Sister      Diabetes Maternal Grandmother      Diabetes Sister      Breast Cancer Sister      Hypertension Sister      Depression Sister      Anxiety Disorder Sister      Obesity Sister      Depression Sister      Breast Cancer Sister      Obesity  Sister      Depression Brother      Depression Daughter      Obesity Daughter      Depression Daughter      Mental Illness Daughter         [email or web address]     Anxiety Disorder Daughter      Mental Illness Daughter      Anxiety Disorder Other      Osteoporosis Other      Thyroid Disease Other      Diabetes Nephew          Current Outpatient Medications   Medication Sig Dispense Refill     clonazePAM (KLONOPIN) 1 MG tablet TAKE 1/2 TO 1 (ONE-HALF TO ONE) TABLET BY MOUTH TWICE DAILY AS NEEDED FOR ANXIETY 60 tablet 0     diltiazem ER (TIAZAC) 300 MG 24 hr ER beaded capsule TAKE 1 CAPSULE BY MOUTH ONCE DAILY 90 capsule 0     estradiol (ESTRACE) 0.5 MG tablet TAKE 1 TABLET BY MOUTH TWICE DAILY 60 tablet 2     EUTHYROX 75 MCG tablet TAKE 1 TABLET BY MOUTH ONCE DAILY 90 tablet 0     NITROSTAT 0.4 MG sublingual tablet Place 1 tablet (0.4 mg) under the tongue every 5 minutes as needed for chest pain 25 tablet 11     pantoprazole (PROTONIX) 40 MG EC tablet Take by mouth 30-60 minutes before a meal. 90 tablet 3     PROCTOZONE-HC 2.5 % cream APPLY CREAM TO AFFECTED AREA THREE TIMES DAILY 45 g 1     SUMAtriptan (IMITREX) 100 MG tablet TAKE ONE TABLET BY MOUTH AS NEEDED MIGRAINES 10 tablet 5     UNABLE TO FIND 1.5 mg 3 times daily MEDICATION NAME: Oral Cannabis    Oral suspension  Taken morning, afternoon and bedtime       UNABLE TO FIND MEDICATION NAME: Cannabis as needed     [place] Vapor take one puff to the count of two at bedtime.  If needed in the middle of the night       Allergies   Allergen Reactions     Codeine      Isosorbide Mononitrate Other (See Comments)     Vomiting and headache       Lisinopril      [facility] scan     Recent Labs   Lab Test 12/02/19  0808 11/19/19  0901  11/21/18  1055 06/06/18  0958  09/11/17  1043  04/19/17  1133 05/09/16  0930 01/13/16  0941   LDL  --   --   --   --   --   --   --   --  104* 127* 147*   HDL  --   --   --   --   --   --   --   --  77 70 64   TRIG  --   --   --   --   --   --   --  "  --  165* 204* 164*   ALT  --   --   --  21 26  --  27   < > 28 44 63*   CR  --   --   --  1.05* 0.95  --  1.05*   < > 0.97 0.94 0.93   GFRESTIMATED  --   --   --  54* 61  --  54*   < > 59* 62 63   GFRESTBLACK  --   --   --  65 73  --  65   < > 71 75 76   POTASSIUM  --   --   --  4.2 4.5  --  4.3   < > 4.4 4.1 4.8   TSH 1.06 0.67   < > 1.31 1.67   < > 1.17  --  1.24 3.12 2.58    < > = values in this interval not displayed.      BP Readings from Last 3 Encounters:   01/23/20 122/64   01/20/20 154/68   09/25/19 118/58    Wt Readings from Last 3 Encounters:   01/23/20 81.2 kg (179 lb)   01/20/20 81.9 kg (180 lb 8.9 oz)   09/25/19 81.6 kg (180 lb)                      Reviewed and updated as needed this visit by Provider         Review of Systems   ROS COMP: Constitutional, HEENT, cardiovascular, pulmonary, gi and gu systems are negative, except as otherwise noted.      Objective    /64 (BP Location: Right arm, Patient Position: Sitting, Cuff Size: Adult Regular)   Pulse 65   Temp 97.1  F (36.2  C) (Tympanic)   Resp 16   Ht 1.702 m (5' 7\")   Wt 81.2 kg (179 lb)   SpO2 98%   BMI 28.04 kg/m    Body mass index is 28.04 kg/m .  Physical Exam   GENERAL: healthy, alert and no distress  EYES: Eyes grossly normal to inspection, PERRL and conjunctivae and sclerae normal  HENT: ear canals and TM's normal, nose and mouth without ulcers or lesions  NECK: no adenopathy, no asymmetry, masses, or scars and thyroid normal to palpation  RESP: lungs clear to auscultation - no rales, rhonchi or wheezes  CV: regular rate and rhythm, normal S1 S2, no S3 or S4, no murmur, click or rub, no peripheral edema and peripheral pulses strong  ABDOMEN: soft, nontender, no hepatosplenomegaly, no masses and bowel sounds normal  MS: no gross musculoskeletal defects noted, no edema  SKIN: no suspicious lesions or rashes  NEURO: Normal strength and tone, mentation intact and speech normal  PSYCH: mentation appears normal, affect " normal/bright  LYMPH: no cervical, supraclavicular, axillary, or inguinal adenopathy    Diagnostic Test Results:  Labs reviewed in Epic  No results found for this or any previous visit (from the past 24 hour(s)).        Assessment & Plan     1. Gastroesophageal reflux disease with esophagitis  She is still having heart burn. Bump up her Protonix.   - pantoprazole (PROTONIX) 40 MG EC tablet; Take by mouth 30-60 minutes before a meal.  Dispense: 90 tablet; Refill: 3      2. Viral upper respiratory tract infection  Elderberry and Zinc recommended.   - benzonatate (TESSALON) 200 MG capsule; Take 1 capsule (200 mg) by mouth 3 times daily as needed for cough  Dispense: 30 capsule; Refill: 0    3. Immunizations up to date  She will have immunizations. She will be seeing up back   - PNEUMOCOCCAL VACCINE,ADULT,SQ OR IM  - VACCINE ADMINISTRATION, INITIAL  - VACCINE ADMINISTRATION, EACH ADDITIONAL  - C RIV4 (FLUBLOK) VACCINE RECOMBINANT DNA PRSRV ANTIBIO FREE, IM    4. Mixed connective tissue disease (H)  Flare again. Is on medical THC and severe aching and fatigue.  She is going to be taking out of work now.  Only working 6 hours per week.       See Patient Instructions    No follow-ups on file.    KEILY Cobos  [facility] [place]

## 2020-01-20 NOTE — TELEPHONE ENCOUNTER
Klonopin -  reviewed   Last visit: 9.25.19  Last refill: 12.16.19    Future appointments:   Next 5 appointments (look out 90 days)    Jan 23, 2020 10:20 AM CST  (Arrive by 10:05 AM)  SHORT with KEILY Fonseca  [facility] [place] ([facility] [place] ) [address]  [place] [address]  [phone]   Mar 25, 2020 11:00 AM CDT  (Arrive by 10:45 AM)  Return Visit with Aurelia Odell MD  [facility] [place] ([facility] [place] ) [address]  [place] [id number] [phone]

## 2020-01-21 DIAGNOSIS — E03.9 ACQUIRED HYPOTHYROIDISM: ICD-10-CM

## 2020-01-21 DIAGNOSIS — Z79.890 POSTMENOPAUSAL HRT (HORMONE REPLACEMENT THERAPY): ICD-10-CM

## 2020-01-22 LAB
BACTERIA SPEC CULT: NORMAL
SPECIMEN SOURCE: NORMAL

## 2020-01-23 ENCOUNTER — OFFICE VISIT (OUTPATIENT)
Dept: FAMILY MEDICINE | Facility: OTHER | Age: 60
End: 2020-01-23
Attending: PHYSICIAN ASSISTANT
Payer: MEDICARE

## 2020-01-23 VITALS
OXYGEN SATURATION: 98 % | DIASTOLIC BLOOD PRESSURE: 64 MMHG | BODY MASS INDEX: 28.09 KG/M2 | HEIGHT: 67 IN | RESPIRATION RATE: 16 BRPM | WEIGHT: 179 LBS | SYSTOLIC BLOOD PRESSURE: 122 MMHG | HEART RATE: 65 BPM | TEMPERATURE: 97.1 F

## 2020-01-23 DIAGNOSIS — Z92.29 IMMUNIZATIONS UP TO DATE: ICD-10-CM

## 2020-01-23 DIAGNOSIS — M35.1 MIXED CONNECTIVE TISSUE DISEASE (H): ICD-10-CM

## 2020-01-23 DIAGNOSIS — K21.00 GASTROESOPHAGEAL REFLUX DISEASE WITH ESOPHAGITIS: ICD-10-CM

## 2020-01-23 DIAGNOSIS — J06.9 VIRAL UPPER RESPIRATORY TRACT INFECTION: Primary | ICD-10-CM

## 2020-01-23 PROCEDURE — 90682 RIV4 VACC RECOMBINANT DNA IM: CPT

## 2020-01-23 PROCEDURE — 90732 PPSV23 VACC 2 YRS+ SUBQ/IM: CPT

## 2020-01-23 PROCEDURE — 99214 OFFICE O/P EST MOD 30 MIN: CPT | Performed by: PHYSICIAN ASSISTANT

## 2020-01-23 PROCEDURE — 90472 IMMUNIZATION ADMIN EACH ADD: CPT | Performed by: PHYSICIAN ASSISTANT

## 2020-01-23 PROCEDURE — G0009 ADMIN PNEUMOCOCCAL VACCINE: HCPCS | Performed by: PHYSICIAN ASSISTANT

## 2020-01-23 PROCEDURE — G0463 HOSPITAL OUTPT CLINIC VISIT: HCPCS

## 2020-01-23 PROCEDURE — G0463 HOSPITAL OUTPT CLINIC VISIT: HCPCS | Mod: 25

## 2020-01-23 RX ORDER — LEVOTHYROXINE SODIUM 75 UG/1
TABLET ORAL
Qty: 90 TABLET | Refills: 0 | Status: SHIPPED | OUTPATIENT
Start: 2020-01-23 | End: 2020-06-17

## 2020-01-23 RX ORDER — BENZONATATE 200 MG/1
200 CAPSULE ORAL 3 TIMES DAILY PRN
Qty: 30 CAPSULE | Refills: 0 | Status: SHIPPED | OUTPATIENT
Start: 2020-01-23 | End: 2020-02-07

## 2020-01-23 RX ORDER — ESTRADIOL 0.5 MG/1
TABLET ORAL
Qty: 60 TABLET | Refills: 1 | Status: SHIPPED | OUTPATIENT
Start: 2020-01-23 | End: 2020-03-13

## 2020-01-23 RX ORDER — AZITHROMYCIN 250 MG/1
TABLET, FILM COATED ORAL
Qty: 6 TABLET | Refills: 1 | Status: SHIPPED | OUTPATIENT
Start: 2020-01-23 | End: 2020-02-07

## 2020-01-23 RX ORDER — PANTOPRAZOLE SODIUM 40 MG/1
TABLET, DELAYED RELEASE ORAL
Qty: 90 TABLET | Refills: 3 | Status: ON HOLD | OUTPATIENT
Start: 2020-01-23 | End: 2020-07-31

## 2020-01-23 ASSESSMENT — ANXIETY QUESTIONNAIRES
3. WORRYING TOO MUCH ABOUT DIFFERENT THINGS: MORE THAN HALF THE DAYS
4. TROUBLE RELAXING: SEVERAL DAYS
6. BECOMING EASILY ANNOYED OR IRRITABLE: NEARLY EVERY DAY
5. BEING SO RESTLESS THAT IT IS HARD TO SIT STILL: SEVERAL DAYS
1. FEELING NERVOUS, ANXIOUS, OR ON EDGE: MORE THAN HALF THE DAYS
IF YOU CHECKED OFF ANY PROBLEMS ON THIS QUESTIONNAIRE, HOW DIFFICULT HAVE THESE PROBLEMS MADE IT FOR YOU TO DO YOUR WORK, TAKE CARE OF THINGS AT HOME, OR GET ALONG WITH OTHER PEOPLE: SOMEWHAT DIFFICULT
2. NOT BEING ABLE TO STOP OR CONTROL WORRYING: NEARLY EVERY DAY
GAD7 TOTAL SCORE: 14
7. FEELING AFRAID AS IF SOMETHING AWFUL MIGHT HAPPEN: MORE THAN HALF THE DAYS

## 2020-01-23 ASSESSMENT — PAIN SCALES - GENERAL: PAINLEVEL: MODERATE PAIN (4)

## 2020-01-23 ASSESSMENT — MIFFLIN-ST. JEOR: SCORE: 1419.57

## 2020-01-23 ASSESSMENT — PATIENT HEALTH QUESTIONNAIRE - PHQ9: SUM OF ALL RESPONSES TO PHQ QUESTIONS 1-9: 16

## 2020-01-23 NOTE — LETTER
[facility] [place]  [address]  [place] [address]  Phone: [phone]    January 23, 2020        Sharlene Mccord  [address]  [place] [address]          To whom it may concern:    RE: Sharlene Mccord    Patient was seen and treated today at our clinic.  She will no longer work due to medical illness.     Please contact me for questions or concerns.      Sincerely,        KEILY Cobos

## 2020-01-23 NOTE — TELEPHONE ENCOUNTER
Levothyroxine      Last Written Prescription Date:  Not on current medication list  Last Fill Quantity: n/a,   # refills: n/a  Last Office Visit: today  Future Office visit:    Next 5 appointments (look out 90 days)    Mar 25, 2020 11:00 AM CDT  (Arrive by 10:45 AM)  Return Visit with Aurelia Odell MD  [facility] ([facility] ) [address] [place] [id number] [phone]           Routing refill request to provider for review/approval because:  Drug not active on patient's medication list

## 2020-01-23 NOTE — PATIENT INSTRUCTIONS
Thank you for choosing [facility].   I have office hours 8:00 am to 4:30 pm on Monday's, Wednesday's, Thursday's and Friday's. My nurse and I are out of the office every Tuesday.    Following your visit, when your labs and diagnostic testing have returned, I will review then and you will be contacted by my nurse.  If you are on My Chart, you can also view results there.    For refills, notify your pharmacy regarding what you need and the pharmacy will generate a refill request. Do not call my nurse as she is unable to process refill request. Please plan ahead and allow 3-5 days for refill requests.    You will generally receive a reminder call the day prior to your appointment.  If you cannot attend your appointment, please cancel your appointment with as much notice as possible.  If there is a pattern of failure to present for your appointments, I cannot provide consistent, meaningful, ongoing care for you. It is very important to me that you come in for your care, so we can best assist you with your health care needs.    IMPORTANT:  Please note that it is my standard of practice to NOT participate in prescribing ongoing requested Narcotic Analgesic therapy, and/or participate in the prescribing of other controlled substances.  My nurse and I am happy to assist you with the process of referral for alternative pain management as needed, and other treatment modalities including but not limited to:  Physical Therapy, Physical Medicine and Rehab, Counseling, Chiropractic Care, Orthopedic Care, and non-narcotic medication management.     In the event that you need to be seen for emergent concerns and I am out of office,  please see one of my colleagues for acute concerns.  You may also present to  or ER.  I appreciate the opportunity to serve you and look forward to supporting your healthcare needs in the future. Please contact me with any questions or concerns that you may  have.    Sincerely,      Concha Hare RN, PA-C

## 2020-01-23 NOTE — NURSING NOTE
"Chief Complaint   Patient presents with     Depression     Musculoskeletal Problem       Initial /64 (BP Location: Right arm, Patient Position: Sitting, Cuff Size: Adult Regular)   Pulse 65   Temp 97.1  F (36.2  C) (Tympanic)   Resp 16   Ht 1.702 m (5' 7\")   Wt 81.2 kg (179 lb)   SpO2 98%   BMI 28.04 kg/m   Estimated body mass index is 28.04 kg/m  as calculated from the following:    Height as of this encounter: 1.702 m (5' 7\").    Weight as of this encounter: 81.2 kg (179 lb).  Medication Reconciliation: complete  Patty Mcintyre LPN  "

## 2020-01-24 ASSESSMENT — ANXIETY QUESTIONNAIRES: GAD7 TOTAL SCORE: 14

## 2020-02-07 ENCOUNTER — APPOINTMENT (OUTPATIENT)
Dept: GENERAL RADIOLOGY | Facility: HOSPITAL | Age: 60
End: 2020-02-07
Attending: NURSE PRACTITIONER
Payer: MEDICARE

## 2020-02-07 ENCOUNTER — HOSPITAL ENCOUNTER (EMERGENCY)
Facility: HOSPITAL | Age: 60
Discharge: HOME | End: 2020-02-07
Attending: NURSE PRACTITIONER | Admitting: NURSE PRACTITIONER
Payer: MEDICARE

## 2020-02-07 VITALS
BODY MASS INDEX: 28.28 KG/M2 | DIASTOLIC BLOOD PRESSURE: 63 MMHG | WEIGHT: 180.56 LBS | RESPIRATION RATE: 18 BRPM | SYSTOLIC BLOOD PRESSURE: 120 MMHG | OXYGEN SATURATION: 98 % | TEMPERATURE: 97.9 F

## 2020-02-07 DIAGNOSIS — N76.0 BACTERIAL VAGINOSIS: ICD-10-CM

## 2020-02-07 DIAGNOSIS — B96.89 BACTERIAL VAGINOSIS: ICD-10-CM

## 2020-02-07 LAB
ALBUMIN SERPL-MCNC: 4 G/DL (ref 3.4–5)
ALBUMIN UR-MCNC: NEGATIVE MG/DL
ALP SERPL-CCNC: 86 U/L (ref 40–150)
ALT SERPL W P-5'-P-CCNC: 29 U/L (ref 0–50)
ANION GAP SERPL CALCULATED.3IONS-SCNC: 5 MMOL/L (ref 3–14)
APPEARANCE UR: CLEAR
AST SERPL W P-5'-P-CCNC: 11 U/L (ref 0–45)
BASOPHILS # BLD AUTO: 0.1 10E9/L (ref 0–0.2)
BASOPHILS NFR BLD AUTO: 1 %
BILIRUB SERPL-MCNC: 0.3 MG/DL (ref 0.2–1.3)
BILIRUB UR QL STRIP: NEGATIVE
BUN SERPL-MCNC: 18 MG/DL (ref 7–30)
CALCIUM SERPL-MCNC: 8.8 MG/DL (ref 8.5–10.1)
CHLORIDE SERPL-SCNC: 108 MMOL/L (ref 94–109)
CO2 SERPL-SCNC: 25 MMOL/L (ref 20–32)
COLOR UR AUTO: NORMAL
CREAT SERPL-MCNC: 1.09 MG/DL (ref 0.52–1.04)
CRP SERPL-MCNC: <2.9 MG/L (ref 0–8)
DIFFERENTIAL METHOD BLD: NORMAL
EOSINOPHIL # BLD AUTO: 0.1 10E9/L (ref 0–0.7)
EOSINOPHIL NFR BLD AUTO: 1 %
ERYTHROCYTE [DISTWIDTH] IN BLOOD BY AUTOMATED COUNT: 12.2 % (ref 10–15)
GFR SERPL CREATININE-BSD FRML MDRD: 55 ML/MIN/{1.73_M2}
GLUCOSE SERPL-MCNC: 97 MG/DL (ref 70–99)
GLUCOSE UR STRIP-MCNC: NEGATIVE MG/DL
HCT VFR BLD AUTO: 42.3 % (ref 35–47)
HEMOCCULT SP1 STL QL: NEGATIVE
HGB BLD-MCNC: 14.4 G/DL (ref 11.7–15.7)
HGB UR QL STRIP: NEGATIVE
IMM GRANULOCYTES # BLD: 0 10E9/L (ref 0–0.4)
IMM GRANULOCYTES NFR BLD: 0.4 %
KETONES UR STRIP-MCNC: NEGATIVE MG/DL
LEUKOCYTE ESTERASE UR QL STRIP: NEGATIVE
LYMPHOCYTES # BLD AUTO: 3.4 10E9/L (ref 0.8–5.3)
LYMPHOCYTES NFR BLD AUTO: 32.5 %
MCH RBC QN AUTO: 29.9 PG (ref 26.5–33)
MCHC RBC AUTO-ENTMCNC: 34 G/DL (ref 31.5–36.5)
MCV RBC AUTO: 88 FL (ref 78–100)
MONOCYTES # BLD AUTO: 0.5 10E9/L (ref 0–1.3)
MONOCYTES NFR BLD AUTO: 4.9 %
NEUTROPHILS # BLD AUTO: 6.3 10E9/L (ref 1.6–8.3)
NEUTROPHILS NFR BLD AUTO: 60.2 %
NITRATE UR QL: NEGATIVE
NRBC # BLD AUTO: 0 10*3/UL
NRBC BLD AUTO-RTO: 0 /100
PH UR STRIP: 6 PH (ref 4.7–8)
PLATELET # BLD AUTO: 210 10E9/L (ref 150–450)
POTASSIUM SERPL-SCNC: 4.4 MMOL/L (ref 3.4–5.3)
PROT SERPL-MCNC: 7.9 G/DL (ref 6.8–8.8)
RBC # BLD AUTO: 4.81 10E12/L (ref 3.8–5.2)
SODIUM SERPL-SCNC: 138 MMOL/L (ref 133–144)
SOURCE: NORMAL
SP GR UR STRIP: 1.01 (ref 1–1.03)
SPECIMEN SOURCE: ABNORMAL
UROBILINOGEN UR STRIP-MCNC: NORMAL MG/DL (ref 0–2)
WBC # BLD AUTO: 10.4 10E9/L (ref 4–11)
WET PREP SPEC: ABNORMAL

## 2020-02-07 PROCEDURE — 82274 ASSAY TEST FOR BLOOD FECAL: CPT | Performed by: NURSE PRACTITIONER

## 2020-02-07 PROCEDURE — G0463 HOSPITAL OUTPT CLINIC VISIT: HCPCS

## 2020-02-07 PROCEDURE — 86140 C-REACTIVE PROTEIN: CPT | Performed by: NURSE PRACTITIONER

## 2020-02-07 PROCEDURE — 81003 URINALYSIS AUTO W/O SCOPE: CPT | Performed by: EMERGENCY MEDICINE

## 2020-02-07 PROCEDURE — 82272 OCCULT BLD FECES 1-3 TESTS: CPT | Performed by: NURSE PRACTITIONER

## 2020-02-07 PROCEDURE — 74018 RADEX ABDOMEN 1 VIEW: CPT | Mod: TC

## 2020-02-07 PROCEDURE — 99213 OFFICE O/P EST LOW 20 MIN: CPT | Mod: Z6 | Performed by: NURSE PRACTITIONER

## 2020-02-07 PROCEDURE — 87210 SMEAR WET MOUNT SALINE/INK: CPT | Performed by: NURSE PRACTITIONER

## 2020-02-07 PROCEDURE — 36415 COLL VENOUS BLD VENIPUNCTURE: CPT | Performed by: NURSE PRACTITIONER

## 2020-02-07 PROCEDURE — 85025 COMPLETE CBC W/AUTO DIFF WBC: CPT | Performed by: NURSE PRACTITIONER

## 2020-02-07 PROCEDURE — 80053 COMPREHEN METABOLIC PANEL: CPT | Performed by: NURSE PRACTITIONER

## 2020-02-07 RX ORDER — METRONIDAZOLE 7.5 MG/G
GEL TOPICAL 2 TIMES DAILY
Qty: 45 G | Refills: 0 | Status: SHIPPED | OUTPATIENT
Start: 2020-02-07 | End: 2020-03-25

## 2020-02-07 ASSESSMENT — ENCOUNTER SYMPTOMS
VOMITING: 0
ABDOMINAL PAIN: 1
COUGH: 1
FEVER: 0
FREQUENCY: 1
HEMATURIA: 0
FLANK PAIN: 1
HEADACHES: 1
DYSURIA: 1
SHORTNESS OF BREATH: 0
CONSTIPATION: 1
ACTIVITY CHANGE: 1
CHILLS: 1
NAUSEA: 1
BACK PAIN: 1

## 2020-02-07 NOTE — ED AVS SNAPSHOT
[facility]  [address] [place] [address]  [place] [address]  Phone:  [phone]                                    hSarlene Mccord   MRN: [id number]    Department:  [facility]   Date of Visit:  2/7/2020           After Visit Summary Signature Page    I have received my discharge instructions, and my questions have been answered. I have discussed any challenges I see with this plan with the nurse or doctor.    ..........................................................................................................................................  Patient/Patient Representative Signature      ..........................................................................................................................................  Patient Representative Print Name and Relationship to Patient    ..................................................               ................................................  Date                                   Time    ..........................................................................................................................................  Reviewed by Signature/Title    ...................................................              ..............................................  Date                                               Time          22EPIC Rev 08/18

## 2020-02-07 NOTE — ED TRIAGE NOTES
Pt presents today with c/o possible UTI. Started yesterday. Lower back pain, pelvic pain, bladder spasms, burning with urination.

## 2020-02-07 NOTE — DISCHARGE INSTRUCTIONS
Increase fluids, (at 6 to 8 glasses daily unless restricted for there medical reasons).   May use acetaminophen for fever.   Avoid sexual intercourse until your symptoms are gone  Metrogel as prescribed. Complete all medications even if your symptoms are gone. May take with food unless instructed not to, to prevent stomach upset. Avoid sex until after you have taken all your medication.  PREVENTING FUTURE INFECTIONS: Keep genital area clean and dry. Personal hygiene wiping from front to back after elimination. Do not hold your urine for long periods of time. Make sure and relieve yourself at least every two hours. Urinate before and right after intercourse to flush out the bladder. Avoid douches and feminine sprays. Wear cotton underwear and cotton-lined panty hose; avoid tight-fitting pants. Avoid caffeine, alcohol, and spicy foods as they can irritate the bladder.    FOLLOW UP Return to this facility or see your doctor if ALL symptoms are not gone after three days of treatment.    GET PROMPT MEDICAL ATTENTION if any of the following occur:  Fever over 101 F (38.3 C), No improvement by the third day of treatment, Increasing back or abdominal pain, vomiting, unable to keep fluids or medicine down, weakness, dizziness, or fainting, vaginal discharge, pain, redness, or swelling of the vaginal area

## 2020-02-07 NOTE — ED PROVIDER NOTES
History     Chief Complaint   Patient presents with     Rule out Urinary Tract Infection     HPI  Sharlene Mccord is a 59 year old female who presents with chills, painful urination, frequency, urgency, and nausea. This is accompanied with pelvic, flank, back, and lower abdominal pain. She has a dry cough and a headache for which she took medical cannabis last evening. She has a history of GERD, hiatal hernia, hemorrhoids, constipation, irritable bowel syndrome, and urinary tract infections, including kidney infections. She has a history of hysterectomy and cardiovascular disease and is on  Cardizem daily. She applies hydrocortisone cream to hemorrhoids daily. Her last BM was this am and was a sticky mushy bowel movement: chocolate colored, not black. She is a 1/2 pack per day smoker. Denies fevers, vomiting,  and shortness of breath.      Allergies:  Allergies   Allergen Reactions     Codeine      Isosorbide Mononitrate Other (See Comments)     Vomiting and headache       Lisinopril      [facility] scan       Problem List:    Patient Active Problem List    Diagnosis Date Noted     SHAKIR (generalized anxiety disorder) 10/02/2019     Priority: Medium     Patient is followed by  for ongoing prescription of benzodiazepines.  All refills should be approved by this provider, or covering partner.    Medication(s): Klonopin 1 mg.   Maximum quantity per month: 60  Clinic visit frequency required: Q 3 months     Controlled substance agreement on file: Yes       Date(s): 9.25.19  Benzodiazepine use reviewed by psychiatry:  Yes       Date(s): 9.25.19    Last [facility] website verification:  done on 9.25.19  [email or web address]           ACP (advance care planning) 09/11/2017     Priority: Medium     Advance Care Planning 9/11/2017: ACP Review of Chart / Resources Provided:  Reviewed chart for advance care plan.  Sharlene Mccord has been provided information and resources to begin or update their advance  care plan.  Added by Melina Armas        Advance Care Planning 7/11/2016: ACP Review of Chart / Resources Provided:  Reviewed chart for advance care plan.  Sharlene Mccord has been provided information and resources to begin or update their advance care plan.  Added by Kortney Smalls             Mixed connective tissue disease (H) 07/05/2017     Priority: Medium     Chronic, continuous use of opioids 06/07/2017     Priority: Medium     Patient is followed by KEILY Cobos for ongoing prescription of pain medication.  All refills should only be approved by this provider, or covering partner.    Medication(s): Ultram 50mg.   Maximum quantity per month: #180  Clinic visit frequency required: Q 3 months     Controlled substance agreement:  Encounter-Level CSA - 06/05/2017:                 Controlled Substance Agreement - Scan on 6/6/2017 12:39 PM : CONTROLLED SUBSTANCE AGREEMENT (below)            Pain Clinic evaluation in the past: No    DIRE Total Score(s):  No flowsheet data found.    Last [facility] website verification:  done on 6.2.17   [email or web address]         Migraine without aura and responsive to treatment 11/23/2015     Priority: Medium     Major depressive disorder, recurrent episode (H) 12/17/2014     Priority: Medium     Problem list name updated by automated process. Provider to review       Cognitive disorder 07/08/2014     Priority: Medium     Major depressive disorder 07/08/2014     Priority: Medium     Memory loss 07/02/2014     Priority: Medium     Constipation 04/28/2014     Priority: Medium     Chronic pain 03/12/2014     Priority: Medium     Aortic valve disorder 08/13/2013     Priority: Medium     Problem list name updated by automated process. Provider to review       Diverticulitis of sigmoid colon 05/11/2013     Priority: Medium     Diverticulitis 05/11/2013     Priority: Medium     Cardiac microvascular disease 04/10/2013     Priority: Medium     [facility]        Hypothyroidism 03/01/2011     Priority: Medium     Problem list name updated by automated process. Provider to review       Anxiety state 03/01/2011     Priority: Medium     Problem list name updated by automated process. Provider to review       Fatigue 07/01/2008     Priority: Medium     Atypical migraine 07/01/2008     Priority: Medium        Past Medical History:    Past Medical History:   Diagnosis Date     Anxiety state, unspecified 03/01/2011     Aortic valve disorders 06/07/2000     Cardiac microvascular disease 4/10/2013     Depressive disorder 1997     Fatigue      Fatigue      Fibromyalgia 03/01/2011     Hypertension      Major depressive disorder, recurrent episode, unspecified 12/17/2014     Migraine, unspecified, without mention of intractable migraine without mention of status migrainosus 03/01/2011     Mitral valve disorders(424.0) 10/16/2007     PONV (postoperative nausea and vomiting)      Thyroid Nodule 03/01/2011     Tobacco use disorder 03/01/2011     Unspecified hypothyroidism 03/01/2011       Past Surgical History:    Past Surgical History:   Procedure Laterality Date     APPENDECTOMY  1977     BIOPSY  2017    taken at [facility] from Dr. Nadira cohen. Thyroid     CARDIAC SURGERY  2013    angiogram [facility]:  Normal coronary arteries.  Felt ot have some microvascular disease     CL AFF SURGICAL PATHOLOGY  01/02/2007    Thyroid Mass     COLONOSCOPY  1/13/2014    Procedure: COLONOSCOPY;  COLONOSCOPY ;  Surgeon: Chasidy Gee DO;  Location: [facility]     ESOPHAGOSCOPY, GASTROSCOPY, DUODENOSCOPY (EGD), COMBINED N/A 2/9/2017    Procedure: COMBINED ESOPHAGOSCOPY, GASTROSCOPY, DUODENOSCOPY (EGD);  Surgeon: Johnathan Ruff DO;  Location: [facility]     GYN SURGERY      c-sections x 3     HYSTERECTOMY TOTAL ABDOMINAL, BILATERAL SALPINGO-OOPHORECTOMY, COMBINED N/A 01/01/2001     LAPAROSCOPIC CHOLECYSTECTOMY  11/07/2003    Cholelithiasis     LAPAROTOMY EXPLORATORY      abdominal pain/fever      LARYNGOSCOPY       SPLENECTOMY         Family History:    Family History   Problem Relation Age of Onset     C.A.D. Father      Hypertension Father      C.A.D. Mother      Cerebrovascular Disease Mother         CVA     Hypertension Mother      Coronary Artery Disease Mother      Diabetes Paternal Grandmother      Diabetes Paternal Grandfather      Diabetes Sister      Breast Cancer Sister      Diabetes Maternal Grandmother      Diabetes Sister      Breast Cancer Sister      Hypertension Sister      Depression Sister      Anxiety Disorder Sister      Obesity Sister      Depression Sister      Breast Cancer Sister      Obesity Sister      Depression Brother      Depression Daughter      Obesity Daughter      Depression Daughter      Mental Illness Daughter         [email or web address]     Anxiety Disorder Daughter      Mental Illness Daughter      Anxiety Disorder Other      Osteoporosis Other      Thyroid Disease Other      Diabetes Nephew        Social History:  Marital Status:   [2]  Social History     Tobacco Use     Smoking status: Light Tobacco Smoker     Packs/day: 0.25     Years: 40.00     Pack years: 10.00     Types: Cigarettes     Start date: 1/1/1975     Smokeless tobacco: Never Used     Tobacco comment: 5 cigs daily  Patient will work on smoking cessation (3-26-18)   Substance Use Topics     Alcohol use: No     Drug use: No        Medications:    clonazePAM (KLONOPIN) 1 MG tablet  diltiazem ER (TIAZAC) 300 MG 24 hr ER beaded capsule  estradiol (ESTRACE) 0.5 MG tablet  levothyroxine (SYNTHROID/LEVOTHROID) 75 MCG tablet  metroNIDAZOLE (METROGEL) 0.75 % external gel  pantoprazole (PROTONIX) 40 MG EC tablet  PROCTOZONE-HC 2.5 % cream  NITROSTAT 0.4 MG sublingual tablet  SUMAtriptan (IMITREX) 100 MG tablet  UNABLE TO FIND  UNABLE TO FIND          Review of Systems   Constitutional: Positive for activity change and chills. Negative for fever.   Respiratory: Positive for cough (smoker). Negative for shortness of  breath.    Gastrointestinal: Positive for abdominal pain, constipation (this morning sticky stools mush) and nausea. Negative for vomiting.        Feel bloated   Genitourinary: Positive for dysuria, flank pain (minor), frequency, pelvic pain and urgency. Negative for decreased urine volume, hematuria, vaginal bleeding, vaginal discharge and vaginal pain.        Not sexuially active. Has external hemerrhoids. Hysterectomy at  40   Musculoskeletal: Positive for back pain.   Neurological: Positive for headaches (does not usually have headaches, medical cannibis aborted them).       Physical Exam   BP: 120/63  Heart Rate: 52  Temp: 97.9  F (36.6  C)  Resp: 18  Weight: 81.9 kg (180 lb 8.9 oz)  SpO2: 98 %      Physical Exam  Vitals signs and nursing note reviewed. Exam conducted with a chaperone present.   Constitutional:       General: She is in acute distress (mild ).   HENT:      Head: Normocephalic.   Cardiovascular:      Rate and Rhythm: Regular rhythm. Bradycardia present.      Heart sounds: Normal heart sounds. No murmur.   Pulmonary:      Effort: Pulmonary effort is normal.      Breath sounds: Normal breath sounds. No wheezing.   Abdominal:      General: Abdomen is flat. Bowel sounds are increased. There is no distension.      Palpations: Abdomen is soft. There is no hepatomegaly or splenomegaly.      Tenderness: There is abdominal tenderness in the right lower quadrant, suprapubic area and left lower quadrant. There is right CVA tenderness. There is no left CVA tenderness.      Hernia: No hernia is present. There is no hernia in the umbilical area.   Genitourinary:     Exam position: Lithotomy position.      Labia:         Right: No tenderness.         Left: No tenderness.       Vagina: Vaginal discharge (minimal white discharge) present. No erythema, tenderness or lesions.      Rectum: External hemorrhoid (not bleeding) present.      Comments: No odor noted  Neurological:      Mental Status: She is alert.          ED Course        Procedures               No results found for this or any previous visit (from the past 24 hour(s)).    Medications - No data to display    Assessments & Plan (with Medical Decision Making)     I have reviewed the nursing notes.    I have reviewed the findings, diagnosis, plan and need for follow up with the patient.  (N76.0,  B96.89) Bacterial vaginosis  Comment: 59 year old female who presents with chills, painful urination, frequency, urgency, and nausea. This is accompanied with pelvic, flank, back, and lower abdominal pain. She has a dry cough and a headache for which she took medical cannabis last evening. She has a history of GERD, hiatal hernia, hemorrhoids, constipation, irritable bowel syndrome, and urinary tract infections, including kidney infections. She has a history of hysterectomy and cardiovascular disease and is on  Cardizem daily. She applies hydrocortisone cream to hemorrhoids daily. Her last BM was this am and was a sticky mushy bowel movement: chocolate colored, not black. She is a 1/2 pack per day smoker. Denies fevers, vomiting,  and shortness of breath.  During the exam she complained of lower abdominal pain accompanied with right CVA tenderness with palpation. Her UA was negative and this warranted further investigation of her abdominal and urinary symptoms.   Normal vaginal exam. Minimal white secretions noted internal vaginal canal. No odor noted.  CBC, CRP, and UA are negative. CMP negatvie except for Creatinine of 1.09 and GFR of 55 which is comparable to her last renal function test. Wet prep positive for clue cells. Occul stool negative  Abdominal x-ray reviewed and per radiology: Intestinal gas pattern is normal. There is no extraluminal gas or pathologic intra-abdominal calcifications. Some surgical clips are seen in the right upper quadrant. Normal abdominal gas pattern  Plan: Metronidazole vaginal gel once daily for five days.(changed to appropriate  medication per pharmacy). Education provided on these medication and bacterial vaginosis.   Increase fluids.Acetaminophen for discomfort.Pyridium three times daily as needed  Adding cranberry tablets to your medication regimen may help to prevent future urinary tract infections.  Avoid sexual intercourse until you have completed your medication.   Personal hygiene discussed.  Follow-up with your primary care provider to evaluate for resolution of UTI.  Return to ER if any of the following occur: Worsening of symptoms. Fever over 101 F (38.3 C), No improvement by the third day of treatment, Increasing back or abdominal pain, vomiting, unable to keep fluids or medicine down, weakness, dizziness, or fainting, vaginal discharge, pain, redness, or swelling of the vaginal area  These discharge instructions and medications were reviewed with her and understanding verbalized.    Discharge Medication List as of 2/7/2020  1:22 PM      START taking these medications    Details   metroNIDAZOLE (METROGEL) 0.75 % external gel Apply topically 2 times daily for 5 daysDisp-45 g, [id number]             Final diagnoses:   Bacterial vaginosis       2/7/2020   [facility]       Domitila Stinson, CNP  02/09/20 [id number]       Domitila Stinson, CNP  02/09/20 [id number]

## 2020-02-10 ENCOUNTER — HEALTH MAINTENANCE LETTER (OUTPATIENT)
Age: 60
End: 2020-02-10

## 2020-02-18 DIAGNOSIS — F41.1 GAD (GENERALIZED ANXIETY DISORDER): ICD-10-CM

## 2020-02-19 RX ORDER — CLONAZEPAM 1 MG/1
TABLET ORAL
Qty: 60 TABLET | Refills: 0 | Status: SHIPPED | OUTPATIENT
Start: 2020-02-19 | End: 2020-03-13

## 2020-02-19 NOTE — TELEPHONE ENCOUNTER
Klonopin 1 mg -  reviewed   Last visit: 9.25.19  Last refill: 1.20.20    Future appointments:   Next 5 appointments (look out 90 days)    Mar 25, 2020 11:00 AM CDT  (Arrive by 10:45 AM)  Return Visit with Aurelia Odell MD  [facility] ([facility] ) [address] [place] [id number] [phone]   May 12, 2020 10:30 AM CDT  (Arrive by 10:15 AM)  Return Visit with Norman Nichole MD  [facility] ([facility] ) [facility] [address]  [phone]

## 2020-02-26 PROBLEM — K21.00 GASTROESOPHAGEAL REFLUX DISEASE WITH ESOPHAGITIS: Chronic | Status: ACTIVE | Noted: 2017-02-09

## 2020-03-12 DIAGNOSIS — Z79.890 POSTMENOPAUSAL HRT (HORMONE REPLACEMENT THERAPY): ICD-10-CM

## 2020-03-12 DIAGNOSIS — F41.1 GAD (GENERALIZED ANXIETY DISORDER): ICD-10-CM

## 2020-03-12 DIAGNOSIS — I25.85 CARDIAC MICROVASCULAR DISEASE: ICD-10-CM

## 2020-03-12 NOTE — TELEPHONE ENCOUNTER
klonopin      Last Written Prescription Date:  2/19/20  Last Fill Quantity: 60,   # refills: 0  Last Office Visit: 1/23/20  Future Office visit:    Next 5 appointments (look out 90 days)    Mar 25, 2020 11:00 AM CDT  (Arrive by 10:45 AM)  Return Visit with Aurelia Odell MD  [facility] [address] [place] [id number] [phone]   May 12, 2020 10:30 AM CDT  (Arrive by 10:15 AM)  Return Visit with Norman Nichole MD  [facility] [address] [facility] [address]  [phone]           Routing refill request to provider for review/approval because:  Drug not on the [facility], [facility] or  Health refill protocol or controlled substance

## 2020-03-13 RX ORDER — ESTRADIOL 0.5 MG/1
TABLET ORAL
Qty: 60 TABLET | Refills: 3 | Status: SHIPPED | OUTPATIENT
Start: 2020-03-13 | End: 2020-07-29

## 2020-03-13 RX ORDER — CLONAZEPAM 1 MG/1
TABLET ORAL
Qty: 60 TABLET | Refills: 0 | Status: SHIPPED | OUTPATIENT
Start: 2020-03-17 | End: 2020-04-24

## 2020-03-13 NOTE — TELEPHONE ENCOUNTER
diltiazem ER (TIAZAC) 300 MG 24 hr ER beaded capsule      Last Written Prescription Date:  12/24/19  Last Fill Quantity: 90,   # refills: 0  Last Office Visit: 1/23/20  Future Office visit:    Next 5 appointments (look out 90 days)    Mar 25, 2020 11:00 AM CDT  (Arrive by 10:45 AM)  Return Visit with [facility]ell MD  [facility] ([facility] ) [address] [place] [id number] [phone]   May 12, 2020 10:30 AM CDT  (Arrive by 10:15 AM)  Return Visit with Norman Nichole MD  [facility] ([facility] ) [facility] [address]  [phone]           Routing refill request to provider for review/approval because:   Normal serum creatinine on file in past 12 months     Creatinine   Date Value Ref Range Status   02/07/2020 1.09 (H) 0.52 - 1.04 mg/dL Final

## 2020-03-15 RX ORDER — DILTIAZEM HYDROCHLORIDE 300 MG/1
CAPSULE, EXTENDED RELEASE ORAL
Qty: 90 CAPSULE | Refills: 3 | Status: ON HOLD | OUTPATIENT
Start: 2020-03-15 | End: 2020-07-31

## 2020-03-25 ENCOUNTER — VIRTUAL VISIT (OUTPATIENT)
Dept: PSYCHIATRY | Facility: OTHER | Age: 60
End: 2020-03-25
Attending: PSYCHIATRY & NEUROLOGY
Payer: COMMERCIAL

## 2020-03-25 DIAGNOSIS — F33.1 MAJOR DEPRESSIVE DISORDER, RECURRENT EPISODE, MODERATE (H): Primary | ICD-10-CM

## 2020-03-25 PROCEDURE — 99214 OFFICE O/P EST MOD 30 MIN: CPT | Mod: 95 | Performed by: PSYCHIATRY & NEUROLOGY

## 2020-03-25 ASSESSMENT — ANXIETY QUESTIONNAIRES
7. FEELING AFRAID AS IF SOMETHING AWFUL MIGHT HAPPEN: NEARLY EVERY DAY
2. NOT BEING ABLE TO STOP OR CONTROL WORRYING: NEARLY EVERY DAY
5. BEING SO RESTLESS THAT IT IS HARD TO SIT STILL: MORE THAN HALF THE DAYS
GAD7 TOTAL SCORE: 19
IF YOU CHECKED OFF ANY PROBLEMS ON THIS QUESTIONNAIRE, HOW DIFFICULT HAVE THESE PROBLEMS MADE IT FOR YOU TO DO YOUR WORK, TAKE CARE OF THINGS AT HOME, OR GET ALONG WITH OTHER PEOPLE: EXTREMELY DIFFICULT
3. WORRYING TOO MUCH ABOUT DIFFERENT THINGS: NEARLY EVERY DAY
6. BECOMING EASILY ANNOYED OR IRRITABLE: MORE THAN HALF THE DAYS
1. FEELING NERVOUS, ANXIOUS, OR ON EDGE: NEARLY EVERY DAY

## 2020-03-25 ASSESSMENT — PATIENT HEALTH QUESTIONNAIRE - PHQ9: 5. POOR APPETITE OR OVEREATING: NEARLY EVERY DAY

## 2020-03-25 NOTE — PROGRESS NOTES
"Sharlene Mccord is a 59 year old female who is being evaluated via a telephone visit.      The patient has been notified of following (by RAMIRO Mast LPN     \"We have found that certain health care needs can be provided without the need for a physical exam.  This service lets us provide the care you need with a short phone conversation.  If a prescription is necessary we can send it directly to your pharmacy.  If lab work is needed we can place an order for that and you can then stop by our lab to have the test done at a later time.    This telephone visit will be conducted via 3 way call with the you (the patient) , the physician/provider, and a me all on the line at the same time.  This allows your physician/provider to have the phone conversation with you while I will be taking notes for your medical record.  We will have full access to your [facility] medical record during this entire phone call.    Since this is like an office visit,  will bill your insurance company for this service.  Please check with your medical insurance if this type of telephone/virtual is covered . You may be responsible for the cost of this service if insurance coverage is denied.  The typical cost is $30 (10min), $59(11-20min) and $85 (21-30min)     If during the course of the call the physician/provider feels a telephone visit is not appropriate, you will not be charged for this service\"    Consent has been obtained for this service by care team member: yes.  See the scanned image in the medical record.      Total time of call between patient and provider was 20 minutes     Aurelia Odell (MD signature)  ===================================================    I have reviewed the note as documented above.  This accurately captures the substance of my conversation with the patient,                  "

## 2020-03-25 NOTE — PROGRESS NOTES
"Sharlene Mccord is a 59 year old female who is being evaluated via a billable telephone visit.      The patient has been notified of following:     \"This telephone visit will be conducted via a call between you and your physician/provider. We have found that certain health care needs can be provided without the need for a physical exam.  This service lets us provide the care you need with a short phone conversation.  If a prescription is necessary we can send it directly to your pharmacy.  If lab work is needed we can place an order for that and you can then stop by our lab to have the test done at a later time.    If during the course of the call the physician/provider feels a telephone visit is not appropriate, you will not be charged for this service.\"     Sharlene Mccord complains of    Chief Complaint   Patient presents with     RECHECK       I have reviewed and updated the patient's Past Medical History, Social History, Family History and Medication List.    ALLERGIES  Codeine; Isosorbide mononitrate; and Lisinopril    Phone call duration: 20 minutes    SUBJECTIVE / INTERIM HISTORY                                                                       Last visit was ~9 months ago thus Sept '19: -- Continue Klonopin 1 mg bid.    - [place] trip was short  -  Notes \"like others I think depression and anxiety are up\"  - right before Yoel was at [employer] doing  but couldn't keep up with it mentally and phsyically  - wishes they could go out to their cabin on [place]   - Roney has been really good with running to stores, etc.   - Stopped medical MJ  - hasn't been talking with her daughters much except for April  - rheumatology consult: (autoimmune tests some pos tested recently) and sounds like no definitive dx made    SUBSTANCE USE- no issues    SYMPTOMS- Depressed mood, low energy, anhedonia, feelings guilt & overwhelmed, poor sleep. cognitive issues including word finding  MEDICAL ROS- nausea and " "vomiting is better, with moving / exercise leg \"heaviness\" and pain,balance issues, Hypersomnia.  Pain: neck, DDD in neck. Numbness and tingling in legs and feet worse at times when sitting  MEDICAL / SURGICAL HISTORY                 Medical Team:     TIEN- Concha Hare     Therapist-none   Patient Active Problem List   Diagnosis     Diverticulitis of sigmoid colon     Hypothyroidism     Anxiety state     Cardiac microvascular disease     Diverticulitis     Aortic valve disorder     ACP (advance care planning)     Chronic pain     Constipation     Memory loss     Major depressive disorder, recurrent episode (H)     Cognitive disorder     Major depressive disorder     Fatigue     Migraine without aura and responsive to treatment     Atypical migraine     Chronic, continuous use of opioids     Mixed connective tissue disease (H)     SHAKIR (generalized anxiety disorder)     Gastroesophageal reflux disease with esophagitis     ALLERGY   Codeine; Isosorbide mononitrate; and Lisinopril  MEDICATIONS                                                                                             Current Outpatient Medications   Medication Sig     clonazePAM (KLONOPIN) 1 MG tablet TAKE 1/2 TO 1 (ONE-HALF TO ONE) TABLET BY MOUTH TWICE DAILY AS NEEDED FOR ANXIETY     diltiazem ER (TIAZAC) 300 MG 24 hr ER beaded capsule Take 1 capsule by mouth once daily     estradiol (ESTRACE) 0.5 MG tablet Take 1 tablet by mouth twice daily     levothyroxine (SYNTHROID/LEVOTHROID) 75 MCG tablet TAKE 1 TABLET BY MOUTH ONCE DAILY     NITROSTAT 0.4 MG sublingual tablet Place 1 tablet (0.4 mg) under the tongue every 5 minutes as needed for chest pain     pantoprazole (PROTONIX) 40 MG EC tablet Take by mouth 30-60 minutes before a meal.     PROCTOZONE-HC 2.5 % cream APPLY CREAM TO AFFECTED AREA THREE TIMES DAILY     SUMAtriptan (IMITREX) 100 MG tablet TAKE ONE TABLET BY MOUTH AS NEEDED MIGRAINES     UNABLE TO FIND 1.5 mg 3 times daily MEDICATION NAME: " Oral Cannabis    Oral suspension  Taken morning, afternoon and bedtime     UNABLE TO FIND MEDICATION NAME: Cannabis as needed     [place] Vapor take one puff to the count of two at bedtime.  If needed in the middle of the night     No current facility-administered medications for this visit.        VITALS   There were no vitals taken for this visit.    PHQ9                       PHQ-9 SCORE 6/27/2019 9/25/2019 1/23/2020   PHQ-9 Total Score - - -   PHQ-9 Total Score 21 19 16     Labs:    Liver Function Studies -   Recent Labs   Lab Test  01/13/16   0941   PROTTOTAL  7.5   ALBUMIN  3.7   BILITOTAL  0.3   ALKPHOS  92   AST  32   ALT  63*     Recent Labs   Lab Test  01/13/16   0941  03/28/14   1008   CHOL  244*  238*   HDL  64  81*   LDL  147*  143*   TRIG  164*  72   CHOLHDLRATIO   --   2.9*       MENTAL STATUS EXAM                                                                                       Mood was anxious and depressed and affect was congruent to speech content and full .  Thought process, including associations, was unremarkable and thought content was devoid of suicidal and homicidal ideation and psychotic thought. No hallucinations. Insight was good. Judgment was intact and adequate for safety. Fund of knowledge was intact. Pt demonstrates no obvious problems with attention, concentration, language, recent or remote memory although these were not formally tested.     ASSESSMENT                                                                                                      HISTORICAL:  Initial psych consult 12/12/14            Notes:  Ritalin as adjunct to antidepressant: didn't like how made her feel.   Prozac, Celexa ineffective. Wellbutrin: weight gain. . Zoloft: weight gain. Effexor back while: didn't stay on long and can't recall why. Cymbalta: was on and didn't help. She tried Provigil and SEs. Buspar: HAs.   Neuropsych testing summer 2015:  language: about same, working memory: better than  last year. Attention / concentration worse than last year. Speed processing: improved. Executive function: didn't test well on that front also comparable to last year. Memory: worse off than last year. Depression: similar to last year. Anxiety / emotional anxiety worse. As part of assessment they think should possible have another MRI  CURRENT: This patient provides a history which supports the diagnoses of Major depressive disorder, recurrent, mod and mild neurocognitive disorder. So glad to see her doing bit better! She has come off lot of meds even.    in terms of risks vs. Benefits Sharlene and I do NOT feel would be good idea to take her off Klonopin. We feel she would get to point she would be unable to get out of her house. We did today however go over issues with long-term use benzodiazepines and things to think about such as contributing to memory issues, physical & mental dependence to benzos, falls.    Today we reviewed med options and going to see if insurance would cover vortioxetine. We reviewed most common SEs.       TREATMENT RISK STATEMENT: The risks, benefits, alternatives and potential adverse effects have been explained and are understood by the pt. The pt agrees to the treatment plan with the ability to do so. The pt knows to call the clinic for any problems or access emergency care if needed.    DIAGNOSES             Major depressive disorder, recurrent moderate  Mild neurocognitive disorder      PLAN                                                                                                                         1) MEDICATION:   -- Continue Klonopin 1 mg bid. Start vortioxetine 10 mg daily.     2) THERAPY: no change.     3) LABS: none    4) Other: Neuropsych testing with Dr. Johnathan Min in past    8)  RTC: ~4-6 weeks      Customer service [phone]

## 2020-03-26 ASSESSMENT — ANXIETY QUESTIONNAIRES: GAD7 TOTAL SCORE: 19

## 2020-04-24 DIAGNOSIS — F41.1 GAD (GENERALIZED ANXIETY DISORDER): ICD-10-CM

## 2020-04-24 RX ORDER — CLONAZEPAM 1 MG/1
TABLET ORAL
Qty: 60 TABLET | Refills: 0 | Status: SHIPPED | OUTPATIENT
Start: 2020-04-24 | End: 2020-05-26

## 2020-04-24 NOTE — TELEPHONE ENCOUNTER
clonazePAM (KLONOPIN) 1 MG tablet         Last Written Prescription Date:  3/17/20  Last Fill Quantity: 60,   # refills: 0  Last Office Visit: 3/25/20 Virtual Visit   Future Office visit:    Next 5 appointments (look out 90 days)    May 12, 2020 10:30 AM CDT  (Arrive by 10:15 AM)  [facility]  [facility] [address] [place] [id number]  [phone]   May 13, 2020 11:00 AM CDT  Telephone Visit with Aurelia Odell MD  [facility] [address] [place] [address] [id number] [phone]           Routing refill request to provider for review/approval because:    Drug not on the [facility], [facility] or  Health refill protocol or controlled substance

## 2020-05-12 ENCOUNTER — VIRTUAL VISIT (OUTPATIENT)
Dept: CARDIOLOGY | Facility: OTHER | Age: 60
End: 2020-05-12
Attending: INTERNAL MEDICINE
Payer: COMMERCIAL

## 2020-05-12 VITALS — HEIGHT: 67 IN | WEIGHT: 179 LBS | BODY MASS INDEX: 28.09 KG/M2

## 2020-05-12 DIAGNOSIS — R00.1 SINUS BRADYCARDIA: ICD-10-CM

## 2020-05-12 DIAGNOSIS — I35.9 AORTIC VALVE DISORDER: ICD-10-CM

## 2020-05-12 DIAGNOSIS — I25.85 CARDIAC MICROVASCULAR DISEASE: Primary | ICD-10-CM

## 2020-05-12 DIAGNOSIS — R07.9 CHEST PAIN, UNSPECIFIED TYPE: ICD-10-CM

## 2020-05-12 PROCEDURE — 99214 OFFICE O/P EST MOD 30 MIN: CPT | Mod: 95 | Performed by: INTERNAL MEDICINE

## 2020-05-12 RX ORDER — NITROGLYCERIN 0.4 MG/1
0.4 TABLET SUBLINGUAL EVERY 5 MIN PRN
Qty: 25 TABLET | Refills: 11 | Status: SHIPPED | OUTPATIENT
Start: 2020-05-12 | End: 2020-05-27

## 2020-05-12 ASSESSMENT — MIFFLIN-ST. JEOR: SCORE: 1419.57

## 2020-05-12 ASSESSMENT — PAIN SCALES - GENERAL: PAINLEVEL: NO PAIN (0)

## 2020-05-12 NOTE — NURSING NOTE
"Chief Complaint   Patient presents with     Cardiology Problem     Follow Up       Initial Ht 1.702 m (5' 7\")   Wt 81.2 kg (179 lb)   BMI 28.04 kg/m   Estimated body mass index is 28.04 kg/m  as calculated from the following:    Height as of this encounter: 1.702 m (5' 7\").    Weight as of this encounter: 81.2 kg (179 lb).  Medication Reconciliation: complete  Chasidy Philip LPN    "

## 2020-05-12 NOTE — LETTER
"5/12/2020      RE: Sharlene Mccord  [address]  [facility] [address]       Sharlene Mccord is a 59 year old female who is being evaluated via a billable telephone visit.      The patient has been notified of following:     \"This telephone visit will be conducted via a call between you and your physician/provider. We have found that certain health care needs can be provided without the need for a physical exam.  This service lets us provide the care you need with a short phone conversation.  If a prescription is necessary we can send it directly to your pharmacy.  If lab work is needed we can place an order for that and you can then stop by our lab to have the test done at a later time.    Telephone visits are billed at different rates depending on your insurance coverage. During this emergency period, for some insurers they may be billed the same as an in-person visit.  Please reach out to your insurance provider with any questions.    If during the course of the call the physician/provider feels a telephone visit is not appropriate, you will not be charged for this service.\"    Patient has given verbal consent for Telephone visit?  Yes    What phone number would you like to be contacted at? [phone]    How would you like to obtain your AVS? Mail a copy    Additional Provider Notes:    CC- fatigue    HPI- Mrs. Mccord has been followed for several years by various cardiologists. She has known AI and has complained of fatigue throughout. She reports recently her fatigue has been much worse. She related that her fatigue was associated with dyspnea on exertion and chest discomfort. Her stress test showed an anterior wall defect that did not fit with her echocardiogram. She also has aortic insufficiency that by echocardiogram did not seem to have progressed. She underwent angiography at [facility]. This demonstrated no epicardial coronary artery disease. Her echocardiogram again showed mild to " mod AI. A stress MRI was done that  suggested microvascular disease. She was begun on isosorbide mononitrate but stopped after 4 days due to severe headache. She cannot recall if it helped with her chest pain. She was started on diltiazem but stopped when she was admitted for diverticulitis. She is already on estrogen replacement. On 360 mg of diltiazem she reported the chest pain was better but her BP was low. She also noted numbness and tingling in both arms and both legs. The legs was constant. The arms mostly when she worked over her head. She also felt fatigued. She does not report orthostatic symptoms. Her diltiazem was reduced to 300 mg daily. The chest pain is worse. The fatigue is maybe a little better and the numbness/tingling is unchanged.     June 2014:  At our last visit I discussed with her that I have no other therapies to offer regarding her chest pain. We discuss the possibility of starting a medication such as Neurontin that has been reported to help with chronic pain syndromes. There is some evidence that patients with microvascular angina have abnormal pain sensitivity. She reports the Neurontin was started and it has helped. She is using SL NTG 1-2x/week as opposed to several times a day.    We had stopped her Diovan because on the increased diltiazem dose she was having relatively low BP. She is worried because she has seen some BP measurements that are high for her. She admits to being stressed. She has just started a w/u for memory loss and a change was seen on her MRI.    She had an echocardiogram done in March, see below, that showed no change in her aortic insufficiency.     [id number]:    She has been doing reasonably well. She is not having as much chest pain. She has been less active due to problems with disk disease in her spine. She has not noted any major changes in her exertional abilities.     [id number]: She has been having more joint pain. She is seeing a Rheumatologist in [place]  "soon.    Her chest pain is perhaps a bit better. Her legs and back feel heavy and she has sharp pains and aches, especially with climbing stairs. This has not been associated with her chest pain. On level drug she had been walking 2 miles with her sister but she has not done that for several months now.     [id number]: her chest pain has been doing reasonably well. She hasn't had NTG available for a few months.     She reports a new barking cough over the past 6 months - no fever, no new meds, not productive. The cough is not getting any worse but no better either. She has not had any wheezing that she has noticed.     Her legs feel heavy when she walks.    [id number]: She reports no real change in her chest pain or exertional abilities.  She has been troubled by chronic pain \"all over her body\".  She reports that rheumatology felt that she had a mixed connective tissue disorder.  She tried Plaquenil for a time but has stopped that and has been trying medical cannabis.  She reports the cannabis has not helped particularly.    Her recent echocardiogram showed no change in her aortic insufficiency.  Her systolic function remains good.  Left ventricular size remains normal.    May 14, 2019: she reports the chest pain is the same. She feels more shortness of breath and wants to stop smoking. She has noted hot flashes recently.     May 12, 2020 Interval history (Telephone Visit): no change in her chest pain. She feels this is a little worse due to stress. Relaxing techniques, deep breathing, has helped. She feels her exertional capacity is maybe a little less. Her resting heart rate is low but that is expected on the diltiazem.    Current Outpatient Medications   Medication Sig Dispense Refill     clonazePAM (KLONOPIN) 1 MG tablet TAKE 1/2 TO 1 (ONE-HALF TO ONE) TABLET BY MOUTH TWICE DAILY AS NEEDED FOR ANXIETY 60 tablet 0     diltiazem ER (TIAZAC) 300 MG 24 hr ER beaded capsule Take 1 capsule by mouth once daily 90 " capsule 3     estradiol (ESTRACE) 0.5 MG tablet Take 1 tablet by mouth twice daily 60 tablet 3     levothyroxine (SYNTHROID/LEVOTHROID) 75 MCG tablet TAKE 1 TABLET BY MOUTH ONCE DAILY 90 tablet 0     NITROSTAT 0.4 MG sublingual tablet Place 1 tablet (0.4 mg) under the tongue every 5 minutes as needed for chest pain 25 tablet 11     pantoprazole (PROTONIX) 40 MG EC tablet Take by mouth 30-60 minutes before a meal. 90 tablet 3     PROCTOZONE-HC 2.5 % cream APPLY CREAM TO AFFECTED AREA THREE TIMES DAILY 45 g 1     SUMAtriptan (IMITREX) 100 MG tablet TAKE ONE TABLET BY MOUTH AS NEEDED MIGRAINES 10 tablet 5     UNABLE TO FIND 1.5 mg 3 times daily MEDICATION NAME: Oral Cannabis    Oral suspension  Taken morning, afternoon and bedtime       UNABLE TO FIND MEDICATION NAME: Cannabis as needed     [place] Vapor take one puff to the count of two at bedtime.  If needed in the middle of the night       vortioxetine (TRINTELLIX) 10 MG tablet Take 1 tablet (10 mg) by mouth daily 30 tablet 4     Allergies   Allergen Reactions     Codeine      Isosorbide Mononitrate Other (See Comments)     Vomiting and headache       Lisinopril      [facility] scan     Past Medical History:   Diagnosis Date     Anxiety state, unspecified 03/01/2011     Aortic valve disorders 06/07/2000    Echocardiogram showin mild/moderate AI.  Normal LV function     Cardiac microvascular disease 4/10/2013    Based on Cardiac MRI done at       Depressive disorder 1997     Fatigue      Fatigue      Fibromyalgia 03/01/2011     Hypertension      Major depressive disorder, recurrent episode, unspecified 12/17/2014     Migraine, unspecified, without mention of intractable migraine without mention of status migrainosus 03/01/2011     Mitral valve disorders(424.0) 10/16/2007     PONV (postoperative nausea and vomiting)      Thyroid Nodule 03/01/2011     Tobacco use disorder 03/01/2011     Unspecified hypothyroidism 03/01/2011     Past Surgical History:   Procedure  Laterality Date     APPENDECTOMY  1977     BIOPSY  2017    taken at [facility] from Dr. Nadira cohen. Thyroid     CARDIAC SURGERY  2013    angiogram [facility]:  Normal coronary arteries.  Felt ot have some microvascular disease     CL AFF SURGICAL PATHOLOGY  01/02/2007    Thyroid Mass     COLONOSCOPY  1/13/2014    Procedure: COLONOSCOPY;  COLONOSCOPY ;  Surgeon: Chasidy Gee DO;  Location: [facility] OR     ESOPHAGOSCOPY, GASTROSCOPY, DUODENOSCOPY (EGD), COMBINED N/A 2/9/2017    Procedure: COMBINED ESOPHAGOSCOPY, GASTROSCOPY, DUODENOSCOPY (EGD);  Surgeon: Johnathan Ruff DO;  Location: [facility] OR     GYN SURGERY      c-sections x 3     HYSTERECTOMY TOTAL ABDOMINAL, BILATERAL SALPINGO-OOPHORECTOMY, COMBINED N/A 01/01/2001     LAPAROSCOPIC CHOLECYSTECTOMY  11/07/2003    Cholelithiasis     LAPAROTOMY EXPLORATORY      abdominal pain/fever     LARYNGOSCOPY       SPLENECTOMY       Social History     Tobacco Use     Smoking status: Light Tobacco Smoker     Packs/day: 0.25     Years: 40.00     Pack years: 10.00     Types: Cigarettes     Start date: 1/1/1975     Smokeless tobacco: Never Used     Tobacco comment: 5 cigs daily  Patient will work on smoking cessation (3-26-18)   Substance Use Topics     Alcohol use: No     Drug use: No     [employer] [place]- the ten system review is negative except as noted in the HPI.      Laboratory and diagnostic data reviewed May 12, 2020:    Results for STEVEN SZYMANSKI (MRN [id number]) as of 5/12/2020 10:28   Ref. Range 2/7/2020 12:26   Sodium Latest Ref Range: 133 - 144 mmol/L 138   Potassium Latest Ref Range: 3.4 - 5.3 mmol/L 4.4   Chloride Latest Ref Range: 94 - 109 mmol/L 108   Carbon Dioxide Latest Ref Range: 20 - 32 mmol/L 25   Urea Nitrogen Latest Ref Range: 7 - 30 mg/dL 18   Creatinine Latest Ref Range: 0.52 - 1.04 mg/dL 1.09 (H)   GFR Estimate Latest Ref Range: >60 mL/min/1.73_m2 55 (L)     [facility]  Echocardiography Laboratory  [address] [address]      Name: STEVEN MCCORD  MRN: [id number]  : 1960  Study Date: 2019 07:58 AM  Age: 58 yrs  Gender: Female  Patient Location: [facility]  Reason For Study: Cardiac microvascular disease, Aortic valve disorder,  Essential  History: AI  Ordering Physician: YOHAN CANTOR  Referring Physician: YOHAN CANTOR  Performed By: Pat Trent     BSA: 1.9 m2  Height: 67 in  Weight: 170 lb  _____________________________________________________________________________  __     _____________________________________________________________________________  __        Interpretation Summary  No pericardial effusion is present.  Global and regional left ventricular function is normal with an EF of 55-60%.  Grade I or early diastolic dysfunction.  The right ventricle is normal size.  Both atria appear normal.  Mild mitral insufficiency is present.  Moderate aortic insufficiency is present.  Mild tricuspid insufficiency is present.  Right ventricular systolic pressure is 18mmHg above the right atrial pressure.  The pulmonic valve is normal.  The aorta root is normal.    ANKLE BRACHIAL INDEX     FINDINGS:  The ankle brachial index measured 1.2 bilaterally.  Absolute ankle pressures were 160 which are adequate for wound  healing.     IMPRESSION:  NO EVIDENCE OF ARTERIAL OCCLUSIVE DISEASE IN EITHER LOWER  EXTREMITY.  Exam Date: 2017 03:10:00 PM  Author: LIS CRYSTAL  This report is final and signed    Assessment and recommendations:    1) Fatigue/chest pain/ dyspnea on exertion   2) Moderate AI  3) Microvascular angina    - echocardiogram stable for AI    4) Sinus bradycardia - she reports relatively minimal decline in exertional ability. We discussed a 48-hour monitor to assess Heart rate response to activity. At this point she does not feel her symptoms warrant that.     I appreciate the chance to help with Mrs. Mccord care. Please let me know if you have any questions or concerns. I will see her in person  when covid19 restrictions lifted.      Phone call duration: 9:48 minutes    Norman Nichole MD

## 2020-05-12 NOTE — PATIENT INSTRUCTIONS
No change in medications.    Nitroglycerin refilled.    Call if shortness of breath/fatigue gets worse.    I'd like to see you back later in the summer or in the fall when virus restrictions have been lifed.

## 2020-05-13 ENCOUNTER — VIRTUAL VISIT (OUTPATIENT)
Dept: PSYCHIATRY | Facility: OTHER | Age: 60
End: 2020-05-13
Attending: PSYCHIATRY & NEUROLOGY
Payer: COMMERCIAL

## 2020-05-13 DIAGNOSIS — F41.1 GAD (GENERALIZED ANXIETY DISORDER): Primary | ICD-10-CM

## 2020-05-13 PROCEDURE — 99213 OFFICE O/P EST LOW 20 MIN: CPT | Mod: 95 | Performed by: PSYCHIATRY & NEUROLOGY

## 2020-05-13 RX ORDER — PAROXETINE 10 MG/1
10 TABLET, FILM COATED ORAL EVERY EVENING
Qty: 30 TABLET | Refills: 3 | Status: SHIPPED | OUTPATIENT
Start: 2020-05-13 | End: 2020-07-30

## 2020-05-13 ASSESSMENT — ANXIETY QUESTIONNAIRES
4. TROUBLE RELAXING: NEARLY EVERY DAY
5. BEING SO RESTLESS THAT IT IS HARD TO SIT STILL: NEARLY EVERY DAY
3. WORRYING TOO MUCH ABOUT DIFFERENT THINGS: NEARLY EVERY DAY
2. NOT BEING ABLE TO STOP OR CONTROL WORRYING: NEARLY EVERY DAY
1. FEELING NERVOUS, ANXIOUS, OR ON EDGE: NEARLY EVERY DAY
6. BECOMING EASILY ANNOYED OR IRRITABLE: NEARLY EVERY DAY
7. FEELING AFRAID AS IF SOMETHING AWFUL MIGHT HAPPEN: NEARLY EVERY DAY
GAD7 TOTAL SCORE: 21

## 2020-05-13 ASSESSMENT — PATIENT HEALTH QUESTIONNAIRE - PHQ9: SUM OF ALL RESPONSES TO PHQ QUESTIONS 1-9: 18

## 2020-05-13 NOTE — PROGRESS NOTES
"Sharlene Mccord is a 59 year old female who is being evaluated via a billable telephone visit.      The patient has been notified of following:     \"This telephone visit will be conducted via a call between you and your physician/provider. We have found that certain health care needs can be provided without the need for a physical exam.  This service lets us provide the care you need with a short phone conversation.  If a prescription is necessary we can send it directly to your pharmacy.  If lab work is needed we can place an order for that and you can then stop by our lab to have the test done at a later time.    Telephone visits are billed at different rates depending on your insurance coverage. During this emergency period, for some insurers they may be billed the same as an in-person visit.  Please reach out to your insurance provider with any questions.    If during the course of the call the physician/provider feels a telephone visit is not appropriate, you will not be charged for this service.\"    Patient has given verbal consent for Telephone visit?  Yes    How would you like to obtain your AVS? Gama    Phone call duration: 28 minutes        SUBJECTIVE / INTERIM HISTORY                                                                       Last visit 3/25/20:  Continue Klonopin 1 mg bid. Start vortioxetine 10 mg daily.    - Roney and her did get out to their cabin on [address]    - didn't picked up Brintellix given would have been $300  - right before Yoel was at [employer] doing  but couldn't keep up with it mentally and phsyically  - Roney has been really good with running to stores, etc.   - Stopped medical MJ  - hasn't been talking with her daughters much except for April  - rheumatology consult: (autoimmune tests some pos tested recently) and sounds like no definitive dx made    SUBSTANCE USE- no issues    SYMPTOMS- Depressed mood, low energy, anhedonia, feelings guilt & " "overwhelmed, poor sleep. cognitive issues including word finding  MEDICAL ROS- nausea and vomiting is better, with moving / exercise leg \"heaviness\" and pain,balance issues, Hypersomnia.  Pain: neck, DDD in neck. Numbness and tingling in legs and feet worse at times when sitting  MEDICAL / SURGICAL HISTORY                 Medical Team:     PMD- Concha Hare     Therapist-none   Patient Active Problem List   Diagnosis     Diverticulitis of sigmoid colon     Hypothyroidism     Anxiety state     Cardiac microvascular disease     Diverticulitis     Aortic valve disorder     ACP (advance care planning)     Chronic pain     Constipation     Memory loss     Major depressive disorder, recurrent episode (H)     Cognitive disorder     Major depressive disorder     Fatigue     Migraine without aura and responsive to treatment     Atypical migraine     Chronic, continuous use of opioids     Mixed connective tissue disease (H)     SHAKIR (generalized anxiety disorder)     Gastroesophageal reflux disease with esophagitis     ALLERGY   Codeine; Isosorbide mononitrate; and Lisinopril  MEDICATIONS                                                                                             Current Outpatient Medications   Medication Sig     clonazePAM (KLONOPIN) 1 MG tablet TAKE 1/2 TO 1 (ONE-HALF TO ONE) TABLET BY MOUTH TWICE DAILY AS NEEDED FOR ANXIETY     diltiazem ER (TIAZAC) 300 MG 24 hr ER beaded capsule Take 1 capsule by mouth once daily     estradiol (ESTRACE) 0.5 MG tablet Take 1 tablet by mouth twice daily     levothyroxine (SYNTHROID/LEVOTHROID) 75 MCG tablet TAKE 1 TABLET BY MOUTH ONCE DAILY     NITROSTAT 0.4 MG sublingual tablet Place 1 tablet (0.4 mg) under the tongue every 5 minutes as needed for chest pain     pantoprazole (PROTONIX) 40 MG EC tablet Take by mouth 30-60 minutes before a meal.     PROCTOZONE-HC 2.5 % cream APPLY CREAM TO AFFECTED AREA THREE TIMES DAILY     SUMAtriptan (IMITREX) 100 MG tablet TAKE ONE " TABLET BY MOUTH AS NEEDED MIGRAINES     UNABLE TO FIND 1.5 mg 3 times daily MEDICATION NAME: Oral Cannabis    Oral suspension  Taken morning, afternoon and bedtime     UNABLE TO FIND MEDICATION NAME: Cannabis as needed     [place] Vapor take one puff to the count of two at bedtime.  If needed in the middle of the night     vortioxetine (TRINTELLIX) 10 MG tablet Take 1 tablet (10 mg) by mouth daily (Patient not taking: Reported on 5/13/2020)     No current facility-administered medications for this visit.        VITALS   There were no vitals taken for this visit.    PHQ9                       PHQ-9 SCORE 9/25/2019 1/23/2020 5/13/2020   PHQ-9 Total Score - - -   PHQ-9 Total Score 19 16 18     Labs:    Liver Function Studies -   Recent Labs   Lab Test  01/13/16   0941   PROTTOTAL  7.5   ALBUMIN  3.7   BILITOTAL  0.3   ALKPHOS  92   AST  32   ALT  63*     Recent Labs   Lab Test  01/13/16   0941  03/28/14   1008   CHOL  244*  238*   HDL  64  81*   LDL  147*  143*   TRIG  164*  72   CHOLHDLRATIO   --   2.9*       MENTAL STATUS EXAM                                                                                       Mood was anxious and depressed and affect was congruent to speech content and full .  Thought process, including associations, was unremarkable and thought content was devoid of suicidal and homicidal ideation and psychotic thought. No hallucinations. Insight was good. Judgment was intact and adequate for safety. Fund of knowledge was intact. Pt demonstrates no obvious problems with attention, concentration, language, recent or remote memory although these were not formally tested.     ASSESSMENT                                                                                                      HISTORICAL:  Initial psych consult 12/12/14            Notes:  Ritalin as adjunct to antidepressant: didn't like how made her feel.   Prozac, Celexa ineffective. Wellbutrin: weight gain. . Zoloft: weight gain. Effexor  "back while: didn't stay on long and can't recall why. Cymbalta: was on and didn't help. She tried Provigil and SEs. Buspar: HAs.   Neuropsych testing summer 2015:  language: about same, working memory: better than last year. Attention / concentration worse than last year. Speed processing: improved. Executive function: didn't test well on that front also comparable to last year. Memory: worse off than last year. Depression: similar to last year. Anxiety / emotional anxiety worse. As part of assessment they think should possible have another MRI  CURRENT: This patient provides a history which supports the diagnoses of Major depressive disorder, recurrent, mod and mild neurocognitive disorder. So glad to see her doing bit better! She has come off lot of meds even.    in terms of risks vs. Benefits Sharlene and I do NOT feel would be good idea to take her off Klonopin. We feel she would get to point she would be unable to get out of her house. We did today however go over issues with long-term use benzodiazepines and things to think about such as contributing to memory issues, physical & mental dependence to benzos, falls.    Last visit we tried for vortioxetine: would have been $300 so clearly that's not an option. Today we reviewed past meds and we don't think she has been on Paxil. Given potential for some cognitive issues given some anticholinergic property I\"m starting at a lower dose. We also reviewed other possible SEs.      TREATMENT RISK STATEMENT: The risks, benefits, alternatives and potential adverse effects have been explained and are understood by the pt. The pt agrees to the treatment plan with the ability to do so. The pt knows to call the clinic for any problems or access emergency care if needed.    DIAGNOSES             Major depressive disorder, recurrent moderate  Mild neurocognitive disorder      PLAN                                                                                                      "                    1) MEDICATION:   -- Continue Klonopin 1 mg bid. Start Paxil 10 mg evening    2) THERAPY: no change.     3) LABS: none    4) Other: Neuropsych testing with Dr. Johnathan Min in past    8)  RTC: ~1 month      Customer service [phone]

## 2020-05-13 NOTE — NURSING NOTE
"Chief Complaint   Patient presents with     RECHECK       Initial There were no vitals taken for this visit. Estimated body mass index is 28.04 kg/m  as calculated from the following:    Height as of 5/12/20: 1.702 m (5' 7\").    Weight as of 5/12/20: 81.2 kg (179 lb).  Medication Reconciliation: complete  Jenni Frye LPN  "

## 2020-05-14 ENCOUNTER — TELEPHONE (OUTPATIENT)
Dept: CARDIOLOGY | Facility: OTHER | Age: 60
End: 2020-05-14

## 2020-05-14 DIAGNOSIS — R07.9 CHEST PAIN, UNSPECIFIED TYPE: ICD-10-CM

## 2020-05-14 ASSESSMENT — ANXIETY QUESTIONNAIRES: GAD7 TOTAL SCORE: 21

## 2020-05-14 NOTE — TELEPHONE ENCOUNTER
Received a PA from [facility] for Nitrostat 0.4mg sublingual tablets. Submitted on CMM. Waiting for a response.

## 2020-05-15 NOTE — TELEPHONE ENCOUNTER
Received a DENIAL from [facility] for Nitrostat 0.4 mg sublingual tablets.       Forms scanned to Epic.

## 2020-05-18 NOTE — TELEPHONE ENCOUNTER
Christin Martinez, ENRIQUE CNP  You; Norman Nichole MD 48 minutes ago (6:43 AM)      Ok to switch to the covered drug as listed in the PA.   Thanks,  LVW    Message text

## 2020-05-21 DIAGNOSIS — F41.1 GAD (GENERALIZED ANXIETY DISORDER): ICD-10-CM

## 2020-05-21 NOTE — TELEPHONE ENCOUNTER
clonazepam      Last Written Prescription Date:  04/24/2020  Last Fill Quantity: 60,   # refills: 0  Last Office Visit: 05/13/2020  Future Office visit:

## 2020-05-26 RX ORDER — CLONAZEPAM 1 MG/1
TABLET ORAL
Qty: 60 TABLET | Refills: 0 | Status: SHIPPED | OUTPATIENT
Start: 2020-05-26 | End: 2020-06-23

## 2020-05-27 NOTE — TELEPHONE ENCOUNTER
The pharmacy needs a new script so that they can provide the patient with the Nitroglycerin sublingual tablet. The Nitrostat was denied and it is written as KASSANDRA. Can this be corrected so the patient can obtain their medication? Thank you!

## 2020-06-10 ENCOUNTER — MYC MEDICAL ADVICE (OUTPATIENT)
Dept: CARDIOLOGY | Facility: OTHER | Age: 60
End: 2020-06-10

## 2020-06-10 RX ORDER — NITROGLYCERIN 0.4 MG/1
0.4 TABLET SUBLINGUAL EVERY 5 MIN PRN
Qty: 25 TABLET | Refills: 11 | Status: ON HOLD | OUTPATIENT
Start: 2020-06-10 | End: 2020-07-31

## 2020-06-16 DIAGNOSIS — E03.9 ACQUIRED HYPOTHYROIDISM: ICD-10-CM

## 2020-06-16 NOTE — TELEPHONE ENCOUNTER
Levothyroxine       Last Written Prescription Date:  1/23/2020  Last Fill Quantity: 90,   # refills: 0  Last Office Visit: 1/23/2020  Future Office visit:

## 2020-06-17 RX ORDER — LEVOTHYROXINE SODIUM 75 UG/1
TABLET ORAL
Qty: 90 TABLET | Refills: 0 | Status: ON HOLD | OUTPATIENT
Start: 2020-06-17 | End: 2020-07-31

## 2020-06-22 DIAGNOSIS — F41.1 GAD (GENERALIZED ANXIETY DISORDER): ICD-10-CM

## 2020-06-23 RX ORDER — CLONAZEPAM 1 MG/1
TABLET ORAL
Qty: 60 TABLET | Refills: 0 | Status: SHIPPED | OUTPATIENT
Start: 2020-06-25 | End: 2020-07-30

## 2020-06-23 NOTE — TELEPHONE ENCOUNTER
clonazePAM 1 MG Oral Tablet     Last Written Prescription Date:  5/26/20  Last Fill Quantity: 60,   # refills: 0  Last Office Visit: 1/23/20  Future Office visit:       Routing refill request to provider for review/approval because:    Drug not on the [facility], P or [facility] refill protocol or controlled substance

## 2020-06-24 ENCOUNTER — DOCUMENTATION ONLY (OUTPATIENT)
Dept: FAMILY MEDICINE | Facility: OTHER | Age: 60
End: 2020-06-24

## 2020-06-29 DIAGNOSIS — K64.4 EXTERNAL HEMORRHOIDS: ICD-10-CM

## 2020-07-01 RX ORDER — HYDROCORTISONE 2.5% 25 MG/G
CREAM TOPICAL
Qty: 45 G | Refills: 0 | Status: ON HOLD | OUTPATIENT
Start: 2020-07-01 | End: 2020-07-31

## 2020-07-01 NOTE — TELEPHONE ENCOUNTER
Proctozone-HC 2.5 % Rectal Cream   Last Written Prescription Date:  5/20/2019  Last Fill Quantity: 45g,   # refills: 1  Last Office Visit: 1/23/2020  Future Office visit:

## 2020-07-24 DIAGNOSIS — F41.1 GAD (GENERALIZED ANXIETY DISORDER): ICD-10-CM

## 2020-07-24 DIAGNOSIS — Z79.890 POSTMENOPAUSAL HRT (HORMONE REPLACEMENT THERAPY): ICD-10-CM

## 2020-07-28 NOTE — TELEPHONE ENCOUNTER
Klonopin  Last Written Prescription Date: 6/25/2020  Last Fill Quantity: 60 # of Refills: 0  Last Office Visit: 5/13/2020

## 2020-07-29 RX ORDER — CLONAZEPAM 1 MG/1
TABLET ORAL
Qty: 60 TABLET | Refills: 0 | OUTPATIENT
Start: 2020-07-29

## 2020-07-29 RX ORDER — ESTRADIOL 0.5 MG/1
TABLET ORAL
Qty: 60 TABLET | Refills: 0 | Status: ON HOLD | OUTPATIENT
Start: 2020-07-29 | End: 2020-07-31

## 2020-07-29 NOTE — TELEPHONE ENCOUNTER
estrace  Last Written Prescription Date: 3/13/2020  Last Fill Quantity: 60 # of Refills: 3  Last Office Visit: 1/23/2020

## 2020-07-30 ENCOUNTER — APPOINTMENT (OUTPATIENT)
Dept: CT IMAGING | Facility: HOSPITAL | Age: 60
DRG: 438 | End: 2020-07-30
Attending: PHYSICIAN ASSISTANT
Payer: MEDICARE

## 2020-07-30 ENCOUNTER — HOSPITAL ENCOUNTER (INPATIENT)
Facility: HOSPITAL | Age: 60
LOS: 1 days | Discharge: ANOTHER HOSPITAL | DRG: 438 | End: 2020-07-31
Attending: PHYSICIAN ASSISTANT | Admitting: INTERNAL MEDICINE
Payer: MEDICARE

## 2020-07-30 DIAGNOSIS — E03.9 ACQUIRED HYPOTHYROIDISM: ICD-10-CM

## 2020-07-30 DIAGNOSIS — I10 BENIGN ESSENTIAL HYPERTENSION: Primary | ICD-10-CM

## 2020-07-30 DIAGNOSIS — K21.00 GASTROESOPHAGEAL REFLUX DISEASE WITH ESOPHAGITIS: Chronic | ICD-10-CM

## 2020-07-30 DIAGNOSIS — K85.10 ACUTE BILIARY PANCREATITIS WITHOUT INFECTION OR NECROSIS: ICD-10-CM

## 2020-07-30 DIAGNOSIS — F41.1 ANXIETY STATE: ICD-10-CM

## 2020-07-30 DIAGNOSIS — K83.1: ICD-10-CM

## 2020-07-30 PROBLEM — K85.90 ACUTE PANCREATITIS: Status: ACTIVE | Noted: 2020-07-30

## 2020-07-30 LAB
ALBUMIN SERPL-MCNC: 4.1 G/DL (ref 3.4–5)
ALP SERPL-CCNC: 84 U/L (ref 40–150)
ALT SERPL W P-5'-P-CCNC: 25 U/L (ref 0–50)
ANION GAP SERPL CALCULATED.3IONS-SCNC: 8 MMOL/L (ref 3–14)
AST SERPL W P-5'-P-CCNC: 13 U/L (ref 0–45)
BASOPHILS # BLD AUTO: 0.1 10E9/L (ref 0–0.2)
BASOPHILS NFR BLD AUTO: 0.6 %
BILIRUB SERPL-MCNC: 0.4 MG/DL (ref 0.2–1.3)
BUN SERPL-MCNC: 15 MG/DL (ref 7–30)
CALCIUM SERPL-MCNC: 9.3 MG/DL (ref 8.5–10.1)
CHLORIDE SERPL-SCNC: 109 MMOL/L (ref 94–109)
CO2 SERPL-SCNC: 24 MMOL/L (ref 20–32)
CREAT SERPL-MCNC: 0.91 MG/DL (ref 0.52–1.04)
DIFFERENTIAL METHOD BLD: ABNORMAL
EOSINOPHIL # BLD AUTO: 0.1 10E9/L (ref 0–0.7)
EOSINOPHIL NFR BLD AUTO: 0.7 %
ERYTHROCYTE [DISTWIDTH] IN BLOOD BY AUTOMATED COUNT: 12.6 % (ref 10–15)
GFR SERPL CREATININE-BSD FRML MDRD: 69 ML/MIN/{1.73_M2}
GLUCOSE SERPL-MCNC: 134 MG/DL (ref 70–99)
HCT VFR BLD AUTO: 41.6 % (ref 35–47)
HGB BLD-MCNC: 14.6 G/DL (ref 11.7–15.7)
IMM GRANULOCYTES # BLD: 0.1 10E9/L (ref 0–0.4)
IMM GRANULOCYTES NFR BLD: 0.6 %
LACTATE BLD-SCNC: 2.1 MMOL/L (ref 0.7–2)
LACTATE BLD-SCNC: 2.5 MMOL/L (ref 0.7–2)
LIPASE SERPL-CCNC: 558 U/L (ref 73–393)
LYMPHOCYTES # BLD AUTO: 2.5 10E9/L (ref 0.8–5.3)
LYMPHOCYTES NFR BLD AUTO: 20.1 %
MCH RBC QN AUTO: 30.3 PG (ref 26.5–33)
MCHC RBC AUTO-ENTMCNC: 35.1 G/DL (ref 31.5–36.5)
MCV RBC AUTO: 86 FL (ref 78–100)
MONOCYTES # BLD AUTO: 0.7 10E9/L (ref 0–1.3)
MONOCYTES NFR BLD AUTO: 5.9 %
NEUTROPHILS # BLD AUTO: 9 10E9/L (ref 1.6–8.3)
NEUTROPHILS NFR BLD AUTO: 72.1 %
NRBC # BLD AUTO: 0 10*3/UL
NRBC BLD AUTO-RTO: 0 /100
PLATELET # BLD AUTO: 212 10E9/L (ref 150–450)
POTASSIUM SERPL-SCNC: 3.9 MMOL/L (ref 3.4–5.3)
PROT SERPL-MCNC: 8.2 G/DL (ref 6.8–8.8)
RBC # BLD AUTO: 4.82 10E12/L (ref 3.8–5.2)
SODIUM SERPL-SCNC: 141 MMOL/L (ref 133–144)
WBC # BLD AUTO: 12.5 10E9/L (ref 4–11)

## 2020-07-30 PROCEDURE — 25000128 H RX IP 250 OP 636: Performed by: PHYSICIAN ASSISTANT

## 2020-07-30 PROCEDURE — 12000000 ZZH R&B MED SURG/OB

## 2020-07-30 PROCEDURE — 99223 1ST HOSP IP/OBS HIGH 75: CPT | Mod: AI | Performed by: INTERNAL MEDICINE

## 2020-07-30 PROCEDURE — 25000132 ZZH RX MED GY IP 250 OP 250 PS 637: Performed by: INTERNAL MEDICINE

## 2020-07-30 PROCEDURE — 87635 SARS-COV-2 COVID-19 AMP PRB: CPT | Performed by: INTERNAL MEDICINE

## 2020-07-30 PROCEDURE — 83690 ASSAY OF LIPASE: CPT | Performed by: PHYSICIAN ASSISTANT

## 2020-07-30 PROCEDURE — 25800030 ZZH RX IP 258 OP 636: Performed by: PHYSICIAN ASSISTANT

## 2020-07-30 PROCEDURE — 83605 ASSAY OF LACTIC ACID: CPT | Performed by: INTERNAL MEDICINE

## 2020-07-30 PROCEDURE — 36415 COLL VENOUS BLD VENIPUNCTURE: CPT | Performed by: INTERNAL MEDICINE

## 2020-07-30 PROCEDURE — 80053 COMPREHEN METABOLIC PANEL: CPT | Performed by: PHYSICIAN ASSISTANT

## 2020-07-30 PROCEDURE — 25800030 ZZH RX IP 258 OP 636: Performed by: INTERNAL MEDICINE

## 2020-07-30 PROCEDURE — 25000125 ZZHC RX 250: Performed by: PHYSICIAN ASSISTANT

## 2020-07-30 PROCEDURE — 25500064 ZZH RX 255 OP 636: Performed by: RADIOLOGY

## 2020-07-30 PROCEDURE — 83605 ASSAY OF LACTIC ACID: CPT | Performed by: PHYSICIAN ASSISTANT

## 2020-07-30 PROCEDURE — 99285 EMERGENCY DEPT VISIT HI MDM: CPT | Mod: 25

## 2020-07-30 PROCEDURE — 85025 COMPLETE CBC W/AUTO DIFF WBC: CPT | Performed by: PHYSICIAN ASSISTANT

## 2020-07-30 PROCEDURE — 96376 TX/PRO/DX INJ SAME DRUG ADON: CPT

## 2020-07-30 PROCEDURE — 74177 CT ABD & PELVIS W/CONTRAST: CPT | Mod: TC

## 2020-07-30 PROCEDURE — 25000132 ZZH RX MED GY IP 250 OP 250 PS 637: Performed by: PHYSICIAN ASSISTANT

## 2020-07-30 PROCEDURE — 25000128 H RX IP 250 OP 636: Performed by: INTERNAL MEDICINE

## 2020-07-30 PROCEDURE — 96374 THER/PROPH/DIAG INJ IV PUSH: CPT

## 2020-07-30 PROCEDURE — 99285 EMERGENCY DEPT VISIT HI MDM: CPT | Mod: Z6 | Performed by: PHYSICIAN ASSISTANT

## 2020-07-30 PROCEDURE — 96375 TX/PRO/DX INJ NEW DRUG ADDON: CPT

## 2020-07-30 RX ORDER — IOPAMIDOL 612 MG/ML
100 INJECTION, SOLUTION INTRAVASCULAR ONCE
Status: COMPLETED | OUTPATIENT
Start: 2020-07-30 | End: 2020-07-30

## 2020-07-30 RX ORDER — PAROXETINE 10 MG/1
10 TABLET, FILM COATED ORAL EVERY EVENING
Status: DISCONTINUED | OUTPATIENT
Start: 2020-07-30 | End: 2020-07-30

## 2020-07-30 RX ORDER — ONDANSETRON 4 MG/1
4 TABLET, ORALLY DISINTEGRATING ORAL EVERY 6 HOURS PRN
Status: DISCONTINUED | OUTPATIENT
Start: 2020-07-30 | End: 2020-07-31 | Stop reason: HOSPADM

## 2020-07-30 RX ORDER — SODIUM CHLORIDE, SODIUM LACTATE, POTASSIUM CHLORIDE, CALCIUM CHLORIDE 600; 310; 30; 20 MG/100ML; MG/100ML; MG/100ML; MG/100ML
INJECTION, SOLUTION INTRAVENOUS CONTINUOUS
Status: DISCONTINUED | OUTPATIENT
Start: 2020-07-30 | End: 2020-07-31 | Stop reason: HOSPADM

## 2020-07-30 RX ORDER — ONDANSETRON 2 MG/ML
4 INJECTION INTRAMUSCULAR; INTRAVENOUS EVERY 6 HOURS PRN
Status: DISCONTINUED | OUTPATIENT
Start: 2020-07-30 | End: 2020-07-31 | Stop reason: HOSPADM

## 2020-07-30 RX ORDER — ACETAMINOPHEN 325 MG/1
650 TABLET ORAL EVERY 4 HOURS PRN
Status: DISCONTINUED | OUTPATIENT
Start: 2020-07-30 | End: 2020-07-31 | Stop reason: HOSPADM

## 2020-07-30 RX ORDER — CLONAZEPAM 0.5 MG/1
0.5 TABLET ORAL 2 TIMES DAILY PRN
Status: DISCONTINUED | OUTPATIENT
Start: 2020-07-30 | End: 2020-07-31 | Stop reason: HOSPADM

## 2020-07-30 RX ORDER — PAROXETINE 10 MG/1
10 TABLET, FILM COATED ORAL EVERY EVENING
Status: ON HOLD | COMMUNITY
End: 2020-07-31

## 2020-07-30 RX ORDER — LIDOCAINE 40 MG/G
CREAM TOPICAL
Status: DISCONTINUED | OUTPATIENT
Start: 2020-07-30 | End: 2020-07-31 | Stop reason: HOSPADM

## 2020-07-30 RX ORDER — HYDROMORPHONE HYDROCHLORIDE 1 MG/ML
INJECTION, SOLUTION INTRAMUSCULAR; INTRAVENOUS; SUBCUTANEOUS
Status: DISPENSED
Start: 2020-07-30 | End: 2020-07-31

## 2020-07-30 RX ORDER — DIPHENHYDRAMINE HYDROCHLORIDE 50 MG/ML
50 INJECTION INTRAMUSCULAR; INTRAVENOUS ONCE
Status: COMPLETED | OUTPATIENT
Start: 2020-07-30 | End: 2020-07-30

## 2020-07-30 RX ORDER — PROCHLORPERAZINE 25 MG
25 SUPPOSITORY, RECTAL RECTAL EVERY 12 HOURS PRN
Status: DISCONTINUED | OUTPATIENT
Start: 2020-07-30 | End: 2020-07-31 | Stop reason: HOSPADM

## 2020-07-30 RX ORDER — PAROXETINE 10 MG/1
10 TABLET, FILM COATED ORAL EVERY EVENING
Status: ON HOLD | COMMUNITY
Start: 2020-06-14 | End: 2020-07-30

## 2020-07-30 RX ORDER — PROCHLORPERAZINE MALEATE 10 MG
10 TABLET ORAL EVERY 6 HOURS PRN
Status: DISCONTINUED | OUTPATIENT
Start: 2020-07-30 | End: 2020-07-31 | Stop reason: HOSPADM

## 2020-07-30 RX ORDER — FENTANYL CITRATE 50 UG/ML
25 INJECTION, SOLUTION INTRAMUSCULAR; INTRAVENOUS ONCE
Status: COMPLETED | OUTPATIENT
Start: 2020-07-30 | End: 2020-07-30

## 2020-07-30 RX ORDER — PANTOPRAZOLE SODIUM 40 MG/1
40 TABLET, DELAYED RELEASE ORAL
Status: DISCONTINUED | OUTPATIENT
Start: 2020-07-31 | End: 2020-07-31 | Stop reason: HOSPADM

## 2020-07-30 RX ORDER — LEVOTHYROXINE SODIUM 75 UG/1
75 TABLET ORAL
Status: DISCONTINUED | OUTPATIENT
Start: 2020-07-31 | End: 2020-07-31 | Stop reason: HOSPADM

## 2020-07-30 RX ORDER — NALOXONE HYDROCHLORIDE 0.4 MG/ML
.1-.4 INJECTION, SOLUTION INTRAMUSCULAR; INTRAVENOUS; SUBCUTANEOUS
Status: DISCONTINUED | OUTPATIENT
Start: 2020-07-30 | End: 2020-07-31 | Stop reason: HOSPADM

## 2020-07-30 RX ORDER — HYDROMORPHONE HYDROCHLORIDE 1 MG/ML
0.5 INJECTION, SOLUTION INTRAMUSCULAR; INTRAVENOUS; SUBCUTANEOUS
Status: DISCONTINUED | OUTPATIENT
Start: 2020-07-30 | End: 2020-07-31 | Stop reason: HOSPADM

## 2020-07-30 RX ADMIN — SODIUM CHLORIDE 1000 ML: 9 INJECTION, SOLUTION INTRAVENOUS at 13:02

## 2020-07-30 RX ADMIN — HYDROMORPHONE HYDROCHLORIDE 0.5 MG: 1 INJECTION, SOLUTION INTRAMUSCULAR; INTRAVENOUS; SUBCUTANEOUS at 20:02

## 2020-07-30 RX ADMIN — IOPAMIDOL 100 ML: 612 INJECTION, SOLUTION INTRAVENOUS at 13:46

## 2020-07-30 RX ADMIN — DIPHENHYDRAMINE HYDROCHLORIDE 50 MG: 50 INJECTION, SOLUTION INTRAMUSCULAR; INTRAVENOUS at 12:48

## 2020-07-30 RX ADMIN — HYDROMORPHONE HYDROCHLORIDE 0.5 MG: 1 INJECTION, SOLUTION INTRAMUSCULAR; INTRAVENOUS; SUBCUTANEOUS at 22:52

## 2020-07-30 RX ADMIN — SODIUM CHLORIDE, POTASSIUM CHLORIDE, SODIUM LACTATE AND CALCIUM CHLORIDE: 600; 310; 30; 20 INJECTION, SOLUTION INTRAVENOUS at 16:21

## 2020-07-30 RX ADMIN — DILTIAZEM HYDROCHLORIDE 300 MG: 120 CAPSULE, COATED, EXTENDED RELEASE ORAL at 20:13

## 2020-07-30 RX ADMIN — FAMOTIDINE 20 MG: 10 INJECTION, SOLUTION INTRAVENOUS at 12:47

## 2020-07-30 RX ADMIN — ONDANSETRON 4 MG: 2 INJECTION INTRAMUSCULAR; INTRAVENOUS at 20:02

## 2020-07-30 RX ADMIN — HYDROMORPHONE HYDROCHLORIDE 1 MG: 1 INJECTION, SOLUTION INTRAMUSCULAR; INTRAVENOUS; SUBCUTANEOUS at 13:02

## 2020-07-30 RX ADMIN — HYDROMORPHONE HYDROCHLORIDE 1 MG: 1 INJECTION, SOLUTION INTRAMUSCULAR; INTRAVENOUS; SUBCUTANEOUS at 14:25

## 2020-07-30 RX ADMIN — FENTANYL CITRATE 25 MCG: 50 INJECTION, SOLUTION INTRAMUSCULAR; INTRAVENOUS at 12:46

## 2020-07-30 RX ADMIN — LIDOCAINE HYDROCHLORIDE 30 ML: 20 SOLUTION ORAL; TOPICAL at 12:50

## 2020-07-30 RX ADMIN — PROCHLORPERAZINE EDISYLATE 5 MG: 5 INJECTION INTRAMUSCULAR; INTRAVENOUS at 12:45

## 2020-07-30 RX ADMIN — HYDROMORPHONE HYDROCHLORIDE 0.5 MG: 1 INJECTION, SOLUTION INTRAMUSCULAR; INTRAVENOUS; SUBCUTANEOUS at 17:45

## 2020-07-30 ASSESSMENT — ACTIVITIES OF DAILY LIVING (ADL): ADLS_ACUITY_SCORE: 11

## 2020-07-30 ASSESSMENT — MIFFLIN-ST. JEOR: SCORE: 1453.13

## 2020-07-30 NOTE — PLAN OF CARE
Prior to Admission Medication Reconciliation:     Medications added:   [] None  [x] As listed below:    paxil- deleted in ER. Not stopped by provider anywhere in chart review. Need to discuss with patient. Recently prescribed. If patient stopped taking it on her own it needs to be marked for the MD to review.    Patient stated she stopped taking on her own because the side effects were too great. Gave her bad head aches. I marked it to be reviewed by an MD since it was not stopped by a medical professional.       Medications deleted:   [] None  [x] As listed below:    trintellix- pt prescribed but it was never picked up due to high insurance cost so alternate therapy selected    Duplicate clonazepam    Changes made to existing medications:   [] None  [x] As listed below:    Medical cannabis- entered in as one entry. Need to discuss with patient. Note in chart that pt stopped this medication in March.      Last times/dates taken verified with patient:  [x] Yes- completed myself  [] Nurse completed no changes made  [] Unable to review with patient:  [] Nurse completed/changes made:     Allergy modifications:    [x]Did not review  []Patient verified NKA  []Patient verified current existing allergies: no changes made  []New allergies: listed below      Medication reconciliation sources:   [x]Patient  []Patient family member/emergency contact: **  [][facility] Report Review  []Epic Chart Review  []Care Everywhere review  [x]Pharmacy med list: obtained a medlist from [facility] to make sure outside med dispense dates were accurate.   []Pharmacy phone call  [x]Outside meds dispense report: see below  []Nursing home or Assisted Living MAR:  []Other: **    Pharmacy desired at discharge: [facility]    Is patient on coumadin?  [x]No      Requests for consultation by provider or pharmacist:   [x] Patient understands why all of their meds were prescribed and how to take them. No questions.   [x] Fill dates coincide with compliancy for  all maintenance meds.   [] Fill dates do not coincide with compliancy with maintenance meds. See notes in PTA medlist about how patient is taking.   [] Patient has questions about the following:    Comments: patient alert and oriented. Manages her own meds. Seth pt. No concerns.     Ifrah Roberts on 7/30/2020 at 3:18 PM       Discrepancies: [x] No []Not Applicable []Yes: listed below    Time spent on medication reconciliation:   []5-20 mins  [x]20-40 mins  []> 40 mins    Issues completing PTA medication reconciliation:  [] On hold for a long time  [] Waited for a call back  [] Fax didn't come through  [] Fax took a long time  [] Other:    Notifying appropriate party of changes/additions/discrepancies:  []No pertinent changes made, notification not necessary.   [x] Notified attending provider via text page  [] Notified attending provider in person  [] Notified pharmacy  [] Notified nurse  [] Attending provider not available, left detailed notes  [] Changes/additions made don't need provider notification because provider has not seen patient or input any orders  [] Changes/additions made don't need provider notification because changes made are to medications not ordered  Medications Prior to Admission   Medication Sig Dispense Refill Last Dose     clonazePAM (KLONOPIN) 1 MG tablet Take 1/2 to 1 tablet up to 2 times daily 60 tablet 3 7/29/2020 at Unknown time     diltiazem ER (TIAZAC) 300 MG 24 hr ER beaded capsule Take 1 capsule by mouth once daily 90 capsule 3 7/29/2020 at Unknown time     estradiol (ESTRACE) 0.5 MG tablet Take 1 tablet by mouth twice daily 60 tablet 0 7/29/2020 at Unknown time     levothyroxine (SYNTHROID/LEVOTHROID) 75 MCG tablet Take 1 tablet by mouth once daily 90 tablet 0 7/29/2020 at Unknown time     pantoprazole (PROTONIX) 40 MG EC tablet Take by mouth 30-60 minutes before a meal. 90 tablet 3 7/29/2020 at Unknown time     medical cannabis (Patient's own supply) [place] Vapor take one puff  to the count of two at bedtime and as needed in the middle of the night.  Oral suspension  1.5 mg taken morning, afternoon and bedtime  (The purpose of this order is to document that the patient reports taking medical cannabis.  This is not a prescription, and is not used to certify that the patient has a qualifying medical condition.)   More than a month at Unknown time     nitroGLYcerin (NITROSTAT) 0.4 MG sublingual tablet Place 1 tablet (0.4 mg) under the tongue every 5 minutes as needed for chest pain 25 tablet 11 Unknown at Unknown time     PARoxetine (PAXIL) 10 MG tablet Take 10 mg by mouth every evening   More than a month at Unknown time     PROCTOZONE-HC 2.5 % cream APPLY   TOPICALLY TO AFFECTED AREA THREE TIMES DAILY 45 g 0 Unknown at Unknown time     SUMAtriptan (IMITREX) 100 MG tablet TAKE ONE TABLET BY MOUTH AS NEEDED MIGRAINES 10 tablet 5 Unknown at Unknown time       Medication Dispense History (from 7/31/2019 to 7/24/2020)   Expand All  Collapse All   Azithromycin      Dispensed  Days Supply  Quantity  Provider  Pharmacy    AZITHROMYCIN 250 MG TABS  02/01/2020  5  6 tablet   [facility] ...    AZITHROMYCIN 250 MG TABS  01/23/2020  5  6 tablet   [facility] ...          Benzonatate      Dispensed  Days Supply  Quantity  Provider  Pharmacy    BENZONATATE 200MG   CAP  01/23/2020  4  14  CONCHA LLANOS  [facility] ...          Doxycycline Monohydrate      Dispensed  Days Supply  Quantity  Provider  Pharmacy    DOXYCYCLINE MONO 50MG   TAB  09/05/2019  6  12 each  CONCHA LLANOS  [facility] ...    DOXYCYCLINE MONOHYDRATE 50 MG TABS  09/05/2019  60  120 tablet  Concha Llanos PA  [facility] ...          Estradiol 7/29/20     Dispensed  Days Supply  Quantity  Provider  Pharmacy    ESTRADIOL 0.5 MG TABS  06/14/2020  30  60 tablet   [facility] ...    ESTRADIOL 0.5 MG TABS  05/13/2020  30  60 tablet   [facility] ...    ESTRADIOL 0.5  MG TABS  04/13/2020  30  60 tablet   [facility] ...    ESTRADIOL 0.5 MG TABS  03/13/2020  30  60 tablet   [facility] ...    ESTRADIOL 0.5 MG TABS  02/18/2020  30  60 tablet   [facility] ...    ESTRADIOL 0.5 MG TABS  01/23/2020  30  60 tablet   [facility] ...    ESTRADIOL 0.5MG     TAB  12/10/2019  30  60 each  [place] TIFFANY  [facility] ...    ESTRADIOL 0.5MG     TAB  11/09/2019  30  60 each  [facility] ...    ESTRADIOL 0.5MG     TAB  10[facility]13/2019  30  [facility] ...    ESTRADIOL 0.5MG     TAB  09/11/2019  30  60 each  [facility] ...    ESTRADIOL 0.5MG     TAB  08/13/2019  30  60 each  [place] TIFFANY  [facility] ...          Famotidine      Dispensed  Days Supply  Quantity  Provider  Pharmacy    FAMOTIDINE 20MG     TAB  11/27/2019  90  90 each  [facility] ...          Hydrocortisone      Dispensed  Days Supply  Quantity  Provider  Pharmacy    PROCTOZONE -HC 2.5% CRE  07/01/2020  20  60 g  [facility] ...    PROCTOZONE -HC 2.5% CRE  03/21/2020  5  30 [facility] ...    PROCTOZONE -HC 2.5% CRE  10/02/2019  5  30 [facility] ...          Levothyroxine Sodium      Dispensed  Days Supply  Quantity  Provider  Pharmacy    LEVOTHYROXINE SODIUM 75 MCG TABS  06/17/2020  90  90 tablet   [facility] ...    LEVOTHYROXINE SODIUM 75 MCG TABS  03/01/2020  90  90 tablet   [facility] ...    LEVOTHYROXIN 75MCG  TAB  12/05/2019  90  90 each  [place]  [facility] ...    LEVOTHYROXIN 75MCG  TAB  09/05/2019  90  90 each  TIFFANY LLANOS  [facility] ...          Nitroglycerin      Dispensed  Days Supply  Quantity  Provider  Pharmacy    NITROGLYCER 0.4MG   SUB  06/10/2020  5  25  YOHAN CANTOR  [facility] ...           PARoxetine HCl      Dispensed  Days Supply  Quantity  Provider  Pharmacy    PAROXETINE HCL 10 MG TABS  06/14/2020  30  30 tablet   [facility] ...    PAROXETINE HCL 10 MG TABS  05/13/2020  30  30 tablet   [facility] ...          Pantoprazole Sodium      Dispensed  Days Supply  Quantity  Provider  Pharmacy    PANTOPRAZOLE SODIUM 40 MG TBEC  01/23/2020  90  90 tablet   [facility] ...          clonazePAM 7/28/20     Dispensed  Days Supply  Quantity  Provider  Pharmacy    CLONAZEPAM 1 MG TABS  06/25/2020  30  60 tablet   [facility] ...    CLONAZEPAM 1 MG TABS  05/26/2020  30  60 tablet   [facility] ...    CLONAZEPAM 1 MG TABS  04/24/2020  30  60 tablet   [facility] ...    CLONAZEPAM 1 MG TABS  03/22/2020  30  60 tablet   [facility] ...    CLONAZEPAM 1 MG TABS  02/19/2020  30  60 tablet   [facility] ...    CLONAZEPAM 1 MG TABS  01/20/2020  30  60 tablet   [facility] ...    CLONAZEPAM 1 MG TABS  12/16/2019  30  60 tablet   [facility] ...    CLONAZEPAM 1 MG TABS  11/18/2019  30  60 tablet  Chriss Galo MD  [facility] ...    CLONAZEPAM 1MG      TAB  10/18/2019  30  60 each  CHRISS GALO  [facility] ...    CLONAZEPAM 1MG      TAB  09/12/2019  30  60 each  CHRISS GALO  [facility] ...    CLONAZEPAM 1MG      TAB  08/13/2019  30  60 each  CHRISS GALO  [facility] ...          dilTIAZem HCl ER Beads      Dispensed  Days Supply  Quantity  Provider  Pharmacy    DILTIAZEM HCL ER BEADS 300 MG CP24  06/14/2020  90  90 applicator   [facility] ...    DILTIAZEM HCL ER BEADS 300 MG CP24  03/16/2020  90  90 applicator   [facility] ...    DILTIAZEM HCL ER BEADS 300 MG CP24  12/24/2019  90  90 applicator   [facility] ...    DILTIAZEM 300MG/24HR CAP  09/12/2019  90  90 each  TIFF[place] LLANOS  [facility] ...          metroNIDAZOLE       Dispensed  Days Supply  Quantity  Provider  Pharmacy    METRONIDAZOLE VAG 0.75% GEL  02/07/2020  5  70 g  [facility]          raNITIdine HCl      Dispensed  Days Supply  Quantity  Provider  Pharmacy    RANITIDINE 150MG    TAB  08/13/2019  90  180 each  TIFFANY LLANOS  [facility] ...

## 2020-07-30 NOTE — DISCHARGE INSTRUCTIONS
What to expect when you have contrast    During your exam, we will inject  contrast  into your vein or artery. (Contrast is a clear liquid with iodine in it. It shows up on X-rays.)    You may feel warm or hot. You may have a metal taste in your mouth and a slight upset stomach. You may also feel pressure near the kidneys and bladder. These effects will last about 1 to 3 minutes.    Please tell us if you have:    Sneezing     Itching    Hives     Swelling in the face    A hoarse voice    Breathing problems    Other new symptoms    Serious problems are rare.  They may include:    Irregular heartbeat     Seizures    Kidney failure              Tissue damage    Shock      Death    If you have any problems during the exam, we  will treat them right away.    When you get home    Call your hospital if you have any new symptoms in the next 2 days, like hives or swelling. (Phone numbers are at the bottom of this page.) Or call your family doctor.     If you have wheezing or trouble breathing, call 911.    Self-care  -Drink at least 4 extra glasses of water today.   This reduces the stress on your kidneys.  -Keep taking your regular medicines.    The contrast will pass out of your body in your  Urine(pee). This will happen in the next 24 hours. You  will not feel this. Your urine will not  change color.    If you have kidney problems or take metformin    Drink 4 to 8 large glasses of water for the next  2 days, if you are not on a fluid restriction.    ?If you take metformin (Glucophage or Glucovance) for diabetes, keep taking it.      ?Your kidney function tests are abnormal.  If you take Metformin, do not take it for 48 hours. Please go to your clinic for a blood test within 3 days after your exam before the restarting this medicine.     (Note to provider:please give patient prescription for lab tests.)    ?Special instructions: -    I have read and understand the above information.    Patient Sign  Here:______________________________________Date:________Time:______    Staff Sign Here:________________________________________Date:_______Time:______      Radiology Departments:     ?[facility]: [phone] ?[place]: [phone]     ?[place]: [phone] ?[place]:[phone]      ?[place]: [phone]  ?[place]: [phone]  ?[place]:[phone]    ?[facility]:[phone]  ?[facility]:[phone]

## 2020-07-30 NOTE — ED NOTES
Pt presents with sister from home. States abd pain started last night after dinner. C/o 10/10 upper abd and up through esophageal area and into neck. Describes as burning and sharp.  Vomiting, this is new-pt has only been nauseous at home   Pt is speaking only one word at a time with short shallow breathes. States it hurts to move or take deep breathes.   Encouraged and tried to calm pt down to take slow deep breathes, unsuccessful.   States no HX of GI or cardiac

## 2020-07-30 NOTE — H&P
Admitted:     2020      CHIEF COMPLAINT:  Abdominal pain, nausea, vomiting.      HISTORY OF PRESENT ILLNESS:  Sharlene is a 59-year-old female who presented to the ER with complaints of 10/10 upper abdominal pain.  She states this occurred last night around 6 p.m.  She had sort of a normal meal, some chicken, etc., and then developed abdominal pain that persisted this morning and was 10/10 and she came to the ER for evaluation.  Initially she was somewhat nauseous at home but had an emesis while she was here.  No fevers at all.  She has not had any chest pains or shortness of breath.  No change in her bowels.  No diarrhea at all.  No blood in her stool, blood in her urine, no peripheral edema, skin rashes at all.  Never had anything quite like this before.  No reflux issues.      She has not had any new medications started within the last week or 2.  She has not eaten anything out of the ordinary.  She did have a drink or two of alcohol over this last weekend on Saturday, but nothing out of the ordinary  and not a large amount.  She is a smoker.      When she arrived in the ER, she was complaining of 10/10 pain.  She had a couple doses of IV Dilaudid with improvement.  She was afebrile with normal heart rate in the 60s, blood pressure was 140/70, sats 100% on room air.  She also got some fentanyl down in the ER also, which did not seem to help much initially, then got hydromorphone.  She did get the Xylocaine and Maalox cocktail, also Compazine for nausea.  Her lab work in the ER was significant in that her lipase was mildly elevated at 558.  White count was 12,000.  Liver profile was normal.  Of note, she has had a cholecystectomy performed.      I am seeing her up here on the floor.  She is in mild distress only, was able to speak in full sentences and illicit her entire history without difficulty.      PAST MEDICAL HISTORY:  Significant for the followin.  Status post splenectomy.   2.  Status post  exploratory laparotomy in the past for abdominal pain and fever.     3.  Status post laparoscopic cholecystectomy due to cholelithiasis.     4.  Status post total abdominal hysterectomy and bilateral salpingo-oophorectomy.     5.  Endoscopy done in 2017.   6.  Status post a coronary angiogram at [facility] in  showing normal coronary arteries.     7.  Status post appendectomy.     8.  History of depression.     9.  History of mild to moderate aortic insufficiency.  Otherwise, normal systolic function.   10.  Fibromyalgia.   11.  Depression.   12.  Hypertension.   13.  Migraines.   14.  Ongoing tobacco abuse.   15.  Hypothyroidism.      MEDICATIONS:  At home are the followin.  Paxil 10 mg every evening.     2.  Klonopin 0.5-1 mg b.i.d. p.r.n.   2.  Diltiazem  mg daily.     4.  Estrace 0.5 mg b.i.d.    5.  Levothyroxine 75 mcg daily.   6.  Protonix 40 mg every morning.   7.  Imitrex 100 mg p.r.n. migraines.      ALLERGIES:  LISINOPRIL - UNCLEAR.  CODEINE - UNCLEAR.  IMDUR - SHE HAD A HORRIBLE HEADACHE WITH it.      SOCIAL HISTORY:  She is , a smoker, rare social drinker.      FAMILY HISTORY:  Mom had coronary artery disease and stroke.  Dad had coronary disease and hypertension.      REVIEW OF SYSTEMS:  Pertinent positives and negatives are noted above in the HPI.      PHYSICAL EXAMINATION:   GENERAL:  Alert, pleasant middle-aged female in mild distress.   VITAL SIGNS:  Most current vital signs showed blood pressure 126/67, temperature 97.3, heart rate 60 and regular, sats are 98% on room air.   HEENT:  Normocephalic, atraumatic.  Pupils equal, round, react to light.  Extraocular muscles are intact.  Sclerae are clear.   NECK:  Supple, no obvious lymphadenopathy or thyromegaly.  Mucous membranes are moist.   LUNGS:  Clear to auscultation.   CARDIAC:  Regular rate and rhythm.  Normal S1, S2.  A 1-2/6 systolic murmur, also diastolic murmur is heard.  Neck veins are flat.  Pulses 2+  and equal throughout.   ABDOMEN:  Soft.  Bowel sounds are positive.  She is tender in the epigastric area.  No rebound, no masses.  Bowel sounds are positive.   EXTREMITIES:  Without significant cyanosis, clubbing or edema.   NEUROLOGIC:  Alert and oriented x3.  Cranial nerves are intact.  Nonfocal exam.      LABORATORY DATA:  Sodium 141, potassium 3.9, chloride 109, CO2 24, BUN 15, creatinine 0.91, glucose 134, calcium 9.3, albumin 4.1, total protein 8.2, total bilirubin 0.4, alkaline phosphatase 84, ALT 25, AST 13.  Lactic acid 2.5.  Lipase is 558.  White count 12,500, hemoglobin 14.6, platelet count 212,000.  Urinalysis has not been performed.      IMAGING:  CT of her abdomen shows no mass or lymphadenopathy.  She has some evidence of pancreatitis.  No fluid collections, pseudocyst or abscess, etc.      ASSESSMENT AND PLAN:   1.  Acute pancreatitis:  Etiology is not clear.  No new medications.  No recent illnesses.  Does not have a gallbladder at all.  LFTs are normal.  No signs this is biliary in origin.  She had 1 drink over the weekend, it does not appear to be related to alcohol.  At this point,  unspecified etiology.  Hemodynamically stable, no fevers at all.  We will treat her as per usual routine.  We will keep her n.p.o. with now a few ice chips, IV Dilaudid for pain control, IV fluids and antiemetics.  Advance diet as she tolerates it, hopefully as early as tomorrow.   2.  Tobacco abuse.   3.  Mild elevation of lactic acid:  No signs of sepsis or end-organ damage.  We will repeat this.  She has received IV fluids.   4.  History of aortic insufficiency:  She has had a normal coronary angiography.  No signs of heart failure or dysrhythmias at this point.  O2 sats are stable.   5.  Hypothyroidism:  On replacement.  Continue with her levothyroxine.      CODE STATUS:  She is full code.      Anticipated hospital stay is likely greater than or equal to 2 midnights.         BRAN DELGADO MD             D:  2020   T: 2020   MT: MARTINA      Name:     STEVEN SZYMANSKI   MRN:      [id number]        Account:      [id number]   :      1960        Admitted:     2020                   Document: [id number]       cc: Concha MICHAUD

## 2020-07-30 NOTE — ED PROVIDER NOTES
"  History     Chief Complaint   Patient presents with     Abdominal Pain     c/o abd pain since 1800 last night. denies nausea, vomiting or diarrhea. c/o \"it burns and 100+ pain\".      HPI  Sharlene Mccord is a 59 year old female who presents here with epigastric abdominal pain starting 1800 last night.  Is been constant progressively worsening over time.  It does radiate to her back.  She has no nausea did have some vomiting here.  No diarrhea.  No fevers or chills no chest pain or shortness of breath no headache or lightheadedness no numbness tingling weakness.  She has been having a few drinks over the weekend.  Says she is not a regular drinker.  Her sister has a history of pancreatitis that was quite severe apparently    Allergies:  Allergies   Allergen Reactions     Codeine      Isosorbide Mononitrate Other (See Comments)     Vomiting and headache       Lisinopril      [facility] scan       Problem List:    Patient Active Problem List    Diagnosis Date Noted     SHAKIR (generalized anxiety disorder) 10/02/2019     Priority: Medium     Patient is followed by  for ongoing prescription of benzodiazepines.  All refills should be approved by this provider, or covering partner.    Medication(s): Klonopin 1 mg.   Maximum quantity per month: 60  Clinic visit frequency required: Q 3 months     Controlled substance agreement on file: Yes       Date(s): 9.25.19  Benzodiazepine use reviewed by psychiatry:  Yes       Date(s): 9.25.19    Last [facility] website verification:  done on 9.25.19  [email or web address]           ACP (advance care planning) 09/11/2017     Priority: Medium     Advance Care Planning 9/11/2017: ACP Review of Chart / Resources Provided:  Reviewed chart for advance care plan.  Sharlene Mccord has been provided information and resources to begin or update their advance care plan.  Added by Melina Armas        Advance Care Planning 7/11/2016: ACP Review of Chart / Resources Provided:  " Reviewed chart for advance care plan.  Sharlene Mccord has been provided information and resources to begin or update their advance care plan.  Added by Kortney Smalls             Mixed connective tissue disease (H) 07/05/2017     Priority: Medium     Chronic, continuous use of opioids 06/07/2017     Priority: Medium     Patient is followed by KEILY Cobos for ongoing prescription of pain medication.  All refills should only be approved by this provider, or covering partner.    Medication(s): Ultram 50mg.   Maximum quantity per month: #180  Clinic visit frequency required: Q 3 months     Controlled substance agreement:  Encounter-Level CSA - 06/05/2017:                 Controlled Substance Agreement - Scan on 6/6/2017 12:39 PM : CONTROLLED SUBSTANCE AGREEMENT (below)            Pain Clinic evaluation in the past: No    DIRE Total Score(s):  No flowsheet data found.    Last [facility] website verification:  done on 6.2.17   [email or web address]         Gastroesophageal reflux disease with esophagitis 02/09/2017     Priority: Medium     Migraine without aura and responsive to treatment 11/23/2015     Priority: Medium     Major depressive disorder, recurrent episode (H) 12/17/2014     Priority: Medium     Problem list name updated by automated process. Provider to review       Cognitive disorder 07/08/2014     Priority: Medium     Major depressive disorder 07/08/2014     Priority: Medium     Memory loss 07/02/2014     Priority: Medium     Constipation 04/28/2014     Priority: Medium     Chronic pain 03/12/2014     Priority: Medium     Aortic valve disorder 08/13/2013     Priority: Medium     Problem list name updated by automated process. Provider to review       Diverticulitis of sigmoid colon 05/11/2013     Priority: Medium     Diverticulitis 05/11/2013     Priority: Medium     Cardiac microvascular disease 04/10/2013     Priority: Medium     Based on Cardiac MRI done at         Hypothyroidism  03/01/2011     Priority: Medium     Problem list name updated by automated process. Provider to review       Anxiety state 03/01/2011     Priority: Medium     Problem list name updated by automated process. Provider to review       Fatigue 07/01/2008     Priority: Medium     Atypical migraine 07/01/2008     Priority: Medium        Past Medical History:    Past Medical History:   Diagnosis Date     Anxiety state, unspecified 03/01/2011     Aortic valve disorders 06/07/2000     Cardiac microvascular disease 4/10/2013     Depressive disorder 1997     Fatigue      Fatigue      Fibromyalgia 03/01/2011     Hypertension      Major depressive disorder, recurrent episode, unspecified 12/17/2014     Migraine, unspecified, without mention of intractable migraine without mention of status migrainosus 03/01/2011     Mitral valve disorders(424.0) 10/16/2007     PONV (postoperative nausea and vomiting)      Thyroid Nodule 03/01/2011     Tobacco use disorder 03/01/2011     Unspecified hypothyroidism 03/01/2011       Past Surgical History:    Past Surgical History:   Procedure Laterality Date     APPENDECTOMY  1977     BIOPSY  2017    taken at [facility] from Dr. Nadira cohen. Thyroid     CARDIAC SURGERY  2013    angiogram [facility]:  Normal coronary arteries.  Felt ot have some microvascular disease     CL AFF SURGICAL PATHOLOGY  01/02/2007    Thyroid Mass     COLONOSCOPY  1/13/2014    Procedure: COLONOSCOPY;  COLONOSCOPY ;  Surgeon: Chasidy Gee DO;  Location: [facility] OR     ESOPHAGOSCOPY, GASTROSCOPY, DUODENOSCOPY (EGD), COMBINED N/A 2/9/2017    Procedure: COMBINED ESOPHAGOSCOPY, GASTROSCOPY, DUODENOSCOPY (EGD);  Surgeon: Johnathan Ruff DO;  Location: [facility] OR     GYN SURGERY      c-sections x 3     HYSTERECTOMY TOTAL ABDOMINAL, BILATERAL SALPINGO-OOPHORECTOMY, COMBINED N/A 01/01/2001     LAPAROSCOPIC CHOLECYSTECTOMY  11/07/2003    Cholelithiasis     LAPAROTOMY EXPLORATORY      abdominal pain/fever     LARYNGOSCOPY        SPLENECTOMY         Family History:    Family History   Problem Relation Age of Onset     C.A.D. Father      Hypertension Father      C.A.D. Mother      Cerebrovascular Disease Mother         CVA     Hypertension Mother      Coronary Artery Disease Mother      Diabetes Paternal Grandmother      Diabetes Paternal Grandfather      Diabetes Sister      Breast Cancer Sister      Diabetes Maternal Grandmother      Diabetes Sister      Breast Cancer Sister      Hypertension Sister      Depression Sister      Anxiety Disorder Sister      Obesity Sister      Depression Sister      Breast Cancer Sister      Obesity Sister      Depression Brother      Depression Daughter      Obesity Daughter      Depression Daughter      Mental Illness Daughter         [email or web address]     Anxiety Disorder Daughter      Mental Illness Daughter      Anxiety Disorder Other      Osteoporosis Other      Thyroid Disease Other      Diabetes Nephew        Social History:  Marital Status:   [2]  Social History     Tobacco Use     Smoking status: Light Tobacco Smoker     Packs/day: 0.25     Years: 40.00     Pack years: 10.00     Types: Cigarettes     Start date: 1/1/1975     Smokeless tobacco: Never Used     Tobacco comment: 5 cigs daily  Patient will work on smoking cessation (3-26-18)   Substance Use Topics     Alcohol use: No     Drug use: No        Medications:    clonazePAM (KLONOPIN) 1 MG tablet  diltiazem ER (TIAZAC) 300 MG 24 hr ER beaded capsule  estradiol (ESTRACE) 0.5 MG tablet  levothyroxine (SYNTHROID/LEVOTHROID) 75 MCG tablet  clonazePAM (KLONOPIN) 1 MG tablet  nitroGLYcerin (NITROSTAT) 0.4 MG sublingual tablet  pantoprazole (PROTONIX) 40 MG EC tablet  PROCTOZONE-HC 2.5 % cream  SUMAtriptan (IMITREX) 100 MG tablet  UNABLE TO FIND  UNABLE TO FIND  vortioxetine (TRINTELLIX) 10 MG tablet          Review of Systems  I did do a complete 10 point review of systems. Pertinent positives and negatives listed per HPI. All other systems have been  reviewed and are negative.      Physical Exam   BP: 147/75  Pulse: 56  Heart Rate: 61  Temp: 97  F (36.1  C)  Resp: 22  SpO2: 100 %      Physical Exam  Vitals signs and nursing note reviewed.   Constitutional:       General: She is not in acute distress.     Appearance: Normal appearance. She is not ill-appearing, toxic-appearing or diaphoretic.   HENT:      Head: Normocephalic and atraumatic.   Eyes:      General: No scleral icterus.     Conjunctiva/sclera: Conjunctivae normal.   Neck:      Musculoskeletal: Neck supple.   Cardiovascular:      Rate and Rhythm: Normal rate and regular rhythm.   Pulmonary:      Effort: Pulmonary effort is normal. No respiratory distress.      Breath sounds: Normal breath sounds.   Abdominal:      General: Bowel sounds are normal.      Tenderness: There is abdominal tenderness.      Comments: Tenderness in the epigastrium.  She is a bit guarded.   Musculoskeletal:         General: No swelling or signs of injury.   Skin:     General: Skin is warm and dry.   Neurological:      General: No focal deficit present.      Mental Status: She is alert and oriented to person, place, and time.   Psychiatric:         Mood and Affect: Mood normal.         Behavior: Behavior normal.         Thought Content: Thought content normal.         Judgment: Judgment normal.         ED Course   Recent alcohol consumption with epigastric pain.  Lipase was 558.  CT reveals pancreatitis no evidence of pseudocyst or fluid collection.  Pain was treated here with IV fentanyl she is given IV Dilaudid a total of 2 mg and 1 mg increments.  She received a liter normal saline.  She received some Compazine 5 mg and Benadryl 50 mg IV.  She will be admitted to the hospitalist service for pain control and gut rest.  ED Course as of Jul 30 1439   u Jul 30, 2020   1435 SpO2: 100 %   1436 Pulse: 56            Results for orders placed or performed during the hospital encounter of 07/30/20 (from the past 24 hour(s))   CBC with  platelets differential   Result Value Ref [place]    WBC 12.5 (H) 4.0 - 11.0 10e9/L    RBC Count 4.82 3.8 - 5.2 10e12/L    Hemoglobin 14.6 11.7 - 15.7 g/dL    Hematocrit 41.6 35.0 - 47.0 %    MCV 86 78 - 100 fl    MCH 30.3 26.5 - 33.0 pg    MCHC 35.1 31.5 - 36.5 g/dL    RDW 12.6 10.0 - 15.0 %    Platelet Count 212 150 - 450 10e9/L    Diff Method Automated Method     % Neutrophils 72.1 %    % Lymphocytes 20.1 %    % Monocytes 5.9 %    % Eosinophils 0.7 %    % Basophils 0.6 %    % Immature Granulocytes 0.6 %    Nucleated RBCs 0 0 /100    Absolute Neutrophil 9.0 (H) 1.6 - 8.3 10e9/L    Absolute Lymphocytes 2.5 0.8 - 5.3 10e9/L    Absolute Monocytes 0.7 0.0 - 1.3 10e9/L    Absolute Eosinophils 0.1 0.0 - 0.7 10e9/L    Absolute Basophils 0.1 0.0 - 0.2 10e9/L    Abs Immature Granulocytes 0.1 0 - 0.4 10e9/L    Absolute Nucleated RBC 0.0    Comprehensive metabolic panel   Result Value Ref [place]    Sodium 141 133 - 144 mmol/L    Potassium 3.9 3.4 - 5.3 mmol/L    Chloride 109 94 - 109 mmol/L    Carbon Dioxide 24 20 - 32 mmol/L    Anion Gap 8 3 - 14 mmol/L    Glucose 134 (H) 70 - 99 mg/dL    Urea Nitrogen 15 7 - 30 mg/dL    Creatinine 0.91 0.52 - 1.04 mg/dL    GFR Estimate 69 >60 mL/min/[1.73_m2]    GFR Estimate If Black 79 >60 mL/min/[1.73_m2]    Calcium 9.3 8.5 - 10.1 mg/dL    Bilirubin Total 0.4 0.2 - 1.3 mg/dL    Albumin 4.1 3.4 - 5.0 g/dL    Protein Total 8.2 6.8 - 8.8 g/dL    Alkaline Phosphatase 84 40 - 150 U/L    ALT 25 0 - 50 U/L    AST 13 0 - 45 U/L   Lipase   Result Value Ref [place]    Lipase 558 (H) 73 - 393 U/L   Lactic acid whole blood   Result Value Ref [place]    Lactic Acid 2.5 (H) 0.7 - 2.0 mmol/L   CT Abdomen Pelvis w Contrast    Narrative    Exam:CT ABDOMEN PELVIS W CONTRAST    History: 59 years Female with epigastric pain, elevated lipase     Comparisons: 12/5/2018    Technique: Axial CT imaging of the abdomen and pelvis was performed  with intervenous contrast. Coronal and sagittal reconstructions  were  obtained      FINDINGS:  Lung bases:The lung bases are clear    Abdomen:  Liver:Unremarkable  Gallbladder and biliary tree: The gallbladder is absent. There is  intra and extra hepatic biliary dilatation. The extrahepatic bile duct  is 9 mm in diameter at the pancreatic head. Findings are similar to  the prior study.  Pancreas: There is some edema within the peripancreatic fat anterior  and above the body and tail. No organized fluid collections are seen  at this time.  Spleen:Unremarkable  Adrenals:Normal    Kidneys and ureters:Unremarkable    Lymph nodes:There is no significant lymphadenopathy    Bowel:No abnormally distended or thickened loops of bowel are present.  There is no evidence of bowel obstruction.    Appendix: No inflammatory changes are seen in the right lower  quadrant.    Vessels:Unremarkable    Osseous structures:Unremarkable    Pelvis:There is no evidence of mass or lymphadenopathy. No abnormal  fluid collections are present.            Impression    IMPRESSION: Pancreatitis. No organized fluid collections are seen to  suggest pseudocyst or abscess at this time.    ANANT LOPEZ MD       Medications   famotidine (PEPCID) injection 20 mg (20 mg Intravenous Given 7/30/20 1247)   lidocaine (XYLOCAINE) 2 % 15 mL, alum & mag hydroxide-simethicone (MAALOX  ES) 15 mL GI Cocktail (30 mLs Oral Given 7/30/20 1250)   prochlorperazine (COMPAZINE) injection 5 mg (5 mg Intravenous Given 7/30/20 1245)   diphenhydrAMINE (BENADRYL) injection 50 mg (50 mg Intravenous Given 7/30/20 1248)   fentaNYL (PF) (SUBLIMAZE) injection 25 mcg (25 mcg Intravenous Given 7/30/20 1246)   0.9% sodium chloride BOLUS (1,000 mLs Intravenous New Bag 7/30/20 1302)   HYDROmorphone (DILAUDID) injection 1 mg (1 mg Intravenous Given 7/30/20 1302)   sodium chloride (PF) 0.9% PF flush 60 mL (60 mLs Intravenous Given 7/30/20 1346)   iopamidol (ISOVUE-300) IV solution 61% 100 mL (100 mLs Intravenous Given 7/30/20 1346)   HYDROmorphone  (DILAUDID) injection 1 mg (1 mg Intravenous Given 7/30/20 4779)       Assessments & Plan (with Medical Decision Making)     I have reviewed the nursing notes.    I have reviewed the findings, diagnosis, plan and need for follow up with the patient.      New Prescriptions    No medications on file       Final diagnoses:   Acute biliary pancreatitis without infection or necrosis       7/30/2020   [facility] EMERGENCY DEPARTMENT

## 2020-07-30 NOTE — PLAN OF CARE
DATE:  7/30/2020   TIME OF RECEIPT FROM LAB:  0882  LAB TEST:  Lactic Acid  LAB VALUE:  2.1  RESULTS GIVEN WITH READ-BACK TO (PROVIDER):  Roney Farias MD  TIME LAB VALUE REPORTED TO PROVIDER:   0494

## 2020-07-30 NOTE — PLAN OF CARE
/67 (BP Location: Left arm)   Pulse 59   Temp 97.3  F (36.3  C) (Tympanic)   Resp 16   SpO2 98%     Pt alert and oriented upon initial assessment. C/O ABD pain 8/10. Dilaudid 0.5mg given 2x, pt states it decreases pain. Denies nausea at the moment. Tolerating ice chips. Lungs clear. BS active. HR regular. SBA in room. No falls or injuries this shift. Will continue to monitor.     Face to face report given with opportunity to observe patient.    Report given to IRMA Sousa RN   7/30/2020  7:03 PM

## 2020-07-30 NOTE — PLAN OF CARE
[facility] Inpatient Admission Note:    Patient admitted to 3210/3210-1 at approximately 1500 via ambulation accompanied by transport tech from emergency room . Report received from Kathleen RN in SBAR format at 1455 via telephone. Patient transferred to bed via self.. Patient is alert and oriented X 3, reports pain; rates at 7 on 0-10 scale.  Patient oriented to room, unit, hourly rounding, and plan of care. Explained admission packet and patient handbook with patient bill of rights brochure. Will continue to monitor and document as needed.     Inpatient Nursing criteria listed below was met:    Health care directives status obtained and documented: Yes    Care Everywhere authorization obtained No    MRSA swab completed for patient 65 years and older: N/A    Patient identifies a surrogate decision maker: No If yes, who:NA Contact Information:NA    Clergy visit ordered if patient requests: N/A    Skin issues/needs documented: N/A    Isolation Patient: no Education given, correct sign in place and documentation row added to PCS:  No    Fall Prevention No: Care plan updated, education given and documented, sticker and magnet in place: N/A    Care Plan initiated: Yes    Education Documented (including assessment): Yes    Patient has discharge needs : No If yes, please explain:NA

## 2020-07-30 NOTE — ED NOTES
DATE:  7/30/2020   TIME OF RECEIPT FROM LAB:  3693  LAB TEST:  lactate  LAB VALUE:  2.5  RESULTS GIVEN WITH READ-BACK TO (PROVIDER):  Norman Brandt PA-C  TIME LAB VALUE REPORTED TO PROVIDER:   8097

## 2020-07-31 ENCOUNTER — APPOINTMENT (OUTPATIENT)
Dept: ULTRASOUND IMAGING | Facility: HOSPITAL | Age: 60
DRG: 438 | End: 2020-07-31
Attending: INTERNAL MEDICINE
Payer: MEDICARE

## 2020-07-31 ENCOUNTER — TRANSFERRED RECORDS (OUTPATIENT)
Dept: HEALTH INFORMATION MANAGEMENT | Facility: CLINIC | Age: 60
End: 2020-07-31

## 2020-07-31 VITALS
OXYGEN SATURATION: 94 % | HEIGHT: 67 IN | BODY MASS INDEX: 29.07 KG/M2 | WEIGHT: 185.19 LBS | HEART RATE: 72 BPM | TEMPERATURE: 98 F | DIASTOLIC BLOOD PRESSURE: 62 MMHG | RESPIRATION RATE: 20 BRPM | SYSTOLIC BLOOD PRESSURE: 121 MMHG

## 2020-07-31 PROBLEM — K83.1 OBSTRUCTION OF COMMON BILE DUCT (H): Status: ACTIVE | Noted: 2020-07-31

## 2020-07-31 LAB
ALBUMIN SERPL-MCNC: 3.6 G/DL (ref 3.4–5)
ALP SERPL-CCNC: 116 U/L (ref 40–150)
ALT SERPL W P-5'-P-CCNC: 253 U/L (ref 0–50)
ANION GAP SERPL CALCULATED.3IONS-SCNC: 2 MMOL/L (ref 3–14)
AST SERPL W P-5'-P-CCNC: 232 U/L (ref 0–45)
BASOPHILS # BLD AUTO: 0 10E9/L (ref 0–0.2)
BASOPHILS NFR BLD AUTO: 0.3 %
BILIRUB SERPL-MCNC: 1.1 MG/DL (ref 0.2–1.3)
BUN SERPL-MCNC: 10 MG/DL (ref 7–30)
CALCIUM SERPL-MCNC: 8.9 MG/DL (ref 8.5–10.1)
CHLORIDE SERPL-SCNC: 107 MMOL/L (ref 94–109)
CO2 SERPL-SCNC: 30 MMOL/L (ref 20–32)
CREAT SERPL-MCNC: 0.74 MG/DL (ref 0.52–1.04)
DIFFERENTIAL METHOD BLD: ABNORMAL
EOSINOPHIL # BLD AUTO: 0 10E9/L (ref 0–0.7)
EOSINOPHIL NFR BLD AUTO: 0.2 %
ERYTHROCYTE [DISTWIDTH] IN BLOOD BY AUTOMATED COUNT: 12.9 % (ref 10–15)
GFR SERPL CREATININE-BSD FRML MDRD: 87 ML/MIN/{1.73_M2}
GLUCOSE SERPL-MCNC: 108 MG/DL (ref 70–99)
HCT VFR BLD AUTO: 39.8 % (ref 35–47)
HGB BLD-MCNC: 13.4 G/DL (ref 11.7–15.7)
IMM GRANULOCYTES # BLD: 0 10E9/L (ref 0–0.4)
IMM GRANULOCYTES NFR BLD: 0.3 %
LABORATORY COMMENT REPORT: NORMAL
LIPASE SERPL-CCNC: 7714 U/L (ref 73–393)
LYMPHOCYTES # BLD AUTO: 1.7 10E9/L (ref 0.8–5.3)
LYMPHOCYTES NFR BLD AUTO: 14.7 %
MCH RBC QN AUTO: 30 PG (ref 26.5–33)
MCHC RBC AUTO-ENTMCNC: 33.7 G/DL (ref 31.5–36.5)
MCV RBC AUTO: 89 FL (ref 78–100)
MONOCYTES # BLD AUTO: 0.9 10E9/L (ref 0–1.3)
MONOCYTES NFR BLD AUTO: 7.7 %
NEUTROPHILS # BLD AUTO: 8.8 10E9/L (ref 1.6–8.3)
NEUTROPHILS NFR BLD AUTO: 76.8 %
NRBC # BLD AUTO: 0 10*3/UL
NRBC BLD AUTO-RTO: 0 /100
PLATELET # BLD AUTO: 154 10E9/L (ref 150–450)
POTASSIUM SERPL-SCNC: 4.1 MMOL/L (ref 3.4–5.3)
PROT SERPL-MCNC: 7.3 G/DL (ref 6.8–8.8)
RBC # BLD AUTO: 4.46 10E12/L (ref 3.8–5.2)
SARS-COV-2 RNA SPEC QL NAA+PROBE: NEGATIVE
SARS-COV-2 RNA SPEC QL NAA+PROBE: NORMAL
SODIUM SERPL-SCNC: 139 MMOL/L (ref 133–144)
SPECIMEN SOURCE: NORMAL
SPECIMEN SOURCE: NORMAL
TRIGL SERPL-MCNC: 99 MG/DL
WBC # BLD AUTO: 11.5 10E9/L (ref 4–11)

## 2020-07-31 PROCEDURE — 85025 COMPLETE CBC W/AUTO DIFF WBC: CPT | Performed by: INTERNAL MEDICINE

## 2020-07-31 PROCEDURE — 25000132 ZZH RX MED GY IP 250 OP 250 PS 637: Performed by: INTERNAL MEDICINE

## 2020-07-31 PROCEDURE — 84478 ASSAY OF TRIGLYCERIDES: CPT | Performed by: INTERNAL MEDICINE

## 2020-07-31 PROCEDURE — 99239 HOSP IP/OBS DSCHRG MGMT >30: CPT | Performed by: INTERNAL MEDICINE

## 2020-07-31 PROCEDURE — 83690 ASSAY OF LIPASE: CPT | Performed by: INTERNAL MEDICINE

## 2020-07-31 PROCEDURE — 36415 COLL VENOUS BLD VENIPUNCTURE: CPT | Performed by: INTERNAL MEDICINE

## 2020-07-31 PROCEDURE — 25000128 H RX IP 250 OP 636: Performed by: INTERNAL MEDICINE

## 2020-07-31 PROCEDURE — 25800030 ZZH RX IP 258 OP 636: Performed by: INTERNAL MEDICINE

## 2020-07-31 PROCEDURE — 80053 COMPREHEN METABOLIC PANEL: CPT | Performed by: INTERNAL MEDICINE

## 2020-07-31 PROCEDURE — 76705 ECHO EXAM OF ABDOMEN: CPT | Mod: TC

## 2020-07-31 RX ORDER — DILTIAZEM HYDROCHLORIDE 300 MG/1
300 CAPSULE, COATED, EXTENDED RELEASE ORAL DAILY
Status: ON HOLD | DISCHARGE
Start: 2020-08-01 | End: 2021-04-14

## 2020-07-31 RX ORDER — SODIUM CHLORIDE, SODIUM LACTATE, POTASSIUM CHLORIDE, CALCIUM CHLORIDE 600; 310; 30; 20 MG/100ML; MG/100ML; MG/100ML; MG/100ML
100 INJECTION, SOLUTION INTRAVENOUS CONTINUOUS
COMMUNITY
Start: 2020-07-31 | End: 2020-08-11

## 2020-07-31 RX ORDER — ONDANSETRON 2 MG/ML
4 INJECTION INTRAMUSCULAR; INTRAVENOUS EVERY 6 HOURS PRN
DISCHARGE
Start: 2020-07-31 | End: 2020-08-11

## 2020-07-31 RX ORDER — CLONAZEPAM 0.5 MG/1
0.5 TABLET ORAL 2 TIMES DAILY PRN
Status: SHIPPED | DISCHARGE
Start: 2020-07-31 | End: 2020-08-11

## 2020-07-31 RX ORDER — PANTOPRAZOLE SODIUM 40 MG/1
40 TABLET, DELAYED RELEASE ORAL
DISCHARGE
Start: 2020-08-01 | End: 2021-02-03

## 2020-07-31 RX ORDER — LEVOTHYROXINE SODIUM 75 UG/1
75 TABLET ORAL
DISCHARGE
Start: 2020-08-01 | End: 2020-09-10

## 2020-07-31 RX ADMIN — ONDANSETRON 4 MG: 2 INJECTION INTRAMUSCULAR; INTRAVENOUS at 08:00

## 2020-07-31 RX ADMIN — TAZOBACTAM SODIUM AND PIPERACILLIN SODIUM 3.38 G: 375; 3 INJECTION, SOLUTION INTRAVENOUS at 10:14

## 2020-07-31 RX ADMIN — HYDROMORPHONE HYDROCHLORIDE 0.5 MG: 1 INJECTION, SOLUTION INTRAMUSCULAR; INTRAVENOUS; SUBCUTANEOUS at 11:20

## 2020-07-31 RX ADMIN — LEVOTHYROXINE SODIUM 75 MCG: 75 TABLET ORAL at 08:07

## 2020-07-31 RX ADMIN — SODIUM CHLORIDE, POTASSIUM CHLORIDE, SODIUM LACTATE AND CALCIUM CHLORIDE: 600; 310; 30; 20 INJECTION, SOLUTION INTRAVENOUS at 01:10

## 2020-07-31 RX ADMIN — CLONAZEPAM 0.5 MG: 0.5 TABLET ORAL at 08:08

## 2020-07-31 RX ADMIN — PANTOPRAZOLE SODIUM 40 MG: 40 TABLET, DELAYED RELEASE ORAL at 08:07

## 2020-07-31 RX ADMIN — HYDROMORPHONE HYDROCHLORIDE 0.5 MG: 1 INJECTION, SOLUTION INTRAMUSCULAR; INTRAVENOUS; SUBCUTANEOUS at 06:21

## 2020-07-31 RX ADMIN — HYDROMORPHONE HYDROCHLORIDE 0.5 MG: 1 INJECTION, SOLUTION INTRAMUSCULAR; INTRAVENOUS; SUBCUTANEOUS at 07:56

## 2020-07-31 RX ADMIN — HYDROMORPHONE HYDROCHLORIDE 0.5 MG: 1 INJECTION, SOLUTION INTRAMUSCULAR; INTRAVENOUS; SUBCUTANEOUS at 01:10

## 2020-07-31 RX ADMIN — DILTIAZEM HYDROCHLORIDE 300 MG: 120 CAPSULE, COATED, EXTENDED RELEASE ORAL at 08:08

## 2020-07-31 RX ADMIN — HYDROMORPHONE HYDROCHLORIDE 0.5 MG: 1 INJECTION, SOLUTION INTRAMUSCULAR; INTRAVENOUS; SUBCUTANEOUS at 04:00

## 2020-07-31 ASSESSMENT — MIFFLIN-ST. JEOR: SCORE: 1447.63

## 2020-07-31 ASSESSMENT — ACTIVITIES OF DAILY LIVING (ADL)
ADLS_ACUITY_SCORE: 11

## 2020-07-31 NOTE — PLAN OF CARE
"Patient was transferred to [facility]  at approximately 1125 AM via [facility].   Patient status stable. Patient's most recent vitals: Blood pressure 121/62, pulse 72, temperature 98  F (36.7  C), temperature source Tympanic, resp. rate 20, height 1.702 m (5' 7\"), weight 84 kg (185 lb 3 oz), SpO2 94 %, not currently breastfeeding.  Pt transferred with intact IV.Yes  Intact IV site at Right AC    Belongings were sent with PATIENT  AVS and pertinent records sent with patient. .N/A   Report given to Alice SOLIS at 1231 in SBAR format.      Medicated with dilaudid for abd pain. VSS. Up independent in room. Also medicated with zofran x1.   "

## 2020-07-31 NOTE — PROGRESS NOTES
[facility]    Hospitalist Progress Note      Assessment & Plan   Sharlene Mccord is a 59 year old female who was admitted on 7/30/2020.      1.  Acute pancreatitis: Patient patient came in with relatively acute onset of epigastric pain.  Lipase minimally elevated initially with normal LFTs.  CT scan did show evidence of pancreatitis.  She is status post cholecystectomy.  Not on any new medications.  Triglycerides normal.  Not a significant drinker.  Overnight her labs have changed significantly.  Her lipase is up to 7714.  Her transaminases have gone up to 10 fold to 2 53-32 respectively.  Bilirubin went from 0.4-1.1.  No fevers at all.  No nausea.  Still extremely tender in her epigastric right upper quadrant area.  So this now points towards probably a duct obstruction issue will obtain a  ultrasound to see if there is any shadowing.  Our next options would be MRCP versus considering transferring for ERCP.  Will discuss with radiology this morning here.  We will keep her n.p.o. continue to use Dilaudid for pain control.  States pain is adequately controlled currently.  No significant nausea vomiting.    2.  Hypothyroidism on replacement.    3.  History tobacco abuse.     4. Pulmonary wise she stable.  No issues.    5.  History of aortic insufficiency.  No cardiac problems currently hemodynamically stable no arrhythmias no signs of heart failure.    Diet: NPO for Medical/Clinical Reasons Except for: Ice Chips, Meds  Fluids:  cc an hour    DVT Prophylaxis: Pneumatic Compression Devices will discontinue Lovenox  Code Status: Full Code    Disposition: Expected discharge in 2-3 days once pancreatitis resolves.    Roney Farias    Interval History   Patient alert this morning still has epigastric discomfort.  Dilaudid is keeping the pain under control.  Minimal nausea overnight.  No shortness of breath or chest pain.  No fevers overnight.  Hemodynamically stable.  Liver function tests are now elevated  as is her lipase even further.  Bring the concern for possible obstructive process.  CT scan was on remarkable.  Will get a quick ultrasound.  Discuss further with radiology may require ERCP.    -Data reviewed today: I reviewed all new labs and imaging results over the last 24 hours. I personally reviewed no images or EKG's today.    Physical Exam   Temp: 99.2  F (37.3  C) Temp src: Tympanic BP: 138/69 Pulse: 76 Heart Rate: 78 Resp: 20 SpO2: 95 % O2 Device: None (Room air)    Vitals:    07/30/20 1525 07/31/20 0524   Weight: 84.6 kg (186 lb 6.4 oz) 84 kg (185 lb 3 oz)     Vital Signs with Ranges  Temp:  [97  F (36.1  C)-99.4  F (37.4  C)] 99.2  F (37.3  C)  Pulse:  [56-82] 76  Heart Rate:  [58-78] 78  Resp:  [16-22] 20  BP: (126-165)/(67-78) 138/69  SpO2:  [93 %-100 %] 95 %  I/O last 3 completed shifts:  In: 1485 [I.V.:1485]  Out: 500 [Urine:500]    Peripheral IV 07/30/20 Right Upper forearm (Active)   Site Assessment WDL 07/31/20 0620   Line Status Infusing 07/31/20 0620   Phlebitis Scale 0-->no symptoms 07/31/20 0620   Infiltration Scale 0 07/31/20 0620   Number of days: 1     No line/device    Constitutional: Alert and oriented x3. No distress    HEENT: Normocephalic/atraumatic, PERRL, EOMI, mouth moist, neck supple, no LN.     Cardiovascular: RRR. 2/6 Murmur, no  rubs, or gallops.  JVD no.  Bruits no.  Pulses 3+    Respiratory: Clear to auscultation bilaterally    Abdomen: Soft, mild distention, quite tender epigastric and right upper quadrant.  No organomegaly. Bowel sounds present    Extremities: Warm/dry. No edema    Neuro:   Non focal, cranial nerves intact, Moves all extremities.  Medications     lactated ringers 100 mL/hr at 07/31/20 0110       diltiazem ER COATED BEADS  300 mg Oral Daily     enoxaparin ANTICOAGULANT  40 mg Subcutaneous Q24H     levothyroxine  75 mcg Oral QAM AC     pantoprazole  40 mg Oral QAM AC     sodium chloride (PF)  3 mL Intracatheter Q8H       Data   Recent Labs   Lab 07/31/20  6244  07/30/20  1220   WBC 11.5* 12.5*   HGB 13.4 14.6   MCV 89 86    212    141   POTASSIUM 4.1 3.9   CHLORIDE 107 109   CO2 30 24   BUN 10 15   CR 0.74 0.91   ANIONGAP 2* 8   DAKSHA 8.9 9.3   * 134*   ALBUMIN 3.6 4.1   PROTTOTAL 7.3 8.2   BILITOTAL 1.1 0.4   ALKPHOS 116 84   * 25   * 13   LIPASE 7,714* 558*       Recent Results (from the past 24 hour(s))   CT Abdomen Pelvis w Contrast    Narrative    Exam:CT ABDOMEN PELVIS W CONTRAST    History: 59 years Female with epigastric pain, elevated lipase     Comparisons: 12/5/2018    Technique: Axial CT imaging of the abdomen and pelvis was performed  with intervenous contrast. Coronal and sagittal reconstructions were  obtained      FINDINGS:  Lung bases:The lung bases are clear    Abdomen:  Liver:Unremarkable  Gallbladder and biliary tree: The gallbladder is absent. There is  intra and extra hepatic biliary dilatation. The extrahepatic bile duct  is 9 mm in diameter at the pancreatic head. Findings are similar to  the prior study.  Pancreas: There is some edema within the peripancreatic fat anterior  and above the body and tail. No organized fluid collections are seen  at this time.  Spleen:Unremarkable  Adrenals:Normal    Kidneys and ureters:Unremarkable    Lymph nodes:There is no significant lymphadenopathy    Bowel:No abnormally distended or thickened loops of bowel are present.  There is no evidence of bowel obstruction.    Appendix: No inflammatory changes are seen in the right lower  quadrant.    Vessels:Unremarkable    Osseous structures:Unremarkable    Pelvis:There is no evidence of mass or lymphadenopathy. No abnormal  fluid collections are present.            Impression    IMPRESSION: Pancreatitis. No organized fluid collections are seen to  suggest pseudocyst or abscess at this time.    ANANT LOPEZ MD

## 2020-07-31 NOTE — DISCHARGE SUMMARY
[facility]  Hospitalist Discharge Summary      Date of Admission:  7/30/2020  Date of Discharge:  7/31/2020  Discharging Provider: Roney Farias MD      Discharge Diagnoses   Common duct obstruction  Acute pancreatitis  Elevated liver function tests secondary to common duct obstruction    Follow-ups Needed After Discharge   None    Unresulted Labs Ordered in the Past 30 Days of this Admission     Date and Time Order Name Status Description    7/30/2020 1605 Asymptomatic COVID-19 Virus (Coronavirus) by PCR In process       These results will be followed up by hospitalist    Discharge Disposition   Transferred to [facility]  Condition at discharge: Stable      Hospital Course   Patient is a 59-year-old female who presented to our ER on the day of admission which was 7/30/20 with acute onset of abdominal pain.  This occurred the night prior around 6 PM.  Came on rather suddenly.  Had some nausea.  Tried various things to help with the pain however it persisted and finally presented to our ER at around noon on 7/30.  She will complained of 10 out of 10 pain.  She was hemodynamically stable blood pressures 147/75 sats 100% room air heart rate in the 60s she was afebrile.  Never had pain like this before.  She was given some IV fluids.  Was given Maalox and Xylocaine.  Also IV Dilaudid.  She was status post a cholecystectomy back in 2003.  Never had pain like this before.  No new medications.  Nondrinker.  She had 1 drink the previous weekend only.  Her labs showed mildly elevated lipase at 558.  Liver function tests were normal.  Renal function normal.  White count was 12,500 hemoglobin is 14.6.  Her triglyceride level was normal at 99.  A CT scan was performed which did show some evidence of pancreatitis.  Otherwise no significant abnormalities.    Patient was admitted to the hospital given IV fluids kept n.p.o. given IV Dilaudid for her pain control.  She did well overnight  without any fevers no hemodynamic changes at all.  Pain was adequately controlled.  Today she still had persistent epigastric pain on examination.  Repeat of her lab work now shows she has elevated liver function tests.  Her total bilirubin is 1.1 alk phos 116  and .  Her lipase level is now 7714.  White count was 11.5 hemoglobin 13.4.  Did perform an ultrasound which now showed common duct dilated at 12.4 mm.  She also had intrahepatic biliary duct dilatation.  Also had dilatation of the pancreatic ducts.  All consistent with likely common duct obstruction at the ampulla.    Briefly discussed this with our surgeons.  Given her common duct obstruction she does require endoscopic evaluation with likely ERCP.  I did initiate Zosyn.  Patient is very stable at this point.  Pain is controlled hemodynamically stable.  I did contact [facility] in [place] and discussed with Dr. Olivia Gonzalse the hospitalist.  She accepts the patient in transfer with GI consultation planned.            Consultations This Hospital Stay   None    Code Status   Full Code    Time Spent on this Encounter   I, Roney Farias MD, personally saw the patient today and spent greater than 30 minutes discharging this patient.       Roney Farias MD  [facility]  ______________________________________________________________________    Physical Exam   Vital Signs: Temp: 98  F (36.7  C) Temp src: Tympanic BP: 121/62 Pulse: 72 Heart Rate: 78 Resp: 20 SpO2: 94 % O2 Device: None (Room air)    Weight: 185 lbs 2.98 oz  Constitutional: awake, alert, cooperative, no apparent distress, and appears stated age  Respiratory: No increased work of breathing, good air exchange, clear to auscultation bilaterally, no crackles or wheezing  Cardiovascular: Normal apical impulse, regular rate and rhythm, normal S1 and S2, no S3 or S4, and 6 systolic murmur noted  GI: Mild abdominal distention.  Bowel sounds are positive.  Quite tender  in the epigastric and right upper quadrant areas.  Musculoskeletal: There is no redness, warmth, or swelling of the joints.  Full range of motion noted.  Motor strength is 5 out of 5 all extremities bilaterally.  Tone is normal.       Primary Care Physician   Concha Hare    Discharge Orders      IV access    Peripheral IV.     Full Code     Advance Diet as Tolerated    Follow this diet upon discharge: Orders Placed This Encounter      NPO for Medical/Clinical Reasons Except for: Ice Chips, Meds       Significant Results and Procedures   Most Recent 3 CBC's:  Recent Labs   Lab Test 07/31/20  0528 07/30/20  1220 02/07/20  1226   WBC 11.5* 12.5* 10.4   HGB 13.4 14.6 14.4   MCV 89 86 88    212 210     Most Recent 3 BMP's:  Recent Labs   Lab Test 07/31/20  0528 07/30/20  1220 02/07/20  1226    141 138   POTASSIUM 4.1 3.9 4.4   CHLORIDE 107 109 108   CO2 30 24 25   BUN 10 15 18   CR 0.74 0.91 1.09*   ANIONGAP 2* 8 5   DAKSHA 8.9 9.3 8.8   * 134* 97     Most Recent 2 LFT's:  Recent Labs   Lab Test 07/31/20  0528 07/30/20  1220   * 13   * 25   ALKPHOS 116 84   BILITOTAL 1.1 0.4     Most Recent Cholesterol Panel:  Recent Labs   Lab Test 07/31/20  0528 04/19/17  1133   CHOL  --  214*   LDL  --  104*   HDL  --  77   TRIG 99 165*     Most Recent 6 Bacteria Isolates From Any Culture (See EPIC Reports for Culture Details):  Recent Labs   Lab Test 01/20/20  1038 11/21/18  1056 05/11/13  1545 05/11/13  1520   CULT No beta hemolytic Streptococcus Group A isolated 10,000 to 50,000 colonies/mL  Mixed bacterial kandy  No further identification or sensitivity done  * No growth after 5 days No growth after 5 days     Most Recent TSH and T4:  Recent Labs   Lab Test 12/02/19  0808  11/21/18  1055   TSH 1.06   < > 1.31   T4  --   --  1.27    < > = values in this interval not displayed.     Lipase: 558>>7714      Results for orders placed or performed during the hospital encounter of 07/30/20   CT Abdomen  Pelvis w Contrast    Narrative    Exam:CT ABDOMEN PELVIS W CONTRAST    History: 59 years Female with epigastric pain, elevated lipase     Comparisons: 12/5/2018    Technique: Axial CT imaging of the abdomen and pelvis was performed  with intervenous contrast. Coronal and sagittal reconstructions were  obtained      FINDINGS:  Lung bases:The lung bases are clear    Abdomen:  Liver:Unremarkable  Gallbladder and biliary tree: The gallbladder is absent. There is  intra and extra hepatic biliary dilatation. The extrahepatic bile duct  is 9 mm in diameter at the pancreatic head. Findings are similar to  the prior study.  Pancreas: There is some edema within the peripancreatic fat anterior  and above the body and tail. No organized fluid collections are seen  at this time.  Spleen:Unremarkable  Adrenals:Normal    Kidneys and ureters:Unremarkable    Lymph nodes:There is no significant lymphadenopathy    Bowel:No abnormally distended or thickened loops of bowel are present.  There is no evidence of bowel obstruction.    Appendix: No inflammatory changes are seen in the right lower  quadrant.    Vessels:Unremarkable    Osseous structures:Unremarkable    Pelvis:There is no evidence of mass or lymphadenopathy. No abnormal  fluid collections are present.            Impression    IMPRESSION: Pancreatitis. No organized fluid collections are seen to  suggest pseudocyst or abscess at this time.    ANANT LOPEZ MD   US Abdomen Limited    Narrative    PROCEDURES: US ABDOMEN LIMITED    HISTORY: Female, age 59 years, elevated lipase and LFTs  evaluate CBD    TECHNIQUE: Ultrasound of the upper abdomen was performed.    COMPARISON: CT scan abdomen and pelvis 7/30/2020     FINDINGS:    LIVER: Intrahepatic biliary dilatation.    GALLBLADDER: Surgically absent.    Common bile duct diameter: 12.4 mm.    ABDOMINAL AORTA AND IVC: The visualized portions of the abdominal  aorta are unremarkable.    PANCREAS: Small volume of free fluid is  seen within the pancreas. The  pancreatic duct is dilated measuring 3.7 mm.    RIGHT KIDNEY: The right kidney is normal. The right kidney measures  8.9 centimeters in length.    OTHER: Small volume of fluid surrounds the head and body the  pancreas..      Impression    IMPRESSION:   1.  Findings concerning for common duct obstruction at the ampulla  with pancreatitis and dilatation of the pancreatic duct.  2.  Intrahepatic and extra hepatic biliary dilatation. The common duct  is dilated and measures 12.4 mm in maximal diameter.    SHARMILA BAIG MD       Discharge Medications   Current Discharge Medication List      START taking these medications    Details   diltiazem ER COATED BEADS (CARDIZEM CD/CARTIA XT) 300 MG 24 hr capsule Take 1 capsule (300 mg) by mouth daily  Qty:      Associated Diagnoses: Benign essential hypertension      lactated ringers infusion Inject 100 mL/hr into the vein continuous      ondansetron (ZOFRAN) 2 MG/ML SOLN injection Inject 2 mLs (4 mg) into the vein every 6 hours as needed for nausea or vomiting  Qty:      Associated Diagnoses: Obstruction of common bile duct      piperacillin-tazobactam (ZOSYN) 3-0.375 GM/50ML infusion Inject 50 mLs (3.375 g) into the vein every 6 hours  Qty:      Associated Diagnoses: Obstruction of common bile duct         CONTINUE these medications which have CHANGED    Details   clonazePAM (KLONOPIN) 0.5 MG tablet Take 1 tablet (0.5 mg) by mouth 2 times daily as needed for anxiety  Qty:      Associated Diagnoses: Anxiety state      levothyroxine (SYNTHROID/LEVOTHROID) 75 MCG tablet Take 1 tablet (75 mcg) by mouth daily before breakfast  Qty:      Associated Diagnoses: Acquired hypothyroidism      pantoprazole (PROTONIX) 40 MG EC tablet Take 1 tablet (40 mg) by mouth every morning (before breakfast)  Qty:      Associated Diagnoses: Gastroesophageal reflux disease with esophagitis         CONTINUE these medications which have NOT CHANGED    Details    HYDROmorphone (DILAUDID) 1 MG/ML injection Inject 0.5 mg into the vein every 2 hours as needed for moderate pain or severe pain  Refills: 0      medical cannabis (Patient's own supply) [place] Vapor take one puff to the count of two at bedtime and as needed in the middle of the night.  Oral suspension  1.5 mg taken morning, afternoon and bedtime  (The purpose of this order is to document that the patient reports taking medical cannabis.  This is not a prescription, and is not used to certify that the patient has a qualifying medical condition.)         STOP taking these medications       diltiazem ER (TIAZAC) 300 MG 24 hr ER beaded capsule Comments:   Reason for Stopping:         estradiol (ESTRACE) 0.5 MG tablet Comments:   Reason for Stopping:         nitroGLYcerin (NITROSTAT) 0.4 MG sublingual tablet Comments:   Reason for Stopping:         PARoxetine (PAXIL) 10 MG tablet Comments:   Reason for Stopping:         PROCTOZONE-HC 2.5 % cream Comments:   Reason for Stopping:         SUMAtriptan (IMITREX) 100 MG tablet Comments:   Reason for Stopping:             Allergies   Allergies   Allergen Reactions     Codeine      Isosorbide Mononitrate Other (See Comments)     Vomiting and headache       Lisinopril      [facility] scan     Paxil [Paroxetine] Headache

## 2020-07-31 NOTE — PLAN OF CARE
A/O. VSS and afebrile. O2 sats adequate on room air. Lungs clear. Dilaudid PRN for abd pain given with good effect. LR at 100ml/hr. Ambulates with SBA to bathroom. Remains NPO with ice chips. Zofran given x1 for nausea with good effect. Makes needs known. Continue to monitor.    Face to face report given with opportunity to observe patient.    Report given to Desiree Benites RN   7/31/2020  7:05 AM

## 2020-08-07 PROBLEM — R25.1 TREMOR: Status: ACTIVE | Noted: 2017-03-13

## 2020-08-07 PROBLEM — K21.9 GERD (GASTROESOPHAGEAL REFLUX DISEASE): Status: ACTIVE | Noted: 2017-03-13

## 2020-08-07 RX ORDER — DILTIAZEM HYDROCHLORIDE 300 MG/1
CAPSULE, EXTENDED RELEASE ORAL
COMMUNITY
Start: 2020-06-14 | End: 2020-11-02

## 2020-08-07 RX ORDER — ONDANSETRON 4 MG/1
4 TABLET, ORALLY DISINTEGRATING ORAL
COMMUNITY
Start: 2020-08-04 | End: 2020-11-02

## 2020-08-07 RX ORDER — NITROGLYCERIN 0.4 MG/1
TABLET SUBLINGUAL
COMMUNITY
Start: 2020-06-10 | End: 2021-06-08

## 2020-08-07 RX ORDER — PANTOPRAZOLE SODIUM 40 MG/1
40 TABLET, DELAYED RELEASE ORAL
COMMUNITY
Start: 2020-08-04 | End: 2020-11-02

## 2020-08-07 RX ORDER — ACETAMINOPHEN 325 MG/1
650 TABLET ORAL
COMMUNITY
Start: 2020-08-04

## 2020-08-07 RX ORDER — ESTRADIOL 0.5 MG/1
TABLET ORAL
COMMUNITY
Start: 2020-06-14 | End: 2020-08-27

## 2020-08-07 RX ORDER — CLONAZEPAM 1 MG/1
TABLET ORAL
COMMUNITY
Start: 2020-06-25 | End: 2020-12-01

## 2020-08-07 RX ORDER — POLYETHYLENE GLYCOL 3350 17 G/17G
POWDER, FOR SOLUTION ORAL
COMMUNITY
Start: 2020-08-04 | End: 2021-03-09

## 2020-08-07 RX ORDER — HYDROCORTISONE 2.5% 25 MG/G
CREAM TOPICAL
COMMUNITY
Start: 2020-07-01 | End: 2021-02-03

## 2020-08-07 RX ORDER — OXYCODONE HYDROCHLORIDE 5 MG/1
5 TABLET ORAL
COMMUNITY
Start: 2020-08-04 | End: 2020-11-02

## 2020-08-07 NOTE — PROGRESS NOTES
Subjective     Sharlene Mccord is a 59 year old female who presents to clinic today for the following health issues:    HPI       Hospital Follow-up Visit:    Hospital/Nursing Home/IP Rehab Facility: [facility]   Date of Admission: 7/31/2020  Date of Discharge: 8/4 2020               Reason(s) for Admission::  Acute pancreatitis- ERCP. Stone in CBD that spontaneously passed. Gastritis and started on PPI        Was your hospitalization related to COVID-19? No   Problems taking medications regularly:  None  Medication changes since discharge: None  Problems adhering to non-medication therapy:  None    Summary of hospitalization:  CareEverywhere information obtained and reviewed  Diagnostic Tests/Treatments reviewed.  Follow up needed: none  Other Healthcare Providers Involved in Patient s Care:         None  Update since discharge: improved. Post Discharge Medication Reconciliation: discharge medications reconciled, continue medications without change.  Plan of care communicated with patient            Patient Active Problem List   Diagnosis     Diverticulitis of sigmoid colon     Hypothyroidism     Anxiety     Cardiac microvascular disease     Diverticulitis     Aortic valve disorder     ACP (advance care planning)     Chronic pain     Constipation     Memory loss     Major depressive disorder, recurrent episode (H)     Cognitive disorder     Major depressive disorder     Fatigue     Migraine without aura and responsive to treatment     Atypical migraine     Chronic, continuous use of opioids     Mixed connective tissue disease (H)     SHAKIR (generalized anxiety disorder)     Gastroesophageal reflux disease with esophagitis     Acute pancreatitis     Obstruction of common bile duct     Abnormal MRI of the head     GERD (gastroesophageal reflux disease)     Tremor     Past Surgical History:   Procedure Laterality Date     APPENDECTOMY  1977     BIOPSY  2017    taken at [facility] from Dr. Nadira cohen. Thyroid      CARDIAC SURGERY  2013    angiogram [facility]:  Normal coronary arteries.  Felt ot have some microvascular disease     CL AFF SURGICAL PATHOLOGY  01/02/2007    Thyroid Mass     COLONOSCOPY  1/13/2014    Procedure: COLONOSCOPY;  COLONOSCOPY ;  Surgeon: Chasidy Gee DO;  Location: [facility]     ESOPHAGOSCOPY, GASTROSCOPY, DUODENOSCOPY (EGD), COMBINED N/A 2/9/2017    Procedure: COMBINED ESOPHAGOSCOPY, GASTROSCOPY, DUODENOSCOPY (EGD);  Surgeon: Johnathan Ruff DO;  Location: [facility] OR     GYN SURGERY      c-sections x 3     HYSTERECTOMY TOTAL ABDOMINAL, BILATERAL SALPINGO-OOPHORECTOMY, COMBINED N/A 01/01/2001     LAPAROSCOPIC CHOLECYSTECTOMY  11/07/2003    Cholelithiasis     LAPAROTOMY EXPLORATORY      abdominal pain/fever     LARYNGOSCOPY       SPLENECTOMY         Social History     Tobacco Use     Smoking status: Light Tobacco Smoker     Packs/day: 0.25     Years: 40.00     Pack years: 10.00     Types: Cigarettes     Start date: 1/1/1975     Smokeless tobacco: Never Used     Tobacco comment: 5 cigs daily  Patient will work on smoking cessation (3-26-18)   Substance Use Topics     Alcohol use: No     Family History   Problem Relation Age of Onset     C.A.D. Father      Hypertension Father      C.A.D. Mother      Cerebrovascular Disease Mother         CVA     Hypertension Mother      Coronary Artery Disease Mother      Diabetes Paternal Grandmother      Diabetes Paternal Grandfather      Diabetes Sister      Breast Cancer Sister      Diabetes Maternal Grandmother      Diabetes Sister      Breast Cancer Sister      Hypertension Sister      Depression Sister      Anxiety Disorder Sister      Obesity Sister      Depression Sister      Breast Cancer Sister      Obesity Sister      Depression Brother      Depression Daughter      Obesity Daughter      Depression Daughter      Mental Illness Daughter         [email or web address]     Anxiety Disorder Daughter      Mental Illness Daughter      Anxiety Disorder Other      Osteoporosis Other       Thyroid Disease Other      Diabetes Nephew          Current Outpatient Medications   Medication Sig Dispense Refill     acetaminophen (TYLENOL) 325 MG tablet Take 650 mg by mouth       clonazePAM (KLONOPIN) 1 MG tablet        diltiazem ER (TIAZAC) 300 MG 24 hr ER beaded capsule        diltiazem ER COATED BEADS (CARDIZEM CD/CARTIA XT) 300 MG 24 hr capsule Take 1 capsule (300 mg) by mouth daily       estradiol (ESTRACE) 0.5 MG tablet        levothyroxine (SYNTHROID/LEVOTHROID) 75 MCG tablet Take 1 tablet (75 mcg) by mouth daily before breakfast       medical cannabis (Patient's own supply) [place] Vapor take one puff to the count of two at bedtime and as needed in the middle of the night.  Oral suspension  1.5 mg taken morning, afternoon and bedtime  (The purpose of this order is to document that the patient reports taking medical cannabis.  This is not a prescription, and is not used to certify that the patient has a qualifying medical condition.)       nitroGLYcerin (NITROSTAT) 0.4 MG sublingual tablet DISSOLVE ONE TABLET UNDER THE TONGUE EVERY 5 MINUTES AS NEEDED FOR CHEST PAIN. DO NOT EXCEED A TOTAL OF 3 DOSES IN 15 MINUTES       ondansetron (ZOFRAN-ODT) 4 MG ODT tab Take 4 mg by mouth       oxyCODONE (ROXICODONE) 5 MG tablet Take 5 mg by mouth       pantoprazole (PROTONIX) 40 MG EC tablet Take 40 mg by mouth       pantoprazole (PROTONIX) 40 MG EC tablet Take 1 tablet (40 mg) by mouth every morning (before breakfast)       piperacillin-tazobactam (ZOSYN) 3-0.375 GM/50ML infusion Inject 50 mLs (3.375 g) into the vein every 6 hours       polyethylene glycol (MIRALAX) 17 GM/SCOOP powder        PROCTOZONE-HC 2.5 % cream APPLY TOPICALLY TO AFFECTED AREA THREE TIMES DAILY       Allergies   Allergen Reactions     Codeine      Isosorbide Mononitrate Other (See Comments)     Vomiting and headache       Lisinopril      [facility] scan     Paxil [Paroxetine] Headache       Reviewed and updated as needed this visit by  "Provider         Review of Systems   Constitutional, HEENT, cardiovascular, pulmonary, gi and gu systems are negative, except as otherwise noted. -fever, chills, or night sweats. -vomiting or diarrhea. Decrease in appetite      Objective    /68   Pulse 55   Temp 98.3  F (36.8  C) (Tympanic)   Ht 1.702 m (5' 7\")   Wt 80.1 kg (176 lb 8 oz)   SpO2 99%   BMI 27.64 kg/m    Body mass index is 27.64 kg/m .  Physical Exam   GENERAL: healthy, alert and no distress  RESP: lungs clear to auscultation - no rales, rhonchi or wheezes  CV: regular rate and rhythm, normal S1 S2, no S3 or S4, no murmur, click or rub, no peripheral edema and peripheral pulses strong  ABDOMEN: soft,no hepatosplenomegaly, no masses and bowel sounds normal. +slight tenderness across upper quadrants yet  MS: no gross musculoskeletal defects noted, no edema  SKIN: no suspicious lesions or rashes  NEURO: Normal strength and tone, mentation intact and speech normal  PSYCH: mentation appears normal, affect normal/bright    Diagnostic Test Results:  Labs reviewed in Epic        Assessment & Plan   Assessment      Plan  (Z09) Hospital discharge follow-up  (primary encounter diagnosis)  Comment: improving. still sore and weak. everyday is better  Plan: bland diet and advance as tolerated. Activity as tolerated     Tobacco Cessation:   reports that she has been smoking cigarettes. She started smoking about 45 years ago. She has a 10.00 pack-year smoking history. She has never used smokeless tobacco.  Tobacco Cessation Action Plan: Information offered: Patient not interested at this time      BMI:   Estimated body mass index is 27.64 kg/m  as calculated from the following:    Height as of this encounter: 1.702 m (5' 7\").    Weight as of this encounter: 80.1 kg (176 lb 8 oz).   Weight management plan: Discussed healthy diet and exercise guidelines        See Patient Instructions    Return if symptoms worsen or fail to improve.    Lynne Pineda, " NP  [facility]

## 2020-08-11 ENCOUNTER — OFFICE VISIT (OUTPATIENT)
Dept: FAMILY MEDICINE | Facility: OTHER | Age: 60
End: 2020-08-11
Attending: NURSE PRACTITIONER
Payer: COMMERCIAL

## 2020-08-11 VITALS
BODY MASS INDEX: 27.7 KG/M2 | HEART RATE: 55 BPM | OXYGEN SATURATION: 99 % | DIASTOLIC BLOOD PRESSURE: 68 MMHG | TEMPERATURE: 98.3 F | SYSTOLIC BLOOD PRESSURE: 118 MMHG | WEIGHT: 176.5 LBS | HEIGHT: 67 IN

## 2020-08-11 DIAGNOSIS — Z09 HOSPITAL DISCHARGE FOLLOW-UP: Primary | ICD-10-CM

## 2020-08-11 PROCEDURE — 99213 OFFICE O/P EST LOW 20 MIN: CPT | Performed by: NURSE PRACTITIONER

## 2020-08-11 PROCEDURE — G0463 HOSPITAL OUTPT CLINIC VISIT: HCPCS

## 2020-08-11 PROCEDURE — 99496 TRANSJ CARE MGMT HIGH F2F 7D: CPT | Performed by: NURSE PRACTITIONER

## 2020-08-11 ASSESSMENT — PAIN SCALES - GENERAL: PAINLEVEL: MILD PAIN (2)

## 2020-08-11 ASSESSMENT — MIFFLIN-ST. JEOR: SCORE: 1408.23

## 2020-08-11 NOTE — NURSING NOTE
"Chief Complaint   Patient presents with     Hospital F/U       Initial /68   Pulse 55   Ht 1.702 m (5' 7\")   Wt 80.1 kg (176 lb 8 oz)   SpO2 99%   BMI 27.64 kg/m   Estimated body mass index is 27.64 kg/m  as calculated from the following:    Height as of this encounter: 1.702 m (5' 7\").    Weight as of this encounter: 80.1 kg (176 lb 8 oz).  Medication Reconciliation: complete  Jennifer Funes LPN    "

## 2020-08-26 DIAGNOSIS — Z79.890 POSTMENOPAUSAL HRT (HORMONE REPLACEMENT THERAPY): Primary | ICD-10-CM

## 2020-08-27 RX ORDER — ESTRADIOL 0.5 MG/1
TABLET ORAL
Qty: 60 TABLET | Refills: 0 | Status: SHIPPED | OUTPATIENT
Start: 2020-08-27 | End: 2020-09-30

## 2020-08-27 NOTE — TELEPHONE ENCOUNTER
Estrace      Last Written Prescription Date:  6/14/2020  Last Fill Quantity: 60,   # refills: 0  Last Office Visit: 8/11/2020  Future Office visit:    Next 5 appointments (look out 90 days)    Nov 24, 2020 10:30 AM CST  (Arrive by 10:15 AM)  Return Visit with Norman Nichole MD  [facility] [place] ([facility] ) [address]  [place] [address]  [phone]           Routing refill request to provider for review/approval because:  Medication is reported/historical

## 2020-08-28 ENCOUNTER — ANCILLARY PROCEDURE (OUTPATIENT)
Dept: MAMMOGRAPHY | Facility: OTHER | Age: 60
End: 2020-08-28
Attending: PHYSICIAN ASSISTANT
Payer: MEDICARE

## 2020-08-28 DIAGNOSIS — Z12.31 VISIT FOR SCREENING MAMMOGRAM: ICD-10-CM

## 2020-08-28 PROCEDURE — 77063 BREAST TOMOSYNTHESIS BI: CPT | Mod: TC

## 2020-09-03 ENCOUNTER — HOSPITAL ENCOUNTER (OUTPATIENT)
Dept: MAMMOGRAPHY | Facility: OTHER | Age: 60
End: 2020-09-03
Attending: PHYSICIAN ASSISTANT
Payer: MEDICARE

## 2020-09-03 DIAGNOSIS — R92.8 ABNORMAL MAMMOGRAM OF RIGHT BREAST: ICD-10-CM

## 2020-09-03 PROCEDURE — G0279 TOMOSYNTHESIS, MAMMO: HCPCS | Mod: TC

## 2020-09-10 DIAGNOSIS — E03.9 ACQUIRED HYPOTHYROIDISM: ICD-10-CM

## 2020-09-10 RX ORDER — LEVOTHYROXINE SODIUM 75 UG/1
TABLET ORAL
Qty: 90 TABLET | Refills: 0 | Status: SHIPPED | OUTPATIENT
Start: 2020-09-10 | End: 2020-12-14

## 2020-09-30 DIAGNOSIS — Z79.890 POSTMENOPAUSAL HRT (HORMONE REPLACEMENT THERAPY): ICD-10-CM

## 2020-09-30 RX ORDER — ESTRADIOL 0.5 MG/1
TABLET ORAL
Qty: 60 TABLET | Refills: 0 | Status: SHIPPED | OUTPATIENT
Start: 2020-09-30 | End: 2020-11-04

## 2020-09-30 NOTE — TELEPHONE ENCOUNTER
Estrace   Last Written Prescription Date:  8.27.2020  Last Fill Quantity: 60,   # refills: 0  Last Office Visit: 08.11.2020

## 2020-11-02 ENCOUNTER — TELEPHONE (OUTPATIENT)
Dept: FAMILY MEDICINE | Facility: OTHER | Age: 60
End: 2020-11-02

## 2020-11-02 ENCOUNTER — VIRTUAL VISIT (OUTPATIENT)
Dept: FAMILY MEDICINE | Facility: OTHER | Age: 60
End: 2020-11-02
Attending: PHYSICIAN ASSISTANT
Payer: COMMERCIAL

## 2020-11-02 VITALS — HEART RATE: 63 BPM | OXYGEN SATURATION: 94 %

## 2020-11-02 DIAGNOSIS — Z79.890 POSTMENOPAUSAL HRT (HORMONE REPLACEMENT THERAPY): ICD-10-CM

## 2020-11-02 DIAGNOSIS — G89.4 CHRONIC PAIN SYNDROME: Primary | ICD-10-CM

## 2020-11-02 DIAGNOSIS — Z87.19 HX OF ACUTE PANCREATITIS: ICD-10-CM

## 2020-11-02 PROCEDURE — 99214 OFFICE O/P EST MOD 30 MIN: CPT | Mod: 95 | Performed by: PHYSICIAN ASSISTANT

## 2020-11-02 RX ORDER — TRAMADOL HYDROCHLORIDE 50 MG/1
50 TABLET ORAL
Qty: 18 TABLET | Refills: 3 | Status: SHIPPED | OUTPATIENT
Start: 2020-11-02 | End: 2020-11-05

## 2020-11-02 RX ORDER — GABAPENTIN 300 MG/1
300 CAPSULE ORAL 3 TIMES DAILY
Qty: 90 CAPSULE | Refills: 3 | Status: SHIPPED | OUTPATIENT
Start: 2020-11-02 | End: 2021-02-17

## 2020-11-02 ASSESSMENT — ANXIETY QUESTIONNAIRES
7. FEELING AFRAID AS IF SOMETHING AWFUL MIGHT HAPPEN: SEVERAL DAYS
6. BECOMING EASILY ANNOYED OR IRRITABLE: SEVERAL DAYS
5. BEING SO RESTLESS THAT IT IS HARD TO SIT STILL: NEARLY EVERY DAY
GAD7 TOTAL SCORE: 13
4. TROUBLE RELAXING: SEVERAL DAYS
1. FEELING NERVOUS, ANXIOUS, OR ON EDGE: NEARLY EVERY DAY
2. NOT BEING ABLE TO STOP OR CONTROL WORRYING: SEVERAL DAYS
3. WORRYING TOO MUCH ABOUT DIFFERENT THINGS: NEARLY EVERY DAY

## 2020-11-02 ASSESSMENT — PATIENT HEALTH QUESTIONNAIRE - PHQ9: SUM OF ALL RESPONSES TO PHQ QUESTIONS 1-9: 11

## 2020-11-02 NOTE — TELEPHONE ENCOUNTER
Patient called stating she cancelled the appointment with Ananya today because she is out and she decided she does want to do a telephone appointment today.  Please call her back at [phone]

## 2020-11-02 NOTE — PROGRESS NOTES
"Sharlene Mccord is a 60 year old female who is being evaluated via a billable telephone visit.      The patient has been notified of following:     \"This telephone visit will be conducted via a call between you and your physician/provider. We have found that certain health care needs can be provided without the need for a physical exam.  This service lets us provide the care you need with a short phone conversation.  If a prescription is necessary we can send it directly to your pharmacy.  If lab work is needed we can place an order for that and you can then stop by our lab to have the test done at a later time.    Telephone visits are billed at different rates depending on your insurance coverage. During this emergency period, for some insurers they may be billed the same as an in-person visit.  Please reach out to your insurance provider with any questions.    If during the course of the call the physician/provider feels a telephone visit is not appropriate, you will not be charged for this service.\"    Patient has given verbal consent for Telephone visit?  Yes    What phone number would you like to be contacted at? [phone]    How would you like to obtain your AVS? MyChart    Subjective     Sharlene Mccord is a 60 year old female who presents via phone visit today for the following health issues:    HPI     Chronic Pain Follow-Up    Where in your body do you have pain? Wide spread  How has your pain affected your ability to work? Unable to work  Which of these pain treatments have you tried since your last clinic visit? Other: just tylenol  How well are you sleeping? Poor  How has your mood been since your last visit? Slightly worse  Have you had a significant life event? Health Concerns  Other aggravating factors: if she sits to long or she does something repetively she has hard time moving afterward  Taking medication as directed? Yes    PHQ-9 SCORE 1/23/2020 5/13/2020 11/2/2020   PHQ-9 Total Score - - - "   PHQ-9 Total Score 16 18 11     SHAKIR-7 SCORE 3/25/2020 5/13/2020 11/2/2020   Total Score 19 21 13     No flowsheet data found.  Encounter-Level CSA - 06/05/2017:    Controlled Substance Agreement - Scan on 6/6/2017 12:39 PM: CONTROLLED SUBSTANCE AGREEMENT     Patient-Level CSA:    Controlled Substance Agreement - Non - Opioid - Scan on 9/30/2019  1:49 PM: NON-OPIOID CONTROLLED SUBSTANCE AGREEMENT                Review of Systems   CONSTITUTIONAL: NEGATIVE for fever, chills, change in weight  INTEGUMENTARY/SKIN: NEGATIVE for worrisome rashes, moles or lesions  EYES: NEGATIVE for vision changes or irritation  ENT/MOUTH: NEGATIVE for ear, mouth and throat problems  RESP: NEGATIVE for significant cough or SOB  MUSCULOSKELETAL:POSITIVE  for arthralgias all over has fibromyalgia and severe OA. Can't afford her medical cannibals. Hasn't been seeing counseling either. Was off all pain medication and is trying to avoid use of any medication.   NEURO: NEGATIVE for weakness, dizziness or paresthesias  ENDOCRINE: NEGATIVE for temperature intolerance, skin/hair changes       Objective          Vitals:  No vitals were obtained today due to virtual visit.    healthy, alert and mild distress  PSYCH: Alert and oriented times 3; coherent speech, normal   rate and volume, able to articulate logical thoughts, able   to abstract reason, no tangential thoughts, no hallucinations   or delusions  Her affect is normal  RESP: No cough, no audible wheezing, able to talk in full sentences  Remainder of exam unable to be completed due to telephone visits    No results found for this or any previous visit (from the past 24 hour(s)).  None, we will be having labs done.         Assessment/Plan:    Assessment & Plan     Chronic pain syndrome  She is going to try Gabapentin for sleep and also we will be trying to hopefully lower her pain. We will get labs and see her back in a month.   - gabapentin (NEURONTIN) 300 MG capsule; Take 1 capsule (300 mg)  by mouth 3 times daily  - traMADol (ULTRAM) 50 MG tablet; Take 1 tablet (50 mg) by mouth 5 x daily PRN for severe pain    Hx of acute pancreatitis  With Covid I haven't seen Sharlene back since her hospitalization. We will be seeing her in office in a month. Getting labs first. She is aware use of Tramadol might cause some issues in pancrease and we are trying to keep her as chemical free as we can. She has been trying to treat chronic pain with topical , baths, massage stretching.   - Comprehensive metabolic panel; Future  - CBC with platelets differential; Future  - Lipase; Future        See Patient Instructions    No follow-ups on file.    KEILY Cobos  [facility] [place]    Phone call duration:  25 minutes

## 2020-11-03 ASSESSMENT — ANXIETY QUESTIONNAIRES: GAD7 TOTAL SCORE: 13

## 2020-11-03 NOTE — TELEPHONE ENCOUNTER
estradiol      Last Written Prescription Date:  9/30/20  Last Fill Quantity: 60,   # refills: 0  Last Office Visit: yesterday  Future Office visit:    Next 5 appointments (look out 90 days)    Nov 10, 2020  3:30 PM  (Arrive by 3:15 PM)  Return Visit with Norman Nichole MD  [facility] [place] ([facility] [place] ) [address]  [place] [address]  [phone]   Nov 11, 2020  1:00 PM  (Arrive by 12:45 PM)  PHYSICAL with KEILY Fonseca  [facility] [place] ([facility] [place] ) [address]  [place] [address]  [phone]   Nov 24, 2020 10:30 AM  (Arrive by 10:15 AM)  Return Visit with Norman Nichole MD  [facility] [place] ([facility] [place] ) [address]  [place] [address]  [phone]

## 2020-11-04 RX ORDER — ESTRADIOL 0.5 MG/1
TABLET ORAL
Qty: 60 TABLET | Refills: 0 | Status: SHIPPED | OUTPATIENT
Start: 2020-11-04 | End: 2020-12-01

## 2020-11-05 NOTE — PROGRESS NOTES
"  SUBJECTIVE:   Sharlene Mccord is a 60 year old female who presents for Preventive Visit.    {Split Bill scripting  The purpose of this visit is to discuss your medical history and prevent health problems before you are sick. You may be responsible for a co-pay, coinsurance, or deductible if your visit today includes services such as checking on a sore throat, having an x-ray or lab test, or treating and evaluating a new or existing condition :363446}  Patient has been advised of split billing requirements and indicates understanding: {Yes and No:173739}  Are you in the first 12 months of your Medicare Part B coverage?  { :487571::\"No\"}    Physical Health:    In general, how would you rate your overall physical health? { :741506}    Outside of work, how many days during the week do you exercise? { :954390}    Outside of work, approximately how many minutes a day do you exercise?{ :267793}    If you drink alcohol do you typically have >3 drinks per day or >7 drinks per week? { :017552}    Do you usually eat at least 4 servings of fruit and vegetables a day, include whole grains & fiber and avoid regularly eating high fat or \"junk\" foods? { :413405::\"Yes\"}    Do you have any problems taking medications regularly?  { :168900::\"No\"}    Do you have any side effects from medications? { :249286}    Needs assistance for the following daily activities: { :039654}    Which of the following safety concerns are present in your home?  { :580709::\"none identified\"}     Hearing impairment: { :815117}    In the past 6 months, have you been bothered by leaking of urine? { :001134}    Mental Health:    In general, how would you rate your overall mental or emotional health? { :184422}  PHQ-2 Score:      Do you feel safe in your environment? { :129504}    Have you ever done Advance Care Planning? (For example, a Health Directive, POLST, or a discussion with a medical provider or your loved ones about your wishes): { " ":171308}    Additional concerns to address?  {If YES, notify patient they may be responsible for a copay, coinsurance or deductible if the visit today includes services such as checking on a sore throat, having an x-ray or lab test, or treating and evaluating a new or existing condition :250017::\"No\"}    Fall risk:  { :812785}  {If any of the above assessments are answered yes, consider ordering appropriate referrals (Optional):946504::\"click delete button to remove this line now\"}  Cognitive Screening: { :635974}    {Do you have sleep apnea, excessive snoring or daytime drowsiness? (Optional):410470}    {Outside tests to abstract? :666686}    Depression and Anxiety Follow-Up    How are you doing with your depression since your last visit? { :286867::\"No change\"}    How are you doing with your anxiety since your last visit?  { :436628::\"No change\"}    Are you having other symptoms that might be associated with depression or anxiety? { :834110}    Have you had a significant life event? { :583125}     Do you have any concerns with your use of alcohol or other drugs? { :828202}    Social History     Tobacco Use     Smoking status: Light Tobacco Smoker     Packs/day: 0.25     Years: 40.00     Pack years: 10.00     Types: Cigarettes     Start date: 1/1/1975     Smokeless tobacco: Never Used     Tobacco comment: 5 cigs daily  Patient will work on smoking cessation (3-26-18)   Substance Use Topics     Alcohol use: No     Drug use: No     PHQ 1/23/2020 5/13/2020 11/2/2020   PHQ-9 Total Score 16 18 11   Q9: Thoughts of better off dead/self-harm past 2 weeks Not at all Not at all Not at all     SHAKIR-7 SCORE 3/25/2020 5/13/2020 11/2/2020   Total Score 19 21 13     {Last PHQ9 or GAD7 Responses (Optional):855213}    Suicide Assessment Five-step Evaluation and Treatment (SAFE-T)    Hypothyroidism Follow-up      Since last visit, patient describes the following symptoms: { :689273::\"Weight stable, no hair loss, no skin changes, no " "constipation, no loose stools\"}    {Chronic Pain:183949}    Reviewed and updated as needed this visit by clinical staff                 Reviewed and updated as needed this visit by Provider                Social History     Tobacco Use     Smoking status: Light Tobacco Smoker     Packs/day: 0.25     Years: 40.00     Pack years: 10.00     Types: Cigarettes     Start date: 1/1/1975     Smokeless tobacco: Never Used     Tobacco comment: 5 cigs daily  Patient will work on smoking cessation (3-26-18)   Substance Use Topics     Alcohol use: No                           Current providers sharing in care for this patient include: {Rooming staff:  Please update Care Team in Rooming Activity, refresh this note and then delete this statement}  Patient Care Team:  Concha Hare PA as PCP - General  Concha Hare PA as Assigned PCP  Norman Nichole MD as MD (Clinical Cardiac Electrophysiology)  Raiza Alanis RN as Registered Nurse  Anabelle Estrella RN as Clinic Care Coordinator (Psychiatry)  Jerardo Borja DO as Assigned Musculoskeletal Provider  Norman Nichole MD as Assigned Heart and Vascular Provider  Aurelia Odell MD as Assigned Behavioral Health Provider    The following health maintenance items are reviewed in Epic and correct as of today:  Health Maintenance   Topic Date Due     TREATMENT AGREEMENT FOR CHRONIC PAIN MANAGEMENT  1960     ZOSTER IMMUNIZATION (1 of 2) 09/06/2010     INFLUENZA VACCINE (1) 09/01/2020     LUNG CANCER SCREENING ANNUAL  09/12/2020     URINE DRUG SCREEN  09/25/2020     TSH W/FREE T4 REFLEX  12/02/2020     SHAKIR ASSESSMENT  02/02/2021     PHQ-9  02/02/2021     MAMMO SCREENING  09/03/2021     MEDICARE ANNUAL WELLNESS VISIT  11/02/2021     LIPID  04/19/2022     ADVANCE CARE PLANNING  09/11/2022     DTAP/TDAP/TD IMMUNIZATION (2 - Td) 11/27/2023     COLORECTAL CANCER SCREENING  01/13/2024     HEPATITIS C SCREENING  Completed     HIV SCREENING  Completed     " "DEPRESSION ACTION PLAN  Completed     Pneumococcal Vaccine: Pediatrics (0 to 5 Years) and At-Risk Patients (6 to 64 Years)  Completed     IPV IMMUNIZATION  Aged Out     MENINGITIS IMMUNIZATION  Aged Out     HEPATITIS B IMMUNIZATION  Aged Out     PAP  Discontinued     {Chronicprobdata (Optional):038158}  {Decision Support (Optional):772430}    ROS:  {ROS COMP:189619}    OBJECTIVE:   There were no vitals taken for this visit. Estimated body mass index is 27.64 kg/m  as calculated from the following:    Height as of 20: 1.702 m (5' 7\").    Weight as of 20: 80.1 kg (176 lb 8 oz).  EXAM:   {Exam :514224}    {Diagnostic Test Results (Optional):420808::\"Diagnostic Test Results:\",\"Labs reviewed in Epic\"}    ASSESSMENT / PLAN:   {Diag Picklist:411545}    Patient has been advised of split billing requirements and indicates understanding: {YES / NO:581827::\"Yes\"}    COUNSELING:  {Medicare Counselin}    Estimated body mass index is 27.64 kg/m  as calculated from the following:    Height as of 20: 1.702 m (5' 7\").    Weight as of 20: 80.1 kg (176 lb 8 oz).    {Weight Management Plan (ACO) Complete if BMI is abnormal-  Ages 18-64  BMI >24.9.  Age 65+ with BMI <23 or >30 (Optional):983687}    She reports that she has been smoking cigarettes. She started smoking about 45 years ago. She has a 10.00 pack-year smoking history. She has never used smokeless tobacco.  Tobacco Cessation Action Plan:   {TOBACCO CESSATION ACTION PLAN:171296}    Appropriate preventive services were discussed with this patient, including applicable screening as appropriate for cardiovascular disease, diabetes, osteopenia/osteoporosis, and glaucoma.  As appropriate for age/gender, discussed screening for colorectal cancer, prostate cancer, breast cancer, and cervical cancer. Checklist reviewing preventive services available has been given to the patient.    Reviewed patients plan of care and provided an AVS. The {CarePlan:656035} " for Sharlene meets the Care Plan requirement. This Care Plan has been established and reviewed with the {PATIENT, FAMILY MEMBER, CAREGIVER:709971}.    Counseling Resources:  ATP IV Guidelines  Pooled Cohorts Equation Calculator  Breast Cancer Risk Calculator  BRCA-Related Cancer Risk Assessment: FHS-7 Tool  FRAX Risk Assessment  ICSI Preventive Guidelines  Dietary Guidelines for Americans, 2010  USDA's MyPlate  ASA Prophylaxis  Lung CA Screening    KEILY Cobos  [facility]

## 2020-11-05 NOTE — PATIENT INSTRUCTIONS
Preventive Health Recommendations  Female Ages 50 - 64    Yearly exam: See your health care provider every year in order to  o Review health changes.   o Discuss preventive care.    o Review your medicines if your doctor has prescribed any.      Get a Pap test every three years (unless you have an abnormal result and your provider advises testing more often).    If you get Pap tests with HPV test, you only need to test every 5 years, unless you have an abnormal result.     You do not need a Pap test if your uterus was removed (hysterectomy) and you have not had cancer.    You should be tested each year for STDs (sexually transmitted diseases) if you're at risk.     Have a mammogram every 1 to 2 years.    Have a colonoscopy at age 50, or have a yearly FIT test (stool test). These exams screen for colon cancer.      Have a cholesterol test every 5 years, or more often if advised.    Have a diabetes test (fasting glucose) every three years. If you are at risk for diabetes, you should have this test more often.     If you are at risk for osteoporosis (brittle bone disease), think about having a bone density scan (DEXA).    Shots: Get a flu shot each year. Get a tetanus shot every 10 years.    Nutrition:     Eat at least 5 servings of fruits and vegetables each day.    Eat whole-grain bread, whole-wheat pasta and brown rice instead of white grains and rice.    Get adequate Calcium and Vitamin D.     Lifestyle    Exercise at least 150 minutes a week (30 minutes a day, 5 days a week). This will help you control your weight and prevent disease.    Limit alcohol to one drink per day.    No smoking.     Wear sunscreen to prevent skin cancer.     See your dentist every six months for an exam and cleaning.    See your eye doctor every 1 to 2 years.    Preventive Health Recommendations  Female Ages 50 - 64    Yearly exam: See your health care provider every year in order to  o Review health changes.   o Discuss preventive  care.    o Review your medicines if your doctor has prescribed any.      Get a Pap test every three years (unless you have an abnormal result and your provider advises testing more often).    If you get Pap tests with HPV test, you only need to test every 5 years, unless you have an abnormal result.     You do not need a Pap test if your uterus was removed (hysterectomy) and you have not had cancer.    You should be tested each year for STDs (sexually transmitted diseases) if you're at risk.     Have a mammogram every 1 to 2 years.    Have a colonoscopy at age 50, or have a yearly FIT test (stool test). These exams screen for colon cancer.      Have a cholesterol test every 5 years, or more often if advised.    Have a diabetes test (fasting glucose) every three years. If you are at risk for diabetes, you should have this test more often.     If you are at risk for osteoporosis (brittle bone disease), think about having a bone density scan (DEXA).    Shots: Get a flu shot each year. Get a tetanus shot every 10 years.    Nutrition:     Eat at least 5 servings of fruits and vegetables each day.    Eat whole-grain bread, whole-wheat pasta and brown rice instead of white grains and rice.    Get adequate Calcium and Vitamin D.     Lifestyle    Exercise at least 150 minutes a week (30 minutes a day, 5 days a week). This will help you control your weight and prevent disease.    Limit alcohol to one drink per day.    No smoking.     Wear sunscreen to prevent skin cancer.     See your dentist every six months for an exam and cleaning.    See your eye doctor every 1 to 2 years.    Patient Education   Personalized Prevention Plan  You are due for the preventive services outlined below.  Your care team is available to assist you in scheduling these services.  If you have already completed any of these items, please share that information with your care team to update in your medical record.  Health Maintenance Due   Topic Date  Due     TREATMENT AGREEMENT FOR CHRONIC PAIN MANAGEMENT  1960     Zoster (Shingles) Vaccine (1 of 2) 09/06/2010     Flu Vaccine (1) 09/01/2020     Lung Cancer Screening (CT Scan)  09/12/2020     URINE DRUG SCREEN  09/25/2020     Thyroid Function Lab  12/02/2020

## 2020-11-10 ENCOUNTER — MYC MEDICAL ADVICE (OUTPATIENT)
Dept: FAMILY MEDICINE | Facility: OTHER | Age: 60
End: 2020-11-10

## 2020-11-11 ENCOUNTER — OFFICE VISIT (OUTPATIENT)
Dept: FAMILY MEDICINE | Facility: OTHER | Age: 60
End: 2020-11-11
Attending: PHYSICIAN ASSISTANT
Payer: COMMERCIAL

## 2020-11-11 VITALS
BODY MASS INDEX: 28.58 KG/M2 | SYSTOLIC BLOOD PRESSURE: 112 MMHG | OXYGEN SATURATION: 97 % | WEIGHT: 182.5 LBS | TEMPERATURE: 96.6 F | DIASTOLIC BLOOD PRESSURE: 56 MMHG | HEART RATE: 61 BPM

## 2020-11-11 DIAGNOSIS — Z12.2 ENCOUNTER FOR SCREENING FOR LUNG CANCER: ICD-10-CM

## 2020-11-11 DIAGNOSIS — Z00.00 ENCOUNTER FOR MEDICARE ANNUAL WELLNESS EXAM: ICD-10-CM

## 2020-11-11 DIAGNOSIS — E78.5 HYPERLIPIDEMIA LDL GOAL <100: ICD-10-CM

## 2020-11-11 DIAGNOSIS — Z87.19 HX OF ACUTE PANCREATITIS: ICD-10-CM

## 2020-11-11 DIAGNOSIS — Z87.891 PERSONAL HISTORY OF NICOTINE DEPENDENCE: ICD-10-CM

## 2020-11-11 DIAGNOSIS — Z23 ENCOUNTER FOR IMMUNIZATION: ICD-10-CM

## 2020-11-11 DIAGNOSIS — Z00.00 ROUTINE GENERAL MEDICAL EXAMINATION AT A HEALTH CARE FACILITY: Primary | ICD-10-CM

## 2020-11-11 LAB
ALBUMIN SERPL-MCNC: 3.8 G/DL (ref 3.4–5)
ALP SERPL-CCNC: 81 U/L (ref 40–150)
ALT SERPL W P-5'-P-CCNC: 25 U/L (ref 0–50)
ANION GAP SERPL CALCULATED.3IONS-SCNC: 4 MMOL/L (ref 3–14)
AST SERPL W P-5'-P-CCNC: 11 U/L (ref 0–45)
BASOPHILS # BLD AUTO: 0.1 10E9/L (ref 0–0.2)
BASOPHILS NFR BLD AUTO: 0.9 %
BILIRUB SERPL-MCNC: 0.3 MG/DL (ref 0.2–1.3)
BUN SERPL-MCNC: 17 MG/DL (ref 7–30)
CALCIUM SERPL-MCNC: 8.7 MG/DL (ref 8.5–10.1)
CHLORIDE SERPL-SCNC: 107 MMOL/L (ref 94–109)
CHOLEST SERPL-MCNC: 252 MG/DL
CO2 SERPL-SCNC: 26 MMOL/L (ref 20–32)
CREAT SERPL-MCNC: 1.01 MG/DL (ref 0.52–1.04)
DIFFERENTIAL METHOD BLD: NORMAL
EOSINOPHIL # BLD AUTO: 0.1 10E9/L (ref 0–0.7)
EOSINOPHIL NFR BLD AUTO: 1.6 %
ERYTHROCYTE [DISTWIDTH] IN BLOOD BY AUTOMATED COUNT: 13.1 % (ref 10–15)
GFR SERPL CREATININE-BSD FRML MDRD: 60 ML/MIN/{1.73_M2}
GLUCOSE SERPL-MCNC: 86 MG/DL (ref 70–99)
HCT VFR BLD AUTO: 40.5 % (ref 35–47)
HDLC SERPL-MCNC: 70 MG/DL
HGB BLD-MCNC: 13.8 G/DL (ref 11.7–15.7)
IMM GRANULOCYTES # BLD: 0 10E9/L (ref 0–0.4)
IMM GRANULOCYTES NFR BLD: 0.3 %
LDLC SERPL CALC-MCNC: 149 MG/DL
LIPASE SERPL-CCNC: 87 U/L (ref 73–393)
LYMPHOCYTES # BLD AUTO: 3.3 10E9/L (ref 0.8–5.3)
LYMPHOCYTES NFR BLD AUTO: 38.1 %
MCH RBC QN AUTO: 29.7 PG (ref 26.5–33)
MCHC RBC AUTO-ENTMCNC: 34.1 G/DL (ref 31.5–36.5)
MCV RBC AUTO: 87 FL (ref 78–100)
MONOCYTES # BLD AUTO: 0.5 10E9/L (ref 0–1.3)
MONOCYTES NFR BLD AUTO: 5.9 %
NEUTROPHILS # BLD AUTO: 4.6 10E9/L (ref 1.6–8.3)
NEUTROPHILS NFR BLD AUTO: 53.2 %
NONHDLC SERPL-MCNC: 182 MG/DL
NRBC # BLD AUTO: 0 10*3/UL
NRBC BLD AUTO-RTO: 0 /100
PLATELET # BLD AUTO: 208 10E9/L (ref 150–450)
POTASSIUM SERPL-SCNC: 4.5 MMOL/L (ref 3.4–5.3)
PROT SERPL-MCNC: 7.6 G/DL (ref 6.8–8.8)
RBC # BLD AUTO: 4.65 10E12/L (ref 3.8–5.2)
SODIUM SERPL-SCNC: 137 MMOL/L (ref 133–144)
TRIGL SERPL-MCNC: 163 MG/DL
WBC # BLD AUTO: 8.7 10E9/L (ref 4–11)

## 2020-11-11 PROCEDURE — 36415 COLL VENOUS BLD VENIPUNCTURE: CPT | Mod: ZL | Performed by: PHYSICIAN ASSISTANT

## 2020-11-11 PROCEDURE — G0463 HOSPITAL OUTPT CLINIC VISIT: HCPCS | Mod: 25

## 2020-11-11 PROCEDURE — 83690 ASSAY OF LIPASE: CPT | Mod: ZL | Performed by: PHYSICIAN ASSISTANT

## 2020-11-11 PROCEDURE — 90682 RIV4 VACC RECOMBINANT DNA IM: CPT

## 2020-11-11 PROCEDURE — 99396 PREV VISIT EST AGE 40-64: CPT | Performed by: PHYSICIAN ASSISTANT

## 2020-11-11 PROCEDURE — 80053 COMPREHEN METABOLIC PANEL: CPT | Mod: ZL | Performed by: PHYSICIAN ASSISTANT

## 2020-11-11 PROCEDURE — 80061 LIPID PANEL: CPT | Mod: ZL | Performed by: PHYSICIAN ASSISTANT

## 2020-11-11 PROCEDURE — 85025 COMPLETE CBC W/AUTO DIFF WBC: CPT | Mod: ZL | Performed by: PHYSICIAN ASSISTANT

## 2020-11-11 PROCEDURE — 99406 BEHAV CHNG SMOKING 3-10 MIN: CPT | Performed by: PHYSICIAN ASSISTANT

## 2020-11-11 ASSESSMENT — PAIN SCALES - GENERAL: PAINLEVEL: MODERATE PAIN (5)

## 2020-11-11 NOTE — NURSING NOTE
"Chief Complaint   Patient presents with     Physical       Initial /56 (BP Location: Left arm, Patient Position: Chair, Cuff Size: Adult Regular)   Pulse 61   Temp 96.6  F (35.9  C) (Tympanic)   Wt 82.8 kg (182 lb 8 oz)   SpO2 97%   BMI 28.58 kg/m   Estimated body mass index is 28.58 kg/m  as calculated from the following:    Height as of 8/11/20: 1.702 m (5' 7\").    Weight as of this encounter: 82.8 kg (182 lb 8 oz).  Medication Reconciliation: complete  Perico Lopez LPN  "

## 2020-11-11 NOTE — PROGRESS NOTES
SUBJECTIVE:   CC: Sharlene Mccord is an 60 year old woman who presents for preventive health visit.       Patient has been advised of split billing requirements and indicates understanding: Yes  Healthy Habits:    Do you get at least three servings of calcium containing foods daily (dairy, green leafy vegetables, etc.)? no    Amount of exercise or daily activities, outside of work: 0 day(s) per week    Problems taking medications regularly No    Medication side effects: No    Have you had an eye exam in the past two years? no    Do you see a dentist twice per year? no    Do you have sleep apnea, excessive snoring or daytime drowsiness?yes      Chronic Pain Follow-Up    Where in your body do you have pain? All over has fibromyalgia  How has your pain affected your ability to work? Unable to work  Which of these pain treatments have you tried since your last clinic visit? Activity or exercise, Chiropractor, Heat, Massage, Physical Therapy, Psychologist and Other: medical THC but can't afford this  How well are you sleeping? Poor  How has your mood been since your last visit? Better  Have you had a significant life event? No  Other aggravating factors: prolonged sitting and prolonged standing  Taking medication as directed? Yes    PHQ-9 SCORE 1/23/2020 5/13/2020 11/2/2020   PHQ-9 Total Score - - -   PHQ-9 Total Score 16 18 11     SHAKIR-7 SCORE 3/25/2020 5/13/2020 11/2/2020   Total Score 19 21 13     No flowsheet data found.  Encounter-Level CSA - 06/05/2017:    Controlled Substance Agreement - Scan on 6/6/2017 12:39 PM: CONTROLLED SUBSTANCE AGREEMENT     Patient-Level CSA:    Controlled Substance Agreement - Non - Opioid - Scan on 9/30/2019  1:49 PM: NON-OPIOID CONTROLLED SUBSTANCE AGREEMENT         Today's PHQ-2 Score:   PHQ-2 ( 1999 Pf[employer]) 11/2/2020 5/14/2019   Q1: Little interest or pleasure in doing things 1 2   Q2: Feeling down, depressed or hopeless 1 2   PHQ-2 Score 2 4       Abuse: Current or Past(Physical,  Sexual or Emotional)- No  Do you feel safe in your environment? Yes        Social History     Tobacco Use     Smoking status: Light Tobacco Smoker     Packs/day: 0.25     Years: 40.00     Pack years: 10.00     Types: Cigarettes     Start date: 1/1/1975     Smokeless tobacco: Never Used     Tobacco comment: 5 cigs daily  Patient will work on smoking cessation (3-26-18)   Substance Use Topics     Alcohol use: No     If you drink alcohol do you typically have >3 drinks per day or >7 drinks per week? No                     Reviewed orders with patient.  Reviewed health maintenance and updated orders accordingly - Yes  Lab work is in process  Labs reviewed in EPIC  BP Readings from Last 3 Encounters:   11/11/20 112/56   08/11/20 118/68   07/31/20 121/62    Wt Readings from Last 3 Encounters:   11/11/20 82.8 kg (182 lb 8 oz)   08/11/20 80.1 kg (176 lb 8 oz)   07/31/20 84 kg (185 lb 3 oz)                  Patient Active Problem List   Diagnosis     Diverticulitis of sigmoid colon     Hypothyroidism     Anxiety     Cardiac microvascular disease     Diverticulitis     Aortic valve disorder     ACP (advance care planning)     Chronic pain     Constipation     Memory loss     Major depressive disorder, recurrent episode (H)     Cognitive disorder     Major depressive disorder     Fatigue     Migraine without aura and responsive to treatment     Atypical migraine     Chronic, continuous use of opioids     Mixed connective tissue disease (H)     SHAKIR (generalized anxiety disorder)     Gastroesophageal reflux disease with esophagitis     Acute pancreatitis     Obstruction of common bile duct     Abnormal MRI of the head     GERD (gastroesophageal reflux disease)     Tremor     Past Surgical History:   Procedure Laterality Date     APPENDECTOMY  1977     BIOPSY  2017    taken at [facility] from Dr. Nadira cohen. Thyroid     CARDIAC SURGERY  2013    angiogram [facility]:  Normal coronary arteries.  Felt ot have some microvascular disease      CL AFF SURGICAL PATHOLOGY  01/02/2007    Thyroid Mass     COLONOSCOPY  1/13/2014    Procedure: COLONOSCOPY;  COLONOSCOPY ;  Surgeon: Chasidy Gee DO;  Location: [facility] OR     ESOPHAGOSCOPY, GASTROSCOPY, DUODENOSCOPY (EGD), COMBINED N/A 2/9/2017    Procedure: COMBINED ESOPHAGOSCOPY, GASTROSCOPY, DUODENOSCOPY (EGD);  Surgeon: Johnathan Ruff DO;  Location: [facility] OR     GYN SURGERY      c-sections x 3     HYSTERECTOMY TOTAL ABDOMINAL, BILATERAL SALPINGO-OOPHORECTOMY, COMBINED N/A 01/01/2001     LAPAROSCOPIC CHOLECYSTECTOMY  11/07/2003    Cholelithiasis     LAPAROTOMY EXPLORATORY      abdominal pain/fever     LARYNGOSCOPY       SPLENECTOMY         Social History     Tobacco Use     Smoking status: Light Tobacco Smoker     Packs/day: 0.25     Years: 40.00     Pack years: 10.00     Types: Cigarettes     Start date: 1/1/1975     Smokeless tobacco: Never Used     Tobacco comment: 5 cigs daily  Patient will work on smoking cessation (3-26-18)   Substance Use Topics     Alcohol use: No     Family History   Problem Relation Age of Onset     C.A.D. Father      Hypertension Father      C.A.D. Mother      Cerebrovascular Disease Mother         CVA     Hypertension Mother      Coronary Artery Disease Mother      Diabetes Paternal Grandmother      Diabetes Paternal Grandfather      Diabetes Sister      Breast Cancer Sister      Diabetes Maternal Grandmother      Diabetes Sister      Breast Cancer Sister      Hypertension Sister      Depression Sister      Anxiety Disorder Sister      Obesity Sister      Depression Sister      Breast Cancer Sister      Obesity Sister      Depression Brother      Depression Daughter      Obesity Daughter      Depression Daughter      Mental Illness Daughter         [email or web address]     Anxiety Disorder Daughter      Mental Illness Daughter      Anxiety Disorder Other      Osteoporosis Other      Thyroid Disease Other      Diabetes Nephew          Current Outpatient Medications   Medication Sig  Dispense Refill     acetaminophen (TYLENOL) 325 MG tablet Take 650 mg by mouth       clonazePAM (KLONOPIN) 1 MG tablet Take 1/2 to 1 pill 2x daily as needed       diltiazem ER COATED BEADS (CARDIZEM CD/CARTIA XT) 300 MG 24 hr capsule Take 1 capsule (300 mg) by mouth daily       estradiol (ESTRACE) 0.5 MG tablet Take 1 tablet by mouth twice daily 60 tablet 0     gabapentin (NEURONTIN) 300 MG capsule Take 1 capsule (300 mg) by mouth 3 times daily 90 capsule 3     levothyroxine (SYNTHROID/LEVOTHROID) 75 MCG tablet Take 1 tablet by mouth once daily 90 tablet 0     medical cannabis (Patient's own supply) [place] Vapor take one puff to the count of two at bedtime and as needed in the middle of the night.  Oral suspension  1.5 mg taken morning, afternoon and bedtime  (The purpose of this order is to document that the patient reports taking medical cannabis.  This is not a prescription, and is not used to certify that the patient has a qualifying medical condition.)       nitroGLYcerin (NITROSTAT) 0.4 MG sublingual tablet DISSOLVE ONE TABLET UNDER THE TONGUE EVERY 5 MINUTES AS NEEDED FOR CHEST PAIN. DO NOT EXCEED A TOTAL OF 3 DOSES IN 15 MINUTES       pantoprazole (PROTONIX) 40 MG EC tablet Take 1 tablet (40 mg) by mouth every morning (before breakfast)       polyethylene glycol (MIRALAX) 17 GM/SCOOP powder        PROCTOZONE-HC 2.5 % cream APPLY TOPICALLY TO AFFECTED AREA THREE TIMES DAILY       Allergies   Allergen Reactions     Codeine      Isosorbide Mononitrate Other (See Comments)     Vomiting and headache       Lisinopril      [facility] scan     Paxil [Paroxetine] Headache     Recent Labs   Lab Test 07/31/20  0528 07/30/20  1220 02/07/20  1226 12/02/19  0808 11/19/19  0901 04/19/17  1133 04/19/17  1133 05/09/16  0930 01/13/16  0941   LDL  --   --   --   --   --   --  104* 127* 147*   HDL  --   --   --   --   --   --  77 70 64   TRIG 99  --   --   --   --   --  165* 204* 164*   * 25 29  --   --    < > 28 44  63*   CR 0.74 0.91 1.09*  --   --    < > 0.97 0.94 0.93   GFRESTIMATED 87 69 55*  --   --    < > 59* 62 63   GFRESTBLACK >90 79 64  --   --    < > 71 75 76   POTASSIUM 4.1 3.9 4.4  --   --    < > 4.4 4.1 4.8   TSH  --   --   --  1.06 0.67   < > 1.24 3.12 2.58    < > = values in this interval not displayed.        Mammogram Screening: Patient over age 50, mutual decision to screen reflected in health maintenance.    Pertinent mammograms are reviewed under the imaging tab. Had two views was negative.   History of abnormal Pap smear:   Last 3 Pap and HPV Results:   PAP / HPV 11/27/2013   PAP NIL     PAP / HPV 11/27/2013   PAP NIL   no longer has cervix or uterus.   Reviewed and updated as needed this visit by clinical staff  Tobacco  Allergies  Meds   Med Hx  Surg Hx  Fam Hx  Soc Hx        Reviewed and updated as needed this visit by Provider                  Past Medical History:   Diagnosis Date     Anxiety state, unspecified 03/01/2011     Aortic valve disorders 06/07/2000    Echocardiogram showin mild/moderate AI.  Normal LV function     Cardiac microvascular disease 4/10/2013    Based on Cardiac MRI done at       Depressive disorder 1997     Fatigue      Fatigue      Fibromyalgia 03/01/2011     Hypertension      Major depressive disorder, recurrent episode, unspecified 12/17/2014     Migraine, unspecified, without mention of intractable migraine without mention of status migrainosus 03/01/2011     Mitral valve disorders(424.0) 10/16/2007     PONV (postoperative nausea and vomiting)      Thyroid Nodule 03/01/2011     Tobacco use disorder 03/01/2011     Unspecified hypothyroidism 03/01/2011      Past Surgical History:   Procedure Laterality Date     APPENDECTOMY  1977     BIOPSY  2017    taken at [facility] from Dr. Nadira cohen. Thyroid     CARDIAC SURGERY  2013    angiogram :  Normal coronary arteries.  Felt ot have some microvascular disease     CL AFF SURGICAL PATHOLOGY  01/02/2007    Thyroid Mass      COLONOSCOPY  1/13/2014    Procedure: COLONOSCOPY;  COLONOSCOPY ;  Surgeon: Chasidy Gee DO;  Location: [facility]     ESOPHAGOSCOPY, GASTROSCOPY, DUODENOSCOPY (EGD), COMBINED N/A 2/9/2017    Procedure: COMBINED ESOPHAGOSCOPY, GASTROSCOPY, DUODENOSCOPY (EGD);  Surgeon: Johnathan Ruff DO;  Location: [facility]     GYN SURGERY      c-sections x 3     HYSTERECTOMY TOTAL ABDOMINAL, BILATERAL SALPINGO-OOPHORECTOMY, COMBINED N/A 01/01/2001     LAPAROSCOPIC CHOLECYSTECTOMY  11/07/2003    Cholelithiasis     LAPAROTOMY EXPLORATORY      abdominal pain/fever     LARYNGOSCOPY       SPLENECTOMY       OB History   No obstetric history on file.       ROS:  CONSTITUTIONAL: NEGATIVE for fever, chills, change in weight  INTEGUMENTARY/SKIN: NEGATIVE for worrisome rashes, moles or lesions  EYES: NEGATIVE for vision changes or irritation  ENT: NEGATIVE for ear, mouth and throat problems  RESP: NEGATIVE for significant cough or SOB  BREAST: NEGATIVE for masses, tenderness or discharge  CV: NEGATIVE for chest pain, palpitations or peripheral edema  GI: NEGATIVE for nausea, abdominal pain, heartburn, or change in bowel habits  : NEGATIVE for unusual urinary or vaginal symptoms. No vaginal bleeding.  MUSCULOSKELETAL: NEGATIVE for significant arthralgias or myalgia  NEURO: NEGATIVE for weakness, dizziness or paresthesias  PSYCHIATRIC: NEGATIVE for changes in mood or affect     OBJECTIVE:   /56 (BP Location: Left arm, Patient Position: Chair, Cuff Size: Adult Regular)   Pulse 61   Temp 96.6  F (35.9  C) (Tympanic)   Wt 82.8 kg (182 lb 8 oz)   SpO2 97%   BMI 28.58 kg/m    EXAM:  GENERAL: healthy, alert and no distress  EYES: Eyes grossly normal to inspection, PERRL and conjunctivae and sclerae normal  HENT: ear canals and TM's normal, nose and mouth without ulcers or lesions  NECK: no adenopathy, no asymmetry, masses, or scars and thyroid normal to palpation  RESP: lungs clear to auscultation - no rales, rhonchi or  "wheezes  BREAST: normal without masses, tenderness or nipple discharge and no palpable axillary masses or adenopathy  CV: regular rate and rhythm, normal S1 S2, no S3 or S4, no murmur, click or rub, no peripheral edema and peripheral pulses strong  ABDOMEN: soft, nontender, no hepatosplenomegaly, no masses and bowel sounds normal  MS: no gross musculoskeletal defects noted, no edema  SKIN: no suspicious lesions or rashes  NEURO: Normal strength and tone, mentation intact and speech normal  PSYCH: mentation appears normal, affect normal/bright  LYMPH: no cervical, supraclavicular, axillary, or inguinal adenopathy    Diagnostic Test Results:  Labs reviewed in Epic  No results found for this or any previous visit (from the past 24 hour(s)).    ASSESSMENT/PLAN:   1. Routine general medical examination at a health care facility  She is going to have a scan on her lungs. She is feeling ok. Pain is manageable.       2. Encounter for screening for lung cancer  Due for screening.   - CT Chest Lung Cancer Scrn Low Dose wo; Future    3. Personal history of nicotine dependence   No too interested but willing to listen.   - CT Chest Lung Cancer Scrn Low Dose wo; Future    Patient has been advised of split billing requirements and indicates understanding: Yes  COUNSELING:   Reviewed preventive health counseling, as reflected in patient instructions       Regular exercise       Healthy diet/nutrition       Vision screening       Hearing screening       Immunizations    Vaccinated for: Influenza             Colon cancer screening       Advance Care Planning    Estimated body mass index is 28.58 kg/m  as calculated from the following:    Height as of 8/11/20: 1.702 m (5' 7\").    Weight as of this encounter: 82.8 kg (182 lb 8 oz).    Weight management plan: Discussed healthy diet and exercise guidelines    She reports that she has been smoking cigarettes. She started smoking about 45 years ago. She has a 10.00 pack-year smoking " history. She has never used smokeless tobacco.  Tobacco Cessation Action Plan:   Information offered: Patient not interested at this time      Counseling Resources:  ATP IV Guidelines  Pooled Cohorts Equation Calculator  Breast Cancer Risk Calculator  BRCA-Related Cancer Risk Assessment: FHS-7 Tool  FRAX Risk Assessment  ICSI Preventive Guidelines  Dietary Guidelines for Americans, 2010  USDA's MyPlate  ASA Prophylaxis  Lung CA Screening    KEILY Cobos  [facility]

## 2020-11-19 ENCOUNTER — HOSPITAL ENCOUNTER (OUTPATIENT)
Dept: CT IMAGING | Facility: HOSPITAL | Age: 60
Discharge: HOME | End: 2020-11-19
Attending: PHYSICIAN ASSISTANT | Admitting: PHYSICIAN ASSISTANT
Payer: MEDICARE

## 2020-11-19 DIAGNOSIS — Z12.2 ENCOUNTER FOR SCREENING FOR LUNG CANCER: ICD-10-CM

## 2020-11-19 DIAGNOSIS — Z87.891 PERSONAL HISTORY OF NICOTINE DEPENDENCE: ICD-10-CM

## 2020-11-19 PROCEDURE — G0297 LDCT FOR LUNG CA SCREEN: HCPCS

## 2020-11-24 ENCOUNTER — OFFICE VISIT (OUTPATIENT)
Dept: CARDIOLOGY | Facility: OTHER | Age: 60
End: 2020-11-24
Attending: INTERNAL MEDICINE
Payer: COMMERCIAL

## 2020-11-24 VITALS
SYSTOLIC BLOOD PRESSURE: 116 MMHG | HEART RATE: 46 BPM | WEIGHT: 186.3 LBS | DIASTOLIC BLOOD PRESSURE: 74 MMHG | OXYGEN SATURATION: 98 % | RESPIRATION RATE: 15 BRPM | BODY MASS INDEX: 29.24 KG/M2 | HEIGHT: 67 IN

## 2020-11-24 DIAGNOSIS — R06.09 DOE (DYSPNEA ON EXERTION): Primary | ICD-10-CM

## 2020-11-24 PROCEDURE — 99214 OFFICE O/P EST MOD 30 MIN: CPT | Performed by: INTERNAL MEDICINE

## 2020-11-24 PROCEDURE — G0463 HOSPITAL OUTPT CLINIC VISIT: HCPCS

## 2020-11-24 RX ORDER — TRAMADOL HYDROCHLORIDE 50 MG/1
50 TABLET ORAL 3 TIMES DAILY PRN
COMMUNITY
End: 2022-05-05

## 2020-11-24 ASSESSMENT — PAIN SCALES - GENERAL: PAINLEVEL: NO PAIN (0)

## 2020-11-24 ASSESSMENT — MIFFLIN-ST. JEOR: SCORE: 1447.68

## 2020-11-24 NOTE — NURSING NOTE
"No chief complaint on file.      Initial /74 (BP Location: Left arm, Patient Position: Sitting, Cuff Size: Adult Regular)   Pulse (!) 46   Resp 15   Ht 1.702 m (5' 7\")   Wt 84.5 kg (186 lb 4.8 oz)   SpO2 98%   BMI 29.18 kg/m   Estimated body mass index is 29.18 kg/m  as calculated from the following:    Height as of this encounter: 1.702 m (5' 7\").    Weight as of this encounter: 84.5 kg (186 lb 4.8 oz).  Medication Reconciliation: complete  Sirisha Reynoso, RN      "

## 2020-11-24 NOTE — PATIENT INSTRUCTIONS
Thank you for allowing Dr. Nichole and our team to participate in your care. Please call our office at [phone] with scheduling questions or if you need to cancel or change your appointment. With any other questions or concerns you may call Heather cardiology nurse at [phone].       If you experience chest pain, chest pressure, chest tightness, shortness of breath, fainting, lightheadedness, nausea, vomiting, or other concerning symptoms, please report to the Emergency Department or call 911. These symptoms may be emergent, and best treated in the Emergency Department.    Follow up in 4 months (can be virtual or in person)    You will have an echocardiogram performed.  This is an ultrasound of the heart, that evaluates heart function.  The hospital scheduling department will call to schedule you for this test.   If echo is unchanged we will proceed with a heart monitor (you would need to increase your activity while wearing).   If echo has changed we will discuss possible angiogram.

## 2020-11-24 NOTE — LETTER
11/24/2020      RE: Sharlene Mccord  [address]  [place] [address]       CC- fatigue    [facility]- Mrs. Mccord has been followed for several years by various cardiologists. She has known AI and has complained of fatigue throughout. She reports recently her fatigue has been much worse. She related that her fatigue was associated with dyspnea on exertion and chest discomfort. Her stress test showed an anterior wall defect that did not fit with her echocardiogram. She also has aortic insufficiency that by echocardiogram did not seem to have progressed. She underwent angiography at [facility]. This demonstrated no epicardial coronary artery disease. Her echocardiogram again showed mild to mod AI. A stress MRI was done that  suggested microvascular disease. She was begun on isosorbide mononitrate but stopped after 4 days due to severe headache. She cannot recall if it helped with her chest pain. She was started on diltiazem but stopped when she was admitted for diverticulitis. She is already on estrogen replacement. On 360 mg of diltiazem she reported the chest pain was better but her BP was low. She also noted numbness and tingling in both arms and both legs. The legs was constant. The arms mostly when she worked over her head. She also felt fatigued. She does not report orthostatic symptoms. Her diltiazem was reduced to 300 mg daily. The chest pain is worse. The fatigue is maybe a little better and the numbness/tingling is unchanged.     June 2014:  At our last visit I discussed with her that I have no other therapies to offer regarding her chest pain. We discuss the possibility of starting a medication such as Neurontin that has been reported to help with chronic pain syndromes. There is some evidence that patients with microvascular angina have abnormal pain sensitivity. She reports the Neurontin was started and it has helped. She is using SL NTG 1-2x/week as opposed to several times a day.    We  "had stopped her Diovan because on the increased diltiazem dose she was having relatively low BP. She is worried because she has seen some BP measurements that are high for her. She admits to being stressed. She has just started a w/u for memory loss and a change was seen on her MRI.    She had an echocardiogram done in March, see below, that showed no change in her aortic insufficiency.     [id number]:    She has been doing reasonably well. She is not having as much chest pain. She has been less active due to problems with disk disease in her spine. She has not noted any major changes in her exertional abilities.     [id number]: She has been having more joint pain. She is seeing a Rheumatologist in [place] soon.    Her chest pain is perhaps a bit better. Her legs and back feel heavy and she has sharp pains and aches, especially with climbing stairs. This has not been associated with her chest pain. On level drug she had been walking 2 miles with her sister but she has not done that for several months now.     [id number]: her chest pain has been doing reasonably well. She hasn't had NTG available for a few months.     She reports a new barking cough over the past 6 months - no fever, no new meds, not productive. The cough is not getting any worse but no better either. She has not had any wheezing that she has noticed.     Her legs feel heavy when she walks.    [id number]: She reports no real change in her chest pain or exertional abilities.  She has been troubled by chronic pain \"all over her body\".  She reports that rheumatology felt that she had a mixed connective tissue disorder.  She tried Plaquenil for a time but has stopped that and has been trying medical cannabis.  She reports the cannabis has not helped particularly.    Her recent echocardiogram showed no change in her aortic insufficiency.  Her systolic function remains good.  Left ventricular size remains normal.    May 14, 2019: she reports the chest pain is " the same. She feels more shortness of breath and wants to stop smoking. She has noted hot flashes recently.     November 24, 2020 Interval history: no significant change in chest pain but has been feeling very fatigued and weak. She only went fishing once this year. She reports feeling too weak to reel in a fish.     Current Outpatient Medications   Medication Sig Dispense Refill     acetaminophen (TYLENOL) 325 MG tablet Take 650 mg by mouth       clonazePAM (KLONOPIN) 1 MG tablet Take 1/2 to 1 pill 2x daily as needed       diltiazem ER COATED BEADS (CARDIZEM CD/CARTIA XT) 300 MG 24 hr capsule Take 1 capsule (300 mg) by mouth daily       estradiol (ESTRACE) 0.5 MG tablet Take 1 tablet by mouth twice daily 60 tablet 0     levothyroxine (SYNTHROID/LEVOTHROID) 75 MCG tablet Take 1 tablet by mouth once daily 90 tablet 0     nitroGLYcerin (NITROSTAT) 0.4 MG sublingual tablet DISSOLVE ONE TABLET UNDER THE TONGUE EVERY 5 MINUTES AS NEEDED FOR CHEST PAIN. DO NOT EXCEED A TOTAL OF 3 DOSES IN 15 MINUTES       pantoprazole (PROTONIX) 40 MG EC tablet Take 1 tablet (40 mg) by mouth every morning (before breakfast)       polyethylene glycol (MIRALAX) 17 GM/SCOOP powder        PROCTOZONE-HC 2.5 % cream APPLY TOPICALLY TO AFFECTED AREA THREE TIMES DAILY       traMADol (ULTRAM) 50 MG tablet Take 50 mg by mouth 3 times daily as needed for severe pain       gabapentin (NEURONTIN) 300 MG capsule Take 1 capsule (300 mg) by mouth 3 times daily (Patient not taking: Reported on 11/24/2020) 90 capsule 3     medical cannabis (Patient's own supply) [place] Vapor take one puff to the count of two at bedtime and as needed in the middle of the night.  Oral suspension  1.5 mg taken morning, afternoon and bedtime  (The purpose of this order is to document that the patient reports taking medical cannabis.  This is not a prescription, and is not used to certify that the patient has a qualifying medical condition.)       Allergies   Allergen  Reactions     Codeine      Isosorbide Mononitrate Other (See Comments)     Vomiting and headache       Lisinopril      [facility] scan     Paxil [Paroxetine] Headache     Past Medical History:   Diagnosis Date     Anxiety state, unspecified 03/01/2011     Aortic valve disorders 06/07/2000    Echocardiogram showin mild/moderate AI.  Normal LV function     Cardiac microvascular disease 4/10/2013    Based on Cardiac MRI done at       Depressive disorder 1997     Fatigue      Fatigue      Fibromyalgia 03/01/2011     Hypertension      Major depressive disorder, recurrent episode, unspecified 12/17/2014     Migraine, unspecified, without mention of intractable migraine without mention of status migrainosus 03/01/2011     Mitral valve disorders(424.0) 10/16/2007     PONV (postoperative nausea and vomiting)      Thyroid Nodule 03/01/2011     Tobacco use disorder 03/01/2011     Unspecified hypothyroidism 03/01/2011     Past Surgical History:   Procedure Laterality Date     APPENDECTOMY  1977     BIOPSY  2017    taken at [facility] from Dr. Nadira cohen. Thyroid     CARDIAC SURGERY  2013    angiogram [facility]:  Normal coronary arteries.  Felt ot have some microvascular disease     CL AFF SURGICAL PATHOLOGY  01/02/2007    Thyroid Mass     COLONOSCOPY  1/13/2014    Procedure: COLONOSCOPY;  COLONOSCOPY ;  Surgeon: Chasidy Gee DO;  Location: [facility] OR     ESOPHAGOSCOPY, GASTROSCOPY, DUODENOSCOPY (EGD), COMBINED N/A 2/9/2017    Procedure: COMBINED ESOPHAGOSCOPY, GASTROSCOPY, DUODENOSCOPY (EGD);  Surgeon: Johnathan Ruff DO;  Location: [facility] OR     GYN SURGERY      c-sections x 3     HYSTERECTOMY TOTAL ABDOMINAL, BILATERAL SALPINGO-OOPHORECTOMY, COMBINED N/A 01/01/2001     LAPAROSCOPIC CHOLECYSTECTOMY  11/07/2003    Cholelithiasis     LAPAROTOMY EXPLORATORY      abdominal pain/fever     LARYNGOSCOPY       SPLENECTOMY       Social History     Tobacco Use     Smoking status: Light Tobacco Smoker     Packs/day: 0.25     Years:  "40.00     Pack years: 10.00     Types: Cigarettes     Start date: 1/1/1975     Smokeless tobacco: Never Used     Tobacco comment: 5 cigs daily  Patient will work on smoking cessation (3-26-18)   Substance Use Topics     Alcohol use: No     Drug use: No     ROS- the ten system review is negative except as noted in the HPI.    Physical examination:  /74 (BP Location: Left arm, Patient Position: Sitting, Cuff Size: Adult Regular)   Pulse (!) 46   Resp 15   Ht 1.702 m (5' 7\")   Wt 84.5 kg (186 lb 4.8 oz)   SpO2 98%   BMI 29.18 kg/m      GENERAL APPEARANCE: healthy, alert and no distress  NECK: no venous distention  RESPIRATORY: lungs clear to auscultation - no rales, rhonchi or wheezes  CARDIOVASCULAR: regular, normal S1 S2, no gallop, soft AI murmur as before  EXTREMITIES: no edema   VASC: pedal pulses are normal    Laboratory and diagnostic data reviewed November 24, 2020:    Follow-up echocardiogram ordered today.    ANKLE BRACHIAL INDEX     FINDINGS:  The ankle brachial index measured 1.2 bilaterally.  Absolute ankle pressures were 160 which are adequate for wound  healing.     IMPRESSION:  NO EVIDENCE OF ARTERIAL OCCLUSIVE DISEASE IN EITHER LOWER  EXTREMITY.  Exam Date: Jun 02, 2017 03:10:00 PM  Author: LIS CRYSTAL  This report is final and signed    Assessment and recommendations:    1) Fatigue/chest pain/ dyspnea on exertion   2) Moderate AI  3) Microvascular angina    - stable exam but reports increased shortness of breath/dyspnea on exertion     Given her increase in dyspnea on exertion will repeat an echocardiogram to ensure no change in valve or systolic function.    If echocardiogram is unchanged we discussed a monitor to assess for chronotropic incompetence.     4) Hypertension -  Under good control.      I appreciate the chance to help with Mrs. Suri eugene. Please let me know if you have any questions or concerns. I will see her in one year.     Norman Nichole M.D.      Norman Cooper " MD [place]

## 2020-11-24 NOTE — PROGRESS NOTES
CC- fatigue    HPI- Mrs. Mccord has been followed for several years by various cardiologists. She has known AI and has complained of fatigue throughout. She reports recently her fatigue has been much worse. She related that her fatigue was associated with dyspnea on exertion and chest discomfort. Her stress test showed an anterior wall defect that did not fit with her echocardiogram. She also has aortic insufficiency that by echocardiogram did not seem to have progressed. She underwent angiography at [facility]. This demonstrated no epicardial coronary artery disease. Her echocardiogram again showed mild to mod AI. A stress MRI was done that  suggested microvascular disease. She was begun on isosorbide mononitrate but stopped after 4 days due to severe headache. She cannot recall if it helped with her chest pain. She was started on diltiazem but stopped when she was admitted for diverticulitis. She is already on estrogen replacement. On 360 mg of diltiazem she reported the chest pain was better but her BP was low. She also noted numbness and tingling in both arms and both legs. The legs was constant. The arms mostly when she worked over her head. She also felt fatigued. She does not report orthostatic symptoms. Her diltiazem was reduced to 300 mg daily. The chest pain is worse. The fatigue is maybe a little better and the numbness/tingling is unchanged.     June 2014:  At our last visit I discussed with her that I have no other therapies to offer regarding her chest pain. We discuss the possibility of starting a medication such as Neurontin that has been reported to help with chronic pain syndromes. There is some evidence that patients with microvascular angina have abnormal pain sensitivity. She reports the Neurontin was started and it has helped. She is using SL NTG 1-2x/week as opposed to several times a day.    We had stopped her Diovan because on the increased diltiazem dose she was  "having relatively low BP. She is worried because she has seen some BP measurements that are high for her. She admits to being stressed. She has just started a w/u for memory loss and a change was seen on her MRI.    She had an echocardiogram done in March, see below, that showed no change in her aortic insufficiency.     [id number]:    She has been doing reasonably well. She is not having as much chest pain. She has been less active due to problems with disk disease in her spine. She has not noted any major changes in her exertional abilities.     [id number]: She has been having more joint pain. She is seeing a Rheumatologist in [place] soon.    Her chest pain is perhaps a bit better. Her legs and back feel heavy and she has sharp pains and aches, especially with climbing stairs. This has not been associated with her chest pain. On level drug she had been walking 2 miles with her sister but she has not done that for several months now.     [id number]: her chest pain has been doing reasonably well. She hasn't had NTG available for a few months.     She reports a new barking cough over the past 6 months - no fever, no new meds, not productive. The cough is not getting any worse but no better either. She has not had any wheezing that she has noticed.     Her legs feel heavy when she walks.    [id number]: She reports no real change in her chest pain or exertional abilities.  She has been troubled by chronic pain \"all over her body\".  She reports that rheumatology felt that she had a mixed connective tissue disorder.  She tried Plaquenil for a time but has stopped that and has been trying medical cannabis.  She reports the cannabis has not helped particularly.    Her recent echocardiogram showed no change in her aortic insufficiency.  Her systolic function remains good.  Left ventricular size remains normal.    May 14, 2019: she reports the chest pain is the same. She feels more shortness of breath and wants to stop smoking. " She has noted hot flashes recently.     November 24, 2020 Interval history: no significant change in chest pain but has been feeling very fatigued and weak. She only went fishing once this year. She reports feeling too weak to reel in a fish.     Current Outpatient Medications   Medication Sig Dispense Refill     acetaminophen (TYLENOL) 325 MG tablet Take 650 mg by mouth       clonazePAM (KLONOPIN) 1 MG tablet Take 1/2 to 1 pill 2x daily as needed       diltiazem ER COATED BEADS (CARDIZEM CD/CARTIA XT) 300 MG 24 hr capsule Take 1 capsule (300 mg) by mouth daily       estradiol (ESTRACE) 0.5 MG tablet Take 1 tablet by mouth twice daily 60 tablet 0     levothyroxine (SYNTHROID/LEVOTHROID) 75 MCG tablet Take 1 tablet by mouth once daily 90 tablet 0     nitroGLYcerin (NITROSTAT) 0.4 MG sublingual tablet DISSOLVE ONE TABLET UNDER THE TONGUE EVERY 5 MINUTES AS NEEDED FOR CHEST PAIN. DO NOT EXCEED A TOTAL OF 3 DOSES IN 15 MINUTES       pantoprazole (PROTONIX) 40 MG EC tablet Take 1 tablet (40 mg) by mouth every morning (before breakfast)       polyethylene glycol (MIRALAX) 17 GM/SCOOP powder        PROCTOZONE-HC 2.5 % cream APPLY TOPICALLY TO AFFECTED AREA THREE TIMES DAILY       traMADol (ULTRAM) 50 MG tablet Take 50 mg by mouth 3 times daily as needed for severe pain       gabapentin (NEURONTIN) 300 MG capsule Take 1 capsule (300 mg) by mouth 3 times daily (Patient not taking: Reported on 11/24/2020) 90 capsule 3     medical cannabis (Patient's own supply) [place] Vapor take one puff to the count of two at bedtime and as needed in the middle of the night.  Oral suspension  1.5 mg taken morning, afternoon and bedtime  (The purpose of this order is to document that the patient reports taking medical cannabis.  This is not a prescription, and is not used to certify that the patient has a qualifying medical condition.)       Allergies   Allergen Reactions     Codeine      Isosorbide Mononitrate Other (See Comments)      Vomiting and headache       Lisinopril      [facility] scan     Paxil [Paroxetine] Headache     Past Medical History:   Diagnosis Date     Anxiety state, unspecified 03/01/2011     Aortic valve disorders 06/07/2000    Echocardiogram showin mild/moderate AI.  Normal LV function     Cardiac microvascular disease 4/10/2013    Based on Cardiac MRI done at       Depressive disorder 1997     Fatigue      Fatigue      Fibromyalgia 03/01/2011     Hypertension      Major depressive disorder, recurrent episode, unspecified 12/17/2014     Migraine, unspecified, without mention of intractable migraine without mention of status migrainosus 03/01/2011     Mitral valve disorders(424.0) 10/16/2007     PONV (postoperative nausea and vomiting)      Thyroid Nodule 03/01/2011     Tobacco use disorder 03/01/2011     Unspecified hypothyroidism 03/01/2011     Past Surgical History:   Procedure Laterality Date     APPENDECTOMY  1977     BIOPSY  2017    taken at [facility] from Dr. Nadira cohen. Thyroid     CARDIAC SURGERY  2013    angiogram [facility]:  Normal coronary arteries.  Felt ot have some microvascular disease     CL AFF SURGICAL PATHOLOGY  01/02/2007    Thyroid Mass     COLONOSCOPY  1/13/2014    Procedure: COLONOSCOPY;  COLONOSCOPY ;  Surgeon: Chasidy Gee DO;  Location: [facility]     ESOPHAGOSCOPY, GASTROSCOPY, DUODENOSCOPY (EGD), COMBINED N/A 2/9/2017    Procedure: COMBINED ESOPHAGOSCOPY, GASTROSCOPY, DUODENOSCOPY (EGD);  Surgeon: Johnathan Ruff DO;  Location: [facility] OR     GYN SURGERY      c-sections x 3     HYSTERECTOMY TOTAL ABDOMINAL, BILATERAL SALPINGO-OOPHORECTOMY, COMBINED N/A 01/01/2001     LAPAROSCOPIC CHOLECYSTECTOMY  11/07/2003    Cholelithiasis     LAPAROTOMY EXPLORATORY      abdominal pain/fever     LARYNGOSCOPY       SPLENECTOMY       Social History     Tobacco Use     Smoking status: Light Tobacco Smoker     Packs/day: 0.25     Years: 40.00     Pack years: 10.00     Types: Cigarettes     Start date: 1/1/1975  "    Smokeless tobacco: Never Used     Tobacco comment: 5 cigs daily  Patient will work on smoking cessation (3-26-18)   Substance Use Topics     Alcohol use: No     Drug use: No     ROS- the ten system review is negative except as noted in the HPI.    Physical examination:  /74 (BP Location: Left arm, Patient Position: Sitting, Cuff Size: Adult Regular)   Pulse (!) 46   Resp 15   Ht 1.702 m (5' 7\")   Wt 84.5 kg (186 lb 4.8 oz)   SpO2 98%   BMI 29.18 kg/m      GENERAL APPEARANCE: healthy, alert and no distress  NECK: no venous distention  RESPIRATORY: lungs clear to auscultation - no rales, rhonchi or wheezes  CARDIOVASCULAR: regular, normal S1 S2, no gallop, soft AI murmur as before  EXTREMITIES: no edema   VASC: pedal pulses are normal    Laboratory and diagnostic data reviewed November 24, 2020:    Follow-up echocardiogram ordered today.    ANKLE BRACHIAL INDEX     FINDINGS:  The ankle brachial index measured 1.2 bilaterally.  Absolute ankle pressures were 160 which are adequate for wound  healing.     IMPRESSION:  NO EVIDENCE OF ARTERIAL OCCLUSIVE DISEASE IN EITHER LOWER  EXTREMITY.  Exam Date: Jun 02, 2017 03:10:00 PM  Author: LIS CRYSTAL  This report is final and signed    Assessment and recommendations:    1) Fatigue/chest pain/ dyspnea on exertion   2) Moderate AI  3) Microvascular angina    - stable exam but reports increased shortness of breath/dyspnea on exertion     Given her increase in dyspnea on exertion will repeat an echocardiogram to ensure no change in valve or systolic function.    If echocardiogram is unchanged we discussed a monitor to assess for chronotropic incompetence.     4) Hypertension -  Under good control.      I appreciate the chance to help with Mrs. Suri eugene. Please let me know if you have any questions or concerns. I will see her in one year.     Norman Nichole M.D.  "

## 2020-11-25 ENCOUNTER — HOSPITAL ENCOUNTER (OUTPATIENT)
Dept: CARDIOLOGY | Facility: HOSPITAL | Age: 60
Discharge: HOME | End: 2020-11-25
Attending: INTERNAL MEDICINE | Admitting: INTERNAL MEDICINE
Payer: MEDICARE

## 2020-11-25 DIAGNOSIS — R06.09 DOE (DYSPNEA ON EXERTION): ICD-10-CM

## 2020-11-25 PROCEDURE — 93306 TTE W/DOPPLER COMPLETE: CPT | Mod: 26 | Performed by: INTERNAL MEDICINE

## 2020-11-25 PROCEDURE — 93306 TTE W/DOPPLER COMPLETE: CPT

## 2020-11-30 DIAGNOSIS — F41.1 GENERALIZED ANXIETY DISORDER: Primary | ICD-10-CM

## 2020-11-30 DIAGNOSIS — Z79.890 POSTMENOPAUSAL HRT (HORMONE REPLACEMENT THERAPY): ICD-10-CM

## 2020-12-01 RX ORDER — CLONAZEPAM 1 MG/1
TABLET ORAL
Qty: 60 TABLET | Refills: 3 | Status: SHIPPED | OUTPATIENT
Start: 2020-12-01 | End: 2021-04-07

## 2020-12-01 RX ORDER — ESTRADIOL 0.5 MG/1
TABLET ORAL
Qty: 60 TABLET | Refills: 0 | Status: SHIPPED | OUTPATIENT
Start: 2020-12-01 | End: 2020-12-28

## 2020-12-01 NOTE — TELEPHONE ENCOUNTER
Clonazepam 1 MG      Last Written Prescription Date:  Historical   Last Fill Quantity: 0,   # refills: 0  Last Office Visit: 5-13-20

## 2020-12-14 DIAGNOSIS — E03.9 ACQUIRED HYPOTHYROIDISM: ICD-10-CM

## 2020-12-14 RX ORDER — LEVOTHYROXINE SODIUM 75 UG/1
TABLET ORAL
Qty: 90 TABLET | Refills: 0 | Status: SHIPPED | OUTPATIENT
Start: 2020-12-14 | End: 2021-03-30

## 2020-12-14 NOTE — TELEPHONE ENCOUNTER
Synthroid      Last Written Prescription Date:  09/10/20  Last Fill Quantity: 90,   # refills: 0  Last Office Visit: 11/11/20  Future Office visit:

## 2020-12-20 NOTE — NURSING NOTE
"Chief Complaint   Patient presents with     RECHECK     Mental health.  Anxiety.  Discuss Wellbutrin       Initial /70 (BP Location: Right arm, Patient Position: Sitting, Cuff Size: Adult Regular)  Pulse 70  Temp 97.5  F (36.4  C) (Tympanic)  Wt 211 lb (95.7 kg)  BMI 34.58 kg/m2 Estimated body mass index is 34.58 kg/(m^2) as calculated from the following:    Height as of 1/12/18: 5' 5.5\" (1.664 m).    Weight as of this encounter: 211 lb (95.7 kg).  Medication Reconciliation: complete     MARCELO DASILVA      " "Problem: Adult Inpatient Plan of Care  Goal: Optimal Comfort and Wellbeing  Outcome: No Change    Shift Summary:   Pt spent majority of the shift isolative in his room. Pt was visible in the lounge intermittently and social with writer. Tangential. Tense affect. Pt reports feeling \"depressed and pissed off.\" Rates depressive symptoms a 4/10 on a scale of 0-10 (10 being the most severe). During check-in pt endorsed thoughts of wanting to harm \"a specific urologist.\" Pt believes the urologist did not accurately perform a vasectomy on him and he is \"not sterile.\" Pt states he found all of this information out from \"the voices telling me.\" Pt also described incidents when the voices controlled his behaviors and actions. Pt showered this shift. Good appetite- 100% intake of breakfast and dinner. Denies SI/SIB.   "

## 2020-12-28 DIAGNOSIS — Z79.890 POSTMENOPAUSAL HRT (HORMONE REPLACEMENT THERAPY): ICD-10-CM

## 2020-12-28 RX ORDER — ESTRADIOL 0.5 MG/1
TABLET ORAL
Qty: 60 TABLET | Refills: 0 | Status: SHIPPED | OUTPATIENT
Start: 2020-12-28 | End: 2021-02-03

## 2020-12-28 NOTE — TELEPHONE ENCOUNTER
Estrace       Last Written Prescription Date:  12/1/2020  Last Fill Quantity: 60,   # refills: 0  Last Office Visit: 11/11/2020  Future Office visit:    Next 5 appointments (look out 90 days)    Mar 23, 2021 10:30 AM  (Arrive by 10:15 AM)  Return Visit with Norman Nichole MD  [facility] [place] ([facility] [place] ) [address]  [place] [address]  [phone]

## 2021-02-01 DIAGNOSIS — K64.4 EXTERNAL HEMORRHOIDS: Primary | ICD-10-CM

## 2021-02-03 RX ORDER — HYDROCORTISONE 25 MG/G
CREAM TOPICAL
Qty: 60 G | Refills: 0 | Status: SHIPPED | OUTPATIENT
Start: 2021-02-03 | End: 2022-01-19

## 2021-02-03 NOTE — TELEPHONE ENCOUNTER
Procto-med HC 2.5 %      Last Written Prescription Date:  7/1/20  Last Fill Quantity: 0,   # refills: 0  Last Office Visit: 11/11/20  Future Office visit:    Next 5 appointments (look out 90 days)    Mar 23, 2021 10:30 AM  (Arrive by 10:15 AM)  Return Visit with Norman Nichole MD  [facility] [place] ([facility] ) [address] [place] [address]  [phone]           Routing refill request to provider for review/approval because:  Medication is reported/historical

## 2021-02-14 ENCOUNTER — MYC MEDICAL ADVICE (OUTPATIENT)
Dept: FAMILY MEDICINE | Facility: OTHER | Age: 61
End: 2021-02-14

## 2021-02-17 ENCOUNTER — OFFICE VISIT (OUTPATIENT)
Dept: FAMILY MEDICINE | Facility: OTHER | Age: 61
End: 2021-02-17
Attending: PHYSICIAN ASSISTANT
Payer: MEDICARE

## 2021-02-17 VITALS
HEART RATE: 60 BPM | BODY MASS INDEX: 30.76 KG/M2 | WEIGHT: 196 LBS | SYSTOLIC BLOOD PRESSURE: 110 MMHG | HEIGHT: 67 IN | OXYGEN SATURATION: 98 % | DIASTOLIC BLOOD PRESSURE: 68 MMHG | TEMPERATURE: 98 F

## 2021-02-17 DIAGNOSIS — K92.89 GAS BLOAT SYNDROME: Primary | ICD-10-CM

## 2021-02-17 LAB
ALBUMIN SERPL-MCNC: 3.7 G/DL (ref 3.4–5)
ALP SERPL-CCNC: 79 U/L (ref 40–150)
ALT SERPL W P-5'-P-CCNC: 53 U/L (ref 0–50)
AMYLASE SERPL-CCNC: 44 U/L (ref 30–110)
AST SERPL W P-5'-P-CCNC: 21 U/L (ref 0–45)
BILIRUB DIRECT SERPL-MCNC: <0.1 MG/DL (ref 0–0.2)
BILIRUB SERPL-MCNC: 0.4 MG/DL (ref 0.2–1.3)
CRP SERPL-MCNC: 3.2 MG/L (ref 0–8)
ERYTHROCYTE [SEDIMENTATION RATE] IN BLOOD BY WESTERGREN METHOD: 10 MM/H (ref 0–30)
LIPASE SERPL-CCNC: 70 U/L (ref 73–393)
PROT SERPL-MCNC: 7.3 G/DL (ref 6.8–8.8)

## 2021-02-17 PROCEDURE — G0463 HOSPITAL OUTPT CLINIC VISIT: HCPCS

## 2021-02-17 PROCEDURE — 83516 IMMUNOASSAY NONANTIBODY: CPT | Mod: ZL | Performed by: PHYSICIAN ASSISTANT

## 2021-02-17 PROCEDURE — G0463 HOSPITAL OUTPT CLINIC VISIT: HCPCS | Mod: 25

## 2021-02-17 PROCEDURE — 82150 ASSAY OF AMYLASE: CPT | Mod: ZL | Performed by: PHYSICIAN ASSISTANT

## 2021-02-17 PROCEDURE — 99214 OFFICE O/P EST MOD 30 MIN: CPT | Performed by: PHYSICIAN ASSISTANT

## 2021-02-17 PROCEDURE — 83690 ASSAY OF LIPASE: CPT | Mod: ZL | Performed by: PHYSICIAN ASSISTANT

## 2021-02-17 PROCEDURE — 85652 RBC SED RATE AUTOMATED: CPT | Mod: ZL | Performed by: PHYSICIAN ASSISTANT

## 2021-02-17 PROCEDURE — 36415 COLL VENOUS BLD VENIPUNCTURE: CPT | Mod: ZL | Performed by: PHYSICIAN ASSISTANT

## 2021-02-17 PROCEDURE — 80076 HEPATIC FUNCTION PANEL: CPT | Mod: ZL | Performed by: PHYSICIAN ASSISTANT

## 2021-02-17 PROCEDURE — 86140 C-REACTIVE PROTEIN: CPT | Mod: ZL | Performed by: PHYSICIAN ASSISTANT

## 2021-02-17 PROCEDURE — 83516 IMMUNOASSAY NONANTIBODY: CPT | Mod: ZL,59 | Performed by: PHYSICIAN ASSISTANT

## 2021-02-17 ASSESSMENT — ANXIETY QUESTIONNAIRES
6. BECOMING EASILY ANNOYED OR IRRITABLE: NEARLY EVERY DAY
2. NOT BEING ABLE TO STOP OR CONTROL WORRYING: NEARLY EVERY DAY
IF YOU CHECKED OFF ANY PROBLEMS ON THIS QUESTIONNAIRE, HOW DIFFICULT HAVE THESE PROBLEMS MADE IT FOR YOU TO DO YOUR WORK, TAKE CARE OF THINGS AT HOME, OR GET ALONG WITH OTHER PEOPLE: SOMEWHAT DIFFICULT
1. FEELING NERVOUS, ANXIOUS, OR ON EDGE: NEARLY EVERY DAY
5. BEING SO RESTLESS THAT IT IS HARD TO SIT STILL: SEVERAL DAYS
GAD7 TOTAL SCORE: 16
3. WORRYING TOO MUCH ABOUT DIFFERENT THINGS: NEARLY EVERY DAY
7. FEELING AFRAID AS IF SOMETHING AWFUL MIGHT HAPPEN: NOT AT ALL
4. TROUBLE RELAXING: NEARLY EVERY DAY

## 2021-02-17 ASSESSMENT — MIFFLIN-ST. JEOR: SCORE: 1491.68

## 2021-02-17 ASSESSMENT — PATIENT HEALTH QUESTIONNAIRE - PHQ9: SUM OF ALL RESPONSES TO PHQ QUESTIONS 1-9: 14

## 2021-02-17 NOTE — NURSING NOTE
"Chief Complaint   Patient presents with     Abdominal Pain       Initial /68 (BP Location: Left arm, Patient Position: Sitting, Cuff Size: Adult Regular)   Pulse 60   Temp 98  F (36.7  C) (Tympanic)   Ht 1.702 m (5' 7\")   Wt 88.9 kg (196 lb)   SpO2 98%   BMI 30.70 kg/m   Estimated body mass index is 30.7 kg/m  as calculated from the following:    Height as of this encounter: 1.702 m (5' 7\").    Weight as of this encounter: 88.9 kg (196 lb).  Medication Reconciliation: complete  Sydni Domingo LPN  "

## 2021-02-17 NOTE — PROGRESS NOTES
"        Beverley Su is a 60 year old who presents for the following health issues     HPI       Abdominal/Flank Pain  Onset/Duration: End of January untill yesterday   Description:   Character: Dull ache, Fullness, Cramping and feels intestines move  Location: starts on the right side moves to center and gets hard  Radiation: None  Intensity: moderate  Progression of Symptoms:  improving and intermittent  Accompanying Signs & Symptoms:  Fever/Chills: no  Gas/Bloating: YES- Gas  Nausea: no  Vomitting: YES- once  Diarrhea: no  Constipation: YES- some  Dysuria or Hematuria: no  History:   Trauma: no  Previous similar pain: YES  Previous tests done: CT, Colonoscopy and Upper Endoscopy  Precipitating factors:   Does the pain change with:     Food: YES- any food    Bowel Movement: YES    Urination: YES- Increased frequency   Other factors:  no  Therapies tried and outcome: None  No LMP recorded. Patient has had a hysterectomy.          Review of Systems   Constitutional, HEENT, cardiovascular, pulmonary, gi and gu systems are negative, except as otherwise noted.      Objective    /68 (BP Location: Left arm, Patient Position: Sitting, Cuff Size: Adult Regular)   Pulse 60   Temp 98  F (36.7  C) (Tympanic)   Ht 1.702 m (5' 7\")   Wt 88.9 kg (196 lb)   SpO2 98%   BMI 30.70 kg/m    Body mass index is 30.7 kg/m .  Physical Exam   GENERAL: healthy, alert and no distress  NECK: no adenopathy, no asymmetry, masses, or scars and thyroid normal to palpation  RESP: lungs clear to auscultation - no rales, rhonchi or wheezes  CV: regular rate and rhythm, normal S1 S2, no S3 or S4, no murmur, click or rub, no peripheral edema and peripheral pulses strong  ABDOMEN: soft, nontender, no hepatosplenomegaly, no masses and bowel sounds normal  SKIN: no suspicious lesions or rashes  BACK: no CVA tenderness, no paralumbar tenderness  PSYCH: mentation appears normal, affect normal/bright    No results found for any visits on " 02/17/21.    1. Gas bloat syndrome  Set her up for allergy food testing. Has significant gas bloat symptoms. We will check labs. inflammatory markers. She is going to be trying Fodmap diet.   - Lipase  - Amylase  - Hepatic panel (Albumin, ALT, AST, Bili, Alk Phos, TP)  - Tissue transglutaminase areli IgA and IgG  - Fat Stool Qual Random Collection; Future        55 minutes in visit over 50 % face to face discussion.

## 2021-02-18 ASSESSMENT — ANXIETY QUESTIONNAIRES: GAD7 TOTAL SCORE: 16

## 2021-02-19 LAB
TTG IGA SER-ACNC: 1 U/ML
TTG IGG SER-ACNC: <1 U/ML

## 2021-02-22 ENCOUNTER — TELEPHONE (OUTPATIENT)
Dept: OTOLARYNGOLOGY | Facility: OTHER | Age: 61
End: 2021-02-22

## 2021-02-22 DIAGNOSIS — K92.89 GAS BLOAT SYNDROME: ICD-10-CM

## 2021-02-22 PROCEDURE — 82705 FATS/LIPIDS FECES QUAL: CPT | Mod: ZL | Performed by: PHYSICIAN ASSISTANT

## 2021-02-24 LAB
FAT STL QL: NORMAL
NEUTRAL FAT STL QL: NORMAL

## 2021-03-01 ENCOUNTER — MYC MEDICAL ADVICE (OUTPATIENT)
Dept: CARE COORDINATION | Facility: OTHER | Age: 61
End: 2021-03-01

## 2021-03-05 DIAGNOSIS — Z79.890 POSTMENOPAUSAL HRT (HORMONE REPLACEMENT THERAPY): ICD-10-CM

## 2021-03-05 RX ORDER — ESTRADIOL 0.5 MG/1
TABLET ORAL
Qty: 60 TABLET | Refills: 0 | Status: SHIPPED | OUTPATIENT
Start: 2021-03-05 | End: 2021-04-06

## 2021-03-09 ENCOUNTER — OFFICE VISIT (OUTPATIENT)
Dept: CARDIOLOGY | Facility: OTHER | Age: 61
End: 2021-03-09
Attending: INTERNAL MEDICINE
Payer: COMMERCIAL

## 2021-03-09 VITALS
HEART RATE: 47 BPM | TEMPERATURE: 96.4 F | WEIGHT: 197 LBS | DIASTOLIC BLOOD PRESSURE: 70 MMHG | BODY MASS INDEX: 30.85 KG/M2 | SYSTOLIC BLOOD PRESSURE: 118 MMHG | OXYGEN SATURATION: 98 %

## 2021-03-09 DIAGNOSIS — R06.09 DOE (DYSPNEA ON EXERTION): Primary | ICD-10-CM

## 2021-03-09 DIAGNOSIS — I50.32 CHRONIC DIASTOLIC HEART FAILURE (H): ICD-10-CM

## 2021-03-09 PROCEDURE — 99214 OFFICE O/P EST MOD 30 MIN: CPT | Performed by: INTERNAL MEDICINE

## 2021-03-09 PROCEDURE — G0463 HOSPITAL OUTPT CLINIC VISIT: HCPCS

## 2021-03-09 ASSESSMENT — PAIN SCALES - GENERAL: PAINLEVEL: MILD PAIN (3)

## 2021-03-09 NOTE — PATIENT INSTRUCTIONS
Thank you for allowing Dr. Nichole and our team to participate in your care. Please call our office at [phone] with scheduling questions or if you need to cancel or change your appointment. With any other questions or concerns you may call Heather cardiology nurse at [phone].       If you experience chest pain, chest pressure, chest tightness, shortness of breath, fainting, lightheadedness, nausea, vomiting, or other concerning symptoms, please report to the Emergency Department or call 911. These symptoms may be emergent, and best treated in the Emergency Department.    Follow up in 3 months.     Possible right heart cath/angiogram in April.    Dr. Nichole will talk with Dr. Clay about your case.     [phone] to call and schedule COVID vaccine.

## 2021-03-09 NOTE — NURSING NOTE
"Chief Complaint   Patient presents with     RECHECK     Discuss Heart Cath         Initial /70 (BP Location: Right arm, Patient Position: Sitting, Cuff Size: Adult Large)   Pulse (!) 47   Temp 96.4  F (35.8  C) (Tympanic)   Wt 89.4 kg (197 lb)   SpO2 98%   BMI 30.85 kg/m   Estimated body mass index is 30.85 kg/m  as calculated from the following:    Height as of 2/17/21: 1.702 m (5' 7\").    Weight as of this encounter: 89.4 kg (197 lb).  Medication Reconciliation: complete  Stacey Aquino LPN  "

## 2021-03-09 NOTE — LETTER
3/9/2021      RE: Sharlene Mccord  [address]  [facility] [address]       CC- fatigue    [facility]- Mrs. Mccord has been followed for several years by various cardiologists. She has known AI and has complained of fatigue throughout. She reports recently her fatigue has been much worse. She related that her fatigue was associated with dyspnea on exertion and chest discomfort. Her stress test showed an anterior wall defect that did not fit with her echocardiogram. She also has aortic insufficiency that by echocardiogram did not seem to have progressed. She underwent angiography at [facility]. This demonstrated no epicardial coronary artery disease. Her echocardiogram again showed mild to mod AI. A stress MRI was done that  suggested microvascular disease. She was begun on isosorbide mononitrate but stopped after 4 days due to severe headache. She cannot recall if it helped with her chest pain. She was started on diltiazem but stopped when she was admitted for diverticulitis. She is already on estrogen replacement. On 360 mg of diltiazem she reported the chest pain was better but her BP was low. She also noted numbness and tingling in both arms and both legs. The legs was constant. The arms mostly when she worked over her head. She also felt fatigued. She does not report orthostatic symptoms. Her diltiazem was reduced to 300 mg daily. The chest pain is worse. The fatigue is maybe a little better and the numbness/tingling is unchanged.     June 2014:  At our last visit I discussed with her that I have no other therapies to offer regarding her chest pain. We discuss the possibility of starting a medication such as Neurontin that has been reported to help with chronic pain syndromes. There is some evidence that patients with microvascular angina have abnormal pain sensitivity. She reports the Neurontin was started and it has helped. She is using SL NTG 1-2x/week as opposed to several times a day.    We  "had stopped her Diovan because on the increased diltiazem dose she was having relatively low BP. She is worried because she has seen some BP measurements that are high for her. She admits to being stressed. She has just started a w/u for memory loss and a change was seen on her MRI.    She had an echocardiogram done in March, see below, that showed no change in her aortic insufficiency.     [id number]:    She has been doing reasonably well. She is not having as much chest pain. She has been less active due to problems with disk disease in her spine. She has not noted any major changes in her exertional abilities.     [id number]: She has been having more joint pain. She is seeing a Rheumatologist in [place] soon.    Her chest pain is perhaps a bit better. Her legs and back feel heavy and she has sharp pains and aches, especially with climbing stairs. This has not been associated with her chest pain. On level drug she had been walking 2 miles with her sister but she has not done that for several months now.     [id number]: her chest pain has been doing reasonably well. She hasn't had NTG available for a few months.     She reports a new barking cough over the past 6 months - no fever, no new meds, not productive. The cough is not getting any worse but no better either. She has not had any wheezing that she has noticed.     Her legs feel heavy when she walks.    [id number]: She reports no real change in her chest pain or exertional abilities.  She has been troubled by chronic pain \"all over her body\".  She reports that rheumatology felt that she had a mixed connective tissue disorder.  She tried Plaquenil for a time but has stopped that and has been trying medical cannabis.  She reports the cannabis has not helped particularly.    Her recent echocardiogram showed no change in her aortic insufficiency.  Her systolic function remains good.  Left ventricular size remains normal.    May 14, 2019: she reports the chest pain is " the same. She feels more shortness of breath and wants to stop smoking. She has noted hot flashes recently.     November 24, 2020 : no significant change in chest pain but has been feeling very fatigued and weak. She only went fishing once this year. She reports feeling too weak to reel in a fish.     March 9, 2021 Interval history: she still has chest pain but her predominant complaint recently is dyspnea on exertion. A recent echo (personal review) suggested diastolic dysfunction and we discussed an exercise right heart cath. She would like to proceed with that.     Current Outpatient Medications   Medication Sig Dispense Refill     clonazePAM (KLONOPIN) 1 MG tablet TAKE 1/2 TO 1 (ONE-HALF TO ONE) TABLET BY MOUTH UP TO TWICE DAILY 60 tablet 3     diltiazem ER COATED BEADS (CARDIZEM CD/CARTIA XT) 300 MG 24 hr capsule Take 1 capsule (300 mg) by mouth daily       estradiol (ESTRACE) 0.5 MG tablet Take 1 tablet by mouth twice daily 60 tablet 0     levothyroxine (SYNTHROID/LEVOTHROID) 75 MCG tablet Take 1 tablet by mouth once daily 90 tablet 0     pantoprazole (PROTONIX) 40 MG EC tablet TAKE ONE TABLET BY MOUTH 30-60 MINUTES BEFORE A MEAL 90 tablet 3     PROCTO-MED HC 2.5 % cream APPLY   TOPICALLY TO AFFECTED AREA THREE TIMES DAILY 60 g 0     traMADol (ULTRAM) 50 MG tablet Take 50 mg by mouth 3 times daily as needed for severe pain       acetaminophen (TYLENOL) 325 MG tablet Take 650 mg by mouth       nitroGLYcerin (NITROSTAT) 0.4 MG sublingual tablet DISSOLVE ONE TABLET UNDER THE TONGUE EVERY 5 MINUTES AS NEEDED FOR CHEST PAIN. DO NOT EXCEED A TOTAL OF 3 DOSES IN 15 MINUTES       Allergies   Allergen Reactions     Codeine      Isosorbide Mononitrate Other (See Comments)     Vomiting and headache       Lisinopril      [facility] scan     Paxil [Paroxetine] Headache     Past Medical History:   Diagnosis Date     Anxiety state, unspecified 03/01/2011     Aortic valve disorders 06/07/2000    Echocardiogram showin  mild/moderate AI.  Normal LV function     Cardiac microvascular disease 4/10/2013    Based on Cardiac MRI done at       Depressive disorder 1997     Fatigue      Fatigue      Fibromyalgia 2011     Hypertension      Major depressive disorder, recurrent episode, unspecified 2014     Migraine, unspecified, without mention of intractable migraine without mention of status migrainosus 2011     Mitral valve disorders(424.0) 10/16/2007     PONV (postoperative nausea and vomiting)      Thyroid Nodule 2011     Tobacco use disorder 2011     Unspecified hypothyroidism 2011     Past Surgical History:   Procedure Laterality Date     APPENDECTOMY       BIOPSY      taken at [facility] from Dr. Nadira cohen. Thyroid     CARDIAC SURGERY      angiogram [facility]:  Normal coronary arteries.  Felt ot have some microvascular disease     [facility] AFF SURGICAL PATHOLOGY  2007    Thyroid Mass     COLONOSCOPY  2014    Procedure: COLONOSCOPY;  COLONOSCOPY ;  Surgeon: Chasidy Gee DO;  Location: [facility] OR     ESOPHAGOSCOPY, GASTROSCOPY, DUODENOSCOPY (EGD), COMBINED N/A 2017    Procedure: COMBINED ESOPHAGOSCOPY, GASTROSCOPY, DUODENOSCOPY (EGD);  Surgeon: Johnathan Ruff DO;  Location: [facility] OR     GYN SURGERY      c-sections x 3     HYSTERECTOMY TOTAL ABDOMINAL, BILATERAL SALPINGO-OOPHORECTOMY, COMBINED N/A 2001     LAPAROSCOPIC CHOLECYSTECTOMY  2003    Cholelithiasis     LAPAROTOMY EXPLORATORY      abdominal pain/fever     LARYNGOSCOPY       SPLENECTOMY       Social History     Tobacco Use     Smoking status: Former Smoker     Packs/day: 0.25     Years: 40.00     Pack years: 10.00     Types: Cigarettes     Start date: 1975     Quit date: 2021     Years since quittin.1     Smokeless tobacco: Never Used   Substance Use Topics     Alcohol use: No     Drug use: No     ROS- the ten system review is negative except as noted in the HPI.    Physical examination:  BP  118/70 (BP Location: Right arm, Patient Position: Sitting, Cuff Size: Adult Large)   Pulse (!) 47   Temp 96.4  F (35.8  C) (Tympanic)   Wt 89.4 kg (197 lb)   SpO2 98%   BMI 30.85 kg/m      GENERAL APPEARANCE: healthy, alert and no distress  NECK: no venous distention  RESPIRATORY: lungs clear to auscultation - no rales, rhonchi or wheezes  CARDIOVASCULAR: regular, normal S1 S2, no gallop, soft AI murmur as before  EXTREMITIES: no edema   VASC: pedal pulses are normal    Laboratory and diagnostic data reviewed 2021:    [facility]  Echocardiography Laboratory  [address] [place] [id number]     Name: STEVEN SZYMANSKI  MRN: [id number]  : 1960  Study Date: 2020 09:50 AM  Age: 60 yrs  Gender: Female  Patient Location: [facility]  Reason For Study: GUTIERREZ (dyspnea on exertion)  History: GUTIERREZ  Ordering Physician: YOHAN CANTOR  Referring Physician: YOHAN CANTOR  Performed By: Kaur Camilo JANUSZ     BSA: 2.0 m2  Height: 67 in  Weight: 186 lb  _____________________________________________________________________________  __     _____________________________________________________________________________  __        Interpretation Summary  No pericardial effusion is present.  Global and regional left ventricular function is hyperkinetic with an EF of  65-70%.  Grade I or early diastolic dysfunction.  The right ventricle is normal size.  Global right ventricular function is normal.  Both atria appear normal.  Mild mitral insufficiency is present.  Moderate aortic insufficiency is present.  The Aortic Insufficiency is unchanged from previous echo 19  Mild tricuspid insufficiency is present.  Right ventricular systolic pressure is 20mmHg above the right atrial pressure.  Trace to mild pulmonic insufficiency is present.  The aorta root is normal.          Follow-up echocardiogram ordered today.    ANKLE BRACHIAL INDEX     FINDINGS:  The ankle brachial index  measured 1.2 bilaterally.  Absolute ankle pressures were 160 which are adequate for wound  healing.     IMPRESSION:  NO EVIDENCE OF ARTERIAL OCCLUSIVE DISEASE IN EITHER LOWER  EXTREMITY.  Exam Date: Jun 02, 2017 03:10:00 PM  Author: LIS CRYSTAL  This report is final and signed    Assessment and recommendations:    1) Fatigue/chest pain/ dyspnea on exertion   2) Moderate AI  3) Microvascular angina    - stable exam but reports increased shortness of breath/dyspnea on exertion     Will arrange for right heart cath with exercise. She would like to wait until after covid vaccination completed. I will discuss with interventional team to see if there is any point to angiogram to re-assess microvascular circulation.     If right heart cath does not show significant rise in right heart pressures with exercise, will need to consider alternative explanations for fatigue/alford, such as chronotropic incompetence.     4) Hypertension -  Under good control.      I appreciate the chance to help with Mrs. Suri eugene. Please let me know if you have any questions or concerns. I will see her in one year.      Norman Nichole MD

## 2021-03-09 NOTE — PROGRESS NOTES
CC- fatigue    HPI- Mrs. Mccord has been followed for several years by various cardiologists. She has known AI and has complained of fatigue throughout. She reports recently her fatigue has been much worse. She related that her fatigue was associated with dyspnea on exertion and chest discomfort. Her stress test showed an anterior wall defect that did not fit with her echocardiogram. She also has aortic insufficiency that by echocardiogram did not seem to have progressed. She underwent angiography at [facility]. This demonstrated no epicardial coronary artery disease. Her echocardiogram again showed mild to mod AI. A stress MRI was done that  suggested microvascular disease. She was begun on isosorbide mononitrate but stopped after 4 days due to severe headache. She cannot recall if it helped with her chest pain. She was started on diltiazem but stopped when she was admitted for diverticulitis. She is already on estrogen replacement. On 360 mg of diltiazem she reported the chest pain was better but her BP was low. She also noted numbness and tingling in both arms and both legs. The legs was constant. The arms mostly when she worked over her head. She also felt fatigued. She does not report orthostatic symptoms. Her diltiazem was reduced to 300 mg daily. The chest pain is worse. The fatigue is maybe a little better and the numbness/tingling is unchanged.     June 2014:  At our last visit I discussed with her that I have no other therapies to offer regarding her chest pain. We discuss the possibility of starting a medication such as Neurontin that has been reported to help with chronic pain syndromes. There is some evidence that patients with microvascular angina have abnormal pain sensitivity. She reports the Neurontin was started and it has helped. She is using SL NTG 1-2x/week as opposed to several times a day.    We had stopped her Diovan because on the increased diltiazem dose she was  "having relatively low BP. She is worried because she has seen some BP measurements that are high for her. She admits to being stressed. She has just started a w/u for memory loss and a change was seen on her MRI.    She had an echocardiogram done in March, see below, that showed no change in her aortic insufficiency.     [id number]:    She has been doing reasonably well. She is not having as much chest pain. She has been less active due to problems with disk disease in her spine. She has not noted any major changes in her exertional abilities.     [id number]: She has been having more joint pain. She is seeing a Rheumatologist in [place] soon.    Her chest pain is perhaps a bit better. Her legs and back feel heavy and she has sharp pains and aches, especially with climbing stairs. This has not been associated with her chest pain. On level drug she had been walking 2 miles with her sister but she has not done that for several months now.     [id number]: her chest pain has been doing reasonably well. She hasn't had NTG available for a few months.     She reports a new barking cough over the past 6 months - no fever, no new meds, not productive. The cough is not getting any worse but no better either. She has not had any wheezing that she has noticed.     Her legs feel heavy when she walks.    [id number]: She reports no real change in her chest pain or exertional abilities.  She has been troubled by chronic pain \"all over her body\".  She reports that rheumatology felt that she had a mixed connective tissue disorder.  She tried Plaquenil for a time but has stopped that and has been trying medical cannabis.  She reports the cannabis has not helped particularly.    Her recent echocardiogram showed no change in her aortic insufficiency.  Her systolic function remains good.  Left ventricular size remains normal.    May 14, 2019: she reports the chest pain is the same. She feels more shortness of breath and wants to stop smoking. " She has noted hot flashes recently.     November 24, 2020 : no significant change in chest pain but has been feeling very fatigued and weak. She only went fishing once this year. She reports feeling too weak to reel in a fish.     March 9, 2021 Interval history: she still has chest pain but her predominant complaint recently is dyspnea on exertion. A recent echo (personal review) suggested diastolic dysfunction and we discussed an exercise right heart cath. She would like to proceed with that.     Current Outpatient Medications   Medication Sig Dispense Refill     clonazePAM (KLONOPIN) 1 MG tablet TAKE 1/2 TO 1 (ONE-HALF TO ONE) TABLET BY MOUTH UP TO TWICE DAILY 60 tablet 3     diltiazem ER COATED BEADS (CARDIZEM CD/CARTIA XT) 300 MG 24 hr capsule Take 1 capsule (300 mg) by mouth daily       estradiol (ESTRACE) 0.5 MG tablet Take 1 tablet by mouth twice daily 60 tablet 0     levothyroxine (SYNTHROID/LEVOTHROID) 75 MCG tablet Take 1 tablet by mouth once daily 90 tablet 0     pantoprazole (PROTONIX) 40 MG EC tablet TAKE ONE TABLET BY MOUTH 30-60 MINUTES BEFORE A MEAL 90 tablet 3     PROCTO-MED HC 2.5 % cream APPLY   TOPICALLY TO AFFECTED AREA THREE TIMES DAILY 60 g 0     traMADol (ULTRAM) 50 MG tablet Take 50 mg by mouth 3 times daily as needed for severe pain       acetaminophen (TYLENOL) 325 MG tablet Take 650 mg by mouth       nitroGLYcerin (NITROSTAT) 0.4 MG sublingual tablet DISSOLVE ONE TABLET UNDER THE TONGUE EVERY 5 MINUTES AS NEEDED FOR CHEST PAIN. DO NOT EXCEED A TOTAL OF 3 DOSES IN 15 MINUTES       Allergies   Allergen Reactions     Codeine      Isosorbide Mononitrate Other (See Comments)     Vomiting and headache       Lisinopril      [facility] scan     Paxil [Paroxetine] Headache     Past Medical History:   Diagnosis Date     Anxiety state, unspecified 03/01/2011     Aortic valve disorders 06/07/2000    Echocardiogram showin mild/moderate AI.  Normal LV function     Cardiac microvascular disease  4/10/2013    Based on Cardiac MRI done at       Depressive disorder 1997     Fatigue      Fatigue      Fibromyalgia 2011     Hypertension      Major depressive disorder, recurrent episode, unspecified 2014     Migraine, unspecified, without mention of intractable migraine without mention of status migrainosus 2011     Mitral valve disorders(424.0) 10/16/2007     PONV (postoperative nausea and vomiting)      Thyroid Nodule 2011     Tobacco use disorder 2011     Unspecified hypothyroidism 2011     Past Surgical History:   Procedure Laterality Date     APPENDECTOMY       BIOPSY      taken at [facility] from Dr. Nadira cohen. Thyroid     CARDIAC SURGERY      angiogram [facility]:  Normal coronary arteries.  Felt ot have some microvascular disease     CL AFF SURGICAL PATHOLOGY  2007    Thyroid Mass     COLONOSCOPY  2014    Procedure: COLONOSCOPY;  COLONOSCOPY ;  Surgeon: Chasidy Gee DO;  Location: [facility]     ESOPHAGOSCOPY, GASTROSCOPY, DUODENOSCOPY (EGD), COMBINED N/A 2017    Procedure: COMBINED ESOPHAGOSCOPY, GASTROSCOPY, DUODENOSCOPY (EGD);  Surgeon: Johnathan Ruff DO;  Location: [facility] OR     GYN SURGERY      c-sections x 3     HYSTERECTOMY TOTAL ABDOMINAL, BILATERAL SALPINGO-OOPHORECTOMY, COMBINED N/A 2001     LAPAROSCOPIC CHOLECYSTECTOMY  2003    Cholelithiasis     LAPAROTOMY EXPLORATORY      abdominal pain/fever     LARYNGOSCOPY       SPLENECTOMY       Social History     Tobacco Use     Smoking status: Former Smoker     Packs/day: 0.25     Years: 40.00     Pack years: 10.00     Types: Cigarettes     Start date: 1975     Quit date: 2021     Years since quittin.1     Smokeless tobacco: Never Used   Substance Use Topics     Alcohol use: No     Drug use: No     ROS- the ten system review is negative except as noted in the HPI.    Physical examination:  /70 (BP Location: Right arm, Patient Position: Sitting, Cuff Size: Adult  Large)   Pulse (!) 47   Temp 96.4  F (35.8  C) (Tympanic)   Wt 89.4 kg (197 lb)   SpO2 98%   BMI 30.85 kg/m      GENERAL APPEARANCE: healthy, alert and no distress  NECK: no venous distention  RESPIRATORY: lungs clear to auscultation - no rales, rhonchi or wheezes  CARDIOVASCULAR: regular, normal S1 S2, no gallop, soft AI murmur as before  EXTREMITIES: no edema   VASC: pedal pulses are normal    Laboratory and diagnostic data reviewed 2021:    [facility]  Echocardiography Laboratory  [address] [address]     Name: STEVEN SZYMANSKI  MRN: [id number]  : 1960  Study Date: 2020 09:50 AM  Age: 60 yrs  Gender: Female  Patient Location: hospitals  Reason For Study: GUTIERREZ (dyspnea on exertion)  History: GUTIERREZ  Ordering Physician: YOHAN CANTOR  Referring Physician: YOHAN CANTOR  Performed By: Kaur Camilo RDCS     BSA: 2.0 m2  Height: 67 in  Weight: 186 lb  _____________________________________________________________________________  __     _____________________________________________________________________________  __        Interpretation Summary  No pericardial effusion is present.  Global and regional left ventricular function is hyperkinetic with an EF of  65-70%.  Grade I or early diastolic dysfunction.  The right ventricle is normal size.  Global right ventricular function is normal.  Both atria appear normal.  Mild mitral insufficiency is present.  Moderate aortic insufficiency is present.  The Aortic Insufficiency is unchanged from previous echo 19  Mild tricuspid insufficiency is present.  Right ventricular systolic pressure is 20mmHg above the right atrial pressure.  Trace to mild pulmonic insufficiency is present.  The aorta root is normal.          Follow-up echocardiogram ordered today.    ANKLE BRACHIAL INDEX     FINDINGS:  The ankle brachial index measured 1.2 bilaterally.  Absolute ankle pressures were 160 which are adequate  for wound  healing.     IMPRESSION:  NO EVIDENCE OF ARTERIAL OCCLUSIVE DISEASE IN EITHER LOWER  EXTREMITY.  Exam Date: Jun 02, 2017 03:10:00 PM  Author: LIS CRYSTAL  This report is final and signed    Assessment and recommendations:    1) Fatigue/chest pain/ dyspnea on exertion   2) Moderate AI  3) Microvascular angina    - stable exam but reports increased shortness of breath/dyspnea on exertion     Will arrange for right heart cath with exercise. She would like to wait until after covid vaccination completed. I will discuss with interventional team to see if there is any point to angiogram to re-assess microvascular circulation.     If right heart cath does not show significant rise in right heart pressures with exercise, will need to consider alternative explanations for fatigue/alford, such as chronotropic incompetence.     4) Hypertension -  Under good control.      I appreciate the chance to help with Mrs. Suri eugene. Please let me know if you have any questions or concerns. I will see her in one year.

## 2021-03-11 ENCOUNTER — MYC MEDICAL ADVICE (OUTPATIENT)
Dept: CARDIOLOGY | Facility: OTHER | Age: 61
End: 2021-03-11

## 2021-03-11 ENCOUNTER — IMMUNIZATION (OUTPATIENT)
Dept: FAMILY MEDICINE | Facility: OTHER | Age: 61
End: 2021-03-11
Attending: PHYSICIAN ASSISTANT
Payer: MEDICARE

## 2021-03-11 PROCEDURE — 91300 PR COVID VAC PFIZER DIL RECON 30 MCG/0.3 ML IM: CPT

## 2021-03-12 PROBLEM — R06.09 DOE (DYSPNEA ON EXERTION): Status: ACTIVE | Noted: 2021-03-12

## 2021-03-12 PROBLEM — I50.32 CHRONIC DIASTOLIC HEART FAILURE (H): Status: ACTIVE | Noted: 2021-03-12

## 2021-03-12 RX ORDER — SODIUM CHLORIDE 9 MG/ML
INJECTION, SOLUTION INTRAVENOUS CONTINUOUS
Status: CANCELLED | OUTPATIENT
Start: 2021-03-12

## 2021-03-12 NOTE — TELEPHONE ENCOUNTER
Norman Nichole MD  You 21 minutes ago (9:43 AM)     Sounds good. I talked to interventional team and they did not feel an angiogram would add to what we already know about her microvascular angina. So, exercise right heart cath alone. I'll put orders in. W    Message text

## 2021-03-15 ENCOUNTER — TRANSCRIBE ORDERS (OUTPATIENT)
Dept: OTHER | Age: 61
End: 2021-03-15

## 2021-03-19 DIAGNOSIS — I50.30 DIASTOLIC HEART FAILURE, UNSPECIFIED HF CHRONICITY (H): Primary | ICD-10-CM

## 2021-03-19 DIAGNOSIS — R07.9 CHEST PAIN, UNSPECIFIED TYPE: ICD-10-CM

## 2021-03-19 RX ORDER — LIDOCAINE 40 MG/G
CREAM TOPICAL
Status: CANCELLED | OUTPATIENT
Start: 2021-03-19

## 2021-03-24 DIAGNOSIS — I25.85 CARDIAC MICROVASCULAR DISEASE: ICD-10-CM

## 2021-03-24 RX ORDER — DILTIAZEM HYDROCHLORIDE 300 MG/1
CAPSULE, EXTENDED RELEASE ORAL
Qty: 90 CAPSULE | Refills: 0 | Status: SHIPPED | OUTPATIENT
Start: 2021-03-24 | End: 2021-04-27 | Stop reason: DRUGHIGH

## 2021-03-24 NOTE — TELEPHONE ENCOUNTER
DILTIAZEM ER      Last Written Prescription Date:  8-1-2020  Last Fill Quantity: UNKNOWN,   # refills: ?  Last Office Visit: 2-  Future Office visit:    Next 5 appointments (look out 90 days)    Apr 10, 2021 10:15 AM  (Arrive by 10:00 AM)  SHORT with HC COLLECTION [facility] ([facility] ) [address]  [place] [address]  [phone]   Apr 27, 2021  1:00 PM  (Arrive by 12:45 PM)  Return Visit with Norman Nichole MD  [facility] ([facility] ) [address] [place] [address]  [phone]   Jun 08, 2021 10:00 AM  (Arrive by 9:45 AM)  Return Visit with Norman Nichole MD  [facility] ([facility] ) [address] [place] [address]  [phone]           Routing refill request to provider for review/approval because:  Medication is reported/historical

## 2021-03-28 DIAGNOSIS — Z11.59 ENCOUNTER FOR SCREENING FOR OTHER VIRAL DISEASES: ICD-10-CM

## 2021-03-30 ENCOUNTER — IMMUNIZATION (OUTPATIENT)
Dept: FAMILY MEDICINE | Facility: OTHER | Age: 61
End: 2021-03-30
Attending: FAMILY MEDICINE
Payer: MEDICARE

## 2021-03-30 DIAGNOSIS — E03.9 ACQUIRED HYPOTHYROIDISM: ICD-10-CM

## 2021-03-30 PROCEDURE — 91300 PR COVID VAC PFIZER DIL RECON 30 MCG/0.3 ML IM: CPT

## 2021-03-30 RX ORDER — LEVOTHYROXINE SODIUM 75 UG/1
TABLET ORAL
Qty: 90 TABLET | Refills: 0 | Status: SHIPPED | OUTPATIENT
Start: 2021-03-30 | End: 2021-07-07

## 2021-03-30 NOTE — TELEPHONE ENCOUNTER
Levothyroxine  Last Written Prescription Date: 12/14/20  Last Fill Quantity: 90 # of Refills: 0  Last Office Visit: 2/17/21

## 2021-04-03 ENCOUNTER — HEALTH MAINTENANCE LETTER (OUTPATIENT)
Age: 61
End: 2021-04-03

## 2021-04-06 DIAGNOSIS — F41.1 GENERALIZED ANXIETY DISORDER: ICD-10-CM

## 2021-04-06 DIAGNOSIS — Z79.890 POSTMENOPAUSAL HRT (HORMONE REPLACEMENT THERAPY): ICD-10-CM

## 2021-04-06 RX ORDER — ESTRADIOL 0.5 MG/1
TABLET ORAL
Qty: 60 TABLET | Refills: 3 | Status: SHIPPED | OUTPATIENT
Start: 2021-04-06 | End: 2021-08-11

## 2021-04-06 NOTE — TELEPHONE ENCOUNTER
clonazePAM (KLONOPIN) 1 MG tablet    Last Written Prescription Date:  12/01/2020  Last Fill Quantity: 60,   # refills: 3  Last Office Visit: 02/17/2021  Future Office visit:    Next 5 appointments (look out 90 days)    Apr 10, 2021 10:15 AM  (Arrive by 10:00 AM)  SHORT with HC COLLECTION [facility] ([facility] ) [address]  [place] [address]  [phone]   Apr 27, 2021  1:00 PM  (Arrive by 12:45 PM)  Return Visit with Norman Nichole MD  [facility] ([facility] ) [address] [place] [address]  [phone]   Jun 08, 2021 10:00 AM  (Arrive by 9:45 AM)  Return Visit with Norman Nichole MD  [facility] ([facility] ) [address] [place] [address]  [phone]           Routing refill request to provider for review/approval because:

## 2021-04-07 RX ORDER — CLONAZEPAM 1 MG/1
TABLET ORAL
Qty: 60 TABLET | Refills: 0 | Status: SHIPPED | OUTPATIENT
Start: 2021-04-07 | End: 2021-05-07

## 2021-04-10 ENCOUNTER — OFFICE VISIT (OUTPATIENT)
Dept: FAMILY MEDICINE | Facility: OTHER | Age: 61
End: 2021-04-10
Attending: PHYSICIAN ASSISTANT
Payer: MEDICARE

## 2021-04-10 DIAGNOSIS — Z11.59 ENCOUNTER FOR SCREENING FOR OTHER VIRAL DISEASES: ICD-10-CM

## 2021-04-10 LAB
SARS-COV-2 RNA RESP QL NAA+PROBE: NORMAL
SPECIMEN SOURCE: NORMAL

## 2021-04-10 PROCEDURE — U0005 INFEC AGEN DETEC AMPLI PROBE: HCPCS | Mod: ZL | Performed by: INTERNAL MEDICINE

## 2021-04-10 PROCEDURE — U0003 INFECTIOUS AGENT DETECTION BY NUCLEIC ACID (DNA OR RNA); SEVERE ACUTE RESPIRATORY SYNDROME CORONAVIRUS 2 (SARS-COV-2) (CORONAVIRUS DISEASE [COVID-19]), AMPLIFIED PROBE TECHNIQUE, MAKING USE OF HIGH THROUGHPUT TECHNOLOGIES AS DESCRIBED BY CMS-2020-01-R: HCPCS | Mod: ZL | Performed by: INTERNAL MEDICINE

## 2021-04-11 LAB
LABORATORY COMMENT REPORT: NORMAL
SARS-COV-2 RNA RESP QL NAA+PROBE: NEGATIVE
SPECIMEN SOURCE: NORMAL

## 2021-04-13 ENCOUNTER — TELEPHONE (OUTPATIENT)
Dept: CARDIOLOGY | Facility: CLINIC | Age: 61
End: 2021-04-13

## 2021-04-13 NOTE — TELEPHONE ENCOUNTER
LEVOTHYROXINE      Last Written Prescription Date:  7-  Last Fill Quantity: 90,   # refills: 0  Last Office Visit: 8-  Future Office visit:    Next 5 appointments (look out 90 days)    Nov 24, 2020 10:30 AM CST  (Arrive by 10:15 AM)  Return Visit with Norman Nichole MD  [facility] ([facility] [place] ) [address]  [place] [address]  [phone]           
with patient

## 2021-04-14 ENCOUNTER — HOSPITAL ENCOUNTER (OUTPATIENT)
Facility: CLINIC | Age: 61
Discharge: HOME | End: 2021-04-14
Attending: INTERNAL MEDICINE | Admitting: INTERNAL MEDICINE
Payer: MEDICARE

## 2021-04-14 ENCOUNTER — APPOINTMENT (OUTPATIENT)
Dept: LAB | Facility: CLINIC | Age: 61
End: 2021-04-14
Attending: INTERNAL MEDICINE
Payer: MEDICARE

## 2021-04-14 ENCOUNTER — APPOINTMENT (OUTPATIENT)
Dept: MEDSURG UNIT | Facility: CLINIC | Age: 61
End: 2021-04-14
Attending: INTERNAL MEDICINE
Payer: MEDICARE

## 2021-04-14 VITALS
WEIGHT: 201 LBS | SYSTOLIC BLOOD PRESSURE: 138 MMHG | TEMPERATURE: 98.1 F | DIASTOLIC BLOOD PRESSURE: 77 MMHG | RESPIRATION RATE: 20 BRPM | OXYGEN SATURATION: 97 % | BODY MASS INDEX: 31.48 KG/M2 | HEART RATE: 64 BPM

## 2021-04-14 DIAGNOSIS — I50.30 DIASTOLIC HEART FAILURE, UNSPECIFIED HF CHRONICITY (H): ICD-10-CM

## 2021-04-14 DIAGNOSIS — R07.9 CHEST PAIN, UNSPECIFIED TYPE: ICD-10-CM

## 2021-04-14 LAB
ANION GAP SERPL CALCULATED.3IONS-SCNC: 4 MMOL/L (ref 3–14)
BASOPHILS # BLD AUTO: 0.1 10E9/L (ref 0–0.2)
BASOPHILS NFR BLD AUTO: 1 %
BUN SERPL-MCNC: 18 MG/DL (ref 7–30)
CALCIUM SERPL-MCNC: 9.2 MG/DL (ref 8.5–10.1)
CHLORIDE SERPL-SCNC: 110 MMOL/L (ref 94–109)
CO2 SERPL-SCNC: 24 MMOL/L (ref 20–32)
CREAT SERPL-MCNC: 1.05 MG/DL (ref 0.52–1.04)
DIFFERENTIAL METHOD BLD: NORMAL
EOSINOPHIL # BLD AUTO: 0.2 10E9/L (ref 0–0.7)
EOSINOPHIL NFR BLD AUTO: 2.7 %
ERYTHROCYTE [DISTWIDTH] IN BLOOD BY AUTOMATED COUNT: 12.5 % (ref 10–15)
GFR SERPL CREATININE-BSD FRML MDRD: 57 ML/MIN/{1.73_M2}
GLUCOSE SERPL-MCNC: 98 MG/DL (ref 70–99)
HCT VFR BLD AUTO: 40.9 % (ref 35–47)
HGB BLD-MCNC: 13.6 G/DL (ref 11.7–15.7)
IMM GRANULOCYTES # BLD: 0 10E9/L (ref 0–0.4)
IMM GRANULOCYTES NFR BLD: 0.4 %
LYMPHOCYTES # BLD AUTO: 2.2 10E9/L (ref 0.8–5.3)
LYMPHOCYTES NFR BLD AUTO: 31.7 %
MCH RBC QN AUTO: 30 PG (ref 26.5–33)
MCHC RBC AUTO-ENTMCNC: 33.3 G/DL (ref 31.5–36.5)
MCV RBC AUTO: 90 FL (ref 78–100)
MONOCYTES # BLD AUTO: 0.4 10E9/L (ref 0–1.3)
MONOCYTES NFR BLD AUTO: 6.2 %
NEUTROPHILS # BLD AUTO: 4.1 10E9/L (ref 1.6–8.3)
NEUTROPHILS NFR BLD AUTO: 58 %
NRBC # BLD AUTO: 0 10*3/UL
NRBC BLD AUTO-RTO: 0 /100
NT-PROBNP SERPL-MCNC: 80 PG/ML (ref 0–900)
PLATELET # BLD AUTO: 211 10E9/L (ref 150–450)
POTASSIUM SERPL-SCNC: 4.4 MMOL/L (ref 3.4–5.3)
RBC # BLD AUTO: 4.53 10E12/L (ref 3.8–5.2)
SODIUM SERPL-SCNC: 138 MMOL/L (ref 133–144)
WBC # BLD AUTO: 7 10E9/L (ref 4–11)

## 2021-04-14 PROCEDURE — 999N000132 HC STATISTIC PP CARE STAGE 1

## 2021-04-14 PROCEDURE — 99213 OFFICE O/P EST LOW 20 MIN: CPT | Mod: 25 | Performed by: PHYSICIAN ASSISTANT

## 2021-04-14 PROCEDURE — C1894 INTRO/SHEATH, NON-LASER: HCPCS | Performed by: INTERNAL MEDICINE

## 2021-04-14 PROCEDURE — 93451 RIGHT HEART CATH: CPT | Performed by: INTERNAL MEDICINE

## 2021-04-14 PROCEDURE — 80048 BASIC METABOLIC PNL TOTAL CA: CPT | Performed by: INTERNAL MEDICINE

## 2021-04-14 PROCEDURE — 272N000001 HC OR GENERAL SUPPLY STERILE: Performed by: INTERNAL MEDICINE

## 2021-04-14 PROCEDURE — 83880 ASSAY OF NATRIURETIC PEPTIDE: CPT | Performed by: INTERNAL MEDICINE

## 2021-04-14 PROCEDURE — 250N000009 HC RX 250: Performed by: INTERNAL MEDICINE

## 2021-04-14 PROCEDURE — 93464 EXERCISE W/HEMODYNAMIC MEAS: CPT | Performed by: INTERNAL MEDICINE

## 2021-04-14 PROCEDURE — 36415 COLL VENOUS BLD VENIPUNCTURE: CPT | Performed by: INTERNAL MEDICINE

## 2021-04-14 PROCEDURE — 85025 COMPLETE CBC W/AUTO DIFF WBC: CPT | Performed by: INTERNAL MEDICINE

## 2021-04-14 PROCEDURE — 272N000002 HC OR SUPPLY OTHER OPNP: Performed by: INTERNAL MEDICINE

## 2021-04-14 RX ORDER — LIDOCAINE 40 MG/G
CREAM TOPICAL
Status: COMPLETED | OUTPATIENT
Start: 2021-04-14 | End: 2021-04-14

## 2021-04-14 RX ADMIN — LIDOCAINE: 40 CREAM TOPICAL at 11:54

## 2021-04-14 NOTE — PRE-PROCEDURE
GENERAL PRE-PROCEDURE:   Procedure:  Right heart catheterization with exercise  Date/Time:  4/14/2021 11:33 AM    Written consent obtained?: Yes    Risks and benefits: Risks, benefits and alternatives were discussed    DC Plan: Appropriate discharge home plan in place for patients who are going home after procedure   Consent given by:  Patient  Patient states understanding of procedure being performed: Yes    Patient's understanding of procedure matches consent: Yes    Procedure consent matches procedure scheduled: Yes    Appropriately NPO:  Yes  Lungs:  Lungs clear with good breath sounds bilaterally  Heart:  Other (comment)  Heart exam comment:  Bradycardiac, normal rhythm. No loud murmurs appreciated.   History & Physical reviewed:  Abbreviated history and physical done prior to moderate sedation  Statement of review:  I have reviewed the lab findings, diagnostic data, medications, and the plan for sedation

## 2021-04-14 NOTE — H&P
History and Physical: Cardiology Cath Lab Service    Sharlene Mccord MRN# [id number]   YOB: 1960 Age: 60 year old         Assessment and plan:   Sharlene Mccord is a 59 yo female with a history of moderate aortic valve insufficiency, mild mitral and tricuspid valve regurgitation, microvascular angina, hypertension, chronic diastolic heart failure dysfunction, obesity, depression/anxiety, fibromyalgia, and migraines who presents today for right heart catheterization with exercise for evaluation of fatigue and dyspnea on exertion.     # Fatigue  # Chest pain, secondary to microvascular angina   # Dyspnea on exertion  # Chronic diastolic heart failure  # Hypertension  # Obesity   # Moderate aortic valve regurgitation  # Mild tricuspid and mitral valve regurgitation    - No contraindications noted, will move forward with right heart catheterization with exercise.   - Patient will discharge home with her  after post procedure orders have been met  - Follow up with Dr. Nichole as scheduled    Patient seen and discussed with Dr. Danna Casarez PA-C  [facility]  [phone]        HPI:   Sharlene Mccord is a 59 yo female with a history of moderate aortic valve insufficiency, microvascular angina, hypertension, chronic diastolic heart failure dysfunction, obesity, depression/anxiety, fibromyalgia, and migraines. She has followed in the cardiology clinic for several years and was most recently seen by Dr. Norman Nichole on 3/9/2021 for evaluation of fatigue, chest pain, and dyspnea on exertion. An echocardiogram performed on 11/25/2020 revealed an LVEF of 65-70% with grade 1 diastolic dysfunction, mild mitral valve regurgitation, moderate aortic valve regurgitation, and mild tricuspid valve regurgitation. Due to concern that her diastolic dysfunction was under estimated by the echo cardiogram, it was recommended that she proceed with exercise right heart catheterization. She  presents today for this procedure.     She reports worsening dyspnea on exertion over the last month and states that she becomes dyspneic with climbing even a few chairs or talking in long sentences. She also believes that her chest pain has become more frequent and is unchanged by her diltiazem. No orthopnea or PND. She does endorse mild lower extremity edema around her ankles at the end of the day. Since she quit smoking in January 2021, she has experienced weight gain but believes this is due to inactivity as well as increased appetite. No recent illnesses, fevers, chills, nausea, vomiting, or diarrhea. No bleeding difficulties. No dizziness, lightheadedness, palpitations, presyncope/syncope. She reports bradycardia that has been stable as well as occasional ortho stasis but states that this has been controlled by making slow positional changes.     COVID 19 test was negative on 4/10/2021.       Past Medical History:   Diagnosis Date     Anxiety state, unspecified 03/01/2011     Aortic valve disorders 06/07/2000    Echocardiogram showin mild/moderate AI.  Normal LV function     Cardiac microvascular disease 4/10/2013    Based on Cardiac MRI done at       Depressive disorder 1997     Fatigue      Fatigue      Fibromyalgia 03/01/2011     Hypertension      Major depressive disorder, recurrent episode, unspecified 12/17/2014     Migraine, unspecified, without mention of intractable migraine without mention of status migrainosus 03/01/2011     Mitral valve disorders(424.0) 10/16/2007     PONV (postoperative nausea and vomiting)      Thyroid Nodule 03/01/2011     Tobacco use disorder 03/01/2011     Unspecified hypothyroidism 03/01/2011       Past Surgical History:   Procedure Laterality Date     APPENDECTOMY  1977     BIOPSY  2017    taken at [facility] from Dr. Nadira cohen. Thyroid     CARDIAC SURGERY  2013    angiogram [facility]:  Normal coronary arteries.  Felt ot have some microvascular disease     CL AFF SURGICAL  PATHOLOGY  01/02/2007    Thyroid Mass     COLONOSCOPY  1/13/2014    Procedure: COLONOSCOPY;  COLONOSCOPY ;  Surgeon: Chasidy Gee DO;  Location: [facility]     ESOPHAGOSCOPY, GASTROSCOPY, DUODENOSCOPY (EGD), COMBINED N/A 2/9/2017    Procedure: COMBINED ESOPHAGOSCOPY, GASTROSCOPY, DUODENOSCOPY (EGD);  Surgeon: Johnathan Ruff DO;  Location: [facility] OR     GYN SURGERY      c-sections x 3     HYSTERECTOMY TOTAL ABDOMINAL, BILATERAL SALPINGO-OOPHORECTOMY, COMBINED N/A 01/01/2001     LAPAROSCOPIC CHOLECYSTECTOMY  11/07/2003    Cholelithiasis     LAPAROTOMY EXPLORATORY      abdominal pain/fever     LARYNGOSCOPY       SPLENECTOMY         No current facility-administered medications on file prior to encounter.   pantoprazole (PROTONIX) 40 MG EC tablet, TAKE ONE TABLET BY MOUTH 30-60 MINUTES BEFORE A MEAL  acetaminophen (TYLENOL) 325 MG tablet, Take 650 mg by mouth  nitroGLYcerin (NITROSTAT) 0.4 MG sublingual tablet, DISSOLVE ONE TABLET UNDER THE TONGUE EVERY 5 MINUTES AS NEEDED FOR CHEST PAIN. DO NOT EXCEED A TOTAL OF 3 DOSES IN 15 MINUTES  PROCTO-MED HC 2.5 % cream, APPLY   TOPICALLY TO AFFECTED AREA THREE TIMES DAILY  traMADol (ULTRAM) 50 MG tablet, Take 50 mg by mouth 3 times daily as needed for severe pain        Family History   Problem Relation Age of Onset     C.A.D. Father      Hypertension Father      C.A.D. Mother      Cerebrovascular Disease Mother         CVA     Hypertension Mother      Coronary Artery Disease Mother      Diabetes Paternal Grandmother      Diabetes Paternal Grandfather      Diabetes Sister      Breast Cancer Sister      Diabetes Maternal Grandmother      Diabetes Sister      Breast Cancer Sister      Hypertension Sister      Depression Sister      Anxiety Disorder Sister      Obesity Sister      Depression Sister      Breast Cancer Sister      Obesity Sister      Depression Brother      Depression Daughter      Obesity Daughter      Depression Daughter      Mental Illness Daughter          [id number]     Anxiety Disorder Daughter      Mental Illness Daughter      Anxiety Disorder Other      Osteoporosis Other      Thyroid Disease Other      Diabetes Nephew        Social History     Tobacco Use     Smoking status: Former Smoker     Packs/day: 0.25     Years: 40.00     Pack years: 10.00     Types: Cigarettes     Start date: 1975     Quit date: 2021     Years since quittin.2     Smokeless tobacco: Never Used   Substance Use Topics     Alcohol use: No       Allergies   Allergen Reactions     Codeine      Isosorbide Mononitrate Other (See Comments)     Vomiting and headache       Lisinopril      [facility] scan     Paxil [Paroxetine] Headache         ROS:   Skin: negative  Respiratory: Dyspnea on exertion- slightly worse over last month  Cardiovascular: See HPI  Gastrointestinal: negative  Genitourinary: negative  Musculoskeletal: negative  Neurologic: negative  Hematologic/Lymphatic/Immunologic: negative  Endocrine: negative    Physical Examination:  Vitals: BP (!) 145/69 (BP Location: Right arm)   Pulse 57   Temp 98.1  F (36.7  C) (Oral)   Resp 16   Wt 91.2 kg (201 lb)   SpO2 98%   BMI 31.48 kg/m    BMI= Body mass index is 31.48 kg/m .    GENERAL APPEARANCE: Pleasant but anxious appearing female. Appears healthy and in no acute distress. Alert and interactive. Accompanied by her .   HEENT: no icterus, no xanthelasmas, normal pupil size and reaction  CHEST: Normal work of breathing on room air, lungs clear to auscultation without rales, rhonchi or wheezes, no use of accessory muscles, no retractions  CARDIOVASCULAR: bradycardic, regular rhythm, normal S1 and S2, no S3 or S4. No obvious murmurs appreciated.   ABDOMEN: soft, non tender  EXTREMITIES: warm,1+ lower extremity edema on the right, trace lower extremity edema on the left. radia/DP/PT pulses 2+ bilaterally, no clubbing or cyanosis   NEURO: alert and oriented to person/place/time, normal speech  PSYCH: slightly anxious,  affect is appropriate.   SKIN: no ecchymoses, no rashes      Laboratory:  CMP  Recent Labs   Lab 04/14/21  1038      POTASSIUM 4.4   CHLORIDE 110*   CO2 24   ANIONGAP 4   GLC 98   BUN 18   CR 1.05*   GFRESTIMATED 57*   GFRESTBLACK 67   DAKSHA 9.2     CBC  Recent Labs   Lab 04/14/21  1038   WBC 7.0   RBC 4.53   HGB 13.6   HCT 40.9   MCV 90   MCH 30.0   MCHC 33.3   RDW 12.5          Echocardiogram 11/25/2020:  Interpretation Summary  No pericardial effusion is present.  Global and regional left ventricular function is hyperkinetic with an EF of  65-70%.  Grade I or early diastolic dysfunction.  The right ventricle is normal size.  Global right ventricular function is normal.  Both atria appear normal.  Mild mitral insufficiency is present.  Moderate aortic insufficiency is present.  The Aortic Insufficiency is unchanged from previous echo 6/17/19  Mild tricuspid insufficiency is present.  Right ventricular systolic pressure is 20mmHg above the right atrial pressure.  Trace to mild pulmonic insufficiency is present.  The aorta root is normal.

## 2021-04-14 NOTE — DISCHARGE INSTRUCTIONS
[facility]                        Interventional Cardiology  Discharge Instructions   Post Right Heart Cath      AFTER YOU GO HOME:    DO drink plenty of fluids    DO resume your regular diet and medications unless otherwise instructed by your Primary Physician    Do Not scrub the procedure site vigorously    No lotion or powder to the puncture site for 3 days    CALL YOUR PRIMARY PHYSICIAN IF: You may resume all normal activity.  Monitor neck site for bleeding, swelling, or voice changes. If you notice bleeding or swelling immediately apply pressure to the site and call number below to speak with Cardiology Fellow.  If you experience any changes in your breathing you should call your doctor immediately or come to the closest Emergency Department.  Do not drive yourself.    ADDITIONAL INSTRUCTIONS: Medications: You are to resume all home medications including anticoagulation therapy unless otherwise advised by your primary cardiologist or nurse coordinator.    Follow Up: Per your primary cardiology team    If you have any questions or concerns regarding your procedure site please call [phone] at anytime and ask for Cardiology Fellow on call.  They are available 24 hours a day.  You may also contact the Cardiology Clinic after hours number at [phone].                                                       Telephone Numbers [phone] Monday-Friday 8:00 am to 4:30 pm    [phone] After 4:30 pm Monday-Friday, Weekends & Holidays  Ask for Interventional Cardiologist on call. Someone is on call 24 hours/day   [facility] toll free number [phone] Monday-Friday 8:00 am to 4:30 pm   [facility] Emergency Dept [phone]

## 2021-04-14 NOTE — Clinical Note
dry, intact, no bleeding and no hematoma. 7 fr sheath removed from R internal jugular. Manual pressure held. Hemostasis achieved. Band aid applied.

## 2021-04-14 NOTE — IP AVS SNAPSHOT
After Visit Summary Template Not Found    This Print Group is only intended to be used in the After Visit Summary and can only be used in a report that uses a released After Visit Summary Template.                       MRN:[id number]                      After Visit Summary   4/14/2021    Sharlene Mccord    MRN: [id number]           Visit Information        Department      4/14/2021 10:19 AM [facility] [address] [place]          Review of your medicines      UNREVIEWED medicines. Ask your doctor about these medicines       Dose / Directions   acetaminophen 325 MG tablet  Commonly known as: TYLENOL      Dose: 650 mg  Take 650 mg by mouth  Refills: 0     clonazePAM 1 MG tablet  Commonly known as: klonoPIN  Used for: Generalized anxiety disorder      TAKE 1/2 TO 1 (ONE-HALF TO ONE) TABLET BY MOUTH UP TO TWICE DAILY  Quantity: 60 tablet  Refills: 0     diltiazem  MG 24 hr ER beaded capsule  Commonly known as: TIAZAC  Used for: Cardiac microvascular disease      Take 1 capsule by mouth once daily  Quantity: 90 capsule  Refills: 0     diltiazem ER COATED BEADS 300 MG 24 hr capsule  Commonly known as: CARDIZEM CD/CARTIA XT  Used for: Benign essential hypertension      Dose: 300 mg  Take 1 capsule (300 mg) by mouth daily  Quantity:    Refills: 0     estradiol 0.5 MG tablet  Commonly known as: ESTRACE  Used for: Postmenopausal HRT (hormone replacement therapy)      Take 1 tablet by mouth twice daily  Quantity: 60 tablet  Refills: 3     levothyroxine 75 MCG tablet  Commonly known as: SYNTHROID/LEVOTHROID  Used for: Acquired hypothyroidism      Take 1 tablet by mouth once daily  Quantity: 90 tablet  Refills: 0     nitroGLYcerin 0.4 MG sublingual tablet  Commonly known as: NITROSTAT      DISSOLVE ONE TABLET UNDER THE TONGUE EVERY 5 MINUTES AS NEEDED FOR CHEST PAIN. DO NOT EXCEED A TOTAL OF 3 DOSES IN 15 MINUTES  Refills: 0     pantoprazole 40 MG EC tablet  Commonly known as: PROTONIX  Used for: Gastroesophageal  reflux disease with esophagitis without hemorrhage      TAKE ONE TABLET BY MOUTH 30-60 MINUTES BEFORE A MEAL  Quantity: 90 tablet  Refills: 3     Procto-Med HC 2.5 % cream  Used for: External hemorrhoids  Generic drug: hydrocortisone (Perianal)      APPLY   TOPICALLY TO AFFECTED AREA THREE TIMES DAILY  Quantity: 60 g  Refills: 0     traMADol 50 MG tablet  Commonly known as: ULTRAM      Dose: 50 mg  Take 50 mg by mouth 3 times daily as needed for severe pain  Refills: 0              Protect others around you: Learn how to safely use, store and throw away your medicines at www.disposemymeds.org.       Follow-ups after your visit       Your next 10 appointments already scheduled    Apr 14, 2021  Procedure with Danna Clay MD  [facility] ([facility] ) [address] [address]  [phone]   The [facility] is located on the corner of [address] and [address] on the [place] [facility] [place]. It is easily accessible from virtually any point in the [place] area, via [employer] and [employer].   Apr 27, 2021  1:00 PM  (Arrive by 12:45 PM)  Return Visit with Norman Nichole MD  [facility] ([facility] ) [address] [address]  [phone]      Jun 08, 2021 10:00 AM  (Arrive by 9:45 AM)  Return Visit with Norman Nichole MD  [facility] ([facility] ) [address] [address]  [phone]         Care Instructions       Further instructions from your care team       [facility]                        Interventional Cardiology  Discharge Instructions   Post Right Heart Cath      AFTER YOU GO HOME:    DO drink plenty of fluids    DO resume your  regular diet and medications unless otherwise instructed by your Primary Physician    Do Not scrub the procedure site vigorously    No lotion or powder to the puncture site for 3 days    CALL YOUR PRIMARY PHYSICIAN IF: You may resume all normal activity.  Monitor neck site for bleeding, swelling, or voice changes. If you notice bleeding or swelling immediately apply pressure to the site and call number below to speak with Cardiology Fellow.  If you experience any changes in your breathing you should call your doctor immediately or come to the closest Emergency Department.  Do not drive yourself.    ADDITIONAL INSTRUCTIONS: Medications: You are to resume all home medications including anticoagulation therapy unless otherwise advised by your primary cardiologist or nurse coordinator.    Follow Up: Per your primary cardiology team    If you have any questions or concerns regarding your procedure site please call [phone] at anytime and ask for Cardiology Fellow on call.  They are available 24 hours a day.  You may also contact the Cardiology Clinic after hours number at [phone].                                                       Telephone Numbers [phone] Monday-Friday 8:00 am to 4:30 pm    [phone] After 4:30 pm Monday-Friday, Weekends & Holidays  Ask for Interventional Cardiologist on call. Someone is on call 24 hours/day   [facility] toll free number [phone] Monday-Friday 8:00 am to 4:30 pm   [facility] Emergency Dept [phone]                   Additional Information About Your Visit       [employer] Information    [employer] gives you secure access to your electronic health record. If you see a primary care provider, you can also send messages to your care team and make appointments. If you have questions, please call your primary care clinic.  If you do not have a primary care provider, please call [phone] and they will assist you.       Care EveryWhere ID    This is your Care  EveryWhere ID. This could be used by other organizations to access your [facility] medical records  [id number]       Your Vitals Were  Most recent update: 4/14/2021 11:00 AM    Blood Pressure   145/69   (BP Location: Right arm)    Pulse   57    Temperature   98.1  F (36.7  C) (Oral)    Respirations   16    Pulse Oximetry   98%          Primary Care Provider Office Phone # Fax #    Concha KEILY Johnson [phone]      Equal Access to Services    RADHA SANCHEZ : Hadii aad ku hadasho Soomaali, waaxda luqadaha, qaybta kaalmada adeegyada, waxay idiin hayaan adeeg kharash la'aan ah. So [facility] [phone].    ATENCIÓN: Si habla español, tiene a meneses disposición servicios gratuitos de asistencia lingüística. Llame al [phone].    We comply with applicable federal and state civil rights laws, including the [place] Human Rights Act. We do not discriminate on the basis of race, color, creed, Cheondoism, national origin, marital status, age, disability, sex, sexual orientation, or gender identity.    If you would like an itemization of your charges they will now be available in [employer] 30 days after discharge. To access the itemized statements in [employer] go to billing/billing summary. From there select view account. There will be multiple tabs showing an overview of your account, detail, payments, and communications. From the communications tab you can see your monthly statements, your itemized statements, and any billing letters generated for your account. If you do not have a [employer] account and need help getting access please contact [employer] support at [phone].  If you would prefer to have your itemized statements mailed please contact our automated itemized bill request line at [phone] option  2.       Thank you!    Thank you for choosing [facility] for your care. Our goal is always to provide you with excellent care. Hearing back from our patients is one way we can continue to improve our services.  Please take a few minutes to complete the written survey that you may receive in the mail after you visit with us. Thank you!            Medication List      ASK your doctor about these medications          Morning Afternoon Evening Bedtime As Needed    acetaminophen 325 MG tablet  Also known as: TYLENOL  INSTRUCTIONS: Take 650 mg by mouth                     clonazePAM 1 MG tablet  Also known as: klonoPIN  INSTRUCTIONS: TAKE 1/2 TO 1 (ONE-HALF TO ONE) TABLET BY MOUTH UP TO TWICE DAILY                     diltiazem  MG 24 hr ER beaded capsule  Also known as: TIAZAC  INSTRUCTIONS: Take 1 capsule by mouth once daily                     diltiazem ER COATED BEADS 300 MG 24 hr capsule  Also known as: CARDIZEM CD/CARTIA XT  INSTRUCTIONS: Take 1 capsule (300 mg) by mouth daily                     estradiol 0.5 MG tablet  Also known as: ESTRACE  INSTRUCTIONS: Take 1 tablet by mouth twice daily                     levothyroxine 75 MCG tablet  Also known as: SYNTHROID/LEVOTHROID  INSTRUCTIONS: Take 1 tablet by mouth once daily                     nitroGLYcerin 0.4 MG sublingual tablet  Also known as: NITROSTAT  INSTRUCTIONS: DISSOLVE ONE TABLET UNDER THE TONGUE EVERY 5 MINUTES AS NEEDED FOR CHEST PAIN. DO NOT EXCEED A TOTAL OF 3 DOSES IN 15 MINUTES                     pantoprazole 40 MG EC tablet  Also known as: PROTONIX  INSTRUCTIONS: TAKE ONE TABLET BY MOUTH 30-60 MINUTES BEFORE A MEAL                     Procto-Med HC 2.5 % cream  INSTRUCTIONS: APPLY   TOPICALLY TO AFFECTED AREA THREE TIMES DAILY  Generic drug: hydrocortisone (Perianal)                     traMADol 50 MG tablet  Also known as: ULTRAM  INSTRUCTIONS: Take 50 mg by mouth 3 times daily as needed for severe pain

## 2021-04-14 NOTE — Clinical Note
Called to [facility]. All questions answered. Patient to wait and talk to Dr. Clay prior to discharge.

## 2021-04-14 NOTE — IP AVS SNAPSHOT
[facility] [address] [address]                                    After Visit Summary   4/14/2021    Sharlene Mccord    MRN: [id number]           After Visit Summary Signature Page    I have received my discharge instructions, and my questions have been answered. I have discussed any challenges I see with this plan with the nurse or doctor.    ..........................................................................................................................................  Patient/Patient Representative Signature      ..........................................................................................................................................  Patient Representative Print Name and Relationship to Patient    ..................................................               ................................................  Date                                   Time    ..........................................................................................................................................  Reviewed by Signature/Title    ...................................................              ..............................................  Date                                               Time          22EPIC Rev 08/18

## 2021-04-27 ENCOUNTER — OFFICE VISIT (OUTPATIENT)
Dept: CARDIOLOGY | Facility: OTHER | Age: 61
End: 2021-04-27
Attending: INTERNAL MEDICINE
Payer: COMMERCIAL

## 2021-04-27 VITALS
TEMPERATURE: 97.4 F | DIASTOLIC BLOOD PRESSURE: 76 MMHG | RESPIRATION RATE: 16 BRPM | HEIGHT: 67 IN | OXYGEN SATURATION: 97 % | WEIGHT: 200.5 LBS | SYSTOLIC BLOOD PRESSURE: 126 MMHG | BODY MASS INDEX: 31.47 KG/M2 | HEART RATE: 60 BPM

## 2021-04-27 DIAGNOSIS — I51.89 DIASTOLIC DYSFUNCTION: Primary | ICD-10-CM

## 2021-04-27 DIAGNOSIS — I25.85 CARDIAC MICROVASCULAR DISEASE: ICD-10-CM

## 2021-04-27 PROCEDURE — G0463 HOSPITAL OUTPT CLINIC VISIT: HCPCS

## 2021-04-27 PROCEDURE — 99214 OFFICE O/P EST MOD 30 MIN: CPT | Performed by: INTERNAL MEDICINE

## 2021-04-27 RX ORDER — DILTIAZEM HYDROCHLORIDE 240 MG/1
240 CAPSULE, COATED, EXTENDED RELEASE ORAL DAILY
Qty: 30 CAPSULE | Refills: 11 | Status: SHIPPED | OUTPATIENT
Start: 2021-04-27 | End: 2022-04-11

## 2021-04-27 ASSESSMENT — PAIN SCALES - GENERAL: PAINLEVEL: MODERATE PAIN (5)

## 2021-04-27 ASSESSMENT — MIFFLIN-ST. JEOR: SCORE: 1512.09

## 2021-04-27 NOTE — NURSING NOTE
"Chief Complaint   Patient presents with     RECHECK       Initial /76 (BP Location: Right arm, Patient Position: Sitting, Cuff Size: Adult Regular)   Pulse 60   Temp 97.4  F (36.3  C) (Tympanic)   Resp 16   Ht 1.702 m (5' 7\")   Wt 90.9 kg (200 lb 8 oz)   SpO2 97%   BMI 31.40 kg/m   Estimated body mass index is 31.4 kg/m  as calculated from the following:    Height as of this encounter: 1.702 m (5' 7\").    Weight as of this encounter: 90.9 kg (200 lb 8 oz).  Medication Reconciliation: complete  Shayna Guo LPN  "

## 2021-04-27 NOTE — PATIENT INSTRUCTIONS
Thank you for allowing Dr. Nichole and our team to participate in your care. Please call our office at [phone] with scheduling questions or if you need to cancel or change your appointment. With any other questions or concerns you may call Heather cardiology nurse at [phone].       If you experience chest pain, chest pressure, chest tightness, shortness of breath, fainting, lightheadedness, nausea, vomiting, or other concerning symptoms, please report to the Emergency Department or call 911. These symptoms may be emergent, and best treated in the Emergency Department.    Follow up in June as already scheduled.    Change the diltiazem to 240 mg at bedtime.

## 2021-04-27 NOTE — PROGRESS NOTES
CC- fatigue    HPI- Mrs. Mccord has been followed for several years by various cardiologists. She has known AI and has complained of fatigue throughout. She reports recently her fatigue has been much worse. She related that her fatigue was associated with dyspnea on exertion and chest discomfort. Her stress test showed an anterior wall defect that did not fit with her echocardiogram. She also has aortic insufficiency that by echocardiogram did not seem to have progressed. She underwent angiography at [facility]. This demonstrated no epicardial coronary artery disease. Her echocardiogram again showed mild to mod AI. A stress MRI was done that  suggested microvascular disease. She was begun on isosorbide mononitrate but stopped after 4 days due to severe headache. She cannot recall if it helped with her chest pain. She was started on diltiazem but stopped when she was admitted for diverticulitis. She is already on estrogen replacement. On 360 mg of diltiazem she reported the chest pain was better but her BP was low. She also noted numbness and tingling in both arms and both legs. The legs was constant. The arms mostly when she worked over her head. She also felt fatigued. She does not report orthostatic symptoms. Her diltiazem was reduced to 300 mg daily. The chest pain is worse. The fatigue is maybe a little better and the numbness/tingling is unchanged.     June 2014:  At our last visit I discussed with her that I have no other therapies to offer regarding her chest pain. We discuss the possibility of starting a medication such as Neurontin that has been reported to help with chronic pain syndromes. There is some evidence that patients with microvascular angina have abnormal pain sensitivity. She reports the Neurontin was started and it has helped. She is using SL NTG 1-2x/week as opposed to several times a day.    We had stopped her Diovan because on the increased diltiazem dose she was  "having relatively low BP. She is worried because she has seen some BP measurements that are high for her. She admits to being stressed. She has just started a w/u for memory loss and a change was seen on her MRI.    She had an echocardiogram done in March, see below, that showed no change in her aortic insufficiency.     [id number]:    She has been doing reasonably well. She is not having as much chest pain. She has been less active due to problems with disk disease in her spine. She has not noted any major changes in her exertional abilities.     [id number]: She has been having more joint pain. She is seeing a Rheumatologist in [place] soon.    Her chest pain is perhaps a bit better. Her legs and back feel heavy and she has sharp pains and aches, especially with climbing stairs. This has not been associated with her chest pain. On level drug she had been walking 2 miles with her sister but she has not done that for several months now.     [id number]: her chest pain has been doing reasonably well. She hasn't had NTG available for a few months.     She reports a new barking cough over the past 6 months - no fever, no new meds, not productive. The cough is not getting any worse but no better either. She has not had any wheezing that she has noticed.     Her legs feel heavy when she walks.    [id number]: She reports no real change in her chest pain or exertional abilities.  She has been troubled by chronic pain \"all over her body\".  She reports that rheumatology felt that she had a mixed connective tissue disorder.  She tried Plaquenil for a time but has stopped that and has been trying medical cannabis.  She reports the cannabis has not helped particularly.    Her recent echocardiogram showed no change in her aortic insufficiency.  Her systolic function remains good.  Left ventricular size remains normal.    May 14, 2019: she reports the chest pain is the same. She feels more shortness of breath and wants to stop smoking. " She has noted hot flashes recently.     November 24, 2020: no significant change in chest pain but has been feeling very fatigued and weak. She only went fishing once this year. She reports feeling too weak to reel in a fish.     April 27, 2021 Interval history: exercise right heart cath showed mild increase in pressures with exercise c/w mild diastolic heart failure. She reports no change in her dyspnea on exertion or exertional abilities.    Current Outpatient Medications   Medication Sig Dispense Refill     acetaminophen (TYLENOL) 325 MG tablet Take 650 mg by mouth       clonazePAM (KLONOPIN) 1 MG tablet TAKE 1/2 TO 1 (ONE-HALF TO ONE) TABLET BY MOUTH UP TO TWICE DAILY 60 tablet 0     diltiazem ER (TIAZAC) 300 MG 24 hr ER beaded capsule Take 1 capsule by mouth once daily 90 capsule 0     estradiol (ESTRACE) 0.5 MG tablet Take 1 tablet by mouth twice daily 60 tablet 3     levothyroxine (SYNTHROID/LEVOTHROID) 75 MCG tablet Take 1 tablet by mouth once daily 90 tablet 0     nitroGLYcerin (NITROSTAT) 0.4 MG sublingual tablet DISSOLVE ONE TABLET UNDER THE TONGUE EVERY 5 MINUTES AS NEEDED FOR CHEST PAIN. DO NOT EXCEED A TOTAL OF 3 DOSES IN 15 MINUTES       pantoprazole (PROTONIX) 40 MG EC tablet TAKE ONE TABLET BY MOUTH 30-60 MINUTES BEFORE A MEAL 90 tablet 3     PROCTO-MED HC 2.5 % cream APPLY   TOPICALLY TO AFFECTED AREA THREE TIMES DAILY 60 g 0     traMADol (ULTRAM) 50 MG tablet Take 50 mg by mouth 3 times daily as needed for severe pain       Allergies   Allergen Reactions     Codeine      Isosorbide Mononitrate Other (See Comments)     Vomiting and headache       Lisinopril      [facility] scan     Paxil [Paroxetine] Headache     Past Medical History:   Diagnosis Date     Anxiety state, unspecified 03/01/2011     Aortic valve disorders 06/07/2000    Echocardiogram showin mild/moderate AI.  Normal LV function     Cardiac microvascular disease 4/10/2013    Based on Cardiac MRI done at       Depressive disorder  1997     Fatigue      Fatigue      Fibromyalgia 03/01/2011     Hypertension      Major depressive disorder, recurrent episode, unspecified 12/17/2014     Migraine, unspecified, without mention of intractable migraine without mention of status migrainosus 03/01/2011     Mitral valve disorders(424.0) 10/16/2007     PONV (postoperative nausea and vomiting)      Thyroid Nodule 03/01/2011     Tobacco use disorder 03/01/2011     Unspecified hypothyroidism 03/01/2011     Past Surgical History:   Procedure Laterality Date     APPENDECTOMY  1977     BIOPSY  2017    taken at [facility] from Dr. Nadira cohen. Thyroid     CARDIAC SURGERY  2013    angiogram [facility]:  Normal coronary arteries.  Felt ot have some microvascular disease     CL AFF SURGICAL PATHOLOGY  01/02/2007    Thyroid Mass     COLONOSCOPY  1/13/2014    Procedure: COLONOSCOPY;  COLONOSCOPY ;  Surgeon: Chasidy Gee DO;  Location: [facility] OR     CV RIGHT HEART CATH MEASUREMENTS RECORDED N/A 4/14/2021    Procedure: CV RIGHT HEART CATH;  Surgeon: Danna Clay MD;  Location:  HEART CARDIAC CATH LAB     CV RIGHT HEART EXERCISE STRESS STUDY N/A 4/14/2021    Procedure: Right Heart Exercise Stress Study;  Surgeon: Danna Clay MD;  Location:  HEART CARDIAC CATH LAB     ESOPHAGOSCOPY, GASTROSCOPY, DUODENOSCOPY (EGD), COMBINED N/A 2/9/2017    Procedure: COMBINED ESOPHAGOSCOPY, GASTROSCOPY, DUODENOSCOPY (EGD);  Surgeon: Johnathan Ruff DO;  Location: [facility] OR     GYN SURGERY      c-sections x 3     HYSTERECTOMY TOTAL ABDOMINAL, BILATERAL SALPINGO-OOPHORECTOMY, COMBINED N/A 01/01/2001     LAPAROSCOPIC CHOLECYSTECTOMY  11/07/2003    Cholelithiasis     LAPAROTOMY EXPLORATORY      abdominal pain/fever     LARYNGOSCOPY       SPLENECTOMY       Social History     Tobacco Use     Smoking status: Former Smoker     Packs/day: 0.25     Years: 40.00     Pack years: 10.00     Types: Cigarettes     Start date: 1/1/1975     Quit date: 1/27/2021     Years since  "quittin.2     Smokeless tobacco: Never Used   Substance Use Topics     Alcohol use: No     Drug use: No     ROS- the ten system review is negative except as noted in the HPI.    Physical examination:  /76 (BP Location: Right arm, Patient Position: Sitting, Cuff Size: Adult Regular)   Pulse 60   Temp 97.4  F (36.3  C) (Tympanic)   Resp 16   Ht 1.702 m (5' 7\")   Wt 90.9 kg (200 lb 8 oz)   SpO2 97%   BMI 31.40 kg/m      GENERAL APPEARANCE: healthy, alert and no distress  NECK: no venous distention  RESPIRATORY: lungs clear to auscultation - no rales, rhonchi or wheezes  CARDIOVASCULAR: regular, normal S1 S2, no gallop, soft AI murmur as before  EXTREMITIES: no edema   VASC: pedal pulses are normal    Laboratory and diagnostic data reviewed personally 2021:    Results for STEVEN SZYMANSKI (MRN [id number]) as of 2021 13:24   Ref. Range 2021 10:38   Sodium Latest Ref Range: 133 - 144 mmol/L 138   Potassium Latest Ref Range: 3.4 - 5.3 mmol/L 4.4   Chloride Latest Ref Range: 94 - 109 mmol/L 110 (H)   Carbon Dioxide Latest Ref Range: 20 - 32 mmol/L 24   Urea Nitrogen Latest Ref Range: 7 - 30 mg/dL 18   Creatinine Latest Ref Range: 0.52 - 1.04 mg/dL 1.05 (H)   GFR Estimate Latest Ref Range: >60 mL/min/1.73_m2 57 (L)       Right Heart Exercise Stress Study   CV RIGHT HEART CATH   Indications    Diastolic heart failure, unspecified HF chronicity (H) [I50.30 (ICD-10-CM)]   Chest pain, unspecified type [R07.9 (ICD-10-CM)]   Comments/Patient Narrative    60-year-old female with exertioanl shortness of breath who was referred to the cath lab for invasive exercise hemodynamic assessment.   Pre Procedure Diagnosis    Chest Pain with activity       Conclusion      Normal resting PA pressures, biventricualr filling pressures, and PVR at rest    Moderately reduced cardiac index at rest in the setting of bradycardia    Mild increase in PCWP and right atrial pressure with exercise    Approriate increase " in cardiac output and PA pressures with exercise.    No increase in PVR with exercise    These constellation of findings may indicate early HFpEF     Name: STEVEN SZYMANSKI  MRN: [id number]  : 1960  Study Date: 2020 09:50 AM  Age: 60 yrs  Gender: Female  Patient Location: [facility] [place]  Reason For Study: GUTIERREZ (dyspnea on exertion)  History: GUTIERREZ  Ordering Physician: YOHAN CANTOR  Referring Physician: YOHAN CANTOR  Performed By: Kaur Camilo RDCS     BSA: 2.0 m2  Height: 67 in  Weight: 186 lb  _____________________________________________________________________________  __     _____________________________________________________________________________  __        Interpretation Summary  No pericardial effusion is present.  Global and regional left ventricular function is hyperkinetic with an EF of  65-70%.  Grade I or early diastolic dysfunction.  The right ventricle is normal size.  Global right ventricular function is normal.  Both atria appear normal.  Mild mitral insufficiency is present.  Moderate aortic insufficiency is present.  The Aortic Insufficiency is unchanged from previous echo 19  Mild tricuspid insufficiency is present.  Right ventricular systolic pressure is 20mmHg above the right atrial pressure.  Trace to mild pulmonic insufficiency is present.  The aorta root is normal.  _____________________________________________________________________________  __        Left Ventricle  Global and regional left ventricular function is hyperkinetic with an EF of  65-70%. Grade I or early diastolic dysfunction.     Right Ventricle  The right ventricle is normal size. Global right ventricular function is  normal.     Atria  Both atria appear normal.     Mitral Valve  Mild mitral insufficiency is present.     Aortic Valve  Moderate aortic insufficiency is present. The Aortic Insufficiency is  unchanged from previous echo 19. The mean AoV pressure gradient is 6.4  mmHg. The peak AoV  pressure gradient is 14.6 mmHg.        Tricuspid Valve  Mild tricuspid insufficiency is present. Right ventricular systolic pressure  is 20mmHg above the right atrial pressure.     Pulmonic Valve  Trace to mild pulmonic insufficiency is present.     Vessels  The aorta root is normal.     Pericardium  No pericardial effusion is present.       ANKLE BRACHIAL INDEX     FINDINGS:  The ankle brachial index measured 1.2 bilaterally.  Absolute ankle pressures were 160 which are adequate for wound  healing.     IMPRESSION:  NO EVIDENCE OF ARTERIAL OCCLUSIVE DISEASE IN EITHER LOWER  EXTREMITY.  Exam Date: Jun 02, 2017 03:10:00 PM  Author: LIS CRYSTAL  This report is final and signed    Assessment and recommendations:    1) Fatigue/chest pain/ dyspnea on exertion   2) Moderate AI - no progress; no signs of LV failure  3) Microvascular angina - always has chest pain  4) Diastolic heart failure    Will decrease diltiazem does to see if faster heart rate improves her symptoms.     4) Hypertension -  Under good control. If BP rises on lower dose of diltiazem with add an ACE inhibitor or angiotensin receptor blocker.    She will keep her [facility] appt with me.      I appreciate the chance to help with Mrs. Suri eugene. Please let me know if you have any questions or concerns. I will see her in one year.     Norman Nichole M.D.

## 2021-04-27 NOTE — LETTER
4/27/2021      RE: Sharlene Mccord  [address]  [facility] [address]       CC- fatigue    [facility]- Mrs. Mccord has been followed for several years by various cardiologists. She has known AI and has complained of fatigue throughout. She reports recently her fatigue has been much worse. She related that her fatigue was associated with dyspnea on exertion and chest discomfort. Her stress test showed an anterior wall defect that did not fit with her echocardiogram. She also has aortic insufficiency that by echocardiogram did not seem to have progressed. She underwent angiography at [facility]. This demonstrated no epicardial coronary artery disease. Her echocardiogram again showed mild to mod AI. A stress MRI was done that  suggested microvascular disease. She was begun on isosorbide mononitrate but stopped after 4 days due to severe headache. She cannot recall if it helped with her chest pain. She was started on diltiazem but stopped when she was admitted for diverticulitis. She is already on estrogen replacement. On 360 mg of diltiazem she reported the chest pain was better but her BP was low. She also noted numbness and tingling in both arms and both legs. The legs was constant. The arms mostly when she worked over her head. She also felt fatigued. She does not report orthostatic symptoms. Her diltiazem was reduced to 300 mg daily. The chest pain is worse. The fatigue is maybe a little better and the numbness/tingling is unchanged.     June 2014:  At our last visit I discussed with her that I have no other therapies to offer regarding her chest pain. We discuss the possibility of starting a medication such as Neurontin that has been reported to help with chronic pain syndromes. There is some evidence that patients with microvascular angina have abnormal pain sensitivity. She reports the Neurontin was started and it has helped. She is using SL NTG 1-2x/week as opposed to several times a day.    We  "had stopped her Diovan because on the increased diltiazem dose she was having relatively low BP. She is worried because she has seen some BP measurements that are high for her. She admits to being stressed. She has just started a w/u for memory loss and a change was seen on her MRI.    She had an echocardiogram done in March, see below, that showed no change in her aortic insufficiency.     [id number]:    She has been doing reasonably well. She is not having as much chest pain. She has been less active due to problems with disk disease in her spine. She has not noted any major changes in her exertional abilities.     [id number]: She has been having more joint pain. She is seeing a Rheumatologist in [place] soon.    Her chest pain is perhaps a bit better. Her legs and back feel heavy and she has sharp pains and aches, especially with climbing stairs. This has not been associated with her chest pain. On level drug she had been walking 2 miles with her sister but she has not done that for several months now.     [id number]: her chest pain has been doing reasonably well. She hasn't had NTG available for a few months.     She reports a new barking cough over the past 6 months - no fever, no new meds, not productive. The cough is not getting any worse but no better either. She has not had any wheezing that she has noticed.     Her legs feel heavy when she walks.    [id number]: She reports no real change in her chest pain or exertional abilities.  She has been troubled by chronic pain \"all over her body\".  She reports that rheumatology felt that she had a mixed connective tissue disorder.  She tried Plaquenil for a time but has stopped that and has been trying medical cannabis.  She reports the cannabis has not helped particularly.    Her recent echocardiogram showed no change in her aortic insufficiency.  Her systolic function remains good.  Left ventricular size remains normal.    May 14, 2019: she reports the chest pain is " the same. She feels more shortness of breath and wants to stop smoking. She has noted hot flashes recently.     November 24, 2020: no significant change in chest pain but has been feeling very fatigued and weak. She only went fishing once this year. She reports feeling too weak to reel in a fish.     April 27, 2021 Interval history: exercise right heart cath showed mild increase in pressures with exercise c/w mild diastolic heart failure. She reports no change in her dyspnea on exertion or exertional abilities.    Current Outpatient Medications   Medication Sig Dispense Refill     acetaminophen (TYLENOL) 325 MG tablet Take 650 mg by mouth       clonazePAM (KLONOPIN) 1 MG tablet TAKE 1/2 TO 1 (ONE-HALF TO ONE) TABLET BY MOUTH UP TO TWICE DAILY 60 tablet 0     diltiazem ER (TIAZAC) 300 MG 24 hr ER beaded capsule Take 1 capsule by mouth once daily 90 capsule 0     estradiol (ESTRACE) 0.5 MG tablet Take 1 tablet by mouth twice daily 60 tablet 3     levothyroxine (SYNTHROID/LEVOTHROID) 75 MCG tablet Take 1 tablet by mouth once daily 90 tablet 0     nitroGLYcerin (NITROSTAT) 0.4 MG sublingual tablet DISSOLVE ONE TABLET UNDER THE TONGUE EVERY 5 MINUTES AS NEEDED FOR CHEST PAIN. DO NOT EXCEED A TOTAL OF 3 DOSES IN 15 MINUTES       pantoprazole (PROTONIX) 40 MG EC tablet TAKE ONE TABLET BY MOUTH 30-60 MINUTES BEFORE A MEAL 90 tablet 3     PROCTO-MED HC 2.5 % cream APPLY   TOPICALLY TO AFFECTED AREA THREE TIMES DAILY 60 g 0     traMADol (ULTRAM) 50 MG tablet Take 50 mg by mouth 3 times daily as needed for severe pain       Allergies   Allergen Reactions     Codeine      Isosorbide Mononitrate Other (See Comments)     Vomiting and headache       Lisinopril      [facility] scan     Paxil [Paroxetine] Headache     Past Medical History:   Diagnosis Date     Anxiety state, unspecified 03/01/2011     Aortic valve disorders 06/07/2000    Echocardiogram showin mild/moderate AI.  Normal LV function     Cardiac microvascular disease  4/10/2013    Based on Cardiac MRI done at       Depressive disorder 1997     Fatigue      Fatigue      Fibromyalgia 03/01/2011     Hypertension      Major depressive disorder, recurrent episode, unspecified 12/17/2014     Migraine, unspecified, without mention of intractable migraine without mention of status migrainosus 03/01/2011     Mitral valve disorders(424.0) 10/16/2007     PONV (postoperative nausea and vomiting)      Thyroid Nodule 03/01/2011     Tobacco use disorder 03/01/2011     Unspecified hypothyroidism 03/01/2011     Past Surgical History:   Procedure Laterality Date     APPENDECTOMY  1977     BIOPSY  2017    taken at [facility] from Dr. Nadira cohen. Thyroid     CARDIAC SURGERY  2013    angiogram [facility]:  Normal coronary arteries.  Felt ot have some microvascular disease     CL AFF SURGICAL PATHOLOGY  01/02/2007    Thyroid Mass     COLONOSCOPY  1/13/2014    Procedure: COLONOSCOPY;  COLONOSCOPY ;  Surgeon: Chasidy Gee DO;  Location: [facility]     CV RIGHT HEART CATH MEASUREMENTS RECORDED N/A 4/14/2021    Procedure: CV RIGHT HEART CATH;  Surgeon: Danna Clay MD;  [facility]     CV RIGHT HEART EXERCISE STRESS STUDY N/A 4/14/2021    Procedure: Right Heart Exercise Stress Study;  Surgeon: Danna Clay MD;  [facility]     ESOPHAGOSCOPY, GASTROSCOPY, DUODENOSCOPY (EGD), COMBINED N/A 2/9/2017    Procedure: COMBINED ESOPHAGOSCOPY, GASTROSCOPY, DUODENOSCOPY (EGD);  Surgeon: Johnathan Ruff DO;  Location: [facility]     GYN SURGERY      c-sections x 3     HYSTERECTOMY TOTAL ABDOMINAL, BILATERAL SALPINGO-OOPHORECTOMY, COMBINED N/A 01/01/2001     LAPAROSCOPIC CHOLECYSTECTOMY  11/07/2003    Cholelithiasis     LAPAROTOMY EXPLORATORY      abdominal pain/fever     LARYNGOSCOPY       SPLENECTOMY       Social History     Tobacco Use     Smoking status: Former Smoker     Packs/day: 0.25     Years: 40.00     Pack years: 10.00     Types: Cigarettes      "Start date: 1975     Quit date: 2021     Years since quittin.2     Smokeless tobacco: Never Used   Substance Use Topics     Alcohol use: No     Drug use: No     ROS- the ten system review is negative except as noted in the HPI.    Physical examination:  /76 (BP Location: Right arm, Patient Position: Sitting, Cuff Size: Adult Regular)   Pulse 60   Temp 97.4  F (36.3  C) (Tympanic)   Resp 16   Ht 1.702 m (5' 7\")   Wt 90.9 kg (200 lb 8 oz)   SpO2 97%   BMI 31.40 kg/m      GENERAL APPEARANCE: healthy, alert and no distress  NECK: no venous distention  RESPIRATORY: lungs clear to auscultation - no rales, rhonchi or wheezes  CARDIOVASCULAR: regular, normal S1 S2, no gallop, soft AI murmur as before  EXTREMITIES: no edema   VASC: pedal pulses are normal    Laboratory and diagnostic data reviewed personally 2021:    Results for STEVEN SZYMANSKI (MRN [id number]) as of 2021 13:24   Ref. Range 2021 10:38   Sodium Latest Ref Range: 133 - 144 mmol/L 138   Potassium Latest Ref Range: 3.4 - 5.3 mmol/L 4.4   Chloride Latest Ref Range: 94 - 109 mmol/L 110 (H)   Carbon Dioxide Latest Ref Range: 20 - 32 mmol/L 24   Urea Nitrogen Latest Ref Range: 7 - 30 mg/dL 18   Creatinine Latest Ref Range: 0.52 - 1.04 mg/dL 1.05 (H)   GFR Estimate Latest Ref Range: >60 mL/min/1.73_m2 57 (L)       Right Heart Exercise Stress Study   CV RIGHT HEART CATH   Indications    Diastolic heart failure, unspecified HF chronicity (H) [I50.30 (ICD-10-CM)]   Chest pain, unspecified type [R07.9 (ICD-10-CM)]   Comments/Patient Narrative    60-year-old female with exertioanl shortness of breath who was referred to the cath lab for invasive exercise hemodynamic assessment.   Pre Procedure Diagnosis    Chest Pain with activity       Conclusion      Normal resting PA pressures, biventricualr filling pressures, and PVR at rest    Moderately reduced cardiac index at rest in the setting of bradycardia    Mild increase in PCWP " and right atrial pressure with exercise    Approriate increase in cardiac output and PA pressures with exercise.    No increase in PVR with exercise    These constellation of findings may indicate early HFpEF     Name: STEVEN SZYMANSKI  MRN: [id number]  : 1960  Study Date: 2020 09:50 AM  Age: 60 yrs  Gender: Female  Patient Location: [facility]  Reason For Study: GUTIERREZ (dyspnea on exertion)  History: GUTIERREZ  Ordering Physician: YOHAN CANTOR  Referring Physician: YOHAN CANTOR  Performed By: Kaur Camilo JANUSZ     BSA: 2.0 m2  Height: 67 in  Weight: 186 lb  _____________________________________________________________________________  __     _____________________________________________________________________________  __        Interpretation Summary  No pericardial effusion is present.  Global and regional left ventricular function is hyperkinetic with an EF of  65-70%.  Grade I or early diastolic dysfunction.  The right ventricle is normal size.  Global right ventricular function is normal.  Both atria appear normal.  Mild mitral insufficiency is present.  Moderate aortic insufficiency is present.  The Aortic Insufficiency is unchanged from previous echo 19  Mild tricuspid insufficiency is present.  Right ventricular systolic pressure is 20mmHg above the right atrial pressure.  Trace to mild pulmonic insufficiency is present.  The aorta root is normal.  _____________________________________________________________________________  __        Left Ventricle  Global and regional left ventricular function is hyperkinetic with an EF of  65-70%. Grade I or early diastolic dysfunction.     Right Ventricle  The right ventricle is normal size. Global right ventricular function is  normal.     Atria  Both atria appear normal.     Mitral Valve  Mild mitral insufficiency is present.     Aortic Valve  Moderate aortic insufficiency is present. The Aortic Insufficiency is  unchanged from previous echo  6/17/19. The mean AoV pressure gradient is 6.4  mmHg. The peak AoV pressure gradient is 14.6 mmHg.        Tricuspid Valve  Mild tricuspid insufficiency is present. Right ventricular systolic pressure  is 20mmHg above the right atrial pressure.     Pulmonic Valve  Trace to mild pulmonic insufficiency is present.     Vessels  The aorta root is normal.     Pericardium  No pericardial effusion is present.       ANKLE BRACHIAL INDEX     FINDINGS:  The ankle brachial index measured 1.2 bilaterally.  Absolute ankle pressures were 160 which are adequate for wound  healing.     IMPRESSION:  NO EVIDENCE OF ARTERIAL OCCLUSIVE DISEASE IN EITHER LOWER  EXTREMITY.  Exam Date: Jun 02, 2017 03:10:00 PM  Author: LIS CRYSTAL  This report is final and signed    Assessment and recommendations:    1) Fatigue/chest pain/ dyspnea on exertion   2) Moderate AI - no progress; no signs of LV failure  3) Microvascular angina - always has chest pain  4) Diastolic heart failure    Will decrease diltiazem does to see if faster heart rate improves her symptoms.     4) Hypertension -  Under good control. If BP rises on lower dose of diltiazem with add an ACE inhibitor or angiotensin receptor blocker.    She will keep her D[facility] appt with me.      I appreciate the chance to help with Mrs. Suri eugene. Please let me know if you have any questions or concerns. I will see her in one year.     Norman Nichole M.D.      Norman Nichole MD

## 2021-05-07 DIAGNOSIS — F41.1 GENERALIZED ANXIETY DISORDER: ICD-10-CM

## 2021-05-07 RX ORDER — CLONAZEPAM 1 MG/1
TABLET ORAL
Qty: 60 TABLET | Refills: 0 | Status: SHIPPED | OUTPATIENT
Start: 2021-05-07 | End: 2021-06-11

## 2021-05-07 NOTE — TELEPHONE ENCOUNTER
Klonopin 1mg      Last Written Prescription Date:  4/7/21  Last Fill Quantity: 60,   # refills: 0  Last Office Visit: 2/17/21  Future Office visit:    Next 5 appointments (look out 90 days)    Jun 08, 2021 10:00 AM  (Arrive by 9:45 AM)  Return Visit with Norman Nichole MD  [facility] ([facility] ) [address]  [place] [address]  [phone]           Routing refill request to provider for review/approval because:

## 2021-05-18 ENCOUNTER — MYC MEDICAL ADVICE (OUTPATIENT)
Dept: CARDIOLOGY | Facility: OTHER | Age: 61
End: 2021-05-18

## 2021-05-20 NOTE — TELEPHONE ENCOUNTER
Norman Nichole MD  You Just now (8:16 AM)     Same meds and continue to monitor BP. W    Message text

## 2021-06-01 ENCOUNTER — MYC MEDICAL ADVICE (OUTPATIENT)
Dept: FAMILY MEDICINE | Facility: OTHER | Age: 61
End: 2021-06-01

## 2021-06-08 ENCOUNTER — OFFICE VISIT (OUTPATIENT)
Dept: CARDIOLOGY | Facility: OTHER | Age: 61
End: 2021-06-08
Attending: INTERNAL MEDICINE
Payer: COMMERCIAL

## 2021-06-08 VITALS
DIASTOLIC BLOOD PRESSURE: 78 MMHG | HEIGHT: 67 IN | HEART RATE: 63 BPM | OXYGEN SATURATION: 98 % | BODY MASS INDEX: 31.55 KG/M2 | RESPIRATION RATE: 16 BRPM | WEIGHT: 201 LBS | TEMPERATURE: 97.7 F | SYSTOLIC BLOOD PRESSURE: 119 MMHG

## 2021-06-08 DIAGNOSIS — I20.89 OTHER FORMS OF ANGINA PECTORIS (H): Primary | ICD-10-CM

## 2021-06-08 PROCEDURE — 99213 OFFICE O/P EST LOW 20 MIN: CPT | Performed by: INTERNAL MEDICINE

## 2021-06-08 PROCEDURE — G0463 HOSPITAL OUTPT CLINIC VISIT: HCPCS

## 2021-06-08 RX ORDER — NITROGLYCERIN 0.4 MG/1
TABLET SUBLINGUAL
Qty: 30 TABLET | Refills: 4 | Status: SHIPPED | OUTPATIENT
Start: 2021-06-08 | End: 2024-07-29

## 2021-06-08 ASSESSMENT — PAIN SCALES - GENERAL: PAINLEVEL: SEVERE PAIN (6)

## 2021-06-08 ASSESSMENT — MIFFLIN-ST. JEOR: SCORE: 1514.36

## 2021-06-08 NOTE — PROGRESS NOTES
CC- fatigue    HPI- Mrs. Mccord has been followed for several years by various cardiologists. She has known AI and has complained of fatigue throughout. She reports recently her fatigue has been much worse. She related that her fatigue was associated with dyspnea on exertion and chest discomfort. Her stress test showed an anterior wall defect that did not fit with her echocardiogram. She also has aortic insufficiency that by echocardiogram did not seem to have progressed. She underwent angiography at [facility]. This demonstrated no epicardial coronary artery disease. Her echocardiogram again showed mild to mod AI. A stress MRI was done that  suggested microvascular disease. She was begun on isosorbide mononitrate but stopped after 4 days due to severe headache. She cannot recall if it helped with her chest pain. She was started on diltiazem but stopped when she was admitted for diverticulitis. She is already on estrogen replacement. On 360 mg of diltiazem she reported the chest pain was better but her BP was low. She also noted numbness and tingling in both arms and both legs. The legs was constant. The arms mostly when she worked over her head. She also felt fatigued. She does not report orthostatic symptoms. Her diltiazem was reduced to 300 mg daily. The chest pain is worse. The fatigue is maybe a little better and the numbness/tingling is unchanged.     June 2014:  At our last visit I discussed with her that I have no other therapies to offer regarding her chest pain. We discuss the possibility of starting a medication such as Neurontin that has been reported to help with chronic pain syndromes. There is some evidence that patients with microvascular angina have abnormal pain sensitivity. She reports the Neurontin was started and it has helped. She is using SL NTG 1-2x/week as opposed to several times a day.    We had stopped her Diovan because on the increased diltiazem dose she was  "having relatively low BP. She is worried because she has seen some BP measurements that are high for her. She admits to being stressed. She has just started a w/u for memory loss and a change was seen on her MRI.    She had an echocardiogram done in March, see below, that showed no change in her aortic insufficiency.     She has been doing reasonably well. She is not having as much chest pain. She has been less active due to problems with disk disease in her spine. She has not noted any major changes in her exertional abilities.     [id number]: She has been having more joint pain. She is seeing a Rheumatologist in [place] soon.    Her chest pain is perhaps a bit better. Her legs and back feel heavy and she has sharp pains and aches, especially with climbing stairs. This has not been associated with her chest pain. On level drug she had been walking 2 miles with her sister but she has not done that for several months now.     [id number]: her chest pain has been doing reasonably well. She hasn't had NTG available for a few months.     She reports a new barking cough over the past 6 months - no fever, no new meds, not productive. The cough is not getting any worse but no better either. She has not had any wheezing that she has noticed.     Her legs feel heavy when she walks.    [id number]: She reports no real change in her chest pain or exertional abilities.  She has been troubled by chronic pain \"all over her body\".  She reports that rheumatology felt that she had a mixed connective tissue disorder.  She tried Plaquenil for a time but has stopped that and has been trying medical cannabis.  She reports the cannabis has not helped particularly.    Her recent echocardiogram showed no change in her aortic insufficiency.  Her systolic function remains good.  Left ventricular size remains normal.    May 14, 2019: she reports the chest pain is the same. She feels more shortness of breath and wants to stop smoking. She has noted " hot flashes recently.     November 24, 2020: no significant change in chest pain but has been feeling very fatigued and weak. She only went fishing once this year. She reports feeling too weak to reel in a fish.     April 27, 2021: exercise right heart cath showed mild increase in pressures with exercise c/w mild diastolic heart failure. She reports no change in her dyspnea on exertion or exertional abilities.    June 8, 2021 Interval history:  she has noted her heart rate increased did seem to improve her dyspnea on exertion a bit. Her chest pain , if anything, got a little worse on the lower dose of diltiazem. Her BPs at home have been higher, as expected with the lower dose of diltiazem.    Current Outpatient Medications   Medication Sig Dispense Refill     acetaminophen (TYLENOL) 325 MG tablet Take 650 mg by mouth       clonazePAM (KLONOPIN) 1 MG tablet TAKE 1/2 TO 1 (ONE-HALF TO ONE) TABLET BY MOUTH UP TO TWICE DAILY 60 tablet 0     diltiazem ER COATED BEADS (CARDIZEM CD/CARTIA XT) 240 MG 24 hr capsule Take 1 capsule (240 mg) by mouth daily 30 capsule 11     estradiol (ESTRACE) 0.5 MG tablet Take 1 tablet by mouth twice daily 60 tablet 3     levothyroxine (SYNTHROID/LEVOTHROID) 75 MCG tablet Take 1 tablet by mouth once daily 90 tablet 0     nitroGLYcerin (NITROSTAT) 0.4 MG sublingual tablet DISSOLVE ONE TABLET UNDER THE TONGUE EVERY 5 MINUTES AS NEEDED FOR CHEST PAIN. DO NOT EXCEED A TOTAL OF 3 DOSES IN 15 MINUTES       pantoprazole (PROTONIX) 40 MG EC tablet TAKE ONE TABLET BY MOUTH 30-60 MINUTES BEFORE A MEAL 90 tablet 3     PROCTO-MED HC 2.5 % cream APPLY   TOPICALLY TO AFFECTED AREA THREE TIMES DAILY 60 g 0     traMADol (ULTRAM) 50 MG tablet Take 50 mg by mouth 3 times daily as needed for severe pain       Allergies   Allergen Reactions     Codeine      Isosorbide Mononitrate Other (See Comments)     Vomiting and headache       Lisinopril      [facility] scan     Paxil [Paroxetine] Headache     Past  Medical History:   Diagnosis Date     Anxiety state, unspecified 03/01/2011     Aortic valve disorders 06/07/2000    Echocardiogram showin mild/moderate AI.  Normal LV function     Cardiac microvascular disease 4/10/2013    Based on Cardiac MRI done at       Depressive disorder 1997     Fatigue      Fatigue      Fibromyalgia 03/01/2011     Hypertension      Major depressive disorder, recurrent episode, unspecified 12/17/2014     Migraine, unspecified, without mention of intractable migraine without mention of status migrainosus 03/01/2011     Mitral valve disorders(424.0) 10/16/2007     PONV (postoperative nausea and vomiting)      Thyroid Nodule 03/01/2011     Tobacco use disorder 03/01/2011     Unspecified hypothyroidism 03/01/2011     Past Surgical History:   Procedure Laterality Date     APPENDECTOMY  1977     BIOPSY  2017    taken at [facility] from Dr. Nadira cohen. Thyroid     CARDIAC SURGERY  2013    angiogram [facility]:  Normal coronary arteries.  Felt ot have some microvascular disease     CL AFF SURGICAL PATHOLOGY  01/02/2007    Thyroid Mass     COLONOSCOPY  1/13/2014    Procedure: COLONOSCOPY;  COLONOSCOPY ;  Surgeon: Chasidy Gee DO;  Location: [facility] OR     CV RIGHT HEART CATH MEASUREMENTS RECORDED N/A 4/14/2021    Procedure: CV RIGHT HEART CATH;  Surgeon: Danna Clay MD;  Location:  HEART CARDIAC CATH LAB     CV RIGHT HEART EXERCISE STRESS STUDY N/A 4/14/2021    Procedure: Right Heart Exercise Stress Study;  Surgeon: Danna Clay MD;  [facility]     ESOPHAGOSCOPY, GASTROSCOPY, DUODENOSCOPY (EGD), COMBINED N/A 2/9/2017    Procedure: COMBINED ESOPHAGOSCOPY, GASTROSCOPY, DUODENOSCOPY (EGD);  Surgeon: Johnathan Ruff DO;  Location: [facility] OR     GYN SURGERY      c-sections x 3     HYSTERECTOMY TOTAL ABDOMINAL, BILATERAL SALPINGO-OOPHORECTOMY, COMBINED N/A 01/01/2001     LAPAROSCOPIC CHOLECYSTECTOMY  11/07/2003    Cholelithiasis     LAPAROTOMY EXPLORATORY       "abdominal pain/fever     LARYNGOSCOPY       SPLENECTOMY       Social History     Tobacco Use     Smoking status: Former Smoker     Packs/day: 0.25     Years: 40.00     Pack years: 10.00     Types: Cigarettes     Start date: 1975     Quit date: 2021     Years since quittin.3     Smokeless tobacco: Never Used   Substance Use Topics     Alcohol use: No     Drug use: No     ROS- the ten system review is negative except as noted in the HPI.    Physical examination:  /78 (BP Location: Left arm, Patient Position: Chair, Cuff Size: Adult Regular)   Pulse 63   Temp 97.7  F (36.5  C) (Tympanic)   Resp 16   Ht 1.702 m (5' 7\")   Wt 91.2 kg (201 lb)   SpO2 98%   BMI 31.48 kg/m      GENERAL APPEARANCE: healthy, alert and no distress  NECK: no venous distention  RESPIRATORY: lungs clear to auscultation - no rales, rhonchi or wheezes  CARDIOVASCULAR: regular, normal S1 S2, no gallop, soft AI murmur as before  EXTREMITIES: no edema   VASC: pedal pulses are normal    Laboratory and diagnostic data reviewed personally 2021:      Results for STEVEN SZYMANSKI (MRN [id number]) as of 2021 08:44   Ref. Range 2021 10:38   N-Terminal Pro BNP Inpatient Latest Ref Range: 0 - 900 pg/mL 80       Results for STEVEN SZYMANSKI (MRN [id number]) as of 2021 08:44   Ref. Range 2021 10:38   Sodium Latest Ref Range: 133 - 144 mmol/L 138   Potassium Latest Ref Range: 3.4 - 5.3 mmol/L 4.4   Chloride Latest Ref Range: 94 - 109 mmol/L 110 (H)   Carbon Dioxide Latest Ref Range: 20 - 32 mmol/L 24   Urea Nitrogen Latest Ref Range: 7 - 30 mg/dL 18   Creatinine Latest Ref Range: 0.52 - 1.04 mg/dL 1.05 (H)   GFR Estimate Latest Ref Range: >60 mL/min/1.73_m2 57 (L)         Right Heart Exercise Stress Study   CV RIGHT HEART CATH   Indications    Diastolic heart failure, unspecified HF chronicity (H) [I50.30 (ICD-10-CM)]   Chest pain, unspecified type [R07.9 (ICD-10-CM)]   Comments/Patient Narrative    60-year-old " female with exertioanl shortness of breath who was referred to the cath lab for invasive exercise hemodynamic assessment.   Pre Procedure Diagnosis    Chest Pain with activity       Conclusion      Normal resting PA pressures, biventricualr filling pressures, and PVR at rest    Moderately reduced cardiac index at rest in the setting of bradycardia    Mild increase in PCWP and right atrial pressure with exercise    Approriate increase in cardiac output and PA pressures with exercise.    No increase in PVR with exercise    These constellation of findings may indicate early HFpEF     Name: STEVEN SZYMANSKI  MRN: [id number]  : 1960  Study Date: 2020 09:50 AM  Age: 60 yrs  Gender: Female  Patient Location: [facility]  Reason For Study: GUTIERREZ (dyspnea on exertion)  History: GUTIERREZ  Ordering Physician: YOHAN CANTOR  Referring Physician: YOHAN CANTOR  Performed By: Kaur Camilo JANUSZ     BSA: 2.0 m2  Height: 67 in  Weight: 186 lb  _____________________________________________________________________________  __     _____________________________________________________________________________  __        Interpretation Summary  No pericardial effusion is present.  Global and regional left ventricular function is hyperkinetic with an EF of  65-70%.  Grade I or early diastolic dysfunction.  The right ventricle is normal size.  Global right ventricular function is normal.  Both atria appear normal.  Mild mitral insufficiency is present.  Moderate aortic insufficiency is present.  The Aortic Insufficiency is unchanged from previous echo 19  Mild tricuspid insufficiency is present.  Right ventricular systolic pressure is 20mmHg above the right atrial pressure.  Trace to mild pulmonic insufficiency is present.  The aorta root is normal.  _____________________________________________________________________________  __        Left Ventricle  Global and regional left ventricular function is hyperkinetic with an  EF of  65-70%. Grade I or early diastolic dysfunction.     Right Ventricle  The right ventricle is normal size. Global right ventricular function is  normal.     Atria  Both atria appear normal.     Mitral Valve  Mild mitral insufficiency is present.     Aortic Valve  Moderate aortic insufficiency is present. The Aortic Insufficiency is  unchanged from previous echo 6/17/19. The mean AoV pressure gradient is 6.4  mmHg. The peak AoV pressure gradient is 14.6 mmHg.        Tricuspid Valve  Mild tricuspid insufficiency is present. Right ventricular systolic pressure  is 20mmHg above the right atrial pressure.     Pulmonic Valve  Trace to mild pulmonic insufficiency is present.     Vessels  The aorta root is normal.     Pericardium  No pericardial effusion is present.       ANKLE BRACHIAL INDEX     FINDINGS:  The ankle brachial index measured 1.2 bilaterally.  Absolute ankle pressures were 160 which are adequate for wound  healing.     IMPRESSION:  NO EVIDENCE OF ARTERIAL OCCLUSIVE DISEASE IN EITHER LOWER  EXTREMITY.  Exam Date: Jun 02, 2017 03:10:00 PM  Author: LIS CRYSTAL  This report is final and signed    Assessment and recommendations:    1) Fatigue/chest pain/ dyspnea on exertion   2) Moderate AI - no progress; no signs of LV failure  3) Microvascular angina - always has chest pain  4) Diastolic heart failure - the decrease in diltiazem dosage improved her dyspnea on exertion but she feels her chest pain got worse. We discussed trying to lower the diltiazem further but she would like to hold off on that.    - continue diltiazem  mg dialy     4) Hypertension -  BP today is very good but at home she reports significantly higher BP readings, as high as 170s for the systolic.     - she will bring her home BP monitor in for a BP check     I appreciate the chance to help with Mrs. Suri eugene. Please let me know if you have any questions or concerns. I will see her in three months.     Norman Nichole M.D.

## 2021-06-08 NOTE — LETTER
6/8/2021      RE: Sharlene Mccord  [address]  [facility] [address]       CC- fatigue    [facility]- Mrs. Mccord has been followed for several years by various cardiologists. She has known AI and has complained of fatigue throughout. She reports recently her fatigue has been much worse. She related that her fatigue was associated with dyspnea on exertion and chest discomfort. Her stress test showed an anterior wall defect that did not fit with her echocardiogram. She also has aortic insufficiency that by echocardiogram did not seem to have progressed. She underwent angiography at [facility]. This demonstrated no epicardial coronary artery disease. Her echocardiogram again showed mild to mod AI. A stress MRI was done that  suggested microvascular disease. She was begun on isosorbide mononitrate but stopped after 4 days due to severe headache. She cannot recall if it helped with her chest pain. She was started on diltiazem but stopped when she was admitted for diverticulitis. She is already on estrogen replacement. On 360 mg of diltiazem she reported the chest pain was better but her BP was low. She also noted numbness and tingling in both arms and both legs. The legs was constant. The arms mostly when she worked over her head. She also felt fatigued. She does not report orthostatic symptoms. Her diltiazem was reduced to 300 mg daily. The chest pain is worse. The fatigue is maybe a little better and the numbness/tingling is unchanged.     June 2014:  At our last visit I discussed with her that I have no other therapies to offer regarding her chest pain. We discuss the possibility of starting a medication such as Neurontin that has been reported to help with chronic pain syndromes. There is some evidence that patients with microvascular angina have abnormal pain sensitivity. She reports the Neurontin was started and it has helped. She is using SL NTG 1-2x/week as opposed to several times a day.    We  "had stopped her Diovan because on the increased diltiazem dose she was having relatively low BP. She is worried because she has seen some BP measurements that are high for her. She admits to being stressed. She has just started a w/u for memory loss and a change was seen on her MRI.    She had an echocardiogram done in March, see below, that showed no change in her aortic insufficiency.     She has been doing reasonably well. She is not having as much chest pain. She has been less active due to problems with disk disease in her spine. She has not noted any major changes in her exertional abilities.     [id number]: She has been having more joint pain. She is seeing a Rheumatologist in [place] soon.    Her chest pain is perhaps a bit better. Her legs and back feel heavy and she has sharp pains and aches, especially with climbing stairs. This has not been associated with her chest pain. On level drug she had been walking 2 miles with her sister but she has not done that for several months now.     [id number]: her chest pain has been doing reasonably well. She hasn't had NTG available for a few months.     She reports a new barking cough over the past 6 months - no fever, no new meds, not productive. The cough is not getting any worse but no better either. She has not had any wheezing that she has noticed.     Her legs feel heavy when she walks.    [id number]: She reports no real change in her chest pain or exertional abilities.  She has been troubled by chronic pain \"all over her body\".  She reports that rheumatology felt that she had a mixed connective tissue disorder.  She tried Plaquenil for a time but has stopped that and has been trying medical cannabis.  She reports the cannabis has not helped particularly.    Her recent echocardiogram showed no change in her aortic insufficiency.  Her systolic function remains good.  Left ventricular size remains normal.    May 14, 2019: she reports the chest pain is the same. She " feels more shortness of breath and wants to stop smoking. She has noted hot flashes recently.     November 24, 2020: no significant change in chest pain but has been feeling very fatigued and weak. She only went fishing once this year. She reports feeling too weak to reel in a fish.     April 27, 2021: exercise right heart cath showed mild increase in pressures with exercise c/w mild diastolic heart failure. She reports no change in her dyspnea on exertion or exertional abilities.    June 8, 2021 Interval history:  she has noted her heart rate increased did seem to improve her dyspnea on exertion a bit. Her chest pain , if anything, got a little worse on the lower dose of diltiazem. Her BPs at home have been higher, as expected with the lower dose of diltiazem.    Current Outpatient Medications   Medication Sig Dispense Refill     acetaminophen (TYLENOL) 325 MG tablet Take 650 mg by mouth       clonazePAM (KLONOPIN) 1 MG tablet TAKE 1/2 TO 1 (ONE-HALF TO ONE) TABLET BY MOUTH UP TO TWICE DAILY 60 tablet 0     diltiazem ER COATED BEADS (CARDIZEM CD/CARTIA XT) 240 MG 24 hr capsule Take 1 capsule (240 mg) by mouth daily 30 capsule 11     estradiol (ESTRACE) 0.5 MG tablet Take 1 tablet by mouth twice daily 60 tablet 3     levothyroxine (SYNTHROID/LEVOTHROID) 75 MCG tablet Take 1 tablet by mouth once daily 90 tablet 0     nitroGLYcerin (NITROSTAT) 0.4 MG sublingual tablet DISSOLVE ONE TABLET UNDER THE TONGUE EVERY 5 MINUTES AS NEEDED FOR CHEST PAIN. DO NOT EXCEED A TOTAL OF 3 DOSES IN 15 MINUTES       pantoprazole (PROTONIX) 40 MG EC tablet TAKE ONE TABLET BY MOUTH 30-60 MINUTES BEFORE A MEAL 90 tablet 3     PROCTO-MED HC 2.5 % cream APPLY   TOPICALLY TO AFFECTED AREA THREE TIMES DAILY 60 g 0     traMADol (ULTRAM) 50 MG tablet Take 50 mg by mouth 3 times daily as needed for severe pain       Allergies   Allergen Reactions     Codeine      Isosorbide Mononitrate Other (See Comments)     Vomiting and headache        Lisinopril      [facility] scan     Paxil [Paroxetine] Headache     Past Medical History:   Diagnosis Date     Anxiety state, unspecified 03/01/2011     Aortic valve disorders 06/07/2000    Echocardiogram showin mild/moderate AI.  Normal LV function     Cardiac microvascular disease 4/10/2013    Based on Cardiac MRI done at       Depressive disorder 1997     Fatigue      Fatigue      Fibromyalgia 03/01/2011     Hypertension      Major depressive disorder, recurrent episode, unspecified 12/17/2014     Migraine, unspecified, without mention of intractable migraine without mention of status migrainosus 03/01/2011     Mitral valve disorders(424.0) 10/16/2007     PONV (postoperative nausea and vomiting)      Thyroid Nodule 03/01/2011     Tobacco use disorder 03/01/2011     Unspecified hypothyroidism 03/01/2011     Past Surgical History:   Procedure Laterality Date     APPENDECTOMY  1977     BIOPSY  2017    taken at [facility] from Dr. Nadira cohen. Thyroid     CARDIAC SURGERY  2013    angiogram [facility]:  Normal coronary arteries.  Felt ot have some microvascular disease     CL AFF SURGICAL PATHOLOGY  01/02/2007    Thyroid Mass     COLONOSCOPY  1/13/2014    Procedure: COLONOSCOPY;  COLONOSCOPY ;  Surgeon: Chsaidy Gee DO;  Location: [facility]     CV RIGHT HEART CATH MEASUREMENTS RECORDED N/A 4/14/2021    Procedure: CV RIGHT HEART CATH;  Surgeon: Danna Clay MD;  [facility]     CV RIGHT HEART EXERCISE STRESS STUDY N/A 4/14/2021    Procedure: Right Heart Exercise Stress Study;  Surgeon: Danna Clay MD;  [facility]     ESOPHAGOSCOPY, GASTROSCOPY, DUODENOSCOPY (EGD), COMBINED N/A 2/9/2017    Procedure: COMBINED ESOPHAGOSCOPY, GASTROSCOPY, DUODENOSCOPY (EGD);  Surgeon: Johnathan Ruff DO;  Location: [facility] OR     GYN SURGERY      c-sections x 3     HYSTERECTOMY TOTAL ABDOMINAL, BILATERAL SALPINGO-OOPHORECTOMY, COMBINED N/A 01/01/2001     LAPAROSCOPIC  "CHOLECYSTECTOMY  2003    Cholelithiasis     LAPAROTOMY EXPLORATORY      abdominal pain/fever     LARYNGOSCOPY       SPLENECTOMY       Social History     Tobacco Use     Smoking status: Former Smoker     Packs/day: 0.25     Years: 40.00     Pack years: 10.00     Types: Cigarettes     Start date: 1975     Quit date: 2021     Years since quittin.3     Smokeless tobacco: Never Used   Substance Use Topics     Alcohol use: No     Drug use: No     ROS- the ten system review is negative except as noted in the HPI.    Physical examination:  /78 (BP Location: Left arm, Patient Position: Chair, Cuff Size: Adult Regular)   Pulse 63   Temp 97.7  F (36.5  C) (Tympanic)   Resp 16   Ht 1.702 m (5' 7\")   Wt 91.2 kg (201 lb)   SpO2 98%   BMI 31.48 kg/m      GENERAL APPEARANCE: healthy, alert and no distress  NECK: no venous distention  RESPIRATORY: lungs clear to auscultation - no rales, rhonchi or wheezes  CARDIOVASCULAR: regular, normal S1 S2, no gallop, soft AI murmur as before  EXTREMITIES: no edema   VASC: pedal pulses are normal    Laboratory and diagnostic data reviewed personally 2021:      Results for STVEEN SZYMANSKI (MRN [id number]) as of 2021 08:44   Ref. Range 2021 10:38   N-Terminal Pro BNP Inpatient Latest Ref Range: 0 - 900 pg/mL 80       Results for STEVEN SZYMANSKI (MRN [id number]) as of 2021 08:44   Ref. Range 2021 10:38   Sodium Latest Ref Range: 133 - 144 mmol/L 138   Potassium Latest Ref Range: 3.4 - 5.3 mmol/L 4.4   Chloride Latest Ref Range: 94 - 109 mmol/L 110 (H)   Carbon Dioxide Latest Ref Range: 20 - 32 mmol/L 24   Urea Nitrogen Latest Ref Range: 7 - 30 mg/dL 18   Creatinine Latest Ref Range: 0.52 - 1.04 mg/dL 1.05 (H)   GFR Estimate Latest Ref Range: >60 mL/min/1.73_m2 57 (L)         Right Heart Exercise Stress Study   CV RIGHT HEART CATH   Indications    Diastolic heart failure, unspecified HF chronicity (H) [I50.30 (ICD-10-CM)]   Chest pain, " unspecified type [R07.9 (ICD-10-CM)]   Comments/Patient Narrative    60-year-old female with exertioanl shortness of breath who was referred to the cath lab for invasive exercise hemodynamic assessment.   Pre Procedure Diagnosis    Chest Pain with activity       Conclusion      Normal resting PA pressures, biventricualr filling pressures, and PVR at rest    Moderately reduced cardiac index at rest in the setting of bradycardia    Mild increase in PCWP and right atrial pressure with exercise    Approriate increase in cardiac output and PA pressures with exercise.    No increase in PVR with exercise    These constellation of findings may indicate early HFpEF     Name: STEVEN SZYMANSKI  MRN: [id number]  : 1960  Study Date: 2020 09:50 AM  Age: 60 yrs  Gender: Female  Patient Location: [facility]  Reason For Study: GUTIERREZ (dyspnea on exertion)  History: GUTIERREZ  Ordering Physician: YOHAN CANTOR  Referring Physician: YOHAN CANTOR  Performed By: Kaur Camilo [facility]     BSA: 2.0 m2  Height: 67 in  Weight: 186 lb  _____________________________________________________________________________  __     _____________________________________________________________________________  __        Interpretation Summary  No pericardial effusion is present.  Global and regional left ventricular function is hyperkinetic with an EF of  65-70%.  Grade I or early diastolic dysfunction.  The right ventricle is normal size.  Global right ventricular function is normal.  Both atria appear normal.  Mild mitral insufficiency is present.  Moderate aortic insufficiency is present.  The Aortic Insufficiency is unchanged from previous echo 19  Mild tricuspid insufficiency is present.  Right ventricular systolic pressure is 20mmHg above the right atrial pressure.  Trace to mild pulmonic insufficiency is present.  The aorta root is normal.  _____________________________________________________________________________  __        Left  Ventricle  Global and regional left ventricular function is hyperkinetic with an EF of  65-70%. Grade I or early diastolic dysfunction.     Right Ventricle  The right ventricle is normal size. Global right ventricular function is  normal.     Atria  Both atria appear normal.     Mitral Valve  Mild mitral insufficiency is present.     Aortic Valve  Moderate aortic insufficiency is present. The Aortic Insufficiency is  unchanged from previous echo 6/17/19. The mean AoV pressure gradient is 6.4  mmHg. The peak AoV pressure gradient is 14.6 mmHg.        Tricuspid Valve  Mild tricuspid insufficiency is present. Right ventricular systolic pressure  is 20mmHg above the right atrial pressure.     Pulmonic Valve  Trace to mild pulmonic insufficiency is present.     Vessels  The aorta root is normal.     Pericardium  No pericardial effusion is present.       ANKLE BRACHIAL INDEX     FINDINGS:  The ankle brachial index measured 1.2 bilaterally.  Absolute ankle pressures were 160 which are adequate for wound  healing.     IMPRESSION:  NO EVIDENCE OF ARTERIAL OCCLUSIVE DISEASE IN EITHER LOWER  EXTREMITY.  Exam Date: Jun 02, 2017 03:10:00 PM  Author: LIS CRYSTAL  This report is final and signed    Assessment and recommendations:    1) Fatigue/chest pain/ dyspnea on exertion   2) Moderate AI - no progress; no signs of LV failure  3) Microvascular angina - always has chest pain  4) Diastolic heart failure - the decrease in diltiazem dosage improved her dyspnea on exertion but she feels her chest pain got worse. We discussed trying to lower the diltiazem further but she would like to hold off on that.    - continue diltiazem  mg dialy     4) Hypertension -  BP today is very good but at home she reports significantly higher BP readings, as high as 170s for the systolic.     - she will bring her home BP monitor in for a BP check     I appreciate the chance to help with Mrs. Suri eugene. Please let me know if you have any  questions or concerns. I will see her in three months.     Norman Nichole M.D.      Norman Nichole MD

## 2021-06-08 NOTE — NURSING NOTE
"Chief Complaint   Patient presents with     Heart Problem       Initial /78 (BP Location: Left arm, Patient Position: Chair, Cuff Size: Adult Regular)   Pulse 63   Temp 97.7  F (36.5  C) (Tympanic)   Resp 16   Ht 1.702 m (5' 7\")   Wt 91.2 kg (201 lb)   SpO2 98%   BMI 31.48 kg/m   Estimated body mass index is 31.48 kg/m  as calculated from the following:    Height as of this encounter: 1.702 m (5' 7\").    Weight as of this encounter: 91.2 kg (201 lb).  Medication Reconciliation: complete  TRAVON LALA LPN    "

## 2021-06-08 NOTE — PATIENT INSTRUCTIONS
Thank you for allowing Dr. Nichole and our team to participate in your care. Please call our office at [phone] with scheduling questions or if you need to cancel or change your appointment. With any other questions or concerns you may call Heather cardiology nurse at [phone].       If you experience chest pain, chest pressure, chest tightness, shortness of breath, fainting, lightheadedness, nausea, vomiting, or other concerning symptoms, please report to the Emergency Department or call 911. These symptoms may be emergent, and best treated in the Emergency Department.    Follow up in September or sooner with issues.     Come in in the next week or so with your BP machine so we can compare readings.

## 2021-06-10 ENCOUNTER — OFFICE VISIT (OUTPATIENT)
Dept: FAMILY MEDICINE | Facility: OTHER | Age: 61
End: 2021-06-10
Attending: PHYSICIAN ASSISTANT
Payer: COMMERCIAL

## 2021-06-10 VITALS
OXYGEN SATURATION: 98 % | SYSTOLIC BLOOD PRESSURE: 120 MMHG | BODY MASS INDEX: 31.55 KG/M2 | HEIGHT: 67 IN | WEIGHT: 201 LBS | DIASTOLIC BLOOD PRESSURE: 70 MMHG | HEART RATE: 58 BPM | TEMPERATURE: 97.3 F

## 2021-06-10 DIAGNOSIS — E03.9 ACQUIRED HYPOTHYROIDISM: Primary | ICD-10-CM

## 2021-06-10 DIAGNOSIS — F09 COGNITIVE DISORDER: ICD-10-CM

## 2021-06-10 DIAGNOSIS — M79.7 FIBROMYALGIA: ICD-10-CM

## 2021-06-10 DIAGNOSIS — R42 DIZZINESS: ICD-10-CM

## 2021-06-10 DIAGNOSIS — F41.1 GAD (GENERALIZED ANXIETY DISORDER): ICD-10-CM

## 2021-06-10 DIAGNOSIS — E03.9 ACQUIRED HYPOTHYROIDISM: ICD-10-CM

## 2021-06-10 LAB
BASOPHILS # BLD AUTO: 0.1 10E9/L (ref 0–0.2)
BASOPHILS NFR BLD AUTO: 1.1 %
CRP SERPL-MCNC: 5.8 MG/L (ref 0–8)
DIFFERENTIAL METHOD BLD: NORMAL
EOSINOPHIL # BLD AUTO: 0.1 10E9/L (ref 0–0.7)
EOSINOPHIL NFR BLD AUTO: 1.9 %
ERYTHROCYTE [DISTWIDTH] IN BLOOD BY AUTOMATED COUNT: 12.1 % (ref 10–15)
ERYTHROCYTE [SEDIMENTATION RATE] IN BLOOD BY WESTERGREN METHOD: 13 MM/H (ref 0–30)
HCT VFR BLD AUTO: 40.9 % (ref 35–47)
HGB BLD-MCNC: 13.9 G/DL (ref 11.7–15.7)
IMM GRANULOCYTES # BLD: 0 10E9/L (ref 0–0.4)
IMM GRANULOCYTES NFR BLD: 0.1 %
LYMPHOCYTES # BLD AUTO: 2.7 10E9/L (ref 0.8–5.3)
LYMPHOCYTES NFR BLD AUTO: 36.3 %
MCH RBC QN AUTO: 30 PG (ref 26.5–33)
MCHC RBC AUTO-ENTMCNC: 34 G/DL (ref 31.5–36.5)
MCV RBC AUTO: 88 FL (ref 78–100)
MONOCYTES # BLD AUTO: 0.5 10E9/L (ref 0–1.3)
MONOCYTES NFR BLD AUTO: 6.7 %
NEUTROPHILS # BLD AUTO: 4 10E9/L (ref 1.6–8.3)
NEUTROPHILS NFR BLD AUTO: 53.9 %
NRBC # BLD AUTO: 0 10*3/UL
NRBC BLD AUTO-RTO: 0 /100
PLATELET # BLD AUTO: 198 10E9/L (ref 150–450)
RBC # BLD AUTO: 4.64 10E12/L (ref 3.8–5.2)
TSH SERPL DL<=0.005 MIU/L-ACNC: 0.92 MU/L (ref 0.4–4)
WBC # BLD AUTO: 7.4 10E9/L (ref 4–11)

## 2021-06-10 PROCEDURE — 36415 COLL VENOUS BLD VENIPUNCTURE: CPT | Mod: ZL | Performed by: PHYSICIAN ASSISTANT

## 2021-06-10 PROCEDURE — 85652 RBC SED RATE AUTOMATED: CPT | Mod: ZL | Performed by: PHYSICIAN ASSISTANT

## 2021-06-10 PROCEDURE — 84443 ASSAY THYROID STIM HORMONE: CPT | Mod: ZL | Performed by: PHYSICIAN ASSISTANT

## 2021-06-10 PROCEDURE — 85025 COMPLETE CBC W/AUTO DIFF WBC: CPT | Mod: ZL | Performed by: PHYSICIAN ASSISTANT

## 2021-06-10 PROCEDURE — 86140 C-REACTIVE PROTEIN: CPT | Mod: ZL | Performed by: PHYSICIAN ASSISTANT

## 2021-06-10 PROCEDURE — G0463 HOSPITAL OUTPT CLINIC VISIT: HCPCS

## 2021-06-10 PROCEDURE — 99214 OFFICE O/P EST MOD 30 MIN: CPT | Performed by: PHYSICIAN ASSISTANT

## 2021-06-10 RX ORDER — AMITRIPTYLINE HYDROCHLORIDE 10 MG/1
20 TABLET ORAL AT BEDTIME
Qty: 60 TABLET | Refills: 3 | Status: SHIPPED | OUTPATIENT
Start: 2021-06-10 | End: 2021-09-13

## 2021-06-10 ASSESSMENT — MIFFLIN-ST. JEOR: SCORE: 1514.36

## 2021-06-10 ASSESSMENT — ANXIETY QUESTIONNAIRES
7. FEELING AFRAID AS IF SOMETHING AWFUL MIGHT HAPPEN: SEVERAL DAYS
GAD7 TOTAL SCORE: 11
4. TROUBLE RELAXING: NOT AT ALL
1. FEELING NERVOUS, ANXIOUS, OR ON EDGE: SEVERAL DAYS
2. NOT BEING ABLE TO STOP OR CONTROL WORRYING: NEARLY EVERY DAY
3. WORRYING TOO MUCH ABOUT DIFFERENT THINGS: NEARLY EVERY DAY
5. BEING SO RESTLESS THAT IT IS HARD TO SIT STILL: NOT AT ALL
6. BECOMING EASILY ANNOYED OR IRRITABLE: NEARLY EVERY DAY

## 2021-06-10 ASSESSMENT — PAIN SCALES - GENERAL: PAINLEVEL: NO PAIN (0)

## 2021-06-10 ASSESSMENT — PATIENT HEALTH QUESTIONNAIRE - PHQ9: SUM OF ALL RESPONSES TO PHQ QUESTIONS 1-9: 0

## 2021-06-10 NOTE — NURSING NOTE
"Chief Complaint   Patient presents with     Anxiety     Dizziness       Initial Blood Pressure 120/70 (BP Location: Left arm, Patient Position: Sitting, Cuff Size: Adult Regular)   Pulse 58   Temperature 97.3  F (36.3  C) (Tympanic)   Height 1.702 m (5' 7\")   Weight 91.2 kg (201 lb)   Oxygen Saturation 98%   Body Mass Index 31.48 kg/m   Estimated body mass index is 31.48 kg/m  as calculated from the following:    Height as of this encounter: 1.702 m (5' 7\").    Weight as of this encounter: 91.2 kg (201 lb).  Medication Reconciliation: complete  Silvana Miguel LPN  "

## 2021-06-10 NOTE — PROGRESS NOTES
Subjective     Sharlene Mccord is a 60 year old female who presents to clinic today for the following health issues:    HPI         Anxiety Follow-Up    How are you doing with your anxiety since your last visit? Worsened  Currently does not take any medication for anxiety at this time    Are you having other symptoms that might be associated with anxiety? No    Have you had a significant life event? No     Are you feeling depressed? No    Do you have any concerns with your use of alcohol or other drugs? No    Social History     Tobacco Use     Smoking status: Former Smoker     Packs/day: 0.25     Years: 40.00     Pack years: 10.00     Types: Cigarettes     Start date: 1975     Quit date: 2021     Years since quittin.3     Smokeless tobacco: Never Used   Substance Use Topics     Alcohol use: No     Drug use: No     SHAKIR-7 SCORE 2020 2021 6/10/2021   Total Score 13 16 11     PHQ 2020 2021 6/10/2021   PHQ-9 Total Score 11 14 0   Q9: Thoughts of better off dead/self-harm past 2 weeks Not at all Not at all Not at all     Last PHQ-9 6/10/2021   1.  Little interest or pleasure in doing things 0   2.  Feeling down, depressed, or hopeless 0   3.  Trouble falling or staying asleep, or sleeping too much 0   4.  Feeling tired or having little energy 0   5.  Poor appetite or overeating 0   6.  Feeling bad about yourself 0   7.  Trouble concentrating 0   8.  Moving slowly or restless 0   Q9: Thoughts of better off dead/self-harm past 2 weeks 0   PHQ-9 Total Score 0   Difficulty at work, home, or with people -     SHAKIR-7  6/10/2021   1. Feeling nervous, anxious, or on edge 1   2. Not being able to stop or control worrying 3   3. Worrying too much about different things 3   4. Trouble relaxing 0   5. Being so restless that it is hard to sit still 0   6. Becoming easily annoyed or irritable 3   7. Feeling afraid, as if something awful might happen 1   SHAKIR-7 Total Score 11   If you checked any problems,  how difficult have they made it for you to do your work, take care of things at home, or get along with other people? -       Dizziness      Duration: 1x on may 29th had double vision    Description   Feeling faint:  YES- at that time   Feeling like the surroundings are moving: no   Loss of consciousness or falls: no     Intensity:  mild    Accompanying signs and symptoms:   Nausea/vomitting: no   Palpitations: no   Weakness in arms or legs: no   Vision or speech changes: YES- had double vision, she was driving and the whole road became one and she had to stop and it eventually passed.   Ringing in ears (Tinnitus): no   Hearing loss related to dizziness: no   Other (fevers/chills/sweating/dyspnea): no     History (similar episodes/head trauma/previous evaluation/recent bleeding): None    Precipitating or alleviating factors (new meds/chemicals): stated had recent changes in her blood pressure medication, dr. soto.  Worse with activity/head movement: no     Therapies tried and outcome: None      Patient Active Problem List   Diagnosis     Diverticulitis of sigmoid colon     Hypothyroidism     Anxiety     Cardiac microvascular disease     Diverticulitis     Aortic valve disorder     ACP (advance care planning)     Chronic pain     Constipation     Memory loss     Major depressive disorder, recurrent episode (H)     Cognitive disorder     Major depressive disorder     Fatigue     Migraine without aura and responsive to treatment     Atypical migraine     Mixed connective tissue disease (H)     SHAKIR (generalized anxiety disorder)     Gastroesophageal reflux disease with esophagitis     Acute pancreatitis     Obstruction of common bile duct     Abnormal MRI of the head     GERD (gastroesophageal reflux disease)     Tremor     GUTIERREZ (dyspnea on exertion)     Chronic diastolic heart failure (H)     Diastolic heart failure, unspecified HF chronicity (H)     Chest pain, unspecified type     Heart murmur     Past Surgical  History:   Procedure Laterality Date     APPENDECTOMY       BIOPSY  2017    taken at [facility] from Dr. Nadira cohen. Thyroid     CARDIAC SURGERY  2013    angiogram [facility]:  Normal coronary arteries.  Felt ot have some microvascular disease     CL AFF SURGICAL PATHOLOGY  2007    Thyroid Mass     COLONOSCOPY  2014    Procedure: COLONOSCOPY;  COLONOSCOPY ;  Surgeon: Chasidy Gee DO;  Location: [facility]     CV RIGHT HEART CATH MEASUREMENTS RECORDED N/A 2021    Procedure: CV RIGHT HEART CATH;  Surgeon: Danna Clay MD;  Location:  HEART CARDIAC CATH LAB     CV RIGHT HEART EXERCISE STRESS STUDY N/A 2021    Procedure: Right Heart Exercise Stress Study;  Surgeon: Danna Clay MD;  Location:  HEART CARDIAC CATH LAB     ESOPHAGOSCOPY, GASTROSCOPY, DUODENOSCOPY (EGD), COMBINED N/A 2017    Procedure: COMBINED ESOPHAGOSCOPY, GASTROSCOPY, DUODENOSCOPY (EGD);  Surgeon: Johnathan Ruff DO;  Location: [facility]     GYN SURGERY      c-sections x 3     HYSTERECTOMY TOTAL ABDOMINAL, BILATERAL SALPINGO-OOPHORECTOMY, COMBINED N/A 2001     LAPAROSCOPIC CHOLECYSTECTOMY  2003    Cholelithiasis     LAPAROTOMY EXPLORATORY      abdominal pain/fever     LARYNGOSCOPY       SPLENECTOMY         Social History     Tobacco Use     Smoking status: Former Smoker     Packs/day: 0.25     Years: 40.00     Pack years: 10.00     Types: Cigarettes     Start date: 1975     Quit date: 2021     Years since quittin.3     Smokeless tobacco: Never Used   Substance Use Topics     Alcohol use: No     Family History   Problem Relation Age of Onset     C.A.D. Father      Hypertension Father      C.A.D. Mother      Cerebrovascular Disease Mother         CVA     Hypertension Mother      Coronary Artery Disease Mother      Diabetes Paternal Grandmother      Diabetes Paternal Grandfather      Diabetes Sister      Breast Cancer Sister      Diabetes Maternal Grandmother      Diabetes Sister       Breast Cancer Sister      Hypertension Sister      Depression Sister      Anxiety Disorder Sister      Obesity Sister      Depression Sister      Breast Cancer Sister      Obesity Sister      Depression Brother      Depression Daughter      Obesity Daughter      Depression Daughter      Mental Illness Daughter         [id number]-polar1     Anxiety Disorder Daughter      Mental Illness Daughter      Anxiety Disorder Other      Osteoporosis Other      Thyroid Disease Other      Diabetes Nephew          Current Outpatient Medications   Medication Sig Dispense Refill     acetaminophen (TYLENOL) 325 MG tablet Take 650 mg by mouth       clonazePAM (KLONOPIN) 1 MG tablet TAKE 1/2 TO 1 (ONE-HALF TO ONE) TABLET BY MOUTH UP TO TWICE DAILY 60 tablet 0     diltiazem ER COATED BEADS (CARDIZEM CD/CARTIA XT) 240 MG 24 hr capsule Take 1 capsule (240 mg) by mouth daily 30 capsule 11     estradiol (ESTRACE) 0.5 MG tablet Take 1 tablet by mouth twice daily 60 tablet 3     levothyroxine (SYNTHROID/LEVOTHROID) 75 MCG tablet Take 1 tablet by mouth once daily 90 tablet 0     nitroGLYcerin (NITROSTAT) 0.4 MG sublingual tablet DISSOLVE ONE TABLET UNDER THE TONGUE EVERY 5 MINUTES AS NEEDED FOR CHEST PAIN. DO NOT EXCEED A TOTAL OF 3 DOSES IN 15 MINUTES 30 tablet 4     pantoprazole (PROTONIX) 40 MG EC tablet TAKE ONE TABLET BY MOUTH 30-60 MINUTES BEFORE A MEAL 90 tablet 3     PROCTO-MED HC 2.5 % cream APPLY   TOPICALLY TO AFFECTED AREA THREE TIMES DAILY 60 g 0     traMADol (ULTRAM) 50 MG tablet Take 50 mg by mouth 3 times daily as needed for severe pain       Allergies   Allergen Reactions     Codeine      Isosorbide Mononitrate Other (See Comments)     Vomiting and headache       Lisinopril      [facility] scan     Paxil [Paroxetine] Headache     Recent Labs   Lab Test 04/14/21  1038 02/17/21  1500 11/11/20  1359 07/31/20  0528 12/02/19  0808 12/02/19  0808 11/19/19  0901 04/19/17  1133 04/19/17  1133 05/09/16  0930   LDL  --   --  149*  --   --    "--   --   --  104* 127*   HDL  --   --  70  --   --   --   --   --  77 70   TRIG  --   --  163* 99  --   --   --   --  165* 204*   ALT  --  53* 25 253*   < >  --   --    < > 28 44   CR 1.05*  --  1.01 0.74   < >  --   --    < > 0.97 0.94   GFRESTIMATED 57*  --  60* 87   < >  --   --    < > 59* 62   GFRESTBLACK 67  --  70 >90   < >  --   --    < > 71 75   POTASSIUM 4.4  --  4.5 4.1   < >  --   --    < > 4.4 4.1   TSH  --   --   --   --   --  1.06 0.67   < > 1.24 3.12    < > = values in this interval not displayed.      BP Readings from Last 3 Encounters:   06/10/21 120/70   06/08/21 119/78   04/27/21 126/76    Wt Readings from Last 3 Encounters:   06/10/21 91.2 kg (201 lb)   06/08/21 91.2 kg (201 lb)   04/27/21 90.9 kg (200 lb 8 oz)                    Reviewed and updated as needed this visit by Provider                Review of Systems   CONSTITUTIONAL:NEGATIVE for fever, chills, change in weight  INTEGUMENTARY/SKIN: NEGATIVE for worrisome rashes, moles or lesions  EYES: NEGATIVE for vision changes or irritation has a small lesion on her lower lid of her right eye.   ENT/MOUTH: NEGATIVE for ear, mouth and throat problems  RESP:NEGATIVE for significant cough or SOB  CV: NEGATIVE for chest pain, palpitations or peripheral edema  MUSCULOSKELETAL: NEGATIVE for significant arthralgias or myalgia  NEURO: NEGATIVE for weakness, dizziness or paresthesias  ENDOCRINE: NEGATIVE for temperature intolerance, skin/hair changes  PSYCHIATRIC: NEGATIVE for changes in mood or affect      Objective    Blood Pressure 120/70 (BP Location: Left arm, Patient Position: Sitting, Cuff Size: Adult Regular)   Pulse 58   Temperature 97.3  F (36.3  C) (Tympanic)   Height 1.702 m (5' 7\")   Weight 91.2 kg (201 lb)   Oxygen Saturation 98%   Body Mass Index 31.48 kg/m    Body mass index is 31.48 kg/m .  Physical Exam   GENERAL: healthy, alert and no distress  EYES: Eyes grossly normal to inspection, PERRL and conjunctivae and sclerae " "normal  HENT: ear canals and TM's normal, nose and mouth without ulcers or lesions  NECK: no adenopathy, no asymmetry, masses, or scars and thyroid normal to palpation  RESP: lungs clear to auscultation - no rales, rhonchi or wheezes  CV: regular rate and rhythm, normal S1 S2, no S3 or S4, no murmur, click or rub, no peripheral edema and peripheral pulses strong  ABDOMEN: soft, nontender, no hepatosplenomegaly, no masses and bowel sounds normal  MS: no gross musculoskeletal defects noted, no edema, tightness in sternal region. Hands, fingers, knees, lower back and hips. She has been on medical cannabis.   Psyche: Mood is stable, aware of anxiety. Weight is up and then anxiety is worse   Diagnostic Test Results:  Labs reviewed in Epic  No results found for this or any previous visit (from the past 24 hour(s)).        Assessment & Plan     Acquired hypothyroidism  Weight is up check labs. She is needing something to help her mood.   - TSH with free T4 reflex  - gentamicin (GARAMYCIN) 0.3 % ophthalmic ointment; Place 0.5 inches into the right eye 3 times daily    SHAKIR (generalized anxiety disorder)  Severe   Mood is up and down. Tossing and turning at night.  She is needing her reactivation of her card. Can't tolerate masks. Can't tolerate limitations all year and more.      Cognitive disorder  Better mood is more functional.   More functional can't work at all.     Fibromyalgia  Start back on Amitriptyline. Going to get her back on cannabis. See us back in 6 weeks.        BMI:   Estimated body mass index is 31.48 kg/m  as calculated from the following:    Height as of this encounter: 1.702 m (5' 7\").    Weight as of this encounter: 91.2 kg (201 lb).            See Patient Instructions    No follow-ups on file.    KEILY Cobos  [facility] [place]  "

## 2021-06-11 DIAGNOSIS — F41.1 GENERALIZED ANXIETY DISORDER: ICD-10-CM

## 2021-06-11 RX ORDER — CLONAZEPAM 1 MG/1
TABLET ORAL
Qty: 60 TABLET | Refills: 0 | Status: SHIPPED | OUTPATIENT
Start: 2021-06-11 | End: 2021-07-07

## 2021-06-11 ASSESSMENT — ANXIETY QUESTIONNAIRES: GAD7 TOTAL SCORE: 11

## 2021-06-11 NOTE — TELEPHONE ENCOUNTER
Not on protocol    clonazePAM (KLONOPIN) 1 MG tablet      Last Written Prescription Date:  5/7/21  Last Fill Quantity: 60,   # refills: 0  Last Office Visit: 6/10/21  Future Office visit:    Next 5 appointments (look out 90 days)    Eloy 15, 2021  9:00 AM  (Arrive by 8:45 AM)  Nurse Only with [facility] CARDIOLOGY NURSE  [facility] [address]  [place] [address]  [phone]   Jul 08, 2021 10:15 AM  (Arrive by 10:00 AM)  SHORT with KEILY Fonseca  [facility] ([facility] [place] ) [address]  [place] [address]  [phone]           Routing refill request to provider for review/approval because:  Drug not on the [facility], P or [facility] refill protocol or controlled substance

## 2021-06-15 ENCOUNTER — ALLIED HEALTH/NURSE VISIT (OUTPATIENT)
Dept: CARDIOLOGY | Facility: OTHER | Age: 61
End: 2021-06-15
Attending: INTERNAL MEDICINE
Payer: COMMERCIAL

## 2021-06-15 DIAGNOSIS — Z01.30 BLOOD PRESSURE CHECK: Primary | ICD-10-CM

## 2021-06-15 PROCEDURE — 99207 PR NO CHARGE NURSE ONLY: CPT

## 2021-06-15 NOTE — NURSING NOTE
Patient in for nurse only today to compare home B/P machine with manual check here in clinic. Per your request.     Home machine Right Arm.......145/78 - P- 68 Large Cuff    Manual Right Arm Nurse.......140/74 - P - 63 Large Cuff    Left Arm Clinic Machine.......127/80 - Regular Cuff     Home machine Left Arm.......140/81 - P - 66 Regular Cuff

## 2021-06-28 ENCOUNTER — HOSPITAL ENCOUNTER (OUTPATIENT)
Dept: MRI IMAGING | Facility: HOSPITAL | Age: 61
Discharge: HOME | End: 2021-06-28
Attending: PHYSICIAN ASSISTANT | Admitting: PHYSICIAN ASSISTANT
Payer: MEDICARE

## 2021-06-28 DIAGNOSIS — R42 DIZZINESS: ICD-10-CM

## 2021-06-28 PROCEDURE — 255N000002 HC RX 255 OP 636: Performed by: RADIOLOGY

## 2021-06-28 PROCEDURE — A9585 GADOBUTROL INJECTION: HCPCS | Performed by: RADIOLOGY

## 2021-06-28 PROCEDURE — 70553 MRI BRAIN STEM W/O & W/DYE: CPT

## 2021-06-28 RX ORDER — GADOBUTROL 604.72 MG/ML
10 INJECTION INTRAVENOUS ONCE
Status: COMPLETED | OUTPATIENT
Start: 2021-06-28 | End: 2021-06-28

## 2021-06-28 RX ADMIN — GADOBUTROL 10 ML: 604.72 INJECTION INTRAVENOUS at 18:22

## 2021-06-29 NOTE — PROGRESS NOTES
Subjective     Sharlene Mccord is a 60 year old female who presents to clinic today for the following health issues:    HPI         Anxiety Follow-Up    How are you doing with your anxiety since your last visit? No change    Are you having other symptoms that might be associated with anxiety? No    Have you had a significant life event? OTHER: sister diagnosed with small cell carcinoma     Are you feeling depressed? No    Do you have any concerns with your use of alcohol or other drugs? No    Social History     Tobacco Use     Smoking status: Former Smoker     Packs/day: 0.25     Years: 40.00     Pack years: 10.00     Types: Cigarettes     Start date: 1975     Quit date: 2021     Years since quittin.4     Smokeless tobacco: Never Used   Substance Use Topics     Alcohol use: No     Drug use: No     SHAKIR-7 SCORE 2021 6/10/2021 2021   Total Score 16 11 6     PHQ 2021 6/10/2021 2021   PHQ-9 Total Score 14 0 0   Q9: Thoughts of better off dead/self-harm past 2 weeks Not at all Not at all Not at all     Last PHQ-9 2021   1.  Little interest or pleasure in doing things 0   2.  Feeling down, depressed, or hopeless 0   3.  Trouble falling or staying asleep, or sleeping too much 0   4.  Feeling tired or having little energy 0   5.  Poor appetite or overeating 0   6.  Feeling bad about yourself 0   7.  Trouble concentrating 0   8.  Moving slowly or restless 0   Q9: Thoughts of better off dead/self-harm past 2 weeks 0   PHQ-9 Total Score 0   Difficulty at work, home, or with people -     SHAKIR-7  2021   1. Feeling nervous, anxious, or on edge 3   2. Not being able to stop or control worrying 3   3. Worrying too much about different things 0   4. Trouble relaxing 0   5. Being so restless that it is hard to sit still 0   6. Becoming easily annoyed or irritable 0   7. Feeling afraid, as if something awful might happen 0   SHAKIR-7 Total Score 6   If you checked any problems, how difficult have they  made it for you to do your work, take care of things at home, or get along with other people? -           Patient Active Problem List   Diagnosis     Diverticulitis of sigmoid colon     Hypothyroidism     Anxiety     Cardiac microvascular disease     Diverticulitis     Aortic valve disorder     ACP (advance care planning)     Chronic pain     Constipation     Memory loss     Major depressive disorder, recurrent episode (H)     Cognitive disorder     Major depressive disorder     Fatigue     Migraine without aura and responsive to treatment     Atypical migraine     Mixed connective tissue disease (H)     SHAKIR (generalized anxiety disorder)     Gastroesophageal reflux disease with esophagitis     Acute pancreatitis     Obstruction of common bile duct     Abnormal MRI of the head     GERD (gastroesophageal reflux disease)     Tremor     GUTIERREZ (dyspnea on exertion)     Chronic diastolic heart failure (H)     Diastolic heart failure, unspecified HF chronicity (H)     Chest pain, unspecified type     Heart murmur     Past Surgical History:   Procedure Laterality Date     APPENDECTOMY  1977     BIOPSY  2017    taken at [facility] from Dr. Nadira cohen. Thyroid     CARDIAC SURGERY  2013    angiogram [facility]:  Normal coronary arteries.  Felt ot have some microvascular disease     CL AFF SURGICAL PATHOLOGY  01/02/2007    Thyroid Mass     COLONOSCOPY  1/13/2014    Procedure: COLONOSCOPY;  COLONOSCOPY ;  Surgeon: Chasidy Gee DO;  Location: [facility]     CV RIGHT HEART CATH MEASUREMENTS RECORDED N/A 4/14/2021    Procedure: CV RIGHT HEART CATH;  Surgeon: Danna Clay MD;  [facility]: [facility] HEART [facility]AC CATH LAB     CV RIGHT HEART EXERCISE STRESS STUDY N/A 4/14/2021    Procedure: Right Heart Exercise Stress Study;  Surgeon: Danna Clay MD;  Location: [facility] HEART CARDIAC CATH LAB     ESOPHAGOSCOPY, GASTROSCOPY, DUODENOSCOPY (EGD), COMBINED N/A 2/9/2017    Procedure: COMBINED ESOPHAGOSCOPY, GASTROSCOPY, DUODENOSCOPY (EGD);   Surgeon: Johnathan Ruff DO;  Location: [facility]     GYN SURGERY      c-sections x 3     HYSTERECTOMY TOTAL ABDOMINAL, BILATERAL SALPINGO-OOPHORECTOMY, COMBINED N/A 2001     LAPAROSCOPIC CHOLECYSTECTOMY  2003    Cholelithiasis     LAPAROTOMY EXPLORATORY      abdominal pain/fever     LARYNGOSCOPY       SPLENECTOMY         Social History     Tobacco Use     Smoking status: Former Smoker     Packs/day: 0.25     Years: 40.00     Pack years: 10.00     Types: Cigarettes     Start date: 1975     Quit date: 2021     Years since quittin.4     Smokeless tobacco: Never Used   Substance Use Topics     Alcohol use: No     Family History   Problem Relation Age of Onset     C.A.D. Father      Hypertension Father      C.A.D. Mother      Cerebrovascular Disease Mother         CVA     Hypertension Mother      Coronary Artery Disease Mother      Diabetes Paternal Grandmother      Diabetes Paternal Grandfather      Diabetes Sister      Breast Cancer Sister      Diabetes Maternal Grandmother      Diabetes Sister      Breast Cancer Sister      Hypertension Sister      Depression Sister      Anxiety Disorder Sister      Obesity Sister      Depression Sister      Breast Cancer Sister      Obesity Sister      Depression Brother      Depression Daughter      Obesity Daughter      Depression Daughter      Mental Illness Daughter         [id number]-polar1     Anxiety Disorder Daughter      Mental Illness Daughter      Anxiety Disorder Other      Osteoporosis Other      Thyroid Disease Other      Diabetes Nephew          Current Outpatient Medications   Medication Sig Dispense Refill     acetaminophen (TYLENOL) 325 MG tablet Take 650 mg by mouth       amitriptyline (ELAVIL) 10 MG tablet Take 2 tablets (20 mg) by mouth At Bedtime 60 tablet 3     clonazePAM (KLONOPIN) 1 MG tablet TAKE 1/2 TO 1 (ONE-HALF TO ONE) TABLET BY MOUTH UP TO TWICE DAILY 60 tablet 0     diltiazem ER COATED BEADS (CARDIZEM CD/CARTIA XT) 240 MG 24 hr  capsule Take 1 capsule (240 mg) by mouth daily 30 capsule 11     estradiol (ESTRACE) 0.5 MG tablet Take 1 tablet by mouth twice daily 60 tablet 3     EUTHYROX 75 MCG tablet Take 1 tablet by mouth once daily 90 tablet 0     nitroGLYcerin (NITROSTAT) 0.4 MG sublingual tablet DISSOLVE ONE TABLET UNDER THE TONGUE EVERY 5 MINUTES AS NEEDED FOR CHEST PAIN. DO NOT EXCEED A TOTAL OF 3 DOSES IN 15 MINUTES 30 tablet 4     pantoprazole (PROTONIX) 40 MG EC tablet TAKE ONE TABLET BY MOUTH 30-60 MINUTES BEFORE A MEAL 90 tablet 3     PROCTO-MED HC 2.5 % cream APPLY   TOPICALLY TO AFFECTED AREA THREE TIMES DAILY 60 g 0     traMADol (ULTRAM) 50 MG tablet Take 50 mg by mouth 3 times daily as needed for severe pain       Allergies   Allergen Reactions     Codeine      Isosorbide Mononitrate Other (See Comments)     Vomiting and headache       Lisinopril      [facility] scan     Paxil [Paroxetine] Headache     Recent Labs   Lab Test 06/10/21  1308 04/14/21  1038 02/17/21  1500 11/11/20  1359 07/31/20  0528 12/02/19  0808 12/02/19  0808 04/19/17  1133 04/19/17  1133 05/09/16  0930   LDL  --   --   --  149*  --   --   --   --  104* 127*   HDL  --   --   --  70  --   --   --   --  77 70   TRIG  --   --   --  163* 99  --   --   --  165* 204*   ALT  --   --  53* 25 253*   < >  --    < > 28 44   CR  --  1.05*  --  1.01 0.74   < >  --    < > 0.97 0.94   GFRESTIMATED  --  57*  --  60* 87   < >  --    < > 59* 62   GFRESTBLACK  --  67  --  70 >90   < >  --    < > 71 75   POTASSIUM  --  4.4  --  4.5 4.1   < >  --    < > 4.4 4.1   TSH 0.92  --   --   --   --   --  1.06   < > 1.24 3.12    < > = values in this interval not displayed.      BP Readings from Last 3 Encounters:   07/08/21 120/68   06/10/21 120/70   06/08/21 119/78    Wt Readings from Last 3 Encounters:   07/08/21 91.2 kg (201 lb)   06/10/21 91.2 kg (201 lb)   06/08/21 91.2 kg (201 lb)                    Reviewed and updated as needed this visit by Provider                 Review of  "Systems   CONSTITUTIONAL: NEGATIVE for fever, chills, change in weight  INTEGUMENTARY/SKIN: NEGATIVE for worrisome rashes, moles or lesions  PSYCHIATRIC: crying and talking about her sister who was just diagnosed with cancer. , anxiety and fatigue      Objective    /68 (BP Location: Left arm, Patient Position: Sitting, Cuff Size: Adult Regular)   Pulse 68   Temp 97.2  F (36.2  C) (Tympanic)   Ht 1.702 m (5' 7\")   Wt 91.2 kg (201 lb)   SpO2 98%   BMI 31.48 kg/m    Body mass index is 31.48 kg/m .  Physical Exam   GENERAL: healthy, alert and no distress  EYES: Eyes grossly normal to inspection, PERRL and conjunctivae and sclerae normal  HENT: ear canals and TM's normal, nose and mouth without ulcers or lesions  NECK: no adenopathy, no asymmetry, masses, or scars and thyroid normal to palpation  RESP: lungs clear to auscultation - no rales, rhonchi or wheezes  CV: regular rate and rhythm, normal   MS: no gross musculoskeletal defects noted, no edema  NEURO: Normal strength and tone, mentation intact and speech normal  BACK: no CVA tenderness, no paralumbar tenderness  PSYCH: mentation appears normal, affect normal/bright  LYMPH: no cervical, supraclavicular, axillary, or inguinal adenopathy    Diagnostic Test Results:  Labs reviewed in Epic  No results found for this or any previous visit (from the past 24 hour(s)).        Assessment & Plan     Double vision  See an eye  Has been a really long time since her last surgery.   - Erythrocyte sedimentation rate auto  - Lyme Disease Dyana with reflex to WB Serum  - Rheumatoid factor  - DNA double stranded antibodies  - Uric acid    Migraine without aura and responsive to treatment  On amitriptyline for this. Going to see her eye  And get her eyes checked. Think this could be stress and trigger .            See Patient Instructions    No follow-ups on file.    Concha Hare PA-C  [facility] - [place]  "

## 2021-07-06 DIAGNOSIS — F41.1 GENERALIZED ANXIETY DISORDER: ICD-10-CM

## 2021-07-06 DIAGNOSIS — E03.9 ACQUIRED HYPOTHYROIDISM: ICD-10-CM

## 2021-07-07 RX ORDER — CLONAZEPAM 1 MG/1
TABLET ORAL
Qty: 60 TABLET | Refills: 0 | Status: SHIPPED | OUTPATIENT
Start: 2021-07-07 | End: 2021-08-11

## 2021-07-07 RX ORDER — LEVOTHYROXINE SODIUM 75 UG/1
TABLET ORAL
Qty: 90 TABLET | Refills: 0 | Status: SHIPPED | OUTPATIENT
Start: 2021-07-07 | End: 2021-10-19

## 2021-07-07 NOTE — TELEPHONE ENCOUNTER
clonazePAM (KLONOPIN) 1 MG tablet     Last Written Prescription Date:  6/11/21  Last Fill Quantity: 60,   # refills: 0  Last Office Visit: 6/10/21  Future Office visit:    Next 5 appointments (look out 90 days)    Jul 08, 2021 10:15 AM  (Arrive by 10:00 AM)  SHORT with KEILY Fonseca  [facility] ([facility] ) [address] [facility] [address]  [phone]           levothyroxine (SYNTHROID/LEVOTHROID) 75 MCG tablet     Last Written Prescription Date:  3/30/21  Last Fill Quantity: 90,   # refills: 0  Last Office Visit: 6/10/21  Future Office visit:    Next 5 appointments (look out 90 days)    Jul 08, 2021 10:15 AM  (Arrive by 10:00 AM)  SHORT with KEILY Fonseca  [facility] ([facility] ) [address] [facility] [address]  [phone]           Routing refill request to provider for review/approval because:

## 2021-07-08 ENCOUNTER — OFFICE VISIT (OUTPATIENT)
Dept: FAMILY MEDICINE | Facility: OTHER | Age: 61
End: 2021-07-08
Attending: PHYSICIAN ASSISTANT
Payer: COMMERCIAL

## 2021-07-08 VITALS
OXYGEN SATURATION: 98 % | TEMPERATURE: 97.2 F | DIASTOLIC BLOOD PRESSURE: 68 MMHG | SYSTOLIC BLOOD PRESSURE: 120 MMHG | WEIGHT: 201 LBS | HEART RATE: 68 BPM | HEIGHT: 67 IN | BODY MASS INDEX: 31.55 KG/M2

## 2021-07-08 DIAGNOSIS — H53.2 DOUBLE VISION: Primary | ICD-10-CM

## 2021-07-08 DIAGNOSIS — G43.009 MIGRAINE WITHOUT AURA AND RESPONSIVE TO TREATMENT: ICD-10-CM

## 2021-07-08 PROBLEM — J44.9 COPD (CHRONIC OBSTRUCTIVE PULMONARY DISEASE) (H): Status: ACTIVE | Noted: 2021-07-08

## 2021-07-08 LAB
CRP SERPL-MCNC: 6.1 MG/L (ref 0–8)
ERYTHROCYTE [SEDIMENTATION RATE] IN BLOOD BY WESTERGREN METHOD: 13 MM/H (ref 0–30)
URATE SERPL-MCNC: 5.4 MG/DL (ref 2.6–6)

## 2021-07-08 PROCEDURE — 86225 DNA ANTIBODY NATIVE: CPT | Mod: ZL | Performed by: PHYSICIAN ASSISTANT

## 2021-07-08 PROCEDURE — 86618 LYME DISEASE ANTIBODY: CPT | Mod: ZL | Performed by: PHYSICIAN ASSISTANT

## 2021-07-08 PROCEDURE — G0463 HOSPITAL OUTPT CLINIC VISIT: HCPCS

## 2021-07-08 PROCEDURE — 85652 RBC SED RATE AUTOMATED: CPT | Mod: ZL | Performed by: PHYSICIAN ASSISTANT

## 2021-07-08 PROCEDURE — 86431 RHEUMATOID FACTOR QUANT: CPT | Mod: ZL | Performed by: PHYSICIAN ASSISTANT

## 2021-07-08 PROCEDURE — 84550 ASSAY OF BLOOD/URIC ACID: CPT | Mod: ZL | Performed by: PHYSICIAN ASSISTANT

## 2021-07-08 PROCEDURE — 99214 OFFICE O/P EST MOD 30 MIN: CPT | Performed by: PHYSICIAN ASSISTANT

## 2021-07-08 PROCEDURE — 36415 COLL VENOUS BLD VENIPUNCTURE: CPT | Mod: ZL | Performed by: PHYSICIAN ASSISTANT

## 2021-07-08 PROCEDURE — 86140 C-REACTIVE PROTEIN: CPT | Mod: ZL | Performed by: PHYSICIAN ASSISTANT

## 2021-07-08 ASSESSMENT — ANXIETY QUESTIONNAIRES
7. FEELING AFRAID AS IF SOMETHING AWFUL MIGHT HAPPEN: NOT AT ALL
5. BEING SO RESTLESS THAT IT IS HARD TO SIT STILL: NOT AT ALL
2. NOT BEING ABLE TO STOP OR CONTROL WORRYING: NEARLY EVERY DAY
GAD7 TOTAL SCORE: 6
3. WORRYING TOO MUCH ABOUT DIFFERENT THINGS: NOT AT ALL
1. FEELING NERVOUS, ANXIOUS, OR ON EDGE: NEARLY EVERY DAY
4. TROUBLE RELAXING: NOT AT ALL
6. BECOMING EASILY ANNOYED OR IRRITABLE: NOT AT ALL

## 2021-07-08 ASSESSMENT — PAIN SCALES - GENERAL: PAINLEVEL: NO PAIN (0)

## 2021-07-08 ASSESSMENT — MIFFLIN-ST. JEOR: SCORE: 1514.36

## 2021-07-08 ASSESSMENT — PATIENT HEALTH QUESTIONNAIRE - PHQ9: SUM OF ALL RESPONSES TO PHQ QUESTIONS 1-9: 0

## 2021-07-08 NOTE — NURSING NOTE
"Chief Complaint   Patient presents with     Anxiety       Initial Blood Pressure 120/68 (BP Location: Left arm, Patient Position: Sitting, Cuff Size: Adult Regular)   Pulse 68   Temperature 97.2  F (36.2  C) (Tympanic)   Height 1.702 m (5' 7\")   Weight 91.2 kg (201 lb)   Oxygen Saturation 98%   Body Mass Index 31.48 kg/m   Estimated body mass index is 31.48 kg/m  as calculated from the following:    Height as of this encounter: 1.702 m (5' 7\").    Weight as of this encounter: 91.2 kg (201 lb).  Medication Reconciliation: complete  Silvana Miguel LPN  "

## 2021-07-09 LAB
B BURGDOR IGG+IGM SER QL: 0.27 (ref 0–0.89)
DSDNA AB SER-ACNC: <1 IU/ML
RHEUMATOID FACT SER NEPH-ACNC: <7 IU/ML (ref 0–20)

## 2021-07-09 ASSESSMENT — ANXIETY QUESTIONNAIRES: GAD7 TOTAL SCORE: 6

## 2021-08-11 DIAGNOSIS — Z79.890 POSTMENOPAUSAL HRT (HORMONE REPLACEMENT THERAPY): ICD-10-CM

## 2021-08-11 DIAGNOSIS — F41.1 GENERALIZED ANXIETY DISORDER: ICD-10-CM

## 2021-08-11 RX ORDER — CLONAZEPAM 1 MG/1
TABLET ORAL
Qty: 60 TABLET | Refills: 0 | Status: SHIPPED | OUTPATIENT
Start: 2021-08-11 | End: 2021-09-16

## 2021-08-11 RX ORDER — ESTRADIOL 0.5 MG/1
TABLET ORAL
Qty: 60 TABLET | Refills: 5 | Status: SHIPPED | OUTPATIENT
Start: 2021-08-11 | End: 2022-02-02

## 2021-08-11 NOTE — TELEPHONE ENCOUNTER
clonazePAM 1 MG Oral Tablet        Last Written Prescription Date:  7/7/21  Last Fill Quantity: 60,   # refills: 0  Last Office Visit: 7/8/21  Future Office visit:    Next 5 appointments (look out 90 days)    Sep 13, 2021  9:15 AM  (Arrive by 9:00 AM)  SHORT with KEILY Fonseca  [facility] ([facility] ) [address]  [place] [address]  [phone]           Routing refill request to provider for review/approval because:    Drug not on the [facility], [facility] or [facility] refill protocol or controlled substance

## 2021-09-13 ENCOUNTER — OFFICE VISIT (OUTPATIENT)
Dept: FAMILY MEDICINE | Facility: OTHER | Age: 61
End: 2021-09-13
Attending: PHYSICIAN ASSISTANT
Payer: COMMERCIAL

## 2021-09-13 VITALS
TEMPERATURE: 97.6 F | WEIGHT: 209.8 LBS | SYSTOLIC BLOOD PRESSURE: 120 MMHG | HEART RATE: 69 BPM | HEIGHT: 67 IN | OXYGEN SATURATION: 98 % | BODY MASS INDEX: 32.93 KG/M2 | DIASTOLIC BLOOD PRESSURE: 70 MMHG

## 2021-09-13 DIAGNOSIS — M79.7 FIBROMYALGIA: ICD-10-CM

## 2021-09-13 DIAGNOSIS — F09 COGNITIVE DISORDER: ICD-10-CM

## 2021-09-13 PROCEDURE — G0463 HOSPITAL OUTPT CLINIC VISIT: HCPCS

## 2021-09-13 PROCEDURE — 99214 OFFICE O/P EST MOD 30 MIN: CPT | Performed by: PHYSICIAN ASSISTANT

## 2021-09-13 RX ORDER — AMITRIPTYLINE HYDROCHLORIDE 10 MG/1
10 TABLET ORAL AT BEDTIME
Qty: 90 TABLET | Refills: 3 | COMMUNITY
Start: 2021-09-13 | End: 2021-12-20

## 2021-09-13 ASSESSMENT — ANXIETY QUESTIONNAIRES
5. BEING SO RESTLESS THAT IT IS HARD TO SIT STILL: NOT AT ALL
2. NOT BEING ABLE TO STOP OR CONTROL WORRYING: NEARLY EVERY DAY
4. TROUBLE RELAXING: NEARLY EVERY DAY
6. BECOMING EASILY ANNOYED OR IRRITABLE: NEARLY EVERY DAY
3. WORRYING TOO MUCH ABOUT DIFFERENT THINGS: NEARLY EVERY DAY
7. FEELING AFRAID AS IF SOMETHING AWFUL MIGHT HAPPEN: NOT AT ALL
GAD7 TOTAL SCORE: 15
1. FEELING NERVOUS, ANXIOUS, OR ON EDGE: NEARLY EVERY DAY

## 2021-09-13 ASSESSMENT — MIFFLIN-ST. JEOR: SCORE: 1549.28

## 2021-09-13 ASSESSMENT — PATIENT HEALTH QUESTIONNAIRE - PHQ9: SUM OF ALL RESPONSES TO PHQ QUESTIONS 1-9: 0

## 2021-09-13 ASSESSMENT — PAIN SCALES - GENERAL: PAINLEVEL: SEVERE PAIN (6)

## 2021-09-13 NOTE — PROGRESS NOTES
Assessment & Plan     Cognitive disorder  She states two are too much.  She is feeling ok.  Sleeping better. Doesn't recall if her mood is better. Weight is up. In a stressful situation right now. Sister has aggressive cancer. Going to chemo with her as well. She wants something to lose weight.  Going to try to get her to document intake.  Also discussion on Vinegar.    - amitriptyline (ELAVIL) 10 MG tablet; Take 1 tablet (10 mg) by mouth At Bedtime    Fibromyalgia  This has helped her quiet a bit. Knee are painful but has gained some weight and her feet don't feel really comfortable. Some arthritis in knee and ankles.   - amitriptyline (ELAVIL) 10 MG tablet; Take 1 tablet (10 mg) by mouth At Bedtime    Review of the result(s) of each unique test - right heart cath.   Ordering of each unique test  Prescription drug management  5  minutes spent on the date of the encounter doing chart review, review of outside records and documentation        See Patient Instructions    No follow-ups on file.    KEILY Cobos  [facility] - ENA Su is a 61 year old who presents for the following health issues     HPI     Medication Followup of  Amitriptyline     Taking Medication as prescribed: NO    Side Effects: sleeping to much    Medication Helping Symptoms:  Yes; but now takes 1 tablet by mouth daily and that seems to help better she is able to wake up easiler.      Pain History:  When did you first notice your pain? - More than 6 weeks   Have you seen this provider for your pain in the past?   Yes   Where in your body do you have pain? All over. Has Fibromyalgia .  Legs felt weak and shaky.   Are you seeing anyone else for your pain? No    PHQ-9 SCORE 6/10/2021 7/8/2021 9/13/2021   PHQ-9 Total Score - - -   PHQ-9 Total Score 0 0 0       SHAKIR-7 SCORE 6/10/2021 7/8/2021 9/13/2021   Total Score 11 6 15         PHQ-9 SCORE 6/10/2021 7/8/2021 9/13/2021   PHQ-9 Total Score - - -   PHQ-9  "Total Score 0 0 0       Chronic Pain Follow Up:    Location of pain: all over.   Analgesia/pain control:    - Recent changes:  Leg pain increase with weight gain,     - Overall control: Tolerable with discomfort    - Current treatments: tramadol and medical marijuana.    Adherence:     - Do you ever take more pain medicine than prescribed? No    - When did you take your last dose of pain medicine?  Yesterday.    Adverse effects: No   PDMP Review       Value Time User    State PDMP site checked  Yes 12/1/2020  1:11 PM Aurelia Odell MD        Last CSA Agreement:   Patient-Level CSA:    Controlled Substance Agreement - Non - Opioid - Scan on 9/30/2019  1:49 PM: NON-OPIOID CONTROLLED SUBSTANCE AGREEMENT       Last UDS: 10/1/2019        No flowsheet data found.    Review of Systems   CONSTITUTIONAL: NEGATIVE for fever, chills, change in weight  ENT/MOUTH: NEGATIVE for ear, mouth and throat problems  RESP: NEGATIVE for significant cough or SOB  CV: NEGATIVE for chest pain, palpitations or peripheral edema  PSYCHIATRIC: NEGATIVE for changes in mood or affect, HX anxiety, HX panic attacks and fatigue      Objective    Blood Pressure 120/70 (BP Location: Left arm, Patient Position: Sitting, Cuff Size: Adult Regular)   Pulse 69   Temperature 97.6  F (36.4  C) (Tympanic)   Height 1.702 m (5' 7\")   Weight 95.2 kg (209 lb 12.8 oz)   Oxygen Saturation 98%   Body Mass Index 32.86 kg/m    Body mass index is 32.86 kg/m .  Physical Exam   GENERAL: healthy, alert and no distress  EYES: Eyes grossly normal to inspection, PERRL and conjunctivae and sclerae normal  HENT: ear canals and TM's normal, nose and mouth without ulcers or lesions  NECK: no adenopathy, no asymmetry, masses, or scars and thyroid normal to palpation  RESP: lungs clear to auscultation - no rales, rhonchi or wheezes  CV: regular rate and rhythm, normal S1 S2, no S3 or S4, no murmur, click or rub, no peripheral edema and peripheral pulses strong  ABDOMEN: " soft, nontender, no hepatosplenomegaly, no masses and bowel sounds normal  MS: no gross musculoskeletal defects noted, no edema  SKIN: no suspicious lesions or rashes  NEURO: Normal strength and tone, mentation intact and speech normal    Office Visit on 07/08/2021   Component Date Value Ref Range Status     Sed Rate 07/08/2021 13  0 - 30 mm/h Final     Lyme Disease Antibodies Serum 07/08/2021 0.27  0.00 - 0.89 Final    Comment: Negative, Absence of detectable Borrelia burdorferi antibodies. A negative   result does not exclude the possibility of Borrelia burgdorferi infection. If   early Lyme disease is suspected, a second sample should be collected and   tested 2 to 4 weeks later.       Rheumatoid Factor 07/08/2021 <7  <12 IU/mL Final     DNA-ds 07/08/2021 <1  <10 IU/mL Final    Negative     Uric Acid 07/08/2021 5.4  2.6 - 6.0 mg/dL Final     CRP Inflammation 07/08/2021 6.1  0.0 - 8.0 mg/L Final

## 2021-09-13 NOTE — NURSING NOTE
"Chief Complaint   Patient presents with     Medication Follow-up       Initial Blood Pressure 120/70 (BP Location: Left arm, Patient Position: Sitting, Cuff Size: Adult Regular)   Pulse 69   Temperature 97.6  F (36.4  C) (Tympanic)   Height 1.702 m (5' 7\")   Weight 95.2 kg (209 lb 12.8 oz)   Oxygen Saturation 98%   Body Mass Index 32.86 kg/m   Estimated body mass index is 32.86 kg/m  as calculated from the following:    Height as of this encounter: 1.702 m (5' 7\").    Weight as of this encounter: 95.2 kg (209 lb 12.8 oz).  Medication Reconciliation: complete  Silvana Miguel LPN  "

## 2021-09-14 DIAGNOSIS — F41.1 GENERALIZED ANXIETY DISORDER: ICD-10-CM

## 2021-09-14 ASSESSMENT — ANXIETY QUESTIONNAIRES: GAD7 TOTAL SCORE: 15

## 2021-09-15 NOTE — TELEPHONE ENCOUNTER
clonazepam      Last Written Prescription Date:  8/11/21  Last Fill Quantity: 60,   # refills: 0  Last Office Visit: 9/13/21  Future Office visit:    Next 5 appointments (look out 90 days)    Dec 13, 2021  9:45 AM  (Arrive by 9:30 AM)  SHORT with KEILY Fonseca  [facility] [place] ([facility] [place] ) [address]  [place] [address]  [phone]

## 2021-09-16 RX ORDER — CLONAZEPAM 1 MG/1
TABLET ORAL
Qty: 60 TABLET | Refills: 0 | Status: SHIPPED | OUTPATIENT
Start: 2021-09-16 | End: 2021-10-19

## 2021-09-18 ENCOUNTER — HEALTH MAINTENANCE LETTER (OUTPATIENT)
Age: 61
End: 2021-09-18

## 2021-09-20 NOTE — PROGRESS NOTES
Pt arrived on 2a post RHC. VSS Ra. Dressing c/d/i. No pain. Discharge instructions reviewed, copy given to pt. Pt sipping on coffee. Dr. Owen here to speak with pt. Pt discharged home accompanied by .  
Per patient, he was sleeping and woke up and urgently had to urinate. Got up quickly and walked fast to bathroom, patient states he became very dizzy, no LOC denies falling, patient sat down and symptoms resolved. Per EMS, on their arrival patient was hypotensive 50/20 and that states they argued with patient x1 hr about bringing him to ED. At triage, patient in do distress, denies any complaints, upset with EMS for bringing him to ED, tachycardia noted but all other VS stable.

## 2021-09-28 ENCOUNTER — VIRTUAL VISIT (OUTPATIENT)
Dept: CARDIOLOGY | Facility: OTHER | Age: 61
End: 2021-09-28
Attending: INTERNAL MEDICINE
Payer: COMMERCIAL

## 2021-09-28 VITALS — SYSTOLIC BLOOD PRESSURE: 149 MMHG | HEART RATE: 75 BPM | DIASTOLIC BLOOD PRESSURE: 71 MMHG

## 2021-09-28 DIAGNOSIS — I25.85 CARDIAC MICROVASCULAR DISEASE: ICD-10-CM

## 2021-09-28 DIAGNOSIS — I10 BENIGN ESSENTIAL HYPERTENSION: Primary | ICD-10-CM

## 2021-09-28 DIAGNOSIS — I20.89 MICROVASCULAR ANGINA (H): ICD-10-CM

## 2021-09-28 PROCEDURE — 99213 OFFICE O/P EST LOW 20 MIN: CPT | Mod: 95 | Performed by: INTERNAL MEDICINE

## 2021-09-28 RX ORDER — VALSARTAN 40 MG/1
40 TABLET ORAL DAILY
Qty: 30 TABLET | Refills: 11 | Status: SHIPPED | OUTPATIENT
Start: 2021-09-28 | End: 2022-05-17

## 2021-09-28 ASSESSMENT — PAIN SCALES - GENERAL: PAINLEVEL: NO PAIN (0)

## 2021-09-28 NOTE — PATIENT INSTRUCTIONS
Thank you for allowing Dr. Nichole and our team to participate in your care. Please call our office at [phone] with scheduling questions or if you need to cancel or change your appointment. With any other questions or concerns you may call Heather cardiology nurse at [phone].       If you experience chest pain, chest pressure, chest tightness, shortness of breath, fainting, lightheadedness, nausea, vomiting, or other concerning symptoms, please report to the Emergency Department or call 911. These symptoms may be emergent, and best treated in the Emergency Department.    Follow up in 2 months      Medications:    Continue Cardizem 240 mg at bedtime.    Start valsartan 40 mg daily in the morning.    Take your BP in the afternoon - 3-4 hours after the valsartan.    Blood work in 2 weeks.    Try nitroglycerin for chest pain.     If chest pain during sleep try liquid liquid Maalox or Mylanta.

## 2021-09-28 NOTE — PROGRESS NOTES
Sharlene is a 61 year old who is being evaluated via a billable telephone visit.      What phone number would you like to be contacted at? [phone]  How would you like to obtain your AVS? Gama    B/RHYS today @ home 149/71 Pulse 75      Additional Provider Notes:    CC- fatigue    HPI- Mrs. Mccord has been followed for several years by various cardiologists. She has known AI and has complained of fatigue throughout. She reports recently her fatigue has been much worse. She related that her fatigue was associated with dyspnea on exertion and chest discomfort. Her stress test showed an anterior wall defect that did not fit with her echocardiogram. She also has aortic insufficiency that by echocardiogram did not seem to have progressed. She underwent angiography at [facility]. This demonstrated no epicardial coronary artery disease. Her echocardiogram again showed mild to mod AI. A stress MRI was done that  suggested microvascular disease. She was begun on isosorbide mononitrate but stopped after 4 days due to severe headache. She cannot recall if it helped with her chest pain. She was started on diltiazem but stopped when she was admitted for diverticulitis. She is already on estrogen replacement. On 360 mg of diltiazem she reported the chest pain was better but her BP was low. She also noted numbness and tingling in both arms and both legs. The legs was constant. The arms mostly when she worked over her head. She also felt fatigued. She does not report orthostatic symptoms. Her diltiazem was reduced to 300 mg daily. The chest pain is worse. The fatigue is maybe a little better and the numbness/tingling is unchanged.     June 2014:  At our last visit I discussed with her that I have no other therapies to offer regarding her chest pain. We discuss the possibility of starting a medication such as Neurontin that has been reported to help with chronic pain syndromes. There is some evidence that  "patients with microvascular angina have abnormal pain sensitivity. She reports the Neurontin was started and it has helped. She is using SL NTG 1-2x/week as opposed to several times a day.    We had stopped her Diovan because on the increased diltiazem dose she was having relatively low BP. She is worried because she has seen some BP measurements that are high for her. She admits to being stressed. She has just started a w/u for memory loss and a change was seen on her MRI.    She had an echocardiogram done in March, see below, that showed no change in her aortic insufficiency.     She has been doing reasonably well. She is not having as much chest pain. She has been less active due to problems with disk disease in her spine. She has not noted any major changes in her exertional abilities.     [id number]: She has been having more joint pain. She is seeing a Rheumatologist in [place] soon.    Her chest pain is perhaps a bit better. Her legs and back feel heavy and she has sharp pains and aches, especially with climbing stairs. This has not been associated with her chest pain. On level drug she had been walking 2 miles with her sister but she has not done that for several months now.     [id number]: her chest pain has been doing reasonably well. She hasn't had NTG available for a few months.     She reports a new barking cough over the past 6 months - no fever, no new meds, not productive. The cough is not getting any worse but no better either. She has not had any wheezing that she has noticed.     Her legs feel heavy when she walks.    [id number]: She reports no real change in her chest pain or exertional abilities.  She has been troubled by chronic pain \"all over her body\".  She reports that rheumatology felt that she had a mixed connective tissue disorder.  She tried Plaquenil for a time but has stopped that and has been trying medical cannabis.  She reports the cannabis has not helped particularly.    Her recent " echocardiogram showed no change in her aortic insufficiency.  Her systolic function remains good.  Left ventricular size remains normal.    May 14, 2019: she reports the chest pain is the same. She feels more shortness of breath and wants to stop smoking. She has noted hot flashes recently.     November 24, 2020: no significant change in chest pain but has been feeling very fatigued and weak. She only went fishing once this year. She reports feeling too weak to reel in a fish.     April 27, 2021: exercise right heart cath showed mild increase in pressures with exercise c/w mild diastolic heart failure. She reports no change in her dyspnea on exertion or exertional abilities.    June 8, 2021:  she has noted her heart rate increased did seem to improve her dyspnea on exertion a bit. Her chest pain , if anything, got a little worse on the lower dose of diltiazem. Her BPs at home have been higher, as expected with the lower dose of diltiazem.    September 28, 2021 Telephone Visit: breathing and chest pain not different than in June. BP at home in 150 -170 range. Has not tried NTG for chest pain. Also wakes up with chest pain at times, despite protonix.     Current Outpatient Medications   Medication Sig Dispense Refill     acetaminophen (TYLENOL) 325 MG tablet Take 650 mg by mouth       amitriptyline (ELAVIL) 10 MG tablet Take 1 tablet (10 mg) by mouth At Bedtime 90 tablet 3     clonazePAM (KLONOPIN) 1 MG tablet TAKE 1/2 TO 1 TABLET BY MOUTH UP TO TWICE DAILY 60 tablet 0     diltiazem ER COATED BEADS (CARDIZEM CD/CARTIA XT) 240 MG 24 hr capsule Take 1 capsule (240 mg) by mouth daily 30 capsule 11     estradiol (ESTRACE) 0.5 MG tablet Take 1 tablet by mouth twice daily 60 tablet 5     EUTHYROX 75 MCG tablet Take 1 tablet by mouth once daily 90 tablet 0     nitroGLYcerin (NITROSTAT) 0.4 MG sublingual tablet DISSOLVE ONE TABLET UNDER THE TONGUE EVERY 5 MINUTES AS NEEDED FOR CHEST PAIN. DO NOT EXCEED A TOTAL OF 3 DOSES IN  15 MINUTES 30 tablet 4     pantoprazole (PROTONIX) 40 MG EC tablet TAKE ONE TABLET BY MOUTH 30-60 MINUTES BEFORE A MEAL 90 tablet 3     PROCTO-MED HC 2.5 % cream APPLY   TOPICALLY TO AFFECTED AREA THREE TIMES DAILY 60 g 0     traMADol (ULTRAM) 50 MG tablet Take 50 mg by mouth 3 times daily as needed for severe pain       Allergies   Allergen Reactions     Codeine      Isosorbide Mononitrate Other (See Comments)     Vomiting and headache       Lisinopril      [facility] scan     Paxil [Paroxetine] Headache     Past Medical History:   Diagnosis Date     Anxiety state, unspecified 03/01/2011     Aortic valve disorders 06/07/2000    Echocardiogram showin mild/moderate AI.  Normal LV function     Cardiac microvascular disease 4/10/2013    Based on Cardiac MRI done at       COPD (chronic obstructive pulmonary disease) (H) 7/8/2021     Depressive disorder 1997     Fatigue      Fatigue      Fibromyalgia 03/01/2011     Hypertension      Major depressive disorder, recurrent episode, unspecified 12/17/2014     Migraine, unspecified, without mention of intractable migraine without mention of status migrainosus 03/01/2011     Mitral valve disorders(424.0) 10/16/2007     PONV (postoperative nausea and vomiting)      Thyroid Nodule 03/01/2011     Tobacco use disorder 03/01/2011     Unspecified hypothyroidism 03/01/2011     Past Surgical History:   Procedure Laterality Date     APPENDECTOMY  1977     BIOPSY  2017    taken at [facility] from Dr. Nadira cohen. Thyroid     CARDIAC SURGERY  2013    angiogram :  Normal coronary arteries.  Felt ot have some microvascular disease     CL AFF SURGICAL PATHOLOGY  01/02/2007    Thyroid Mass     COLONOSCOPY  1/13/2014    Procedure: COLONOSCOPY;  COLONOSCOPY ;  Surgeon: Chasidy Gee DO;  Location: [facility] OR     CV RIGHT HEART CATH MEASUREMENTS RECORDED N/A 4/14/2021    Procedure: CV RIGHT HEART CATH;  Surgeon: Danna Clay MD;  Location:  HEART CARDIAC CATH LAB     CV RIGHT  HEART EXERCISE STRESS STUDY N/A 2021    Procedure: Right Heart Exercise Stress Study;  Surgeon: Danna Clay MD;  Location:  HEART CARDIAC CATH LAB     ESOPHAGOSCOPY, GASTROSCOPY, DUODENOSCOPY (EGD), COMBINED N/A 2017    Procedure: COMBINED ESOPHAGOSCOPY, GASTROSCOPY, DUODENOSCOPY (EGD);  Surgeon: Johnathan Ruff DO;  Location: [facility] OR     GYN SURGERY      c-sections x 3     HYSTERECTOMY TOTAL ABDOMINAL, BILATERAL SALPINGO-OOPHORECTOMY, COMBINED N/A 2001     LAPAROSCOPIC CHOLECYSTECTOMY  2003    Cholelithiasis     LAPAROTOMY EXPLORATORY      abdominal pain/fever     LARYNGOSCOPY       SPLENECTOMY       Social History     Tobacco Use     Smoking status: Former Smoker     Packs/day: 0.25     Years: 40.00     Pack years: 10.00     Types: Cigarettes     Start date: 1975     Quit date: 2021     Years since quittin.6     Smokeless tobacco: Never Used   Substance Use Topics     Alcohol use: No     Drug use: No         Physical examination:  BP (!) 149/71   Pulse 75       Laboratory and diagnostic data reviewed personally 2021:      Results for STEVEN SZYMANSKI (MRN [id number]) as of 2021 08:44   Ref. Range 2021 10:38   N-Terminal Pro BNP Inpatient Latest Ref Range: 0 - 900 pg/mL 80             Right Heart Exercise Stress Study   CV RIGHT HEART CATH   Indications    Diastolic heart failure, unspecified HF chronicity (H) [I50.30 (ICD-10-CM)]   Chest pain, unspecified type [R07.9 (ICD-10-CM)]   Comments/Patient Narrative    60-year-old female with exertioanl shortness of breath who was referred to the cath lab for invasive exercise hemodynamic assessment.   Pre Procedure Diagnosis    Chest Pain with activity       Conclusion      Normal resting PA pressures, biventricualr filling pressures, and PVR at rest    Moderately reduced cardiac index at rest in the setting of bradycardia    Mild increase in PCWP and right atrial pressure with exercise    Approriate  increase in cardiac output and PA pressures with exercise.    No increase in PVR with exercise    These constellation of findings may indicate early HFpEF     Name: STEVEN SZYMANSKI  MRN: [id number]  : 1960  Study Date: 2020 09:50 AM  Age: 60 yrs  Gender: Female  Patient Location: hospitals  Reason For Study: GUTIERREZ (dyspnea on exertion)  History: GUTIERREZ  Ordering Physician: YOHAN CANTOR  Referring Physician: YOHAN CANTOR  Performed By: Kaur Camilo RDCS     BSA: 2.0 m2  Height: 67 in  Weight: 186 lb  _____________________________________________________________________________  __     _____________________________________________________________________________  __        Interpretation Summary  No pericardial effusion is present.  Global and regional left ventricular function is hyperkinetic with an EF of  65-70%.  Grade I or early diastolic dysfunction.  The right ventricle is normal size.  Global right ventricular function is normal.  Both atria appear normal.  Mild mitral insufficiency is present.  Moderate aortic insufficiency is present.  The Aortic Insufficiency is unchanged from previous echo 19  Mild tricuspid insufficiency is present.  Right ventricular systolic pressure is 20mmHg above the right atrial pressure.  Trace to mild pulmonic insufficiency is present.  The aorta root is normal.  _____________________________________________________________________________  __        Left Ventricle  Global and regional left ventricular function is hyperkinetic with an EF of  65-70%. Grade I or early diastolic dysfunction.     Right Ventricle  The right ventricle is normal size. Global right ventricular function is  normal.     Atria  Both atria appear normal.     Mitral Valve  Mild mitral insufficiency is present.     Aortic Valve  Moderate aortic insufficiency is present. The Aortic Insufficiency is  unchanged from previous echo 19. The mean AoV pressure gradient is 6.4  mmHg. The  peak AoV pressure gradient is 14.6 mmHg.        Tricuspid Valve  Mild tricuspid insufficiency is present. Right ventricular systolic pressure  is 20mmHg above the right atrial pressure.     Pulmonic Valve  Trace to mild pulmonic insufficiency is present.     Vessels  The aorta root is normal.     Pericardium  No pericardial effusion is present.       ANKLE BRACHIAL INDEX     FINDINGS:  The ankle brachial index measured 1.2 bilaterally.  Absolute ankle pressures were 160 which are adequate for wound  healing.     IMPRESSION:  NO EVIDENCE OF ARTERIAL OCCLUSIVE DISEASE IN EITHER LOWER  EXTREMITY.  Exam Date: Jun 02, 2017 03:10:00 PM  Author: LIS CRYSTAL  This report is final and signed    Assessment and recommendations:    1) Fatigue/chest pain/ dyspnea on exertion   2) Moderate AI - no progress; no signs of LV failure  3) Microvascular angina - always has chest pain  4) Diastolic heart failure - the decrease in diltiazem dosage improved her dyspnea on exertion but she feels her chest pain got worse. We discussed trying to lower the diltiazem further but she would like to hold off on that.    - continue diltiazem  mg dialy     4) Hypertension -  BP remains high at home; her BP machine was checked in the office and correlates well.     - losartan 40 mg daily (take in AM); she was on this in the past and had no problems   - BMP in 2 weeks   - BP check in 2 weeks   - check BP at home 3 hours after taking losartan    5) Nocturnal chest pain - try liquid antacid    6) Awake chest pain - try NTG     I'll see her back in a couple of months.    I appreciate the chance to help with Mrs. Suri eugene. Please let me know if you have any questions or concerns. I will see her in three months.     Normna Nichole M.D.    Phone call duration: 14:07 minutes

## 2021-09-28 NOTE — NURSING NOTE
"Chief Complaint   Patient presents with     RECHECK       Initial BP (!) 149/71   Pulse 75  Estimated body mass index is 32.86 kg/m  as calculated from the following:    Height as of 9/13/21: 1.702 m (5' 7\").    Weight as of 9/13/21: 95.2 kg (209 lb 12.8 oz).  Medication Reconciliation: complete  Heather Quinonez LPN    "

## 2021-09-28 NOTE — LETTER
9/28/2021      RE: Sharlene Mccord  [address]  [facility] [address]       Sharlene is a 61 year old who is being evaluated via a billable telephone visit.      What phone number would you like to be contacted at? [phone]  How would you like to obtain your AVS? Gama    B/P today @ home 149/71 Pulse 75      Additional Provider Notes:    CC- fatigue    HPI- Mrs. Mccord has been followed for several years by various cardiologists. She has known AI and has complained of fatigue throughout. She reports recently her fatigue has been much worse. She related that her fatigue was associated with dyspnea on exertion and chest discomfort. Her stress test showed an anterior wall defect that did not fit with her echocardiogram. She also has aortic insufficiency that by echocardiogram did not seem to have progressed. She underwent angiography at [facility]. This demonstrated no epicardial coronary artery disease. Her echocardiogram again showed mild to mod AI. A stress MRI was done that  suggested microvascular disease. She was begun on isosorbide mononitrate but stopped after 4 days due to severe headache. She cannot recall if it helped with her chest pain. She was started on diltiazem but stopped when she was admitted for diverticulitis. She is already on estrogen replacement. On 360 mg of diltiazem she reported the chest pain was better but her BP was low. She also noted numbness and tingling in both arms and both legs. The legs was constant. The arms mostly when she worked over her head. She also felt fatigued. She does not report orthostatic symptoms. Her diltiazem was reduced to 300 mg daily. The chest pain is worse. The fatigue is maybe a little better and the numbness/tingling is unchanged.     June 2014:  At our last visit I discussed with her that I have no other therapies to offer regarding her chest pain. We discuss the possibility of starting a medication such as Neurontin that has been  "reported to help with chronic pain syndromes. There is some evidence that patients with microvascular angina have abnormal pain sensitivity. She reports the Neurontin was started and it has helped. She is using SL NTG 1-2x/week as opposed to several times a day.    We had stopped her Diovan because on the increased diltiazem dose she was having relatively low BP. She is worried because she has seen some BP measurements that are high for her. She admits to being stressed. She has just started a w/u for memory loss and a change was seen on her MRI.    She had an echocardiogram done in March, see below, that showed no change in her aortic insufficiency.     She has been doing reasonably well. She is not having as much chest pain. She has been less active due to problems with disk disease in her spine. She has not noted any major changes in her exertional abilities.     [id number]: She has been having more joint pain. She is seeing a Rheumatologist in [place] soon.    Her chest pain is perhaps a bit better. Her legs and back feel heavy and she has sharp pains and aches, especially with climbing stairs. This has not been associated with her chest pain. On level drug she had been walking 2 miles with her sister but she has not done that for several months now.     [id number]: her chest pain has been doing reasonably well. She hasn't had NTG available for a few months.     She reports a new barking cough over the past 6 months - no fever, no new meds, not productive. The cough is not getting any worse but no better either. She has not had any wheezing that she has noticed.     Her legs feel heavy when she walks.    [id number]: She reports no real change in her chest pain or exertional abilities.  She has been troubled by chronic pain \"all over her body\".  She reports that rheumatology felt that she had a mixed connective tissue disorder.  She tried Plaquenil for a time but has stopped that and has been trying medical cannabis. "  She reports the cannabis has not helped particularly.    Her recent echocardiogram showed no change in her aortic insufficiency.  Her systolic function remains good.  Left ventricular size remains normal.    May 14, 2019: she reports the chest pain is the same. She feels more shortness of breath and wants to stop smoking. She has noted hot flashes recently.     November 24, 2020: no significant change in chest pain but has been feeling very fatigued and weak. She only went fishing once this year. She reports feeling too weak to reel in a fish.     April 27, 2021: exercise right heart cath showed mild increase in pressures with exercise c/w mild diastolic heart failure. She reports no change in her dyspnea on exertion or exertional abilities.    June 8, 2021:  she has noted her heart rate increased did seem to improve her dyspnea on exertion a bit. Her chest pain , if anything, got a little worse on the lower dose of diltiazem. Her BPs at home have been higher, as expected with the lower dose of diltiazem.    September 28, 2021 Telephone Visit: breathing and chest pain not different than in June. BP at home in 150 -170 range. Has not tried NTG for chest pain. Also wakes up with chest pain at times, despite protonix.     Current Outpatient Medications   Medication Sig Dispense Refill     acetaminophen (TYLENOL) 325 MG tablet Take 650 mg by mouth       amitriptyline (ELAVIL) 10 MG tablet Take 1 tablet (10 mg) by mouth At Bedtime 90 tablet 3     clonazePAM (KLONOPIN) 1 MG tablet TAKE 1/2 TO 1 TABLET BY MOUTH UP TO TWICE DAILY 60 tablet 0     diltiazem ER COATED BEADS (CARDIZEM CD/CARTIA XT) 240 MG 24 hr capsule Take 1 capsule (240 mg) by mouth daily 30 capsule 11     estradiol (ESTRACE) 0.5 MG tablet Take 1 tablet by mouth twice daily 60 tablet 5     EUTHYROX 75 MCG tablet Take 1 tablet by mouth once daily 90 tablet 0     nitroGLYcerin (NITROSTAT) 0.4 MG sublingual tablet DISSOLVE ONE TABLET UNDER THE TONGUE EVERY 5  MINUTES AS NEEDED FOR CHEST PAIN. DO NOT EXCEED A TOTAL OF 3 DOSES IN 15 MINUTES 30 tablet 4     pantoprazole (PROTONIX) 40 MG EC tablet TAKE ONE TABLET BY MOUTH 30-60 MINUTES BEFORE A MEAL 90 tablet 3     PROCTO-MED HC 2.5 % cream APPLY   TOPICALLY TO AFFECTED AREA THREE TIMES DAILY 60 g 0     traMADol (ULTRAM) 50 MG tablet Take 50 mg by mouth 3 times daily as needed for severe pain       Allergies   Allergen Reactions     Codeine      Isosorbide Mononitrate Other (See Comments)     Vomiting and headache       Lisinopril      [facility] scan     Paxil [Paroxetine] Headache     Past Medical History:   Diagnosis Date     Anxiety state, unspecified 03/01/2011     Aortic valve disorders 06/07/2000    Echocardiogram showin mild/moderate AI.  Normal LV function     Cardiac microvascular disease 4/10/2013    Based on Cardiac MRI done at       COPD (chronic obstructive pulmonary disease) (H) 7/8/2021     Depressive disorder 1997     Fatigue      Fatigue      Fibromyalgia 03/01/2011     Hypertension      Major depressive disorder, recurrent episode, unspecified 12/17/2014     Migraine, unspecified, without mention of intractable migraine without mention of status migrainosus 03/01/2011     Mitral valve disorders(424.0) 10/16/2007     PONV (postoperative nausea and vomiting)      Thyroid Nodule 03/01/2011     Tobacco use disorder 03/01/2011     Unspecified hypothyroidism 03/01/2011     Past Surgical History:   Procedure Laterality Date     APPENDECTOMY  1977     BIOPSY  2017    taken at [facility] from Dr. Nadira cohen. Thyroid     CARDIAC SURGERY  2013    angiogram [facility]:  Normal coronary arteries.  Felt ot have some microvascular disease     CL AFF SURGICAL PATHOLOGY  01/02/2007    Thyroid Mass     COLONOSCOPY  1/13/2014    Procedure: COLONOSCOPY;  COLONOSCOPY ;  Surgeon: Chasidy Gee DO;  Location: [facility] OR     CV RIGHT HEART CATH MEASUREMENTS RECORDED N/A 4/14/2021    Procedure: CV RIGHT HEART CATH;  Surgeon:  Danna Clay MD;  Location:  HEART CARDIAC CATH LAB     CV RIGHT HEART EXERCISE STRESS STUDY N/A 2021    Procedure: Right Heart Exercise Stress Study;  Surgeon: Danna Clay MD;  Location: [facility] CARDIAC CATH LAB     ESOPHAGOSCOPY, GASTROSCOPY, DUODENOSCOPY (EGD), COMBINED N/A 2017    Procedure: COMBINED ESOPHAGOSCOPY, GASTROSCOPY, DUODENOSCOPY (EGD);  Surgeon: Johnathan Ruff DO;  Location: [facility] OR     GYN SURGERY      c-sections x 3     HYSTERECTOMY TOTAL ABDOMINAL, BILATERAL SALPINGO-OOPHORECTOMY, COMBINED N/A 2001     LAPAROSCOPIC CHOLECYSTECTOMY  2003    Cholelithiasis     LAPAROTOMY EXPLORATORY      abdominal pain/fever     LARYNGOSCOPY       SPLENECTOMY       Social History     Tobacco Use     Smoking status: Former Smoker     Packs/day: 0.25     Years: 40.00     Pack years: 10.00     Types: Cigarettes     Start date: 1975     Quit date: 2021     Years since quittin.6     Smokeless tobacco: Never Used   Substance Use Topics     Alcohol use: No     Drug use: No         Physical examination:  BP (!) 149/71   Pulse 75       Laboratory and diagnostic data reviewed personally 2021:      Results for STEVEN SZYMANSKI (MRN [id number]) as of 2021 08:44   Ref. Range 2021 10:38   N-Terminal Pro BNP Inpatient Latest Ref Range: 0 - 900 pg/mL 80             Right Heart Exercise Stress Study   CV RIGHT HEART CATH   Indications    Diastolic heart failure, unspecified HF chronicity (H) [I50.30 (ICD-10-CM)]   Chest pain, unspecified type [R07.9 (ICD-10-CM)]   Comments/Patient Narrative    60-year-old female with exertioanl shortness of breath who was referred to the cath lab for invasive exercise hemodynamic assessment.   Pre Procedure Diagnosis    Chest Pain with activity       Conclusion      Normal resting PA pressures, biventricualr filling pressures, and PVR at rest    Moderately reduced cardiac index at rest in the setting of  bradycardia    Mild increase in PCWP and right atrial pressure with exercise    Approriate increase in cardiac output and PA pressures with exercise.    No increase in PVR with exercise    These constellation of findings may indicate early HFpEF     Name: STEVEN SZYMANSKI  MRN: [id number]  : 1960  Study Date: 2020 09:50 AM  Age: 60 yrs  Gender: Female  Patient Location: [facility]  Reason For Study: GUTIERREZ (dyspnea on exertion)  History: GUTIERREZ  Ordering Physician: YOHAN CANTOR  Referring Physician: YOHAN CANTOR  Performed By: Kaur Camilo JANUSZ     BSA: 2.0 m2  Height: 67 in  Weight: 186 lb  _____________________________________________________________________________  __     _____________________________________________________________________________  __        Interpretation Summary  No pericardial effusion is present.  Global and regional left ventricular function is hyperkinetic with an EF of  65-70%.  Grade I or early diastolic dysfunction.  The right ventricle is normal size.  Global right ventricular function is normal.  Both atria appear normal.  Mild mitral insufficiency is present.  Moderate aortic insufficiency is present.  The Aortic Insufficiency is unchanged from previous echo 19  Mild tricuspid insufficiency is present.  Right ventricular systolic pressure is 20mmHg above the right atrial pressure.  Trace to mild pulmonic insufficiency is present.  The aorta root is normal.  _____________________________________________________________________________  __        Left Ventricle  Global and regional left ventricular function is hyperkinetic with an EF of  65-70%. Grade I or early diastolic dysfunction.     Right Ventricle  The right ventricle is normal size. Global right ventricular function is  normal.     Atria  Both atria appear normal.     Mitral Valve  Mild mitral insufficiency is present.     Aortic Valve  Moderate aortic insufficiency is present. The Aortic Insufficiency  is  unchanged from previous echo 6/17/19. The mean AoV pressure gradient is 6.4  mmHg. The peak AoV pressure gradient is 14.6 mmHg.        Tricuspid Valve  Mild tricuspid insufficiency is present. Right ventricular systolic pressure  is 20mmHg above the right atrial pressure.     Pulmonic Valve  Trace to mild pulmonic insufficiency is present.     Vessels  The aorta root is normal.     Pericardium  No pericardial effusion is present.       ANKLE BRACHIAL INDEX     FINDINGS:  The ankle brachial index measured 1.2 bilaterally.  Absolute ankle pressures were 160 which are adequate for wound  healing.     IMPRESSION:  NO EVIDENCE OF ARTERIAL OCCLUSIVE DISEASE IN EITHER LOWER  EXTREMITY.  Exam Date: Jun 02, 2017 03:10:00 PM  Author: LIS CRYSTAL  This report is final and signed    Assessment and recommendations:    1) Fatigue/chest pain/ dyspnea on exertion   2) Moderate AI - no progress; no signs of LV failure  3) Microvascular angina - always has chest pain  4) Diastolic heart failure - the decrease in diltiazem dosage improved her dyspnea on exertion but she feels her chest pain got worse. We discussed trying to lower the diltiazem further but she would like to hold off on that.    - continue diltiazem  mg dialy     4) Hypertension -  BP remains high at home; her BP machine was checked in the office and correlates well.     - losartan 40 mg daily (take in AM); she was on this in the past and had no problems   - BMP in 2 weeks   - BP check in 2 weeks   - check BP at home 3 hours after taking losartan    5) Nocturnal chest pain - try liquid antacid    6) Awake chest pain - try NTG     I'll see her back in a couple of months.    I appreciate the chance to help with Mrs. Suri eugene. Please let me know if you have any questions or concerns. I will see her in three months.     Norman Nichole M.D.    Phone call duration: 14:07 minutes        Norman Nichole MD

## 2021-10-12 ENCOUNTER — ALLIED HEALTH/NURSE VISIT (OUTPATIENT)
Dept: CARDIOLOGY | Facility: OTHER | Age: 61
End: 2021-10-12
Attending: INTERNAL MEDICINE
Payer: MEDICARE

## 2021-10-12 ENCOUNTER — LAB (OUTPATIENT)
Dept: LAB | Facility: OTHER | Age: 61
End: 2021-10-12
Attending: INTERNAL MEDICINE
Payer: MEDICARE

## 2021-10-12 DIAGNOSIS — I10 BENIGN ESSENTIAL HYPERTENSION: ICD-10-CM

## 2021-10-12 DIAGNOSIS — I50.32 CHRONIC DIASTOLIC HEART FAILURE (H): ICD-10-CM

## 2021-10-12 DIAGNOSIS — I50.30 DIASTOLIC HEART FAILURE, UNSPECIFIED HF CHRONICITY (H): ICD-10-CM

## 2021-10-12 DIAGNOSIS — I20.89 MICROVASCULAR ANGINA (H): ICD-10-CM

## 2021-10-12 DIAGNOSIS — R07.9 CHEST PAIN, UNSPECIFIED TYPE: ICD-10-CM

## 2021-10-12 DIAGNOSIS — Z01.30 BLOOD PRESSURE CHECK: Primary | ICD-10-CM

## 2021-10-12 DIAGNOSIS — I20.89 OTHER FORMS OF ANGINA PECTORIS (H): ICD-10-CM

## 2021-10-12 DIAGNOSIS — I25.85 CARDIAC MICROVASCULAR DISEASE: ICD-10-CM

## 2021-10-12 DIAGNOSIS — I51.89 DIASTOLIC DYSFUNCTION: ICD-10-CM

## 2021-10-12 LAB
ANION GAP SERPL CALCULATED.3IONS-SCNC: 5 MMOL/L (ref 3–14)
BUN SERPL-MCNC: 17 MG/DL (ref 7–30)
CALCIUM SERPL-MCNC: 8.9 MG/DL (ref 8.5–10.1)
CHLORIDE BLD-SCNC: 106 MMOL/L (ref 94–109)
CO2 SERPL-SCNC: 25 MMOL/L (ref 20–32)
CREAT SERPL-MCNC: 1.07 MG/DL (ref 0.52–1.04)
GFR SERPL CREATININE-BSD FRML MDRD: 56 ML/MIN/1.73M2
GLUCOSE BLD-MCNC: 93 MG/DL (ref 70–99)
POTASSIUM BLD-SCNC: 4.3 MMOL/L (ref 3.4–5.3)
SODIUM SERPL-SCNC: 136 MMOL/L (ref 133–144)

## 2021-10-12 PROCEDURE — 80048 BASIC METABOLIC PNL TOTAL CA: CPT | Mod: ZL

## 2021-10-12 PROCEDURE — 36415 COLL VENOUS BLD VENIPUNCTURE: CPT | Mod: ZL

## 2021-10-12 PROCEDURE — 99207 PR NO CHARGE NURSE ONLY: CPT

## 2021-10-12 NOTE — NURSING NOTE
Patient presented to the clinic to have her blood pressure checked.  Blood pressure readings over the last 2 weeks are as follows:  9/30/21   160/83  Pulse 70  10/8/21   134/91  Pulse 67  10/11/21  130/79  Pulse 77  10/12/21  117/71  Pulse 69  Patient will continue to monitor her blood pressures.  EZEKIEL RODRIGUEZ LPN

## 2021-10-17 DIAGNOSIS — E03.9 ACQUIRED HYPOTHYROIDISM: ICD-10-CM

## 2021-10-17 DIAGNOSIS — F41.1 GENERALIZED ANXIETY DISORDER: ICD-10-CM

## 2021-10-19 RX ORDER — LEVOTHYROXINE SODIUM 75 UG/1
TABLET ORAL
Qty: 90 TABLET | Refills: 0 | Status: SHIPPED | OUTPATIENT
Start: 2021-10-19 | End: 2022-01-18

## 2021-10-19 RX ORDER — CLONAZEPAM 1 MG/1
TABLET ORAL
Qty: 60 TABLET | Refills: 0 | Status: SHIPPED | OUTPATIENT
Start: 2021-10-19 | End: 2021-11-23

## 2021-10-19 NOTE — TELEPHONE ENCOUNTER
Klonopin      Last Written Prescription Date:  9/16/21  Last Fill Quantity: 60,   # refills: 0  Last Office Visit: 9/13/21  Future Office visit:    Next 5 appointments (look out 90 days)    Nov 09, 2021  2:30 PM  (Arrive by 2:15 PM)  Return Visit with Norman Nichole MD  [facility] [place] ([facility] [place] ) [address]  [place] [address]  [phone]   Dec 13, 2021  9:45 AM  (Arrive by 9:30 AM)  SHORT with KEILY Fonseca  [facility] [place] ([facility] [place] ) [address]  [place] [address]  [phone]           Routing refill request to provider for review/approval because:        Euthyrox      Last Written Prescription Date:  7/7/21  Last Fill Quantity: 90,   # refills: 0  Last Office Visit: 9/13/21  Future Office visit:    Next 5 appointments (look out 90 days)    Nov 09, 2021  2:30 PM  (Arrive by 2:15 PM)  Return Visit with Norman Nichole MD  [facility] [place] ([facility] [place] ) [address]  [place] [address]  [phone]   Dec 13, 2021  9:45 AM  (Arrive by 9:30 AM)  SHORT with KEILY Fonseca  [facility] [place] ([facility] [place] ) [address]  [place] [address]  [phone]           Routing refill request to provider for review/approval because:

## 2021-11-09 ENCOUNTER — OFFICE VISIT (OUTPATIENT)
Dept: CARDIOLOGY | Facility: OTHER | Age: 61
End: 2021-11-09
Attending: INTERNAL MEDICINE
Payer: MEDICARE

## 2021-11-09 ENCOUNTER — IMMUNIZATION (OUTPATIENT)
Dept: FAMILY MEDICINE | Facility: OTHER | Age: 61
End: 2021-11-09
Attending: PHYSICIAN ASSISTANT
Payer: COMMERCIAL

## 2021-11-09 VITALS
BODY MASS INDEX: 32.96 KG/M2 | DIASTOLIC BLOOD PRESSURE: 79 MMHG | SYSTOLIC BLOOD PRESSURE: 127 MMHG | WEIGHT: 210 LBS | RESPIRATION RATE: 16 BRPM | HEIGHT: 67 IN | TEMPERATURE: 97.4 F | OXYGEN SATURATION: 98 % | HEART RATE: 75 BPM

## 2021-11-09 DIAGNOSIS — I35.9 AORTIC VALVE DISORDER: ICD-10-CM

## 2021-11-09 DIAGNOSIS — N18.1 CHRONIC KIDNEY DISEASE, STAGE 1: ICD-10-CM

## 2021-11-09 DIAGNOSIS — I25.85 CARDIAC MICROVASCULAR DISEASE: ICD-10-CM

## 2021-11-09 DIAGNOSIS — I50.32 CHRONIC DIASTOLIC HEART FAILURE (H): Primary | ICD-10-CM

## 2021-11-09 PROCEDURE — 91300 PR COVID VAC PFIZER DIL RECON 30 MCG/0.3 ML IM: CPT

## 2021-11-09 PROCEDURE — G0463 HOSPITAL OUTPT CLINIC VISIT: HCPCS

## 2021-11-09 PROCEDURE — 99214 OFFICE O/P EST MOD 30 MIN: CPT | Performed by: INTERNAL MEDICINE

## 2021-11-09 RX ORDER — SPIRONOLACTONE 25 MG/1
25 TABLET ORAL DAILY
Qty: 30 TABLET | Refills: 11 | Status: SHIPPED | OUTPATIENT
Start: 2021-11-09 | End: 2022-10-13

## 2021-11-09 ASSESSMENT — PAIN SCALES - GENERAL: PAINLEVEL: MILD PAIN (3)

## 2021-11-09 ASSESSMENT — MIFFLIN-ST. JEOR: SCORE: 1550.18

## 2021-11-09 NOTE — PATIENT INSTRUCTIONS
Thank you for allowing Dr. Nichole and our team to participate in your care. Please call our office at [phone] with scheduling questions or if you need to cancel or change your appointment. With any other questions or concerns you may call Heather cardiology nurse at [phone].       If you experience chest pain, chest pressure, chest tightness, shortness of breath, fainting, lightheadedness, nausea, vomiting, or other concerning symptoms, please report to the Emergency Department or call 911. These symptoms may be emergent, and best treated in the Emergency Department.    Follow up in 3 MONTHS WITH ENRIQUE Avendaño CNP.    LABS IN 2 WEEKS. CALL [phone] TO SCHEDULE.    SPIRONOLACTONE 25 MG DAILY.

## 2021-11-09 NOTE — PROGRESS NOTES
CC- fatigue    HPI- Mrs. Mccord has been followed for several years by various cardiologists. She has known AI and has complained of fatigue throughout. She reports recently her fatigue has been much worse. She related that her fatigue was associated with dyspnea on exertion and chest discomfort. Her stress test showed an anterior wall defect that did not fit with her echocardiogram. She also has aortic insufficiency that by echocardiogram did not seem to have progressed. She underwent angiography at [facility]. This demonstrated no epicardial coronary artery disease. Her echocardiogram again showed mild to mod AI. A stress MRI was done that  suggested microvascular disease. She was begun on isosorbide mononitrate but stopped after 4 days due to severe headache. She cannot recall if it helped with her chest pain. She was started on diltiazem but stopped when she was admitted for diverticulitis. She is already on estrogen replacement. On 360 mg of diltiazem she reported the chest pain was better but her BP was low. She also noted numbness and tingling in both arms and both legs. The legs was constant. The arms mostly when she worked over her head. She also felt fatigued. She does not report orthostatic symptoms. Her diltiazem was reduced to 300 mg daily. The chest pain is worse. The fatigue is maybe a little better and the numbness/tingling is unchanged.     June 2014:  At our last visit I discussed with her that I have no other therapies to offer regarding her chest pain. We discuss the possibility of starting a medication such as Neurontin that has been reported to help with chronic pain syndromes. There is some evidence that patients with microvascular angina have abnormal pain sensitivity. She reports the Neurontin was started and it has helped. She is using SL NTG 1-2x/week as opposed to several times a day.    We had stopped her Diovan because on the increased diltiazem dose she was  "having relatively low BP. She is worried because she has seen some BP measurements that are high for her. She admits to being stressed. She has just started a w/u for memory loss and a change was seen on her MRI.    She had an echocardiogram done in March, see below, that showed no change in her aortic insufficiency.     She has been doing reasonably well. She is not having as much chest pain. She has been less active due to problems with disk disease in her spine. She has not noted any major changes in her exertional abilities.     [id number]: She has been having more joint pain. She is seeing a Rheumatologist in [place] soon.    Her chest pain is perhaps a bit better. Her legs and back feel heavy and she has sharp pains and aches, especially with climbing stairs. This has not been associated with her chest pain. On level drug she had been walking 2 miles with her sister but she has not done that for several months now.     [id number]: her chest pain has been doing reasonably well. She hasn't had NTG available for a few months.     She reports a new barking cough over the past 6 months - no fever, no new meds, not productive. The cough is not getting any worse but no better either. She has not had any wheezing that she has noticed.     Her legs feel heavy when she walks.    [id number]: She reports no real change in her chest pain or exertional abilities.  She has been troubled by chronic pain \"all over her body\".  She reports that rheumatology felt that she had a mixed connective tissue disorder.  She tried Plaquenil for a time but has stopped that and has been trying medical cannabis.  She reports the cannabis has not helped particularly.    Her recent echocardiogram showed no change in her aortic insufficiency.  Her systolic function remains good.  Left ventricular size remains normal.    May 14, 2019: she reports the chest pain is the same. She feels more shortness of breath and wants to stop smoking. She has noted " hot flashes recently.     November 24, 2020: no significant change in chest pain but has been feeling very fatigued and weak. She only went fishing once this year. She reports feeling too weak to reel in a fish.     April 27, 2021: exercise right heart cath showed mild increase in pressures with exercise c/w mild diastolic heart failure. She reports no change in her dyspnea on exertion or exertional abilities.    June 8, 2021:  she has noted her heart rate increased did seem to improve her dyspnea on exertion a bit. Her chest pain , if anything, got a little worse on the lower dose of diltiazem. Her BPs at home have been higher, as expected with the lower dose of diltiazem.    September 28, 2021 Telephone Visit: breathing and chest pain not different than in June. BP at home in 150 -170 range. Has not tried NTG for chest pain. Also wakes up with chest pain at times, despite protonix.     November 9, 2021 Interval history: she feels like her dyspnea on exertion is getting worse. The chest pain in unchanged.     Current Outpatient Medications   Medication Sig Dispense Refill     acetaminophen (TYLENOL) 325 MG tablet Take 650 mg by mouth       amitriptyline (ELAVIL) 10 MG tablet Take 1 tablet (10 mg) by mouth At Bedtime 90 tablet 3     clonazePAM (KLONOPIN) 1 MG tablet TAKE 1/2 TO 1 (ONE-HALF TO ONE) TABLET BY MOUTH UP TO TWICE DAILY 60 tablet 0     diltiazem ER COATED BEADS (CARDIZEM CD/CARTIA XT) 240 MG 24 hr capsule Take 1 capsule (240 mg) by mouth daily 30 capsule 11     estradiol (ESTRACE) 0.5 MG tablet Take 1 tablet by mouth twice daily 60 tablet 5     EUTHYROX 75 MCG tablet Take 1 tablet by mouth once daily 90 tablet 0     nitroGLYcerin (NITROSTAT) 0.4 MG sublingual tablet DISSOLVE ONE TABLET UNDER THE TONGUE EVERY 5 MINUTES AS NEEDED FOR CHEST PAIN. DO NOT EXCEED A TOTAL OF 3 DOSES IN 15 MINUTES 30 tablet 4     pantoprazole (PROTONIX) 40 MG EC tablet TAKE ONE TABLET BY MOUTH 30-60 MINUTES BEFORE A MEAL 90  tablet 3     PROCTO-MED HC 2.5 % cream APPLY   TOPICALLY TO AFFECTED AREA THREE TIMES DAILY 60 g 0     traMADol (ULTRAM) 50 MG tablet Take 50 mg by mouth 3 times daily as needed for severe pain       valsartan (DIOVAN) 40 MG tablet Take 1 tablet (40 mg) by mouth daily 30 tablet 11     Allergies   Allergen Reactions     Codeine      Isosorbide Mononitrate Other (See Comments)     Vomiting and headache       Lisinopril      [facility] scan     Paxil [Paroxetine] Headache     Past Medical History:   Diagnosis Date     Anxiety state, unspecified 03/01/2011     Aortic valve disorders 06/07/2000    Echocardiogram showin mild/moderate AI.  Normal LV function     Cardiac microvascular disease 4/10/2013    Based on Cardiac MRI done at       COPD (chronic obstructive pulmonary disease) (H) 7/8/2021     Depressive disorder 1997     Fatigue      Fatigue      Fibromyalgia 03/01/2011     Hypertension      Major depressive disorder, recurrent episode, unspecified 12/17/2014     Migraine, unspecified, without mention of intractable migraine without mention of status migrainosus 03/01/2011     Mitral valve disorders(424.0) 10/16/2007     PONV (postoperative nausea and vomiting)      Thyroid Nodule 03/01/2011     Tobacco use disorder 03/01/2011     Unspecified hypothyroidism 03/01/2011     Past Surgical History:   Procedure Laterality Date     APPENDECTOMY  1977     BIOPSY  2017    taken at [facility] from Dr. Nadira cohen. Thyroid     CARDIAC SURGERY  2013    angiogram :  Normal coronary arteries.  Felt ot have some microvascular disease     CL AFF SURGICAL PATHOLOGY  01/02/2007    Thyroid Mass     COLONOSCOPY  1/13/2014    Procedure: COLONOSCOPY;  COLONOSCOPY ;  Surgeon: Chasidy Gee DO;  Location: [facility]     CV RIGHT HEART CATH MEASUREMENTS RECORDED N/A 4/14/2021    Procedure: CV RIGHT HEART CATH;  Surgeon: Danna Clay MD;  Location:  HEART CARDIAC CATH LAB     CV RIGHT HEART EXERCISE STRESS STUDY N/A  "2021    Procedure: Right Heart Exercise Stress Study;  Surgeon: Danna Clay MD;  Location:  HEART CARDIAC CATH LAB     ESOPHAGOSCOPY, GASTROSCOPY, DUODENOSCOPY (EGD), COMBINED N/A 2017    Procedure: COMBINED ESOPHAGOSCOPY, GASTROSCOPY, DUODENOSCOPY (EGD);  Surgeon: Johnathan Ruff DO;  Location: [facility] OR     GYN SURGERY      c-sections x 3     HYSTERECTOMY TOTAL ABDOMINAL, BILATERAL SALPINGO-OOPHORECTOMY, COMBINED N/A 2001     LAPAROSCOPIC CHOLECYSTECTOMY  2003    Cholelithiasis     LAPAROTOMY EXPLORATORY      abdominal pain/fever     LARYNGOSCOPY       SPLENECTOMY       Social History     Tobacco Use     Smoking status: Former Smoker     Packs/day: 0.25     Years: 40.00     Pack years: 10.00     Types: Cigarettes     Start date: 1975     Quit date: 2021     Years since quittin.7     Smokeless tobacco: Never Used   Substance Use Topics     Alcohol use: No     Drug use: No       Physical examination:  /79   Pulse 75   Temp 97.4  F (36.3  C) (Tympanic)   Resp 16   Ht 1.702 m (5' 7\")   Wt 95.3 kg (210 lb)   SpO2 98%   BMI 32.89 kg/m    GENERAL APPEARANCE: healthy, alert and no distress  NECK: no venous distention  RESPIRATORY: lungs clear to auscultation - no rales, rhonchi or wheezes  CARDIOVASCULAR: regular, normal S1 S2, no murmur or gallop  ABDOMEN: soft, non tender with normal bowel sounds   EXTREMITIES: no edema    Laboratory and diagnostic data reviewed personally 2021:    Results for STEVEN SZYMANSKI (MRN [id number]) as of 2021 14:42   Ref. Range 10/12/2021 12:53   Sodium Latest Ref Range: 133 - 144 mmol/L 136   Potassium Latest Ref Range: 3.4 - 5.3 mmol/L 4.3   Chloride Latest Ref Range: 94 - 109 mmol/L 106   Carbon Dioxide Latest Ref Range: 20 - 32 mmol/L 25   Urea Nitrogen Latest Ref Range: 7 - 30 mg/dL 17   Creatinine Latest Ref Range: 0.52 - 1.04 mg/dL 1.07 (H)   GFR Estimate Latest Ref Range: >60 mL/min/1.73m2 56 (L)     Results " for STEVEN SZYMANSKI (MRN [id number]) as of 2021 14:42   Ref. Range 6/10/2021 13:08   TSH Latest Ref Range: 0.40 - 4.00 mU/L 0.92     Results for STEVEN SZYMANSKI (MRN [id number]) as of 2021 14:42   Ref. Range 6/10/2021 13:08   WBC Latest Ref Range: 4.0 - 11.0 10e9/L 7.4   Hemoglobin Latest Ref Range: 11.7 - 15.7 g/dL 13.9   Hematocrit Latest Ref Range: 35.0 - 47.0 % 40.9   Platelet Count Latest Ref Range: 150 - 450 10e9/L 198   RBC Count Latest Ref Range: 3.8 - 5.2 10e12/L 4.64   MCV Latest Ref Range: 78 - 100 fl 88   MCH Latest Ref Range: 26.5 - 33.0 pg 30.0   MCHC Latest Ref Range: 31.5 - 36.5 g/dL 34.0   RDW Latest Ref Range: 10.0 - 15.0 % 12.1       Right Heart Exercise Stress Study   CV RIGHT HEART CATH   Indications    Diastolic heart failure, unspecified HF chronicity (H) [I50.30 (ICD-10-CM)]   Chest pain, unspecified type [R07.9 (ICD-10-CM)]   Comments/Patient Narrative    60-year-old female with exertioanl shortness of breath who was referred to the cath lab for invasive exercise hemodynamic assessment.   Pre Procedure Diagnosis    Chest Pain with activity       Conclusion      Normal resting PA pressures, biventricualr filling pressures, and PVR at rest    Moderately reduced cardiac index at rest in the setting of bradycardia    Mild increase in PCWP and right atrial pressure with exercise    Approriate increase in cardiac output and PA pressures with exercise.    No increase in PVR with exercise    These constellation of findings may indicate early HFpEF     Name: STEVEN SZYMANSKI  MRN: [id number]  : 1960  Study Date: 2020 09:50 AM  Age: 60 yrs  Gender: Female  Patient Location: [facility]  Reason For Study: GUTIERREZ (dyspnea on exertion)  History: GUTIERREZ  Ordering Physician: YOHAN CANTOR  Referring Physician: YOHAN CANTOR  Performed By: Kaur Camilo, [facility]     BSA: 2.0 m2  Height: 67 in  Weight: 186 lb  _____________________________________________________________________________  __      _____________________________________________________________________________  __        Interpretation Summary  No pericardial effusion is present.  Global and regional left ventricular function is hyperkinetic with an EF of  65-70%.  Grade I or early diastolic dysfunction.  The right ventricle is normal size.  Global right ventricular function is normal.  Both atria appear normal.  Mild mitral insufficiency is present.  Moderate aortic insufficiency is present.  The Aortic Insufficiency is unchanged from previous echo 6/17/19  Mild tricuspid insufficiency is present.  Right ventricular systolic pressure is 20mmHg above the right atrial pressure.  Trace to mild pulmonic insufficiency is present.  The aorta root is normal.  _____________________________________________________________________________  __        Left Ventricle  Global and regional left ventricular function is hyperkinetic with an EF of  65-70%. Grade I or early diastolic dysfunction.     Right Ventricle  The right ventricle is normal size. Global right ventricular function is  normal.     Atria  Both atria appear normal.     Mitral Valve  Mild mitral insufficiency is present.     Aortic Valve  Moderate aortic insufficiency is present. The Aortic Insufficiency is  unchanged from previous echo 6/17/19. The mean AoV pressure gradient is 6.4  mmHg. The peak AoV pressure gradient is 14.6 mmHg.        Tricuspid Valve  Mild tricuspid insufficiency is present. Right ventricular systolic pressure  is 20mmHg above the right atrial pressure.     Pulmonic Valve  Trace to mild pulmonic insufficiency is present.     Vessels  The aorta root is normal.     Pericardium  No pericardial effusion is present.       ANKLE BRACHIAL INDEX     FINDINGS:  The ankle brachial index measured 1.2 bilaterally.  Absolute ankle pressures were 160 which are adequate for wound  healing.     IMPRESSION:  NO EVIDENCE OF ARTERIAL OCCLUSIVE DISEASE IN EITHER LOWER  EXTREMITY.  Exam  Date: Jun 02, 2017 03:10:00 PM  Author: LIS CRYSTAL  This report is final and signed    Assessment and recommendations:    1) Fatigue/chest pain/ dyspnea on exertion   2) Moderate AI - no progress; no signs of LV failure  3) Microvascular angina - always has chest pain  4) Diastolic heart failure - the decrease in diltiazem dosage improved her dyspnea on exertion but she feels her chest pain got worse. We discussed trying to lower the diltiazem further but she would like to hold off on that.    - continue diltiazem  mg dialy    - Will also check Mg.    - spironolactone 25 mg daily   - BMP and Mg in 2 weeks    4) Hypertension -  BP remains high at home; her BP machine was checked in the office and correlates well.     - losartan 40 mg daily (take in AM); she was on this in the past and had no problems    5) Nocturnal chest pain - try liquid antacid    6) Awake chest pain - try NTG     I'll see her back in a couple of months.    I appreciate the chance to help with Mrs. Suri eugene.

## 2021-11-09 NOTE — NURSING NOTE
"Chief Complaint   Patient presents with     Follow Up     Heart Problem       Initial /79   Pulse 75   Temp 97.4  F (36.3  C) (Tympanic)   Resp 16   Ht 1.702 m (5' 7\")   Wt 95.3 kg (210 lb)   SpO2 98%   BMI 32.89 kg/m   Estimated body mass index is 32.89 kg/m  as calculated from the following:    Height as of this encounter: 1.702 m (5' 7\").    Weight as of this encounter: 95.3 kg (210 lb).  Medication Reconciliation: complete  Jing Chino LPN    "

## 2021-11-13 ENCOUNTER — HEALTH MAINTENANCE LETTER (OUTPATIENT)
Age: 61
End: 2021-11-13

## 2021-11-15 ENCOUNTER — MYC MEDICAL ADVICE (OUTPATIENT)
Dept: FAMILY MEDICINE | Facility: OTHER | Age: 61
End: 2021-11-15
Payer: COMMERCIAL

## 2021-11-15 DIAGNOSIS — J20.9 ACUTE BRONCHITIS, UNSPECIFIED ORGANISM: Primary | ICD-10-CM

## 2021-11-15 RX ORDER — AZITHROMYCIN 250 MG/1
TABLET, FILM COATED ORAL
Qty: 6 TABLET | Refills: 0 | Status: SHIPPED | OUTPATIENT
Start: 2021-11-15 | End: 2021-11-20

## 2021-11-21 DIAGNOSIS — F41.1 GENERALIZED ANXIETY DISORDER: ICD-10-CM

## 2021-11-23 ENCOUNTER — MYC REFILL (OUTPATIENT)
Dept: PSYCHIATRY | Facility: OTHER | Age: 61
End: 2021-11-23

## 2021-11-23 ENCOUNTER — LAB (OUTPATIENT)
Dept: LAB | Facility: OTHER | Age: 61
End: 2021-11-23
Payer: MEDICARE

## 2021-11-23 DIAGNOSIS — F41.1 GENERALIZED ANXIETY DISORDER: ICD-10-CM

## 2021-11-23 DIAGNOSIS — I50.32 CHRONIC DIASTOLIC HEART FAILURE (H): ICD-10-CM

## 2021-11-23 LAB
ANION GAP SERPL CALCULATED.3IONS-SCNC: 6 MMOL/L (ref 3–14)
BUN SERPL-MCNC: 17 MG/DL (ref 7–30)
CALCIUM SERPL-MCNC: 9.9 MG/DL (ref 8.5–10.1)
CHLORIDE BLD-SCNC: 108 MMOL/L (ref 94–109)
CO2 SERPL-SCNC: 24 MMOL/L (ref 20–32)
CREAT SERPL-MCNC: 1.16 MG/DL (ref 0.52–1.04)
GFR SERPL CREATININE-BSD FRML MDRD: 51 ML/MIN/1.73M2
GLUCOSE BLD-MCNC: 100 MG/DL (ref 70–99)
MAGNESIUM SERPL-MCNC: 2.2 MG/DL (ref 1.6–2.3)
POTASSIUM BLD-SCNC: 4.3 MMOL/L (ref 3.4–5.3)
SODIUM SERPL-SCNC: 138 MMOL/L (ref 133–144)

## 2021-11-23 PROCEDURE — 36415 COLL VENOUS BLD VENIPUNCTURE: CPT | Mod: ZL

## 2021-11-23 PROCEDURE — 83735 ASSAY OF MAGNESIUM: CPT | Mod: ZL

## 2021-11-23 PROCEDURE — 80048 BASIC METABOLIC PNL TOTAL CA: CPT | Mod: ZL

## 2021-11-23 RX ORDER — CLONAZEPAM 1 MG/1
TABLET ORAL
Qty: 60 TABLET | Refills: 0 | Status: SHIPPED | OUTPATIENT
Start: 2021-11-23 | End: 2021-12-20

## 2021-11-23 NOTE — TELEPHONE ENCOUNTER
KLONOPIN      Last Written Prescription Date:  10-  Last Fill Quantity: 60,   # refills: 0  Last Office Visit: 9-  Future Office visit:    Next 5 appointments (look out 90 days)    Dec 08, 2021  9:45 AM  (Arrive by 9:30 AM)  SHORT with KEILY Fonseca  [facility] ([facility] [place] ) [address]  [place] [address]  [phone]           Routing refill request to provider for review/approval because:  Drug not on the [facility], [facility] or [facility] refill protocol or controlled substance

## 2021-11-25 NOTE — TELEPHONE ENCOUNTER
KLONOPIN    JUST FILLED  Last Written Prescription Date:  11-  Last Fill Quantity: 60,   # refills: 0

## 2021-11-28 PROBLEM — N18.1 CHRONIC KIDNEY DISEASE, STAGE 1: Status: ACTIVE | Noted: 2021-11-28

## 2021-11-30 ENCOUNTER — NURSE TRIAGE (OUTPATIENT)
Dept: FAMILY MEDICINE | Facility: OTHER | Age: 61
End: 2021-11-30
Payer: COMMERCIAL

## 2021-11-30 DIAGNOSIS — Z20.822 EXPOSURE TO 2019 NOVEL CORONAVIRUS: Primary | ICD-10-CM

## 2021-11-30 NOTE — TELEPHONE ENCOUNTER
Reason for Disposition    [1] CLOSE CONTACT COVID-19 EXPOSURE within last 14 days AND [2] NO symptoms    Additional Information    Negative: COVID-19 lab test positive    Negative: [1] Lives with someone known to have influenza (flu test positive) AND [2] flu-like symptoms (e.g., cough, runny nose, sore throat, SOB; with or without fever)    Negative: [1] Symptoms of COVID-19 (e.g., cough, fever, SOB, or others) AND [2] HCP diagnosed COVID-19 based on symptoms    Negative: [1] Symptoms of COVID-19 (e.g., cough, fever, SOB, or others) AND [2] lives in an area with community spread    Negative: [1] Symptoms of COVID-19 (e.g., cough, fever, SOB, or others) AND [2] within 14 days of EXPOSURE (close contact) with diagnosed or suspected COVID-19 patient    Negative: [1] Symptoms of COVID-19 (e.g., cough, fever, SOB, or others) AND [2] within 14 days of travel from high-risk area for COVID-19 community spread (identified by CDC)    Negative: [1] Difficulty breathing (shortness of breath) occurs AND [2] onset > 14 days after COVID-19 EXPOSURE (Close Contact)    Negative: [1] Dry cough occurs AND [2] onset > 14 days after COVID-19 EXPOSURE    Negative: [1] Wet cough (i.e., white-yellow, yellow, green, or randy colored sputum) AND [2] onset > 14 days after COVID-19 EXPOSURE    Negative: [1] Common cold symptoms AND [2] onset > 14 days after COVID-19 EXPOSURE    Negative: COVID-19 vaccine reaction suspected (e.g., fever, headache, muscle aches) occurring during days 1-3 after getting vaccine    Negative: COVID-19 vaccine, questions about    Negative: [1] CLOSE CONTACT COVID-19 EXPOSURE within last 14 days AND [2] needs COVID-19 lab test to return to work AND [3] NO symptoms    Negative: [1] CLOSE CONTACT COVID-19 EXPOSURE within last 14 days AND [2] exposed person is a  (e.g., police or paramedic) AND [3] NO symptoms    Negative: [1] CLOSE CONTACT COVID-19 EXPOSURE within last 14 days AND [2] exposed person  "is a healthcare worker who was NOT using all recommended personal protective equipment (e.g., a respirator-N95 mask, eye protection, gloves, and gown) AND [3] NO symptoms    Negative: [1] Living or working in a correctional facility, long-term care facility, or shelter (i.e., congregate setting; densely populated) AND [2] where an outbreak has occurred AND [3] NO symptoms    Answer Assessment - Initial Assessment Questions  1. COVID-19 EXPOSURE: \"Please describe how you were exposed to someone with a COVID-19 infection.\"      Spouse tested positive on 11/29/21  2. PLACE of CONTACT: \"Where were you when you were exposed to COVID-19?\" (e.g., home, school, medical waiting room; which city?)      home  3. TYPE of CONTACT: \"How much contact was there?\" (e.g., sitting next to, live in same house, work in same office, same building)      Live in the same house  4. DURATION of CONTACT: \"How long were you in contact with the COVID-19 patient?\" (e.g., a few seconds, passed by person, a few minutes, 15 minutes or longer, live with the patient)      Live in same house  5. MASK: \"Were you wearing a mask?\" \"Was the other person wearing a mask?\" Note: wearing a mask reduces the risk of an otherwise close contact.      no  6. DATE of CONTACT: \"When did you have contact with a COVID-19 patient?\" (e.g., how many days ago)      Tested positive on 11/29/21  7. COMMUNITY SPREAD: \"Are there lots of cases of COVID-19 (community spread) where you live?\" (See public health department website, if unsure)        yes  8. SYMPTOMS: \"Do you have any symptoms?\" (e.g., fever, cough, breathing difficulty, loss of taste or smell)      Headache and congestion  9. PREGNANCY OR POSTPARTUM: \"Is there any chance you are pregnant?\" \"When was your last menstrual period?\" \"Did you deliver in the last 2 weeks?\"      no  10. HIGH RISK: \"Do you have any heart or lung problems?\" \"Do you have a weak immune system?\" (e.g., heart failure, COPD, asthma, HIV " "positive, chemotherapy, renal failure, diabetes mellitus, sickle cell anemia, obesity)        Heart disease  11. TRAVEL: \"Have you traveled out of the country recently?\" If Yes, ask: \"When and where?\" Also ask about out-of-state travel, since the [facility] has identified some high-risk cities for community spread in the . Note: Travel becomes less relevant if there is widespread community transmission where the patient lives.        no    Protocols used: CORONAVIRUS (COVID-19) EXPOSURE-A- 8.25.2021      "

## 2021-12-01 RX ORDER — CLONAZEPAM 1 MG/1
TABLET ORAL
Qty: 60 TABLET | Refills: 0 | OUTPATIENT
Start: 2021-12-01

## 2021-12-02 DIAGNOSIS — K21.00 GASTROESOPHAGEAL REFLUX DISEASE WITH ESOPHAGITIS WITHOUT HEMORRHAGE: ICD-10-CM

## 2021-12-03 ENCOUNTER — OFFICE VISIT (OUTPATIENT)
Dept: FAMILY MEDICINE | Facility: OTHER | Age: 61
End: 2021-12-03
Attending: PHYSICIAN ASSISTANT
Payer: MEDICARE

## 2021-12-03 DIAGNOSIS — Z20.822 EXPOSURE TO 2019 NOVEL CORONAVIRUS: ICD-10-CM

## 2021-12-03 PROCEDURE — U0005 INFEC AGEN DETEC AMPLI PROBE: HCPCS | Mod: ZL

## 2021-12-03 RX ORDER — PANTOPRAZOLE SODIUM 40 MG/1
TABLET, DELAYED RELEASE ORAL
Qty: 90 TABLET | Refills: 0 | Status: SHIPPED | OUTPATIENT
Start: 2021-12-03 | End: 2022-02-21

## 2021-12-04 LAB — SARS-COV-2 RNA RESP QL NAA+PROBE: NEGATIVE

## 2021-12-06 NOTE — PROGRESS NOTES
"Sharlene is a 61 year old who is being evaluated via a billable telephone visit.      What phone number would you like to be contacted at? [phone]  How would you like to obtain your AVS? MyChart    Assessment & Plan     Flank pain  Back pain and wosening   - US Kidney Left; Future  - US Kidney Right; Future    Personal history of tobacco use  Due for a check.  Aware of this and has tried to quit. Sister is currently ill with cancer. Lung derived. Is motivated.   - Prof Fee: Shared Decision Making Visit for Lung Cancer Screening  - CT Chest Lung Cancer Scrn Low Dose wo; Future    Review of external notes as documented elsewhere in note  Ordering of each unique test  Prescription drug management  5  minutes spent on the date of the encounter doing chart review, patient visit and documentation        BMI:   Estimated body mass index is 32.89 kg/m  as calculated from the following:    Height as of 21: 1.702 m (5' 7\").    Weight as of 21: 95.3 kg (210 lb).       See Patient Instructions    No follow-ups on file.    KEILY Cobos  [facility] [place]    Subjective   Sharlene is a 61 year old who presents for the following health issues     HPI      Blood work - Cardiology lab result levels     Depression and Anxiety Follow-Up    How are you doing with your depression since your last visit? No change    How are you doing with your anxiety since your last visit?  No change    Are you having other symptoms that might be associated with depression or anxiety? No change    Have you had a significant life event? Sister is very sick    Do you have any concerns with your use of alcohol or other drugs? No    Social History     Tobacco Use     Smoking status: Former Smoker     Packs/day: 0.25     Years: 40.00     Pack years: 10.00     Types: Cigarettes     Start date: 1975     Quit date: 2021     Years since quittin.8     Smokeless tobacco: Never Used   Substance Use Topics     Alcohol " use: No     Drug use: No     PHQ 7/8/2021 9/13/2021 12/8/2021   PHQ-9 Total Score 0 0 15   Q9: Thoughts of better off dead/self-harm past 2 weeks Not at all Not at all Not at all     SHAKIR-7 SCORE 7/8/2021 9/13/2021 12/8/2021   Total Score 6 15 13     Last PHQ-9 12/8/2021   1.  Little interest or pleasure in doing things 1   2.  Feeling down, depressed, or hopeless 2   3.  Trouble falling or staying asleep, or sleeping too much 3   4.  Feeling tired or having little energy 3   5.  Poor appetite or overeating 3   6.  Feeling bad about yourself 0   7.  Trouble concentrating 3   8.  Moving slowly or restless 0   Q9: Thoughts of better off dead/self-harm past 2 weeks 0   PHQ-9 Total Score 15   Difficulty at work, home, or with people Very difficult     SHAKIR-7  12/8/2021   1. Feeling nervous, anxious, or on edge 1   2. Not being able to stop or control worrying 3   3. Worrying too much about different things 3   4. Trouble relaxing 1   5. Being so restless that it is hard to sit still 1   6. Becoming easily annoyed or irritable 3   7. Feeling afraid, as if something awful might happen 1   SHAKIR-7 Total Score 13   If you checked any problems, how difficult have they made it for you to do your work, take care of things at home, or get along with other people? Somewhat difficult       Suicide Assessment Five-step Evaluation and Treatment (SAFE-T)      How many servings of fruits and vegetables do you eat daily?  0-1    On average, how many sweetened beverages do you drink each day (Examples: soda, juice, sweet tea, etc.  Do NOT count diet or artificially sweetened beverages)?   0    How many days per week do you exercise enough to make your heart beat faster? 3 or less    How many minutes a day do you exercise enough to make your heart beat faster? 9 or less    How many days per week do you miss taking your medication? 0        Review of Systems   Constitutional, HEENT, cardiovascular, pulmonary, gi and gu systems are negative,  except as otherwise noted.      Objective           Vitals:  No vitals were obtained today due to virtual visit.    Physical Exam   healthy, alert and no distress  PSYCH: Alert and oriented times 3; coherent speech, normal   rate and volume, able to articulate logical thoughts, able   to abstract reason, no tangential thoughts, no hallucinations   or delusions  Her affect is normal  RESP: No cough, no audible wheezing, able to talk in full sentences  Remainder of exam unable to be completed due to telephone visits    Office Visit on 12/03/2021   Component Date Value Ref Range Status     SARS CoV2 PCR 12/03/2021 Negative  Negative, Testing sent to reference lab. Results will be returned via unsolicited result Final    NEGATIVE: SARS-CoV-2 (COVID-19) RNA not detected, presumed negative.               Phone call duration: 15  minutes  Lung Cancer Screening Shared Decision Making Visit     Sharlene Mccord is eligible for lung cancer screening on the basis of the information provided in my signed lung cancer screening order.     I have discussed with patient the risks and benefits of screening for lung cancer with low-dose CT.     The risks include:  radiation exposure: one low dose chest CT has as much ionizing radiation as about 15 chest x-rays or 6 months of background radiation living in [place]    false positives: 96% of positive findings/nodules are NOT cancer, but some might still require additional diagnostic evaluation, including biopsy  over-diagnosis: some slow growing cancers that might never have been clinically significant will be detected and treated unnecessarily     The benefit of early detection of lung cancer is contingent upon adherence to annual screening or more frequent follow up if indicated.     Furthermore, reaping the benefits of screening requires Sharlene Mccord to be willing and physically able to undergo diagnostic procedures, if indicated. Although no specific guide is available for  determining severity of comorbidities, it is reasonable to withhold screening in patients who have greater mortality risk from other diseases.     We did discuss that the only way to prevent lung cancer is to not smoke. Smoking cessation counseling was not given.      I did offer risk estimation using a calculator such as this one:    ShouldIScreen

## 2021-12-08 ENCOUNTER — VIRTUAL VISIT (OUTPATIENT)
Dept: FAMILY MEDICINE | Facility: OTHER | Age: 61
End: 2021-12-08
Attending: FAMILY MEDICINE
Payer: COMMERCIAL

## 2021-12-08 DIAGNOSIS — R10.9 FLANK PAIN: Primary | ICD-10-CM

## 2021-12-08 DIAGNOSIS — Z87.891 PERSONAL HISTORY OF TOBACCO USE: ICD-10-CM

## 2021-12-08 PROCEDURE — 99213 OFFICE O/P EST LOW 20 MIN: CPT | Mod: 95 | Performed by: PHYSICIAN ASSISTANT

## 2021-12-08 PROCEDURE — G0296 VISIT TO DETERM LDCT ELIG: HCPCS | Mod: TEL,95 | Performed by: PHYSICIAN ASSISTANT

## 2021-12-08 ASSESSMENT — ANXIETY QUESTIONNAIRES
4. TROUBLE RELAXING: SEVERAL DAYS
1. FEELING NERVOUS, ANXIOUS, OR ON EDGE: SEVERAL DAYS
GAD7 TOTAL SCORE: 13
6. BECOMING EASILY ANNOYED OR IRRITABLE: NEARLY EVERY DAY
IF YOU CHECKED OFF ANY PROBLEMS ON THIS QUESTIONNAIRE, HOW DIFFICULT HAVE THESE PROBLEMS MADE IT FOR YOU TO DO YOUR WORK, TAKE CARE OF THINGS AT HOME, OR GET ALONG WITH OTHER PEOPLE: SOMEWHAT DIFFICULT
3. WORRYING TOO MUCH ABOUT DIFFERENT THINGS: NEARLY EVERY DAY
5. BEING SO RESTLESS THAT IT IS HARD TO SIT STILL: SEVERAL DAYS
2. NOT BEING ABLE TO STOP OR CONTROL WORRYING: NEARLY EVERY DAY
7. FEELING AFRAID AS IF SOMETHING AWFUL MIGHT HAPPEN: SEVERAL DAYS

## 2021-12-08 NOTE — PATIENT INSTRUCTIONS

## 2021-12-08 NOTE — NURSING NOTE
"Chief Complaint   Patient presents with     Depression     Anxiety       Initial There were no vitals taken for this visit. Estimated body mass index is 32.89 kg/m  as calculated from the following:    Height as of 11/9/21: 1.702 m (5' 7\").    Weight as of 11/9/21: 95.3 kg (210 lb).  Medication Reconciliation: reese Patel  "

## 2021-12-09 ENCOUNTER — TELEPHONE (OUTPATIENT)
Dept: FAMILY MEDICINE | Facility: OTHER | Age: 61
End: 2021-12-09
Payer: COMMERCIAL

## 2021-12-09 DIAGNOSIS — N28.9 RENAL INSUFFICIENCY: Primary | ICD-10-CM

## 2021-12-09 ASSESSMENT — ANXIETY QUESTIONNAIRES: GAD7 TOTAL SCORE: 13

## 2021-12-09 ASSESSMENT — PATIENT HEALTH QUESTIONNAIRE - PHQ9: SUM OF ALL RESPONSES TO PHQ QUESTIONS 1-9: 15

## 2021-12-19 DIAGNOSIS — M79.7 FIBROMYALGIA: ICD-10-CM

## 2021-12-19 DIAGNOSIS — F09 COGNITIVE DISORDER: ICD-10-CM

## 2021-12-19 DIAGNOSIS — F41.1 GENERALIZED ANXIETY DISORDER: ICD-10-CM

## 2021-12-20 RX ORDER — CLONAZEPAM 1 MG/1
TABLET ORAL
Qty: 60 TABLET | Refills: 0 | Status: SHIPPED | OUTPATIENT
Start: 2021-12-20 | End: 2022-01-21

## 2021-12-20 RX ORDER — AMITRIPTYLINE HYDROCHLORIDE 10 MG/1
TABLET ORAL
Qty: 60 TABLET | Refills: 0 | Status: SHIPPED | OUTPATIENT
Start: 2021-12-20 | End: 2022-01-19 | Stop reason: ALTCHOICE

## 2021-12-20 NOTE — TELEPHONE ENCOUNTER
Elavil      Last Written Prescription Date:  9/13/21  Last Fill Quantity: 90,   # refills: 3  Last Office Visit: 12/8/21  Future Office visit:       Routing refill request to provider for review/approval because:      Klonopin      Last Written Prescription Date:  11/23/21  Last Fill Quantity: 60,   # refills: 0  Last Office Visit: 12/8/21  Future Office visit:       Routing refill request to provider for review/approval because:

## 2021-12-22 ENCOUNTER — HOSPITAL ENCOUNTER (OUTPATIENT)
Dept: CT IMAGING | Facility: HOSPITAL | Age: 61
End: 2021-12-22
Attending: PHYSICIAN ASSISTANT
Payer: MEDICARE

## 2021-12-22 ENCOUNTER — HOSPITAL ENCOUNTER (OUTPATIENT)
Dept: ULTRASOUND IMAGING | Facility: HOSPITAL | Age: 61
End: 2021-12-22
Attending: PHYSICIAN ASSISTANT
Payer: MEDICARE

## 2021-12-22 DIAGNOSIS — R10.9 FLANK PAIN: ICD-10-CM

## 2021-12-22 DIAGNOSIS — Z87.891 PERSONAL HISTORY OF TOBACCO USE: ICD-10-CM

## 2021-12-22 PROCEDURE — 76770 US EXAM ABDO BACK WALL COMP: CPT

## 2021-12-22 PROCEDURE — 71271 CT THORAX LUNG CANCER SCR C-: CPT

## 2022-01-17 DIAGNOSIS — E03.9 ACQUIRED HYPOTHYROIDISM: ICD-10-CM

## 2022-01-18 RX ORDER — LEVOTHYROXINE SODIUM 75 UG/1
TABLET ORAL
Qty: 90 TABLET | Refills: 0 | Status: SHIPPED | OUTPATIENT
Start: 2022-01-18 | End: 2022-04-20

## 2022-01-18 NOTE — TELEPHONE ENCOUNTER
Synthroid      Last Written Prescription Date:  10/19/21  Last Fill Quantity: 90,   # refills: 0  Last Office Visit: 12/08/21  Future Office visit:

## 2022-01-19 ENCOUNTER — OFFICE VISIT (OUTPATIENT)
Dept: FAMILY MEDICINE | Facility: OTHER | Age: 62
End: 2022-01-19
Attending: PHYSICIAN ASSISTANT
Payer: MEDICARE

## 2022-01-19 VITALS
HEART RATE: 89 BPM | OXYGEN SATURATION: 98 % | SYSTOLIC BLOOD PRESSURE: 126 MMHG | WEIGHT: 215 LBS | BODY MASS INDEX: 33.74 KG/M2 | DIASTOLIC BLOOD PRESSURE: 80 MMHG | TEMPERATURE: 98 F | HEIGHT: 67 IN

## 2022-01-19 DIAGNOSIS — K64.4 EXTERNAL HEMORRHOIDS: ICD-10-CM

## 2022-01-19 DIAGNOSIS — N39.46 MIXED INCONTINENCE: ICD-10-CM

## 2022-01-19 DIAGNOSIS — Z23 NEED FOR PROPHYLACTIC VACCINATION AND INOCULATION AGAINST INFLUENZA: Primary | ICD-10-CM

## 2022-01-19 DIAGNOSIS — G47.00 PERSISTENT INSOMNIA: ICD-10-CM

## 2022-01-19 DIAGNOSIS — F41.9 ANXIETY: ICD-10-CM

## 2022-01-19 DIAGNOSIS — N28.9 RENAL INSUFFICIENCY: ICD-10-CM

## 2022-01-19 LAB
ALBUMIN UR-MCNC: NEGATIVE MG/DL
ANION GAP SERPL CALCULATED.3IONS-SCNC: 6 MMOL/L (ref 3–14)
APPEARANCE UR: CLEAR
BACTERIA #/AREA URNS HPF: ABNORMAL /HPF
BILIRUB UR QL STRIP: NEGATIVE
BUN SERPL-MCNC: 16 MG/DL (ref 7–30)
CALCIUM SERPL-MCNC: 9.3 MG/DL (ref 8.5–10.1)
CHLORIDE BLD-SCNC: 108 MMOL/L (ref 94–109)
CO2 SERPL-SCNC: 24 MMOL/L (ref 20–32)
COLOR UR AUTO: ABNORMAL
CREAT SERPL-MCNC: 1.06 MG/DL (ref 0.52–1.04)
GFR SERPL CREATININE-BSD FRML MDRD: 59 ML/MIN/1.73M2
GLUCOSE BLD-MCNC: 122 MG/DL (ref 70–99)
GLUCOSE UR STRIP-MCNC: NEGATIVE MG/DL
HGB UR QL STRIP: NEGATIVE
KETONES UR STRIP-MCNC: NEGATIVE MG/DL
LEUKOCYTE ESTERASE UR QL STRIP: NEGATIVE
MUCOUS THREADS #/AREA URNS LPF: PRESENT /LPF
NITRATE UR QL: NEGATIVE
PH UR STRIP: 5 [PH] (ref 4.7–8)
POTASSIUM BLD-SCNC: 4.4 MMOL/L (ref 3.4–5.3)
RBC URINE: <1 /HPF
SODIUM SERPL-SCNC: 138 MMOL/L (ref 133–144)
SP GR UR STRIP: 1.02 (ref 1–1.03)
SQUAMOUS EPITHELIAL: 3 /HPF
UROBILINOGEN UR STRIP-MCNC: NORMAL MG/DL
WBC URINE: <1 /HPF

## 2022-01-19 PROCEDURE — 99214 OFFICE O/P EST MOD 30 MIN: CPT | Performed by: PHYSICIAN ASSISTANT

## 2022-01-19 PROCEDURE — 80048 BASIC METABOLIC PNL TOTAL CA: CPT | Mod: ZL | Performed by: PHYSICIAN ASSISTANT

## 2022-01-19 PROCEDURE — 90670 PCV13 VACCINE IM: CPT

## 2022-01-19 PROCEDURE — 90682 RIV4 VACC RECOMBINANT DNA IM: CPT

## 2022-01-19 PROCEDURE — 81001 URINALYSIS AUTO W/SCOPE: CPT | Mod: ZL | Performed by: PHYSICIAN ASSISTANT

## 2022-01-19 PROCEDURE — 36415 COLL VENOUS BLD VENIPUNCTURE: CPT | Mod: ZL | Performed by: PHYSICIAN ASSISTANT

## 2022-01-19 PROCEDURE — G0463 HOSPITAL OUTPT CLINIC VISIT: HCPCS

## 2022-01-19 PROCEDURE — G0463 HOSPITAL OUTPT CLINIC VISIT: HCPCS | Mod: 25

## 2022-01-19 RX ORDER — OXYBUTYNIN CHLORIDE 5 MG/1
5 TABLET, EXTENDED RELEASE ORAL DAILY
Qty: 30 TABLET | Refills: 1 | Status: SHIPPED | OUTPATIENT
Start: 2022-01-19 | End: 2022-02-21

## 2022-01-19 RX ORDER — HYDROCORTISONE 25 MG/G
CREAM TOPICAL
Qty: 60 G | Refills: 0 | Status: SHIPPED | OUTPATIENT
Start: 2022-01-19 | End: 2022-10-26

## 2022-01-19 RX ORDER — TRAZODONE HYDROCHLORIDE 50 MG/1
50 TABLET, FILM COATED ORAL AT BEDTIME
Qty: 30 TABLET | Refills: 3 | Status: SHIPPED | OUTPATIENT
Start: 2022-01-19 | End: 2022-05-05

## 2022-01-19 ASSESSMENT — ANXIETY QUESTIONNAIRES
6. BECOMING EASILY ANNOYED OR IRRITABLE: SEVERAL DAYS
GAD7 TOTAL SCORE: 17
4. TROUBLE RELAXING: NEARLY EVERY DAY
2. NOT BEING ABLE TO STOP OR CONTROL WORRYING: NEARLY EVERY DAY
3. WORRYING TOO MUCH ABOUT DIFFERENT THINGS: NEARLY EVERY DAY
1. FEELING NERVOUS, ANXIOUS, OR ON EDGE: NEARLY EVERY DAY
7. FEELING AFRAID AS IF SOMETHING AWFUL MIGHT HAPPEN: SEVERAL DAYS
5. BEING SO RESTLESS THAT IT IS HARD TO SIT STILL: NEARLY EVERY DAY

## 2022-01-19 ASSESSMENT — PAIN SCALES - GENERAL: PAINLEVEL: SEVERE PAIN (6)

## 2022-01-19 ASSESSMENT — PATIENT HEALTH QUESTIONNAIRE - PHQ9: SUM OF ALL RESPONSES TO PHQ QUESTIONS 1-9: 21

## 2022-01-19 ASSESSMENT — MIFFLIN-ST. JEOR: SCORE: 1572.86

## 2022-01-19 NOTE — PATIENT INSTRUCTIONS
Thank you for choosing [facility].   I have office hours 8:00 am to 4:30 pm on Monday's, Wednesday's, Thursday's and Friday's. My nurse and I are out of the office every Tuesday.    Following your visit, when your labs and diagnostic testing have returned, I will review then and you will be contacted by my nurse.  If you are on My Chart, you can also view results there.    For refills, notify your pharmacy regarding what you need and the pharmacy will generate a refill request. Do not call my nurse as she is unable to process refill request. Please plan ahead and allow 3-5 days for refill requests.    You will generally receive a reminder call the day prior to your appointment.  If you cannot attend your appointment, please cancel your appointment with as much notice as possible.  If there is a pattern of failure to present for your appointments, I cannot provide consistent, meaningful, ongoing care for you. It is very important to me that you come in for your care, so we can best assist you with your health care needs.    IMPORTANT:  Please note that it is my standard of practice to NOT participate in prescribing ongoing requested Narcotic Analgesic therapy, and/or participate in the prescribing of other controlled substances.  My nurse and I am happy to assist you with the process of referral for alternative pain management as needed, and other treatment modalities including but not limited to:  Physical Therapy, Physical Medicine and Rehab, Counseling, Chiropractic Care, Orthopedic Care, and non-narcotic medication management.     In the event that you need to be seen for emergent concerns and I am out of office,  please see one of my colleagues for acute concerns.  You may also present to  or ER.  I appreciate the opportunity to serve you and look forward to supporting your healthcare needs in the future. Please contact me with any questions or concerns that you may  have.    Sincerely,      Concha Hare RN, PA-C

## 2022-01-19 NOTE — NURSING NOTE
"Chief Complaint   Patient presents with     RECHECK       Initial /80 (BP Location: Left arm, Patient Position: Sitting, Cuff Size: Adult Regular)   Pulse 89   Temp 98  F (36.7  C) (Tympanic)   Ht 1.702 m (5' 7\")   Wt 97.5 kg (215 lb)   SpO2 98%   BMI 33.67 kg/m   Estimated body mass index is 33.67 kg/m  as calculated from the following:    Height as of this encounter: 1.702 m (5' 7\").    Weight as of this encounter: 97.5 kg (215 lb).  Medication Reconciliation: complete  Silvana Miguel LPN  "

## 2022-01-19 NOTE — PROGRESS NOTES
Assessment & Plan     Mixed incontinence  History is most consistent with stress incontinence. We discussed treatment options such as eliminating bladder irritants (coffee, soda, tea) and losing weight. She was offered trial of pelvic floor PT but declined. She also does not care to see urology at this time. We will get a UA to rule out infection. Plan to start oxybutyin 5 mg before bedtime.   - oxybutynin ER (DITROPAN-XL) 5 MG 24 hr tablet; Take 1 tablet (5 mg) by mouth daily  - UA with Microscopic reflex to Culture - [place]    Anxiety  Her generalized anxiety has been worsening. She was taking amitriptyline without good effect. We will start her on fluoxetine 20 mg. Follow up in 4 weeks to recheck mood.    - FLUoxetine (PROZAC) 20 MG capsule; Take 1 capsule (20 mg) by mouth daily    Persistent insomnia  She has not been sleeping well in association with her anxiety. We will start her on trazodone 50 mg to help promote better sleep. We discussed that if we can get her sleeping better, she will likely have less anxiety during the day as well.   - traZODone (DESYREL) 50 MG tablet; Take 1 tablet (50 mg) by mouth At Bedtime    Renal insufficiency  We reviewed her renal US which was unremarkable. We discussed that her decreased GFR may be due to dehydration. She states that she only drinks about one glass of water a day. She was encouraged to increase this. We will check her bmp today.   - Basic metabolic panel    Review of external notes as documented elsewhere in note  Ordering of each unique test  Prescription drug management  10 minutes spent on the date of the encounter doing chart review, patient visit and documentation        KEILY [employer]    KEILY Cobos  [facility] [place]    Subjective   Sharlene is a 61 year old who presents for the following health issues     Insomnia  Onset/Duration: end June beginning of July  Description:   Frequency of  insomnia:  nightly  Time to fall asleep (sleep latency): 2 hours  Middle of night awakening:  YES  Early morning awakening:  YES  Progression of Symptoms:  worsening  Accompanying Signs & Symptoms:  Daytime sleepiness/napping: YES  Excessive snoring/apnea: no  Restless legs: no  Waking to urinate: YES  Chronic pain:  Yes;but has arthritis   Depression symptoms (if yes, do PHQ9): YES  Anxiety symptoms (if yes, do SHAKIR-7): YES  History:  Prior Insomnia: YES  New stressful situation: YES- sister with cancer brother with a heart condition  Precipitating factors:   Caffeine intake: YES  OTC decongestants: no  Any new medications: no  Alleviating factors:  Self medicating (alcohol, etc.):  no  Stress-reduction (exercise, yoga, meditation etc): YES  Therapies tried and outcome: elavil at night  - still taking Amitriptyline 10 mg once daily at bedtime   - Klonopin 1 mg twice daily   - feels as though her stress is getting worse   - willing to start a new daily medication   - wants a medication with least weight gain       Concern - Urinary Incontinence   Onset: 6 months   Description: urine incontinence with coughing, sneezing, laughing, and activity   Intensity: mild  Progression of Symptoms:  same  Accompanying Signs & Symptoms: no burning, dysuria, or frequency  Previous history of similar problem: none  Precipitating factors:        Worsened by: none  Therapies tried and outcome: none  - stress incontinence  - has tired doing some exercises at home   - drinks 1-2 cups of coffee a day, doesn't drink tea or pop    - drinks 12 oz water a day       Review of Systems   CONSTITUTIONAL: NEGATIVE for fever, chills, change in weight  RESP: NEGATIVE for significant cough or SOB  CV: NEGATIVE for chest pain, palpitations or peripheral edema  GI: NEGATIVE for nausea, abdominal pain, heartburn, or change in bowel habits  : incontinence  NEURO: NEGATIVE for weakness, dizziness or paresthesias  PSYCHIATRIC: HX anxiety, HX depression  "and HX panic attacks      Objective    /80 (BP Location: Left arm, Patient Position: Sitting, Cuff Size: Adult Regular)   Pulse 89   Temp 98  F (36.7  C) (Tympanic)   Ht 1.702 m (5' 7\")   Wt 97.5 kg (215 lb)   SpO2 98%   BMI 33.67 kg/m    Body mass index is 33.67 kg/m .  Physical Exam   GENERAL: healthy, alert and no distress  NECK: no adenopathy, no asymmetry, masses, or scars and thyroid normal to palpation  RESP: lungs clear to auscultation - no rales, rhonchi or wheezes  CV: regular rate and rhythm, normal S1 S2, no S3 or S4, no murmur, click or rub, no peripheral edema and peripheral pulses strong  ABDOMEN: soft, nontender, no hepatosplenomegaly, no masses and bowel sounds normal  NEURO: Normal strength and tone, mentation intact and speech normal  PSYCH: tearful and anxious        "

## 2022-01-20 DIAGNOSIS — F41.1 GENERALIZED ANXIETY DISORDER: ICD-10-CM

## 2022-01-20 ASSESSMENT — ANXIETY QUESTIONNAIRES: GAD7 TOTAL SCORE: 17

## 2022-01-21 RX ORDER — CLONAZEPAM 1 MG/1
TABLET ORAL
Qty: 60 TABLET | Refills: 0 | Status: SHIPPED | OUTPATIENT
Start: 2022-01-21 | End: 2022-02-22

## 2022-01-21 NOTE — TELEPHONE ENCOUNTER
Klonopin      Last Written Prescription Date:  12.20.21  Last Fill Quantity: #60,   # refills: 0  Last Office Visit: 1.19.22  Future Office visit:    Next 5 appointments (look out 90 days)    Feb 21, 2022 10:45 AM  (Arrive by 10:30 AM)  SHORT with KEILY Fonseca  [facility] ([facility] [place] ) [address]  [place] [address]  [phone]           Routing refill request to provider for review/approval because:  Drug not on the [facility], [facility] or [facility] refill protocol or controlled substance

## 2022-01-31 ENCOUNTER — MYC MEDICAL ADVICE (OUTPATIENT)
Dept: FAMILY MEDICINE | Facility: OTHER | Age: 62
End: 2022-01-31
Payer: COMMERCIAL

## 2022-01-31 DIAGNOSIS — R12 HEARTBURN: Primary | ICD-10-CM

## 2022-02-01 DIAGNOSIS — Z79.890 POSTMENOPAUSAL HRT (HORMONE REPLACEMENT THERAPY): ICD-10-CM

## 2022-02-01 RX ORDER — ESOMEPRAZOLE MAGNESIUM 40 MG/1
40 CAPSULE, DELAYED RELEASE ORAL
Qty: 90 CAPSULE | Refills: 1 | Status: SHIPPED | OUTPATIENT
Start: 2022-02-01 | End: 2022-05-05

## 2022-02-02 ENCOUNTER — ANCILLARY PROCEDURE (OUTPATIENT)
Dept: MAMMOGRAPHY | Facility: OTHER | Age: 62
End: 2022-02-02
Attending: PHYSICIAN ASSISTANT
Payer: MEDICARE

## 2022-02-02 DIAGNOSIS — Z12.31 VISIT FOR SCREENING MAMMOGRAM: ICD-10-CM

## 2022-02-02 PROCEDURE — 77067 SCR MAMMO BI INCL CAD: CPT | Mod: TC

## 2022-02-02 RX ORDER — ESTRADIOL 0.5 MG/1
TABLET ORAL
Qty: 60 TABLET | Refills: 11 | Status: SHIPPED | OUTPATIENT
Start: 2022-02-02 | End: 2022-05-05

## 2022-02-17 NOTE — PROGRESS NOTES
Assessment & Plan     1. Mixed incontinence  Currently would like to try different medication to help her bladder control we did describe prescribed oxybutynin we will see her back in 6 weeks to see how that is working  - oxybutynin ER (DITROPAN-XL) 10 MG 24 hr tablet; Take 1 tablet (10 mg) by mouth daily At bedtime  Dispense: 30 tablet; Refill: 3    2. Vaginitis and vulvovaginitis  Chronic recurrent vaginitis with mild odor.  Patient is treated with clindamycin and will notify if worsening  - clindamycin (CLEOCIN) 2 % vaginal cream; Place 1 applicator vaginally At Bedtime  Dispense: 40 g; Refill: 3    3. Anxiety  Longstanding issues with anxiety.  Was on medical marijuana that seem to really settle her down and help her relax unfortunately was cost prohibitive.  We have continued her on Prozac that does seem to work quite well for her on occasion she will use clonazepam as needed.  We did offer to refill medications today    4. Moderate episode of recurrent major depressive disorder (H)  Family strife.  Encouraged counseling and therapy.  I have tried to get her a list of who is open unfortunately that is very limited.  I did make a referral to our therapist here and she hopefully will be able to get in as soon as possible    5. Hip pain, right  X-ray was completed, is completely normal.  Offered physical therapy and that had helped her in the past.  - XR Hip Right 2-3 Views (Clinic Performed); Future    6. Hyperlipidemia LDL goal <100  Due for lipid screening.  - Lipid Profile (Chol, Trig, HDL, LDL calc); Future    7. Renal insufficiency  Due for urine albumin due to history of mild renal insufficiency  - Albumin Random Urine Quantitative with Creat Ratio; Future    8. Encounter for Medicare annual wellness exam  Had mammogram last week. Has chronic vagitis will try cleocin.  Immunizations reviewed with her.  Emotional and stressed         Review of external notes as documented elsewhere in note  Ordering of  each unique test  Prescription drug management  5 minutes spent on the date of the encounter doing chart review, review of outside records, patient visit and documentation        Regular exercise  See Patient Instructions    No follow-ups on file.    KEILY Cobos  [facility] [place] is a 61 year old who presents for the following health issues     HPI     Depression and Anxiety Follow-Up    How are you doing with your depression since your last visit? Worsened trip to [place].    How are you doing with your anxiety since your last visit?  Worsened trip to [place].    Are you having other symptoms that might be associated with depression or anxiety? No    Have you had a significant life event? No     Do you have any concerns with your use of alcohol or other drugs? No    Social History     Tobacco Use     Smoking status: Former Smoker     Packs/day: 0.25     Years: 40.00     Pack years: 10.00     Types: Cigarettes     Start date: 1975     Quit date: 2021     Years since quittin.0     Smokeless tobacco: Never Used   Substance Use Topics     Alcohol use: No     Drug use: No     PHQ 2021   PHQ-9 Total Score 15 21 14   Q9: Thoughts of better off dead/self-harm past 2 weeks Not at all Not at all Not at all     SHAKIR-7 SCORE 2021   Total Score 13 17 21     Last PHQ-9 2022   1.  Little interest or pleasure in doing things 1   2.  Feeling down, depressed, or hopeless 1   3.  Trouble falling or staying asleep, or sleeping too much 1   4.  Feeling tired or having little energy 1   5.  Poor appetite or overeating 3   6.  Feeling bad about yourself 1   7.  Trouble concentrating 3   8.  Moving slowly or restless 3   Q9: Thoughts of better off dead/self-harm past 2 weeks 0   PHQ-9 Total Score 14   Difficulty at work, home, or with people -     SHAKIR-7  2022   1. Feeling nervous, anxious, or on edge 3   2. Not being  "able to stop or control worrying 3   3. Worrying too much about different things 3   4. Trouble relaxing 3   5. Being so restless that it is hard to sit still 3   6. Becoming easily annoyed or irritable 3   7. Feeling afraid, as if something awful might happen 3   SHAKIR-7 Total Score 21   If you checked any problems, how difficult have they made it for you to do your work, take care of things at home, or get along with other people? -       Suicide Assessment Five-step Evaluation and Treatment (SAFE-T)        Vaginal Symptoms      Duration: 6-7 weeks    Description  vaginal discharge - clear    Intensity:  moderate    Accompanying signs and symptoms (fever/dysuria/abdominal or back pain): has lowe back pain    History  Sexually active: not at present  Possibility of pregnancy: No  Recent antibiotic use: no     Precipitating or alleviating factors: None    Therapies tried and outcome:              Review of Systems   CONSTITUTIONAL:NEGATIVE for fever, chills, change in weight  INTEGUMENTARY/SKIN: NEGATIVE for worrisome rashes, moles or lesions  EYES: NEGATIVE for vision changes or irritation  RESP:NEGATIVE for significant cough or SOB  CV: NEGATIVE for chest pain, palpitations or peripheral edema  MUSCULOSKELETAL: limited movement of her right hip. Can't internally rotate her hip and trying to get her get her socks on is terrible. Nothing past midline.   NEURO: NEGATIVE for weakness, dizziness or paresthesias and memory trouble and word finding is difficult.   PSYCHIATRIC: under a lot of stressors and anxious. She went to [place] and was a very stressful family time.  Made her depression worse and then daughter just got in an accident       Objective    /78 (BP Location: Left arm, Patient Position: Sitting, Cuff Size: Adult Regular)   Pulse 63   Temp 97.8  F (36.6  C) (Tympanic)   Ht 1.702 m (5' 7\")   Wt 96.6 kg (213 lb)   SpO2 98%   BMI 33.36 kg/m    Body mass index is 33.36 kg/m .  Physical Exam "   GENERAL: healthy, alert and no distress  EYES: Eyes grossly normal to inspection, PERRL and conjunctivae and sclerae normal  HENT: ear canals and TM's normal, nose and mouth without ulcers or lesions  NECK: no adenopathy, no asymmetry, masses, or scars and thyroid normal to palpation  RESP: lungs clear to auscultation - no rales, rhonchi or wheezes  BREAST: normal without masses, tenderness or nipple discharge and no palpable axillary masses or adenopathy  CV: regular rate and rhythm, normal S1 S2, no S3 or S4, no murmur, click or rub, no peripheral edema and peripheral pulses strong  ABDOMEN: soft, nontender, no hepatosplenomegaly, no masses and bowel sounds normal  MS: no gross musculoskeletal defects noted, no edema  SKIN: no suspicious lesions or rashes  NEURO: Normal strength and tone, mentation intact and speech normal  BACK: no CVA tenderness, no paralumbar tenderness  PSYCH: mentation appears normal, affect normal/bright  LYMPH: no cervical, supraclavicular, axillary, or inguinal adenopathy    Office Visit on 01/19/2022   Component Date Value Ref Range Status     Color Urine 01/19/2022 Light Yellow  Colorless, Straw, Light Yellow, Yellow Final     Appearance Urine 01/19/2022 Clear  Clear Final     Glucose Urine 01/19/2022 Negative  Negative mg/dL Final     Bilirubin Urine 01/19/2022 Negative  Negative Final     Ketones Urine 01/19/2022 Negative  Negative mg/dL Final     Specific Gravity Urine 01/19/2022 1.025  1.003 - 1.035 Final     Blood Urine 01/19/2022 Negative  Negative Final     pH Urine 01/19/2022 5.0  4.7 - 8.0 Final     Protein Albumin Urine 01/19/2022 Negative  Negative mg/dL Final     Urobilinogen Urine 01/19/2022 Normal  Normal, 2.0 mg/dL Final     Nitrite Urine 01/19/2022 Negative  Negative Final     Leukocyte Esterase Urine 01/19/2022 Negative  Negative Final     Bacteria Urine 01/19/2022 Few (A) None Seen /HPF Final     Mucus Urine 01/19/2022 Present (A) None Seen /LPF Final     RBC Urine  01/19/2022 <1  <=2 /HPF Final     WBC Urine 01/19/2022 <1  <=5 /HPF Final     Squamous Epithelials Urine 01/19/2022 3 (A) <=1 /HPF Final     Sodium 01/19/2022 138  133 - 144 mmol/L Final     Potassium 01/19/2022 4.4  3.4 - 5.3 mmol/L Final     Chloride 01/19/2022 108  94 - 109 mmol/L Final     Carbon Dioxide (CO2) 01/19/2022 24  20 - 32 mmol/L Final     Anion Gap 01/19/2022 6  3 - 14 mmol/L Final     Urea Nitrogen 01/19/2022 16  7 - 30 mg/dL Final     Creatinine 01/19/2022 1.06 (A) 0.52 - 1.04 mg/dL Final     Calcium 01/19/2022 9.3  8.5 - 10.1 mg/dL Final     Glucose 01/19/2022 122 (A) 70 - 99 mg/dL Final     GFR Estimate 01/19/2022 59 (A) >60 mL/min/1.73m2 Final    Effective December 21, 2021 eGFRcr in adults is calculated using the 2021 CKD-EPI creatinine equation which includes age and gender (Uli et al., NEJM, DOI: 10.1056/PMIBwm9843985)

## 2022-02-18 DIAGNOSIS — F41.1 GENERALIZED ANXIETY DISORDER: ICD-10-CM

## 2022-02-19 NOTE — TELEPHONE ENCOUNTER
Klonopin      Last Written Prescription Date:  1/21/22  Last Fill Quantity: 60,   # refills: 0  Last Office Visit: 1/19/22  Future Office visit:    Next 5 appointments (look out 90 days)    Feb 21, 2022 10:45 AM  (Arrive by 10:30 AM)  SHORT with KEILY Fonseca  [facility] [place] ([facility] [place] ) [address]  [place] [address]  [phone]           Routing refill request to provider for review/approval because:

## 2022-02-21 ENCOUNTER — OFFICE VISIT (OUTPATIENT)
Dept: FAMILY MEDICINE | Facility: OTHER | Age: 62
End: 2022-02-21
Attending: PHYSICIAN ASSISTANT
Payer: COMMERCIAL

## 2022-02-21 ENCOUNTER — ANCILLARY PROCEDURE (OUTPATIENT)
Dept: GENERAL RADIOLOGY | Facility: OTHER | Age: 62
End: 2022-02-21
Attending: PHYSICIAN ASSISTANT
Payer: MEDICARE

## 2022-02-21 VITALS
HEART RATE: 63 BPM | DIASTOLIC BLOOD PRESSURE: 78 MMHG | HEIGHT: 67 IN | OXYGEN SATURATION: 98 % | SYSTOLIC BLOOD PRESSURE: 128 MMHG | WEIGHT: 213 LBS | TEMPERATURE: 97.8 F | BODY MASS INDEX: 33.43 KG/M2

## 2022-02-21 DIAGNOSIS — N39.46 MIXED INCONTINENCE: ICD-10-CM

## 2022-02-21 DIAGNOSIS — F33.1 MODERATE EPISODE OF RECURRENT MAJOR DEPRESSIVE DISORDER (H): ICD-10-CM

## 2022-02-21 DIAGNOSIS — E78.5 HYPERLIPIDEMIA LDL GOAL <100: ICD-10-CM

## 2022-02-21 DIAGNOSIS — M25.551 HIP PAIN, RIGHT: ICD-10-CM

## 2022-02-21 DIAGNOSIS — F41.9 ANXIETY: ICD-10-CM

## 2022-02-21 DIAGNOSIS — N28.9 RENAL INSUFFICIENCY: ICD-10-CM

## 2022-02-21 DIAGNOSIS — N76.0 VAGINITIS AND VULVOVAGINITIS: ICD-10-CM

## 2022-02-21 DIAGNOSIS — Z00.00 ENCOUNTER FOR MEDICARE ANNUAL WELLNESS EXAM: Primary | ICD-10-CM

## 2022-02-21 PROBLEM — J44.9 COPD (CHRONIC OBSTRUCTIVE PULMONARY DISEASE) (H): Status: RESOLVED | Noted: 2021-07-08 | Resolved: 2022-02-21

## 2022-02-21 LAB
CHOLEST SERPL-MCNC: 210 MG/DL
CREAT UR-MCNC: 132 MG/DL
FASTING STATUS PATIENT QL REPORTED: YES
HDLC SERPL-MCNC: 66 MG/DL
LDLC SERPL CALC-MCNC: 122 MG/DL
MICROALBUMIN UR-MCNC: 11 MG/L
MICROALBUMIN/CREAT UR: 8.33 MG/G CR (ref 0–25)
NONHDLC SERPL-MCNC: 144 MG/DL
TRIGL SERPL-MCNC: 111 MG/DL

## 2022-02-21 PROCEDURE — 80061 LIPID PANEL: CPT | Mod: ZL | Performed by: PHYSICIAN ASSISTANT

## 2022-02-21 PROCEDURE — 82043 UR ALBUMIN QUANTITATIVE: CPT | Mod: ZL | Performed by: PHYSICIAN ASSISTANT

## 2022-02-21 PROCEDURE — 73502 X-RAY EXAM HIP UNI 2-3 VIEWS: CPT | Mod: TC

## 2022-02-21 PROCEDURE — 99396 PREV VISIT EST AGE 40-64: CPT | Performed by: PHYSICIAN ASSISTANT

## 2022-02-21 PROCEDURE — 99213 OFFICE O/P EST LOW 20 MIN: CPT | Mod: 25 | Performed by: PHYSICIAN ASSISTANT

## 2022-02-21 PROCEDURE — G0463 HOSPITAL OUTPT CLINIC VISIT: HCPCS | Mod: 25

## 2022-02-21 PROCEDURE — 36415 COLL VENOUS BLD VENIPUNCTURE: CPT | Mod: ZL | Performed by: PHYSICIAN ASSISTANT

## 2022-02-21 RX ORDER — OXYBUTYNIN CHLORIDE 10 MG/1
10 TABLET, EXTENDED RELEASE ORAL DAILY
Qty: 30 TABLET | Refills: 3 | Status: SHIPPED | OUTPATIENT
Start: 2022-02-21 | End: 2022-05-05

## 2022-02-21 RX ORDER — CLINDAMYCIN PHOSPHATE 20 MG/G
1 CREAM VAGINAL AT BEDTIME
Qty: 40 G | Refills: 3 | Status: SHIPPED | OUTPATIENT
Start: 2022-02-21 | End: 2022-03-31

## 2022-02-21 ASSESSMENT — ANXIETY QUESTIONNAIRES
1. FEELING NERVOUS, ANXIOUS, OR ON EDGE: NEARLY EVERY DAY
7. FEELING AFRAID AS IF SOMETHING AWFUL MIGHT HAPPEN: NEARLY EVERY DAY
6. BECOMING EASILY ANNOYED OR IRRITABLE: NEARLY EVERY DAY
5. BEING SO RESTLESS THAT IT IS HARD TO SIT STILL: NEARLY EVERY DAY
4. TROUBLE RELAXING: NEARLY EVERY DAY
GAD7 TOTAL SCORE: 21
2. NOT BEING ABLE TO STOP OR CONTROL WORRYING: NEARLY EVERY DAY
3. WORRYING TOO MUCH ABOUT DIFFERENT THINGS: NEARLY EVERY DAY

## 2022-02-21 ASSESSMENT — PAIN SCALES - GENERAL: PAINLEVEL: SEVERE PAIN (6)

## 2022-02-21 ASSESSMENT — PATIENT HEALTH QUESTIONNAIRE - PHQ9: SUM OF ALL RESPONSES TO PHQ QUESTIONS 1-9: 14

## 2022-02-21 NOTE — NURSING NOTE
"Chief Complaint   Patient presents with     MOOD CHANGES       Initial /78 (BP Location: Left arm, Patient Position: Sitting, Cuff Size: Adult Regular)   Pulse 63   Temp 97.8  F (36.6  C) (Tympanic)   Ht 1.702 m (5' 7\")   Wt 96.6 kg (213 lb)   SpO2 98%   BMI 33.36 kg/m   Estimated body mass index is 33.36 kg/m  as calculated from the following:    Height as of this encounter: 1.702 m (5' 7\").    Weight as of this encounter: 96.6 kg (213 lb).  Medication Reconciliation: complete  Silvana Miguel LPN  "

## 2022-02-22 RX ORDER — CLONAZEPAM 1 MG/1
TABLET ORAL
Qty: 60 TABLET | Refills: 0 | Status: SHIPPED | OUTPATIENT
Start: 2022-02-22 | End: 2022-03-31

## 2022-02-22 ASSESSMENT — ANXIETY QUESTIONNAIRES: GAD7 TOTAL SCORE: 21

## 2022-02-24 ENCOUNTER — HOSPITAL ENCOUNTER (OUTPATIENT)
Dept: PHYSICAL THERAPY | Facility: HOSPITAL | Age: 62
Setting detail: THERAPIES SERIES
End: 2022-02-24
Attending: PHYSICIAN ASSISTANT
Payer: MEDICARE

## 2022-02-24 DIAGNOSIS — M25.551 HIP PAIN, RIGHT: ICD-10-CM

## 2022-02-24 PROCEDURE — 97110 THERAPEUTIC EXERCISES: CPT | Mod: GP

## 2022-02-24 PROCEDURE — 97140 MANUAL THERAPY 1/> REGIONS: CPT | Mod: GP

## 2022-02-24 PROCEDURE — 97161 PT EVAL LOW COMPLEX 20 MIN: CPT | Mod: GP

## 2022-02-24 ASSESSMENT — ACTIVITIES OF DAILY LIVING (ADL)
TWISTING/PIVOTING_ON_INVOLVED_LEG: MODERATE DIFFICULTY
HOS_ADL_HIGHEST_POTENTIAL_SCORE: 68
GOING_DOWN_1_FLIGHT_OF_STAIRS: NO DIFFICULTY AT ALL
HOS_ADL_SCORE(%): 69.12
HOS_ADL_ITEM_SCORE_TOTAL: 47
GETTING_INTO_AND_OUT_OF_AN_AVERAGE_CAR: SLIGHT DIFFICULTY
WALKING_INITIALLY: NO DIFFICULTY AT ALL
GOING_UP_1_FLIGHT_OF_STAIRS: NO DIFFICULTY AT ALL
WALKING_APPROXIMATELY_10_MINUTES: NO DIFFICULTY AT ALL
STEPPING_UP_AND_DOWN_CURBS: NO DIFFICULTY AT ALL
RECREATIONAL_ACTIVITIES: MODERATE DIFFICULTY
ROLLING_OVER_IN_BED: MODERATE DIFFICULTY
HOS_ADL_COUNT: 17
DEEP_SQUATTING: EXTREME DIFFICULTY
WALKING_UP_STEEP_HILLS: EXTREME DIFFICULTY
WALKING_DOWN_STEEP_HILLS: NO DIFFICULTY AT ALL
PUTTING_ON_SOCKS_AND_SHOES: EXTREME DIFFICULTY
SITTING_FOR_15_MINUTES: NO DIFFICULTY AT ALL
STANDING_FOR_15_MINUTES: MODERATE DIFFICULTY
GETTING_INTO_AND_OUT_OF_A_BATHTUB: EXTREME DIFFICULTY
LIGHT_TO_MODERATE_WORK: MODERATE DIFFICULTY
HEAVY_WORK: SLIGHT DIFFICULTY
WALKING_15_MINUTES_OR_GREATER: NO DIFFICULTY AT ALL

## 2022-02-26 NOTE — PROGRESS NOTES
02/24/22 0900   General Information   Type of Visit Initial OP Ortho PT Evaluation   Start of Care Date 02/24/22   Referring Physician KEILY Asher   Patient/Family Goals Statement Reduce pain and return to a functional lifestyle pain-free   Orders Evaluate and Treat   Date of Order 02/24/22   Certification Required? Yes   Medical Diagnosis R Hip Pain   Surgical/Medical history reviewed Yes   Precautions/Limitations no known precautions/limitations   Weight-Bearing Status - LUE full weight-bearing   Weight-Bearing Status - RUE full weight-bearing   Weight-Bearing Status - LLE full weight-bearing   Weight-Bearing Status - RLE full weight-bearing   General Information Comments Sharlene arrived to therapy today statign she has been having some low back pain. She notes she met with KEILY Asher and was told it was actually her R hip causing her pain. She states she can't perform little tasks such as putting on her socks, getting dressed, get in/out of the bath tub, and notes when she gets down on her knees she struggles to get back up. She notes the pain in her low back is usually a 5-6/10. She notes the pain began sometime in late summer but she was hoping it would go away. She notes the pain in her knees is right below the kneecap and will radiate down her shin bones. She notes she is on disability and is retired from working as a  and a vast majority of occupations. She notes she enjoys going fishing but can't get in/out of the boat. She notes simple tasks like rolling over in bed are quite hard at times. She notes she can't stand long enough to bake, fold clothes, etc. She notes she quit smoking over a year ago and with covid she has gained 50 pounds and she knows that does not help. She states she is agreeable to work with the PT today.        Present No   Body Part(s)   Body Part(s) Hip;Knee   Presentation and Etiology   Impairments A. Pain;B. Decreased WB tolerance;C.  "Swelling;D. Decreased ROM;E. Decreased flexibility;F. Decreased strength and endurance   Functional Limitations perform activities of daily living;perform desired leisure / sports activities   Symptom Location R Low back, R Hip, B knees   How/Where did it occur Other  (Unsure how her pain started, gradual onset)   Onset date of current episode/exacerbation 07/22/21  (Last Summer 2021)   Chronicity Chronic   Pain rating (0-10 point scale) Best (/10);Worst (/10)   Best (/10) 5   Worst (/10)   (Patient did not answer)   Pain quality B. Dull;C. Aching;D. Burning   Frequency of pain/symptoms A. Constant   Pain/symptoms are: Other  (\"Depends on what I am doing\")   Pain/symptoms exacerbated by G. Certain positions;I. Bending;J. ADL;M. Other   Pain exacerbation comment Turning over in bed is painful   Pain/symptoms eased by C. Rest;F. Certain positions;I. OTC medication(s)   Progression of symptoms since onset: Worsened   Current / Previous Interventions   Diagnostic Tests: X-ray   Prior Level of Function   Prior Level of Function-Mobility Indep with previous tasks    Prior Level of Function-ADLs Indep with previous tasks    Current Level of Function   Patient role/employment history F. Retired   [facility]   Current equipment-Gait/Locomotion None   Current equipment-ADL None   Fall Risk Screen   Fall screen completed by PT   Have you fallen 2 or more times in the past year? No   Have you fallen and had an injury in the past year? No   Is patient a fall risk? No   Abuse Screen (yes response referral indicated)   Feels Unsafe at Home or Work/School no   Feels Threatened by Someone no   Does Anyone Try to Keep You From Having Contact with Others or Doing Things Outside Your Home? no   Physical Signs of Abuse Present no   Patient needs abuse support services and resources No   Knee Objective Findings   Side (if bilateral, select both right and left) Left;Right   Gait/Locomotion Gait appears stable and " neutral - no antalgic gait pattern demonstrated today   Lachmans Test Neg    Anterior Drawer Test Neg    Posterior Drawer Test Neg    Varus Stress Test Neg    Valgus Stress Test Neg    Abigail's Test Neg    Apley's Test Neg   External Rotation Test Neg    Apprehension Test Neg    Palpation Tenderness to palpation noted only over the medial meniscus on the R knee    Right Knee Extension AROM -5 degrees from zero    Right Knee Extension PROM WFL    Right Knee Flexion AROM 110 degrees    Right Knee Flexion PROM WFL    Right Knee Flexion Strength 4/5   Right Knee Extension Strength 4/5   Right Hip Abduction Strength 4/5   Right Quad Set Strength 4/5   R VMO Strength 3/5   Right Gastrocnemius Flexibility Tightness noted    Right Hamstring Flexibility Tightness noted    Right Hip Flexor Flexibility Tightness noted    Right Quadricep Flexibility Tightness noted    Right ITB Flexibility Tightness noted    Left Knee Extension AROM -6 degrees from zero    Left Knee Extension PROM WFL    Left Knee Flexion AROM 114 degrees   Left Knee Flexion PROM WFL    Left Knee Flexion Strength 4/5   Left Knee Extension Strength 4/5   Left Hip Abduction Strength 4/5   Left Quad Set Strength 4/5   L VMO Strength 3/5   Left Gastrocnemius Flexibility Tightness noted    Left Hamstring Flexibility Tightness noted    Left Hip Flexor Flexibility Tightness noted    Left Quadricep Flexibility Tightness noted    Left ITB Flexibility Tightness noted    Hip Objective Findings   Side (if bilateral, select both right and left) Right   Gait/Locomotion Gait appears fluid - no antalgic gait pattern noticed upon eval today   Femoral Nerve Stretch Test Pos    Gluteal Tendopathy Test Pos    Resisted Hip Adduction Test Neg   Straight Leg Raise Test Pos    Scour Test Neg   TAVO Test Pos    FADIR Test Pos    Palpation Tenderness to palpation noted over the R greater trochanter and R ITB   Right Hip Flexion PROM WFL    Right Hip Abduction PROM Limited to 4  degrees in supine   Right Hip Adduction PROM WFL    Right Hip ER PROM Limited to 22 degrees in supine with 90/90    Right Hip IR PROM WFL    Right Hip Ext PROM Limited to 5 degrees prior to tightness noted   Right Hip Flexion Strength 4/5   Right Hip Abduction Strength 4/5   Right Hip Extension Strength 4/5   Right Hip IR Strength 4/5   Right Hip ER Strength 4/5   Right Knee Flexion Strength 4/5   Right Knee Extension Strength 4/5   Dylan Flexibility Test Pos    Obers/ITB Flexibility Pos    Right Hamstring Flexibility Pos    Right Piriformis Flexibility Pos    Right Prone Quad Flexibility Pos    Planned Therapy Interventions   Planned Therapy Interventions gait training;manual therapy;neuromuscular re-education;ROM;strengthening;stretching   Planned Modality Interventions   Planned Modality Interventions Cryotherapy;Electrical stimulation;Hot packs;Iontophoresis;TENS;Traction;Ultrasound   Clinical Impression   Criteria for Skilled Therapeutic Interventions Met yes, treatment indicated   PT Diagnosis R Trochanteric Bursitis; R Hip Pain; B Low Back Pain without sciatica; B Chrondomalacia Patella; B Lateral Tracking Patella   Influenced by the following impairments Pain; weakness, Decreased ROM    Functional limitations due to impairments Difficutly with functional ADLs, lifting, ambulating when pain is high   Clinical Presentation Stable/Uncomplicated   Clinical Presentation Rationale Due to the patient's inability to complete functional ADLs painfree, she is requesting PT services at this time.    Clinical Decision Making (Complexity) Low complexity   Therapy Frequency 2 times/Week   Predicted Duration of Therapy Intervention (days/wks) 8-12 weeks   Risk & Benefits of therapy have been explained Yes   Patient, Family & other staff in agreement with plan of care Yes   Education Assessment   Preferred Learning Style Listening   Barriers to Learning No barriers   ORTHO GOALS   PT Ortho Eval Goals 1;2   Ortho Goal 1    Goal Identifier STG-1    Goal Progress New   Target Date 03/25/22   Ortho Goal 2   Goal Identifier LTG-1    Goal Progress New    Target Date 05/25/22   Total Evaluation Time   PT Eval, Low Complexity Minutes (24610) 17   Therapy Certification   Certification date from 02/24/22   Certification date to 05/25/22   Medical Diagnosis   R Hip Pain     I certify the need for these services furnished under this plan of treatment and while under my care. (Physician co-signature of this document indicates review and certification of the therapy plan).

## 2022-03-01 ENCOUNTER — HOSPITAL ENCOUNTER (OUTPATIENT)
Dept: PHYSICAL THERAPY | Facility: HOSPITAL | Age: 62
Setting detail: THERAPIES SERIES
End: 2022-03-01
Attending: PHYSICIAN ASSISTANT
Payer: MEDICARE

## 2022-03-01 PROCEDURE — 97110 THERAPEUTIC EXERCISES: CPT | Mod: GP

## 2022-03-01 PROCEDURE — 97140 MANUAL THERAPY 1/> REGIONS: CPT | Mod: GP

## 2022-03-03 DIAGNOSIS — F41.9 ANXIETY: ICD-10-CM

## 2022-03-04 ENCOUNTER — HOSPITAL ENCOUNTER (OUTPATIENT)
Dept: PHYSICAL THERAPY | Facility: HOSPITAL | Age: 62
Setting detail: THERAPIES SERIES
End: 2022-03-04
Attending: PHYSICIAN ASSISTANT
Payer: MEDICARE

## 2022-03-04 PROCEDURE — 97110 THERAPEUTIC EXERCISES: CPT | Mod: GP,CQ

## 2022-03-04 PROCEDURE — 97140 MANUAL THERAPY 1/> REGIONS: CPT | Mod: GP,CQ

## 2022-03-04 NOTE — TELEPHONE ENCOUNTER
Pharmacy requesting one tablet vs two.     Writer called pt please send fluoxetine again per pt pharmacy didn't receive this script. reviewed dose with pt. Per pt dose has increased to 40 mg daily as PCP signed.

## 2022-03-08 ENCOUNTER — HOSPITAL ENCOUNTER (OUTPATIENT)
Dept: PHYSICAL THERAPY | Facility: HOSPITAL | Age: 62
Setting detail: THERAPIES SERIES
End: 2022-03-08
Attending: PHYSICIAN ASSISTANT
Payer: MEDICARE

## 2022-03-08 PROCEDURE — 97110 THERAPEUTIC EXERCISES: CPT | Mod: GP

## 2022-03-08 PROCEDURE — 97140 MANUAL THERAPY 1/> REGIONS: CPT | Mod: GP,CQ

## 2022-03-11 ENCOUNTER — HOSPITAL ENCOUNTER (OUTPATIENT)
Dept: PHYSICAL THERAPY | Facility: HOSPITAL | Age: 62
Setting detail: THERAPIES SERIES
Discharge: HOME | End: 2022-03-11
Attending: PHYSICIAN ASSISTANT
Payer: MEDICARE

## 2022-03-11 PROCEDURE — 97140 MANUAL THERAPY 1/> REGIONS: CPT | Mod: GP

## 2022-03-15 ENCOUNTER — HOSPITAL ENCOUNTER (OUTPATIENT)
Dept: PHYSICAL THERAPY | Facility: HOSPITAL | Age: 62
Setting detail: THERAPIES SERIES
Discharge: HOME | End: 2022-03-15
Attending: PHYSICIAN ASSISTANT
Payer: MEDICARE

## 2022-03-15 PROCEDURE — 97140 MANUAL THERAPY 1/> REGIONS: CPT | Mod: GP

## 2022-03-17 ENCOUNTER — HOSPITAL ENCOUNTER (OUTPATIENT)
Dept: PHYSICAL THERAPY | Facility: HOSPITAL | Age: 62
Setting detail: THERAPIES SERIES
Discharge: HOME | End: 2022-03-17
Attending: PHYSICIAN ASSISTANT
Payer: MEDICARE

## 2022-03-17 PROCEDURE — 97140 MANUAL THERAPY 1/> REGIONS: CPT | Mod: GP

## 2022-03-22 ENCOUNTER — HOSPITAL ENCOUNTER (OUTPATIENT)
Dept: PHYSICAL THERAPY | Facility: HOSPITAL | Age: 62
Setting detail: THERAPIES SERIES
Discharge: HOME | End: 2022-03-22
Attending: PHYSICIAN ASSISTANT
Payer: MEDICARE

## 2022-03-22 PROCEDURE — 97140 MANUAL THERAPY 1/> REGIONS: CPT | Mod: GP,CQ

## 2022-03-23 NOTE — MR AVS SNAPSHOT
After Visit Summary   1/10/2017    Sharlene Mccord    MRN: [id number]           Patient Information     Date Of Birth          1960        Visit Information        Provider Department      1/10/2017 11:30 AM Johnathan Ruff, DO [facility]        Today's Diagnoses     Pharyngoesophageal dysphagia    -  1     Gastroesophageal reflux disease without esophagitis         Non morbid obesity due to excess calories         Tobacco dependence           Care Instructions    Thank you for allowing Dr. Ruff and our surgical team to participate in your care.  If you have a scheduling or an appointment question please contact Counts include 234 beds at the [facility] Unit Coordinator at her direct line [phone].   ALL nursing questions or concerns can be directed to Breanne at: [phone]       You are scheduled for a: EGD  Your procedure date is: 2/3/17      You need a friend or family member available to drive you home AND stay with you for 24 hours after you leave the hospital. You will not be allowed to drive yourself. IF you need to take a taxi or the bus you MUST have a responsible person to ride with you. YOUR PROCEDURE WILL BE CANCELLED IF YOU DO NOT HAVE A RESPONSIBLE ADULT TO DRIVE YOU HOME.       You CANNOT have anything to eat or drink after midnight the night before your surgery, ncluding water and coffee. Your stomach needs to be completely empty. Do NOT chew gum, suck on hard candy, or smoke. You can brush your teeth the morning of surgery.       You need to call our Surgery Education Nurses 1-2 weeks prior to your surgery date at  [phone] or toll free [phone]. Please have you medication and allergy lists ready.      Stop your aspirin or other NSAIDs(Ibuprofen, Motrin, Aleve, Celebrex, Naproxen, etc...) 7 days before your surgery.      Hospital admitting will call you the day before your surgery with your arrival time. If you are scheduled on a Monday admitting will call you the Friday  CC:   Chief Complaint   Patient presents with   • Fever     for a few days. Neg Covid at home. Previously positive for covid.    • Congestion     Pt is autistic   • Cough        SUBJECTIVE:    Roly Pitts is a 22 year old male who presents to [facility] Care with respiratory symptoms which have been present for several days. Symptoms include: cough , feverishness, nasal congestion and nasal discharge. Denies: appetite loss, diarrhea, shortness of breath or wheezing.      The remainder of the ROS is negative.    Patient Active Problem List   Diagnosis   • Routine general medical examination at a health care facility   • Generalized seizure (CMS/HCC)   • Active autistic disorder   • Bruxism     Social Hx: non-smoking household  ALLERGIES:  Patient has no known allergies.    OBJECTIVE:    Vitals:    03/23/22 1725   BP: 125/84   Pulse: (!) 102   Resp: 16   Temp: 98.4 °F (36.9 °C)   SpO2: 96%      Gen: Alert, well hydrated, in no apparent distress  Ears: TM's intact without erythema, bulging, retraction, or fluid-level. External auditory canal clear  Conjunctiva: clear without injection or discharge  Nose/Sinus: sinus tenderness  Neck: supple without cervical adenopathy. No meningismus  Heart: regular rate and rhythm without murmur, rub or gallop  Lungs: clear to auscultation without wheezes, rhonchi or rales; normal respiratory effort   Skin: smooth without rash, edema, redness or increased warmth    ASSESSMENT/PLAN:    SINUSITIS ACUTE SINUSITIS - Advised to use antibiotics as directed. May use tylenol as directed for pain or fever. Nasal saline may help break up mucous and speed recovery. Increase hydration.OTC cough medication and additional symptoms management may be performed as discussed.  If sinus symptoms are worsening or not improving after 5-7 days of antibiotic therapy follow-up with your primary care provider or return to Immediate Care for reevaluation.    Diagnosis and prognosis, treatment and side-effects  before.      Please call your primary care physician if you should become ill within 24 hours of scheduled surgery. (ex.vomiting, diarrhea, fever)  Surgery Education will contact you the day before your procedure between the hours of noon and 5 pm with the time you need to register in admitting at the hospital. Call Breanne with any questions [phone]          Follow-ups after your visit        Your next 10 appointments already scheduled     Feb 08, 2017  9:40 AM   (Arrive by 9:25 AM)   Return Visit with Aurelia Odell MD   [facility]    [address] [place] [address]  [place] [address] [phone]            May 23, 2017  9:30 AM   (Arrive by 9:15 AM)   Return Visit with Norman Nichole MD   [facility] ([facility])    [address]  [place] [address] [phone]              Future tests that were ordered for you today     Open Future Orders        Priority Expected Expires Ordered    QUITPLAN  Referral Routine  3/10/2017 1/9/2017            Who to contact     If you have questions or need follow up information about today's clinic visit or your schedule please contact [facility] directly at [phone].  Normal or non-critical lab and imaging results will be communicated to you by MyChart, letter or phone within 4 business days after the clinic has received the results. If you do not hear from us within 7 days, please contact the clinic through LOVEFiLM[facility] or phone. If you have a critical or abnormal lab result, we will notify you by phone as soon as possible.  Submit refill requests through [facility] or call your pharmacy and they will forward the refill request to us. Please allow 3 business days for your refill to be completed.          Additional Information About Your Visit        LOVEFiLMharKhush Information     [employer] gives you secure access to your electronic health record. If you see a primary care provider, you can also send messages  were explained and all questions were answered satisfactorily and there were no further questions upon discharge.    Electronically signed by: Jose R Coffman DO  3/23/2022    "to your care team and make appointments. If you have questions, please call your primary care clinic.  If you do not have a primary care provider, please call [phone] and they will assist you.        Care EveryWhere ID     This is your Care EveryWhere ID. This could be used by other organizations to access your [facility] medical records  [id number]        Your Vitals Were     Pulse Temperature Height BMI (Body Mass Index) Pulse Oximetry       79 98.6  F (37  C) (Tympanic) 5' 5\" (1.651 m) 32.45 kg/m2 98%        Blood Pressure from Last 3 Encounters:   01/10/17 128/60   01/09/17 118/62   12/16/16 116/66    Weight from Last 3 Encounters:   01/10/17 195 lb (88.451 kg)   01/09/17 195 lb (88.451 kg)   12/16/16 200 lb (90.719 kg)              We Performed the Following     XR Esophagram          Today's Medication Changes          These changes are accurate as of: 1/10/17 11:36 AM.  If you have any questions, ask your nurse or doctor.               These medicines have changed or have updated prescriptions.        Dose/Directions    traZODone 50 MG tablet   Commonly known as:  DESYREL   This may have changed:  additional instructions   Used for:  Psychophysiological insomnia        TAKE TWO TABLETS BY MOUTH AT BEDTIME AS NEEDED FOR SLEEP   Quantity:  60 tablet   Refills:  11                Primary Care Provider Office Phone # Fax #    KEILY Fonseca [phone]       [address] [address]        Thank you!     Thank you for choosing [facility]  for your care. Our goal is always to provide you with excellent care. Hearing back from our patients is one way we can continue to improve our services. Please take a few minutes to complete the written survey that you may receive in the mail after your visit with us. Thank you!             Your Updated Medication List - Protect others around you: Learn how to safely use, store and throw away your " medicines at www.disposemymeds.org.          This list is accurate as of: 1/10/17 11:36 AM.  Always use your most recent med list.                   Brand Name Dispense Instructions for use    buPROPion 200 MG 12 hr tablet    WELLBUTRIN SR    60 tablet    Take 1 tablet (200 mg) by mouth 2 times daily       clonazePAM 1 MG tablet    klonoPIN    60 tablet    Take 1 tablet (1 mg) by mouth 2 times daily as needed for anxiety       cyclobenzaprine 10 MG tablet    FLEXERIL    30 tablet    Take 0.5-1 tablets (5-10 mg) by mouth 3 times daily as needed for muscle spasms       diltiazem 300 MG 24 hr ER beaded capsule    TIAZAC    90 capsule    Take 1 capsule (300 mg) by mouth daily       estradiol 0.5 MG tablet    ESTRACE    60 tablet    TAKE ONE TABLET BY MOUTH TWICE DAILY       hydrOXYzine 25 MG tablet    ATARAX    120 tablet    TAKE ONE TO TWO TABLETS BY MOUTH ONCE DAILY AT BEDTIME FOR NAUSEA       levothyroxine 50 MCG tablet    SYNTHROID/LEVOTHROID    30 tablet    TAKE ONE TABLET BY MOUTH ONCE DAILY       NITROSTAT 0.4 MG sublingual tablet   Generic drug:  nitroglycerin     25 tablet    Place 1 tablet (0.4 mg) under the tongue every 5 minutes as needed for chest pain       PRILOSEC OTC PO          PROCTOSOL HC 2.5 % cream   Generic drug:  hydrocortisone     58 g    APPLY  CREAM TO AFFECTED AREA THREE TIMES DAILY       sucralfate 1 GM tablet    CARAFATE    40 tablet    Take 1 tablet (1 g) by mouth 4 times daily       SUMAtriptan 100 MG tablet    IMITREX    10 tablet    TAKE ONE TABLET BY MOUTH AS NEEDED MIGRAINES       TOPIRAMATE PO      Take 25 mg by mouth       traMADol 50 MG tablet    ULTRAM    180 tablet    TAKE TWO TABLETS BY MOUTH EVERY 8 HOURS AS NEEDED FOR PAIN       traZODone 50 MG tablet    DESYREL    60 tablet    TAKE TWO TABLETS BY MOUTH AT BEDTIME AS NEEDED FOR SLEEP

## 2022-03-25 ENCOUNTER — HOSPITAL ENCOUNTER (OUTPATIENT)
Dept: PHYSICAL THERAPY | Facility: HOSPITAL | Age: 62
Setting detail: THERAPIES SERIES
Discharge: HOME | End: 2022-03-25
Attending: PHYSICIAN ASSISTANT
Payer: MEDICARE

## 2022-03-25 PROCEDURE — 97140 MANUAL THERAPY 1/> REGIONS: CPT | Mod: GP

## 2022-03-29 ENCOUNTER — HOSPITAL ENCOUNTER (OUTPATIENT)
Dept: PHYSICAL THERAPY | Facility: HOSPITAL | Age: 62
Setting detail: THERAPIES SERIES
Discharge: HOME | End: 2022-03-29
Attending: PHYSICIAN ASSISTANT
Payer: MEDICARE

## 2022-03-29 PROCEDURE — 97140 MANUAL THERAPY 1/> REGIONS: CPT | Mod: GP

## 2022-03-30 ENCOUNTER — MYC MEDICAL ADVICE (OUTPATIENT)
Dept: FAMILY MEDICINE | Facility: OTHER | Age: 62
End: 2022-03-30
Payer: COMMERCIAL

## 2022-03-30 DIAGNOSIS — Z63.0 MARITAL CONFLICT: ICD-10-CM

## 2022-03-30 DIAGNOSIS — F41.9 ANXIETY: Primary | ICD-10-CM

## 2022-03-30 DIAGNOSIS — F41.1 GENERALIZED ANXIETY DISORDER: ICD-10-CM

## 2022-03-30 RX ORDER — CLONAZEPAM 0.5 MG/1
0.5 TABLET ORAL 2 TIMES DAILY PRN
Qty: 30 TABLET | Refills: 1 | Status: SHIPPED | OUTPATIENT
Start: 2022-03-30 | End: 2022-05-05

## 2022-03-30 SDOH — SOCIAL STABILITY - SOCIAL INSECURITY: PROBLEMS IN RELATIONSHIP WITH SPOUSE OR PARTNER: Z63.0

## 2022-03-31 ENCOUNTER — OFFICE VISIT (OUTPATIENT)
Dept: CARDIOLOGY | Facility: OTHER | Age: 62
End: 2022-03-31
Attending: NURSE PRACTITIONER
Payer: COMMERCIAL

## 2022-03-31 VITALS
WEIGHT: 211 LBS | SYSTOLIC BLOOD PRESSURE: 126 MMHG | HEIGHT: 67 IN | BODY MASS INDEX: 33.12 KG/M2 | HEART RATE: 65 BPM | OXYGEN SATURATION: 97 % | DIASTOLIC BLOOD PRESSURE: 75 MMHG | TEMPERATURE: 97.7 F

## 2022-03-31 DIAGNOSIS — I50.32 CHRONIC DIASTOLIC HEART FAILURE (H): ICD-10-CM

## 2022-03-31 DIAGNOSIS — I35.9 AORTIC VALVE DISORDER: ICD-10-CM

## 2022-03-31 DIAGNOSIS — I35.1 NONRHEUMATIC AORTIC VALVE INSUFFICIENCY: ICD-10-CM

## 2022-03-31 DIAGNOSIS — I25.85 CARDIAC MICROVASCULAR DISEASE: ICD-10-CM

## 2022-03-31 DIAGNOSIS — R60.0 EDEMA OF BOTH LOWER EXTREMITIES: ICD-10-CM

## 2022-03-31 DIAGNOSIS — Z87.891 HISTORY OF TOBACCO ABUSE: ICD-10-CM

## 2022-03-31 DIAGNOSIS — I25.85 CARDIAC MICROVASCULAR DISEASE: Primary | ICD-10-CM

## 2022-03-31 DIAGNOSIS — R06.09 DOE (DYSPNEA ON EXERTION): ICD-10-CM

## 2022-03-31 DIAGNOSIS — I73.9 INTERMITTENT CLAUDICATION (H): ICD-10-CM

## 2022-03-31 DIAGNOSIS — I50.32 CHRONIC DIASTOLIC HEART FAILURE (H): Primary | ICD-10-CM

## 2022-03-31 DIAGNOSIS — N18.31 STAGE 3A CHRONIC KIDNEY DISEASE (H): ICD-10-CM

## 2022-03-31 DIAGNOSIS — I89.0 LYMPHEDEMA: ICD-10-CM

## 2022-03-31 PROCEDURE — 99215 OFFICE O/P EST HI 40 MIN: CPT | Performed by: NURSE PRACTITIONER

## 2022-03-31 PROCEDURE — 99417 PROLNG OP E/M EACH 15 MIN: CPT | Performed by: NURSE PRACTITIONER

## 2022-03-31 PROCEDURE — G0463 HOSPITAL OUTPT CLINIC VISIT: HCPCS

## 2022-03-31 RX ORDER — CLONAZEPAM 1 MG/1
TABLET ORAL
Qty: 60 TABLET | Refills: 0 | Status: SHIPPED | OUTPATIENT
Start: 2022-03-31 | End: 2022-05-09

## 2022-03-31 ASSESSMENT — PAIN SCALES - GENERAL: PAINLEVEL: NO PAIN (0)

## 2022-03-31 NOTE — PATIENT INSTRUCTIONS
Thank you for allowing Giovana Carlson and our team to participate in your care. Please call our office at [phone] with scheduling questions or if you need to cancel or change your appointment. With any other questions or concerns you may call Heather cardiology nurse at [phone].       If you experience chest pain, chest pressure, chest tightness, shortness of breath, fainting, lightheadedness, nausea, vomiting, or other concerning symptoms, please report to the Emergency Department or call 911. These symptoms may be emergent, and best treated in the Emergency Department.          Follow up after all testing 6-8 weeks    You will have an echocardiogram performed.  This is an ultrasound of the heart, that evaluates heart function.  The hospital scheduling department will call to schedule you for this test.     You will have an ultrasound of the JESUS. The hospital will call you to schedule this.     The hospital will call you to schedule a pulmonary function test to evaluate your lung function.

## 2022-03-31 NOTE — TELEPHONE ENCOUNTER
KLONOPIN      Last Written Prescription Date:  2-  Last Fill Quantity: 60,   # refills: 0  Last Office Visit: 2-  Future Office visit:    Next 5 appointments (look out 90 days)    May 05, 2022 10:15 AM  (Arrive by 10:00 AM)  SHORT with KEILY Fonseca  [facility] ([facility] ) [address]  [place] [address]  [phone]   May 17, 2022 11:15 AM  (Arrive by 11:00 AM)  Return Visit with ENRIQUE Nuñez CNP  [facility] ([facility] ) [address]  [place] [address]  [phone]           Routing refill request to provider for review/approval because:  Drug not on the [facility], P or [facility] refill protocol or controlled substance

## 2022-03-31 NOTE — PROGRESS NOTES
[facility] HEART CARE   CARDIOLOGY CONSULT     Sharlene Mccord   [address]  [place] [address]    Concha Hare     Chief Complaint   Patient presents with     New Patient        HPI:   Sharlene Mccord is a 61-year old female who presents for cardiology consult with chronic diastolic heart failure and aortic insufficiency with chronic dyspnea. Patient had previously followed cardiologist Dr. Nichole, she was last seen on 11/9/2021.    She has a history of moderate aortic insufficiency without much progression over the years and no signs of LV systolic failure.  She has a history of microvascular angina-chronic (suggested on cardiac stress MRI), chronic diastolic heart failure, hypertension, GUTIERREZ, chronic pain syndrome (rhematology evaluation with possible mixed connective tissue disorder) and chronic fatigue.  Additional past medical history includes migraine headaches, stage I CKD, history of pancreatitis, SHAKIR, depression, GERD, diverticulitis and hypothyroidism.    She has a history of chronic chest discomfort related to microvascular disease and has tried several therapies including isosorbide mononitrate (stopped after 4 days due to severe headache), diltiazem (improved chest discomfort but low blood pressures) and SL nitroglycerin PRN.     She underwent heart catheterization on 4/14/2021 for invasive exercise hemodynamic assessment with continued chest pain and dyspnea.  She was identified to have normal resting PA pressures, biventricular filling pressures and normal PVR at rest.  Moderately reduced cardiac index at rest in the setting of bradycardia.  Mild increase in PCWP and right atrial pressure with exercise.  Appropriate increase in cardiac output and PA pressures with exercise.  No increased PVR with exercise.  All findings indicating early HFpEF.    She previously underwent MR cardiac with contrast and stress revealing normal LV size and function, LVEF at 67%, no regional wall motion abnormalities.  Normal  RV size and function.  Regadenoson stress perfusion imaging did not reveal any areas of reversible ischemia.  Semiquantitative blood flow reserve assessment revealed maximal hyperemic flow to be reduced in all myocardial segments with a range from 1.6-1.9 which suggest global decrease in hyperemic flow which can be seen in microvascular dysfunction.    Cardiac cath in 4/2013 revealing mild aortic regurgitation noted to be chronic, focal CAD with no hemodynamically significant lesions and normal heart function.    Today patient continues to report chronic dyspnea, no change in chest pains- currently stable. Positive for LE edema. No palpitations, lightheadedness or syncope.     Lab Results   Component Value Date    CHOL 210 02/21/2022    CHOL 252 11/11/2020    CHOL 214 04/19/2017     Lab Results   Component Value Date    HDL 66 02/21/2022    HDL 70 11/11/2020    HDL 77 04/19/2017     Lab Results   Component Value Date     02/21/2022     11/11/2020     04/19/2017     Lab Results   Component Value Date    TRIG 111 02/21/2022    TRIG 163 11/11/2020    TRIG 99 07/31/2020     Lab Results   Component Value Date    CHOLHDLRATIO 2.9 03/28/2014     The 10-year ASCVD risk score (Arlingtontrent HUDSON Jr., et al., 2013) is: 4.4%    Values used to calculate the score:      Age: 61 years      Sex: Female      Is Non- : No      Diabetic: No      Tobacco smoker: No      Systolic Blood Pressure: 126 mmHg      Is BP treated: Yes      HDL Cholesterol: 66 mg/dL      Total Cholesterol: 210 mg/dL          PAST MEDICAL HISTORY:   Past Medical History:   Diagnosis Date     Anxiety state, unspecified 03/01/2011     Aortic valve disorders 06/07/2000    Echocardiogram showin mild/moderate AI.  Normal LV function     Cardiac microvascular disease 4/10/2013    Based on Cardiac MRI done at       COPD (chronic obstructive pulmonary disease) (H) 7/8/2021     Depressive disorder 1997     Fatigue      Fatigue       Fibromyalgia 03/01/2011     Hypertension      Major depressive disorder, recurrent episode, unspecified 12/17/2014     Migraine, unspecified, without mention of intractable migraine without mention of status migrainosus 03/01/2011     Mitral valve disorders(424.0) 10/16/2007     Need for prophylactic hormone replacement therapy (postmenopausal)      PONV (postoperative nausea and vomiting)      Thyroid Nodule 03/01/2011     Tobacco use disorder 03/01/2011     Unspecified hypothyroidism 03/01/2011          FAMILY HISTORY:   Family History   Problem Relation Age of Onset     C.A.D. Father      Hypertension Father      C.A.D. Mother      Cerebrovascular Disease Mother         CVA     Hypertension Mother      Coronary Artery Disease Mother      Diabetes Paternal Grandmother      Diabetes Paternal Grandfather      Diabetes Sister      Breast Cancer Sister      Diabetes Maternal Grandmother      Diabetes Sister      Breast Cancer Sister      Hypertension Sister      Depression Sister      Anxiety Disorder Sister      Obesity Sister      Depression Sister      Breast Cancer Sister      Obesity Sister      Depression Brother      Depression Daughter      Obesity Daughter      Depression Daughter      Mental Illness Daughter         [id number]     Anxiety Disorder Daughter      Mental Illness Daughter      Anxiety Disorder Other      Osteoporosis Other      Thyroid Disease Other      Diabetes Nephew           PAST SURGICAL HISTORY:   Past Surgical History:   Procedure Laterality Date     APPENDECTOMY  1977     BIOPSY  2017    [facility] from Dr. Nadira cohen. Thyroid     CARDIAC SURGERY  2013    angiogram [facility]:  Normal coronary arteries.  Felt ot have some microvascular disease     CL AFF SURGICAL PATHOLOGY  01/02/2007    Thyroid Mass     COLONOSCOPY  1/13/2014    Procedure: COLONOSCOPY;  COLONOSCOPY ;  Surgeon: Chasidy Gee DO;  Location: [facility] OR     CV RIGHT HEART CATH MEASUREMENTS RECORDED N/A 4/14/2021     Procedure: CV RIGHT HEART CATH;  Surgeon: Danna Clya MD;  Location: [facility] CARDIAC CATH LAB     CV RIGHT HEART EXERCISE STRESS STUDY N/A 2021    Procedure: Right Heart Exercise Stress Study;  Surgeon: Danna Clay MD;  Location: [facility] CARDIAC CATH LAB     ESOPHAGOSCOPY, GASTROSCOPY, DUODENOSCOPY (EGD), COMBINED N/A 2017    Procedure: COMBINED ESOPHAGOSCOPY, GASTROSCOPY, DUODENOSCOPY (EGD);  Surgeon: Johnathan Ruff DO;  Location: [facility] OR     GYN SURGERY      c-sections x 3     HYSTERECTOMY TOTAL ABDOMINAL, BILATERAL SALPINGO-OOPHORECTOMY, COMBINED N/A 2001     LAPAROSCOPIC CHOLECYSTECTOMY  2003    Cholelithiasis     LAPAROTOMY EXPLORATORY      abdominal pain/fever     LARYNGOSCOPY       SPLENECTOMY            SOCIAL HISTORY:   Social History     Socioeconomic History     Marital status:      Spouse name: Not on file     Number of children: Not on file     Years of education: Not on file     Highest education level: Not on file   Occupational History     Occupation: Manager   Tobacco Use     Smoking status: Former Smoker     Packs/day: 0.25     Years: 40.00     Pack years: 10.00     Types: Cigarettes     Start date: 1975     Quit date: 2021     Years since quittin.1     Smokeless tobacco: Never Used   Substance and Sexual Activity     Alcohol use: No     Drug use: No     Sexual activity: Not Currently     Partners: Male     Birth control/protection: Other     Comment: hysterectomy   Other Topics Concern      Service Not Asked     Blood Transfusions Yes     Caffeine Concern Yes     Comment: Coffee      Occupational Exposure Not Asked     Hobby Hazards Not Asked     Sleep Concern Not Asked     Stress Concern Not Asked     Weight Concern Not Asked     Special Diet Not Asked     Back Care Not Asked     Exercise Not Asked     Bike Helmet Not Asked     Seat Belt Not Asked     Self-Exams Not Asked     Parent/sibling w/ CABG, MI or angioplasty  before 65F 55M? No   Social History Narrative     Not on file     Social Determinants of Health     Financial Resource Strain: Not on file   Food Insecurity: Not on file   Transportation Needs: Not on file   Physical Activity: Not on file   Stress: Not on file   Social Connections: Not on file   Intimate Partner Violence: Not on file   Housing Stability: Not on file          CURRENT MEDICATIONS:   Prior to Admission medications    Medication Sig Start Date End Date Taking? Authorizing Provider   acetaminophen (TYLENOL) 325 MG tablet Take 650 mg by mouth 8/4/20   Reported, Patient   clindamycin (CLEOCIN) 2 % vaginal cream Place 1 applicator vaginally At Bedtime 2/21/22   Concha Hare PA   clonazePAM (KLONOPIN) 0.5 MG tablet Take 1 tablet (0.5 mg) by mouth 2 times daily as needed for anxiety 3/30/22   Concha Hare PA   clonazePAM (KLONOPIN) 1 MG tablet TAKE 1/2 TO 1 (ONE-HALF TO ONE) TABLET BY MOUTH UP TO TWICE DAILY 2/22/22   Concha Hare PA   diltiazem ER COATED BEADS (CARDIZEM CD/CARTIA XT) 240 MG 24 hr capsule Take 1 capsule (240 mg) by mouth daily 4/27/21   Norman Nichole MD   esomeprazole (NEXIUM) 40 MG DR capsule Take 1 capsule (40 mg) by mouth every morning (before breakfast) Take 30-60 minutes before eating. 2/1/22   Concha Hare PA   estradiol (ESTRACE) 0.5 MG tablet Take 1 tablet by mouth twice daily 2/2/22   Cocnha Hare PA   FLUoxetine (PROZAC) 20 MG capsule Take 2 capsules (40 mg) by mouth daily 3/4/22   Concha Hare PA   hydrocortisone, Perianal, (PROCTO-MED HC) 2.5 % cream APPLY   TOPICALLY TO AFFECTED AREA THREE TIMES DAILY 1/19/22   Concha Hare PA   levothyroxine (SYNTHROID/LEVOTHROID) 75 MCG tablet Take 1 tablet by mouth once daily 1/18/22   Maddy Quinonez APRN CNP   nitroGLYcerin (NITROSTAT) 0.4 MG sublingual tablet DISSOLVE ONE TABLET UNDER THE TONGUE EVERY 5 MINUTES AS NEEDED FOR CHEST PAIN. DO NOT EXCEED A TOTAL OF 3 DOSES IN 15 MINUTES  "6/8/21   Norman Nichole MD   oxybutynin ER (DITROPAN-XL) 10 MG 24 hr tablet Take 1 tablet (10 mg) by mouth daily At bedtime 2/21/22   Concha Hare PA   spironolactone (ALDACTONE) 25 MG tablet Take 1 tablet (25 mg) by mouth daily 11/9/21   Norman Nichole MD   traMADol (ULTRAM) 50 MG tablet Take 50 mg by mouth 3 times daily as needed for severe pain    Reported, Patient   traZODone (DESYREL) 50 MG tablet Take 1 tablet (50 mg) by mouth At Bedtime 1/19/22   Concha Hare PA   valsartan (DIOVAN) 40 MG tablet Take 1 tablet (40 mg) by mouth daily 9/28/21   Norman Nichole MD          ALLERGIES:   Allergies   Allergen Reactions     Codeine      Isosorbide Mononitrate Other (See Comments)     Vomiting and headache       Lisinopril      [facility] scan     Paxil [Paroxetine] Headache          ROS:   CONSTITUTIONAL: No reported fever or chills. No changes in weight.  ENT: No visual disturbance, ear ache, epistaxis or sore throat.   CARDIOVASCULAR: Positive for chronic chest discomfort. No palpitations. Positive for lower extremity edema.   RESPIRATORY: Positive for chronic dyspnea upon exertion. No cough, wheezing or hemoptysis.  No reported orthopnea or PND.  GI: No abdominal pain, melena or hematochezia.   : No reported hematuria or dysuria.   NEUROLOGICAL: No lightheadedness, dizziness, syncope, ataxia, paresthesias or weakness.   HEMATOLOGIC: No history of anemia. No bleeding or excessive bruising. No history of blood clots.   MUSCULOSKELETAL: No new joint pain or swelling, no muscle pain.  ENDOCRINOLOGIC: No temperature intolerance. No hair or skin changes.  SKIN: No abnormal rashes or sores, no unusual itching.  PSYCHIATRIC: Positive for history of depression and anxiety.      PHYSICAL EXAM:   /75 (BP Location: Right arm)   Pulse 65   Temp 97.7  F (36.5  C) (Tympanic)   Ht 1.702 m (5' 7\")   Wt 95.7 kg (211 lb)   SpO2 97%   BMI 33.05 kg/m    GENERAL: The patient is a " well-developed, well-nourished, in no apparent distress.  HEENT: Head is normocephalic and atraumatic. Eyes are symmetrical with normal visual tracking. No icterus, no xanthelasmas. Nares appeared normal without nasal drainage. Mucous membranes are moist, no cyanosis.  NECK: Supple, no cervical bruits, JVP not visible.   CHEST/ LUNGS: Lungs clear to auscultation, no rales, rhonchi or wheezes, no use of accessory muscles, no retractions, respirations unlabored and normal respiratory rate.   CARDIO: Regular rate and rhythm normal with S1 and S2, no S3 or S4 and no murmur, click or rub. Precordium quiet with normal PMI.    ABD: Abdomen is nondistended.  EXTREMITIES: Trace LE edema present.  MUSCULOSKELETAL: No visible joint swelling.   NEUROLOGIC: Alert and oriented X3. Normal speech, gait and affect. No focal neurologic deficits.   SKIN: No jaundice. No rashes or visible skin lesions present. No ecchymosis.       LAB RESULTS:   Office Visit on 02/21/2022   Component Date Value Ref Range Status     Cholesterol 02/21/2022 210 (A) <200 mg/dL Final     Triglycerides 02/21/2022 111  <150 mg/dL Final     Direct Measure HDL 02/21/2022 66  >=50 mg/dL Final     LDL Cholesterol Calculated 02/21/2022 122 (A) <=100 mg/dL Final     Non HDL Cholesterol 02/21/2022 144 (A) <130 mg/dL Final     Patient Fasting > 8hrs? 02/21/2022 Yes   Final     Creatinine Urine mg/dL 02/21/2022 132  mg/dL Final     Albumin Urine mg/L 02/21/2022 11  mg/L Final     Albumin Urine mg/g Cr 02/21/2022 8.33  0.00 - 25.00 mg/g Cr Final   Office Visit on 01/19/2022   Component Date Value Ref Range Status     Color Urine 01/19/2022 Light Yellow  Colorless, Straw, Light Yellow, Yellow Final     Appearance Urine 01/19/2022 Clear  Clear Final     Glucose Urine 01/19/2022 Negative  Negative mg/dL Final     Bilirubin Urine 01/19/2022 Negative  Negative Final     Ketones Urine 01/19/2022 Negative  Negative mg/dL Final     Specific Gravity Urine 01/19/2022 1.025   1.003 - 1.035 Final     Blood Urine 01/19/2022 Negative  Negative Final     pH Urine 01/19/2022 5.0  4.7 - 8.0 Final     Protein Albumin Urine 01/19/2022 Negative  Negative mg/dL Final     Urobilinogen Urine 01/19/2022 Normal  Normal, 2.0 mg/dL Final     Nitrite Urine 01/19/2022 Negative  Negative Final     Leukocyte Esterase Urine 01/19/2022 Negative  Negative Final     Bacteria Urine 01/19/2022 Few (A) None Seen /HPF Final     Mucus Urine 01/19/2022 Present (A) None Seen /LPF Final     RBC Urine 01/19/2022 <1  <=2 /HPF Final     WBC Urine 01/19/2022 <1  <=5 /HPF Final     Squamous Epithelials Urine 01/19/2022 3 (A) <=1 /HPF Final     Sodium 01/19/2022 138  133 - 144 mmol/L Final     Potassium 01/19/2022 4.4  3.4 - 5.3 mmol/L Final     Chloride 01/19/2022 108  94 - 109 mmol/L Final     Carbon Dioxide (CO2) 01/19/2022 24  20 - 32 mmol/L Final     Anion Gap 01/19/2022 6  3 - 14 mmol/L Final     Urea Nitrogen 01/19/2022 16  7 - 30 mg/dL Final     Creatinine 01/19/2022 1.06 (A) 0.52 - 1.04 mg/dL Final     Calcium 01/19/2022 9.3  8.5 - 10.1 mg/dL Final     Glucose 01/19/2022 122 (A) 70 - 99 mg/dL Final     GFR Estimate 01/19/2022 59 (A) >60 mL/min/1.73m2 Final          ASSESSMENT:   Sharlene Mccord presents for cardiology consult with chronic diastolic heart failure and aortic insufficiency with chronic dyspnea. Patient had previously followed cardiologist Dr. Nichole, she was last seen on 11/9/2021.  She has a history of moderate aortic insufficiency without much progression over the years and no signs of LV systolic failure.  She has a history of microvascular angina-chronic (suggested on cardiac stress MRI), chronic diastolic heart failure, hypertension, GUTIERREZ, chronic pain syndrome (rhematology evaluation with possible mixed connective tissue disorder) and chronic fatigue.  Additional past medical history includes migraine headaches, stage I CKD, history of pancreatitis, SHAKIR, depression, GERD, diverticulitis and  hypothyroidism.  Today patient continues to report chronic dyspnea, no change in chest pains- currently stable. Positive for LE edema. No palpitations, lightheadedness or syncope.     1. Chronic diastolic heart failure (H)  2. Cardiac microvascular disease  3. Nonrheumatic aortic valve insufficiency  4. Stage 3a chronic kidney disease (H)  5. Lymphedema  6. Edema of both lower extremities  7. Intermittent claudication (H)  8. History of tobacco abuse  9. GUTIERREZ (dyspnea on exertion)    PLAN:   1. Chronic HFpEF, NYHA FC II. BP currently well controlled.  She will continue on spironolactone 25 mg daily. Has not required loop diuretic.  2. Patient is due for repeat TTE with HFpEF and AR.   3. Chronic vasospastic angina, currently stable on diltiazem 240 mg daily.  She has not required sublingual nitroglycerin use.  4. Patient describes symptoms of intermittent claudication, proceed with US JESUS's.   5. PFT's with ongoing dyspnea.   6.  Follow-up with cardiology in 6 weeks following above testing, certainly sooner if needed.  Orders Placed This Encounter   Procedures     US JESUS Doppler No Exercise     EKG CARDIAC - HIM SCAN     Echocardiogram Complete     Pulmonary Function Test     Compression Sleeve/Stocking Order for DME - ONLY FOR DME       Thank you for allowing me to participate in the care of your patient. Please do not hesitate to contact me if you have any questions.     Total time 79 min on date of encounter spent reviewing records, face-to-face time obtaining HPI, physical exam, reviewing results of recent testing and counseling on the above diagnoses and recommended plan of care.    Giovana Carlson, APRN CNP CHFN

## 2022-04-01 ENCOUNTER — HOSPITAL ENCOUNTER (OUTPATIENT)
Dept: PHYSICAL THERAPY | Facility: HOSPITAL | Age: 62
Setting detail: THERAPIES SERIES
Discharge: HOME | End: 2022-04-01
Attending: PHYSICIAN ASSISTANT
Payer: MEDICARE

## 2022-04-01 PROCEDURE — 97140 MANUAL THERAPY 1/> REGIONS: CPT | Mod: GP

## 2022-04-05 ENCOUNTER — HOSPITAL ENCOUNTER (OUTPATIENT)
Dept: PHYSICAL THERAPY | Facility: HOSPITAL | Age: 62
Setting detail: THERAPIES SERIES
Discharge: HOME | End: 2022-04-05
Attending: PHYSICIAN ASSISTANT
Payer: MEDICARE

## 2022-04-05 PROCEDURE — 97035 APP MDLTY 1+ULTRASOUND EA 15: CPT | Mod: GP,CQ

## 2022-04-05 PROCEDURE — 97140 MANUAL THERAPY 1/> REGIONS: CPT | Mod: GP,CQ

## 2022-04-08 ENCOUNTER — HOSPITAL ENCOUNTER (OUTPATIENT)
Dept: CARDIOLOGY | Facility: HOSPITAL | Age: 62
Discharge: HOME | End: 2022-04-08
Attending: NURSE PRACTITIONER | Admitting: INTERNAL MEDICINE
Payer: MEDICARE

## 2022-04-08 DIAGNOSIS — I50.32 CHRONIC DIASTOLIC HEART FAILURE (H): ICD-10-CM

## 2022-04-08 DIAGNOSIS — I35.1 NONRHEUMATIC AORTIC VALVE INSUFFICIENCY: ICD-10-CM

## 2022-04-08 LAB — LVEF ECHO: NORMAL

## 2022-04-08 PROCEDURE — 93306 TTE W/DOPPLER COMPLETE: CPT

## 2022-04-11 DIAGNOSIS — I25.85 CARDIAC MICROVASCULAR DISEASE: ICD-10-CM

## 2022-04-11 DIAGNOSIS — I51.89 DIASTOLIC DYSFUNCTION: ICD-10-CM

## 2022-04-11 RX ORDER — DILTIAZEM HYDROCHLORIDE 240 MG/1
CAPSULE, COATED, EXTENDED RELEASE ORAL
Qty: 30 CAPSULE | Refills: 0 | Status: SHIPPED | OUTPATIENT
Start: 2022-04-11 | End: 2022-05-26

## 2022-04-11 NOTE — TELEPHONE ENCOUNTER
diltiazem ER COATED BEADS (CARDIZEM CD/CARTIA XT) 240 MG 24 hr capsule      Last Written Prescription Date:  4-27-21  Last Fill Quantity: 30,   # refills: 11  Last Office Visit: 3-31-21  Future Office visit:    Next 5 appointments (look out 90 days)    May 05, 2022 10:15 AM  (Arrive by 10:00 AM)  SHORT with KEILY Fonseca  [facility] ([facility] ) [address]  [place] [address]  [phone]   May 17, 2022 11:15 AM  (Arrive by 11:00 AM)  Return Visit with ENRIQUE Nuñez CNP  [facility] ([facility] ) [address]  [place] [address]  [phone]           Routing refill request to provider for review/approval because:   Normal ALT in past 12 months    Normal serum creatinine on file in past 12 months

## 2022-04-18 DIAGNOSIS — E03.9 ACQUIRED HYPOTHYROIDISM: ICD-10-CM

## 2022-04-20 RX ORDER — LEVOTHYROXINE SODIUM 75 UG/1
TABLET ORAL
Qty: 90 TABLET | Refills: 0 | Status: SHIPPED | OUTPATIENT
Start: 2022-04-20 | End: 2022-07-18

## 2022-04-20 NOTE — TELEPHONE ENCOUNTER
levothyroxine      Last Written Prescription Date:  1/18/22  Last Fill Quantity: 90,   # refills: 0  Last Office Visit: 2/21/22  Future Office visit:    Next 5 appointments (look out 90 days)    May 05, 2022 10:15 AM  (Arrive by 10:00 AM)  SHORT with KEILY Fonseca  [facility] ([facility] [place] ) [address]  [place] [address]  [phone]   May 17, 2022 11:15 AM  (Arrive by 11:00 AM)  Return Visit with ENRIQUE Nuñez CNP  [facility] [place] ([facility] [place] ) [address]  [place] [address]  [phone]

## 2022-04-27 ENCOUNTER — HOSPITAL ENCOUNTER (OUTPATIENT)
Dept: ULTRASOUND IMAGING | Facility: HOSPITAL | Age: 62
Discharge: HOME | End: 2022-04-27
Attending: NURSE PRACTITIONER | Admitting: NURSE PRACTITIONER
Payer: MEDICARE

## 2022-04-27 DIAGNOSIS — I73.9 INTERMITTENT CLAUDICATION (H): ICD-10-CM

## 2022-04-27 DIAGNOSIS — R60.0 EDEMA OF BOTH LOWER EXTREMITIES: ICD-10-CM

## 2022-04-27 DIAGNOSIS — Z87.891 HISTORY OF TOBACCO ABUSE: ICD-10-CM

## 2022-04-27 PROCEDURE — 93922 UPR/L XTREMITY ART 2 LEVELS: CPT
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
## 2022-05-05 ENCOUNTER — OFFICE VISIT (OUTPATIENT)
Dept: FAMILY MEDICINE | Facility: OTHER | Age: 62
End: 2022-05-05
Attending: PHYSICIAN ASSISTANT
Payer: COMMERCIAL

## 2022-05-05 VITALS
SYSTOLIC BLOOD PRESSURE: 128 MMHG | DIASTOLIC BLOOD PRESSURE: 80 MMHG | TEMPERATURE: 98.5 F | HEIGHT: 67 IN | OXYGEN SATURATION: 99 % | BODY MASS INDEX: 32.33 KG/M2 | HEART RATE: 66 BPM | WEIGHT: 206 LBS

## 2022-05-05 DIAGNOSIS — F41.9 ANXIETY: ICD-10-CM

## 2022-05-05 DIAGNOSIS — G47.00 PERSISTENT INSOMNIA: ICD-10-CM

## 2022-05-05 DIAGNOSIS — N39.46 MIXED INCONTINENCE: ICD-10-CM

## 2022-05-05 DIAGNOSIS — R12 HEARTBURN: Primary | ICD-10-CM

## 2022-05-05 DIAGNOSIS — Z63.0 MARITAL CONFLICT: ICD-10-CM

## 2022-05-05 DIAGNOSIS — Z79.890 POSTMENOPAUSAL HRT (HORMONE REPLACEMENT THERAPY): ICD-10-CM

## 2022-05-05 PROCEDURE — G0463 HOSPITAL OUTPT CLINIC VISIT: HCPCS

## 2022-05-05 PROCEDURE — 99214 OFFICE O/P EST MOD 30 MIN: CPT | Performed by: PHYSICIAN ASSISTANT

## 2022-05-05 RX ORDER — ESTRADIOL 0.5 MG/1
0.5 TABLET ORAL 2 TIMES DAILY
Qty: 60 TABLET | Refills: 11 | Status: SHIPPED | OUTPATIENT
Start: 2022-05-05 | End: 2023-04-07

## 2022-05-05 RX ORDER — ESOMEPRAZOLE MAGNESIUM 40 MG/1
40 CAPSULE, DELAYED RELEASE ORAL
Qty: 90 CAPSULE | Refills: 1 | Status: SHIPPED | OUTPATIENT
Start: 2022-05-05 | End: 2023-01-13

## 2022-05-05 RX ORDER — HYDROXYZINE HYDROCHLORIDE 25 MG/1
25 TABLET, FILM COATED ORAL 3 TIMES DAILY PRN
Qty: 60 TABLET | Refills: 11 | Status: SHIPPED | OUTPATIENT
Start: 2022-05-05 | End: 2022-06-13

## 2022-05-05 RX ORDER — TRAZODONE HYDROCHLORIDE 50 MG/1
50 TABLET, FILM COATED ORAL AT BEDTIME
Qty: 30 TABLET | Refills: 3 | Status: SHIPPED | OUTPATIENT
Start: 2022-05-05 | End: 2022-08-30

## 2022-05-05 RX ORDER — OXYBUTYNIN CHLORIDE 10 MG/1
10 TABLET, EXTENDED RELEASE ORAL DAILY
Qty: 30 TABLET | Refills: 3 | Status: SHIPPED | OUTPATIENT
Start: 2022-05-05 | End: 2022-05-26 | Stop reason: SINTOL

## 2022-05-05 SDOH — SOCIAL STABILITY - SOCIAL INSECURITY: PROBLEMS IN RELATIONSHIP WITH SPOUSE OR PARTNER: Z63.0

## 2022-05-05 ASSESSMENT — ANXIETY QUESTIONNAIRES
GAD7 TOTAL SCORE: 21
3. WORRYING TOO MUCH ABOUT DIFFERENT THINGS: NEARLY EVERY DAY
2. NOT BEING ABLE TO STOP OR CONTROL WORRYING: NEARLY EVERY DAY
7. FEELING AFRAID AS IF SOMETHING AWFUL MIGHT HAPPEN: NEARLY EVERY DAY
1. FEELING NERVOUS, ANXIOUS, OR ON EDGE: NEARLY EVERY DAY
5. BEING SO RESTLESS THAT IT IS HARD TO SIT STILL: NEARLY EVERY DAY
4. TROUBLE RELAXING: NEARLY EVERY DAY
6. BECOMING EASILY ANNOYED OR IRRITABLE: NEARLY EVERY DAY

## 2022-05-05 ASSESSMENT — PATIENT HEALTH QUESTIONNAIRE - PHQ9: SUM OF ALL RESPONSES TO PHQ QUESTIONS 1-9: 24

## 2022-05-05 ASSESSMENT — PAIN SCALES - GENERAL: PAINLEVEL: NO PAIN (0)

## 2022-05-05 NOTE — NURSING NOTE
"Chief Complaint   Patient presents with     MOOD CHANGES       Initial /80 (BP Location: Right arm, Patient Position: Sitting, Cuff Size: Adult Regular)   Pulse 66   Temp 98.5  F (36.9  C) (Tympanic)   Ht 1.702 m (5' 7\")   Wt 93.4 kg (206 lb)   SpO2 99%   BMI 32.26 kg/m   Estimated body mass index is 32.26 kg/m  as calculated from the following:    Height as of this encounter: 1.702 m (5' 7\").    Weight as of this encounter: 93.4 kg (206 lb).  Medication Reconciliation: complete  Silvana Miguel LPN  "

## 2022-05-06 ASSESSMENT — ANXIETY QUESTIONNAIRES: GAD7 TOTAL SCORE: 21

## 2022-05-08 DIAGNOSIS — F41.1 GENERALIZED ANXIETY DISORDER: ICD-10-CM

## 2022-05-09 RX ORDER — CLONAZEPAM 1 MG/1
TABLET ORAL
Qty: 60 TABLET | Refills: 0 | Status: SHIPPED | OUTPATIENT
Start: 2022-05-09 | End: 2022-06-09

## 2022-05-09 NOTE — TELEPHONE ENCOUNTER
Klonopin      Last Written Prescription Date:  3.31.22  Last Fill Quantity: #60,   # refills: 0  Last Office Visit: 5.5.22  Future Office visit:    Next 5 appointments (look out 90 days)    May 17, 2022 11:15 AM  (Arrive by 11:00 AM)  Return Visit with ENRIQUE Nuñez CNP  [facility] ([facility] [place] [address] [facility] [place] [address]  [phone]   Jun 13, 2022 10:45 AM  (Arrive by 10:30 AM)  SHORT with KEILY Fonseca  [facility] ([facility] ) [address]  [place] [address]  [phone]           Routing refill request to provider for review/approval because:  Drug not on the [facility], [facility] or [facility] refill protocol or controlled substance

## 2022-05-17 ENCOUNTER — OFFICE VISIT (OUTPATIENT)
Dept: CARDIOLOGY | Facility: OTHER | Age: 62
End: 2022-05-17
Attending: NURSE PRACTITIONER
Payer: COMMERCIAL

## 2022-05-17 VITALS
SYSTOLIC BLOOD PRESSURE: 103 MMHG | WEIGHT: 204.5 LBS | DIASTOLIC BLOOD PRESSURE: 67 MMHG | BODY MASS INDEX: 32.1 KG/M2 | HEART RATE: 64 BPM | TEMPERATURE: 97.6 F | HEIGHT: 67 IN | OXYGEN SATURATION: 98 %

## 2022-05-17 DIAGNOSIS — I95.1 ORTHOSTATIC HYPOTENSION: Primary | ICD-10-CM

## 2022-05-17 DIAGNOSIS — R73.01 IMPAIRED FASTING GLUCOSE: ICD-10-CM

## 2022-05-17 DIAGNOSIS — N18.31 STAGE 3A CHRONIC KIDNEY DISEASE (H): ICD-10-CM

## 2022-05-17 DIAGNOSIS — N30.00 ACUTE CYSTITIS WITHOUT HEMATURIA: ICD-10-CM

## 2022-05-17 DIAGNOSIS — R30.0 DYSURIA: ICD-10-CM

## 2022-05-17 DIAGNOSIS — I35.1 NONRHEUMATIC AORTIC VALVE INSUFFICIENCY: ICD-10-CM

## 2022-05-17 DIAGNOSIS — I25.85 CARDIAC MICROVASCULAR DISEASE: ICD-10-CM

## 2022-05-17 DIAGNOSIS — I51.89 GRADE I DIASTOLIC DYSFUNCTION: ICD-10-CM

## 2022-05-17 DIAGNOSIS — I10 ESSENTIAL HYPERTENSION: ICD-10-CM

## 2022-05-17 DIAGNOSIS — I89.0 LYMPHEDEMA: ICD-10-CM

## 2022-05-17 DIAGNOSIS — R06.09 DOE (DYSPNEA ON EXERTION): ICD-10-CM

## 2022-05-17 LAB
ALBUMIN UR-MCNC: NEGATIVE MG/DL
APPEARANCE UR: CLEAR
BACTERIA #/AREA URNS HPF: ABNORMAL /HPF
BILIRUB UR QL STRIP: NEGATIVE
COLOR UR AUTO: ABNORMAL
GLUCOSE UR STRIP-MCNC: NEGATIVE MG/DL
HGB UR QL STRIP: NEGATIVE
KETONES UR STRIP-MCNC: NEGATIVE MG/DL
LEUKOCYTE ESTERASE UR QL STRIP: ABNORMAL
NITRATE UR QL: NEGATIVE
PH UR STRIP: 7 [PH] (ref 4.7–8)
RBC URINE: 1 /HPF
SP GR UR STRIP: 1 (ref 1–1.03)
SQUAMOUS EPITHELIAL: 1 /HPF
UROBILINOGEN UR STRIP-MCNC: NORMAL MG/DL
WBC URINE: 27 /HPF

## 2022-05-17 PROCEDURE — G0463 HOSPITAL OUTPT CLINIC VISIT: HCPCS

## 2022-05-17 PROCEDURE — 99215 OFFICE O/P EST HI 40 MIN: CPT | Performed by: NURSE PRACTITIONER

## 2022-05-17 PROCEDURE — 81001 URINALYSIS AUTO W/SCOPE: CPT | Mod: ZL | Performed by: NURSE PRACTITIONER

## 2022-05-17 PROCEDURE — 87186 SC STD MICRODIL/AGAR DIL: CPT | Mod: ZL | Performed by: NURSE PRACTITIONER

## 2022-05-17 ASSESSMENT — PAIN SCALES - GENERAL: PAINLEVEL: MODERATE PAIN (5)

## 2022-05-17 NOTE — NURSING NOTE
"Chief Complaint   Patient presents with     RECHECK     Follow up- last visit 3/31/22-echo done 4/8/22- no new symptoms       Initial /67 (BP Location: Right arm, Patient Position: Chair, Cuff Size: Adult Large)   Pulse 64   Temp 97.6  F (36.4  C) (Tympanic)   Ht 1.702 m (5' 7\")   Wt 92.8 kg (204 lb 8 oz)   SpO2 98%   BMI 32.03 kg/m   Estimated body mass index is 32.03 kg/m  as calculated from the following:    Height as of this encounter: 1.702 m (5' 7\").    Weight as of this encounter: 92.8 kg (204 lb 8 oz).  Medication Reconciliation: complete  EZEKIEL RODRIGUEZ LPN    "

## 2022-05-17 NOTE — PATIENT INSTRUCTIONS
Thank you for allowing Giovana Carlson and our team to participate in your care. Please call our office at [phone] with scheduling questions or if you need to cancel or change your appointment. With any other questions or concerns you may call Heather cardiology nurse at [phone].       If you experience chest pain, chest pressure, chest tightness, shortness of breath, fainting, lightheadedness, nausea, vomiting, or other concerning symptoms, please report to the Emergency Department or call 911. These symptoms may be emergent, and best treated in the Emergency Department.        Follow up in 1 week with Dr. Lo     You will have labs that day before seeing doctor.     Stop your Valsartan    Labs today.

## 2022-05-17 NOTE — PROGRESS NOTES
[facility] HEART CARE   CARDIOLOGY PROGRESS NOTE    Sharlene Mccord   [address]  [place] [address]    Concha Hare     Chief Complaint   Patient presents with     RECHECK        HPI:   Sharlene Mccord is a 61-year old female who presents for cardiology follow-up to visit on 3/31/22 with chronic diastolic heart failure, aortic valve insufficiency with chronic dyspnea. Patient had previously followed cardiologist Dr. Nichole, she was last seen on 11/9/2021.    She has a history of moderate aortic insufficiency without much progression over the years and no signs of LV systolic failure.  She has a history of microvascular angina-chronic (suggested on cardiac stress MRI), chronic diastolic heart failure, hypertension, GUTIERREZ, chronic pain syndrome (rhematology evaluation with possible mixed connective tissue disorder) and chronic fatigue.  Additional past medical history includes migraine headaches, stage I CKD, history of pancreatitis, SHAKIR, depression, GERD, diverticulitis and hypothyroidism.    She has a history of chronic chest discomfort related to microvascular disease and has tried several therapies including isosorbide mononitrate (stopped after 4 days due to severe headache), diltiazem (improved chest discomfort but low blood pressures) and SL nitroglycerin PRN.     She underwent heart catheterization on 4/14/2021 for invasive exercise hemodynamic assessment with continued chest pain and dyspnea.  She was identified to have normal resting PA pressures, biventricular filling pressures and normal PVR at rest.  Moderately reduced cardiac index at rest in the setting of bradycardia.  Mild increase in PCWP and right atrial pressure with exercise.  Appropriate increase in cardiac output and PA pressures with exercise.  No increased PVR with exercise.  All findings indicating early HFpEF.    She previously underwent MR cardiac with contrast and stress revealing normal LV size and function, LVEF at 67%, no regional wall  motion abnormalities.  Normal RV size and function.  Regadenoson stress perfusion imaging did not reveal any areas of reversible ischemia.  Semiquantitative blood flow reserve assessment revealed maximal hyperemic flow to be reduced in all myocardial segments with a range from 1.6-1.9 which suggest global decrease in hyperemic flow which can be seen in microvascular dysfunction.    Cardiac cath in 2013 revealing mild aortic regurgitation noted to be chronic, focal CAD with no hemodynamically significant lesions and normal heart function.    INTERVAL HISTORY:  Patient reporting symptoms of orthostatic lightheadedness. No syncope. Acute reports of near syncope driving and feeling lightheadedness, near syncopal with hot showers.   Occasional palpitations, describes as brief irregular heart beat. Chest pains well controlled on Diltiazem. Edema of LE's chronic, controlled with use of compression stockings and has also been on aldosterone antagonist with grade I diastolic dysfunction. Today patient denies any increased dyspnea at rest or with exertion.   Additionally, patient reporting dysuria and urinary frequency for past 2-3 days. No fever or chills reported.     RELEVANT TESTING:  [facility]  Echocardiography Laboratory  [address] [place] [address]     Name: STEVEN SZYMANSKI  MRN: [id number]  : 1960  Study Date: 2022 12:24 PM  Age: 61 yrs  Gender: Female  Patient Location: [facility]  Reason For Study: Chronic diastolic heart failure (H), Nonrheumatic aortic  valve i  History: Diastolic heart failure, AI  Ordering Physician: FLAKITO HEARN  Referring Physician: FLAKITO HEARN  Performed By: Kaur Camilo RDCS     BSA: 2.1 m2  Height: 67 in  Weight: 211 lb  ______________________________________________________________________________  Procedure  Echocardiogram with two-dimensional, color and spectral Doppler  performed.  ______________________________________________________________________________  Interpretation Summary  Left ventricular function is normal.The ejection fraction is 60-65%. Right  ventricular function, chamber size, wall motion, and thickness are normal.  Moderate aortic insufficiency is present.  No pericardial effusion is present.  This study was compared with the study from 11/25/2020 . There has been no  change.     ______________________________________________________________________________  Left Ventricle  Left ventricular function is normal.The ejection fraction is 60-65%. Left  ventricular size is normal. Relative wall thickness is increased consistent  with concentric remodeling. Grade I or early diastolic dysfunction.     Right Ventricle  Right ventricular function, chamber size, wall motion, and thickness are  normal.     Atria  Both atria appear normal. The atrial septum is intact as assessed by color  Doppler .     Mitral Valve  The mitral valve is normal.     Aortic Valve  Moderate aortic insufficiency is present.     Tricuspid Valve  The tricuspid valve is normal. Mild tricuspid insufficiency is present. The  right ventricular systolic pressure is approximated at 19.5 mmHg plus the  right atrial pressure. Pulmonary artery systolic pressure is normal.     Pulmonic Valve  The valve leaflets are not well visualized. On Doppler interrogation, there is  no significant stenosis or regurgitation.     Vessels  The aorta root is normal. The pulmonary artery and bifurcation cannot be  assessed. IVC diameter and respiratory changes fall into an intermediate range  suggesting an RA pressure of 8 mmHg.     Pericardium  No pericardial effusion is present.     Compared to Previous Study  This study was compared with the study from 11/25/2020 . There has been no  change.     Attestation  I have personally viewed the imaging and agree with the interpretation and  report as documented by the fellow, Tonio  Luis Reyes Castro, and/or edited by  me.  ______________________________________________________________________________  MMode/2D Measurements & Calculations     IVSd: 1.1 cm  LVIDd: 4.3 cm  LVIDs: 2.8 cm  LVPWd: 1.1 cm  FS: 35.6 %  LV mass(C)d: 159.1 grams  LV mass(C)dI: 76.9 grams/m2  Ao root diam: 3.3 cm  asc Aorta Diam: 2.8 cm  LVOT diam: 2.1 cm  LVOT area: 3.3 cm2  RWT: 0.51     Doppler Measurements & Calculations  MV E max stefan: 104.0 cm/sec  MV A max stefan: 111.0 cm/sec  MV E/A: 0.94  MV dec slope: 540.0 cm/sec2  MV dec time: 0.19 sec  Ao V2 max: 225.0 cm/sec  Ao max P.2 mmHg  Ao V2 mean: 145.0 cm/sec  Ao mean P.8 mmHg  Ao V2 VTI: 54.2 cm  JAVY(I,D): 2.2 cm2  JAVY(V,D): 2.4 cm2  AI P1/2t: 639.8 msec  LV V1 max PG: 10.9 mmHg  LV V1 max: 165.0 cm/sec  LV V1 VTI: 36.3 cm  SV(LVOT): 120.9 ml  SI(LVOT): 58.4 ml/m2     PA V2 max: 105.0 cm/sec  PA max P.4 mmHg  PA mean P.5 mmHg  PA V2 VTI: 27.0 cm  TR max stefan: 221.0 cm/sec  TR max P.5 mmHg  AV Stefan Ratio (DI): 0.73  JAVY Index (cm2/m2): 1.1  E/E' av.4  Lateral E/e': 13.7  Medial E/e': 13.1     ______________________________________________________________________________  Report approved by: Shayy Nice 2022 01:25 PM     Hospital Outpatient Visit on 2022   Component Date Value Ref Range Status     LVEF  2022 60-65%   Final       PAST MEDICAL HISTORY:   Past Medical History:   Diagnosis Date     Anxiety state, unspecified 2011     Aortic valve disorders 2000    Echocardiogram showin mild/moderate AI.  Normal LV function     Cardiac microvascular disease 4/10/2013    Based on Cardiac MRI done at       COPD (chronic obstructive pulmonary disease) (H) 2021     Depressive disorder      Fatigue      Fatigue      Fibromyalgia 2011     Hypertension      Major depressive disorder, recurrent episode, unspecified 2014     Migraine, unspecified, without mention of intractable migraine without mention  of status migrainosus 03/01/2011     Mitral valve disorders(424.0) 10/16/2007     Need for prophylactic hormone replacement therapy (postmenopausal)      PONV (postoperative nausea and vomiting)      Thyroid Nodule 03/01/2011     Tobacco use disorder 03/01/2011     Unspecified hypothyroidism 03/01/2011          FAMILY HISTORY:   Family History   Problem Relation Age of Onset     C.A.D. Father      Hypertension Father      C.A.D. Mother      Cerebrovascular Disease Mother         CVA     Hypertension Mother      Coronary Artery Disease Mother      Diabetes Paternal Grandmother      Diabetes Paternal Grandfather      Diabetes Sister      Breast Cancer Sister      Diabetes Maternal Grandmother      Diabetes Sister      Breast Cancer Sister      Hypertension Sister      Depression Sister      Anxiety Disorder Sister      Obesity Sister      Depression Sister      Breast Cancer Sister      Obesity Sister      Depression Brother      Depression Daughter      Obesity Daughter      Depression Daughter      Mental Illness Daughter         [id number]     Anxiety Disorder Daughter      Mental Illness Daughter      Anxiety Disorder Other      Osteoporosis Other      Thyroid Disease Other      Diabetes Nephew           PAST SURGICAL HISTORY:   Past Surgical History:   Procedure Laterality Date     APPENDECTOMY  1977     BIOPSY  2017    taken at [facility] from Dr. Nadira cohen. Thyroid     CARDIAC SURGERY  2013    angiogram [facility]:  Normal coronary arteries.  Felt ot have some microvascular disease     CL AFF SURGICAL PATHOLOGY  01/02/2007    Thyroid Mass     COLONOSCOPY  1/13/2014    Procedure: COLONOSCOPY;  COLONOSCOPY ;  Surgeon: Chasidy Gee DO;  Location: [facility] OR     CV RIGHT HEART CATH MEASUREMENTS RECORDED N/A 4/14/2021    Procedure: CV RIGHT HEART CATH;  Surgeon: Danna Clay MD;  Location:  HEART CARDIAC CATH LAB     CV RIGHT HEART EXERCISE STRESS STUDY N/A 4/14/2021    Procedure: Right Heart Exercise Stress  Study;  Surgeon: Danna Clay MD;  Location:  HEART CARDIAC CATH LAB     ESOPHAGOSCOPY, GASTROSCOPY, DUODENOSCOPY (EGD), COMBINED N/A 2017    Procedure: COMBINED ESOPHAGOSCOPY, GASTROSCOPY, DUODENOSCOPY (EGD);  Surgeon: Johnathan Ruff DO;  Location: [facility] OR     GYN SURGERY      c-sections x 3     HYSTERECTOMY TOTAL ABDOMINAL, BILATERAL SALPINGO-OOPHORECTOMY, COMBINED N/A 2001     LAPAROSCOPIC CHOLECYSTECTOMY  2003    Cholelithiasis     LAPAROTOMY EXPLORATORY      abdominal pain/fever     LARYNGOSCOPY       SPLENECTOMY            SOCIAL HISTORY:   Social History     Socioeconomic History     Marital status:      Spouse name: Not on file     Number of children: Not on file     Years of education: Not on file     Highest education level: Not on file   Occupational History     Occupation: Manager   Tobacco Use     Smoking status: Former Smoker     Packs/day: 0.25     Years: 40.00     Pack years: 10.00     Types: Cigarettes     Start date: 1975     Quit date: 2021     Years since quittin.1     Smokeless tobacco: Never Used   Substance and Sexual Activity     Alcohol use: No     Drug use: No     Sexual activity: Not Currently     Partners: Male     Birth control/protection: Other     Comment: hysterectomy   Other Topics Concern      Service Not Asked     Blood Transfusions Yes     Caffeine Concern Yes     Comment: Coffee      Occupational Exposure Not Asked     Hobby Hazards Not Asked     Sleep Concern Not Asked     Stress Concern Not Asked     Weight Concern Not Asked     Special Diet Not Asked     Back Care Not Asked     Exercise Not Asked     Bike Helmet Not Asked     Seat Belt Not Asked     Self-Exams Not Asked     Parent/sibling w/ CABG, MI or angioplasty before 65F 55M? No   Social History Narrative     Not on file     Social Determinants of Health     Financial Resource Strain: Not on file   Food Insecurity: Not on file   Transportation Needs: Not on file    Physical Activity: Not on file   Stress: Not on file   Social Connections: Not on file   Intimate Partner Violence: Not on file   Housing Stability: Not on file          CURRENT MEDICATIONS:   Current Outpatient Medications   Medication     acetaminophen (TYLENOL) 325 MG tablet     clonazePAM (KLONOPIN) 1 MG tablet     diltiazem ER COATED BEADS (CARDIZEM CD/CARTIA XT) 240 MG 24 hr capsule     esomeprazole (NEXIUM) 40 MG DR capsule     estradiol (ESTRACE) 0.5 MG tablet     FLUoxetine (PROZAC) 20 MG capsule     hydrocortisone, Perianal, (PROCTO-MED HC) 2.5 % cream     hydrOXYzine (ATARAX) 25 MG tablet     levothyroxine (SYNTHROID/LEVOTHROID) 75 MCG tablet     nitroGLYcerin (NITROSTAT) 0.4 MG sublingual tablet     oxybutynin ER (DITROPAN XL) 10 MG 24 hr tablet     spironolactone (ALDACTONE) 25 MG tablet     traZODone (DESYREL) 50 MG tablet     valsartan (DIOVAN) 40 MG tablet     No current facility-administered medications for this visit.        ALLERGIES:   Allergies   Allergen Reactions     Codeine      Isosorbide Mononitrate Other (See Comments)     Vomiting and headache       Lisinopril      [facility] scan     Paxil [Paroxetine] Headache          ROS:   CONSTITUTIONAL: No reported fever or chills. No changes in weight.  ENT: No visual disturbance, ear ache, epistaxis or sore throat.   CARDIOVASCULAR: No recurrence of chest pains, well controlled. Occasional palpitations, see HPI. Positive for chronic lower extremity edema- controlled.    RESPIRATORY: Positive for chronic dyspnea upon exertion. No cough, wheezing or hemoptysis.  No reported orthopnea or PND.  GI: No abdominal pain, melena or hematochezia.   : Positive for dysuria and urinary frequency. Requested UA today.  NEUROLOGICAL: Positive for episodes of lightheadedness and dizziness, orthostatic lightheadedness described. No syncope, ataxia, paresthesias or weakness.   HEMATOLOGIC: No history of anemia. No bleeding or excessive bruising. No history  "of blood clots.   MUSCULOSKELETAL: No new joint pain or swelling, no muscle pain.  ENDOCRINOLOGIC: No temperature intolerance. No hair or skin changes.  SKIN: No abnormal rashes or sores, no unusual itching.  PSYCHIATRIC: Positive for history of depression and anxiety.      PHYSICAL EXAM:   /67 (BP Location: Right arm, Patient Position: Chair, Cuff Size: Adult Large)   Pulse 64   Temp 97.6  F (36.4  C) (Tympanic)   Ht 1.702 m (5' 7\")   Wt 92.8 kg (204 lb 8 oz)   SpO2 98%   BMI 32.03 kg/m     Lay- 131/74 p53, sit 116/75 p 63, stand 103/69 p73 bpm  GENERAL: The patient is a well-developed, well-nourished, in no apparent distress.  HEENT: Head is normocephalic and atraumatic. Eyes are symmetrical with normal visual tracking. No icterus, no xanthelasmas. Nares appeared normal without nasal drainage. Mucous membranes are moist, no cyanosis.  NECK: Supple, no cervical bruits, JVP not visible.   CHEST/ LUNGS: Lungs clear to auscultation, no rales, rhonchi or wheezes, no use of accessory muscles, no retractions, respirations unlabored and normal respiratory rate.   CARDIO: Regular rate and rhythm normal with S1 and S2, no S3 or S4 and no murmur, click or rub.   ABD: Abdomen is nondistended.  EXTREMITIES: Trace LE edema present, compression stockings present.  MUSCULOSKELETAL: No visible joint swelling.   NEUROLOGIC: Alert and oriented X3. Normal speech, gait and affect. No focal neurologic deficits.   SKIN: No jaundice. No rashes or visible skin lesions present. No ecchymosis.       LAB RESULTS:   Office Visit on 02/21/2022   Component Date Value Ref Range Status     Cholesterol 02/21/2022 210 (A) <200 mg/dL Final     Triglycerides 02/21/2022 111  <150 mg/dL Final     Direct Measure HDL 02/21/2022 66  >=50 mg/dL Final     LDL Cholesterol Calculated 02/21/2022 122 (A) <=100 mg/dL Final     Non HDL Cholesterol 02/21/2022 144 (A) <130 mg/dL Final     Patient Fasting > 8hrs? 02/21/2022 Yes   Final     Creatinine " Urine mg/dL 02/21/2022 132  mg/dL Final     Albumin Urine mg/L 02/21/2022 11  mg/L Final     Albumin Urine mg/g Cr 02/21/2022 8.33  0.00 - 25.00 mg/g Cr Final   Office Visit on 01/19/2022   Component Date Value Ref Range Status     Color Urine 01/19/2022 Light Yellow  Colorless, Straw, Light Yellow, Yellow Final     Appearance Urine 01/19/2022 Clear  Clear Final     Glucose Urine 01/19/2022 Negative  Negative mg/dL Final     Bilirubin Urine 01/19/2022 Negative  Negative Final     Ketones Urine 01/19/2022 Negative  Negative mg/dL Final     Specific Gravity Urine 01/19/2022 1.025  1.003 - 1.035 Final     Blood Urine 01/19/2022 Negative  Negative Final     pH Urine 01/19/2022 5.0  4.7 - 8.0 Final     Protein Albumin Urine 01/19/2022 Negative  Negative mg/dL Final     Urobilinogen Urine 01/19/2022 Normal  Normal, 2.0 mg/dL Final     Nitrite Urine 01/19/2022 Negative  Negative Final     Leukocyte Esterase Urine 01/19/2022 Negative  Negative Final     Bacteria Urine 01/19/2022 Few (A) None Seen /HPF Final     Mucus Urine 01/19/2022 Present (A) None Seen /LPF Final     RBC Urine 01/19/2022 <1  <=2 /HPF Final     WBC Urine 01/19/2022 <1  <=5 /HPF Final     Squamous Epithelials Urine 01/19/2022 3 (A) <=1 /HPF Final     Sodium 01/19/2022 138  133 - 144 mmol/L Final     Potassium 01/19/2022 4.4  3.4 - 5.3 mmol/L Final     Chloride 01/19/2022 108  94 - 109 mmol/L Final     Carbon Dioxide (CO2) 01/19/2022 24  20 - 32 mmol/L Final     Anion Gap 01/19/2022 6  3 - 14 mmol/L Final     Urea Nitrogen 01/19/2022 16  7 - 30 mg/dL Final     Creatinine 01/19/2022 1.06 (A) 0.52 - 1.04 mg/dL Final     Calcium 01/19/2022 9.3  8.5 - 10.1 mg/dL Final     Glucose 01/19/2022 122 (A) 70 - 99 mg/dL Final     GFR Estimate 01/19/2022 59 (A) >60 mL/min/1.73m2 Final          ASSESSMENT:   Sharlene Mccord presents for cardiology follow-up to visit on 3/31/22 with chronic diastolic heart failure, aortic valve insufficiency with chronic dyspnea. Patient  had previously followed cardiologist Dr. Nichole, she was last seen on 11/9/2021.  Patient reporting symptoms of orthostatic lightheadedness. No syncope. Occasional palpitations, describes as brief irregular heart beat. Chest pains well controlled on Diltiazem. Edema of LE's chronic, controlled with use of compression stockings and has also been on aldosterone antagonist with grade I diastolic dysfunction. Today patient denies any increased dyspnea at rest or with exertion.     1. Grade I diastolic dysfunction  2. Cardiac microvascular disease  3. Essential hypertension  4. Nonrheumatic aortic valve insufficiency  5. Lymphedema  6. GUTIERREZ (dyspnea on exertion)  7. Stage 3a chronic kidney disease (H)  8. Dysuria  9. Orthostatic hypotension- acute  10. Impaired fasting glucose    PLAN:   1. Chronic HFpEF, NYHA FC II. BP  Has been well controlled.  She will continue on spironolactone 25 mg daily. Has not required loop diuretic.  2. Reviewed results of TTE with moderate MR, grade I diastolic dysfunction and no LV dilation. See above.   3. Chronic vasospastic angina, currently stable on diltiazem 240 mg daily.    4. Patient previously described symptoms of intermittent claudication with history of tobacco abuse, normal JESUS's.  5. Orthostatic lightheadedness, near syncope described with positive orthostatic exam today. It has been recommended that she stop Valsartan. She will follow-up with cardiology clinic in one week with repeat orthostatic testing and additional laboratory evaluation. Follow-up on renal function with stopping ARB. Assess for anemia with orthostatic concerns today.   6.  Symptoms suggestive of acute cystitis. Follow-up with PCP for STI testing requested.   Orders Placed This Encounter   Procedures     UA reflex to Microscopic and Culture - [place]     CBC with platelets     Basic metabolic panel     Hemoglobin A1c       Thank you for allowing me to participate in the care of your patient. Please do not  hesitate to contact me if you have any questions.       Giovana Carlson, APRN CNP [facility]

## 2022-05-18 RX ORDER — NITROFURANTOIN 25; 75 MG/1; MG/1
100 CAPSULE ORAL 2 TIMES DAILY
Qty: 10 CAPSULE | Refills: 0 | Status: SHIPPED | OUTPATIENT
Start: 2022-05-18 | End: 2022-05-23

## 2022-05-20 LAB — BACTERIA UR CULT: ABNORMAL

## 2022-05-25 DIAGNOSIS — I25.85 CARDIAC MICROVASCULAR DISEASE: ICD-10-CM

## 2022-05-25 DIAGNOSIS — I51.89 DIASTOLIC DYSFUNCTION: ICD-10-CM

## 2022-05-26 ENCOUNTER — OFFICE VISIT (OUTPATIENT)
Dept: CARDIOLOGY | Facility: OTHER | Age: 62
End: 2022-05-26
Attending: INTERNAL MEDICINE
Payer: MEDICARE

## 2022-05-26 ENCOUNTER — LAB (OUTPATIENT)
Dept: LAB | Facility: OTHER | Age: 62
End: 2022-05-26
Attending: INTERNAL MEDICINE
Payer: COMMERCIAL

## 2022-05-26 VITALS — HEIGHT: 67 IN | BODY MASS INDEX: 31.86 KG/M2 | TEMPERATURE: 98.3 F | OXYGEN SATURATION: 97 % | WEIGHT: 203 LBS

## 2022-05-26 DIAGNOSIS — I25.85 CARDIAC MICROVASCULAR DISEASE: ICD-10-CM

## 2022-05-26 DIAGNOSIS — I95.1 ORTHOSTATIC HYPOTENSION: ICD-10-CM

## 2022-05-26 DIAGNOSIS — I51.89 DIASTOLIC DYSFUNCTION: ICD-10-CM

## 2022-05-26 DIAGNOSIS — R73.01 IMPAIRED FASTING GLUCOSE: ICD-10-CM

## 2022-05-26 DIAGNOSIS — H43.393 VITREOUS FLOATERS OF BOTH EYES: ICD-10-CM

## 2022-05-26 DIAGNOSIS — N18.31 STAGE 3A CHRONIC KIDNEY DISEASE (H): ICD-10-CM

## 2022-05-26 DIAGNOSIS — N30.00 ACUTE CYSTITIS WITHOUT HEMATURIA: Primary | ICD-10-CM

## 2022-05-26 LAB
ALBUMIN UR-MCNC: NEGATIVE MG/DL
ANION GAP SERPL CALCULATED.3IONS-SCNC: 7 MMOL/L (ref 3–14)
APPEARANCE UR: CLEAR
BACTERIA #/AREA URNS HPF: ABNORMAL /HPF
BILIRUB UR QL STRIP: NEGATIVE
BUN SERPL-MCNC: 14 MG/DL (ref 7–30)
CALCIUM SERPL-MCNC: 9.2 MG/DL (ref 8.5–10.1)
CHLORIDE BLD-SCNC: 106 MMOL/L (ref 94–109)
CO2 SERPL-SCNC: 22 MMOL/L (ref 20–32)
COLOR UR AUTO: ABNORMAL
CREAT SERPL-MCNC: 1.22 MG/DL (ref 0.52–1.04)
ERYTHROCYTE [DISTWIDTH] IN BLOOD BY AUTOMATED COUNT: 12.7 % (ref 10–15)
EST. AVERAGE GLUCOSE BLD GHB EST-MCNC: 111 MG/DL
GFR SERPL CREATININE-BSD FRML MDRD: 50 ML/MIN/1.73M2
GLUCOSE BLD-MCNC: 105 MG/DL (ref 70–99)
GLUCOSE UR STRIP-MCNC: NEGATIVE MG/DL
HBA1C MFR BLD: 5.5 % (ref 0–5.6)
HCT VFR BLD AUTO: 41.5 % (ref 35–47)
HGB BLD-MCNC: 13.8 G/DL (ref 11.7–15.7)
HGB UR QL STRIP: NEGATIVE
KETONES UR STRIP-MCNC: NEGATIVE MG/DL
LEUKOCYTE ESTERASE UR QL STRIP: NEGATIVE
MCH RBC QN AUTO: 29.3 PG (ref 26.5–33)
MCHC RBC AUTO-ENTMCNC: 33.3 G/DL (ref 31.5–36.5)
MCV RBC AUTO: 88 FL (ref 78–100)
MUCOUS THREADS #/AREA URNS LPF: PRESENT /LPF
NITRATE UR QL: NEGATIVE
PH UR STRIP: 5 [PH] (ref 4.7–8)
PLATELET # BLD AUTO: 234 10E3/UL (ref 150–450)
POTASSIUM BLD-SCNC: 4.7 MMOL/L (ref 3.4–5.3)
RBC # BLD AUTO: 4.71 10E6/UL (ref 3.8–5.2)
RBC URINE: <1 /HPF
SODIUM SERPL-SCNC: 135 MMOL/L (ref 133–144)
SP GR UR STRIP: 1.01 (ref 1–1.03)
SQUAMOUS EPITHELIAL: 1 /HPF
UROBILINOGEN UR STRIP-MCNC: NORMAL MG/DL
WBC # BLD AUTO: 8.5 10E3/UL (ref 4–11)
WBC URINE: <1 /HPF

## 2022-05-26 PROCEDURE — G0463 HOSPITAL OUTPT CLINIC VISIT: HCPCS

## 2022-05-26 PROCEDURE — 80048 BASIC METABOLIC PNL TOTAL CA: CPT | Mod: ZL

## 2022-05-26 PROCEDURE — 99215 OFFICE O/P EST HI 40 MIN: CPT | Performed by: INTERNAL MEDICINE

## 2022-05-26 PROCEDURE — 36415 COLL VENOUS BLD VENIPUNCTURE: CPT | Mod: ZL

## 2022-05-26 PROCEDURE — 81001 URINALYSIS AUTO W/SCOPE: CPT | Mod: ZL | Performed by: INTERNAL MEDICINE

## 2022-05-26 PROCEDURE — 83036 HEMOGLOBIN GLYCOSYLATED A1C: CPT | Mod: ZL

## 2022-05-26 PROCEDURE — 85027 COMPLETE CBC AUTOMATED: CPT | Mod: ZL

## 2022-05-26 RX ORDER — DILTIAZEM HYDROCHLORIDE 240 MG/1
240 CAPSULE, COATED, EXTENDED RELEASE ORAL DAILY
Qty: 60 CAPSULE | Refills: 1 | Status: SHIPPED | OUTPATIENT
Start: 2022-05-26 | End: 2022-09-23

## 2022-05-26 ASSESSMENT — ENCOUNTER SYMPTOMS
HEMATURIA: 0
SNORING: 1
ENDOCRINE NEGATIVE: 1
DYSPNEA ON EXERTION: 1
LIGHT-HEADEDNESS: 1
CLAUDICATION: 1
DIZZINESS: 0
NUMBNESS: 1
FALLS: 0
MYALGIAS: 1
LOSS OF BALANCE: 1
FOCAL WEAKNESS: 0
COUGH: 1

## 2022-05-26 ASSESSMENT — PAIN SCALES - GENERAL: PAINLEVEL: NO PAIN (0)

## 2022-05-26 NOTE — PATIENT INSTRUCTIONS
Thank you for allowing Dr. Lo and our  team to participate in your care. Please call our office at [phone] with scheduling questions or if you need to cancel or change your appointment. With any other questions or concerns you may call Heather cardiology nurse at [phone].       If you experience chest pain, chest pressure, chest tightness, shortness of breath, fainting, lightheadedness, nausea, vomiting, or other concerning symptoms, please report to the Emergency Department or call 911. These symptoms may be emergent, and best treated in the Emergency Department.    Your will trial stopping you oxybutynin for 7-10 days if feeling better with tunnel vision, etc stay off.     Restart if you have increase urination at night or stress incontinence.     Your will follow up with Dr. Lo in 1 month.Someone will call you to schedule this.

## 2022-05-26 NOTE — PROGRESS NOTES
CARDIOLOGY PROGRESS NOTE     Chief Complaint   Patient presents with     New Patient     Follow up from visit with Giovana Carlson      Chief complaint: Mrs. Sharlene Mccord presents for follow-up evaluation regarding primarily: Symptomatic orthostatic hypotension, as well as her other problems as follows.    HPI: Review of the chart reveals Mrs. Robin was seen the last visit regarding her problems including     #1:Microvascular cardiac disease with prior history of anginal type of chest pain      #2: Mild to moderate aortic insufficiency #3: Chronic diastolic heart failure    #4: COPD remote history of smoking    #5: Fibromyalgia & fatigue    #6: Hypertension by history recently well controlled    #7: UTI with E. coli, documented at the last visit culture +27 white blood cells on microscopic, treated for 5 days with Macrodantin.  Patient says her symptoms are resolved.  Urinalysis and culture will be repeated today    #8 orthostatic hypotension, this is the primary diagnosis regarding this visit.  The patient relates that in fact her symptomatology is improved with discontinuing the Diovan but its not completely resolved.  She still had an episode of some spots in front of her eyes in the shower although it was not too warm.  We noted that the patient does wear compression stockings and is on low-dose spironolactone, diltiazem, Ditropan XL, and trazodone.  Is also noted the patient was on thyroid replacement for partial thyroidectomy in the past.  We discussed the point that the patient was perhaps still symptomatic as the above-mentioned medications can still affect her autonomic nervous system and contribute to orthostatic hypotension.  Specifically the Ditropan.  She states she does have rather dry mouth likely as a result to the Ditropan as well as a the trazodone.  She states that the Ditropan has helped somewhat with regards to her stress incontinence.  We did discussed the need for her to wear her compression  stockings which he did not do today.  We also discussed a trial of discontinuing the Ditropan for 7 to 10 days and see if the orthostatic dizziness and symptomatology improve, and she does not relapse too badly with regards to the urinary bladder issues.  Naturally we are reluctant to reduce the spironolactone and/or Cardizem.  The trazodone could be an issue but there is a history of fibromyalgia algia as well as depression.    #9: Floaters, in discussing the patient's lightheadedness and occasional episodes of spots in front of her eyes in the shower or upon standing or that one time while driving, the patient also described seeing what described and sounds like floaters in both eyes.  I question her about flashes of light she responded positive to that inquiry.  I told her that this should be investigated as soon as possible by an ophthalmologist it could represent retinal detachment, though frequently it is a benign issue most people her age having this.  She excepted the premise of a referral to ophthalmology.    Other past medical history and diagnosis is reviewed and documented below.    Past Medical History:   Diagnosis Date     Anxiety state, unspecified 03/01/2011     Aortic valve disorders 06/07/2000    Echocardiogram showin mild/moderate AI.  Normal LV function     Cardiac microvascular disease 4/10/2013    Based on Cardiac MRI done at       COPD (chronic obstructive pulmonary disease) (H) 7/8/2021     Depressive disorder 1997     Fatigue      Fatigue      Fibromyalgia 03/01/2011     Hypertension      Major depressive disorder, recurrent episode, unspecified 12/17/2014     Migraine, unspecified, without mention of intractable migraine without mention of status migrainosus 03/01/2011     Mitral valve disorders(424.0) 10/16/2007     Need for prophylactic hormone replacement therapy (postmenopausal)      PONV (postoperative nausea and vomiting)      Thyroid Nodule 03/01/2011     Tobacco use disorder  "2011     Unspecified hypothyroidism 2011       Family History   Problem Relation Age of Onset     C.A.D. Father      Hypertension Father      C.A.D. Mother      Cerebrovascular Disease Mother         CVA     Hypertension Mother      Coronary Artery Disease Mother      Diabetes Paternal Grandmother      Diabetes Paternal Grandfather      Diabetes Sister      Breast Cancer Sister      Diabetes Maternal Grandmother      Diabetes Sister      Breast Cancer Sister      Hypertension Sister      Depression Sister      Anxiety Disorder Sister      Obesity Sister      Depression Sister      Breast Cancer Sister      Obesity Sister      Depression Brother      Depression Daughter      Obesity Daughter      Depression Daughter      Mental Illness Daughter         [email or web address]     Anxiety Disorder Daughter      Mental Illness Daughter      Anxiety Disorder Other      Osteoporosis Other      Thyroid Disease Other      Diabetes Nephew    Family medical history: As noted above is very positive on both sides of the family for coronary artery disease her mother had open heart her father  of a coronary sister, had cerebrovascular event with a clot to the brain.    Review of Systems   Constitutional: Positive for malaise/fatigue.   HENT: Negative.    Eyes: Positive for visual disturbance.   Cardiovascular: Positive for claudication, dyspnea on exertion and leg swelling (wears compression socks ).   Respiratory: Positive for cough and snoring.    Endocrine: Negative.    Musculoskeletal: Positive for arthritis (osteoprorosis moderate) and myalgias. Negative for falls.   Genitourinary: Positive for bladder incontinence and urgency. Negative for hematuria.   Neurological: Positive for light-headedness (has had in the past), loss of balance (states \" always trip over) and numbness (arms and leg, feet at times. ). Negative for dizziness and focal weakness.        Visit the patient complained of orthostatic dizziness near " syncope, also occurring during hot showers.  Also getting spots in front of her eyes upon standing quickly and/or in the shower.  It happened once more in the shower, it is improved since the last visit with the discontinuation of the Diovan     While the patient complains of symptomatology compatible with claudication she had a recent ABIs performed in April which were within normal limits    Current Outpatient Medications   Medication Sig Dispense Refill     acetaminophen (TYLENOL) 325 MG tablet Take 650 mg by mouth       clonazePAM (KLONOPIN) 1 MG tablet TAKE HALF TO ONE TABLET BY MOUTH UP TO TWICE DAILY 60 tablet 0     diltiazem ER COATED BEADS (CARDIZEM CD/CARTIA XT) 240 MG 24 hr capsule TAKE ONE CAPSULE BY MOUTH ONCE DAILY 30 capsule 0     esomeprazole (NEXIUM) 40 MG DR capsule Take 1 capsule (40 mg) by mouth every morning (before breakfast) Take 30-60 minutes before eating. 90 capsule 1     estradiol (ESTRACE) 0.5 MG tablet Take 1 tablet (0.5 mg) by mouth 2 times daily 60 tablet 11     FLUoxetine (PROZAC) 20 MG capsule Take 2 capsules (40 mg) by mouth daily 60 capsule 1     hydrocortisone, Perianal, (PROCTO-MED HC) 2.5 % cream APPLY   TOPICALLY TO AFFECTED AREA THREE TIMES DAILY 60 g 0     hydrOXYzine (ATARAX) 25 MG tablet Take 1 tablet (25 mg) by mouth 3 times daily as needed for other (hydroxyzine) 60 tablet 11     levothyroxine (SYNTHROID/LEVOTHROID) 75 MCG tablet Take 1 tablet by mouth once daily 90 tablet 0     nitroGLYcerin (NITROSTAT) 0.4 MG sublingual tablet DISSOLVE ONE TABLET UNDER THE TONGUE EVERY 5 MINUTES AS NEEDED FOR CHEST PAIN. DO NOT EXCEED A TOTAL OF 3 DOSES IN 15 MINUTES 30 tablet 4     oxybutynin ER (DITROPAN XL) 10 MG 24 hr tablet Take 1 tablet (10 mg) by mouth daily At bedtime 30 tablet 3     spironolactone (ALDACTONE) 25 MG tablet Take 1 tablet (25 mg) by mouth daily 30 tablet 11     traZODone (DESYREL) 50 MG tablet Take 1 tablet (50 mg) by mouth At Bedtime 30 tablet 3       Past  Surgical History:   Procedure Laterality Date     APPENDECTOMY  1977     BIOPSY  2017    taken at [facility] from Dr. Nadira cohen. Thyroid     CARDIAC SURGERY  2013    angiogram [facility]:  Normal coronary arteries.  Felt ot have some microvascular disease     CL AFF SURGICAL PATHOLOGY  01/02/2007    Thyroid Mass     COLONOSCOPY  1/13/2014    Procedure: COLONOSCOPY;  COLONOSCOPY ;  Surgeon: Chasidy Gee DO;  Location: [facility] OR     CV RIGHT HEART CATH MEASUREMENTS RECORDED N/A 4/14/2021    Procedure: CV RIGHT HEART CATH;  Surgeon: Danna Clay MD;  [facility]     CV RIGHT HEART EXERCISE STRESS STUDY N/A 4/14/2021    Procedure: Right Heart Exercise Stress Study;  Surgeon: Danna Clay MD;  [facility]     ESOPHAGOSCOPY, GASTROSCOPY, DUODENOSCOPY (EGD), COMBINED N/A 2/9/2017    Procedure: COMBINED ESOPHAGOSCOPY, GASTROSCOPY, DUODENOSCOPY (EGD);  Surgeon: Johnathan Ruff DO;  Location: [facility] OR     GYN SURGERY      c-sections x 3     HYSTERECTOMY TOTAL ABDOMINAL, BILATERAL SALPINGO-OOPHORECTOMY, COMBINED N/A 01/01/2001     LAPAROSCOPIC CHOLECYSTECTOMY  11/07/2003    Cholelithiasis     LAPAROTOMY EXPLORATORY      abdominal pain/fever     LARYNGOSCOPY       SPLENECTOMY     Above past surgical history reviewed:    Allergies   Allergen Reactions     Codeine      Isosorbide Mononitrate Other (See Comments)     Vomiting and headache       Lisinopril      [facility] scan     Paxil [Paroxetine] Headache   Allergies as noted review      Physical examination:  Physical Exam   Constitutional: She appears healthy. No distress.   HENT:   Grossly normal neck supple patient wearing mask for COVID precautions   Eyes: Pupils are equal, round, and reactive to light.   Extraocular motion intact   Neck: No JVD present. No neck adenopathy.   Cardiovascular: Regular rhythm, S1 normal and S2 normal.   Murmur heard.  High-pitched blowing decrescendo early diastolic murmur is  present with a grade of 1/4 at the upper right sternal border radiating to the apex.  Pulses:       Carotid pulses are 2+ on the right side and 2+ on the left side.       Radial pulses are 2+ on the right side and 2+ on the left side.        Femoral pulses are 2+ on the right side and 2+ on the left side.       Popliteal pulses are 2+ on the right side and 2+ on the left side.        Dorsalis pedis pulses are 2+ on the right side and 2+ on the left side.        Posterior tibial pulses are 2+ on the right side and 2+ on the left side.   There is only a faint wisp of aortic insufficiency suggested.  Patient had good brisk carotid upstrokes bilaterally with no carotid bruits.  The patient had excellent distal pulses with no femoral bruits appreciated.   Pulmonary/Chest: Breath sounds normal. She has no wheezes. She has no rales.   Abdominal: Soft. Bowel sounds are normal. She exhibits no distension and no mass. There is no splenomegaly or hepatomegaly. There is no abdominal tenderness.   Musculoskeletal:      Cervical back: Normal range of motion.   Neurological: She is alert and oriented to person, place, and time. She has normal motor skills. Gait normal.   Skin: Skin is cool and dry. No cyanosis. There is pallor. No jaundice or plethora. Nails show no clubbing.       LAB RESULTS:   Lab on 05/26/2022   Component Date Value Ref Range Status     WBC Count 05/26/2022 8.5  4.0 - 11.0 10e3/uL Final     RBC Count 05/26/2022 4.71  3.80 - 5.20 10e6/uL Final     Hemoglobin 05/26/2022 13.8  11.7 - 15.7 g/dL Final     Hematocrit 05/26/2022 41.5  35.0 - 47.0 % Final     MCV 05/26/2022 88  78 - 100 fL Final     MCH 05/26/2022 29.3  26.5 - 33.0 pg Final     MCHC 05/26/2022 33.3  31.5 - 36.5 g/dL Final     RDW 05/26/2022 12.7  10.0 - 15.0 % Final     Platelet Count 05/26/2022 234  150 - 450 10e3/uL Final   Office Visit on 05/17/2022   Component Date Value Ref Range Status     Color Urine 05/17/2022 Straw  Colorless, Straw, Light  Yellow, Yellow Final     Appearance Urine 05/17/2022 Clear  Clear Final     Glucose Urine 05/17/2022 Negative  Negative mg/dL Final     Bilirubin Urine 05/17/2022 Negative  Negative Final     Ketones Urine 05/17/2022 Negative  Negative mg/dL Final     Specific Gravity Urine 05/17/2022 1.005  1.003 - 1.035 Final     Blood Urine 05/17/2022 Negative  Negative Final     pH Urine 05/17/2022 7.0  4.7 - 8.0 Final     Protein Albumin Urine 05/17/2022 Negative  Negative mg/dL Final     Urobilinogen Urine 05/17/2022 Normal  Normal, 2.0 mg/dL Final     Nitrite Urine 05/17/2022 Negative  Negative Final     Leukocyte Esterase Urine 05/17/2022 Moderate (A) Negative Final     Bacteria Urine 05/17/2022 Few (A) None Seen /HPF Final     RBC Urine 05/17/2022 1  <=2 /HPF Final     WBC Urine 05/17/2022 27 (A) <=5 /HPF Final     Squamous Epithelials Urine 05/17/2022 1  <=1 /HPF Final     Culture 05/17/2022 10,000-50,000 CFU/mL Escherichia coli (A)  Final        Relevant testing: Labs: BMP performed today, sodium 135 potassium 4.7 BUN 14 creatinine 1.22, that is slightly up from previous readings of 1.06 in January and 1.11 in November 2021 estimated GFR 50 as noted above hemoglobin 13.8, hematocrit 41.5 platelet count 234 balance of the indices within normal limits.  UA: Microscopic revealed 27 white blood cells culture was positive for 10-50,000 E. coli.  This was previously noted and treated by the previous treating provider since the last recent visit.    Echocardiogram: Previously documented and discussed at the last visit reportedly showing no significant change.  Reviewed at today's visit and discussed with the patient briefly today nothing here to suggest correlation with the patient's orthostatic symptomatology.    ABIs: Performed April 27, 2022, right leg 1.11, left leg 1.06, within normal limit    EKG: Not done today    Impression and plan #1: Orthostatic hypotension symptomatic, but improved.  Patient will discontinue Ditropan  XL for 7 to 10 days and see if the symptomatology  improve with regards to orthostatic dizziness.  The patient find significant improvement she should remain off the Ditropan XL unless she is extremely troubled by the urinary symptomatology, in which case she could be reevaluated regarding possible other medications with less side effects.    #2:  E. coli UTI, status posttreatment recheck the patient's urinalysis and urine culture today    #3: History of Diastolic congestive heart failure, mild aortic insufficiency clinically stable, on spironolactone and diltiazem.    #4: History of angina pectoris with prior work-up suggestive of myocardial microvascular dysfunction clinically controlled on diltiazem    #5: Renal insufficiency/chronic kidney disease, perhaps exacerbated by mild dehydration and contributing to #1, although the urine specific gravity on the recent UA was 1.05 which speaks against dehydration at least on that date.    #9: Floaters: As noted and discussed above the patient will be referred to an ophthalmologist for further evaluation.    Return to clinic 1 month with Tiffany Pereira or myself to reevaluate the patient's symptomatology with the discontinuation of the Ditropan.  Versus adjustment of other medications or consideration of tilt table testing.  Patient's been encouraged to wear her compression stockings as much as possible.    Omar Lo MD     Thank you for allowing me to participate in the care of your patient. Please do not hesitate to contact me if you have any questions.       Heather Quinonez LPN

## 2022-05-26 NOTE — NURSING NOTE
"Chief Complaint   Patient presents with     New Patient     Follow up from visit with Giovana Carlson       Initial Temp 98.3  F (36.8  C) (Tympanic)   Ht 1.702 m (5' 7\")   Wt 92.1 kg (203 lb)   SpO2 97%   BMI 31.79 kg/m   Estimated body mass index is 31.79 kg/m  as calculated from the following:    Height as of this encounter: 1.702 m (5' 7\").    Weight as of this encounter: 92.1 kg (203 lb).  Medication Reconciliation: complete  Heather Quinonez LPN    "

## 2022-05-27 RX ORDER — DILTIAZEM HYDROCHLORIDE 240 MG/1
CAPSULE, COATED, EXTENDED RELEASE ORAL
Qty: 30 CAPSULE | Refills: 0 | OUTPATIENT
Start: 2022-05-27

## 2022-06-07 DIAGNOSIS — F41.1 GENERALIZED ANXIETY DISORDER: ICD-10-CM

## 2022-06-08 NOTE — PROGRESS NOTES
"  Assessment & Plan     Acquired hypothyroidism  She is going to be given a recheck on this.   - TSH with free T4 reflex; Future    Persistent insomnia  Sleeping better at night.       Moderate episode of recurrent major depressive disorder (H)  She is given recommendations for counseling.  had an extramarital affair.  Has been trying to forgive and move on. It very difficult.       Review of external notes as documented elsewhere in note  Ordering of each unique test  Prescription drug management  10   minutes spent on the date of the encounter doing chart review, history and exam, documentation and further activities per the note       BMI:   Estimated body mass index is 31.39 kg/m  as calculated from the following:    Height as of this encounter: 1.702 m (5' 7\").    Weight as of this encounter: 90.9 kg (200 lb 6.4 oz).       See Patient Instructions    No follow-ups on file.    KEILY Cobos  [facility] is a 61 year old who presents for the following health issues     HPI     Depression and Anxiety Follow-Up    How are you doing with your depression since your last visit? Improved     How are you doing with your anxiety since your last visit?  No change    Are you having other symptoms that might be associated with depression or anxiety? No    Have you had a significant life event? No     Do you have any concerns with your use of alcohol or other drugs? No    Social History     Tobacco Use     Smoking status: Former Smoker     Packs/day: 0.25     Years: 40.00     Pack years: 10.00     Types: Cigarettes     Start date: 1975     Quit date: 2021     Years since quittin.3     Smokeless tobacco: Never Used   Substance Use Topics     Alcohol use: No     Drug use: No     PHQ 2022   PHQ-9 Total Score 14 24 8   Q9: Thoughts of better off dead/self-harm past 2 weeks Not at all Not at all Not at all     SHAKIR-7 SCORE 2022 " "5/5/2022 6/13/2022   Total Score 21 21 8     Last PHQ-9 6/13/2022   1.  Little interest or pleasure in doing things 0   2.  Feeling down, depressed, or hopeless 1   3.  Trouble falling or staying asleep, or sleeping too much 3   4.  Feeling tired or having little energy 1   5.  Poor appetite or overeating 0   6.  Feeling bad about yourself 0   7.  Trouble concentrating 3   8.  Moving slowly or restless 0   Q9: Thoughts of better off dead/self-harm past 2 weeks 0   PHQ-9 Total Score 8   Difficulty at work, home, or with people -     SHAKIR-7  6/13/2022   1. Feeling nervous, anxious, or on edge 1   2. Not being able to stop or control worrying 1   3. Worrying too much about different things 3   4. Trouble relaxing 0   5. Being so restless that it is hard to sit still 3   6. Becoming easily annoyed or irritable 0   7. Feeling afraid, as if something awful might happen 0   SHAKIR-7 Total Score 8   If you checked any problems, how difficult have they made it for you to do your work, take care of things at home, or get along with other people? -       Suicide Assessment Five-step Evaluation and Treatment (SAFE-T)          Review of Systems   Constitutional, HEENT, cardiovascular, pulmonary, gi and gu systems are negative, except as otherwise noted.      Objective    /70 (BP Location: Left arm, Patient Position: Sitting, Cuff Size: Adult Regular)   Pulse 59   Temp 97.7  F (36.5  C) (Tympanic)   Ht 1.702 m (5' 7\")   Wt 90.9 kg (200 lb 6.4 oz)   SpO2 98%   BMI 31.39 kg/m    Body mass index is 31.39 kg/m .  Physical Exam   GENERAL: healthy, alert and no distress  NECK: no adenopathy, no asymmetry, masses, or scars and thyroid normal to palpation  RESP: lungs clear to auscultation - no rales, rhonchi or wheezes  CV: regular rate and rhythm, normal S1 S2, no S3 or S4, no murmur, click or rub, no peripheral edema and peripheral pulses strong  ABDOMEN: soft, nontender, no hepatosplenomegaly, no masses and bowel sounds " normal  MS: no gross musculoskeletal defects noted, no edema    Office Visit on 05/26/2022   Component Date Value Ref Range Status     Color Urine 05/26/2022 Light Yellow  Colorless, Straw, Light Yellow, Yellow Final     Appearance Urine 05/26/2022 Clear  Clear Final     Glucose Urine 05/26/2022 Negative  Negative mg/dL Final     Bilirubin Urine 05/26/2022 Negative  Negative Final     Ketones Urine 05/26/2022 Negative  Negative mg/dL Final     Specific Gravity Urine 05/26/2022 1.009  1.003 - 1.035 Final     Blood Urine 05/26/2022 Negative  Negative Final     pH Urine 05/26/2022 5.0  4.7 - 8.0 Final     Protein Albumin Urine 05/26/2022 Negative  Negative mg/dL Final     Urobilinogen Urine 05/26/2022 Normal  Normal, 2.0 mg/dL Final     Nitrite Urine 05/26/2022 Negative  Negative Final     Leukocyte Esterase Urine 05/26/2022 Negative  Negative Final     Bacteria Urine 05/26/2022 Few (A) None Seen /HPF Final     Mucus Urine 05/26/2022 Present (A) None Seen /LPF Final     RBC Urine 05/26/2022 <1  <=2 /HPF Final     WBC Urine 05/26/2022 <1  <=5 /HPF Final     Squamous Epithelials Urine 05/26/2022 1  <=1 /HPF Final

## 2022-06-09 RX ORDER — CLONAZEPAM 1 MG/1
TABLET ORAL
Qty: 60 TABLET | Refills: 0 | Status: SHIPPED | OUTPATIENT
Start: 2022-06-09 | End: 2022-07-07

## 2022-06-09 NOTE — TELEPHONE ENCOUNTER
Klonopin       Last Written Prescription Date:  5-9-22  Last Fill Quantity: 60,   # refills: 0  Last Office Visit: 5-26-22  Future Office visit:    Next 5 appointments (look out 90 days)    Jun 13, 2022 10:45 AM  (Arrive by 10:30 AM)  SHORT with KEILY Fonseca  [facility] [place] ([facility] [place] ) [address]  [place] [address]  [phone]   Eloy 15, 2022 10:00 AM  (Arrive by 9:45 AM)  Return Visit with Omar Lo MD  [facility] [place] ([facility] [place] ) [address]  [place] [address]  [phone]

## 2022-06-13 ENCOUNTER — OFFICE VISIT (OUTPATIENT)
Dept: FAMILY MEDICINE | Facility: OTHER | Age: 62
End: 2022-06-13
Attending: PHYSICIAN ASSISTANT
Payer: COMMERCIAL

## 2022-06-13 VITALS
TEMPERATURE: 97.7 F | OXYGEN SATURATION: 98 % | SYSTOLIC BLOOD PRESSURE: 122 MMHG | WEIGHT: 200.4 LBS | DIASTOLIC BLOOD PRESSURE: 70 MMHG | HEIGHT: 67 IN | BODY MASS INDEX: 31.45 KG/M2 | HEART RATE: 59 BPM

## 2022-06-13 DIAGNOSIS — F33.1 MODERATE EPISODE OF RECURRENT MAJOR DEPRESSIVE DISORDER (H): ICD-10-CM

## 2022-06-13 DIAGNOSIS — G47.00 PERSISTENT INSOMNIA: ICD-10-CM

## 2022-06-13 DIAGNOSIS — E03.9 ACQUIRED HYPOTHYROIDISM: Primary | ICD-10-CM

## 2022-06-13 DIAGNOSIS — N18.31 STAGE 3A CHRONIC KIDNEY DISEASE (H): ICD-10-CM

## 2022-06-13 PROCEDURE — G0463 HOSPITAL OUTPT CLINIC VISIT: HCPCS

## 2022-06-13 PROCEDURE — 99213 OFFICE O/P EST LOW 20 MIN: CPT | Performed by: PHYSICIAN ASSISTANT

## 2022-06-13 ASSESSMENT — ANXIETY QUESTIONNAIRES
1. FEELING NERVOUS, ANXIOUS, OR ON EDGE: SEVERAL DAYS
GAD7 TOTAL SCORE: 8
4. TROUBLE RELAXING: NOT AT ALL
3. WORRYING TOO MUCH ABOUT DIFFERENT THINGS: NEARLY EVERY DAY
7. FEELING AFRAID AS IF SOMETHING AWFUL MIGHT HAPPEN: NOT AT ALL
GAD7 TOTAL SCORE: 8
6. BECOMING EASILY ANNOYED OR IRRITABLE: NOT AT ALL
2. NOT BEING ABLE TO STOP OR CONTROL WORRYING: SEVERAL DAYS
5. BEING SO RESTLESS THAT IT IS HARD TO SIT STILL: NEARLY EVERY DAY

## 2022-06-13 ASSESSMENT — PATIENT HEALTH QUESTIONNAIRE - PHQ9: SUM OF ALL RESPONSES TO PHQ QUESTIONS 1-9: 8

## 2022-06-13 ASSESSMENT — PAIN SCALES - GENERAL: PAINLEVEL: MODERATE PAIN (5)

## 2022-06-13 NOTE — NURSING NOTE
"Chief Complaint   Patient presents with     MOOD CHANGES       Initial /70 (BP Location: Left arm, Patient Position: Sitting, Cuff Size: Adult Regular)   Pulse 59   Temp 97.7  F (36.5  C) (Tympanic)   Ht 1.702 m (5' 7\")   Wt 90.9 kg (200 lb 6.4 oz)   SpO2 98%   BMI 31.39 kg/m   Estimated body mass index is 31.39 kg/m  as calculated from the following:    Height as of this encounter: 1.702 m (5' 7\").    Weight as of this encounter: 90.9 kg (200 lb 6.4 oz).  Medication Reconciliation: complete  Silvana Miguel LPN    "

## 2022-06-14 ENCOUNTER — HOSPITAL ENCOUNTER (OUTPATIENT)
Dept: RESPIRATORY THERAPY | Facility: HOSPITAL | Age: 62
Discharge: HOME | End: 2022-06-14
Attending: NURSE PRACTITIONER | Admitting: INTERNAL MEDICINE
Payer: MEDICARE

## 2022-06-14 DIAGNOSIS — I50.32 CHRONIC DIASTOLIC HEART FAILURE (H): ICD-10-CM

## 2022-06-14 DIAGNOSIS — R06.09 DOE (DYSPNEA ON EXERTION): ICD-10-CM

## 2022-06-14 DIAGNOSIS — Z87.891 HISTORY OF TOBACCO ABUSE: ICD-10-CM

## 2022-06-14 PROCEDURE — 94729 DIFFUSING CAPACITY: CPT | Mod: 26 | Performed by: INTERNAL MEDICINE

## 2022-06-14 PROCEDURE — 94010 BREATHING CAPACITY TEST: CPT

## 2022-06-14 PROCEDURE — 94726 PLETHYSMOGRAPHY LUNG VOLUMES: CPT | Mod: 26 | Performed by: INTERNAL MEDICINE

## 2022-06-14 PROCEDURE — 94010 BREATHING CAPACITY TEST: CPT | Mod: 26 | Performed by: INTERNAL MEDICINE

## 2022-06-14 PROCEDURE — 94726 PLETHYSMOGRAPHY LUNG VOLUMES: CPT

## 2022-06-14 PROCEDURE — 94729 DIFFUSING CAPACITY: CPT

## 2022-06-15 ENCOUNTER — OFFICE VISIT (OUTPATIENT)
Dept: CARDIOLOGY | Facility: OTHER | Age: 62
End: 2022-06-15
Attending: INTERNAL MEDICINE
Payer: COMMERCIAL

## 2022-06-15 VITALS
RESPIRATION RATE: 17 BRPM | HEART RATE: 71 BPM | BODY MASS INDEX: 31.37 KG/M2 | DIASTOLIC BLOOD PRESSURE: 73 MMHG | TEMPERATURE: 98.8 F | OXYGEN SATURATION: 97 % | SYSTOLIC BLOOD PRESSURE: 109 MMHG | WEIGHT: 200.3 LBS

## 2022-06-15 DIAGNOSIS — I95.2 HYPOTENSION DUE TO DRUGS: ICD-10-CM

## 2022-06-15 PROCEDURE — 99214 OFFICE O/P EST MOD 30 MIN: CPT | Performed by: INTERNAL MEDICINE

## 2022-06-15 PROCEDURE — G0463 HOSPITAL OUTPT CLINIC VISIT: HCPCS

## 2022-06-15 ASSESSMENT — ENCOUNTER SYMPTOMS
MYALGIAS: 1
LIGHT-HEADEDNESS: 0
FALLS: 0
HEMATURIA: 0
COUGH: 1
SNORING: 1
LOSS OF BALANCE: 1
DIZZINESS: 0
FOCAL WEAKNESS: 0
ENDOCRINE NEGATIVE: 1
DYSPNEA ON EXERTION: 1
CLAUDICATION: 1
NUMBNESS: 1

## 2022-06-15 ASSESSMENT — PAIN SCALES - GENERAL: PAINLEVEL: NO PAIN (0)

## 2022-06-15 NOTE — PROGRESS NOTES
CARDIOLOGY PROGRESS NOTE     Chief Complaint   Patient presents with     RECHECK     Follow Up      Chief complaint: Mrs. Sharlene Mccord presents for follow-up evaluation regarding primarily: Symptomatic orthostatic hypotension, as well as her other problems as follows.  Since her last visit on May 26, 2022, Mrs. Robin has discontinued the Ditropan XL and has started to wear her compression stockings.    HPI: Review of the chart reveals Mrs. 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    #2: Mild to moderate aortic insufficiency #3: Chronic diastolic heart failure interim history the patient denies any increasing shortness of breath or edema, having discontinued the Diovan which was prescribed for her reported stage I diastolic heart failure.    #4: COPD remote history of smoking    #5: Fibromyalgia & fatigue: Interim history no significant change.    #6: Hypertension by history recently well controlled; interim history there is been no demonstrated increase in her blood pressure having discontinued the Diovan.    #7: UTI with E. coli, documented at the last visit culture +27 white blood cells on microscopic, treated for 5 days with Macrodantin.  Patient says her symptoms are resolved.  Urinalysis and culture .  Interim history:  The urinalysis that was repeated was free of any white blood cells or significant bacteria in the patient's symptomatology with regards to the UTI has resolved so it appears she had been successfully treated.    #8 orthostatic hypotension, this is the primary diagnosis regarding this visit.  The patient relates that in fact her symptomatology is improved with discontinuing the Diovan but its not completely resolved.  She still had an episode of some spots in front of her eyes in the shower although it was not  too warm. A : We noted that the patient does wear compression stockings and is on low-dose spironolactone, diltiazem, Ditropan XL, and trazodone.  Is also noted the patient was on thyroid replacement for partial thyroidectomy in the past.  We discussed the point that the patient was perhaps still symptomatic as the above-mentioned medications can still affect her autonomic nervous system and contribute to orthostatic hypotension.  Specifically the Ditropan.  She states she does have rather dry mouth likely as a result to the Ditropan as well as a the trazodone.  She states that the Ditropan has helped somewhat with regards to her stress incontinence.  We did discussed the need for her to wear her compression stockings which he did not do today.  We also discussed a trial of discontinuing the Ditropan for 7 to 10 days and see if the orthostatic dizziness and symptomatology improve, and she does not relapse too badly with regards to the urinary bladder issues.  Naturally we are reluctant to reduce the spironolactone and/or Cardizem.  The trazodone could be an issue but there is a history of fibromyalgia algia as well as depression.    B: Interim history the patient as mentioned above has discontinued the Ditropan XL and has been wearing her below the knee compression stockings over-the-counter low pressure.  She is not had any significant episodes as previously mentioned suggesting presyncope or orthostatic lightheadedness or dizziness.  Additionally she has not had any significant increase in her urinary incontinence.  As such she will stay off the Ditropan XL and continue with the stockings.  If the urinary symptomatology should increase, will obtain a urological consult regarding some of the newer medications for this condition.  She is also been encouraged to perform Kegel exercises, feel free to investigate this modality on the Internet to learn how to better perform it    #9: Floaters, in discussing the  patient's lightheadedness and occasional episodes of spots in front of her eyes in the shower or upon standing or that one time while driving, the patient also described seeing what described and sounds like floaters in both eyes.  I question her about flashes of light she responded positive to that inquiry.  I told her that this should be investigated as soon as possible by an ophthalmologist it could represent retinal detachment, though frequently it is a benign issue most people her age having this.  She excepted the premise of a referral to ophthalmology.    Other past medical history and diagnosis is reviewed and documented below.    Past Medical History:   Diagnosis Date     Anxiety state, unspecified 03/01/2011     Aortic valve disorders 06/07/2000    Echocardiogram showin mild/moderate AI.  Normal LV function     Cardiac microvascular disease 4/10/2013    Based on Cardiac MRI done at       COPD (chronic obstructive pulmonary disease) ([facility]) 7/8/2021     Depressive disorder 1997     Fatigue      Fatigue      Fibromyalgia 03/01/2011     Hypertension      Major depressive disorder, recurrent episode, unspecified 12/17/2014     Migraine, unspecified, without mention of intractable migraine without mention of status migrainosus 03/01/2011     Mitral valve disorders(424.0) 10/16/2007     Need for prophylactic hormone replacement therapy (postmenopausal)      PONV (postoperative nausea and vomiting)      Thyroid Nodule 03/01/2011     Tobacco use disorder 03/01/2011     Unspecified hypothyroidism 03/01/2011       Family History   Problem Relation Age of Onset     C.A.D. Father      Hypertension Father      C.A.D. Mother      Cerebrovascular Disease Mother         CVA     Hypertension Mother      Coronary Artery Disease Mother      Diabetes Paternal Grandmother      Diabetes Paternal Grandfather      Diabetes Sister      Breast Cancer Sister      Diabetes Maternal Grandmother      Diabetes Sister      Breast Cancer  "Sister      Hypertension Sister      Depression Sister      Anxiety Disorder Sister      Obesity Sister      Depression Sister      Breast Cancer Sister      Obesity Sister      Depression Brother      Depression Daughter      Obesity Daughter      Depression Daughter      Mental Illness Daughter         [email or web address]     Anxiety Disorder Daughter      Mental Illness Daughter      Anxiety Disorder Other      Osteoporosis Other      Thyroid Disease Other      Diabetes Nephew    Family medical history: As noted above is very positive on both sides of the family for coronary artery disease her mother had open heart her father  of a coronary sister, had cerebrovascular event with a clot to the brain.    Review of Systems   Constitutional: Positive for malaise/fatigue.   HENT: Negative.    Eyes: Positive for visual disturbance.   Cardiovascular: Positive for claudication, dyspnea on exertion and leg swelling (wears compression socks ).   Respiratory: Positive for cough and snoring.    Endocrine: Negative.    Musculoskeletal: Positive for arthritis (osteoprorosis moderate) and myalgias. Negative for falls.   Genitourinary: Positive for bladder incontinence and urgency. Negative for hematuria.   Neurological: Positive for loss of balance (states \" always trip over) and numbness (arms and leg, feet at times. ). Negative for dizziness, focal weakness and light-headedness (has had in the past).        Visit the patient complained of orthostatic dizziness near syncope, also occurring during hot showers.  Also getting spots in front of her eyes upon standing quickly and/or in the shower.  It happened once more in the shower, it is improved since the last visit with the discontinuation of the Diovan     While the patient complains of symptomatology compatible with claudication she had a recent ABIs performed in April which were within normal limits    Current Outpatient Medications   Medication Sig Dispense Refill     " acetaminophen (TYLENOL) 325 MG tablet Take 650 mg by mouth       clonazePAM (KLONOPIN) 1 MG tablet TAKE 1/2 TO 1 (ONE-HALF TO ONE) TABLET BY MOUTH UP TO TWICE DAILY 60 tablet 0     diltiazem ER COATED BEADS (CARDIZEM CD/CARTIA XT) 240 MG 24 hr capsule Take 1 capsule (240 mg) by mouth daily 60 capsule 1     esomeprazole (NEXIUM) 40 MG DR capsule Take 1 capsule (40 mg) by mouth every morning (before breakfast) Take 30-60 minutes before eating. 90 capsule 1     estradiol (ESTRACE) 0.5 MG tablet Take 1 tablet (0.5 mg) by mouth 2 times daily 60 tablet 11     FLUoxetine (PROZAC) 20 MG capsule Take 2 capsules (40 mg) by mouth daily 60 capsule 1     hydrocortisone, Perianal, (PROCTO-MED HC) 2.5 % cream APPLY   TOPICALLY TO AFFECTED AREA THREE TIMES DAILY 60 g 0     levothyroxine (SYNTHROID/LEVOTHROID) 75 MCG tablet Take 1 tablet by mouth once daily 90 tablet 0     nitroGLYcerin (NITROSTAT) 0.4 MG sublingual tablet DISSOLVE ONE TABLET UNDER THE TONGUE EVERY 5 MINUTES AS NEEDED FOR CHEST PAIN. DO NOT EXCEED A TOTAL OF 3 DOSES IN 15 MINUTES 30 tablet 4     oxybutynin ER (DITROPAN XL) 10 MG 24 hr tablet Take 1 tablet (10 mg) by mouth daily At bedtime 30 tablet 3     spironolactone (ALDACTONE) 25 MG tablet Take 1 tablet (25 mg) by mouth daily 30 tablet 11     traZODone (DESYREL) 50 MG tablet Take 1 tablet (50 mg) by mouth At Bedtime 30 tablet 3       Past Surgical History:   Procedure Laterality Date     APPENDECTOMY  1977     BIOPSY  2017    taken at [facility] from Dr. Nadira cohen. Thyroid     CARDIAC SURGERY  2013    angiogram [facility]:  Normal coronary arteries.  Felt ot have some microvascular disease     CL AFF SURGICAL PATHOLOGY  01/02/2007    Thyroid Mass     COLONOSCOPY  1/13/2014    Procedure: COLONOSCOPY;  COLONOSCOPY ;  Surgeon: Chasidy Gee DO;  Location: [facility] OR     CV RIGHT HEART CATH MEASUREMENTS RECORDED N/A 4/14/2021    Procedure: CV RIGHT HEART CATH;  Surgeon: Danna Clay MD;  Location: [facility]  CARDIAC CATH LAB     CV RIGHT HEART EXERCISE STRESS STUDY N/A 4/14/2021    Procedure: Right Heart Exercise Stress Study;  Surgeon: Danna Clay MD;  Location: [facility] CARDIAC CATH LAB     ESOPHAGOSCOPY, GASTROSCOPY, DUODENOSCOPY (EGD), COMBINED N/A 2/9/2017    Procedure: COMBINED ESOPHAGOSCOPY, GASTROSCOPY, DUODENOSCOPY (EGD);  Surgeon: Johnathan Ruff DO;  Location: [facility] OR     GYN SURGERY      c-sections x 3     HYSTERECTOMY TOTAL ABDOMINAL, BILATERAL SALPINGO-OOPHORECTOMY, COMBINED N/A 01/01/2001     LAPAROSCOPIC CHOLECYSTECTOMY  11/07/2003    Cholelithiasis     LAPAROTOMY EXPLORATORY      abdominal pain/fever     LARYNGOSCOPY       SPLENECTOMY     Above past surgical history reviewed:    Allergies   Allergen Reactions     Codeine      Isosorbide Mononitrate Other (See Comments)     Vomiting and headache       Lisinopril      [facility] scan     Paxil [Paroxetine] Headache   Allergies as noted review    Vital signs: Weight 200.3 pounds blood pressure by nursing staff 109/73 pulse 71 respiration 17 O2 saturation 97% temperature 98.8, no significant pain: Vital sign by physician pulse 64 strong and regular, blood pressure large cuff 120/70 sitting, 118/68 standing.  The last visit her blood pressures were 110/62 sitting 110/70 supine and 104/62 standing.    Physical examination:  Physical Exam   Constitutional: She appears healthy. No distress.   HENT:   Grossly normal neck supple patient wearing mask for COVID precautions   Eyes: Pupils are equal, round, and reactive to light.   Extraocular motion intact   Neck: No JVD present. No neck adenopathy.   Cardiovascular: Regular rhythm, S1 normal and S2 normal.   Murmur heard.  High-pitched blowing decrescendo early diastolic murmur is present with a grade of 1/4 at the upper right sternal border radiating to the apex.  Pulses:       Carotid pulses are 2+ on the right side and 2+ on the left side.       Radial pulses are 2+ on the right side and 2+ on  the left side.        Femoral pulses are 2+ on the right side and 2+ on the left side.       Popliteal pulses are 2+ on the right side and 2+ on the left side.        Dorsalis pedis pulses are 2+ on the right side and 2+ on the left side.        Posterior tibial pulses are 2+ on the right side and 2+ on the left side.   There is only a faint wisp of aortic insufficiency suggested.  Patient had good brisk carotid upstrokes bilaterally with no carotid bruits.  The patient had excellent distal pulses with no femoral bruits appreciated.   Pulmonary/Chest: Breath sounds normal. She has no wheezes. She has no rales.   Abdominal: Soft. Bowel sounds are normal. She exhibits no distension and no mass. There is no splenomegaly or hepatomegaly. There is no abdominal tenderness.   Musculoskeletal:      Cervical back: Normal range of motion.   Neurological: She is alert and oriented to person, place, and time. She has normal motor skills. Gait normal.   Skin: Skin is cool and dry. No cyanosis. There is pallor. No jaundice or plethora. Nails show no clubbing.       LAB RESULTS:   Lab on 05/26/2022   Component Date Value Ref Range Status     WBC Count 05/26/2022 8.5  4.0 - 11.0 10e3/uL Final     RBC Count 05/26/2022 4.71  3.80 - 5.20 10e6/uL Final     Hemoglobin 05/26/2022 13.8  11.7 - 15.7 g/dL Final     Hematocrit 05/26/2022 41.5  35.0 - 47.0 % Final     MCV 05/26/2022 88  78 - 100 fL Final     MCH 05/26/2022 29.3  26.5 - 33.0 pg Final     MCHC 05/26/2022 33.3  31.5 - 36.5 g/dL Final     RDW 05/26/2022 12.7  10.0 - 15.0 % Final     Platelet Count 05/26/2022 234  150 - 450 10e3/uL Final   Office Visit on 05/17/2022   Component Date Value Ref Range Status     Color Urine 05/17/2022 Straw  Colorless, Straw, Light Yellow, Yellow Final     Appearance Urine 05/17/2022 Clear  Clear Final     Glucose Urine 05/17/2022 Negative  Negative mg/dL Final     Bilirubin Urine 05/17/2022 Negative  Negative Final     Ketones Urine 05/17/2022  Negative  Negative mg/dL Final     Specific Gravity Urine 05/17/2022 1.005  1.003 - 1.035 Final     Blood Urine 05/17/2022 Negative  Negative Final     pH Urine 05/17/2022 7.0  4.7 - 8.0 Final     Protein Albumin Urine 05/17/2022 Negative  Negative mg/dL Final     Urobilinogen Urine 05/17/2022 Normal  Normal, 2.0 mg/dL Final     Nitrite Urine 05/17/2022 Negative  Negative Final     Leukocyte Esterase Urine 05/17/2022 Moderate (A) Negative Final     Bacteria Urine 05/17/2022 Few (A) None Seen /HPF Final     RBC Urine 05/17/2022 1  <=2 /HPF Final     WBC Urine 05/17/2022 27 (A) <=5 /HPF Final     Squamous Epithelials Urine 05/17/2022 1  <=1 /HPF Final     Culture 05/17/2022 10,000-50,000 CFU/mL Escherichia coli (A)  Final        Relevant testing: Labs: BMP performed today, sodium 135 potassium 4.7 BUN 14 creatinine 1.22, that is slightly up from previous readings of 1.06 in January and 1.11 in November 2021 estimated GFR 50 as noted above hemoglobin 13.8, hematocrit 41.5 platelet count 234 balance of the indices within normal limits.  UA: Microscopic revealed 27 white blood cells culture was positive for 10-50,000 E. coli.  This was previously noted and treated by the previous treating provider since the last recent visit.    Urinalysis: Noted and discussed above no evidence of infection no white blood cells no bacteria.    Echocardiogram: Previously documented and discussed at the last visit reportedly showing no significant change.  Reviewed at today's visit and discussed with the patient briefly today nothing here to suggest correlation with the patient's orthostatic symptomatology.    ABIs: Performed April 27, 2022, right leg 1.11, left leg 1.06, within normal limit    EKG: Not done today    Pulmonary function test: Recently performed not yet resulted to the chart or available, patient will be advised of results when available.  She says the tech mention something that her first breath was in the 91% range which  sounds excellent but naturally the report needs to be evaluated.    Impression and plan #1: Orthostatic hypotension symptomatic, but improved.  Patient will discontinue Ditropan XL for 7 to 10 days and see if the symptomatology  improve with regards to orthostatic dizziness.  The patient find significant improvement she should remain off the Ditropan XL unless she is extremely troubled by the urinary symptomatology, in which case she could be reevaluated regarding possible other medications with less side effects.  Interim history and follow-up as noted above: Patient significantly improved with discontinuing the Ditropan XL and wearing the compression stockings she will continue in this regard.  She will start Kegel exercises as suggested.  If her urinary incontinence acts up, she can discuss a urological consultation with her primary care provider.    #2:  E. coli UTI, status posttreatment recheck the patient's urinalysis and urine culture today A; patient has both symptomatically and objectively improved, the urinalysis is negative performed elements such as white blood cells and or bacteria a repeat culture was not performed    #3: History of Diastolic congestive heart failure, mild aortic insufficiency clinically stable, on spironolactone and diltiazem.    #4: History of angina pectoris with prior work-up suggestive of myocardial microvascular dysfunction clinically controlled on diltiazem    #5: Renal insufficiency/chronic kidney disease, perhaps exacerbated by mild dehydration and contributing to #1, although the urine specific gravity on the recent UA was 1.05 which speaks against dehydration at least on that date.    #9: Floaters: As noted and discussed above the patient will be referred to an ophthalmologist for further evaluation.  A:: Patient has seen an eye care provider and is under the care of the local [facility] report is not available, however she will be undergoing visual field testing as she  reports.  She is encouraged to follow-up with them and her primary care physician regarding this issue.    Return to clinic 1 3 months  with Tiffany Pereira or myself to reevaluate the patient's symptomatology and conditions with regards to v adjustment of other medications or consideration of tilt table testing , given the patient's clinical and subjective improvement this does not appear to be necessary.  Patient's been encouraged to wear her compression stockings as much as possible.    Omar Lo MD         55 minutes was spent reviewing the chart, interviewing and examining the patient discussing the current treatment and suggestions going forward.  Patient demonstrated good understanding.    Thank you for allowing me to participate in the care of your patient. Please do not hesitate to contact me if you have any questions.       Heather Quinonez LPN

## 2022-06-15 NOTE — NURSING NOTE
"Chief Complaint   Patient presents with     RECHECK     Follow Up       Initial /73 (BP Location: Right arm, Patient Position: Sitting, Cuff Size: Adult Regular)   Pulse 71   Temp 98.8  F (37.1  C) (Tympanic)   Resp 17   Wt 90.9 kg (200 lb 4.8 oz)   SpO2 97%   BMI 31.37 kg/m   Estimated body mass index is 31.37 kg/m  as calculated from the following:    Height as of 6/13/22: 1.702 m (5' 7\").    Weight as of this encounter: 90.9 kg (200 lb 4.8 oz).  Medication Reconciliation: complete  Sirisha Reynoso RN  "

## 2022-06-15 NOTE — PROGRESS NOTES
CARDIOLOGY PROGRESS NOTE     1 month follow up            Diagnosis:    No diagnosis found.    Past Medical History:   Diagnosis Date     Anxiety state, unspecified 03/01/2011     Aortic valve disorders 06/07/2000    Echocardiogram showin mild/moderate AI.  Normal LV function     Cardiac microvascular disease 4/10/2013    Based on Cardiac MRI done at       COPD (chronic obstructive pulmonary disease) (H) 7/8/2021     Depressive disorder 1997     Fatigue      Fatigue      Fibromyalgia 03/01/2011     Hypertension      Major depressive disorder, recurrent episode, unspecified 12/17/2014     Migraine, unspecified, without mention of intractable migraine without mention of status migrainosus 03/01/2011     Mitral valve disorders(424.0) 10/16/2007     Need for prophylactic hormone replacement therapy (postmenopausal)      PONV (postoperative nausea and vomiting)      Thyroid Nodule 03/01/2011     Tobacco use disorder 03/01/2011     Unspecified hypothyroidism 03/01/2011       Family History   Problem Relation Age of Onset     C.A.D. Father      Hypertension Father      C.A.D. Mother      Cerebrovascular Disease Mother         CVA     Hypertension Mother      Coronary Artery Disease Mother      Diabetes Paternal Grandmother      Diabetes Paternal Grandfather      Diabetes Sister      Breast Cancer Sister      Diabetes Maternal Grandmother      Diabetes Sister      Breast Cancer Sister      Hypertension Sister      Depression Sister      Anxiety Disorder Sister      Obesity Sister      Depression Sister      Breast Cancer Sister      Obesity Sister      Depression Brother      Depression Daughter      Obesity Daughter      Depression Daughter      Mental Illness Daughter         [email or web address]     Anxiety Disorder Daughter      Mental Illness Daughter      Anxiety Disorder Other      Osteoporosis Other      Thyroid Disease Other      Diabetes Nephew        ROS    Current Outpatient Medications   Medication Sig Dispense Refill      acetaminophen (TYLENOL) 325 MG tablet Take 650 mg by mouth       clonazePAM (KLONOPIN) 1 MG tablet TAKE 1/2 TO 1 (ONE-HALF TO ONE) TABLET BY MOUTH UP TO TWICE DAILY 60 tablet 0     diltiazem ER COATED BEADS (CARDIZEM CD/CARTIA XT) 240 MG 24 hr capsule Take 1 capsule (240 mg) by mouth daily 60 capsule 1     esomeprazole (NEXIUM) 40 MG DR capsule Take 1 capsule (40 mg) by mouth every morning (before breakfast) Take 30-60 minutes before eating. 90 capsule 1     estradiol (ESTRACE) 0.5 MG tablet Take 1 tablet (0.5 mg) by mouth 2 times daily 60 tablet 11     FLUoxetine (PROZAC) 20 MG capsule Take 2 capsules (40 mg) by mouth daily 60 capsule 1     hydrocortisone, Perianal, (PROCTO-MED HC) 2.5 % cream APPLY   TOPICALLY TO AFFECTED AREA THREE TIMES DAILY 60 g 0     levothyroxine (SYNTHROID/LEVOTHROID) 75 MCG tablet Take 1 tablet by mouth once daily 90 tablet 0     nitroGLYcerin (NITROSTAT) 0.4 MG sublingual tablet DISSOLVE ONE TABLET UNDER THE TONGUE EVERY 5 MINUTES AS NEEDED FOR CHEST PAIN. DO NOT EXCEED A TOTAL OF 3 DOSES IN 15 MINUTES 30 tablet 4     oxybutynin ER (DITROPAN XL) 10 MG 24 hr tablet Take 1 tablet (10 mg) by mouth daily At bedtime 30 tablet 3     spironolactone (ALDACTONE) 25 MG tablet Take 1 tablet (25 mg) by mouth daily 30 tablet 11     traZODone (DESYREL) 50 MG tablet Take 1 tablet (50 mg) by mouth At Bedtime 30 tablet 3       Past Surgical History:   Procedure Laterality Date     APPENDECTOMY  1977     BIOPSY  2017    taken at [facility] from Dr. Nadira cohen. Thyroid     CARDIAC SURGERY  2013    angiogram [facility]:  Normal coronary arteries.  Felt ot have some microvascular disease     CL AFF SURGICAL PATHOLOGY  01/02/2007    Thyroid Mass     COLONOSCOPY  1/13/2014    Procedure: COLONOSCOPY;  COLONOSCOPY ;  Surgeon: Chasidy Gee DO;  Location: [facility]     CV RIGHT HEART CATH MEASUREMENTS RECORDED N/A 4/14/2021    Procedure: CV RIGHT HEART CATH;  Surgeon: Danna Clay MD;  Location: [facility]  CARDIAC CATH LAB     CV RIGHT HEART EXERCISE STRESS STUDY N/A 4/14/2021    Procedure: Right Heart Exercise Stress Study;  Surgeon: Danna Clay MD;  Location:  HEART CARDIAC CATH LAB     ESOPHAGOSCOPY, GASTROSCOPY, DUODENOSCOPY (EGD), COMBINED N/A 2/9/2017    Procedure: COMBINED ESOPHAGOSCOPY, GASTROSCOPY, DUODENOSCOPY (EGD);  Surgeon: Johnathan Ruff DO;  Location: [facility]     GYN SURGERY      c-sections x 3     HYSTERECTOMY TOTAL ABDOMINAL, BILATERAL SALPINGO-OOPHORECTOMY, COMBINED N/A 01/01/2001     LAPAROSCOPIC CHOLECYSTECTOMY  11/07/2003    Cholelithiasis     LAPAROTOMY EXPLORATORY      abdominal pain/fever     LARYNGOSCOPY       SPLENECTOMY         Allergies   Allergen Reactions     Codeine      Isosorbide Mononitrate Other (See Comments)     Vomiting and headache       Lisinopril      [facility] scan     Paxil [Paroxetine] Headache         Assessment/Plan:          Physical examination:  Physical Exam    LAB RESULTS:   Office Visit on 05/26/2022   Component Date Value Ref Range Status     Color Urine 05/26/2022 Light Yellow  Colorless, Straw, Light Yellow, Yellow Final     Appearance Urine 05/26/2022 Clear  Clear Final     Glucose Urine 05/26/2022 Negative  Negative mg/dL Final     Bilirubin Urine 05/26/2022 Negative  Negative Final     Ketones Urine 05/26/2022 Negative  Negative mg/dL Final     Specific Gravity Urine 05/26/2022 1.009  1.003 - 1.035 Final     Blood Urine 05/26/2022 Negative  Negative Final     pH Urine 05/26/2022 5.0  4.7 - 8.0 Final     Protein Albumin Urine 05/26/2022 Negative  Negative mg/dL Final     Urobilinogen Urine 05/26/2022 Normal  Normal, 2.0 mg/dL Final     Nitrite Urine 05/26/2022 Negative  Negative Final     Leukocyte Esterase Urine 05/26/2022 Negative  Negative Final     Bacteria Urine 05/26/2022 Few (A) None Seen /HPF Final     Mucus Urine 05/26/2022 Present (A) None Seen /LPF Final     RBC Urine 05/26/2022 <1  <=2 /HPF Final     WBC Urine 05/26/2022 <1  <=5  /HPF Final     Squamous Epithelials Urine 05/26/2022 1  <=1 /HPF Final   Lab on 05/26/2022   Component Date Value Ref Range Status     WBC Count 05/26/2022 8.5  4.0 - 11.0 10e3/uL Final     RBC Count 05/26/2022 4.71  3.80 - 5.20 10e6/uL Final     Hemoglobin 05/26/2022 13.8  11.7 - 15.7 g/dL Final     Hematocrit 05/26/2022 41.5  35.0 - 47.0 % Final     MCV 05/26/2022 88  78 - 100 fL Final     MCH 05/26/2022 29.3  26.5 - 33.0 pg Final     MCHC 05/26/2022 33.3  31.5 - 36.5 g/dL Final     RDW 05/26/2022 12.7  10.0 - 15.0 % Final     Platelet Count 05/26/2022 234  150 - 450 10e3/uL Final     Sodium 05/26/2022 135  133 - 144 mmol/L Final     Potassium 05/26/2022 4.7  3.4 - 5.3 mmol/L Final     Chloride 05/26/2022 106  94 - 109 mmol/L Final     Carbon Dioxide (CO2) 05/26/2022 22  20 - 32 mmol/L Final     Anion Gap 05/26/2022 7  3 - 14 mmol/L Final     Urea Nitrogen 05/26/2022 14  7 - 30 mg/dL Final     Creatinine 05/26/2022 1.22 (A) 0.52 - 1.04 mg/dL Final     Calcium 05/26/2022 9.2  8.5 - 10.1 mg/dL Final     Glucose 05/26/2022 105 (A) 70 - 99 mg/dL Final     GFR Estimate 05/26/2022 50 (A) >60 mL/min/1.73m2 Final     Estimated Average Glucose 05/26/2022 111  mg/dL Final     Hemoglobin A1C 05/26/2022 5.5  0.0 - 5.6 % Final   Office Visit on 05/17/2022   Component Date Value Ref Range Status     Color Urine 05/17/2022 Straw  Colorless, Straw, Light Yellow, Yellow Final     Appearance Urine 05/17/2022 Clear  Clear Final     Glucose Urine 05/17/2022 Negative  Negative mg/dL Final     Bilirubin Urine 05/17/2022 Negative  Negative Final     Ketones Urine 05/17/2022 Negative  Negative mg/dL Final     Specific Gravity Urine 05/17/2022 1.005  1.003 - 1.035 Final     Blood Urine 05/17/2022 Negative  Negative Final     pH Urine 05/17/2022 7.0  4.7 - 8.0 Final     Protein Albumin Urine 05/17/2022 Negative  Negative mg/dL Final     Urobilinogen Urine 05/17/2022 Normal  Normal, 2.0 mg/dL Final     Nitrite Urine 05/17/2022 Negative   Negative Final     Leukocyte Esterase Urine 05/17/2022 Moderate (A) Negative Final     Bacteria Urine 05/17/2022 Few (A) None Seen /HPF Final     RBC Urine 05/17/2022 1  <=2 /HPF Final     WBC Urine 05/17/2022 27 (A) <=5 /HPF Final     Squamous Epithelials Urine 05/17/2022 1  <=1 /HPF Final     Culture 05/17/2022 10,000-50,000 CFU/mL Escherichia coli (A)  Final        Relevant testing:      Thank you for allowing me to participate in the care of your patient. Please do not hesitate to contact me if you have any questions.       Heather Quinonez LPN

## 2022-06-15 NOTE — PATIENT INSTRUCTIONS
Thank you for allowing Dr. Lo and our team to participate in your care. Please call our office at [phone] with scheduling questions or if you need to cancel or change your appointment. With any other questions or concerns you may call Heather cardiology nurse at [phone].       If you experience chest pain, chest pressure, chest tightness, shortness of breath, fainting, lightheadedness, nausea, vomiting, or other concerning symptoms, please report to the Emergency Department or call 911. These symptoms may be emergent, and best treated in the Emergency Department.    Follow up in September.     No changes at this time.

## 2022-06-15 NOTE — ASSESSMENT & PLAN NOTE
Initially the patient's Diovan was discontinued with some improvement objectively and the orthostatic blood pressures but the patient was still experiencing symptomatology.  The patient was encouraged to wear compression hose low to moderate pressure which she is doing now.  She also discontinued her Ditropan and is feeling better.  Her urinary symptoms have not worsened.

## 2022-07-05 DIAGNOSIS — F41.9 ANXIETY: ICD-10-CM

## 2022-07-05 DIAGNOSIS — F41.1 GENERALIZED ANXIETY DISORDER: ICD-10-CM

## 2022-07-07 RX ORDER — CLONAZEPAM 1 MG/1
TABLET ORAL
Qty: 60 TABLET | Refills: 0 | Status: SHIPPED | OUTPATIENT
Start: 2022-07-07 | End: 2022-08-04

## 2022-07-07 NOTE — TELEPHONE ENCOUNTER
Prozac      Last Written Prescription Date:  5/05/2022  Last Fill Quantity: 60,   # refills: 1  Last Office Visit: 6/13/2022      Klonopin       Last Written Prescription Date:  6/09/2022  Last Fill Quantity: 60,   # refills: 0  Last Office Visit: 6/13/2022      Future Office visit:    Next 5 appointments (look out 90 days)    Sep 13, 2022  9:00 AM  (Arrive by 8:45 AM)  Return Visit with Omar Lo MD  [facility] [place] ([facility] [place] ) [address]  [place] [address]  [phone]   Sep 14, 2022  9:15 AM  (Arrive by 9:00 AM)  SHORT with KEILY Fonseca  [facility] [place] ([facility] [place] ) [address]  [place] [address]  [phone]

## 2022-07-13 ENCOUNTER — LAB (OUTPATIENT)
Dept: LAB | Facility: OTHER | Age: 62
End: 2022-07-13
Payer: MEDICARE

## 2022-07-13 DIAGNOSIS — N18.31 STAGE 3A CHRONIC KIDNEY DISEASE (H): ICD-10-CM

## 2022-07-13 DIAGNOSIS — E03.9 ACQUIRED HYPOTHYROIDISM: ICD-10-CM

## 2022-07-13 LAB
ANION GAP SERPL CALCULATED.3IONS-SCNC: 4 MMOL/L (ref 3–14)
BUN SERPL-MCNC: 19 MG/DL (ref 7–30)
CALCIUM SERPL-MCNC: 9 MG/DL (ref 8.5–10.1)
CHLORIDE BLD-SCNC: 110 MMOL/L (ref 94–109)
CO2 SERPL-SCNC: 22 MMOL/L (ref 20–32)
CREAT SERPL-MCNC: 1.06 MG/DL (ref 0.52–1.04)
GFR SERPL CREATININE-BSD FRML MDRD: 59 ML/MIN/1.73M2
GLUCOSE BLD-MCNC: 122 MG/DL (ref 70–99)
POTASSIUM BLD-SCNC: 4.2 MMOL/L (ref 3.4–5.3)
SODIUM SERPL-SCNC: 136 MMOL/L (ref 133–144)
TSH SERPL DL<=0.005 MIU/L-ACNC: 1.09 MU/L (ref 0.4–4)

## 2022-07-13 PROCEDURE — 36415 COLL VENOUS BLD VENIPUNCTURE: CPT | Mod: ZL

## 2022-07-13 PROCEDURE — 82310 ASSAY OF CALCIUM: CPT | Mod: ZL

## 2022-07-13 PROCEDURE — 84443 ASSAY THYROID STIM HORMONE: CPT | Mod: ZL

## 2022-07-14 DIAGNOSIS — E03.9 ACQUIRED HYPOTHYROIDISM: ICD-10-CM

## 2022-07-18 RX ORDER — LEVOTHYROXINE SODIUM 75 UG/1
TABLET ORAL
Qty: 90 TABLET | Refills: 0 | Status: SHIPPED | OUTPATIENT
Start: 2022-07-18 | End: 2022-10-20

## 2022-08-03 DIAGNOSIS — F41.1 GENERALIZED ANXIETY DISORDER: ICD-10-CM

## 2022-08-04 RX ORDER — CLONAZEPAM 1 MG/1
TABLET ORAL
Qty: 60 TABLET | Refills: 0 | Status: SHIPPED | OUTPATIENT
Start: 2022-08-04 | End: 2022-08-30

## 2022-08-04 NOTE — TELEPHONE ENCOUNTER
Klonopin       Last Written Prescription Date:  7-7-22  Last Fill Quantity: 60,   # refills: 0  Last Office Visit: 6-13-22  Future Office visit:    Next 5 appointments (look out 90 days)    Sep 13, 2022  9:00 AM  (Arrive by 8:45 AM)  Return Visit with Omar Lo MD  [facility] [place] ([facility] [place] ) [address]  [place] [address]  [phone]   Sep 14, 2022  9:15 AM  (Arrive by 9:00 AM)  SHORT with KEILY Fonseca  [facility] [place] ([facility] [place] ) [address]  [place] [address]  [phone]

## 2022-08-29 DIAGNOSIS — F41.1 GENERALIZED ANXIETY DISORDER: ICD-10-CM

## 2022-08-29 DIAGNOSIS — G47.00 PERSISTENT INSOMNIA: ICD-10-CM

## 2022-08-30 RX ORDER — CLONAZEPAM 1 MG/1
TABLET ORAL
Qty: 60 TABLET | Refills: 0 | Status: SHIPPED | OUTPATIENT
Start: 2022-08-30 | End: 2022-10-10

## 2022-08-30 RX ORDER — TRAZODONE HYDROCHLORIDE 50 MG/1
TABLET, FILM COATED ORAL
Qty: 30 TABLET | Refills: 0 | Status: SHIPPED | OUTPATIENT
Start: 2022-08-30 | End: 2022-10-20

## 2022-08-30 NOTE — TELEPHONE ENCOUNTER
clonazepam      Last Written Prescription Date:  8/4/22  Last Fill Quantity: 60,   # refills: 0  Last Office Visit:   Future Office visit:    Next 5 appointments (look out 90 days)    Sep 13, 2022  9:00 AM  (Arrive by 8:45 AM)  Return Visit with Omar Lo MD  [facility] [place] ([facility] [place] ) [address]  [place] [address]  [phone]   Sep 14, 2022  9:15 AM  (Arrive by 9:00 AM)  SHORT with KEILY Fonseca  [facility] [place] ([facility] [place] ) [address]  [place] [address]  [phone]           Routing refill request to provider for review/approval because:  Medication is reported/historical    trazodone      Last Written Prescription Date:  5/5/22  Last Fill Quantity: 30,   # refills: 3  Last Office Visit:   Future Office visit:    Next 5 appointments (look out 90 days)    Sep 13, 2022  9:00 AM  (Arrive by 8:45 AM)  Return Visit with Omar Lo MD  [facility] [place] ([facility] [place] ) [address]  [place] [address]  [phone]   Sep 14, 2022  9:15 AM  (Arrive by 9:00 AM)  SHORT with KEILY Fonseca  [facility] [place] ([facility] [place] ) [address]  [place] [address]  [phone]           Routing refill request to provider for review/approval because:  Medication is reported/historical

## 2022-09-14 ENCOUNTER — OFFICE VISIT (OUTPATIENT)
Dept: CARDIOLOGY | Facility: OTHER | Age: 62
End: 2022-09-14
Attending: INTERNAL MEDICINE
Payer: COMMERCIAL

## 2022-09-14 ENCOUNTER — OFFICE VISIT (OUTPATIENT)
Dept: FAMILY MEDICINE | Facility: OTHER | Age: 62
End: 2022-09-14
Attending: PHYSICIAN ASSISTANT
Payer: COMMERCIAL

## 2022-09-14 VITALS
OXYGEN SATURATION: 98 % | BODY MASS INDEX: 30.61 KG/M2 | TEMPERATURE: 97.7 F | SYSTOLIC BLOOD PRESSURE: 100 MMHG | RESPIRATION RATE: 16 BRPM | HEIGHT: 67 IN | HEART RATE: 62 BPM | WEIGHT: 195 LBS | DIASTOLIC BLOOD PRESSURE: 65 MMHG

## 2022-09-14 VITALS
OXYGEN SATURATION: 98 % | TEMPERATURE: 97.6 F | WEIGHT: 196.7 LBS | RESPIRATION RATE: 18 BRPM | BODY MASS INDEX: 30.87 KG/M2 | HEIGHT: 67 IN

## 2022-09-14 DIAGNOSIS — I95.2 HYPOTENSION DUE TO DRUGS: ICD-10-CM

## 2022-09-14 DIAGNOSIS — N28.9 RENAL INSUFFICIENCY: ICD-10-CM

## 2022-09-14 DIAGNOSIS — I10 ESSENTIAL HYPERTENSION: ICD-10-CM

## 2022-09-14 DIAGNOSIS — I50.30 HEART FAILURE WITH PRESERVED EJECTION FRACTION, NYHA CLASS II (H): Primary | ICD-10-CM

## 2022-09-14 DIAGNOSIS — I25.85 CARDIAC MICROVASCULAR DISEASE: ICD-10-CM

## 2022-09-14 DIAGNOSIS — Z63.0 MARITAL CONFLICT: ICD-10-CM

## 2022-09-14 DIAGNOSIS — F33.1 MODERATE EPISODE OF RECURRENT MAJOR DEPRESSIVE DISORDER (H): ICD-10-CM

## 2022-09-14 DIAGNOSIS — E03.9 ACQUIRED HYPOTHYROIDISM: ICD-10-CM

## 2022-09-14 DIAGNOSIS — K29.50 OTHER CHRONIC GASTRITIS WITHOUT HEMORRHAGE: Primary | ICD-10-CM

## 2022-09-14 DIAGNOSIS — E78.2 MIXED HYPERLIPIDEMIA: ICD-10-CM

## 2022-09-14 DIAGNOSIS — Z87.891 HISTORY OF TOBACCO ABUSE: ICD-10-CM

## 2022-09-14 DIAGNOSIS — I35.1 AORTIC VALVE INSUFFICIENCY, ETIOLOGY OF CARDIAC VALVE DISEASE UNSPECIFIED: ICD-10-CM

## 2022-09-14 DIAGNOSIS — N18.31 STAGE 3A CHRONIC KIDNEY DISEASE (H): ICD-10-CM

## 2022-09-14 LAB
ANION GAP SERPL CALCULATED.3IONS-SCNC: 3 MMOL/L (ref 3–14)
BUN SERPL-MCNC: 21 MG/DL (ref 7–30)
CALCIUM SERPL-MCNC: 9 MG/DL (ref 8.5–10.1)
CHLORIDE BLD-SCNC: 106 MMOL/L (ref 94–109)
CO2 SERPL-SCNC: 26 MMOL/L (ref 20–32)
CREAT SERPL-MCNC: 1.15 MG/DL (ref 0.52–1.04)
GFR SERPL CREATININE-BSD FRML MDRD: 54 ML/MIN/1.73M2
GLUCOSE BLD-MCNC: 99 MG/DL (ref 70–99)
POTASSIUM BLD-SCNC: 4.7 MMOL/L (ref 3.4–5.3)
SODIUM SERPL-SCNC: 135 MMOL/L (ref 133–144)

## 2022-09-14 PROCEDURE — G0463 HOSPITAL OUTPT CLINIC VISIT: HCPCS | Mod: 25

## 2022-09-14 PROCEDURE — 80048 BASIC METABOLIC PNL TOTAL CA: CPT | Mod: ZL | Performed by: PHYSICIAN ASSISTANT

## 2022-09-14 PROCEDURE — 99214 OFFICE O/P EST MOD 30 MIN: CPT | Performed by: NURSE PRACTITIONER

## 2022-09-14 PROCEDURE — 36415 COLL VENOUS BLD VENIPUNCTURE: CPT | Mod: ZL | Performed by: PHYSICIAN ASSISTANT

## 2022-09-14 PROCEDURE — 99214 OFFICE O/P EST MOD 30 MIN: CPT | Performed by: PHYSICIAN ASSISTANT

## 2022-09-14 PROCEDURE — G0463 HOSPITAL OUTPT CLINIC VISIT: HCPCS | Mod: 25,27

## 2022-09-14 PROCEDURE — G0463 HOSPITAL OUTPT CLINIC VISIT: HCPCS

## 2022-09-14 RX ORDER — CALCIUM CARBONATE 500 MG/1
1 TABLET, CHEWABLE ORAL 3 TIMES DAILY
Qty: 180 TABLET | Refills: 3 | Status: SHIPPED | OUTPATIENT
Start: 2022-09-14

## 2022-09-14 SDOH — SOCIAL STABILITY - SOCIAL INSECURITY: PROBLEMS IN RELATIONSHIP WITH SPOUSE OR PARTNER: Z63.0

## 2022-09-14 ASSESSMENT — ANXIETY QUESTIONNAIRES
7. FEELING AFRAID AS IF SOMETHING AWFUL MIGHT HAPPEN: MORE THAN HALF THE DAYS
6. BECOMING EASILY ANNOYED OR IRRITABLE: MORE THAN HALF THE DAYS
5. BEING SO RESTLESS THAT IT IS HARD TO SIT STILL: MORE THAN HALF THE DAYS
1. FEELING NERVOUS, ANXIOUS, OR ON EDGE: MORE THAN HALF THE DAYS
IF YOU CHECKED OFF ANY PROBLEMS ON THIS QUESTIONNAIRE, HOW DIFFICULT HAVE THESE PROBLEMS MADE IT FOR YOU TO DO YOUR WORK, TAKE CARE OF THINGS AT HOME, OR GET ALONG WITH OTHER PEOPLE: SOMEWHAT DIFFICULT
3. WORRYING TOO MUCH ABOUT DIFFERENT THINGS: NEARLY EVERY DAY
GAD7 TOTAL SCORE: 15
4. TROUBLE RELAXING: MORE THAN HALF THE DAYS
2. NOT BEING ABLE TO STOP OR CONTROL WORRYING: MORE THAN HALF THE DAYS
GAD7 TOTAL SCORE: 15

## 2022-09-14 ASSESSMENT — PAIN SCALES - GENERAL
PAINLEVEL: NO PAIN (0)
PAINLEVEL: NO PAIN (0)

## 2022-09-14 ASSESSMENT — PATIENT HEALTH QUESTIONNAIRE - PHQ9: SUM OF ALL RESPONSES TO PHQ QUESTIONS 1-9: 16

## 2022-09-14 NOTE — PATIENT INSTRUCTIONS
Continue to be off of hydroxyzine with improvement in LE edema    Stop oxybutnin (Ditropan) for at least 14 days. Monitor for improvement in symptoms. If worsened urinary incontinence with stopping and no change- okay to restart. If no change in urinary incontinence may be beneficial to not take due to risk of side effects.     Continue with spironolactone, Cardizem. With bradycardia we certainly could consider decreasing Cardizem to 120 mg once daily to see if there is improvement in symptoms.     Consider starting a statin medication with ASCVD risk of 10.6%, elevated LDL cholesterol, microvascular angina, small vessel changes on brain MR    Follow-up with cardiology in 3 months, certainly sooner with acute concerns.     Sydni Buckley, CNP

## 2022-09-14 NOTE — NURSING NOTE
"Chief Complaint   Patient presents with     Depression     Thyroid Problem       Initial /65 (BP Location: Right arm, Patient Position: Chair)   Pulse 62   Temp 97.7  F (36.5  C)   Resp 16   Ht 1.702 m (5' 7\")   Wt 88.5 kg (195 lb)   SpO2 98%   BMI 30.54 kg/m   Estimated body mass index is 30.54 kg/m  as calculated from the following:    Height as of this encounter: 1.702 m (5' 7\").    Weight as of this encounter: 88.5 kg (195 lb).  Medication Reconciliation: complete  Elsa Beard LPN    "

## 2022-09-14 NOTE — PROGRESS NOTES
Assessment & Plan     Other chronic gastritis without hemorrhage  She is going to be given Tums to add in to her program. In 2020 she had a scope showing gastritis. Was on Protonix.  Now very bad again under stress.  Smoking again, use of ETOH is mild but present.   - calcium carbonate (TUMS) 500 MG chewable tablet; Take 1 tablet (500 mg) by mouth 3 times daily    Acquired hypothyroidism  Recheck her labs.     Moderate episode of recurrent major depressive disorder (H)  Referral to counseling . Given numbers to call. Med management might not be a bad idea either.   Her mood is down, and she is anxious     Marital conflict  Is the same, no really great changes.  Has been losing weight.       Review of external notes as documented elsewhere in note  Ordering of each unique test  Prescription drug management  5 minutes spent on the date of the encounter doing chart review, history and exam, documentation and further activities per the note       Nicotine/Tobacco Cessation:  She reports that she has been smoking cigarettes. She started smoking about 47 years ago. She has a 10.00 pack-year smoking history. She has never used smokeless tobacco.  Nicotine/Tobacco Cessation Plan:   Information offered: Patient not interested at this time      See Patient Instructions    Return in about 6 months (around 3/14/2023).    KEILY Cobos  [facility] - MEOMKINZA Su is a 62 year old, presenting for the following health issues:  Depression and Thyroid Problem      HPI     Depression Followup    How are you doing with your depression since your last visit? Worsened     Are you having other symptoms that might be associated with depression? No    Have you had a significant life event?  OTHER: Relationship     Are you feeling anxious or having panic attacks?   Yes:  panic attacks     Do you have any concerns with your use of alcohol or other drugs? No    Social History     Tobacco Use     Smoking  status: Current Every Day Smoker     Packs/day: 0.25     Years: 40.00     Pack years: 10.00     Types: Cigarettes     Start date: 1/1/1975     Last attempt to quit: 9/14/2022     Smokeless tobacco: Never Used   Substance Use Topics     Alcohol use: No     Drug use: No     PHQ 5/5/2022 6/13/2022 9/14/2022   PHQ-9 Total Score 24 8 16   Q9: Thoughts of better off dead/self-harm past 2 weeks Not at all Not at all Not at all     SHAKIR-7 SCORE 5/5/2022 6/13/2022 9/14/2022   Total Score 21 8 15     Last PHQ-9 9/14/2022   1.  Little interest or pleasure in doing things 1   2.  Feeling down, depressed, or hopeless 2   3.  Trouble falling or staying asleep, or sleeping too much 2   4.  Feeling tired or having little energy 2   5.  Poor appetite or overeating 1   6.  Feeling bad about yourself 3   7.  Trouble concentrating 2   8.  Moving slowly or restless 3   Q9: Thoughts of better off dead/self-harm past 2 weeks 0   PHQ-9 Total Score 16   Difficulty at work, home, or with people Somewhat difficult     SHAKIR-7  9/14/2022   1. Feeling nervous, anxious, or on edge 2   2. Not being able to stop or control worrying 2   3. Worrying too much about different things 3   4. Trouble relaxing 2   5. Being so restless that it is hard to sit still 2   6. Becoming easily annoyed or irritable 2   7. Feeling afraid, as if something awful might happen 2   SHAKIR-7 Total Score 15   If you checked any problems, how difficult have they made it for you to do your work, take care of things at home, or get along with other people? Somewhat difficult       Suicide Assessment Five-step Evaluation and Treatment (SAFE-T)          Hypothyroidism Follow-up      Since last visit, patient describes the following symptoms: Weight stable, no hair loss, no skin changes, no constipation, no loose stools, constipation, loose stools, anxiety and depression          Review of Systems   Constitutional, HEENT, cardiovascular, pulmonary, gi and gu systems are negative,  "except as otherwise noted.      Objective    /65 (BP Location: Right arm, Patient Position: Chair)   Pulse 62   Temp 97.7  F (36.5  C)   Resp 16   Ht 1.702 m (5' 7\")   Wt 88.5 kg (195 lb)   SpO2 98%   BMI 30.54 kg/m    Body mass index is 30.54 kg/m .  Physical Exam   GENERAL: healthy, alert and no distress  NECK: no adenopathy, no asymmetry, masses, or scars and thyroid normal to palpation  RESP: lungs clear to auscultation - no rales, rhonchi or wheezes  CV: regular rate and rhythm, normal S1 S2, no S3 or S4, no murmur, click or rub, no peripheral edema and peripheral pulses strong  ABDOMEN: soft, nontender, no hepatosplenomegaly, no masses and bowel sounds normal  MS: no gross musculoskeletal defects noted, no edema    Lab on 07/13/2022   Component Date Value Ref Range Status     TSH 07/13/2022 1.09  0.40 - 4.00 mU/L Final     Sodium 07/13/2022 136  133 - 144 mmol/L Final     Potassium 07/13/2022 4.2  3.4 - 5.3 mmol/L Final     Chloride 07/13/2022 110 (A) 94 - 109 mmol/L Final     Carbon Dioxide (CO2) 07/13/2022 22  20 - 32 mmol/L Final     Anion Gap 07/13/2022 4  3 - 14 mmol/L Final     Urea Nitrogen 07/13/2022 19  7 - 30 mg/dL Final     Creatinine 07/13/2022 1.06 (A) 0.52 - 1.04 mg/dL Final     Calcium 07/13/2022 9.0  8.5 - 10.1 mg/dL Final     Glucose 07/13/2022 122 (A) 70 - 99 mg/dL Final     GFR Estimate 07/13/2022 59 (A) >60 mL/min/1.73m2 Final    Effective December 21, 2021 eGFRcr in adults is calculated using the 2021 CKD-EPI creatinine equation which includes age and gender (Uli sanchez al., NEJ, DOI: 10.1056/GZJJnr9290569)                   "

## 2022-09-14 NOTE — NURSING NOTE
"Chief Complaint   Patient presents with     Follow Up     Follow up-Previous patient of Dr Lo-Symptomatic orthostatic hypotension       Initial /62 (BP Location: Left arm, Patient Position: Sitting, Cuff Size: Adult Regular)   Pulse 52   Temp 97.6  F (36.4  C) (Tympanic)   Resp 18   Ht 1.702 m (5' 7\")   Wt 89.2 kg (196 lb 11.2 oz)   SpO2 98%   BMI 30.81 kg/m   Estimated body mass index is 30.81 kg/m  as calculated from the following:    Height as of this encounter: 1.702 m (5' 7\").    Weight as of this encounter: 89.2 kg (196 lb 11.2 oz).  Medication Reconciliation: complete  MARCELO DASILVA LPN    "

## 2022-09-14 NOTE — PATIENT INSTRUCTIONS
Psychologists/ Counselors                        Vishnu Ann          ...            ..[phone]  [facility]                    ..  [phone]  [facility]                 [phone]  [facility] (kids)       .         [phone]  [facility](teens)                ..[phone]  [facility]                    [phone]  [facility]                   [phone]  [facility]           ...      .. [phone]  [facility]                ...[phone]  [facility]     ...          ...[phone]  [facility]               [phone]   [facility]..            [phone]  [facility]                       [phone]  [facility]               .    [phone]  [facility]                ..[phone]  [facility]     .      . ..[phone]     [facility]              .   [phone]  [facility]                   [phone]              [facility]              .   [phone]  [facility]              .  ...[phone]  [facility]              .   ..[phone]  [facility]           ...      .. [phone]  [facility]              . ..[phone]  [facility]              .    ..[phone]  [facility]              .     .. [phone]  [facility]                 ... [phone]  [facility]                         .[phone]  [facility]     .      . ..[phone]            [facility]              ..  [phone]                   [facility]             . ... ..[phone]  [facility]              .      ..[phone]  [facility]              .     ..[phone]  [facility]        .      . [phone]  [facility]           .[phone]  [facility]         .    ..[phone]  [employer] [facility]             . ..  ..  [phone]  [facility]            [phone]  [facility]              ..[phone]  [facility]                     [phone]    [facility]           ..     [phone]     [facility]    ...     ...[phone]     [facility]            [phone]                                                  [facility]                ..  .  [phone]  [facility]         .     .  [phone]  [facility] Dr[facility] OREN Ruff              . [phone]  [facility]  ..        [phone]  [facility]              .    [phone]    [facility]               ...  .  [phone]  [facility]             . [phone]  [facility]            [phone]  [facility]     .      . ..[phone]         *Facilities in bold italics indicate medication management  Services are offered.     Crisis support  Mobile crisis line- [phone]  [facility]: Free online resources including therapy for Mental Health and substance use problems: Http://thriverange.org     Crisis Text Line  Text HOME to 862-279 - The Crisis Text Line serves anyone, in any type of crisis, providing access to free, 24/7 support and information via the medium people already use and trust  http://www.crisistextline.org   Here's how it works:  Text HOME to 116-718 from anywhere in the [place], anytime, about any type of crisis.  A live, trained Crisis Counselor receives the text and responds quickly.  The volunteer Crisis Counselor will help you move from a 'hot moment to a cool moment'  ad

## 2022-09-23 DIAGNOSIS — I51.89 DIASTOLIC DYSFUNCTION: ICD-10-CM

## 2022-09-23 DIAGNOSIS — I25.85 CARDIAC MICROVASCULAR DISEASE: ICD-10-CM

## 2022-09-23 RX ORDER — DILTIAZEM HYDROCHLORIDE 240 MG/1
240 CAPSULE, COATED, EXTENDED RELEASE ORAL DAILY
Qty: 90 CAPSULE | Refills: 0 | Status: SHIPPED | OUTPATIENT
Start: 2022-09-23 | End: 2022-12-23

## 2022-09-23 NOTE — TELEPHONE ENCOUNTER
Sydni last visit on 9/14/2022 you stated she could try decreasing her diltiazem to 120 mg. ( Continue with spironolactone, Cardizem. With bradycardia we certainly could consider decreasing Cardizem to 120 mg once daily to see if there is improvement in symptoms.)      She would like to try this could you please prescribe.     Heather Quinonez LPN

## 2022-10-05 DIAGNOSIS — F41.1 GENERALIZED ANXIETY DISORDER: ICD-10-CM

## 2022-10-07 DIAGNOSIS — I50.32 CHRONIC DIASTOLIC HEART FAILURE (H): ICD-10-CM

## 2022-10-10 ENCOUNTER — MYC REFILL (OUTPATIENT)
Dept: PSYCHIATRY | Facility: OTHER | Age: 62
End: 2022-10-10

## 2022-10-10 DIAGNOSIS — F41.1 GENERALIZED ANXIETY DISORDER: ICD-10-CM

## 2022-10-10 RX ORDER — CLONAZEPAM 1 MG/1
TABLET ORAL
Qty: 60 TABLET | Refills: 0 | Status: SHIPPED | OUTPATIENT
Start: 2022-10-10 | End: 2022-11-09

## 2022-10-13 RX ORDER — CLONAZEPAM 1 MG/1
TABLET ORAL
Qty: 60 TABLET | Refills: 0 | OUTPATIENT
Start: 2022-10-13

## 2022-10-13 RX ORDER — SPIRONOLACTONE 25 MG/1
25 TABLET ORAL DAILY
Qty: 30 TABLET | Refills: 11 | Status: SHIPPED | OUTPATIENT
Start: 2022-10-13 | End: 2023-10-20

## 2022-10-19 DIAGNOSIS — E03.9 ACQUIRED HYPOTHYROIDISM: ICD-10-CM

## 2022-10-19 DIAGNOSIS — G47.00 PERSISTENT INSOMNIA: ICD-10-CM

## 2022-10-20 RX ORDER — LEVOTHYROXINE SODIUM 75 UG/1
TABLET ORAL
Qty: 90 TABLET | Refills: 0 | Status: SHIPPED | OUTPATIENT
Start: 2022-10-20 | End: 2023-01-20

## 2022-10-20 RX ORDER — TRAZODONE HYDROCHLORIDE 50 MG/1
TABLET, FILM COATED ORAL
Qty: 30 TABLET | Refills: 0 | Status: SHIPPED | OUTPATIENT
Start: 2022-10-20 | End: 2022-11-23

## 2022-10-20 NOTE — TELEPHONE ENCOUNTER
Trazodone       Last Written Prescription Date:  8/30/22  Last Fill Quantity: 30,   # refills: 0    Synthroid       Last Written Prescription Date:  7/18/22  Last Fill Quantity: 90,   # refills: 0  Last Office Visit: 9/14/22  Future Office visit:    Next 5 appointments (look out 90 days)    Dec 14, 2022  9:00 AM  (Arrive by 8:45 AM)  Return Visit with Sydni Buckley CNP  [facility] [place] ([facility] [place] ) [address]  [place] [address]  [phone]   Dec 14, 2022 10:15 AM  (Arrive by 10:00 AM)  SHORT with KEILY Fonseca  [facility] [place] ([facility] [place] ) [address]  [place] [address]  [phone]

## 2022-10-24 DIAGNOSIS — K64.4 EXTERNAL HEMORRHOIDS: ICD-10-CM

## 2022-10-26 RX ORDER — HYDROCORTISONE 25 MG/G
CREAM TOPICAL
Qty: 56 G | Refills: 0 | Status: SHIPPED | OUTPATIENT
Start: 2022-10-26 | End: 2023-12-13

## 2022-10-26 NOTE — TELEPHONE ENCOUNTER
hydrocortisone      Last Written Prescription Date:  1/19/22  Last Fill Quantity: 60 g,   # refills: 0  Last Office Visit: 9/14/22  Future Office visit:    Next 5 appointments (look out 90 days)    Dec 14, 2022  9:00 AM  (Arrive by 8:45 AM)  Return Visit with Sydni Buckley CNP  [facility] [place] ([facility] [place] ) [address]  [place] [address]  [phone]   Dec 14, 2022 10:15 AM  (Arrive by 10:00 AM)  SHORT with KEILY Fonseca  [facility] [place] ([facility] [place] ) [address]  [place] [address]  [phone]

## 2022-11-08 DIAGNOSIS — F41.1 GENERALIZED ANXIETY DISORDER: ICD-10-CM

## 2022-11-09 RX ORDER — CLONAZEPAM 1 MG/1
TABLET ORAL
Qty: 60 TABLET | Refills: 0 | Status: SHIPPED | OUTPATIENT
Start: 2022-11-09 | End: 2022-12-12

## 2022-11-09 NOTE — TELEPHONE ENCOUNTER
Klonopin      Last Written Prescription Date:  10.11.22  Last Fill Quantity: #60,   # refills: 0  Last Office Visit: 9.14.22  Future Office visit:    Next 5 appointments (look out 90 days)    Dec 14, 2022  9:00 AM  (Arrive by 8:45 AM)  Return Visit with Sydni Buckley CNP  [facility] [place] ([facility] [place] ) [address]  [place] [address]  [phone]   Dec 14, 2022 10:15 AM  (Arrive by 10:00 AM)  SHORT with KEILY Fonseca  [facility] [place] ([facility] [place] ) [address] [facility] [address]  [phone]           Routing refill request to provider for review/approval because:  Drug not on the [facility], [facility] or [facility] refill protocol or controlled substance

## 2022-11-19 ENCOUNTER — HEALTH MAINTENANCE LETTER (OUTPATIENT)
Age: 62
End: 2022-11-19

## 2022-11-23 DIAGNOSIS — G47.00 PERSISTENT INSOMNIA: ICD-10-CM

## 2022-11-23 RX ORDER — TRAZODONE HYDROCHLORIDE 50 MG/1
TABLET, FILM COATED ORAL
Qty: 30 TABLET | Refills: 0 | Status: SHIPPED | OUTPATIENT
Start: 2022-11-23 | End: 2022-12-23

## 2022-12-10 DIAGNOSIS — F41.1 GENERALIZED ANXIETY DISORDER: ICD-10-CM

## 2022-12-12 RX ORDER — CLONAZEPAM 1 MG/1
TABLET ORAL
Qty: 60 TABLET | Refills: 0 | Status: SHIPPED | OUTPATIENT
Start: 2022-12-12 | End: 2023-01-13

## 2022-12-12 NOTE — TELEPHONE ENCOUNTER
clonazepam      Last Written Prescription Date:  11/9/22  Last Fill Quantity: 60,   # refills: 0  Last Office Visit: 9/14/22  Future Office visit:    Next 5 appointments (look out 90 days)    Dec 14, 2022  9:00 AM  (Arrive by 8:45 AM)  Return Visit with Sydni Buckley CNP  [facility] [place] ([facility] [place] ) [address] [place] [address]  [phone]   Dec 14, 2022 10:15 AM  (Arrive by 10:00 AM)  SHORT with KEILY Fonseca  [facility] [place] ([facility] [place] ) [address]  [place] [address]  [phone]

## 2022-12-14 ENCOUNTER — OFFICE VISIT (OUTPATIENT)
Dept: FAMILY MEDICINE | Facility: OTHER | Age: 62
End: 2022-12-14
Attending: PHYSICIAN ASSISTANT
Payer: COMMERCIAL

## 2022-12-14 VITALS
WEIGHT: 203.7 LBS | TEMPERATURE: 96.7 F | HEART RATE: 58 BPM | OXYGEN SATURATION: 99 % | DIASTOLIC BLOOD PRESSURE: 65 MMHG | SYSTOLIC BLOOD PRESSURE: 110 MMHG | RESPIRATION RATE: 16 BRPM | BODY MASS INDEX: 31.9 KG/M2

## 2022-12-14 DIAGNOSIS — F41.1 GAD (GENERALIZED ANXIETY DISORDER): ICD-10-CM

## 2022-12-14 DIAGNOSIS — F33.1 MODERATE EPISODE OF RECURRENT MAJOR DEPRESSIVE DISORDER (H): ICD-10-CM

## 2022-12-14 DIAGNOSIS — G47.10 DAYTIME HYPERSOMNIA: ICD-10-CM

## 2022-12-14 DIAGNOSIS — G43.009 MIGRAINE WITHOUT AURA AND RESPONSIVE TO TREATMENT: ICD-10-CM

## 2022-12-14 DIAGNOSIS — I50.30 DIASTOLIC HEART FAILURE, UNSPECIFIED HF CHRONICITY (H): ICD-10-CM

## 2022-12-14 DIAGNOSIS — Z87.891 PERSONAL HISTORY OF TOBACCO USE: ICD-10-CM

## 2022-12-14 DIAGNOSIS — N18.31 STAGE 3A CHRONIC KIDNEY DISEASE (H): ICD-10-CM

## 2022-12-14 DIAGNOSIS — Z23 NEED FOR PROPHYLACTIC VACCINATION AND INOCULATION AGAINST INFLUENZA: Primary | ICD-10-CM

## 2022-12-14 LAB
ALBUMIN SERPL BCG-MCNC: 4.3 G/DL (ref 3.5–5.2)
ALP SERPL-CCNC: 88 U/L (ref 35–104)
ALT SERPL W P-5'-P-CCNC: 23 U/L (ref 10–35)
ANION GAP SERPL CALCULATED.3IONS-SCNC: 11 MMOL/L (ref 7–15)
AST SERPL W P-5'-P-CCNC: 19 U/L (ref 10–35)
BILIRUB SERPL-MCNC: 0.2 MG/DL
BUN SERPL-MCNC: 11.5 MG/DL (ref 8–23)
CALCIUM SERPL-MCNC: 9.3 MG/DL (ref 8.8–10.2)
CHLORIDE SERPL-SCNC: 103 MMOL/L (ref 98–107)
CHOLEST SERPL-MCNC: 228 MG/DL
CREAT SERPL-MCNC: 0.97 MG/DL (ref 0.51–0.95)
DEPRECATED HCO3 PLAS-SCNC: 24 MMOL/L (ref 22–29)
GFR SERPL CREATININE-BSD FRML MDRD: 66 ML/MIN/1.73M2
GLUCOSE SERPL-MCNC: 92 MG/DL (ref 70–99)
HDLC SERPL-MCNC: 62 MG/DL
LDLC SERPL CALC-MCNC: 130 MG/DL
NONHDLC SERPL-MCNC: 166 MG/DL
POTASSIUM SERPL-SCNC: 4.8 MMOL/L (ref 3.4–5.3)
PROT SERPL-MCNC: 7.5 G/DL (ref 6.4–8.3)
SODIUM SERPL-SCNC: 138 MMOL/L (ref 136–145)
TRIGL SERPL-MCNC: 178 MG/DL

## 2022-12-14 PROCEDURE — 80053 COMPREHEN METABOLIC PANEL: CPT | Mod: ZL | Performed by: PHYSICIAN ASSISTANT

## 2022-12-14 PROCEDURE — 80061 LIPID PANEL: CPT | Mod: ZL | Performed by: PHYSICIAN ASSISTANT

## 2022-12-14 PROCEDURE — 36415 COLL VENOUS BLD VENIPUNCTURE: CPT | Mod: ZL | Performed by: PHYSICIAN ASSISTANT

## 2022-12-14 PROCEDURE — 99214 OFFICE O/P EST MOD 30 MIN: CPT | Performed by: PHYSICIAN ASSISTANT

## 2022-12-14 PROCEDURE — 82040 ASSAY OF SERUM ALBUMIN: CPT | Mod: ZL | Performed by: PHYSICIAN ASSISTANT

## 2022-12-14 PROCEDURE — G0463 HOSPITAL OUTPT CLINIC VISIT: HCPCS

## 2022-12-14 PROCEDURE — G0463 HOSPITAL OUTPT CLINIC VISIT: HCPCS | Mod: 25

## 2022-12-14 PROCEDURE — 90682 RIV4 VACC RECOMBINANT DNA IM: CPT

## 2022-12-14 ASSESSMENT — ANXIETY QUESTIONNAIRES
6. BECOMING EASILY ANNOYED OR IRRITABLE: NEARLY EVERY DAY
2. NOT BEING ABLE TO STOP OR CONTROL WORRYING: NEARLY EVERY DAY
1. FEELING NERVOUS, ANXIOUS, OR ON EDGE: MORE THAN HALF THE DAYS
5. BEING SO RESTLESS THAT IT IS HARD TO SIT STILL: SEVERAL DAYS
3. WORRYING TOO MUCH ABOUT DIFFERENT THINGS: NEARLY EVERY DAY
7. FEELING AFRAID AS IF SOMETHING AWFUL MIGHT HAPPEN: MORE THAN HALF THE DAYS
4. TROUBLE RELAXING: MORE THAN HALF THE DAYS
GAD7 TOTAL SCORE: 16
GAD7 TOTAL SCORE: 16
IF YOU CHECKED OFF ANY PROBLEMS ON THIS QUESTIONNAIRE, HOW DIFFICULT HAVE THESE PROBLEMS MADE IT FOR YOU TO DO YOUR WORK, TAKE CARE OF THINGS AT HOME, OR GET ALONG WITH OTHER PEOPLE: VERY DIFFICULT

## 2022-12-14 ASSESSMENT — PATIENT HEALTH QUESTIONNAIRE - PHQ9: SUM OF ALL RESPONSES TO PHQ QUESTIONS 1-9: 19

## 2022-12-14 ASSESSMENT — PAIN SCALES - GENERAL: PAINLEVEL: MODERATE PAIN (5)

## 2022-12-14 NOTE — NURSING NOTE
"Chief Complaint   Patient presents with     Fibromyalgia       Initial /68 (BP Location: Left arm, Patient Position: Sitting, Cuff Size: Adult Regular)   Pulse 50   Temp 98  F (36.7  C) (Tympanic)   Ht 1.702 m (5' 7\")   Wt 77.1 kg (170 lb)   SpO2 98%   BMI 26.63 kg/m   Estimated body mass index is 26.63 kg/m  as calculated from the following:    Height as of this encounter: 1.702 m (5' 7\").    Weight as of this encounter: 77.1 kg (170 lb).  Medication Reconciliation: complete    Sydni Domingo LPN  " 0 = understands/communicates without difficulty

## 2022-12-14 NOTE — PROGRESS NOTES
"  Assessment & Plan     Need for prophylactic vaccination and inoculation against influenza  Received today    Moderate episode of recurrent major depressive disorder (H)  Depression and anxiety have worsened. Going to increase prozac dose.   - FLUoxetine (PROZAC) 20 MG capsule; Take 3 capsules (60 mg) by mouth daily    Personal history of tobacco use  Offered smoking cessation counseling, she is not interested at this time.     Migraine without aura and responsive to treatment  She has been having migraines more frequently, she has been taking Imitrex which has been helping.       Diastolic heart failure, unspecified HF chronicity (H)  She has been having worsening orthopnea, shortness of breath on exertion and edema in her legs. She is seeing cardiology on Friday.   - Comprehensive metabolic panel (BMP + Alb, Alk Phos, ALT, AST, Total. Bili, TP); Future  - Lipid Profile (Chol, Trig, HDL, LDL calc); Future  - Comprehensive metabolic panel (BMP + Alb, Alk Phos, ALT, AST, Total. Bili, TP)  - Lipid Profile (Chol, Trig, HDL, LDL calc)    Stage 3a chronic kidney disease (H)  Kidney function has been stable.     Daytime hypersomnia  Not sleeping through the night, needed 1-1.5 hr nap every day. Going to get a sleep study.   - Adult Sleep Eval & Management  Referral; Future    Ordering of each unique test  Prescription drug management  15 minutes spent on the date of the encounter doing chart review, history and exam, documentation and further activities per the note       BMI:   Estimated body mass index is 31.9 kg/m  as calculated from the following:    Height as of 9/14/22: 1.702 m (5' 7\").    Weight as of this encounter: 92.4 kg (203 lb 11.2 oz).       See Patient Instructions    No follow-ups on file.  STANISLAV Moran  [facility]   I saw and examined this patient.  I have read through the note and made appropriate changes and agree with the assessment and plan.     Concha Hare, " PA  [facility]   Sharlene is a 62 year old, presenting for the following health issues:  Recheck Medication      HPI     Hypertension Follow-up      Do you check your blood pressure regularly outside of the clinic? No     Are you following a low salt diet? No    Are your blood pressures ever more than 140 on the top number (systolic) OR more   than 90 on the bottom number (diastolic), for example 140/90? No    Heart Failure Follow-up  Are you experiencing any shortness of breath? Yes, at night or when lying flat, getting it more frequently  How would you describe your shortness of breath?  Slightly worse  Yes, with rest and activity   How would you describe your shortness of breath?  Slightly worse    Are you experiencing any swelling in your legs or feet?  Better than usual    Are you using more pillows than usual? No    Do you cough at night?  Yes    Do you check your weight daily?  Yes    Have you had a weight change recently?  Weight increase - less activity, no changes in diet     Are you having any of the following side effects from your medications? (Select all that apply)  Slow heart beat    Since your last visit, how many times have you gone to the cardiologist, urgent care, emergency room, or hospital because of your heart failure?   None    Depression and Anxiety Follow-Up    How are you doing with your depression since your last visit? Worsened slightly    How are you doing with your anxiety since your last visit?  No change    Are you having other symptoms that might be associated with depression or anxiety? Yes:  chest pain    Have you had a significant life event? No     Do you have any concerns with your use of alcohol or other drugs? No  Pain is waking her up at night, not sleeping well.  Increased appetite, due to stress  On 40MG of prozac, doesn't think it is helping as much as it used to.   Social History     Tobacco Use     Smoking status: Every Day      Packs/day: 0.25     Years: 40.00     Pack years: 10.00     Types: Cigarettes     Start date: 1975     Last attempt to quit: 2022     Years since quittin.2     Smokeless tobacco: Never   Vaping Use     Vaping Use: Never used   Substance Use Topics     Alcohol use: No     Drug use: No     PHQ 2022   PHQ-9 Total Score 8 16 19   Q9: Thoughts of better off dead/self-harm past 2 weeks Not at all Not at all Not at all     SHAKIR-7 SCORE 2022   Total Score 8 15 16     Last PHQ-9 2022   1.  Little interest or pleasure in doing things 3   2.  Feeling down, depressed, or hopeless 2   3.  Trouble falling or staying asleep, or sleeping too much 3   4.  Feeling tired or having little energy 3   5.  Poor appetite or overeating 3   6.  Feeling bad about yourself 1   7.  Trouble concentrating 3   8.  Moving slowly or restless 1   Q9: Thoughts of better off dead/self-harm past 2 weeks 0   PHQ-9 Total Score 19   Difficulty at work, home, or with people Extremely dIfficult     SHAKIR-7  2022   1. Feeling nervous, anxious, or on edge 2   2. Not being able to stop or control worrying 3   3. Worrying too much about different things 3   4. Trouble relaxing 2   5. Being so restless that it is hard to sit still 1   6. Becoming easily annoyed or irritable 3   7. Feeling afraid, as if something awful might happen 2   SHAKIR-7 Total Score 16   If you checked any problems, how difficult have they made it for you to do your work, take care of things at home, or get along with other people? Very difficult       Suicide Assessment Five-step Evaluation and Treatment (SAFE-T)    Chronic Kidney Disease Follow-up    Do you take any over the counter pain medicine?: Yes  What over the counter medicine are you taking for your pain?:  Tylenol arthritis       How often do you take this medicine?:  A few times a month    Migraine     Since your last clinic visit, how have your headaches  changed?  Worsened    How often are you getting headaches or migraines? 1 or 2 a week     Are you able to do normal daily activities when you have a migraine? No    Are you taking rescue/relief medications? (Select all that apply) Tylenol    How helpful is your rescue/relief medication?  I get some relief    Are you taking any medications to prevent migraines? (Select all that apply)  No    In the past 4 weeks, how often have you gone to urgent care or the emergency room because of your headaches?  0     Has been taking  imitrex that she was prescribed before.     Hypothyroidism Follow-up      Since last visit, patient describes the following symptoms: Weight stable, no hair loss, no skin changes, no constipation, no loose stools, weight gain of 10 lbs, dry skin, loose stools and fatigue      Pain History:  When did you first notice your pain? - Chronic Pain   Have you seen this provider for your pain in the past?   Yes   Where in your body do you have pain? Back, knees, ribcage  Are you seeing anyone else for your pain? No  Taking tyelol 625mg for arthritic pain as needed.     PHQ-9 SCORE 2022   PHQ-9 Total Score - - -   PHQ-9 Total Score 8 16 19       SHAKIR-7 SCORE 2022   Total Score 8 15 16           PHQ-9 SCORE 2022   PHQ-9 Total Score - - -   PHQ-9 Total Score 8 16 19     SHAKIR-7 SCORE 2022   Total Score 8 15 16     PEG Score 2022   PEG Total Score 6.33       Chronic Pain Follow Up:    Location of pain: back, knees, ribcage  Analgesia/pain control:    - Recent changes:  none    - Overall control: Tolerable with discomfort    - Current treatments: tylenol   Adherence:     - Do you ever take more pain medicine than prescribed? No    - When did you take your last dose of pain medicine?  3 days ago   Adverse effects: No   PDMP Review       Value Time User    State PDMP site checked  Yes 2022  1:09 PM  Olivia Moy MD        Last CSA Agreement:   CSA -- Patient Level:     [Media Unavailable] Controlled Substance Agreement - Non - Opioid - Scan on 9/30/2019  1:49 PM: NON-OPIOID CONTROLLED SUBSTANCE AGREEMENT       Last UDS: 10/1/2019    Review of Systems   CONSTITUTIONAL:NEGATIVE for fever, chills. Has gained weight recently.   INTEGUMENTARY/SKIN: moles on abdomen and recurrent lesion behind elbow   ENT/MOUTH: NEGATIVE for ear, mouth and throat problems  RESP:POSITIVE for dyspnea on exertion, SOB/dyspnea, wheezing and orthopnea. Negative for significant cough and hemoptysis  CV: POSITIVE for chest pain/chest pressure, claudication, dyspnea on exertion, lower extremity edema and orthopnea and NEGATIVE for paroxysmal nocturnal dyspnea  GI: NEGATIVE for nausea, abdominal pain, heartburn. Loose stools for a couple of months. Hx of Hemorrhoids   MUSCULOSKELETAL: arthritis pain in knees, hip, hands  PSYCHIATRIC: worsening depression and anxiety.       Objective    /65 (BP Location: Right arm, Patient Position: Sitting, Cuff Size: Adult Large)   Pulse 58   Temp (!) 96.7  F (35.9  C) (Tympanic)   Resp 16   Wt 92.4 kg (203 lb 11.2 oz)   SpO2 99%   BMI 31.90 kg/m    Body mass index is 31.9 kg/m .  Physical Exam   GENERAL: healthy, alert and no distress  EYES: Eyes grossly normal to inspection, PERRL and conjunctivae and sclerae normal  HENT: ear canals and TM's normal, nose and mouth without ulcers or lesions  NECK: no adenopathy, no asymmetry, masses, or scars and thyroid normal to palpation  RESP: lungs clear to auscultation - no rales, rhonchi or wheezes  CV: regular rate and rhythm, normal S1 S2, no S3 or S4, no murmur, click or rub, bilateral leg edema and peripheral pulses strong  ABDOMEN: soft, tenderness in RUQ and LUQ, bowel sounds normal and no palpable or pulsatile masses  MS: no gross musculoskeletal defects noted  SKIN: multiple cherry angiomas on abdomen, lesion on back of right arm, no  suspicious lesions or rashes  NEURO: Normal strength and tone, mentation intact and speech normal  PSYCH: mentation appears normal, affect normal/bright

## 2022-12-21 DIAGNOSIS — I51.89 DIASTOLIC DYSFUNCTION: ICD-10-CM

## 2022-12-21 DIAGNOSIS — G47.00 PERSISTENT INSOMNIA: ICD-10-CM

## 2022-12-21 DIAGNOSIS — I25.85 CARDIAC MICROVASCULAR DISEASE: ICD-10-CM

## 2022-12-23 RX ORDER — DILTIAZEM HYDROCHLORIDE 240 MG/1
240 CAPSULE, COATED, EXTENDED RELEASE ORAL DAILY
Qty: 90 CAPSULE | Refills: 0 | Status: SHIPPED | OUTPATIENT
Start: 2022-12-23 | End: 2023-01-04

## 2022-12-23 RX ORDER — TRAZODONE HYDROCHLORIDE 50 MG/1
TABLET, FILM COATED ORAL
Qty: 30 TABLET | Refills: 0 | Status: SHIPPED | OUTPATIENT
Start: 2022-12-23 | End: 2023-01-20

## 2022-12-23 NOTE — TELEPHONE ENCOUNTER
Trazodone      Last Written Prescription Date:  11/23/22  Last Fill Quantity: 30,   # refills: 0  Last Office Visit: 12/14/22  Future Office visit:    Next 5 appointments (look out 90 days)    Jan 04, 2023  9:30 AM  (Arrive by 9:15 AM)  Return Visit with Sydni Buckley CNP  [facility] [place] ([facility] [place] ) [address]  [place] [address]  [phone]   Mar 15, 2023  8:45 AM  (Arrive by 8:30 AM)  SHORT with KEILY Fonseca  [facility] [place] ([facility] [place] ) [address]  [place] [address]  [phone]           Cardizem      Last Written Prescription Date:  09/23/22  Last Fill Quantity: 90,   # refills: 0  Last Office Visit: 12/14/22  Future Office visit:    Next 5 appointments (look out 90 days)    Jan 04, 2023  9:30 AM  (Arrive by 9:15 AM)  Return Visit with Sydni Buckley CNP  [facility] [place] ([facility] [place] ) [address]  [place] [address]  [phone]   Mar 15, 2023  8:45 AM  (Arrive by 8:30 AM)  SHORT with KEILY Fonseca  [facility] [place] ([facility] [place] ) [address]  [place] [address]  [phone]

## 2023-01-04 ENCOUNTER — OFFICE VISIT (OUTPATIENT)
Dept: CARDIOLOGY | Facility: OTHER | Age: 63
End: 2023-01-04
Attending: NURSE PRACTITIONER
Payer: COMMERCIAL

## 2023-01-04 VITALS
OXYGEN SATURATION: 96 % | BODY MASS INDEX: 31.01 KG/M2 | DIASTOLIC BLOOD PRESSURE: 70 MMHG | TEMPERATURE: 98 F | WEIGHT: 198 LBS | SYSTOLIC BLOOD PRESSURE: 118 MMHG | RESPIRATION RATE: 16 BRPM | HEART RATE: 62 BPM

## 2023-01-04 DIAGNOSIS — I10 ESSENTIAL HYPERTENSION: ICD-10-CM

## 2023-01-04 DIAGNOSIS — R07.89 ATYPICAL CHEST PAIN: ICD-10-CM

## 2023-01-04 DIAGNOSIS — I50.30 HEART FAILURE WITH PRESERVED EJECTION FRACTION, NYHA CLASS II (H): Primary | ICD-10-CM

## 2023-01-04 DIAGNOSIS — N18.31 STAGE 3A CHRONIC KIDNEY DISEASE (H): ICD-10-CM

## 2023-01-04 DIAGNOSIS — E78.2 MIXED HYPERLIPIDEMIA: ICD-10-CM

## 2023-01-04 DIAGNOSIS — R06.09 DYSPNEA ON EXERTION: ICD-10-CM

## 2023-01-04 DIAGNOSIS — R60.0 BILATERAL LEG EDEMA: ICD-10-CM

## 2023-01-04 DIAGNOSIS — I51.89 DIASTOLIC DYSFUNCTION: ICD-10-CM

## 2023-01-04 DIAGNOSIS — I35.1 AORTIC VALVE INSUFFICIENCY, ETIOLOGY OF CARDIAC VALVE DISEASE UNSPECIFIED: ICD-10-CM

## 2023-01-04 DIAGNOSIS — I25.85 CARDIAC MICROVASCULAR DISEASE: ICD-10-CM

## 2023-01-04 PROCEDURE — 99214 OFFICE O/P EST MOD 30 MIN: CPT | Performed by: NURSE PRACTITIONER

## 2023-01-04 RX ORDER — FUROSEMIDE 20 MG
20 TABLET ORAL DAILY
Qty: 90 TABLET | Refills: 0 | Status: SHIPPED | OUTPATIENT
Start: 2023-01-04 | End: 2023-03-30

## 2023-01-04 RX ORDER — DILTIAZEM HYDROCHLORIDE 120 MG/1
120 CAPSULE, COATED, EXTENDED RELEASE ORAL DAILY
Qty: 90 CAPSULE | Refills: 3 | Status: SHIPPED | OUTPATIENT
Start: 2023-01-04 | End: 2023-12-14

## 2023-01-04 ASSESSMENT — PAIN SCALES - GENERAL: PAINLEVEL: MODERATE PAIN (5)

## 2023-01-04 NOTE — PATIENT INSTRUCTIONS
Thank you for allowing Sydni Buckley CNP and our  team to participate in your care. Please call our office at [phone] with scheduling questions or if you need to cancel or change your appointment. With any other questions or concerns you may call cardiology nurse at [phone] or [phone].       If you experience chest pain, chest pressure, chest tightness, shortness of breath, fainting, lightheadedness, nausea, vomiting, or other concerning symptoms, please report to the Emergency Department or call 911. These symptoms may be emergent, and best treated in the Emergency Department.    Assessment/Plan:    Heart failure with preserved ejection fraction (HFpEF)   Grade I diastolic dysfunction on echocardiogram, cardiac catheterization 4/14/2021    BP: controlled   Fluid status: 1+ pitting edema to bilateral LE- pre-tibial to just below knee. We will start furosemide 20 mg once daily.   Aldosterone antagonist: spironolactone 25 mg once daily  SGLT-2 Inhibitor:   Ischemia evaluation: Complete Stress MR in 2013 without inducible inschemia or infarct  ACEi/ARB/ARNI: n/a, no evidence for use in HFpEF  BB: n/a, no evidence for use in HFpEF   SCD prophylaxis: n/a, no evidence for use in HFpEF  NSAID use: Contraindicated  Sleep apnea evaluation: Suggested today    Cardiac microvascular disease  Has been on several medications for management  No recent chest pain, pressure, tightness  Decrease diltiazem 240 mg once daily to diltiazem 120 mg once daily since we are starting furosemide and she already has some orthostatic lightheadedness.     Aortic valve insufficiency  Moderate on 4/8/2022 echocardiogram  Echocardiogram in 1 year for surveillance    Essential hypertension, controlled  BP Readings from Last 6 Encounters:   01/04/23 118/70   12/14/22 110/65   09/14/22 100/65   06/15/22 109/73   06/13/22 122/70   05/17/22 103/67     Mixed hyperlipidemia  ASCVD risk of 9.3%    History of small vessel changes on  brain MR  Consider starting statin medication  Recent Labs   Lab Test 12/14/22  1213 02/21/22  1200   CHOL 228* 210*   HDL 62 66   * 122*   TRIG 178* 111     Chronic kidney disease  Creatinine and GFR have been stable    Atypical chest pain  Dyspnea on exertion  Plan for stress testing    Orthostatic hypotension, lightheaded, dizziness, unsteady gait  Previously stopped Diovan but felt it made her symptoms worse  No improvement in symptoms of lightheadedness, dizziness, unsteady gait with stopping hydroxyzine. She did stop oxybutnin to see if there is any improvement and there was not.   She does have a history of small vessel disease on brain MR and symptoms seem to be more standing and suddenly feels unsteady on her feet versus vertiginous spinning or lightheadedness/pre-syncope.     Former smoker  Pulmonary function testing 6/14/2022 showed a mild diffusion defect      Follow-up with cardiology after TTE/NM Lexiscan stress test, certainly sooner with acute concerns.       Sydni Buckley, CNP

## 2023-01-04 NOTE — PROGRESS NOTES
[facility] HEART CARE   CARDIOLOGY PROGRESS NOTE     Chief Complaint   Patient presents with     Follow Up     3 month follow up          Diagnosis:      ICD-10-CM    1. Heart failure with preserved ejection fraction, NYHA class II (H)  I50.30 furosemide (LASIX) 20 MG tablet      2. Cardiac microvascular disease  I25.89 NM Lexiscan stress test     diltiazem ER COATED BEADS (CARDIZEM CD/CARTIA XT) 120 MG 24 hr capsule      3. Aortic valve insufficiency (moderate on 4/8/2022 echo)  I35.1       4. Essential hypertension  I10       5. Mixed hyperlipidemia  E78.2       6. Stage 3a chronic kidney disease (H)  N18.31       7. Atypical chest pain  R07.89 NM Lexiscan stress test      8. Dyspnea on exertion  R06.09 NM Lexiscan stress test      9. Diastolic dysfunction  I51.89 diltiazem ER COATED BEADS (CARDIZEM CD/CARTIA XT) 120 MG 24 hr capsule      10. Bilateral leg edema  R60.0 furosemide (LASIX) 20 MG tablet            Assessment/Plan:    1. Heart failure with preserved ejection fraction (HFpEF)   Grade I diastolic dysfunction on echocardiogram, cardiac catheterization 4/14/2021    BP: controlled   Fluid status: 1+ pitting edema to bilateral LE- pre-tibial to just below knee. We will start furosemide 20 mg once daily.   Aldosterone antagonist: spironolactone 25 mg once daily  SGLT-2 Inhibitor:   Ischemia evaluation: Complete Stress MR in 2013 without inducible inschemia or infarct  ACEi/ARB/ARNI: n/a, no evidence for use in HFpEF  BB: n/a, no evidence for use in HFpEF   SCD prophylaxis: n/a, no evidence for use in HFpEF  NSAID use: Contraindicated  Sleep apnea evaluation: Suggested today    2. Cardiac microvascular disease    Has been on several medications for management    No recent chest pain, pressure, tightness    Decrease diltiazem 240 mg once daily to diltiazem 120 mg once daily since we are starting furosemide and she already has some orthostatic lightheadedness.     3. Aortic valve insufficiency    Moderate on  4/8/2022 echocardiogram    Echocardiogram in 1 year for surveillance    4. Essential hypertension, controlled  BP Readings from Last 6 Encounters:   01/04/23 118/70   12/14/22 110/65   09/14/22 100/65   06/15/22 109/73   06/13/22 122/70   05/17/22 103/67     5. Mixed hyperlipidemia    ASCVD risk of 9.3%        History of small vessel changes on brain MR    Consider starting statin medication  Recent Labs   Lab Test 12/14/22  1213 02/21/22  1200   CHOL 228* 210*   HDL 62 66   * 122*   TRIG 178* 111       6. Chronic kidney disease    Creatinine and GFR have been stable    7. Atypical chest pain  8. Dyspnea on exertion    Plan for stress testing    Due to TTE in April for surveillance of aortic valve    9. Orthostatic hypotension, lightheaded, dizziness, unsteady gait    Previously stopped Diovan but felt it made her symptoms worse    No improvement in symptoms of lightheadedness, dizziness, unsteady gait with stopping hydroxyzine. She did stop oxybutnin to see if there is any improvement and there was not.     10. Former smoker    Pulmonary function testing 6/14/2022 showed a mild diffusion defect      Follow-up with cardiology after TTE/NM Lexiscan stress test, certainly sooner with acute concerns.     Sydni Buckley, CNP       Interval history:  Mrs. Mccord is a very pleasant 62-year old female who presents for cardiology follow-up. She was previously seen for symptomatic orthostatic hypotension, microvascular cardiac disease with prior history of anginal type of chest pain, chronic diastolic heart failure, mild-moderate aortic insufficiency, COPD with smoking history, hypertension.     Mrs Calvert tells me that at night she has been awakened with pain going across chest from shoulder to shoulder. Burning sensation. Pressure/pushing on it does not make it worse. She will sit up and take some deep breaths and discomfort will go away. Sometimes comes back when she lays down. This has been ongoing since  November 2022.  She tells me with recent snow storm she could only walk a few steps and shovel a few scoops and would experience dyspnea on exertion which would get better with rest, lightheadedness. Pulling sensation in her chest. No associated diaphoresis, nausea.     She does experience LE edema which seems to worsen when she is on her feet more and worse more recnetly. Wears compression stockings which has bene helpful. Sometimes LE edema is uncomfortable and makes walking painful.         Smoking history: started smoking around age 15, quit smoking at age 60 years old. 1 ppd.     Alcohol history: None    Substance abuse history: none, was on fen-phen diet pills 20 years ago.     Sleep history: Sleeps in bed, denies orthopnea. +snoring, no witnessed apnea- does wake up in the middle of the night gasping for air on occassion but not regularly. Wakes up 0/7 mornings feeling well rested- wakes up 3-4 times per night to void.       HPI:    Sharlene Mccord is a 61-year old female who presents for cardiology consult with chronic diastolic heart failure and aortic insufficiency with chronic dyspnea. Patient had previously followed cardiologist Dr. Nichole, she was last seen on 11/9/2021.    She has a history of moderate aortic insufficiency without much progression over the years and no signs of LV systolic failure.  She has a history of microvascular angina-chronic (suggested on cardiac stress MRI), chronic diastolic heart failure, hypertension, GUTIERREZ, chronic pain syndrome (rhematology evaluation with possible mixed connective tissue disorder) and chronic fatigue.  Additional past medical history includes migraine headaches, stage I CKD, history of pancreatitis, SHAKIR, depression, GERD, diverticulitis and hypothyroidism.    She has a history of chronic chest discomfort related to microvascular disease and has tried several therapies including isosorbide mononitrate (stopped after 4 days due to severe headache), diltiazem  (improved chest discomfort but low blood pressures) and SL nitroglycerin PRN.     She underwent heart catheterization on 4/14/2021 for invasive exercise hemodynamic assessment with continued chest pain and dyspnea.  She was identified to have normal resting PA pressures, biventricular filling pressures and normal PVR at rest.  Moderately reduced cardiac index at rest in the setting of bradycardia.  Mild increase in PCWP and right atrial pressure with exercise.  Appropriate increase in cardiac output and PA pressures with exercise.  No increased PVR with exercise.  All findings indicating early HFpEF.    She previously underwent MR cardiac with contrast and stress revealing normal LV size and function, LVEF at 67%, no regional wall motion abnormalities.  Normal RV size and function.  Regadenoson stress perfusion imaging did not reveal any areas of reversible ischemia.  Semiquantitative blood flow reserve assessment revealed maximal hyperemic flow to be reduced in all myocardial segments with a range from 1.6-1.9 which suggest global decrease in hyperemic flow which can be seen in microvascular dysfunction.    Cardiac cath in 4/2013 revealing mild aortic regurgitation noted to be chronic, focal CAD with no hemodynamically significant lesions and normal heart function.        RELEVANT TESTING:  US JESUS 4/27/2022  Findings:   Right:     Arm: 143 mmHg   PT at ankle: 160 mmHg   DP at foot: 148 mmHg      JESUS: 1.11     Left:    Arm: 139 mmHg   PT at ankle: 153 mmHg   DP at foot: 151 mmHg      JESUS: 1.06                                                                 Impression: Normal ABIs.    Pulmonary Function Testing 6/14/2022  The FVC, FEV1, FEV1/FVc ratio and KGZ67-21% are within normal limits. The airway resistance is normal. While the TLC and RV are with normal limits, the FRC is reduced. The reduced diffusing capacity indicates a mild loss of functional alveolar capillary surface.   Conclusions: the diffusion defect  is consistent with a pulmonary vascular process.  Pulmonary function diagnosis: Mild diffusion defect- pulmonary vascular    Echocardiogram 4/8/2022  Interpretation Summary  Left ventricular function is normal.The ejection fraction is 60-65%. Left  ventricular size is normal. Relative wall thickness is increased consistent  with concentric remodeling. Grade I or early diastolic dysfunction.  Right ventricular function, chamber size, wall motion, and thickness are normal.  Moderate aortic insufficiency is present.  No pericardial effusion is present.  This study was compared with the study from 11/25/2020 . There has been no  change.    Right heart catheterization/right hear exercise stress study 4/14/2021  Conclusion    Normal resting PA pressures, biventricualr filling pressures, and PVR at rest    Moderately reduced cardiac index at rest in the setting of bradycardia    Mild increase in PCWP and right atrial pressure with exercise    Approriate increase in cardiac output and PA pressures with exercise.    No increase in PVR with exercise    These constellation of findings may indicate early HFpEF          Past Medical History:   Diagnosis Date     Anxiety state, unspecified 03/01/2011     Aortic valve disorders 06/07/2000    Echocardiogram showin mild/moderate AI.  Normal LV function     Cardiac microvascular disease 4/10/2013    Based on Cardiac MRI done at       COPD (chronic obstructive pulmonary disease) (H) 7/8/2021     Depressive disorder 1997     Fatigue      Fatigue      Fibromyalgia 03/01/2011     Hypertension      Major depressive disorder, recurrent episode, unspecified 12/17/2014     Migraine, unspecified, without mention of intractable migraine without mention of status migrainosus 03/01/2011     Mitral valve disorders(424.0) 10/16/2007     Need for prophylactic hormone replacement therapy (postmenopausal)      PONV (postoperative nausea and vomiting)      Thyroid Nodule 03/01/2011     Tobacco use  disorder 03/01/2011     Unspecified hypothyroidism 03/01/2011       Past Surgical History:   Procedure Laterality Date     APPENDECTOMY  1977     BIOPSY  2017    taken [facility] from Dr. Nadira cohen. Thyroid     CARDIAC SURGERY  2013    angiogram [facility]:  Normal coronary arteries.  Felt ot have some microvascular disease     CL AFF SURGICAL PATHOLOGY  01/02/2007    Thyroid Mass     COLONOSCOPY  1/13/2014    Procedure: COLONOSCOPY;  COLONOSCOPY ;  Surgeon: Chasidy Gee DO;  Location: [facility] OR     CV RIGHT HEART CATH MEASUREMENTS RECORDED N/A 4/14/2021    Procedure: CV RIGHT HEART CATH;  Surgeon: Danna Clay MD;  Location:  HEART CARDIAC CATH LAB     CV RIGHT HEART EXERCISE STRESS STUDY N/A 4/14/2021    Procedure: Right Heart Exercise Stress Study;  Surgeon: Danna Clay MD;  Location:  HEART CARDIAC CATH LAB     ESOPHAGOSCOPY, GASTROSCOPY, DUODENOSCOPY (EGD), COMBINED N/A 2/9/2017    Procedure: COMBINED ESOPHAGOSCOPY, GASTROSCOPY, DUODENOSCOPY (EGD);  Surgeon: Johnathan Ruff DO;  Location: [facility] OR     GYN SURGERY      c-sections x 3     HYSTERECTOMY TOTAL ABDOMINAL, BILATERAL SALPINGO-OOPHORECTOMY, COMBINED N/A 01/01/2001     LAPAROSCOPIC CHOLECYSTECTOMY  11/07/2003    Cholelithiasis     LAPAROTOMY EXPLORATORY      abdominal pain/fever     LARYNGOSCOPY       SPLENECTOMY         Allergies   Allergen Reactions     Codeine      Isosorbide Mononitrate Other (See Comments)     Vomiting and headache       Lisinopril      [facility] scan     Paxil [Paroxetine] Headache       Current Outpatient Medications   Medication Sig Dispense Refill     acetaminophen (TYLENOL) 325 MG tablet Take 650 mg by mouth       calcium carbonate (TUMS) 500 MG chewable tablet Take 1 tablet (500 mg) by mouth 3 times daily 180 tablet 3     clonazePAM (KLONOPIN) 1 MG tablet TAKE 1/2 TO 1 (ONE-HALF TO ONE) TABLET BY MOUTH TWICE DAILY 60 tablet 0     diltiazem ER COATED BEADS (CARDIZEM CD/CARTIA XT) 120 MG 24 hr  capsule Take 1 capsule (120 mg) by mouth daily for 90 days 90 capsule 3     esomeprazole (NEXIUM) 40 MG DR capsule Take 1 capsule (40 mg) by mouth every morning (before breakfast) Take 30-60 minutes before eating. 90 capsule 1     estradiol (ESTRACE) 0.5 MG tablet Take 1 tablet (0.5 mg) by mouth 2 times daily 60 tablet 11     FLUoxetine (PROZAC) 20 MG capsule Take 2 capsules by mouth once daily 60 capsule 10     furosemide (LASIX) 20 MG tablet Take 1 tablet (20 mg) by mouth daily for 90 days 90 tablet 0     levothyroxine (SYNTHROID/LEVOTHROID) 75 MCG tablet Take 1 tablet by mouth once daily 90 tablet 0     nitroGLYcerin (NITROSTAT) 0.4 MG sublingual tablet DISSOLVE ONE TABLET UNDER THE TONGUE EVERY 5 MINUTES AS NEEDED FOR CHEST PAIN. DO NOT EXCEED A TOTAL OF 3 DOSES IN 15 MINUTES 30 tablet 4     PROCTO-MED HC 2.5 % cream APPLY CREAM RECTALLY TO AFFECTED AREA(S) THREE TIMES DAILY AS DIRECTED 56 g 0     spironolactone (ALDACTONE) 25 MG tablet Take 1 tablet (25 mg) by mouth daily 30 tablet 11     traZODone (DESYREL) 50 MG tablet TAKE 1 TABLET BY MOUTH AT BEDTIME 30 tablet 0     FLUoxetine (PROZAC) 20 MG capsule Take 3 capsules (60 mg) by mouth daily (Patient not taking: Reported on 2023) 90 capsule 3       Social History     Socioeconomic History     Marital status:      Spouse name: Not on file     Number of children: Not on file     Years of education: Not on file     Highest education level: Not on file   Occupational History     Occupation: Manager   Tobacco Use     Smoking status: Every Day     Packs/day: 0.25     Years: 40.00     Pack years: 10.00     Types: Cigarettes     Start date: 1975     Last attempt to quit: 2022     Years since quittin.3     Smokeless tobacco: Never   Vaping Use     Vaping Use: Never used   Substance and Sexual Activity     Alcohol use: No     Drug use: No     Sexual activity: Not Currently     Partners: Male     Birth control/protection: Other     Comment:  hysterectomy   Other Topics Concern      Service Not Asked     Blood Transfusions Yes     Caffeine Concern Yes     Comment: Coffee      Occupational Exposure Not Asked     Hobby Hazards Not Asked     Sleep Concern Not Asked     Stress Concern Not Asked     Weight Concern Not Asked     Special Diet Not Asked     Back Care Not Asked     Exercise Not Asked     Bike Helmet Not Asked     Seat Belt Not Asked     Self-Exams Not Asked     Parent/sibling w/ CABG, MI or angioplasty before 65F 55M? No   Social History Narrative     Not on file     Social Determinants of Health     Financial Resource Strain: Not on file   Food Insecurity: Not on file   Transportation Needs: Not on file   Physical Activity: Not on file   Stress: Not on file   Social Connections: Not on file   Intimate Partner Violence: Not on file   Housing Stability: Not on file       LAB RESULTS:   No visits with results within 2 Month(s) from this visit.   Latest known visit with results is:   Lab on 07/13/2022   Component Date Value Ref Range Status     TSH 07/13/2022 1.09  0.40 - 4.00 mU/L Final     Sodium 07/13/2022 136  133 - 144 mmol/L Final     Potassium 07/13/2022 4.2  3.4 - 5.3 mmol/L Final     Chloride 07/13/2022 110 (A) 94 - 109 mmol/L Final     Carbon Dioxide (CO2) 07/13/2022 22  20 - 32 mmol/L Final     Anion Gap 07/13/2022 4  3 - 14 mmol/L Final     Urea Nitrogen 07/13/2022 19  7 - 30 mg/dL Final     Creatinine 07/13/2022 1.06 (A) 0.52 - 1.04 mg/dL Final     Calcium 07/13/2022 9.0  8.5 - 10.1 mg/dL Final     Glucose 07/13/2022 122 (A) 70 - 99 mg/dL Final     GFR Estimate 07/13/2022 59 (A) >60 mL/min/1.73m2 Final        Review of systems: Negative except that which was noted in the HPI.    Physical examination:  /70 (BP Location: Right arm, Patient Position: Sitting, Cuff Size: Adult Regular)   Pulse 62   Temp 98  F (36.7  C) (Tympanic)   Resp 16   Wt 89.8 kg (198 lb)   SpO2 96%   BMI 31.01 kg/m      GENERAL APPEARANCE:  healthy, alert and no distress  HEENT: no icterus, no xanthelasmas, normal pupil size and reaction, no cyanosis.  NECK: no adenopathy, no asymmetry, masses.  CHEST: lungs clear to auscultation - no rales, rhonchi or wheezes, no use of accessory muscles, no retractions, respirations are unlabored, normal respiratory rate  CARDIOVASCULAR: regular rhythm, normal S1 with physiologic split S2, no S3 or S4 and no murmur, click or rub  EXTREMITIES: 1+ bilateral LE edema, pre-tibial up about 3 fingerbreadths below knees. No clubbing, cyanosis  NEURO: alert and oriented normal speech, and affect  VASC: No vascular bruits heard.  SKIN: no ecchymoses, no rashes        Thank you for allowing me to participate in the care of your patient. Please do not hesitate to contact me if you have any questions.       Sydni Buckley, CNP

## 2023-01-09 DIAGNOSIS — R12 HEARTBURN: ICD-10-CM

## 2023-01-09 DIAGNOSIS — F41.1 GENERALIZED ANXIETY DISORDER: ICD-10-CM

## 2023-01-12 NOTE — TELEPHONE ENCOUNTER
clonazePAM (KLONOPIN) 1 MG tablet 60 tablet 0 12/12/2022     esomeprazole (NEXIUM) 40 MG DR capsule 90 capsule 1 5/5/2022     lov 12/14/22

## 2023-01-13 RX ORDER — ESOMEPRAZOLE MAGNESIUM 40 MG/1
CAPSULE, DELAYED RELEASE ORAL
Qty: 90 CAPSULE | Refills: 0 | Status: SHIPPED | OUTPATIENT
Start: 2023-01-13 | End: 2023-04-12

## 2023-01-13 RX ORDER — CLONAZEPAM 1 MG/1
TABLET ORAL
Qty: 60 TABLET | Refills: 0 | Status: SHIPPED | OUTPATIENT
Start: 2023-01-13 | End: 2023-02-20

## 2023-01-20 DIAGNOSIS — E03.9 ACQUIRED HYPOTHYROIDISM: ICD-10-CM

## 2023-01-20 DIAGNOSIS — G47.00 PERSISTENT INSOMNIA: ICD-10-CM

## 2023-01-20 RX ORDER — TRAZODONE HYDROCHLORIDE 50 MG/1
TABLET, FILM COATED ORAL
Qty: 30 TABLET | Refills: 0 | Status: SHIPPED | OUTPATIENT
Start: 2023-01-20 | End: 2023-02-16

## 2023-01-20 RX ORDER — LEVOTHYROXINE SODIUM 75 UG/1
TABLET ORAL
Qty: 90 TABLET | Refills: 0 | Status: SHIPPED | OUTPATIENT
Start: 2023-01-20 | End: 2023-04-18

## 2023-01-20 NOTE — TELEPHONE ENCOUNTER
levothyroxine (SYNTHROID/LEVOTHROID) 75 MCG tablet  traZODone (DESYREL) 50 MG tablet  Last Written Prescription Date:  10/20/2022, 12/23/2022  Last Fill Quantity: 90, 30  # refills: 0  Last Office Visit: 1/4/2022  Future Office visit:    Next 5 appointments (look out 90 days)    Mar 15, 2023  8:45 AM  (Arrive by 8:30 AM)  SHORT with KEILY Fonseca  [facility] ([facility] ) [address]  [place] [address]  [phone]

## 2023-01-24 ENCOUNTER — MYC MEDICAL ADVICE (OUTPATIENT)
Dept: FAMILY MEDICINE | Facility: OTHER | Age: 63
End: 2023-01-24

## 2023-01-24 ENCOUNTER — PATIENT OUTREACH (OUTPATIENT)
Dept: OTHER | Facility: HOSPITAL | Age: 63
End: 2023-01-24

## 2023-01-24 ASSESSMENT — ANXIETY QUESTIONNAIRES
3. WORRYING TOO MUCH ABOUT DIFFERENT THINGS: NEARLY EVERY DAY
7. FEELING AFRAID AS IF SOMETHING AWFUL MIGHT HAPPEN: NEARLY EVERY DAY
1. FEELING NERVOUS, ANXIOUS, OR ON EDGE: MORE THAN HALF THE DAYS
6. BECOMING EASILY ANNOYED OR IRRITABLE: MORE THAN HALF THE DAYS
GAD7 TOTAL SCORE: 15
5. BEING SO RESTLESS THAT IT IS HARD TO SIT STILL: SEVERAL DAYS
GAD7 TOTAL SCORE: 15
2. NOT BEING ABLE TO STOP OR CONTROL WORRYING: NEARLY EVERY DAY
4. TROUBLE RELAXING: SEVERAL DAYS
IF YOU CHECKED OFF ANY PROBLEMS ON THIS QUESTIONNAIRE, HOW DIFFICULT HAVE THESE PROBLEMS MADE IT FOR YOU TO DO YOUR WORK, TAKE CARE OF THINGS AT HOME, OR GET ALONG WITH OTHER PEOPLE: VERY DIFFICULT
GAD7 TOTAL SCORE: 15
8. IF YOU CHECKED OFF ANY PROBLEMS, HOW DIFFICULT HAVE THESE MADE IT FOR YOU TO DO YOUR WORK, TAKE CARE OF THINGS AT HOME, OR GET ALONG WITH OTHER PEOPLE?: VERY DIFFICULT
7. FEELING AFRAID AS IF SOMETHING AWFUL MIGHT HAPPEN: NEARLY EVERY DAY

## 2023-01-24 ASSESSMENT — PATIENT HEALTH QUESTIONNAIRE - PHQ9
10. IF YOU CHECKED OFF ANY PROBLEMS, HOW DIFFICULT HAVE THESE PROBLEMS MADE IT FOR YOU TO DO YOUR WORK, TAKE CARE OF THINGS AT HOME, OR GET ALONG WITH OTHER PEOPLE: VERY DIFFICULT
SUM OF ALL RESPONSES TO PHQ QUESTIONS 1-9: 15
SUM OF ALL RESPONSES TO PHQ QUESTIONS 1-9: 15

## 2023-01-24 NOTE — TELEPHONE ENCOUNTER
Patient completed PHQ-9/SHAKIR-7 today for Depression Remission quality patient outreach at 12 months per [facility] Depression Remission guidelines. This writer notes a decline in PHQ-9 & SHAKIR-7 scores today when compared to last completed assessment on 12/14/2022. This writer does not see any concerns at present and noted patient has a 3 month follow-up clinic visit scheduled with pcp on 3/15/2023 in which this writer will also add a clinic schedule note to review mental health therapy plan with patient on this date.     PHQ 9/14/2022 12/14/2022 1/24/2023   PHQ-9 Total Score 16 19 15   Q9: Thoughts of better off dead/self-harm past 2 weeks Not at all Not at all Not at all     SHAKIR-7 SCORE 9/14/2022 12/14/2022 1/24/2023   Total Score - - 15 (severe anxiety)   Total Score 15 16 15     Iram Pina, RN

## 2023-02-10 ENCOUNTER — HOSPITAL ENCOUNTER (OUTPATIENT)
Dept: NUCLEAR MEDICINE | Facility: HOSPITAL | Age: 63
Setting detail: NUCLEAR MEDICINE
Discharge: HOME | End: 2023-02-10
Attending: NURSE PRACTITIONER
Payer: MEDICARE

## 2023-02-10 ENCOUNTER — HOSPITAL ENCOUNTER (OUTPATIENT)
Dept: CARDIOLOGY | Facility: HOSPITAL | Age: 63
Setting detail: NUCLEAR MEDICINE
Discharge: HOME | End: 2023-02-10
Attending: NURSE PRACTITIONER
Payer: MEDICARE

## 2023-02-10 DIAGNOSIS — R06.09 DYSPNEA ON EXERTION: ICD-10-CM

## 2023-02-10 DIAGNOSIS — I25.85 CARDIAC MICROVASCULAR DISEASE: ICD-10-CM

## 2023-02-10 DIAGNOSIS — R07.89 ATYPICAL CHEST PAIN: ICD-10-CM

## 2023-02-10 LAB
CV BLOOD PRESSURE: 74 MMHG
CV STRESS MAX HR HE: 80
NUC STRESS EJECTION FRACTION: 75 %
RATE PRESSURE PRODUCT: 9440
STRESS ECHO BASELINE DIASTOLIC HE: 68
STRESS ECHO BASELINE HR: 56 BPM
STRESS ECHO BASELINE SYSTOLIC BP: 124
STRESS ECHO CALCULATED PERCENT HR: 51 %
STRESS ECHO LAST STRESS DIASTOLIC BP: 68
STRESS ECHO LAST STRESS SYSTOLIC BP: 118
STRESS ECHO TARGET HR: 158

## 2023-02-10 PROCEDURE — A9500 TC99M SESTAMIBI: HCPCS | Performed by: RADIOLOGY

## 2023-02-10 PROCEDURE — 93018 CV STRESS TEST I&R ONLY: CPT | Performed by: INTERNAL MEDICINE

## 2023-02-10 PROCEDURE — 93016 CV STRESS TEST SUPVJ ONLY: CPT | Performed by: INTERNAL MEDICINE

## 2023-02-10 PROCEDURE — 250N000011 HC RX IP 250 OP 636: Performed by: INTERNAL MEDICINE

## 2023-02-10 PROCEDURE — 93017 CV STRESS TEST TRACING ONLY: CPT

## 2023-02-10 PROCEDURE — G1010 CDSM STANSON: HCPCS

## 2023-02-10 PROCEDURE — 343N000001 HC RX 343: Performed by: RADIOLOGY

## 2023-02-10 RX ORDER — REGADENOSON 0.08 MG/ML
0.4 INJECTION, SOLUTION INTRAVENOUS ONCE
Status: COMPLETED | OUTPATIENT
Start: 2023-02-10 | End: 2023-02-10

## 2023-02-10 RX ADMIN — Medication 31.3 MILLICURIE: at 09:17

## 2023-02-10 RX ADMIN — Medication 10.1 MILLICURIE: at 07:16

## 2023-02-10 RX ADMIN — REGADENOSON 0.4 MG: 0.08 INJECTION, SOLUTION INTRAVENOUS at 09:16

## 2023-02-14 ENCOUNTER — MYC MEDICAL ADVICE (OUTPATIENT)
Dept: FAMILY MEDICINE | Facility: OTHER | Age: 63
End: 2023-02-14

## 2023-02-15 ENCOUNTER — PATIENT OUTREACH (OUTPATIENT)
Dept: CARE COORDINATION | Facility: OTHER | Age: 63
End: 2023-02-15

## 2023-02-15 DIAGNOSIS — F41.1 GENERALIZED ANXIETY DISORDER: ICD-10-CM

## 2023-02-15 DIAGNOSIS — R07.89 ATYPICAL CHEST PAIN: Primary | ICD-10-CM

## 2023-02-15 DIAGNOSIS — R94.39 ABNORMAL CARDIOVASCULAR STRESS TEST: ICD-10-CM

## 2023-02-15 DIAGNOSIS — E78.2 MIXED HYPERLIPIDEMIA: ICD-10-CM

## 2023-02-15 DIAGNOSIS — I25.85 CARDIAC MICROVASCULAR DISEASE: ICD-10-CM

## 2023-02-15 DIAGNOSIS — R06.09 DYSPNEA ON EXERTION: ICD-10-CM

## 2023-02-15 RX ORDER — POTASSIUM CHLORIDE 1500 MG/1
20 TABLET, EXTENDED RELEASE ORAL
Status: CANCELLED | OUTPATIENT
Start: 2023-02-15

## 2023-02-15 RX ORDER — SODIUM CHLORIDE 9 MG/ML
INJECTION, SOLUTION INTRAVENOUS CONTINUOUS
Status: CANCELLED | OUTPATIENT
Start: 2023-02-15

## 2023-02-15 RX ORDER — LIDOCAINE 40 MG/G
CREAM TOPICAL
Status: CANCELLED | OUTPATIENT
Start: 2023-02-15

## 2023-02-15 RX ORDER — POTASSIUM CHLORIDE 1500 MG/1
40 TABLET, EXTENDED RELEASE ORAL
Status: CANCELLED | OUTPATIENT
Start: 2023-02-15

## 2023-02-15 NOTE — PROGRESS NOTES
Please call to schedule patient a pre-op for procedure. Needs to be within 30 days of procedure which is on 2-28-23.   Patient is aware she will be called. Please put in appt note to giver her 2 bottles of surgical scrub.      Remainder of instructions below were gone over with patient over the phone and send through [employer]. Patient verbalized understanding and will call or mychart with questions.

## 2023-02-15 NOTE — PROGRESS NOTES
Pre-op- you will be called to schedule this  Angiogram- Tuesday 2-28-23 arriving at 730AM  Follow up with Sydni Buckley CNP- keep appt on 5-2-23        Patient Education    1. Your arrival time is Tuesday 2-28-23 arriving at 730am.  Location is [address] Waiting Room  2. Please plan on being at the hospital all day.  3. At any time, emergencies and/or urgent cases may come up which could delay the start of your procedure.  4. You will need a pre-op.     Pre-procedure instructions - Coronary Angiogram  - Shower in the evening before or the morning of the procedure  - Take your temperature in the morning prior to coming in.  If your temperature is 100 F please call [phone] (Opt. 1) and notify them.  If you do not have access to a thermometer at home, please come in for testing.  If you are running a temperature your procedure may be rescheduled.  - Nothing to eat or drink after midnight  - You can take your morning medications (except for diabetic and blood thinners) with sips of water.  - Take 325 mg of Asprin 24 hours prior to the procedure and the morning of procedure.   - You will need to arrange a ride to drop you off and pick you up, as you will be unable to drive home.  Prior to discharge you may be required to lay flat for approximately 2-4 hours in the recovery unit to ensure proper clotting of the artery.              Diabetic Medication Instructions  ? DO NOT take any oral diabetic medication, short-acting diabetes medications/insulin, humalog or regular insulin the morning of your test  ? Take   dose of long-acting insulin (Lantus, Levemir) the day of your test  ? Hold Oral Diabetic on the day of the procedure and for 48 hours after IV contrast given  ? Remember to  bring your glucometer and insulin with you to take after your test if needed              Anticoagulation Medication Instructions   MAKE SURE  YOU TAKE YOUR ASPIRIN     If you have further questions, please utilize [employer] to contact us.   If your question concerns the above instructions, contact:   Rivera MARCOS RN   Cardiology Care Coordinator   [phone] option #8    If your question concerns the schedule/appointment times, contact:   Lucia BETHEA, Cath Lab Scheduling  [phone]  ]

## 2023-02-15 NOTE — TELEPHONE ENCOUNTER
clonazePAM (KLONOPIN) 1 MG tablet   Last Written Prescription Date:  1/13/2023  Last Fill Quantity: 60,   # refills: 0  Last Office Visit: 12/14/2022  Future Office visit:    Next 5 appointments (look out 90 days)    Feb 16, 2023  9:45 AM  (Arrive by 9:30 AM)  Pre-Op physical with KEILY Fonseca  [facility] [place] ([facility] [place] ) [address]  [place] [address]  [phone]   Mar 15, 2023  8:45 AM  (Arrive by 8:30 AM)  SHORT with KEILY Fonseca  [facility] [place] ([facility] [place] ) [address]  [place] [address]  [phone]   May 02, 2023  9:00 AM  (Arrive by 8:45 AM)  Return Visit with Sydni Buckley CNP  [facility] [place] ([facility] [place] ) [address]  [place] [address]  [phone]           Routing refill request to provider for review/approval because:  Drug not on the G, P or [facility] refill protocol or controlled substance

## 2023-02-16 ENCOUNTER — OFFICE VISIT (OUTPATIENT)
Dept: FAMILY MEDICINE | Facility: OTHER | Age: 63
End: 2023-02-16
Attending: PHYSICIAN ASSISTANT
Payer: COMMERCIAL

## 2023-02-16 VITALS
WEIGHT: 200 LBS | DIASTOLIC BLOOD PRESSURE: 70 MMHG | OXYGEN SATURATION: 98 % | SYSTOLIC BLOOD PRESSURE: 109 MMHG | TEMPERATURE: 97.7 F | RESPIRATION RATE: 18 BRPM | HEART RATE: 61 BPM | BODY MASS INDEX: 31.32 KG/M2

## 2023-02-16 DIAGNOSIS — Z01.818 PREOP GENERAL PHYSICAL EXAM: Primary | ICD-10-CM

## 2023-02-16 DIAGNOSIS — G47.00 PERSISTENT INSOMNIA: ICD-10-CM

## 2023-02-16 DIAGNOSIS — F41.9 ANXIETY: ICD-10-CM

## 2023-02-16 PROCEDURE — 93010 ELECTROCARDIOGRAM REPORT: CPT | Mod: 77 | Performed by: INTERNAL MEDICINE

## 2023-02-16 PROCEDURE — G0463 HOSPITAL OUTPT CLINIC VISIT: HCPCS | Mod: 25

## 2023-02-16 PROCEDURE — 93005 ELECTROCARDIOGRAM TRACING: CPT

## 2023-02-16 PROCEDURE — G0463 HOSPITAL OUTPT CLINIC VISIT: HCPCS

## 2023-02-16 PROCEDURE — 99214 OFFICE O/P EST MOD 30 MIN: CPT | Performed by: PHYSICIAN ASSISTANT

## 2023-02-16 RX ORDER — TRAZODONE HYDROCHLORIDE 50 MG/1
50 TABLET, FILM COATED ORAL AT BEDTIME
Qty: 30 TABLET | Refills: 4 | Status: SHIPPED | OUTPATIENT
Start: 2023-02-16 | End: 2023-07-27

## 2023-02-16 ASSESSMENT — PAIN SCALES - GENERAL: PAINLEVEL: MILD PAIN (2)

## 2023-02-16 NOTE — PROGRESS NOTES
[facility] [place]  [address]  [place] [address]  Phone: [phone]  Primary Provider: Concha Llanos  Pre-op Performing Provider: CONCHA LLANOS      PREOPERATIVE EVALUATION:  Today's date: 2/16/2023    Sharlene Mccord is a 62 year old female who presents for a preoperative evaluation.    Surgical Information:  Surgery/Procedure: Coronary Angiogram  Surgery Location:  Heart Cath Lab  Surgeon: Jose Juan Cardiologist  Surgery Date: 02/28/2023  Time of Surgery: TBD  Where patient plans to recover: At home with family  Fax number for surgical facility: [facility].     Type of Anesthesia Anticipated: to be determined    Assessment & Plan     The proposed surgical procedure is considered INTERMEDIATE risk.    Preop general physical exam  Optimized. Having heart procedure due to near Syncope and known valvular disease.  Hx of Microvascular disease.     Persistent insomnia  Working well.   - traZODone (DESYREL) 50 MG tablet; Take 1 tablet (50 mg) by mouth At Bedtime    Anxiety  Refill now.   - FLUoxetine (PROZAC) 20 MG capsule; Take 2 capsules (40 mg) by mouth daily      Possible Sleep Apnea: no/no test was done yet.  We were going to see if this was an issue.           Risks and Recommendations:  The patient has the following additional risks and recommendations for perioperative complications:  Cardiovascular:   - Cardiology consulted she is having the test for her heart. No other concerns noted today.     Medication Instructions:   - aspirin: was given information on her ASA use per the [facility]  policy.  She is taking 81 mg and will take 325 the day before procedure and day of procedure.     RECOMMENDATION:  APPROVAL GIVEN to proceed with proposed procedure, without further diagnostic evaluation.    Ordering of each unique test  Prescription drug management  15  minutes spent on the date of the encounter doing chart review, history and exam, documentation and further activities per the  note      Subjective     HPI related to upcoming procedure: she has been working with cardiology for years. Hx of Microvascular Disease. No has near syncope and chest pain that is worsening.  Pain in jaw and arm.  Found to have Valve Disease and it is worsening and her Stress test showed areas of defect on inferior heart.     Preop Questions 2/15/2023   1. Have you ever had a heart attack or stroke? No   2. Have you ever had surgery on your heart or blood vessels, such as a stent placement, a coronary artery bypass, or surgery on an artery in your head, neck, heart, or legs? No   3. Do you have chest pain with activity? YES - get winded and have to stop   4. Do you have a history of  heart failure? YES - aortic valve heart disease. Chronic diastalic heart failure, cardiac microvascular disease, diastolic heart failure   5. Do you currently have a cold, bronchitis or symptoms of other infection? No   6. Do you have a cough, shortness of breath, or wheezing? YES - cough, shortness of breath, sometimes wheezing   7. Do you or anyone in your family have previous history of blood clots? YES - family   8. Do you or does anyone in your family have a serious bleeding problem such as prolonged bleeding following surgeries or cuts? No   9. Have you ever had problems with anemia or been told to take iron pills? YES - pregnancy and after pregnancy   10. Have you had any abnormal blood loss such as black, tarry or bloody stools, or abnormal vaginal bleeding? No   11. Have you ever had a blood transfusion? No   12. Are you willing to have a blood transfusion if it is medically needed before, during, or after your surgery? Yes   13. Have you or any of your relatives ever had problems with anesthesia? YES - self, get sick   14. Do you have sleep apnea, excessive snoring or daytime drowsiness? YES - daytime drowsiness   14a. Do you have a CPAP machine? No   15. Do you have any artifical heart valves or other implanted medical  devices like a pacemaker, defibrillator, or continuous glucose monitor? No   16. Do you have artificial joints? No   17. Are you allergic to latex? No       Health Care Directive:  Patient does not have a Health Care Directive or Living Will: Patient states has Advance Directive and will bring in a copy to clinic.    Preoperative Review of :   reviewed - 40      Status of Chronic Conditions:  DEPRESSION - Patient has a long history of Depression of moderate severity requiring medication for control with recent symptoms being stable..Current symptoms of depression include depressed mood, anxiety.     HYPERLIPIDEMIA - Patient has a long history of significant Hyperlipidemia requiring medication for treatment with recent good control. Patient reports no problems or side effects with the medication.     HYPERTENSION - Patient has longstanding history of HTN , currently denies any symptoms referable to elevated blood pressure. Specifically denies chest pain, palpitations, dyspnea, orthopnea, PND or peripheral edema. Blood pressure readings have been in normal range. Current medication regimen is as listed below. Patient denies any side effects of medication.       Review of Systems  CONSTITUTIONAL: NEGATIVE for fever, chills, change in weight  INTEGUMENTARY/SKIN: NEGATIVE for worrisome rashes, moles or lesions  EYES: NEGATIVE for vision changes or irritation  ENT/MOUTH: NEGATIVE for ear, mouth and throat problems  RESP: NEGATIVE for significant cough or SOB  CV: NEGATIVE for chest pain, palpitations or peripheral edema  GI: NEGATIVE for nausea, abdominal pain, heartburn, or change in bowel habits  : NEGATIVE for frequency, dysuria, or hematuria  MUSCULOSKELETAL: NEGATIVE for significant arthralgias or myalgia  NEURO: NEGATIVE for weakness, dizziness or paresthesias  ENDOCRINE: NEGATIVE for temperature intolerance, skin/hair changes  HEME: NEGATIVE for bleeding problems  PSYCHIATRIC: NEGATIVE for changes in mood  or affect    Patient Active Problem List    Diagnosis Date Noted     Hypotension due to drugs 06/15/2022     Priority: Medium     Stage 3a chronic kidney disease (H) 06/13/2022     Priority: Medium     Persistent insomnia 06/13/2022     Priority: Medium     Chronic kidney disease, stage 1 11/28/2021     Priority: Medium     Diastolic heart failure, unspecified HF chronicity (H) 03/19/2021     Priority: Medium     Added automatically from request for surgery [id number]       Chest pain, unspecified type 03/19/2021     Priority: Medium     Added automatically from request for surgery [id number]       GUTIERREZ (dyspnea on exertion) 03/12/2021     Priority: Medium     Added automatically from request for surgery [id number]       Chronic diastolic heart failure (H) 03/12/2021     Priority: Medium     Added automatically from request for surgery [id number]       Obstruction of common bile duct 07/31/2020     Priority: Medium     Acute pancreatitis 07/30/2020     Priority: Medium     SHAKIR (generalized anxiety disorder) 10/02/2019     Priority: Medium     Patient is followed by  for ongoing prescription of benzodiazepines.  All refills should be approved by this provider, or covering partner.    Medication(s): Klonopin 1 mg.   Maximum quantity per month: 60  Clinic visit frequency required: Q 3 months     Controlled substance agreement on file: Yes       Date(s): 9.25.19  Benzodiazepine use reviewed by psychiatry:  Yes       Date(s): 9.25.19    Last [place] website verification:  done on 9.25.19  [email or web address]           ACP (advance care planning) 09/11/2017     Priority: Medium     Advance Care Planning 9/11/2017: ACP Review of Chart / Resources Provided:  Reviewed chart for advance care plan.  Sharlene Mccord has been provided information and resources to begin or update their advance care plan.  Added by Melina Armas        Advance Care Planning 7/11/2016: ACP Review of Chart / Resources Provided:  Reviewed  chart for advance care plan.  Sharlene Mccord has been provided information and resources to begin or update their advance care plan.  Added by Kortney Smalls             Mixed connective tissue disease (H) 07/05/2017     Priority: Medium     GERD (gastroesophageal reflux disease) 03/13/2017     Priority: Medium     Tremor 03/13/2017     Priority: Medium     Gastroesophageal reflux disease with esophagitis 02/09/2017     Priority: Medium     Migraine without aura and responsive to treatment 11/23/2015     Priority: Medium     Major depressive disorder, recurrent episode (H) 12/17/2014     Priority: Medium     Problem list name updated by automated process. Provider to review       Cognitive disorder 07/08/2014     Priority: Medium     Major depressive disorder 07/08/2014     Priority: Medium     Memory loss 07/02/2014     Priority: Medium     Constipation 04/28/2014     Priority: Medium     Chronic pain 03/12/2014     Priority: Medium     Aortic valve disorder 08/13/2013     Priority: Medium     Problem list name updated by automated process. Provider to review       Diverticulitis of sigmoid colon 05/11/2013     Priority: Medium     Diverticulitis 05/11/2013     Priority: Medium     Cardiac microvascular disease 04/10/2013     Priority: Medium     Based on Cardiac MRI done at         Hypothyroidism 03/01/2011     Priority: Medium     Problem list name updated by automated process. Provider to review       Anxiety 03/01/2011     Priority: Medium     Problem list name updated by automated process. Provider to review       Fatigue 07/01/2008     Priority: Medium     Atypical migraine 07/01/2008     Priority: Medium     Abnormal MRI of the head 07/01/2008     Priority: Medium     Overview:   Nonspecific WM changes small peripherally. Not c/w MS  IMO Update 10/11       Heart murmur 09/14/1999     Priority: Medium     Formatting of this note might be different from the original.  Previous hx of. [facility]         Past Medical History:   Diagnosis Date     Anxiety state, unspecified 03/01/2011     Aortic valve disorders 06/07/2000    Echocardiogram showin mild/moderate AI.  Normal LV function     Cardiac microvascular disease 4/10/2013    Based on Cardiac MRI done at       COPD (chronic obstructive pulmonary disease) (H) 7/8/2021     Depressive disorder 1997     Fatigue      Fatigue      Fibromyalgia 03/01/2011     Hypertension      Major depressive disorder, recurrent episode, unspecified 12/17/2014     Migraine, unspecified, without mention of intractable migraine without mention of status migrainosus 03/01/2011     Mitral valve disorders(424.0) 10/16/2007     Need for prophylactic hormone replacement therapy (postmenopausal)      PONV (postoperative nausea and vomiting)      Thyroid Nodule 03/01/2011     Tobacco use disorder 03/01/2011     Unspecified hypothyroidism 03/01/2011     Past Surgical History:   Procedure Laterality Date     APPENDECTOMY  1977     BIOPSY  2017    taken at [facility] from Dr. Nadira cohen. Thyroid     CARDIAC SURGERY  2013    angiogram [facility]:  Normal coronary arteries.  Felt ot have some microvascular disease     CL AFF SURGICAL PATHOLOGY  01/02/2007    Thyroid Mass     COLONOSCOPY  1/13/2014    Procedure: COLONOSCOPY;  COLONOSCOPY ;  Surgeon: Chasidy Gee DO;  Location: [facility]      RIGHT HEART CATH MEASUREMENTS RECORDED N/A 4/14/2021    Procedure: CV RIGHT HEART CATH;  Surgeon: Danna Clay MD;  [facility]     CV RIGHT HEART EXERCISE STRESS STUDY N/A 4/14/2021    Procedure: Right Heart Exercise Stress Study;  Surgeon: Danna Clay MD;  [facility]     ESOPHAGOSCOPY, GASTROSCOPY, DUODENOSCOPY (EGD), COMBINED N/A 2/9/2017    Procedure: COMBINED ESOPHAGOSCOPY, GASTROSCOPY, DUODENOSCOPY (EGD);  Surgeon: Johnathan Ruff DO;  Location: [facility] OR     GYN SURGERY      c-sections x 3     HYSTERECTOMY TOTAL ABDOMINAL, BILATERAL  SALPINGO-OOPHORECTOMY, COMBINED N/A 01/01/2001     LAPAROSCOPIC CHOLECYSTECTOMY  11/07/2003    Cholelithiasis     LAPAROTOMY EXPLORATORY      abdominal pain/fever     LARYNGOSCOPY       SPLENECTOMY       Current Outpatient Medications   Medication Sig Dispense Refill     acetaminophen (TYLENOL) 325 MG tablet Take 650 mg by mouth       calcium carbonate (TUMS) 500 MG chewable tablet Take 1 tablet (500 mg) by mouth 3 times daily 180 tablet 3     clonazePAM (KLONOPIN) 1 MG tablet TAKE 1/2 TO 1 (ONE-HALF TO ONE) TABLET BY MOUTH TWICE DAILY 60 tablet 0     diltiazem ER COATED BEADS (CARDIZEM CD/CARTIA XT) 120 MG 24 hr capsule Take 1 capsule (120 mg) by mouth daily for 90 days 90 capsule 3     esomeprazole (NEXIUM) 40 MG DR capsule TAKE 1 CAPSULE BY MOUTH ONCE DAILY IN THE MORNING BEFORE BREAKFAST--TAKE 30 TO 60 MINUTES BEFORE EATING 90 capsule 0     estradiol (ESTRACE) 0.5 MG tablet Take 1 tablet (0.5 mg) by mouth 2 times daily 60 tablet 11     FLUoxetine (PROZAC) 20 MG capsule Take 2 capsules by mouth once daily 60 capsule 10     furosemide (LASIX) 20 MG tablet Take 1 tablet (20 mg) by mouth daily for 90 days 90 tablet 0     levothyroxine (SYNTHROID/LEVOTHROID) 75 MCG tablet Take 1 tablet by mouth once daily 90 tablet 0     nitroGLYcerin (NITROSTAT) 0.4 MG sublingual tablet DISSOLVE ONE TABLET UNDER THE TONGUE EVERY 5 MINUTES AS NEEDED FOR CHEST PAIN. DO NOT EXCEED A TOTAL OF 3 DOSES IN 15 MINUTES 30 tablet 4     PROCTO-MED HC 2.5 % cream APPLY CREAM RECTALLY TO AFFECTED AREA(S) THREE TIMES DAILY AS DIRECTED 56 g 0     spironolactone (ALDACTONE) 25 MG tablet Take 1 tablet (25 mg) by mouth daily 30 tablet 11     traZODone (DESYREL) 50 MG tablet TAKE 1 TABLET BY MOUTH AT BEDTIME 30 tablet 0     FLUoxetine (PROZAC) 20 MG capsule Take 3 capsules (60 mg) by mouth daily (Patient not taking: Reported on 1/4/2023) 90 capsule 3       Allergies   Allergen Reactions     Codeine      Isosorbide Mononitrate Other (See Comments)      Vomiting and headache       Lisinopril      [facility] scan     Paxil [Paroxetine] Headache        Social History     Tobacco Use     Smoking status: Every Day     Packs/day: 0.25     Years: 40.00     Pack years: 10.00     Types: Cigarettes     Start date: 1975     Last attempt to quit: 2022     Years since quittin.4     Smokeless tobacco: Never   Substance Use Topics     Alcohol use: No     Family History   Problem Relation Age of Onset     C.A.D. Father      Hypertension Father      C.A.D. Mother      Cerebrovascular Disease Mother         CVA     Hypertension Mother      Coronary Artery Disease Mother      Diabetes Paternal Grandmother      Diabetes Paternal Grandfather      Diabetes Sister      Breast Cancer Sister      Diabetes Maternal Grandmother      Diabetes Sister      Breast Cancer Sister      Hypertension Sister      Depression Sister      Anxiety Disorder Sister      Obesity Sister      Depression Sister      Breast Cancer Sister      Obesity Sister      Depression Brother      Depression Daughter      Obesity Daughter      Depression Daughter      Mental Illness Daughter         [email or web address]     Anxiety Disorder Daughter      Mental Illness Daughter      Anxiety Disorder Other      Osteoporosis Other      Thyroid Disease Other      Diabetes Nephew      History   Drug Use No         Objective     /70 (BP Location: Left arm, Patient Position: Sitting, Cuff Size: Adult Large)   Pulse 61   Temp 97.7  F (36.5  C) (Tympanic)   Resp 18   Wt 90.7 kg (200 lb)   SpO2 98%   BMI 31.32 kg/m      Physical Exam    GENERAL APPEARANCE: healthy, alert and no distress     EYES: EOMI, PERRL     HENT: ear canals and TM's normal and nose and mouth without ulcers or lesions     NECK: no adenopathy, no asymmetry, masses, or scars and thyroid normal to palpation     RESP: lungs clear to auscultation - no rales, rhonchi or wheezes     CV: regular rates and rhythm, normal S1 S2, no S3 or S4 and no  murmur, click or rub     ABDOMEN:  soft, nontender, no HSM or masses and bowel sounds normal     MS: extremities normal- no gross deformities noted, no evidence of inflammation in joints, FROM in all extremities.     SKIN: no suspicious lesions or rashes     NEURO: Normal strength and tone, sensory exam grossly normal, mentation intact and speech normal     PSYCH: mentation appears normal. and affect normal/bright     LYMPHATICS: No cervical adenopathy    Recent Labs   Lab Test 12/14/22  1213 09/14/22  1140 07/13/22  0849 05/26/22  1047 10/12/21  1253 06/10/21  1308   HGB  --   --   --  13.8  --  13.9   PLT  --   --   --  234  --  198    135   < > 135   < >  --    POTASSIUM 4.8 4.7   < > 4.7   < >  --    CR 0.97* 1.15*   < > 1.22*   < >  --    A1C  --   --   --  5.5  --   --     < > = values in this interval not displayed.        Diagnostics:  Labs will be done the morning of surgery.    EKG: appears normal, NSR, normal axis, normal intervals, no acute ST/T changes c/w ischemia, no LVH by voltage criteria, unchanged from previous tracings    Revised Cardiac Risk Index (RCRI):  The patient has the following serious cardiovascular risks for perioperative complications:   - Coronary Artery Disease (MI, positive stress test, angina, Qs on EKG) = 1 point     RCRI Interpretation: 1 point: Class II (low risk - 0.9% complication rate)           Signed Electronically by: KEILY Cobos  Copy of this evaluation report is provided to requesting physician.

## 2023-02-20 ENCOUNTER — MYC REFILL (OUTPATIENT)
Dept: PSYCHIATRY | Facility: OTHER | Age: 63
End: 2023-02-20

## 2023-02-20 DIAGNOSIS — F41.1 GENERALIZED ANXIETY DISORDER: ICD-10-CM

## 2023-02-20 RX ORDER — CLONAZEPAM 1 MG/1
TABLET ORAL
Qty: 60 TABLET | Refills: 0 | Status: SHIPPED | OUTPATIENT
Start: 2023-02-20 | End: 2023-03-15

## 2023-02-23 NOTE — TELEPHONE ENCOUNTER
clonazePAM (KLONOPIN) 1 MG tablet   Last Written Prescription Date:  2-20-23  Last Fill Quantity: 60,   # refills: 0  Last Office Visit: 2-16-23  Future Office visit:    Next 5 appointments (look out 90 days)    Mar 15, 2023  8:45 AM  (Arrive by 8:30 AM)  SHORT with KEILY Fonseca  [facility] ([facility] [place] ) [address]  [place] [address]  [phone]   May 03, 2023  8:30 AM  (Arrive by 8:15 AM)  Return Visit with Sydni Buckley CNP  [facility] ([facility] [place] ) [address] [place] [address]  [phone]           Routing refill request to provider for review/approval because:  Drug not on the [facility], [facility] or [facility] refill protocol or controlled substance

## 2023-02-24 RX ORDER — CLONAZEPAM 1 MG/1
TABLET ORAL
Qty: 60 TABLET | Refills: 0 | OUTPATIENT
Start: 2023-02-24

## 2023-02-28 ENCOUNTER — APPOINTMENT (OUTPATIENT)
Dept: MEDSURG UNIT | Facility: CLINIC | Age: 63
End: 2023-02-28
Attending: INTERNAL MEDICINE
Payer: MEDICARE

## 2023-02-28 ENCOUNTER — HOSPITAL ENCOUNTER (OUTPATIENT)
Facility: CLINIC | Age: 63
Discharge: HOME | End: 2023-02-28
Attending: INTERNAL MEDICINE | Admitting: INTERNAL MEDICINE
Payer: MEDICARE

## 2023-02-28 ENCOUNTER — LAB (OUTPATIENT)
Dept: LAB | Facility: CLINIC | Age: 63
End: 2023-02-28
Attending: INTERNAL MEDICINE
Payer: MEDICARE

## 2023-02-28 VITALS
HEIGHT: 67 IN | DIASTOLIC BLOOD PRESSURE: 63 MMHG | BODY MASS INDEX: 31.25 KG/M2 | SYSTOLIC BLOOD PRESSURE: 122 MMHG | WEIGHT: 199.08 LBS | TEMPERATURE: 98.5 F | RESPIRATION RATE: 16 BRPM | OXYGEN SATURATION: 100 % | HEART RATE: 51 BPM

## 2023-02-28 DIAGNOSIS — E78.2 MIXED HYPERLIPIDEMIA: ICD-10-CM

## 2023-02-28 DIAGNOSIS — I25.85 CARDIAC MICROVASCULAR DISEASE: ICD-10-CM

## 2023-02-28 DIAGNOSIS — R07.89 ATYPICAL CHEST PAIN: ICD-10-CM

## 2023-02-28 DIAGNOSIS — R06.09 DYSPNEA ON EXERTION: ICD-10-CM

## 2023-02-28 DIAGNOSIS — R06.09 DOE (DYSPNEA ON EXERTION): Primary | ICD-10-CM

## 2023-02-28 DIAGNOSIS — R94.39 ABNORMAL CARDIOVASCULAR STRESS TEST: ICD-10-CM

## 2023-02-28 PROBLEM — Z98.890 STATUS POST CORONARY ANGIOGRAM: Status: ACTIVE | Noted: 2023-02-28

## 2023-02-28 LAB
ANION GAP SERPL CALCULATED.3IONS-SCNC: 10 MMOL/L (ref 7–15)
BUN SERPL-MCNC: 22.9 MG/DL (ref 8–23)
CALCIUM SERPL-MCNC: 9.2 MG/DL (ref 8.8–10.2)
CHLORIDE SERPL-SCNC: 105 MMOL/L (ref 98–107)
CREAT SERPL-MCNC: 1.2 MG/DL (ref 0.51–0.95)
DEPRECATED HCO3 PLAS-SCNC: 25 MMOL/L (ref 22–29)
ERYTHROCYTE [DISTWIDTH] IN BLOOD BY AUTOMATED COUNT: 12.6 % (ref 10–15)
GFR SERPL CREATININE-BSD FRML MDRD: 51 ML/MIN/1.73M2
GLUCOSE SERPL-MCNC: 111 MG/DL (ref 70–99)
HCT VFR BLD AUTO: 42.7 % (ref 35–47)
HGB BLD-MCNC: 13.8 G/DL (ref 11.7–15.7)
INR PPP: 0.98 (ref 0.85–1.15)
MCH RBC QN AUTO: 29.7 PG (ref 26.5–33)
MCHC RBC AUTO-ENTMCNC: 32.3 G/DL (ref 31.5–36.5)
MCV RBC AUTO: 92 FL (ref 78–100)
PLATELET # BLD AUTO: 212 10E3/UL (ref 150–450)
POTASSIUM SERPL-SCNC: 4.8 MMOL/L (ref 3.4–5.3)
RBC # BLD AUTO: 4.64 10E6/UL (ref 3.8–5.2)
SODIUM SERPL-SCNC: 140 MMOL/L (ref 136–145)
WBC # BLD AUTO: 8.5 10E3/UL (ref 4–11)

## 2023-02-28 PROCEDURE — 258N000003 HC RX IP 258 OP 636: Performed by: NURSE PRACTITIONER

## 2023-02-28 PROCEDURE — 999N000142 HC STATISTIC PROCEDURE PREP ONLY

## 2023-02-28 PROCEDURE — 272N000001 HC OR GENERAL SUPPLY STERILE: Performed by: INTERNAL MEDICINE

## 2023-02-28 PROCEDURE — 99152 MOD SED SAME PHYS/QHP 5/>YRS: CPT | Performed by: INTERNAL MEDICINE

## 2023-02-28 PROCEDURE — 250N000011 HC RX IP 250 OP 636: Performed by: INTERNAL MEDICINE

## 2023-02-28 PROCEDURE — 36415 COLL VENOUS BLD VENIPUNCTURE: CPT | Performed by: NURSE PRACTITIONER

## 2023-02-28 PROCEDURE — 93005 ELECTROCARDIOGRAM TRACING: CPT

## 2023-02-28 PROCEDURE — C1887 CATHETER, GUIDING: HCPCS | Performed by: INTERNAL MEDICINE

## 2023-02-28 PROCEDURE — 999N000054 HC STATISTIC EKG NON-CHARGEABLE

## 2023-02-28 PROCEDURE — 85014 HEMATOCRIT: CPT | Performed by: NURSE PRACTITIONER

## 2023-02-28 PROCEDURE — 93010 ELECTROCARDIOGRAM REPORT: CPT | Performed by: INTERNAL MEDICINE

## 2023-02-28 PROCEDURE — 99153 MOD SED SAME PHYS/QHP EA: CPT | Performed by: INTERNAL MEDICINE

## 2023-02-28 PROCEDURE — 85610 PROTHROMBIN TIME: CPT | Performed by: NURSE PRACTITIONER

## 2023-02-28 PROCEDURE — 999N000132 HC STATISTIC PP CARE STAGE 1

## 2023-02-28 PROCEDURE — 99152 MOD SED SAME PHYS/QHP 5/>YRS: CPT | Mod: GC | Performed by: INTERNAL MEDICINE

## 2023-02-28 PROCEDURE — 93458 L HRT ARTERY/VENTRICLE ANGIO: CPT | Mod: 26 | Performed by: INTERNAL MEDICINE

## 2023-02-28 PROCEDURE — 82310 ASSAY OF CALCIUM: CPT | Performed by: NURSE PRACTITIONER

## 2023-02-28 PROCEDURE — C1894 INTRO/SHEATH, NON-LASER: HCPCS | Performed by: INTERNAL MEDICINE

## 2023-02-28 PROCEDURE — 93458 L HRT ARTERY/VENTRICLE ANGIO: CPT | Performed by: INTERNAL MEDICINE

## 2023-02-28 PROCEDURE — 250N000009 HC RX 250: Performed by: INTERNAL MEDICINE

## 2023-02-28 RX ORDER — NALOXONE HYDROCHLORIDE 0.4 MG/ML
0.2 INJECTION, SOLUTION INTRAMUSCULAR; INTRAVENOUS; SUBCUTANEOUS
Status: DISCONTINUED | OUTPATIENT
Start: 2023-02-28 | End: 2023-02-28 | Stop reason: HOSPADM

## 2023-02-28 RX ORDER — POTASSIUM CHLORIDE 750 MG/1
20 TABLET, EXTENDED RELEASE ORAL
Status: DISCONTINUED | OUTPATIENT
Start: 2023-02-28 | End: 2023-02-28 | Stop reason: HOSPADM

## 2023-02-28 RX ORDER — ONDANSETRON 2 MG/ML
INJECTION INTRAMUSCULAR; INTRAVENOUS
Status: DISCONTINUED | OUTPATIENT
Start: 2023-02-28 | End: 2023-02-28 | Stop reason: HOSPADM

## 2023-02-28 RX ORDER — NICARDIPINE HYDROCHLORIDE 2.5 MG/ML
INJECTION INTRAVENOUS
Status: DISCONTINUED | OUTPATIENT
Start: 2023-02-28 | End: 2023-02-28 | Stop reason: HOSPADM

## 2023-02-28 RX ORDER — OXYCODONE HYDROCHLORIDE 10 MG/1
10 TABLET ORAL EVERY 4 HOURS PRN
Status: DISCONTINUED | OUTPATIENT
Start: 2023-02-28 | End: 2023-02-28 | Stop reason: HOSPADM

## 2023-02-28 RX ORDER — FENTANYL CITRATE 50 UG/ML
INJECTION, SOLUTION INTRAMUSCULAR; INTRAVENOUS
Status: DISCONTINUED | OUTPATIENT
Start: 2023-02-28 | End: 2023-02-28 | Stop reason: HOSPADM

## 2023-02-28 RX ORDER — NALOXONE HYDROCHLORIDE 0.4 MG/ML
0.4 INJECTION, SOLUTION INTRAMUSCULAR; INTRAVENOUS; SUBCUTANEOUS
Status: DISCONTINUED | OUTPATIENT
Start: 2023-02-28 | End: 2023-02-28 | Stop reason: HOSPADM

## 2023-02-28 RX ORDER — SODIUM CHLORIDE 9 MG/ML
INJECTION, SOLUTION INTRAVENOUS CONTINUOUS
Status: DISCONTINUED | OUTPATIENT
Start: 2023-02-28 | End: 2023-02-28 | Stop reason: HOSPADM

## 2023-02-28 RX ORDER — IOPAMIDOL 755 MG/ML
INJECTION, SOLUTION INTRAVASCULAR
Status: DISCONTINUED | OUTPATIENT
Start: 2023-02-28 | End: 2023-02-28 | Stop reason: HOSPADM

## 2023-02-28 RX ORDER — OXYCODONE HYDROCHLORIDE 5 MG/1
5 TABLET ORAL EVERY 4 HOURS PRN
Status: DISCONTINUED | OUTPATIENT
Start: 2023-02-28 | End: 2023-02-28 | Stop reason: HOSPADM

## 2023-02-28 RX ORDER — HEPARIN SODIUM 1000 [USP'U]/ML
INJECTION, SOLUTION INTRAVENOUS; SUBCUTANEOUS
Status: DISCONTINUED | OUTPATIENT
Start: 2023-02-28 | End: 2023-02-28 | Stop reason: HOSPADM

## 2023-02-28 RX ORDER — ATROPINE SULFATE 0.1 MG/ML
0.5 INJECTION INTRAVENOUS
Status: DISCONTINUED | OUTPATIENT
Start: 2023-02-28 | End: 2023-02-28 | Stop reason: HOSPADM

## 2023-02-28 RX ORDER — FENTANYL CITRATE 50 UG/ML
25 INJECTION, SOLUTION INTRAMUSCULAR; INTRAVENOUS
Status: DISCONTINUED | OUTPATIENT
Start: 2023-02-28 | End: 2023-02-28 | Stop reason: HOSPADM

## 2023-02-28 RX ORDER — FLUMAZENIL 0.1 MG/ML
0.2 INJECTION, SOLUTION INTRAVENOUS
Status: DISCONTINUED | OUTPATIENT
Start: 2023-02-28 | End: 2023-02-28 | Stop reason: HOSPADM

## 2023-02-28 RX ORDER — LIDOCAINE 40 MG/G
CREAM TOPICAL
Status: DISCONTINUED | OUTPATIENT
Start: 2023-02-28 | End: 2023-02-28 | Stop reason: HOSPADM

## 2023-02-28 RX ORDER — POTASSIUM CHLORIDE 750 MG/1
40 TABLET, EXTENDED RELEASE ORAL
Status: DISCONTINUED | OUTPATIENT
Start: 2023-02-28 | End: 2023-02-28 | Stop reason: HOSPADM

## 2023-02-28 RX ORDER — NITROGLYCERIN 5 MG/ML
VIAL (ML) INTRAVENOUS
Status: DISCONTINUED | OUTPATIENT
Start: 2023-02-28 | End: 2023-02-28 | Stop reason: HOSPADM

## 2023-02-28 RX ADMIN — SODIUM CHLORIDE: 9 INJECTION, SOLUTION INTRAVENOUS at 07:55

## 2023-02-28 ASSESSMENT — ACTIVITIES OF DAILY LIVING (ADL)
ADLS_ACUITY_SCORE: 37

## 2023-02-28 NOTE — PRE-PROCEDURE
GENERAL PRE-PROCEDURE:   Procedure:  Coronary angiogram with potential intervention  Date/Time:  2/28/2023 8:32 AM    Verbal consent obtained?: Yes    Written consent obtained?: Yes    Risks and benefits: Risks, benefits and alternatives were discussed    Consent given by:  Patient  Patient states understanding of procedure being performed: Yes    Patient's understanding of procedure matches consent: Yes    Procedure consent matches procedure scheduled: Yes    Expected level of sedation:  Moderate  Appropriately NPO:  Yes  ASA Class:  3  Mallampati  :  Grade 3- soft palate visible, posterior pharyngeal wall not visible  Lungs:  Lungs clear with good breath sounds bilaterally  Heart:  Normal heart sounds and rate  History & Physical reviewed:  History and physical reviewed and no updates needed  Statement of review:  I have reviewed the lab findings, diagnostic data, medications, and the plan for sedation

## 2023-02-28 NOTE — DISCHARGE INSTRUCTIONS
Going Home after a Coronary Angiogram    After you go home:  Have an adult stay with you for 24 hours.  Drink plenty of fluids.  You may eat your normal diet, unless your doctor tells you otherwise.  For 24 hours:  - Relax and take it easy.  - Do NOT smoke.  - Do NOT make any important or legal decisions.  - Do NOT drive or operate machines at home or at work.  - Do NOT drink alcohol.    Remove the Band-Aid after 24 hours. If there is minor oozing, apply another Band-aid and remove it after 12 hours.  For 2 days, do NOT have sex or do any heavy exercise.  Do NOT take a bath, or use a hot tub or pool for at least 3 days. You may shower.    Care of wrist or arm site  It is normal to have soreness at the puncture site and mild tingling in your hand for up to 3 days.  For 2 days, do not use your hand or arm to support your weight (such as rising from a chair) or bend your wrist (such as lifting a garage door).  For 2 days, do not lift more than 5 pounds or exercise your arm (tennis, golf or bowling).      If you start bleeding from the site in your arm:  Sit down and press firmly on the site with your fingers for 10 minutes. Call your doctor as soon as you can.  If the bleeding stops, sit still and keep your wrist straight for 2 hours.  Medicines  No changes to your current medications.  Call your doctor if:  You have a large or growing hard lump around the site.    The site is red, swollen, hot or tender.  Blood or fluid is draining from the site.  You have chills or a fever greater than 101 F (38 C).  Your leg or arm turns bluish, feels numb or cool.  You have hives, a rash or unusual itching.  Call 911 right away if you have:   Bleeding that does not stop.   Heavy bleeding.  [facility] [place]:  [phone] (7 days a week)

## 2023-02-28 NOTE — PROGRESS NOTES
Pre procedure complete. Awaiting consent. VSS, pt denies pain. Pt took 325 mg of Aspirin at home this morning. Bang groin prepped. Pedal pulses marked. [facility] () will transport pt home post procedure.

## 2023-02-28 NOTE — PROGRESS NOTES
D/I/A: Pt roomed on 3C in bay 31.  Arrived via litter and accompanied by CCL RN and  [facility] On/Off: Off monitor.  VSSA.  Rhythm upon arrival SR on monitor.  Denies pain or sob.  Reviewed activity restrictions and when to notify RN, ie-changes to breathing or increased chest pressure or chest pain.  CCL access:  Right radial-TR band in place with 12 ml of air, start deflation at 1115.  P: Continue to monitor status.  Discharge to home once meeting criteria.

## 2023-02-28 NOTE — PROGRESS NOTES
"/63   Pulse 51   Temp 98.5  F (36.9  C) (Oral)   Resp 16   Ht 1.702 m (5' 7\")   Wt 90.3 kg (199 lb 1.2 oz)   SpO2 100%   BMI 31.18 kg/m    Condition is stable s/p CORS. Vital Signs are stable/WNL. Right radial site clean, dry and intact, cms intact.  Discharge instructions reviewed with patient and questions answered. Patient verbalizes understanding. IV removed. Pain under control. Patient is ambulating and voiding spontaneously. Patient is tolerating regular diet and denies any N/V. Patient to be discharged to home via  [facility]. Patient has all belongings.    "

## 2023-03-01 ENCOUNTER — TELEPHONE (OUTPATIENT)
Dept: CARDIOLOGY | Facility: OTHER | Age: 63
End: 2023-03-01
Payer: COMMERCIAL

## 2023-03-01 LAB
ATRIAL RATE - MUSE: 52 BPM
DIASTOLIC BLOOD PRESSURE - MUSE: NORMAL MMHG
INTERPRETATION ECG - MUSE: NORMAL
P AXIS - MUSE: 71 DEGREES
PR INTERVAL - MUSE: 200 MS
QRS DURATION - MUSE: 98 MS
QT - MUSE: 450 MS
QTC - MUSE: 418 MS
R AXIS - MUSE: 16 DEGREES
SYSTOLIC BLOOD PRESSURE - MUSE: NORMAL MMHG
T AXIS - MUSE: 44 DEGREES
VENTRICULAR RATE- MUSE: 52 BPM

## 2023-03-01 NOTE — TELEPHONE ENCOUNTER
----- Message from Sheree Serna RN sent at 3/1/2023  8:14 AM CST -----  Regarding: angiogram 2/28/23  Hi,    suspected CADnew onset angina <= 2 monthsabnormal stress test  1. Normal Coronary Arteries.  2. Aortagram was done which demonstrated mild-moderate aortic regurgitation consistent with previous echocardiogram; LVEDP was 16 mmHg.  3. No cardiac pathology to explain patient's symptoms.    Right radial used for access    No new meds at discharge.  Juju

## 2023-03-15 ENCOUNTER — OFFICE VISIT (OUTPATIENT)
Dept: FAMILY MEDICINE | Facility: OTHER | Age: 63
End: 2023-03-15
Attending: PHYSICIAN ASSISTANT
Payer: COMMERCIAL

## 2023-03-15 VITALS
OXYGEN SATURATION: 98 % | WEIGHT: 201 LBS | RESPIRATION RATE: 16 BRPM | TEMPERATURE: 97.8 F | BODY MASS INDEX: 31.48 KG/M2 | HEART RATE: 63 BPM | SYSTOLIC BLOOD PRESSURE: 98 MMHG | DIASTOLIC BLOOD PRESSURE: 55 MMHG

## 2023-03-15 DIAGNOSIS — Z12.31 VISIT FOR SCREENING MAMMOGRAM: ICD-10-CM

## 2023-03-15 DIAGNOSIS — R73.09 ELEVATED GLUCOSE: ICD-10-CM

## 2023-03-15 DIAGNOSIS — F41.1 GENERALIZED ANXIETY DISORDER: ICD-10-CM

## 2023-03-15 DIAGNOSIS — I95.2 HYPOTENSION DUE TO DRUGS: ICD-10-CM

## 2023-03-15 DIAGNOSIS — Z98.890 STATUS POST CORONARY ANGIOGRAM: Primary | ICD-10-CM

## 2023-03-15 DIAGNOSIS — F17.200 TOBACCO USE DISORDER: ICD-10-CM

## 2023-03-15 DIAGNOSIS — N18.31 CHRONIC RENAL IMPAIRMENT, STAGE 3A (H): ICD-10-CM

## 2023-03-15 DIAGNOSIS — Z87.891 PERSONAL HISTORY OF NICOTINE DEPENDENCE: ICD-10-CM

## 2023-03-15 LAB
ANION GAP SERPL CALCULATED.3IONS-SCNC: 12 MMOL/L (ref 7–15)
BUN SERPL-MCNC: 18.9 MG/DL (ref 8–23)
CALCIUM SERPL-MCNC: 9.7 MG/DL (ref 8.8–10.2)
CHLORIDE SERPL-SCNC: 101 MMOL/L (ref 98–107)
CREAT SERPL-MCNC: 1.11 MG/DL (ref 0.51–0.95)
DEPRECATED HCO3 PLAS-SCNC: 24 MMOL/L (ref 22–29)
EST. AVERAGE GLUCOSE BLD GHB EST-MCNC: 114 MG/DL
GFR SERPL CREATININE-BSD FRML MDRD: 56 ML/MIN/1.73M2
GLUCOSE SERPL-MCNC: 101 MG/DL (ref 70–99)
HBA1C MFR BLD: 5.6 %
POTASSIUM SERPL-SCNC: 4.6 MMOL/L (ref 3.4–5.3)
SODIUM SERPL-SCNC: 137 MMOL/L (ref 136–145)

## 2023-03-15 PROCEDURE — 36415 COLL VENOUS BLD VENIPUNCTURE: CPT | Mod: ZL | Performed by: PHYSICIAN ASSISTANT

## 2023-03-15 PROCEDURE — 99496 TRANSJ CARE MGMT HIGH F2F 7D: CPT | Performed by: PHYSICIAN ASSISTANT

## 2023-03-15 PROCEDURE — 82310 ASSAY OF CALCIUM: CPT | Mod: ZL | Performed by: PHYSICIAN ASSISTANT

## 2023-03-15 PROCEDURE — 83036 HEMOGLOBIN GLYCOSYLATED A1C: CPT | Mod: ZL | Performed by: PHYSICIAN ASSISTANT

## 2023-03-15 PROCEDURE — G0463 HOSPITAL OUTPT CLINIC VISIT: HCPCS

## 2023-03-15 PROCEDURE — 99214 OFFICE O/P EST MOD 30 MIN: CPT | Performed by: PHYSICIAN ASSISTANT

## 2023-03-15 RX ORDER — CLONAZEPAM 1 MG/1
TABLET ORAL
Qty: 60 TABLET | Refills: 0 | Status: SHIPPED | OUTPATIENT
Start: 2023-03-15 | End: 2023-03-24

## 2023-03-15 RX ORDER — FLUOXETINE 40 MG/1
40 CAPSULE ORAL DAILY
Qty: 90 CAPSULE | Refills: 1 | Status: SHIPPED | OUTPATIENT
Start: 2023-03-15 | End: 2023-06-07

## 2023-03-15 ASSESSMENT — ANXIETY QUESTIONNAIRES
6. BECOMING EASILY ANNOYED OR IRRITABLE: SEVERAL DAYS
IF YOU CHECKED OFF ANY PROBLEMS ON THIS QUESTIONNAIRE, HOW DIFFICULT HAVE THESE PROBLEMS MADE IT FOR YOU TO DO YOUR WORK, TAKE CARE OF THINGS AT HOME, OR GET ALONG WITH OTHER PEOPLE: SOMEWHAT DIFFICULT
4. TROUBLE RELAXING: SEVERAL DAYS
GAD7 TOTAL SCORE: 14
1. FEELING NERVOUS, ANXIOUS, OR ON EDGE: NEARLY EVERY DAY
7. FEELING AFRAID AS IF SOMETHING AWFUL MIGHT HAPPEN: MORE THAN HALF THE DAYS
3. WORRYING TOO MUCH ABOUT DIFFERENT THINGS: NEARLY EVERY DAY
2. NOT BEING ABLE TO STOP OR CONTROL WORRYING: NEARLY EVERY DAY
5. BEING SO RESTLESS THAT IT IS HARD TO SIT STILL: SEVERAL DAYS
GAD7 TOTAL SCORE: 14

## 2023-03-15 ASSESSMENT — PAIN SCALES - GENERAL: PAINLEVEL: MODERATE PAIN (4)

## 2023-03-15 ASSESSMENT — PATIENT HEALTH QUESTIONNAIRE - PHQ9: SUM OF ALL RESPONSES TO PHQ QUESTIONS 1-9: 16

## 2023-03-15 NOTE — PROGRESS NOTES
Assessment & Plan     Status post coronary angiogram  All Coronary Arteries are clear.  She does have some issues with Aortic Valve and will continue follow up with the cardiology department.  She is always having pain. There doesn't appear to be a cardiac cause of her pain from the angiogram.  She has significant tightness in her muscles of her neck.     Generalized anxiety disorder  She has been having some issues lately with anxiety.  Just lost her sister-in-law.  Has had a lot of stress with her nephew and her daughter.  The angiogram did not give her any answers that also she feels is stressful, she has been using clonazepam 1/2 to 1 tablet twice a day for the last 3 months and it has not seem to really given her any benefit.  I have recommended that she seek counseling to lessen her anxiety.  I think some of the chest pain is musculoskeletal that she has been having is she is extremely tight through the upper shoulder  and that is where she does hold her stress.  We will continue to follow with cardiology.  - clonazePAM (KLONOPIN) 1 MG tablet; TAKE 1/2 TO 1 (ONE-HALF TO ONE) TABLET BY MOUTH TWICE DAILY Strength: 1 mg  - FLUoxetine (PROZAC) 40 MG capsule; Take 1 capsule (40 mg) by mouth daily    Hypotension due to drugs  Sharlene also has some difficulty with dizziness and feeling extremely exhausted.  Systolically she has trouble reaching the 100.  She states that she probably does not drink as much water she should appetite is only been fair her weight fluctuates up and down fairly easily.    Visit for screening mammogram  She is on hormone therapy and she is needing mammogram.  - MA Screen Bilateral w/David; Future    Tobacco use disorder  We have recommended that she have a chest CT due to her tach tobacco use history this would be a follow-up.  - CT Chest Lung Cancer Scrn Low Dose wo; Future    Personal history of nicotine dependence  As above  - CT Chest Lung Cancer Scrn Low Dose wo; Future    Review of  "external notes as documented elsewhere in note  Ordering of each unique test  Prescription drug management  15 minutes spent on the date of the encounter doing chart review, history and exam, documentation and further activities per the note       BMI:   Estimated body mass index is 31.48 kg/m  as calculated from the following:    Height as of 2/28/23: 1.702 m (5' 7\").    Weight as of this encounter: 91.2 kg (201 lb).   Weight management plan: Discussed healthy diet and exercise guidelines    Work on weight loss  Regular exercise    No follow-ups on file.    KEILY Cobos  [facility] - ENA Su is a 62 year old, presenting for the following health issues:  Follow Up      HPI     Hypertension Follow-up      Do you check your blood pressure regularly outside of the clinic? No     Are you following a low salt diet? No    Are your blood pressures ever more than 140 on the top number (systolic) OR more   than 90 on the bottom number (diastolic), for example 140/90? No    Heart Failure Follow-up  Are you experiencing any shortness of breath? Yes, at night or when lying flat  How would you describe your shortness of breath?  Same as usual        Are you experiencing any swelling in your legs or feet?  Stable    Are you using more pillows than usual? No    Do you cough at night?  Yes    Do you check your weight daily?  Yes    Have you had a weight change recently?  No    Are you having any of the following side effects from your medications? (Select all that apply)  The patient does not report symptoms of dizziness, fatigue, cough, swelling, or slow heart beat.    Since your last visit, how many times have you gone to the cardiologist, urgent care, emergency room, or hospital because of your heart failure?   1 time angiogram    Depression and Anxiety Follow-Up    How are you doing with your depression since your last visit? Worsened slightly just lost a good friend    How are you " doing with your anxiety since your last visit?  No change    Are you having other symptoms that might be associated with depression or anxiety? No    Have you had a significant life event? Grief or Loss     Do you have any concerns with your use of alcohol or other drugs? No    Social History     Tobacco Use     Smoking status: Every Day     Packs/day: 0.25     Years: 40.00     Pack years: 10.00     Types: Cigarettes     Start date: 1975     Last attempt to quit: 2022     Years since quittin.4     Smokeless tobacco: Never   Vaping Use     Vaping Use: Never used   Substance Use Topics     Alcohol use: No     Drug use: No     PHQ 2022 2023 3/15/2023   PHQ-9 Total Score 19 15 16   Q9: Thoughts of better off dead/self-harm past 2 weeks Not at all Not at all Not at all     SHAKIR-7 SCORE 2022 2023 3/15/2023   Total Score - 15 (severe anxiety) -   Total Score 16 15 14     Last PHQ-9 3/15/2023   1.  Little interest or pleasure in doing things 2   2.  Feeling down, depressed, or hopeless 2   3.  Trouble falling or staying asleep, or sleeping too much 1   4.  Feeling tired or having little energy 2   5.  Poor appetite or overeating 3   6.  Feeling bad about yourself 2   7.  Trouble concentrating 2   8.  Moving slowly or restless 2   Q9: Thoughts of better off dead/self-harm past 2 weeks 0   PHQ-9 Total Score 16   Difficulty at work, home, or with people Somewhat difficult     SHAKIR-7  3/15/2023   1. Feeling nervous, anxious, or on edge 3   2. Not being able to stop or control worrying 3   3. Worrying too much about different things 3   4. Trouble relaxing 1   5. Being so restless that it is hard to sit still 1   6. Becoming easily annoyed or irritable 1   7. Feeling afraid, as if something awful might happen 2   SHAKIR-7 Total Score 14   If you checked any problems, how difficult have they made it for you to do your work, take care of things at home, or get along with other people? Somewhat  difficult       Suicide Assessment Five-step Evaluation and Treatment (SAFE-T)    Chronic Kidney Disease Follow-up  Her kidney functions have been slowly worsening.  Most of her last labs were done fasting.  Rechecking these today.  Avoids all NSAIDs.    Do you take any over the counter pain medicine?: Yes  What over the counter medicine are you taking for your pain?:  Arthritis strength tylenol 625 mg      How often do you take this medicine?:  once a week    Migraine     Since your last clinic visit, how have your headaches changed?  Worsened    How often are you getting headaches or migraines? 1-2 times a week     Are you able to do normal daily activities when you have a migraine? No    Are you taking rescue/relief medications? (Select all that apply) sumatriptan (Imitrex) taking old prescription    How helpful is your rescue/relief medication?  I get total relief    Are you taking any medications to prevent migraines? (Select all that apply)  No    In the past 4 weeks, how often have you gone to urgent care or the emergency room because of your headaches?  0    Hypothyroidism Follow-up      Since last visit, patient describes the following symptoms: Weight stable, no hair loss, no skin changes, no constipation, no loose stools, dry skin, constipation, loose stools, tremors, anxiety, depression and fatigue    Pain History:  When did you first notice your pain? - Chronic Pain   Have you seen this provider for your pain in the past?   Yes   Where in your body do you have pain? Back knees, ribcage, right hip, shoulders  Are you seeing anyone else for your pain? No  Taking tylenol 625mg for arthritic pain as needed    PHQ-9 SCORE 12/14/2022 1/24/2023 3/15/2023   PHQ-9 Total Score - - -   PHQ-9 Total Score MyChart - 15 (Moderately severe depression) -   PHQ-9 Total Score 19 15 16       SHAKIR-7 SCORE 12/14/2022 1/24/2023 3/15/2023   Total Score - 15 (severe anxiety) -   Total Score 16 15 14           PHQ-9 SCORE  12/14/2022 1/24/2023 3/15/2023   PHQ-9 Total Score - - -   PHQ-9 Total Score MyChart - 15 (Moderately severe depression) -   PHQ-9 Total Score 19 15 16     SHAKIR-7 SCORE 12/14/2022 1/24/2023 3/15/2023   Total Score - 15 (severe anxiety) -   Total Score 16 15 14     PEG Score 12/14/2022 3/15/2023   PEG Total Score 6.33 5.67       Chronic Pain Follow Up:    Location of pain: back, knees, ribcage  Analgesia/pain control:    - Recent changes:  More pain lately     - Overall control: Tolerable with discomfort    - Current treatments: tylenol   Adherence:     - Do you ever take more pain medicine than prescribed? No    - When did you take your last dose of pain medicine?  Last tylenol was 2 weeks ago   Adverse effects: No   PDMP Review       Value Time User    State PDMP site checked  Yes 2/16/2023 10:36 AM Concha Hare PA        Last CSA Agreement:   CSA -- Patient Level:     [Media Unavailable] Controlled Substance Agreement - Non - Opioid - Scan on 9/30/2019  1:49 PM: NON-OPIOID CONTROLLED SUBSTANCE AGREEMENT       Last UDS: 10/1/2019        PHQ-9 SCORE 12/14/2022 1/24/2023 3/15/2023   PHQ-9 Total Score - - -   PHQ-9 Total Score MyChart - 15 (Moderately severe depression) -   PHQ-9 Total Score 19 15 16     SHAKIR-7 SCORE 12/14/2022 1/24/2023 3/15/2023   Total Score - 15 (severe anxiety) -   Total Score 16 15 14           Review of Systems   Constitutional, HEENT, cardiovascular, pulmonary, gi and gu systems are negative, except as otherwise noted.      Objective    BP 98/55 (BP Location: Left arm, Patient Position: Sitting, Cuff Size: Adult Large)   Pulse 63   Temp 97.8  F (36.6  C) (Tympanic)   Resp 16   Wt 91.2 kg (201 lb)   SpO2 98%   BMI 31.48 kg/m    Body mass index is 31.48 kg/m .  Physical Exam   GENERAL: healthy, alert and no distress  NECK: no adenopathy, no asymmetry, masses, or scars and thyroid normal to palpation  RESP: lungs clear to auscultation - no rales, rhonchi or wheezes  CV: regular rate and  rhythm, normal S1 S2, no S3 or S4, no murmur, click or rub, no peripheral edema and peripheral pulses strong  MS: no gross musculoskeletal defects noted, no edema  SKIN: no suspicious lesions or rashes  PSYCH: mentation appears normal, affect normal/bright    Results for orders placed or performed in visit on 03/15/23   Hemoglobin A1c     Status: None   Result Value Ref Range    Estimated Average Glucose 114 mg/dL    Hemoglobin A1C 5.6 <5.7 %   Basic metabolic panel     Status: Abnormal   Result Value Ref Range    Sodium 137 136 - 145 mmol/L    Potassium 4.6 3.4 - 5.3 mmol/L    Chloride 101 98 - 107 mmol/L    Carbon Dioxide (CO2) 24 22 - 29 mmol/L    Anion Gap 12 7 - 15 mmol/L    Urea Nitrogen 18.9 8.0 - 23.0 mg/dL    Creatinine 1.11 (H) 0.51 - 0.95 mg/dL    Calcium 9.7 8.8 - 10.2 mg/dL    Glucose 101 (H) 70 - 99 mg/dL    GFR Estimate 56 (L) >60 mL/min/1.73m2

## 2023-03-22 DIAGNOSIS — F41.1 GENERALIZED ANXIETY DISORDER: ICD-10-CM

## 2023-03-24 RX ORDER — CLONAZEPAM 1 MG/1
TABLET ORAL
Qty: 60 TABLET | Refills: 0 | Status: SHIPPED | OUTPATIENT
Start: 2023-03-24 | End: 2023-05-22

## 2023-03-24 NOTE — TELEPHONE ENCOUNTER
Klonopin       Last Written Prescription Date:  3-15-23  Last Fill Quantity: 60,   # refills: 0  Last Office Visit: 3-15-23  Future Office visit:    Next 5 appointments (look out 90 days)    May 03, 2023  8:30 AM  (Arrive by 8:15 AM)  Return Visit with Sydni Buckley CNP  [facility] [place] ([facility] [place] ) [address] [place] [address]  [phone]   Jun 07, 2023  9:15 AM  (Arrive by 9:00 AM)  SHORT with KEILY Fonseca  [facility] [place] ([facility] [place] ) [address]  [place] [address]  [phone]

## 2023-03-30 DIAGNOSIS — R60.0 BILATERAL LEG EDEMA: ICD-10-CM

## 2023-03-30 DIAGNOSIS — I50.30 HEART FAILURE WITH PRESERVED EJECTION FRACTION, NYHA CLASS II (H): ICD-10-CM

## 2023-03-30 RX ORDER — FUROSEMIDE 20 MG
20 TABLET ORAL DAILY
Qty: 90 TABLET | Refills: 0 | Status: SHIPPED | OUTPATIENT
Start: 2023-03-30 | End: 2023-06-29

## 2023-03-30 NOTE — TELEPHONE ENCOUNTER
furosemide (LASIX) 20 MG tablet 90 tablet 0 1/4/2023     lov 01/04/23    Future Office visit:    Next 5 appointments (look out 90 days)    May 03, 2023  8:30 AM  (Arrive by 8:15 AM)  Return Visit with Sydni Buckley CNP  [facility] [place] ([facility] [place] ) [address]  [place] [address]  [phone]   Jun 07, 2023  9:15 AM  (Arrive by 9:00 AM)  SHORT with KEILY Fonseca  [facility] [place] ([facility] [place] ) [address]  [place] [address]  [phone]           Routing refill request to provider for review/approval because:

## 2023-04-03 ENCOUNTER — HOSPITAL ENCOUNTER (OUTPATIENT)
Dept: CARDIOLOGY | Facility: HOSPITAL | Age: 63
Discharge: HOME | End: 2023-04-03
Attending: NURSE PRACTITIONER | Admitting: STUDENT IN AN ORGANIZED HEALTH CARE EDUCATION/TRAINING PROGRAM
Payer: MEDICARE

## 2023-04-03 DIAGNOSIS — I25.85 CARDIAC MICROVASCULAR DISEASE: ICD-10-CM

## 2023-04-03 DIAGNOSIS — I50.30 HEART FAILURE WITH PRESERVED EJECTION FRACTION, NYHA CLASS II (H): ICD-10-CM

## 2023-04-03 DIAGNOSIS — I35.1 AORTIC VALVE INSUFFICIENCY, ETIOLOGY OF CARDIAC VALVE DISEASE UNSPECIFIED: ICD-10-CM

## 2023-04-03 LAB — LVEF ECHO: NORMAL

## 2023-04-03 PROCEDURE — 93306 TTE W/DOPPLER COMPLETE: CPT

## 2023-04-06 DIAGNOSIS — Z79.890 POSTMENOPAUSAL HRT (HORMONE REPLACEMENT THERAPY): ICD-10-CM

## 2023-04-07 RX ORDER — ESTRADIOL 0.5 MG/1
TABLET ORAL
Qty: 60 TABLET | Refills: 11 | Status: SHIPPED | OUTPATIENT
Start: 2023-04-07 | End: 2024-01-15

## 2023-04-09 ENCOUNTER — HEALTH MAINTENANCE LETTER (OUTPATIENT)
Age: 63
End: 2023-04-09

## 2023-04-12 DIAGNOSIS — R12 HEARTBURN: ICD-10-CM

## 2023-04-12 RX ORDER — ESOMEPRAZOLE MAGNESIUM 40 MG/1
CAPSULE, DELAYED RELEASE ORAL
Qty: 90 CAPSULE | Refills: 0 | Status: SHIPPED | OUTPATIENT
Start: 2023-04-12 | End: 2023-07-06

## 2023-04-12 NOTE — TELEPHONE ENCOUNTER
esomeprazole (NEXIUM) 40 MG DR capsule    Last Written Prescription Date:  1-13-23  Last Fill Quantity: 90,   # refills: 0  Last Office Visit: 3-15-23  Future Office visit:    Next 5 appointments (look out 90 days)    May 03, 2023  8:30 AM  (Arrive by 8:15 AM)  Return Visit with Syd[facility] CNP  [facility] ([facility] [place] ) [address]  [place] [address]  [phone]   Jun 07, 2023  9:15 AM  (Arrive by 9:00 AM)  SHORT with KEILY Fonseca  [facility] ([facility] [place] ) [address]  [place] [address]  [phone]           Routing refill request to provider for review/approval because:  Drug not on the [facility], [facility] or [place] refill protocol or controlled substance

## 2023-04-17 DIAGNOSIS — E03.9 ACQUIRED HYPOTHYROIDISM: ICD-10-CM

## 2023-04-17 NOTE — TELEPHONE ENCOUNTER
Levothyroxine       Last Written Prescription Date:  1/20/2023  Last Fill Quantity: 90,   # refills: 0  Last Office Visit: 03/15/2023  Future Office visit:    Next 5 appointments (look out 90 days)    May 03, 2023  8:30 AM  (Arrive by 8:15 AM)  Return Visit with Sydni Buckley CNP  [facility] [place] ([facility] [place] ) [address]  [place] [address]  [phone]   Jun 07, 2023  9:15 AM  (Arrive by 9:00 AM)  SHORT with KEILY Fonseca  [facility] [place] ([facility] [place] ) [address]  [place] [address]  [phone]           Routing refill request to provider for review/approval because:

## 2023-04-18 RX ORDER — LEVOTHYROXINE SODIUM 75 UG/1
TABLET ORAL
Qty: 90 TABLET | Refills: 0 | Status: SHIPPED | OUTPATIENT
Start: 2023-04-18 | End: 2023-07-27

## 2023-05-02 ENCOUNTER — TELEPHONE (OUTPATIENT)
Dept: MAMMOGRAPHY | Facility: OTHER | Age: 63
End: 2023-05-02

## 2023-05-02 ENCOUNTER — ANCILLARY PROCEDURE (OUTPATIENT)
Dept: MAMMOGRAPHY | Facility: OTHER | Age: 63
End: 2023-05-02
Attending: PHYSICIAN ASSISTANT
Payer: MEDICARE

## 2023-05-02 ENCOUNTER — HOSPITAL ENCOUNTER (OUTPATIENT)
Dept: CT IMAGING | Facility: HOSPITAL | Age: 63
Discharge: HOME | End: 2023-05-02
Attending: PHYSICIAN ASSISTANT
Payer: MEDICARE

## 2023-05-02 DIAGNOSIS — Z12.31 VISIT FOR SCREENING MAMMOGRAM: ICD-10-CM

## 2023-05-02 PROCEDURE — 71271 CT THORAX LUNG CANCER SCR C-: CPT

## 2023-05-02 PROCEDURE — 77067 SCR MAMMO BI INCL CAD: CPT | Mod: TC

## 2023-05-03 ENCOUNTER — OFFICE VISIT (OUTPATIENT)
Dept: CARDIOLOGY | Facility: OTHER | Age: 63
End: 2023-05-03
Attending: NURSE PRACTITIONER
Payer: COMMERCIAL

## 2023-05-03 VITALS
BODY MASS INDEX: 31.86 KG/M2 | RESPIRATION RATE: 16 BRPM | TEMPERATURE: 97 F | DIASTOLIC BLOOD PRESSURE: 68 MMHG | SYSTOLIC BLOOD PRESSURE: 112 MMHG | OXYGEN SATURATION: 99 % | HEIGHT: 67 IN | HEART RATE: 73 BPM | WEIGHT: 203 LBS

## 2023-05-03 DIAGNOSIS — R07.89 ATYPICAL CHEST PAIN: ICD-10-CM

## 2023-05-03 DIAGNOSIS — I35.1 AORTIC VALVE INSUFFICIENCY, ETIOLOGY OF CARDIAC VALVE DISEASE UNSPECIFIED: ICD-10-CM

## 2023-05-03 DIAGNOSIS — I25.85 CARDIAC MICROVASCULAR DISEASE: ICD-10-CM

## 2023-05-03 DIAGNOSIS — R60.0 BILATERAL LEG EDEMA: ICD-10-CM

## 2023-05-03 DIAGNOSIS — E78.2 MIXED HYPERLIPIDEMIA: ICD-10-CM

## 2023-05-03 DIAGNOSIS — R06.09 DYSPNEA ON EXERTION: ICD-10-CM

## 2023-05-03 DIAGNOSIS — I50.30 HEART FAILURE WITH PRESERVED EJECTION FRACTION, NYHA CLASS II (H): Primary | ICD-10-CM

## 2023-05-03 DIAGNOSIS — N18.31 STAGE 3A CHRONIC KIDNEY DISEASE (H): ICD-10-CM

## 2023-05-03 PROCEDURE — 99214 OFFICE O/P EST MOD 30 MIN: CPT | Performed by: NURSE PRACTITIONER

## 2023-05-03 PROCEDURE — G0463 HOSPITAL OUTPT CLINIC VISIT: HCPCS

## 2023-05-03 ASSESSMENT — PAIN SCALES - GENERAL: PAINLEVEL: NO PAIN (0)

## 2023-05-03 NOTE — PATIENT INSTRUCTIONS
Thank you for allowing Sydni Buckley CNP and our  team to participate in your care. Please call our office at [phone] with scheduling questions or if you need to cancel or change your appointment. With any other questions or concerns you may call cardiology nurse at [phone] or [phone].       If you experience chest pain, chest pressure, chest tightness, shortness of breath, fainting, lightheadedness, nausea, vomiting, or other concerning symptoms, please report to the Emergency Department or call 911. These symptoms may be emergent, and best treated in the Emergency Department.

## 2023-05-03 NOTE — PROGRESS NOTES
[facility] HEART CARE   CARDIOLOGY PROGRESS NOTE     Chief Complaint   Patient presents with     Results     Heart Problem          Diagnosis:      ICD-10-CM    1. Heart failure with preserved ejection fraction, NYHA class II (H)  I50.30 Echocardiogram Complete      2. Cardiac microvascular disease  I25.89       3. Aortic valve insufficiency, etiology of cardiac valve disease unspecified  I35.1 Echocardiogram Complete      4. Mixed hyperlipidemia  E78.2       5. Stage 3a chronic kidney disease (H)  N18.31       6. Bilateral leg edema  R60.0 Echocardiogram Complete      7. Dyspnea on exertion  R06.09 Echocardiogram Complete      8. Atypical chest pain  R07.89             Assessment/Plan:    1. Heart failure with preserved ejection fraction (HFpEF)   Grade I diastolic dysfunction on echocardiogram, cardiac catheterization 4/14/2021    BP: controlled   Fluid status: +trace edema to bilateral LE. Improved since starting furosemide 10 mg once daily.   Aldosterone antagonist: Continue spironolactone 25 mg once daily  SGLT-2 Inhibitor:   Ischemia evaluation: Cardiac catheterization 2/28/2023 with normal coronary arteries  ACEi/ARB/ARNI: n/a, no evidence for use in HFpEF  BB: n/a, no evidence for use in HFpEF   SCD prophylaxis: n/a, no evidence for use in HFpEF  NSAID use: Contraindicated  Sleep apnea evaluation: Recommended again today with daytime fatigue, snoring      2. Cardiac microvascular disease    Has been on several medications for management    No recent chest pain, pressure, tightness    Previously decreased diltiazem 240 mg once daily to diltiazem 120 mg once daily since she already had some orthostatic lightheadedness and starting furosemide. She has done well on reduced dose without increased symptoms. She does not think previously symptoms improved at high dose of diltiazem.       3. Aortic valve insufficiency    Stable - Moderate on 4/2023 echocardiogram    Echocardiogram in 1 year for  surveillance      4. Essential hypertension, controlled    BP Readings from Last 6 Encounters:   05/03/23 112/68   03/15/23 98/55   02/28/23 122/63   02/16/23 109/70   01/04/23 118/70   12/14/22 110/65       5. Mixed hyperlipidemia    ASCVD risk of 6.4%        History of small vessel changes on brain MR, family history early ischemic heart disease. Recent coronary angiogram without coronary disease.     Consider starting statin medication for primary prevention.  Recent Labs   Lab Test 12/14/22  1213 02/21/22  1200   CHOL 228* 210*   HDL 62 66   * 122*   TRIG 178* 111     The 10-year ASCVD risk score (Eusebio GARCIA, et al., 2019) is: 6.4%    Values used to calculate the score:      Age: 62 years      Sex: Female      Is Non- : No      Diabetic: No      Tobacco smoker: Yes      Systolic Blood Pressure: 112 mmHg      Is BP treated: No      HDL Cholesterol: 62 mg/dL      Total Cholesterol: 228 mg/dL      6. Chronic kidney disease    Creatinine and GFR have been stable      7. Atypical chest pain  8. Dyspnea on exertion    Symptoms improved but she will still experience intermittent chest discomfort of squeezing that radiates across her chest and is not necessarily associated with exertion    TTE 4/2023- aortic valve insufficiency stable, no significant findings    Cardiac catheterization 2/2023:    Normal Coronary Arteries.    Aortagram was done which demonstrated mild-moderate aortic regurgitation consistent with previous echocardiogram; LVEDP was 16 mmHg.    No cardiac pathology to explain patient's symptoms.      9. Orthostatic hypotension, lightheaded, dizziness, unsteady gait    Previously stopped Diovan but felt it made her symptoms worse    No improvement in symptoms of lightheadedness, dizziness, unsteady gait with stopping hydroxyzine. She did stop oxybutnin to see if there is any improvement and there was not. Continues with intermittent symptoms but they have not been as bad  lately as previously.      10. Former smoker    Pulmonary function testing 6/14/2022 showed a mild diffusion defect      Follow-up with cardiology in 1 year, certainly sooner with acute concerns.      Sydni Buckley, CNP       Interval history:  Mrs. Mccord is a very pleasant 62-year old female who presents for cardiology follow-up. She was previously seen for symptomatic orthostatic hypotension, microvascular cardiac disease with prior history of anginal type of chest pain, chronic diastolic heart failure, mild-moderate aortic insufficiency, COPD with smoking history, hypertension.     Mrs. Mccord tells me that she has had improvement in chest discomfort but it does still occur from time to time. Feels like squeezing sensation across her chest. Not associated with exertion and can occur at rest. Spontaneously resolves. No associated diaphoresis, nausea. She does continue with some dyspnea on exertion. Has gained weight over the winter and not as active due to knee pain. LE edema improved with furosemide. No orthopnea, PND. She does have daytime fatigue and snoring- no prior sleep evaluation. \      Smoking history: started smoking around age 15, quit smoking at age 60 years old. 1 ppd.     Alcohol history: None    Substance abuse history: none, was on fen-phen diet pills 20 years ago.     Sleep history: Sleeps in bed, denies orthopnea. +snoring, no witnessed apnea- does wake up in the middle of the night gasping for air on occassion but not regularly. Wakes up 0/7 mornings feeling well rested- wakes up 3-4 times per night to void.           HPI:    Sharlene Mccord is a 61-year old female who presents for cardiology consult with chronic diastolic heart failure and aortic insufficiency with chronic dyspnea. Patient had previously followed cardiologist Dr. Nichole, she was last seen on 11/9/2021.    Squeezing sensation across chest. Occurs sporadically and not correlated more with activity or rest.     History of hiatal  "hernia and GERD- symptoms a few times per week - pressure, fullness of epigastric discomfort \"like you've over eaten but you don't.\"       She has a history of moderate aortic insufficiency without much progression over the years and no signs of LV systolic failure.  She has a history of microvascular angina-chronic (suggested on cardiac stress MRI), chronic diastolic heart failure, hypertension, GUTIERREZ, chronic pain syndrome (rhematology evaluation with possible mixed connective tissue disorder) and chronic fatigue.  Additional past medical history includes migraine headaches, stage I CKD, history of pancreatitis, SHAKIR, depression, GERD, diverticulitis and hypothyroidism.    She has a history of chronic chest discomfort related to microvascular disease and has tried several therapies including isosorbide mononitrate (stopped after 4 days due to severe headache), diltiazem (improved chest discomfort but low blood pressures) and SL nitroglycerin PRN.     She underwent heart catheterization on 4/14/2021 for invasive exercise hemodynamic assessment with continued chest pain and dyspnea.  She was identified to have normal resting PA pressures, biventricular filling pressures and normal PVR at rest.  Moderately reduced cardiac index at rest in the setting of bradycardia.  Mild increase in PCWP and right atrial pressure with exercise.  Appropriate increase in cardiac output and PA pressures with exercise.  No increased PVR with exercise.  All findings indicating early HFpEF.    She previously underwent MR cardiac with contrast and stress revealing normal LV size and function, LVEF at 67%, no regional wall motion abnormalities.  Normal RV size and function.  Regadenoson stress perfusion imaging did not reveal any areas of reversible ischemia.  Semiquantitative blood flow reserve assessment revealed maximal hyperemic flow to be reduced in all myocardial segments with a range from 1.6-1.9 which suggest global decrease in " hyperemic flow which can be seen in microvascular dysfunction.    Cardiac cath in 4/2013 revealing mild aortic regurgitation noted to be chronic, focal CAD with no hemodynamically significant lesions and normal heart function.        RELEVANT TESTING:  Transthoracic Echocardiogram 4/2023  Interpretation Summary  Global and regional left ventricular function is normal with an EF of 55-60%.  Left ventricular size is normal. Left ventricular diastolic function is  indeterminate.  The right ventricle is normal size. Global right ventricular function is  normal.  Moderate aortic insufficiency is present.  The inferior vena cava was normal in size with preserved respiratory  variability.  No pericardial effusion is present.     This study was compared with the study from 4/8/2022. No significant changes  Noted.      Cardiac Catheterization 2/2023  Conclusion  1. Normal Coronary Arteries.  2. Aortagram was done which demonstrated mild-moderate aortic regurgitation consistent with previous echocardiogram; LVEDP was 16 mmHg.  3. No cardiac pathology to explain patient's symptoms.       US JESUS 4/27/2022  Findings:   Right:     Arm: 143 mmHg   PT at ankle: 160 mmHg   DP at foot: 148 mmHg      JESUS: 1.11     Left:    Arm: 139 mmHg   PT at ankle: 153 mmHg   DP at foot: 151 mmHg      JESUS: 1.06                                                                 Impression: Normal ABIs.    Pulmonary Function Testing 6/14/2022  The FVC, FEV1, FEV1/FVc ratio and VOC42-24% are within normal limits. The airway resistance is normal. While the TLC and RV are with normal limits, the FRC is reduced. The reduced diffusing capacity indicates a mild loss of functional alveolar capillary surface.   Conclusions: the diffusion defect is consistent with a pulmonary vascular process.  Pulmonary function diagnosis: Mild diffusion defect- pulmonary vascular    Echocardiogram 4/8/2022  Interpretation Summary  Left ventricular function is normal.The  ejection fraction is 60-65%. Left  ventricular size is normal. Relative wall thickness is increased consistent  with concentric remodeling. Grade I or early diastolic dysfunction.  Right ventricular function, chamber size, wall motion, and thickness are normal.  Moderate aortic insufficiency is present.  No pericardial effusion is present.  This study was compared with the study from 11/25/2020 . There has been no  change.    Right heart catheterization/right hear exercise stress study 4/14/2021  Conclusion    Normal resting PA pressures, biventricualr filling pressures, and PVR at rest    Moderately reduced cardiac index at rest in the setting of bradycardia    Mild increase in PCWP and right atrial pressure with exercise    Approriate increase in cardiac output and PA pressures with exercise.    No increase in PVR with exercise    These constellation of findings may indicate early HFpEF          Past Medical History:   Diagnosis Date     Anxiety state, unspecified 03/01/2011     Aortic valve disorders 06/07/2000    Echocardiogram showin mild/moderate AI.  Normal LV function     Cardiac microvascular disease 4/10/2013    Based on Cardiac MRI done at       COPD (chronic obstructive pulmonary disease) (H) 7/8/2021     Depressive disorder 1997     Fatigue      Fatigue      Fibromyalgia 03/01/2011     Hypertension      Major depressive disorder, recurrent episode, unspecified 12/17/2014     Migraine, unspecified, without mention of intractable migraine without mention of status migrainosus 03/01/2011     Mitral valve disorders(424.0) 10/16/2007     Need for prophylactic hormone replacement therapy (postmenopausal)      PONV (postoperative nausea and vomiting)      Thyroid Nodule 03/01/2011     Tobacco use disorder 03/01/2011     Unspecified hypothyroidism 03/01/2011       Past Surgical History:   Procedure Laterality Date     APPENDECTOMY  1977     BIOPSY  2017    taken at [facility] from Dr. Nadira cohen. Thyroid      CARDIAC SURGERY  2013    angiogram [facility]:  Normal coronary arteries.  Felt ot have some microvascular disease     CL AFF SURGICAL PATHOLOGY  01/02/2007    Thyroid Mass     COLONOSCOPY  1/13/2014    Procedure: COLONOSCOPY;  COLONOSCOPY ;  Surgeon: Chasidy Gee DO;  Location: [facility] OR     CV CORONARY ANGIOGRAM N/A 2/28/2023    Procedure: Coronary Angiogram;  Surgeon: Amol Bee MD;  Location: [facility]     CV RIGHT HEART CATH MEASUREMENTS RECORDED N/A 4/14/2021    Procedure: CV RIGHT HEART CATH;  Surgeon: Danna Clay MD;  [facility]     CV RIGHT HEART EXERCISE STRESS STUDY N/A 4/14/2021    Procedure: Right Heart Exercise Stress Study;  Surgeon: Danna Clay MD;  [facility]     ESOPHAGOSCOPY, GASTROSCOPY, DUODENOSCOPY (EGD), COMBINED N/A 2/9/2017    Procedure: COMBINED ESOPHAGOSCOPY, GASTROSCOPY, DUODENOSCOPY (EGD);  Surgeon: Johnathan Ruff DO;  Location: [facility] OR     GYN SURGERY      c-sections x 3     HYSTERECTOMY TOTAL ABDOMINAL, BILATERAL SALPINGO-OOPHORECTOMY, COMBINED N/A 01/01/2001     LAPAROSCOPIC CHOLECYSTECTOMY  11/07/2003    Cholelithiasis     LAPAROTOMY EXPLORATORY      abdominal pain/fever     LARYNGOSCOPY       SPLENECTOMY         Allergies   Allergen Reactions     Codeine      Isosorbide Mononitrate Other (See Comments)     Vomiting and headache       Lisinopril      [facility] scan     Paxil [Paroxetine] Headache       Current Outpatient Medications   Medication Sig Dispense Refill     acetaminophen (TYLENOL) 325 MG tablet Take 650 mg by mouth       aspirin (ASA) 81 MG EC tablet Take 1 tablet (81 mg) by mouth daily       calcium carbonate (TUMS) 500 MG chewable tablet Take 1 tablet (500 mg) by mouth 3 times daily 180 tablet 3     clonazePAM (KLONOPIN) 1 MG tablet TAKE 1/2 TO 1 (ONE-HALF TO ONE) TABLET BY MOUTH TWICE DAILY 60 tablet 0     diltiazem ER COATED BEADS (CARDIZEM CD/CARTIA XT) 120 MG 24 hr  capsule Take 1 capsule (120 mg) by mouth daily for 90 days 90 capsule 3     esomeprazole (NEXIUM) 40 MG DR capsule TAKE 1 CAPSULE BY MOUTH IN THE MORNING BEFORE BREAKFAST -- TAKE 30 TO 60 MINUTES BEFORE EATING 90 capsule 0     estradiol (ESTRACE) 0.5 MG tablet Take 1 tablet by mouth twice daily 60 tablet 11     FLUoxetine (PROZAC) 40 MG capsule Take 1 capsule (40 mg) by mouth daily 90 capsule 1     furosemide (LASIX) 20 MG tablet Take 1 tablet (20 mg) by mouth daily (Patient taking differently: Take 10 mg by mouth daily) 90 tablet 0     levothyroxine (SYNTHROID/LEVOTHROID) 75 MCG tablet Take 1 tablet by mouth once daily 90 tablet 0     nitroGLYcerin (NITROSTAT) 0.4 MG sublingual tablet DISSOLVE ONE TABLET UNDER THE TONGUE EVERY 5 MINUTES AS NEEDED FOR CHEST PAIN. DO NOT EXCEED A TOTAL OF 3 DOSES IN 15 MINUTES 30 tablet 4     PROCTO-MED HC 2.5 % cream APPLY CREAM RECTALLY TO AFFECTED AREA(S) THREE TIMES DAILY AS DIRECTED 56 g 0     spironolactone (ALDACTONE) 25 MG tablet Take 1 tablet (25 mg) by mouth daily 30 tablet 11     traZODone (DESYREL) 50 MG tablet Take 1 tablet (50 mg) by mouth At Bedtime 30 tablet 4       Social History     Socioeconomic History     Marital status:      Spouse name: Not on file     Number of children: Not on file     Years of education: Not on file     Highest education level: Not on file   Occupational History     Occupation: Manager   Tobacco Use     Smoking status: Every Day     Packs/day: 0.25     Years: 40.00     Pack years: 10.00     Types: Cigarettes     Start date: 1975     Last attempt to quit: 2022     Years since quittin.6     Smokeless tobacco: Never   Vaping Use     Vaping status: Never Used   Substance and Sexual Activity     Alcohol use: No     Drug use: No     Sexual activity: Not Currently     Partners: Male     Birth control/protection: Other     Comment: hysterectomy   Other Topics Concern      Service Not Asked     Blood Transfusions Yes      "Caffeine Concern Yes     Comment: Coffee      Occupational Exposure Not Asked     Hobby Hazards Not Asked     Sleep Concern Not Asked     Stress Concern Not Asked     Weight Concern Not Asked     Special Diet Not Asked     Back Care Not Asked     Exercise Not Asked     Bike Helmet Not Asked     Seat Belt Not Asked     Self-Exams Not Asked     Parent/sibling w/ CABG, MI or angioplasty before 65F 55M? No   Social History Narrative     Not on file     Social Determinants of Health     Financial Resource Strain: Not on file   Food Insecurity: Not on file   Transportation Needs: Not on file   Physical Activity: Not on file   Stress: Not on file   Social Connections: Not on file   Intimate Partner Violence: Not on file   Housing Stability: Not on file       LAB RESULTS:   No visits with results within 2 Month(s) from this visit.   Latest known visit with results is:   Lab on 07/13/2022   Component Date Value Ref Range Status     TSH 07/13/2022 1.09  0.40 - 4.00 mU/L Final     Sodium 07/13/2022 136  133 - 144 mmol/L Final     Potassium 07/13/2022 4.2  3.4 - 5.3 mmol/L Final     Chloride 07/13/2022 110 (A) 94 - 109 mmol/L Final     Carbon Dioxide (CO2) 07/13/2022 22  20 - 32 mmol/L Final     Anion Gap 07/13/2022 4  3 - 14 mmol/L Final     Urea Nitrogen 07/13/2022 19  7 - 30 mg/dL Final     Creatinine 07/13/2022 1.06 (A) 0.52 - 1.04 mg/dL Final     Calcium 07/13/2022 9.0  8.5 - 10.1 mg/dL Final     Glucose 07/13/2022 122 (A) 70 - 99 mg/dL Final     GFR Estimate 07/13/2022 59 (A) >60 mL/min/1.73m2 Final        Review of systems: Negative except that which was noted in the HPI.    Physical examination:  /68   Pulse 73   Temp 97  F (36.1  C) (Tympanic)   Resp 16   Ht 1.702 m (5' 7\")   Wt 92.1 kg (203 lb)   SpO2 99%   BMI 31.79 kg/m      GENERAL APPEARANCE: healthy, alert and no distress  HEENT: no icterus, no xanthelasmas, normal pupil size and reaction, no cyanosis.  NECK: no adenopathy, no asymmetry, " masses.  CHEST: lungs clear to auscultation - no rales, rhonchi or wheezes, no use of accessory muscles, no retractions, respirations are unlabored, normal respiratory rate  CARDIOVASCULAR: regular rhythm, normal S1 with physiologic split S2, no S3 or S4 and no murmur, click or rub  EXTREMITIES: +trace bilateral lower extremity edema  NEURO: alert and oriented normal speech, and affect  VASC: No vascular bruits heard.  SKIN: no ecchymoses, no rashes        Thank you for allowing me to participate in the care of your patient. Please do not hesitate to contact me if you have any questions.       Sydni Buckley, CNP

## 2023-05-19 DIAGNOSIS — F41.1 GENERALIZED ANXIETY DISORDER: ICD-10-CM

## 2023-05-22 RX ORDER — CLONAZEPAM 1 MG/1
TABLET ORAL
Qty: 60 TABLET | Refills: 0 | Status: SHIPPED | OUTPATIENT
Start: 2023-05-22 | End: 2023-06-22

## 2023-05-22 NOTE — TELEPHONE ENCOUNTER
Klonopin      Last Written Prescription Date:  4.19.23  Last Fill Quantity: #60,   # refills: 0  Last Office Visit: 3.15.23  Future Office visit:    Next 5 appointments (look out 90 days)    Jun 07, 2023  9:15 AM  (Arrive by 9:00 AM)  SHORT with KEILY Fonseca  [facility] ([facility] [address]  [place] [address]  [phone]           Routing refill request to provider for review/approval because:  Drug not on the [facility], [facility] or [facility] refill protocol or controlled substance

## 2023-06-07 ENCOUNTER — ANCILLARY PROCEDURE (OUTPATIENT)
Dept: GENERAL RADIOLOGY | Facility: OTHER | Age: 63
End: 2023-06-07
Attending: PHYSICIAN ASSISTANT
Payer: MEDICARE

## 2023-06-07 ENCOUNTER — OFFICE VISIT (OUTPATIENT)
Dept: FAMILY MEDICINE | Facility: OTHER | Age: 63
End: 2023-06-07
Attending: PHYSICIAN ASSISTANT
Payer: MEDICARE

## 2023-06-07 VITALS
HEART RATE: 62 BPM | SYSTOLIC BLOOD PRESSURE: 110 MMHG | DIASTOLIC BLOOD PRESSURE: 70 MMHG | BODY MASS INDEX: 30.7 KG/M2 | WEIGHT: 196 LBS | OXYGEN SATURATION: 98 % | RESPIRATION RATE: 16 BRPM | TEMPERATURE: 98.1 F

## 2023-06-07 DIAGNOSIS — M25.561 CHRONIC PAIN OF RIGHT KNEE: ICD-10-CM

## 2023-06-07 DIAGNOSIS — M54.50 CHRONIC BILATERAL LOW BACK PAIN WITHOUT SCIATICA: ICD-10-CM

## 2023-06-07 DIAGNOSIS — E03.9 ACQUIRED HYPOTHYROIDISM: Primary | ICD-10-CM

## 2023-06-07 DIAGNOSIS — G89.29 CHRONIC PAIN OF RIGHT KNEE: ICD-10-CM

## 2023-06-07 DIAGNOSIS — F41.1 GENERALIZED ANXIETY DISORDER: ICD-10-CM

## 2023-06-07 DIAGNOSIS — G89.29 CHRONIC PAIN OF LEFT KNEE: ICD-10-CM

## 2023-06-07 DIAGNOSIS — E78.5 HYPERLIPIDEMIA LDL GOAL <100: ICD-10-CM

## 2023-06-07 DIAGNOSIS — G89.29 CHRONIC BILATERAL LOW BACK PAIN WITHOUT SCIATICA: ICD-10-CM

## 2023-06-07 DIAGNOSIS — I25.85 CARDIAC MICROVASCULAR DISEASE: ICD-10-CM

## 2023-06-07 DIAGNOSIS — M25.562 CHRONIC PAIN OF LEFT KNEE: ICD-10-CM

## 2023-06-07 LAB
BASOPHILS # BLD AUTO: 0 10E3/UL (ref 0–0.2)
BASOPHILS NFR BLD AUTO: 1 %
EOSINOPHIL # BLD AUTO: 0.1 10E3/UL (ref 0–0.7)
EOSINOPHIL NFR BLD AUTO: 1 %
ERYTHROCYTE [DISTWIDTH] IN BLOOD BY AUTOMATED COUNT: 13.1 % (ref 10–15)
HCT VFR BLD AUTO: 42.3 % (ref 35–47)
HGB BLD-MCNC: 14.2 G/DL (ref 11.7–15.7)
IMM GRANULOCYTES # BLD: 0 10E3/UL
IMM GRANULOCYTES NFR BLD: 0 %
LYMPHOCYTES # BLD AUTO: 2 10E3/UL (ref 0.8–5.3)
LYMPHOCYTES NFR BLD AUTO: 24 %
MCH RBC QN AUTO: 29.5 PG (ref 26.5–33)
MCHC RBC AUTO-ENTMCNC: 33.6 G/DL (ref 31.5–36.5)
MCV RBC AUTO: 88 FL (ref 78–100)
MONOCYTES # BLD AUTO: 0.5 10E3/UL (ref 0–1.3)
MONOCYTES NFR BLD AUTO: 6 %
NEUTROPHILS # BLD AUTO: 5.7 10E3/UL (ref 1.6–8.3)
NEUTROPHILS NFR BLD AUTO: 68 %
NRBC # BLD AUTO: 0 10E3/UL
NRBC BLD AUTO-RTO: 0 /100
PLATELET # BLD AUTO: 220 10E3/UL (ref 150–450)
RBC # BLD AUTO: 4.82 10E6/UL (ref 3.8–5.2)
TSH SERPL DL<=0.005 MIU/L-ACNC: 0.78 UIU/ML (ref 0.3–4.2)
WBC # BLD AUTO: 8.4 10E3/UL (ref 4–11)

## 2023-06-07 PROCEDURE — 85025 COMPLETE CBC W/AUTO DIFF WBC: CPT | Mod: ZL | Performed by: PHYSICIAN ASSISTANT

## 2023-06-07 PROCEDURE — 99214 OFFICE O/P EST MOD 30 MIN: CPT | Performed by: PHYSICIAN ASSISTANT

## 2023-06-07 PROCEDURE — 84443 ASSAY THYROID STIM HORMONE: CPT | Mod: ZL | Performed by: PHYSICIAN ASSISTANT

## 2023-06-07 PROCEDURE — 72100 X-RAY EXAM L-S SPINE 2/3 VWS: CPT | Mod: TC

## 2023-06-07 PROCEDURE — G0463 HOSPITAL OUTPT CLINIC VISIT: HCPCS

## 2023-06-07 PROCEDURE — 73562 X-RAY EXAM OF KNEE 3: CPT | Mod: TC,LT

## 2023-06-07 PROCEDURE — 73565 X-RAY EXAM OF KNEES: CPT | Mod: TC,XU

## 2023-06-07 PROCEDURE — 36415 COLL VENOUS BLD VENIPUNCTURE: CPT | Mod: ZL | Performed by: PHYSICIAN ASSISTANT

## 2023-06-07 PROCEDURE — 73562 X-RAY EXAM OF KNEE 3: CPT | Mod: TC,RT

## 2023-06-07 RX ORDER — FLUOXETINE 40 MG/1
40 CAPSULE ORAL DAILY
Qty: 90 CAPSULE | Refills: 1 | Status: SHIPPED | OUTPATIENT
Start: 2023-06-07 | End: 2023-12-04

## 2023-06-07 RX ORDER — ROSUVASTATIN CALCIUM 5 MG/1
5 TABLET, COATED ORAL DAILY
Qty: 30 TABLET | Refills: 3 | Status: SHIPPED | OUTPATIENT
Start: 2023-06-07 | End: 2023-11-06

## 2023-06-07 ASSESSMENT — ANXIETY QUESTIONNAIRES
4. TROUBLE RELAXING: MORE THAN HALF THE DAYS
1. FEELING NERVOUS, ANXIOUS, OR ON EDGE: NEARLY EVERY DAY
IF YOU CHECKED OFF ANY PROBLEMS ON THIS QUESTIONNAIRE, HOW DIFFICULT HAVE THESE PROBLEMS MADE IT FOR YOU TO DO YOUR WORK, TAKE CARE OF THINGS AT HOME, OR GET ALONG WITH OTHER PEOPLE: VERY DIFFICULT
7. FEELING AFRAID AS IF SOMETHING AWFUL MIGHT HAPPEN: MORE THAN HALF THE DAYS
6. BECOMING EASILY ANNOYED OR IRRITABLE: MORE THAN HALF THE DAYS
2. NOT BEING ABLE TO STOP OR CONTROL WORRYING: NEARLY EVERY DAY
GAD7 TOTAL SCORE: 17
GAD7 TOTAL SCORE: 17
3. WORRYING TOO MUCH ABOUT DIFFERENT THINGS: NEARLY EVERY DAY
5. BEING SO RESTLESS THAT IT IS HARD TO SIT STILL: MORE THAN HALF THE DAYS

## 2023-06-07 ASSESSMENT — PATIENT HEALTH QUESTIONNAIRE - PHQ9: SUM OF ALL RESPONSES TO PHQ QUESTIONS 1-9: 17

## 2023-06-07 ASSESSMENT — PAIN SCALES - GENERAL: PAINLEVEL: SEVERE PAIN (6)

## 2023-06-07 NOTE — PROGRESS NOTES
"  Assessment & Plan     Acquired hypothyroidism  Due for recheck. Labs ordered.   - TSH with free T4 reflex; Future  - TSH with free T4 reflex    Cardiac microvascular disease  Had a full workup this spring. All vessels are clear.  Her pain is much better and needing to keep this in mind.  Remains on a CCB for  \"syndrom x\"  And helps her pressure and her pain. No ankle swelling at all.   - CBC with platelets and differential; Future  - CBC with platelets and differential    Hyperlipidemia LDL goal <100  Goal LDL is reached. Continue .   - TSH with free T4 reflex; Future  - rosuvastatin (CRESTOR) 5 MG tablet; Take 1 tablet (5 mg) by mouth daily  - TSH with free T4 reflex    Generalized anxiety disorder  Refill Prozac. Helping some. Therapy always recommended and she doesn't find it has help her much.   - FLUoxetine (PROZAC) 40 MG capsule; Take 1 capsule (40 mg) by mouth daily    Chronic bilateral low back pain without sciatica  Recheck out her pain . Seems to be a real issues in her life. Knees back hips . Can't move. Always stiff. Labs are always negative.   - XR LUMBAR SPINE 2/3 VIEWS (Clinic Performed); Future  - XR Knee AP Standing Bilateral (Clinic Performed)    Chronic pain of right knee  Having trouble blending and moving.   - XR Knee Right 3 Views (Clinic Performed); Future    Chronic pain of left knee  bipartate patella. But really very little arthritis.   - XR Knee Left 3 Views (Clinic Performed); Future    Ordering of each unique test  20 minutes spent by me on the date of the encounter doing chart review, history and exam, documentation and further activities per the note       See Patient Instructions    No follow-ups on file.    KEILY Cobos  [facility] is a 62 year old, presenting for the following health issues:  Follow Up (3month)        6/7/2023     8:54 AM   Additional Questions   Roomed by Juan Leggett LPN   Accompanied by self         " 2023     8:54 AM   Patient Reported Additional Medications   Patient reports taking the following new medications none     HPI     Hypertension Follow-up      Do you check your blood pressure regularly outside of the clinic? No     Are you following a low salt diet? No    Are your blood pressures ever more than 140 on the top number (systolic) OR more   than 90 on the bottom number (diastolic), for example 140/90? No    Heart Failure Follow-up  Are you experiencing any shortness of breath? No  How would you describe your shortness of breath?  Same as usual    Are you experiencing any swelling in your legs or feet?  Stable    Are you using more pillows than usual? No    Do you cough at night?  Yes    Do you check your weight daily?  Yes    Have you had a weight change recently?  Weight decrease    Are you having any of the following side effects from your medications? (Select all that apply)  Dizziness, Fatigue, Cough and Swelling    Since your last visit, how many times have you gone to the cardiologist, urgent care, emergency room, or hospital because of your heart failure?   None    Depression and Anxiety Follow-Up    How are you doing with your depression since your last visit? Worsened     How are you doing with your anxiety since your last visit?  No change    Are you having other symptoms that might be associated with depression or anxiety? No    Have you had a significant life event? OTHER: family health concerns     Do you have any concerns with your use of alcohol or other drugs? No    Social History     Tobacco Use     Smoking status: Every Day     Packs/day: 0.25     Years: 40.00     Pack years: 10.00     Types: Cigarettes     Start date: 1975     Last attempt to quit: 2022     Years since quittin.7     Smokeless tobacco: Never   Vaping Use     Vaping status: Never Used   Substance Use Topics     Alcohol use: No     Drug use: No         2023    11:25 AM 3/15/2023     8:34 AM 2023      9:03 AM   PHQ   PHQ-9 Total Score 15 16 17   Q9: Thoughts of better off dead/self-harm past 2 weeks Not at all Not at all Not at all         1/24/2023    11:26 AM 3/15/2023     8:00 AM 6/7/2023     9:05 AM   SHAKIR-7 SCORE   Total Score 15 (severe anxiety)     Total Score 15 14 17         6/7/2023     9:03 AM   Last PHQ-9   1.  Little interest or pleasure in doing things 2   2.  Feeling down, depressed, or hopeless 2   3.  Trouble falling or staying asleep, or sleeping too much 2   4.  Feeling tired or having little energy 1   5.  Poor appetite or overeating 2   6.  Feeling bad about yourself 2   7.  Trouble concentrating 3   8.  Moving slowly or restless 3   Q9: Thoughts of better off dead/self-harm past 2 weeks 0   PHQ-9 Total Score 17   Difficulty at work, home, or with people Very difficult         6/7/2023     9:05 AM   SHAKIR-7    1. Feeling nervous, anxious, or on edge 3   2. Not being able to stop or control worrying 3   3. Worrying too much about different things 3   4. Trouble relaxing 2   5. Being so restless that it is hard to sit still 2   6. Becoming easily annoyed or irritable 2   7. Feeling afraid, as if something awful might happen 2   SHAKIR-7 Total Score 17   If you checked any problems, how difficult have they made it for you to do your work, take care of things at home, or get along with other people? Very difficult       Suicide Assessment Five-step Evaluation and Treatment (SAFE-T)    Chronic Kidney Disease Follow-up    Do you take any over the counter pain medicine?: Yes  What over the counter medicine are you taking for your pain?:  Arthritis strength tylenol 625mg      How often do you take this medicine?:  Once a day    Migraine     Since your last clinic visit, how have your headaches changed?  Improved    How often are you getting headaches or migraines? 1 or 2 a month     Are you able to do normal daily activities when you have a migraine? Yes    Are you taking rescue/relief medications?  (Select all that apply) sumatriptan (Imitrex)    How helpful is your rescue/relief medication?  I get total relief    Are you taking any medications to prevent migraines? (Select all that apply)  No    In the past 4 weeks, how often have you gone to urgent care or the emergency room because of your headaches?  0    Hypothyroidism Follow-up      Since last visit, patient describes the following symptoms: dry skin, loose stools, tremors and fatigue    Pain History:  When did you first notice your pain? Chronic pain   Have you seen this provider for your pain in the past?   Yes   Where in your body do you have pain? Front of knees, ribcage, right hip, shoulders  Are you seeing anyone else for your pain? No        1/24/2023    11:25 AM 3/15/2023     8:34 AM 6/7/2023     9:03 AM   PHQ-9 SCORE   PHQ-9 Total Score MyChart 15 (Moderately severe depression)     PHQ-9 Total Score 15 16 17           1/24/2023    11:26 AM 3/15/2023     8:00 AM 6/7/2023     9:05 AM   SHAKIR-7 SCORE   Total Score 15 (severe anxiety)     Total Score 15 14 17               Chronic Pain Follow Up:    Location of pain: back of knees, ribcage, right hip and shoulders  Analgesia/pain control:    - Recent changes:  More pain and swelling    - Overall control: Tolerable with discomfort    - Current treatments: tylenol   Adherence:     - Do you ever take more pain medicine than prescribed? No    - When did you take your last dose of pain medicine?  Two days ago    Adverse effects: No   PDMP Review       Value Time User    State PDMP site checked  Yes 2/16/2023 10:36 AM Concha Hare PA        Last CSA Agreement:   CSA -- Patient Level:     [Media Unavailable] Controlled Substance Agreement - Non - Opioid - Scan on 9/30/2019  1:49 PM: NON-OPIOID CONTROLLED SUBSTANCE AGREEMENT       Last UDS: 10/1/2019  Patient declined to complete not on opiods today                Review of Systems   Constitutional, HEENT, cardiovascular, pulmonary, gi and gu systems  are negative, except as otherwise noted.      Objective    /70 (BP Location: Left arm, Patient Position: Sitting, Cuff Size: Adult Regular)   Pulse 62   Temp 98.1  F (36.7  C) (Tympanic)   Resp 16   Wt 88.9 kg (196 lb)   SpO2 98%   BMI 30.70 kg/m    Body mass index is 30.7 kg/m .  Physical Exam   GENERAL: healthy, alert and no distress  NECK: no adenopathy, no asymmetry, masses, or scars and thyroid normal to palpation  RESP: lungs clear to auscultation - no rales, rhonchi or wheezes  CV: regular rate and rhythm, normal S1 S2, no S3 or S4, no murmur, click or rub, no peripheral edema and peripheral pulses strong  ABDOMEN: soft, nontender, no hepatosplenomegaly, no masses and bowel sounds normal  MS: in severe pain. Mild OA changes to knees. No warmth or swelling of any joint in upper or lower extremities.     Hospital Outpatient Visit on 04/03/2023   Component Date Value Ref Range Status     LVEF  04/03/2023 55-60%   Final     Results for orders placed or performed in visit on 06/07/23   XR Knee Left 3 Views (Clinic Performed)     Status: None    Narrative    PROCEDURE:  XR KNEE LEFT 3 VIEWS    HISTORY: Chronic pain of left knee; Chronic pain of left knee    COMPARISON:  None.    TECHNIQUE:  3 views of the left knee were obtained.    FINDINGS:  No fracture or dislocation is identified. There is a  bipartite patella. The joint spaces are preserved.        Impression    IMPRESSION: Normal left knee      LIS CRYSTAL MD         SYSTEM ID:  [id number]   Results for orders placed or performed in visit on 06/07/23   XR Knee Right 3 Views (Clinic Performed)     Status: None    Narrative    PROCEDURE:  XR KNEE RIGHT 3 VIEWS    HISTORY: Chronic pain of right knee; Chronic pain of right knee    COMPARISON:  None.    TECHNIQUE:  3 views of the right knee were obtained.    FINDINGS:  No fracture or dislocation is identified. The joint spaces  are preserved.        Impression    IMPRESSION: Normal right knee       LIS CRYSTAL MD         SYSTEM ID:  [id number]   Results for orders placed or performed in visit on 06/07/23   XR LUMBAR SPINE 2/3 VIEWS (Clinic Performed)     Status: None    Narrative    PROCEDURE: XR LUMBAR SPINE 2/3 VIEWS 6/7/2023 10:53 AM    HISTORY: Chronic bilateral low back pain without sciatica; Chronic  bilateral low back pain without sciatica    COMPARISONS: None.    TECHNIQUE: AP and lateral    FINDINGS: The lumbar disks are normal in height. Lumbar vertebral  bodies and arches are normal. Sacrum and sacroiliac joints are normal.  The prevertebral soft tissues are normal.         Impression    IMPRESSION: Normal lumbar spine    LIS CRYSTAL MD         SYSTEM ID:  [id number]   Results for orders placed or performed in visit on 06/07/23   XR Knee AP Standing Bilateral (Clinic Performed)     Status: None    Narrative    PROCEDURE:  XR KNEE AP STANDING BILATERAL    HISTORY: Chronic bilateral low back pain without sciatica; Chronic  bilateral low back pain without sciatica    Standing bilateral knees one view    FINDINGS: The articular spaces are normal in height at the femoral  tibial articulations bilaterally.    There is a bipartite patella on the left. The distal femur and  proximal tibia and fibula are normal bilaterally.      Impression    IMPRESSION: Normal articular spaces of both knees on the standing view    LIS CRYSTAL MD         SYSTEM ID:  [id number]   TSH with free T4 reflex     Status: Normal   Result Value Ref Range    TSH 0.78 0.30 - 4.20 uIU/mL   CBC with platelets and differential     Status: None   Result Value Ref Range    WBC Count 8.4 4.0 - 11.0 10e3/uL    RBC Count 4.82 3.80 - 5.20 10e6/uL    Hemoglobin 14.2 11.7 - 15.7 g/dL    Hematocrit 42.3 35.0 - 47.0 %    MCV 88 78 - 100 fL    MCH 29.5 26.5 - 33.0 pg    MCHC 33.6 31.5 - 36.5 g/dL    RDW 13.1 10.0 - 15.0 %    Platelet Count 220 150 - 450 10e3/uL    % Neutrophils 68 %    % Lymphocytes 24 %    % Monocytes 6 %    %  Eosinophils 1 %    % Basophils 1 %    % Immature Granulocytes 0 %    NRBCs per 100 WBC 0 <1 /100    Absolute Neutrophils 5.7 1.6 - 8.3 10e3/uL    Absolute Lymphocytes 2.0 0.8 - 5.3 10e3/uL    Absolute Monocytes 0.5 0.0 - 1.3 10e3/uL    Absolute Eosinophils 0.1 0.0 - 0.7 10e3/uL    Absolute Basophils 0.0 0.0 - 0.2 10e3/uL    Absolute Immature Granulocytes 0.0 <=0.4 10e3/uL    Absolute NRBCs 0.0 10e3/uL   CBC with platelets and differential     Status: None    Narrative    The following orders were created for panel order CBC with platelets and differential.  Procedure                               Abnormality         Status                     ---------                               -----------         ------                     CBC with platelets and d...[[id number]]                      Final result                 Please view results for these tests on the individual orders.

## 2023-06-14 NOTE — RESULT ENCOUNTER NOTE
Xray is good of the knee. ? MRI if needed.  We need to revisit this. Not degenerative as we though.

## 2023-06-21 DIAGNOSIS — F41.1 GENERALIZED ANXIETY DISORDER: ICD-10-CM

## 2023-06-22 RX ORDER — CLONAZEPAM 1 MG/1
TABLET ORAL
Qty: 60 TABLET | Refills: 0 | Status: SHIPPED | OUTPATIENT
Start: 2023-06-22 | End: 2023-07-28

## 2023-06-22 NOTE — TELEPHONE ENCOUNTER
clonazePAM (KLONOPIN) 1 MG tablet      Last Written Prescription Date:  5/22/2023  Last Fill Quantity: 60,   # refills: 0  Last Office Visit: 6/7/2023  Future Office visit:    Next 5 appointments (look out 90 days)    Sep 13, 2023 10:15 AM  (Arrive by 10:00 AM)  SHORT with KEILY Fonseca  [facility] ([facility] ) [address]  [place] [address]  [phone]           Routing refill request to provider for review/approval because:    Kimberly Boecker, RN

## 2023-06-26 ENCOUNTER — TELEPHONE (OUTPATIENT)
Dept: CARE COORDINATION | Facility: OTHER | Age: 63
End: 2023-06-26

## 2023-06-26 NOTE — TELEPHONE ENCOUNTER
furosemide (LASIX) 20 MG tablet      Last Written Prescription Date:  3/30/2023  Last Fill Quantity: 90,   # refills: 0  Last Office Visit: 6/7/2023  Future Office visit:    Next 5 appointments (look out 90 days)    Sep 13, 2023 10:15 AM  (Arrive by 10:00 AM)  SHORT with KEILY Fonseca  [facility] ([facility] ) [address] [place] [address]  [phone]           Routing refill request to provider for review/approval because:  [facility] Fax Request    Kimberly Boecker, RN

## 2023-06-29 DIAGNOSIS — I50.30 HEART FAILURE WITH PRESERVED EJECTION FRACTION, NYHA CLASS II (H): ICD-10-CM

## 2023-06-29 DIAGNOSIS — R60.0 BILATERAL LEG EDEMA: ICD-10-CM

## 2023-06-29 RX ORDER — FUROSEMIDE 20 MG
20 TABLET ORAL DAILY
Qty: 90 TABLET | Refills: 1 | Status: SHIPPED | OUTPATIENT
Start: 2023-06-29 | End: 2023-11-06

## 2023-06-29 NOTE — TELEPHONE ENCOUNTER
furosemide (LASIX) 20 MG tablet      Last Written Prescription Date:  3/30/23  Last Fill Quantity: 90,   # refills: 0  Last Office Visit: 5/3/23  Future Office visit:    Next 5 appointments (look out 90 days)    Sep 13, 2023 10:15 AM  (Arrive by 10:00 AM)  SHORT with KEILY Fonseca  [facility] ([facility] ) [address]  [place] [address]  [phone]           Routing refill request to provider for review/approval because:     Diuretics (Including Combos) Protocol Failed 06/29/2023 09:03 AM   Protocol Details  Normal serum creatinine on file in past 12 months

## 2023-07-05 DIAGNOSIS — R12 HEARTBURN: ICD-10-CM

## 2023-07-06 RX ORDER — ESOMEPRAZOLE MAGNESIUM 40 MG/1
CAPSULE, DELAYED RELEASE ORAL
Qty: 90 CAPSULE | Refills: 0 | Status: SHIPPED | OUTPATIENT
Start: 2023-07-06 | End: 2023-10-19

## 2023-07-06 NOTE — TELEPHONE ENCOUNTER
Nexium     Last Written Prescription Date: 4.12.2023  Last Fill Quantity: 90,   # refills: 0  Last Office Visit:   Future Office visit:    Next 5 appointments (look out 90 days)    Sep 13, 2023 10:15 AM  (Arrive by 10:00 AM)  SHORT with KEILY Fonseca  [facility] ([facility] ) [address]  [place] [address]  [phone]           Routing refill request to provider for review/approval because:   PPI Protocol Failed 07/05/2023 09:33 AM   Protocol Details  Recent (12 mo) or future (30 days) visit within the authorizing provider's specialty        Ghislaine CASTRO RN

## 2023-07-10 DIAGNOSIS — R12 HEARTBURN: ICD-10-CM

## 2023-07-11 RX ORDER — ESOMEPRAZOLE MAGNESIUM 40 MG/1
CAPSULE, DELAYED RELEASE ORAL
Qty: 90 CAPSULE | Refills: 0 | OUTPATIENT
Start: 2023-07-11

## 2023-07-24 DIAGNOSIS — E03.9 ACQUIRED HYPOTHYROIDISM: ICD-10-CM

## 2023-07-24 DIAGNOSIS — G47.00 PERSISTENT INSOMNIA: ICD-10-CM

## 2023-07-24 DIAGNOSIS — F41.1 GENERALIZED ANXIETY DISORDER: ICD-10-CM

## 2023-07-27 ENCOUNTER — MYC REFILL (OUTPATIENT)
Dept: PSYCHIATRY | Facility: OTHER | Age: 63
End: 2023-07-27

## 2023-07-27 DIAGNOSIS — F41.1 GENERALIZED ANXIETY DISORDER: ICD-10-CM

## 2023-07-27 RX ORDER — TRAZODONE HYDROCHLORIDE 50 MG/1
TABLET, FILM COATED ORAL
Qty: 30 TABLET | Refills: 10 | Status: SHIPPED | OUTPATIENT
Start: 2023-07-27 | End: 2023-09-13

## 2023-07-27 RX ORDER — LEVOTHYROXINE SODIUM 75 UG/1
TABLET ORAL
Qty: 90 TABLET | Refills: 2 | Status: SHIPPED | OUTPATIENT
Start: 2023-07-27 | End: 2024-05-10

## 2023-07-27 RX ORDER — CLONAZEPAM 1 MG/1
TABLET ORAL
Qty: 60 TABLET | Refills: 0 | OUTPATIENT
Start: 2023-07-27

## 2023-07-27 RX ORDER — CLONAZEPAM 1 MG/1
TABLET ORAL
Qty: 60 TABLET | Refills: 0 | Status: CANCELLED | OUTPATIENT
Start: 2023-07-27

## 2023-07-27 NOTE — TELEPHONE ENCOUNTER
Klonopin      Last Written Prescription Date:  6.22.23  Last Fill Quantity: #60,   # refills: 0  Last Office Visit: 6.7.23  Future Office visit:    Next 5 appointments (look out 90 days)      Sep 13, 2023 10:15 AM  (Arrive by 10:00 AM)  SHORT with KEILY Fonseca  [facility] ([facility] ) [address]  [place] [address]  [phone]             Routing refill request to provider for review/approval because:  Drug not on the [facility], [facility] or [facility] refill protocol or controlled substance

## 2023-07-28 DIAGNOSIS — F41.1 GENERALIZED ANXIETY DISORDER: ICD-10-CM

## 2023-07-28 RX ORDER — CLONAZEPAM 1 MG/1
TABLET ORAL
Qty: 60 TABLET | Refills: 0 | Status: CANCELLED | OUTPATIENT
Start: 2023-07-28

## 2023-07-28 RX ORDER — CLONAZEPAM 1 MG/1
TABLET ORAL
Qty: 60 TABLET | Refills: 0 | Status: SHIPPED | OUTPATIENT
Start: 2023-07-28 | End: 2023-08-28

## 2023-08-23 DIAGNOSIS — F41.1 GENERALIZED ANXIETY DISORDER: ICD-10-CM

## 2023-08-24 NOTE — TELEPHONE ENCOUNTER
Klonopin      Last Written Prescription Date:  7.28.23  Last Fill Quantity: #60,   # refills: 0  Last Office Visit: 6.7.23  Future Office visit:    Next 5 appointments (look out 90 days)      Sep 13, 2023 10:15 AM  (Arrive by 10:00 AM)  SHORT with KEILY Fonseca  [facility] ([facility] ) [address]  [place] [address]  [phone]             Routing refill request to provider for review/approval because:  Drug not on the [facility], [facility] or [facility] refill protocol or controlled substance

## 2023-08-26 ENCOUNTER — MYC MEDICAL ADVICE (OUTPATIENT)
Dept: FAMILY MEDICINE | Facility: OTHER | Age: 63
End: 2023-08-26

## 2023-08-28 RX ORDER — CLONAZEPAM 1 MG/1
TABLET ORAL
Qty: 60 TABLET | Refills: 0 | Status: SHIPPED | OUTPATIENT
Start: 2023-08-28 | End: 2023-09-13

## 2023-09-13 ENCOUNTER — OFFICE VISIT (OUTPATIENT)
Dept: FAMILY MEDICINE | Facility: OTHER | Age: 63
End: 2023-09-13
Attending: PHYSICIAN ASSISTANT
Payer: COMMERCIAL

## 2023-09-13 VITALS
OXYGEN SATURATION: 98 % | DIASTOLIC BLOOD PRESSURE: 60 MMHG | BODY MASS INDEX: 30.53 KG/M2 | HEART RATE: 65 BPM | SYSTOLIC BLOOD PRESSURE: 112 MMHG | RESPIRATION RATE: 16 BRPM | TEMPERATURE: 97.5 F | WEIGHT: 194.9 LBS

## 2023-09-13 DIAGNOSIS — F41.1 GENERALIZED ANXIETY DISORDER: ICD-10-CM

## 2023-09-13 DIAGNOSIS — G47.00 PERSISTENT INSOMNIA: Primary | ICD-10-CM

## 2023-09-13 DIAGNOSIS — Z63.9 RELATIONSHIP DYSFUNCTION: ICD-10-CM

## 2023-09-13 DIAGNOSIS — I25.85 CARDIAC MICROVASCULAR DISEASE: ICD-10-CM

## 2023-09-13 DIAGNOSIS — E03.9 ACQUIRED HYPOTHYROIDISM: ICD-10-CM

## 2023-09-13 PROCEDURE — 99214 OFFICE O/P EST MOD 30 MIN: CPT | Performed by: PHYSICIAN ASSISTANT

## 2023-09-13 PROCEDURE — G0463 HOSPITAL OUTPT CLINIC VISIT: HCPCS

## 2023-09-13 RX ORDER — CLONAZEPAM 1 MG/1
TABLET ORAL
Qty: 60 TABLET | Refills: 3 | Status: SHIPPED | OUTPATIENT
Start: 2023-09-13 | End: 2024-01-08

## 2023-09-13 RX ORDER — TRAZODONE HYDROCHLORIDE 50 MG/1
50 TABLET, FILM COATED ORAL AT BEDTIME
Qty: 30 TABLET | Refills: 10 | Status: SHIPPED | OUTPATIENT
Start: 2023-09-13 | End: 2023-11-06 | Stop reason: DRUGHIGH

## 2023-09-13 SDOH — SOCIAL STABILITY - SOCIAL INSECURITY: PROBLEM RELATED TO PRIMARY SUPPORT GROUP, UNSPECIFIED: Z63.9

## 2023-09-13 ASSESSMENT — ANXIETY QUESTIONNAIRES
1. FEELING NERVOUS, ANXIOUS, OR ON EDGE: NEARLY EVERY DAY
5. BEING SO RESTLESS THAT IT IS HARD TO SIT STILL: MORE THAN HALF THE DAYS
7. FEELING AFRAID AS IF SOMETHING AWFUL MIGHT HAPPEN: SEVERAL DAYS
6. BECOMING EASILY ANNOYED OR IRRITABLE: SEVERAL DAYS
GAD7 TOTAL SCORE: 16
2. NOT BEING ABLE TO STOP OR CONTROL WORRYING: NEARLY EVERY DAY
4. TROUBLE RELAXING: NEARLY EVERY DAY
IF YOU CHECKED OFF ANY PROBLEMS ON THIS QUESTIONNAIRE, HOW DIFFICULT HAVE THESE PROBLEMS MADE IT FOR YOU TO DO YOUR WORK, TAKE CARE OF THINGS AT HOME, OR GET ALONG WITH OTHER PEOPLE: VERY DIFFICULT
3. WORRYING TOO MUCH ABOUT DIFFERENT THINGS: NEARLY EVERY DAY
GAD7 TOTAL SCORE: 16

## 2023-09-13 ASSESSMENT — PAIN SCALES - GENERAL: PAINLEVEL: NO PAIN (0)

## 2023-09-13 ASSESSMENT — PATIENT HEALTH QUESTIONNAIRE - PHQ9: SUM OF ALL RESPONSES TO PHQ QUESTIONS 1-9: 17

## 2023-09-13 NOTE — LETTER
[facility] [place]  [address]  [place] [address]  Phone: [phone]    September 13, 2023        Sharlene Mccord  [address]  [place] [address]          To whom it may concern:    RE: Sharlene Mccord    Patient was seen and treated today at our clinic.  She is not fit for jury duty due to her disability .  She has short term memory loss and would not be able to retain the spoke word.      Please contact me for questions or concerns.      Sincerely,        KEILY Cobos

## 2023-09-13 NOTE — PROGRESS NOTES
"  Assessment & Plan     Persistent insomnia  She is going to be given something for sleep found out her  had another relationship. This is 2 in 2 years.     - traZODone (DESYREL) 50 MG tablet; Take 1 tablet (50 mg) by mouth At Bedtime    Acquired hypothyroidism  She is ok on her level. Ok through [place].     Cardiac microvascular disease  Seeing Cardiology her blood pressure is good today. Even with the anxieyt she has her pressure Is very good today.     Relationship dysfunction  She is encouraged to see a therapist.  Right now it is too much for her.  Her memory is poor and step by step instructions are very difficult. We will have her see us back as recommended in 3 months.     Review of external notes as documented elsewhere in note  Ordering of each unique test  Prescription drug management         Nicotine/Tobacco Cessation:  She reports that she has been smoking cigarettes. She started smoking about 48 years ago. She has a 10.00 pack-year smoking history. She has never used smokeless tobacco.  Nicotine/Tobacco Cessation Plan:   Information offered: Patient not interested at this time      BMI:   Estimated body mass index is 30.53 kg/m  as calculated from the following:    Height as of 5/3/23: 1.702 m (5' 7\").    Weight as of this encounter: 88.4 kg (194 lb 14.4 oz).   Weight management plan: Patient referred to endocrine and/or weight management specialty    See Patient Instructions    No follow-ups on file.    KEILY Cobos  [facility] is a 63 year old, presenting for the following health issues:  Follow Up, Hypertension, and Lipids      HPI     *got summoned for jury duty, wants to talk about getting out of it.     Hyperlipidemia Follow-Up    Are you regularly taking any medication or supplement to lower your cholesterol?   No, stopped taking due to side effects, muscle aches, blurred vision  Are you having muscle aches or other side effects " that you think could be caused by your cholesterol lowering medication?  Yes    Hypertension Follow-up    Do you check your blood pressure regularly outside of the clinic? No   Are you following a low salt diet? No  Are your blood pressures ever more than 140 on the top number (systolic) OR more   than 90 on the bottom number (diastolic), for example 140/90? No    Depression and Anxiety Follow-Up  How are you doing with your depression since your last visit? Worsened thinks its gotten worse  How are you doing with your anxiety since your last visit?  Worsened got worse also  Are you having other symptoms that might be associated with depression or anxiety? Yes:  fatigue, concentrating, uncontrollable shakes, crying, trouble sleeping- does not go into a good sleep at night, up a lot to use restroom,  eats when hungry, gets full faster  Have you had a significant life event? Relationship Concerns and OTHER: sister has stage 4 cancer ; cheating   Do you have any concerns with your use of alcohol or other drugs? No    Social History     Tobacco Use    Smoking status: Every Day     Packs/day: 0.25     Years: 40.00     Pack years: 10.00     Types: Cigarettes     Start date: 1975     Last attempt to quit: 2022     Years since quittin.9    Smokeless tobacco: Never   Vaping Use    Vaping Use: Never used   Substance Use Topics    Alcohol use: No    Drug use: No         3/15/2023     8:34 AM 2023     9:03 AM 2023    10:00 AM   PHQ   PHQ-9 Total Score 16 17 17   Q9: Thoughts of better off dead/self-harm past 2 weeks Not at all Not at all Not at all         3/15/2023     8:00 AM 2023     9:05 AM 2023    10:02 AM   SHAKIR-7 SCORE   Total Score 14 17 16         2023    10:00 AM   Last PHQ-9   1.  Little interest or pleasure in doing things 2   2.  Feeling down, depressed, or hopeless 2   3.  Trouble falling or staying asleep, or sleeping too much 2   4.  Feeling tired or having little energy  1   5.  Poor appetite or overeating 2   6.  Feeling bad about yourself 2   7.  Trouble concentrating 3   8.  Moving slowly or restless 3   Q9: Thoughts of better off dead/self-harm past 2 weeks 0   PHQ-9 Total Score 17   Difficulty at work, home, or with people Very difficult         9/13/2023    10:02 AM   SHAKIR-7    1. Feeling nervous, anxious, or on edge 3   2. Not being able to stop or control worrying 3   3. Worrying too much about different things 3   4. Trouble relaxing 3   5. Being so restless that it is hard to sit still 2   6. Becoming easily annoyed or irritable 1   7. Feeling afraid, as if something awful might happen 1   SHAKIR-7 Total Score 16   If you checked any problems, how difficult have they made it for you to do your work, take care of things at home, or get along with other people? Very difficult       Suicide Assessment Five-step Evaluation and Treatment (SAFE-T)            Review of Systems   Constitutional, HEENT, cardiovascular, pulmonary, gi and gu systems are negative, except as otherwise noted.      Objective    /60 (BP Location: Left arm, Patient Position: Sitting, Cuff Size: Adult Regular)   Pulse 65   Temp 97.5  F (36.4  C) (Tympanic)   Resp 16   Wt 88.4 kg (194 lb 14.4 oz)   SpO2 98%   BMI 30.53 kg/m    Body mass index is 30.53 kg/m .  Physical Exam   GENERAL: healthy, alert and no distress  NECK: no adenopathy, no asymmetry, masses, or scars and thyroid normal to palpation  RESP: lungs clear to auscultation - no rales, rhonchi or wheezes  CV: regular rate and rhythm, normal S1 S2, no S3 or S4, no murmur, click or rub, no peripheral edema and peripheral pulses strong  MS: no gross musculoskeletal defects noted, no edema  PSYCH: affect flat and she is sad.  Well groomed and able to discuss her issue and follow her train of thought.  No issues with self harm. Self worth is poor.

## 2023-10-17 DIAGNOSIS — I50.32 CHRONIC DIASTOLIC HEART FAILURE (H): ICD-10-CM

## 2023-10-17 DIAGNOSIS — R12 HEARTBURN: ICD-10-CM

## 2023-10-18 NOTE — TELEPHONE ENCOUNTER
Spironolactone      Last Written Prescription Date:  10/13/22  Last Fill Quantity: 30,   # refills: 11  Last Office Visit: 9/13/23  Future Office visit:    Next 5 appointments (look out 90 days)      Dec 13, 2023 10:15 AM  (Arrive by 10:00 AM)  SHORT with KEILY Fonseca  [facility] [place] ([facility] [place] ) [address]  [place] [address]  [phone]             Routing refill request to provider for review/approval because:      Nexium      Last Written Prescription Date:  7/6/23  Last Fill Quantity: 90,   # refills: 0  Last Office Visit: 9/13/23  Future Office visit:    Next 5 appointments (look out 90 days)      Dec 13, 2023 10:15 AM  (Arrive by 10:00 AM)  SHORT with KEILY Fonseca  [facility] [place] ([facility] [place] ) [address]  [place] [address]  [phone]             Routing refill request to provider for review/approval because:

## 2023-10-19 RX ORDER — ESOMEPRAZOLE MAGNESIUM 40 MG/1
CAPSULE, DELAYED RELEASE ORAL
Qty: 90 CAPSULE | Refills: 3 | Status: SHIPPED | OUTPATIENT
Start: 2023-10-19 | End: 2024-10-03

## 2023-10-20 RX ORDER — SPIRONOLACTONE 25 MG/1
25 TABLET ORAL DAILY
Qty: 30 TABLET | Refills: 0 | Status: SHIPPED | OUTPATIENT
Start: 2023-10-20 | End: 2023-11-20

## 2023-11-06 ENCOUNTER — VIRTUAL VISIT (OUTPATIENT)
Dept: PSYCHIATRY | Facility: OTHER | Age: 63
End: 2023-11-06
Attending: PSYCHIATRY & NEUROLOGY
Payer: COMMERCIAL

## 2023-11-06 DIAGNOSIS — G47.00 PERSISTENT INSOMNIA: Primary | ICD-10-CM

## 2023-11-06 PROCEDURE — 99443 PR PHYSICIAN TELEPHONE EVALUATION 21-30 MIN: CPT | Mod: 95 | Performed by: PSYCHIATRY & NEUROLOGY

## 2023-11-06 RX ORDER — TRAZODONE HYDROCHLORIDE 100 MG/1
TABLET ORAL
Qty: 30 TABLET | Refills: 4 | Status: SHIPPED | OUTPATIENT
Start: 2023-11-06 | End: 2024-02-12

## 2023-11-06 ASSESSMENT — ANXIETY QUESTIONNAIRES
7. FEELING AFRAID AS IF SOMETHING AWFUL MIGHT HAPPEN: MORE THAN HALF THE DAYS
2. NOT BEING ABLE TO STOP OR CONTROL WORRYING: NEARLY EVERY DAY
5. BEING SO RESTLESS THAT IT IS HARD TO SIT STILL: NEARLY EVERY DAY
GAD7 TOTAL SCORE: 19
1. FEELING NERVOUS, ANXIOUS, OR ON EDGE: NEARLY EVERY DAY
3. WORRYING TOO MUCH ABOUT DIFFERENT THINGS: NEARLY EVERY DAY
GAD7 TOTAL SCORE: 19
6. BECOMING EASILY ANNOYED OR IRRITABLE: MORE THAN HALF THE DAYS

## 2023-11-06 ASSESSMENT — PATIENT HEALTH QUESTIONNAIRE - PHQ9
SUM OF ALL RESPONSES TO PHQ QUESTIONS 1-9: 25
5. POOR APPETITE OR OVEREATING: NEARLY EVERY DAY

## 2023-11-06 ASSESSMENT — PAIN SCALES - GENERAL: PAINLEVEL: NO PAIN (0)

## 2023-11-06 NOTE — PROGRESS NOTES
"  Phone call duration: 32 minutes.       SUBJECTIVE / INTERIM HISTORY                                                                       Last visit May 2020:  Continue Klonopin 1 mg bid. Start Paxil 10 mg evening   - found out Roney was having relationship (second in two years). Getting divorce. Was around Labor Day Sharlene had been out at the cabin for while and called Roney and he said he wasn't going to come to the cabin and at the point Sharlene figured things out  - called [facility] / legal advocates   - doesn't have enough $ to move out thus living with Roney.   - has been talking to her sister Alejandra which isn't always helpful     SUBSTANCE USE- no issues    SYMPTOMS- Depressed mood, low energy, anhedonia, feelings guilt & overwhelmed, poor sleep. cognitive issues including word finding  MEDICAL ROS- nausea and vomiting is better, with moving / exercise leg \"heaviness\" and pain,balance issues, Hypersomnia.  Pain: neck, DDD in neck. Numbness and tingling in legs and feet worse at times when sitting  MEDICAL / SURGICAL HISTORY                 Medical Team:     PMD- Concha Hare     Therapist-none   Patient Active Problem List   Diagnosis    Diverticulitis of sigmoid colon    Hypothyroidism    Anxiety    Cardiac microvascular disease    Diverticulitis    Aortic valve disorder    ACP (advance care planning)    Chronic pain    Constipation    Memory loss    Major depressive disorder, recurrent episode (H24)    Cognitive disorder    Major depressive disorder    Fatigue    Migraine without aura and responsive to treatment    Atypical migraine    Mixed connective tissue disease (H24)    SHAKIR (generalized anxiety disorder)    Gastroesophageal reflux disease with esophagitis    Acute pancreatitis    Obstruction of common bile duct (H28)    Abnormal MRI of the head    GERD (gastroesophageal reflux disease)    Tremor    GUTIERREZ (dyspnea on exertion)    Chronic diastolic heart failure (H)    Diastolic heart failure, " unspecified HF chronicity (H)    Chest pain, unspecified type    Heart murmur    Chronic kidney disease, stage 1    Stage 3a chronic kidney disease (H)    Persistent insomnia    Hypotension due to drugs    Status post coronary angiogram    Relationship dysfunction     ALLERGY   Codeine, Isosorbide mononitrate, Lisinopril, and Paxil [paroxetine]  MEDICATIONS                                                                                             Current Outpatient Medications   Medication Sig    acetaminophen (TYLENOL) 325 MG tablet Take 650 mg by mouth    aspirin (ASA) 81 MG EC tablet Take 1 tablet (81 mg) by mouth daily (Patient not taking: Reported on 9/13/2023)    calcium carbonate (TUMS) 500 MG chewable tablet Take 1 tablet (500 mg) by mouth 3 times daily    clonazePAM (KLONOPIN) 1 MG tablet TAKE 1/2 TO 1 (ONE-HALF TO ONE) TABLET BY MOUTH TWICE DAILY    diltiazem ER COATED BEADS (CARDIZEM CD/CARTIA XT) 120 MG 24 hr capsule Take 1 capsule (120 mg) by mouth daily for 90 days    esomeprazole (NEXIUM) 40 MG DR capsule TAKE 1 CAPSULE BY MOUTH IN THE MORNING BEFORE BREAKFAST TAKE  30-60  MINUTES  BEFORE  EATING    estradiol (ESTRACE) 0.5 MG tablet Take 1 tablet by mouth twice daily    FLUoxetine (PROZAC) 40 MG capsule Take 1 capsule (40 mg) by mouth daily    furosemide (LASIX) 20 MG tablet Take 1 tablet (20 mg) by mouth daily    levothyroxine (SYNTHROID/LEVOTHROID) 75 MCG tablet Take 1 tablet by mouth once daily    nitroGLYcerin (NITROSTAT) 0.4 MG sublingual tablet DISSOLVE ONE TABLET UNDER THE TONGUE EVERY 5 MINUTES AS NEEDED FOR CHEST PAIN. DO NOT EXCEED A TOTAL OF 3 DOSES IN 15 MINUTES    PROCTO-MED HC 2.5 % cream APPLY CREAM RECTALLY TO AFFECTED AREA(S) THREE TIMES DAILY AS DIRECTED    rosuvastatin (CRESTOR) 5 MG tablet Take 1 tablet (5 mg) by mouth daily    spironolactone (ALDACTONE) 25 MG tablet Take 1 tablet by mouth once daily    traZODone (DESYREL) 50 MG tablet Take 1 tablet (50 mg) by mouth At Bedtime      No current facility-administered medications for this visit.       VITALS   There were no vitals taken for this visit.    PHQ9                           6/7/2023     9:03 AM 9/13/2023    10:00 AM 11/6/2023     2:27 PM   PHQ-9 SCORE   PHQ-9 Total Score 17 17 25     Labs:  CBC RESULTS:   Recent Labs   Lab Test 06/07/23  1023   WBC 8.4   RBC 4.82   HGB 14.2   HCT 42.3   MCV 88   MCH 29.5   MCHC 33.6   RDW 13.1        Last Comprehensive Metabolic Panel:  Lab Results   Component Value Date     03/15/2023    POTASSIUM 4.6 03/15/2023    CHLORIDE 101 03/15/2023    CO2 24 03/15/2023    ANIONGAP 12 03/15/2023     (H) 03/15/2023    BUN 18.9 03/15/2023    CR 1.11 (H) 03/15/2023    GFRESTIMATED 56 (L) 03/15/2023    DAKSHA 9.7 03/15/2023            MENTAL STATUS EXAM                                                                                       Mood was anxious and depressed .   Thought process, including associations, was unremarkable and thought content was devoid of  homicidal ideation and psychotic thought. + SI with no intent or plan. Insight was good. Judgment was intact and adequate for safety. Fund of knowledge was intact. Pt demonstrates no obvious problems with attention, concentration, language, recent or remote memory although these were not formally tested.     ASSESSMENT                                                                                                      HISTORICAL:  Initial psych consult 12/12/14            Notes:  Ritalin as adjunct to antidepressant: didn't like how made her feel.   Prozac, Celexa ineffective. Wellbutrin: weight gain. . Zoloft: weight gain. Effexor back while: didn't stay on long and can't recall why. Cymbalta: was on and didn't help. She tried Provigil and SEs. Buspar: HAs.   Neuropsych testing summer 2015:  language: about same, working memory: better than last year. Attention / concentration worse than last year. Speed processing: improved. Executive  function: didn't test well on that front also comparable to last year. Memory: worse off than last year. Depression: similar to last year. Anxiety / emotional anxiety worse. As part of assessment they think should possible have another MRI    Last touched base with Sharlene 3 years ago. In process of divorce. She is really struggling and has kept it limited as to whom she she has shared this with. Not sleeping well - trazodone helped initially now struggling with middle insomnia. We agreed on trying an increase in dose.     TREATMENT RISK STATEMENT: The risks, benefits, alternatives and potential adverse effects have been explained and are understood by the pt. The pt agrees to the treatment plan with the ability to do so. The pt knows to call the clinic for any problems or access emergency care if needed.    DIAGNOSES             Major depressive disorder, recurrent, severe without psychosis  Mild neurocognitive disorder      PLAN                                                                                                                         1) MEDICATION:   -- Continue Klonopin 1 mg bid., Prozac 40 mg daily. Increase trazodone from 50 mg to take 50 to 100 mg HS prn insomnia    2) THERAPY: no change.     3) LABS: none    4) Other: Neuropsych testing with Dr. Johnathan Min in past    8)  RTC: ~1 month

## 2023-11-10 ENCOUNTER — HOSPITAL ENCOUNTER (EMERGENCY)
Facility: HOSPITAL | Age: 63
Discharge: HOME | End: 2023-11-10
Attending: NURSE PRACTITIONER | Admitting: NURSE PRACTITIONER
Payer: MEDICARE

## 2023-11-10 ENCOUNTER — APPOINTMENT (OUTPATIENT)
Dept: GENERAL RADIOLOGY | Facility: HOSPITAL | Age: 63
End: 2023-11-10
Attending: NURSE PRACTITIONER
Payer: MEDICARE

## 2023-11-10 VITALS
TEMPERATURE: 97.9 F | RESPIRATION RATE: 16 BRPM | HEART RATE: 63 BPM | DIASTOLIC BLOOD PRESSURE: 77 MMHG | OXYGEN SATURATION: 97 % | SYSTOLIC BLOOD PRESSURE: 135 MMHG

## 2023-11-10 DIAGNOSIS — S96.911A RIGHT FOOT STRAIN, INITIAL ENCOUNTER: ICD-10-CM

## 2023-11-10 PROCEDURE — 99283 EMERGENCY DEPT VISIT LOW MDM: CPT

## 2023-11-10 PROCEDURE — 73590 X-RAY EXAM OF LOWER LEG: CPT | Mod: RT

## 2023-11-10 PROCEDURE — 250N000013 HC RX MED GY IP 250 OP 250 PS 637: Performed by: STUDENT IN AN ORGANIZED HEALTH CARE EDUCATION/TRAINING PROGRAM

## 2023-11-10 PROCEDURE — 99283 EMERGENCY DEPT VISIT LOW MDM: CPT | Performed by: NURSE PRACTITIONER

## 2023-11-10 PROCEDURE — 73630 X-RAY EXAM OF FOOT: CPT | Mod: RT

## 2023-11-10 RX ORDER — ACETAMINOPHEN 325 MG/1
650 TABLET ORAL ONCE
Status: COMPLETED | OUTPATIENT
Start: 2023-11-10 | End: 2023-11-10

## 2023-11-10 RX ORDER — IBUPROFEN 200 MG
400 TABLET ORAL ONCE
Status: COMPLETED | OUTPATIENT
Start: 2023-11-10 | End: 2023-11-10

## 2023-11-10 RX ADMIN — ACETAMINOPHEN 650 MG: 325 TABLET, FILM COATED ORAL at 18:59

## 2023-11-10 ASSESSMENT — ENCOUNTER SYMPTOMS
ALLERGIC/IMMUNOLOGIC NEGATIVE: 1
HEMATOLOGIC/LYMPHATIC NEGATIVE: 1
CARDIOVASCULAR NEGATIVE: 1
GASTROINTESTINAL NEGATIVE: 1
NEUROLOGICAL NEGATIVE: 1
CONSTITUTIONAL NEGATIVE: 1
PSYCHIATRIC NEGATIVE: 1
ENDOCRINE NEGATIVE: 1
RESPIRATORY NEGATIVE: 1
EYES NEGATIVE: 1

## 2023-11-11 NOTE — DISCHARGE INSTRUCTIONS
Use crutches and advance weightbearing as tolerated.  Use postop shoe and wear for pain control.    Pain control:   If your past medical conditions, allergies, current medications, or current status does not prevent you from using acetaminophen and/or ibuprofen, use the following:   Acetaminophen 650-1000 mg every 6 hours as needed for pain in addition to ibuprofen 400 mg every 6 hours as needed for pain.  Take these two medications together if wanted.    Remember that these are for AS NEEDED.  If not needed, do not take.         therapy:     P- Protect your injury by avoiding more trauma.  This can be done with the use of the equipment that was provided (if one was given).  This does not need to be worn at all times.  Take it off and do passive range of motion exercises so it doesn't become overly stiff.   R- Rest you injury.  Do not do excessive work with the injured part.     I- Ice the injured area every 3-4 hours for 20 minutes.  Be sure to place a barrier between the ice and skin so frostbite doesn't occur.   C- Compression can be done with an ACE bandage if desired.  Do not wrap it too tight so circulation is cut off.  If increased swelling occurs, numbness/tingling, or the extremity turns blue, LOOSEN THE ACE WRAP.   E- Elevate the injured area above the level of your heart when you are not up and about.             R- Rehabilitation (some soft tissue injuries, particularly around joints, may require rehab to limit the likelihood of joint stiffness.     As pain recedes, begin normal activities slowly as tolerated.     Call if symptoms persist.        Follow-up with your primary care provider for reevaluation.  Contact your primary care provider if you have any questions or concerns.  Do not hesitate to return to the ER if any new or worsening symptoms.     Please read the attached instructions (if any).  They highlight more specific treatments and interventions for you at home.              Thank you  for letting me participate in your care and wish you a fast and uneventful recovery,    Jurgen NUNEZ, CNP    Do not hesitate to contact me with questions or concerns.  [email or web address]  [email or web address]

## 2023-11-11 NOTE — ED TRIAGE NOTES
Reports tripped on a step at home PTA. Pain to heal and starting to swell. Denies hitting head or any other injuries.

## 2023-11-11 NOTE — ED NOTES
Pt states she tripped on the stairs and hurt the heel and bottom of R foot. Pt states pain is in heel of R foot. CMS intact, some swelling, no discoloration. ROM moderately impaired due to pain. Pt denies hitting head or being on blood thinners. Pt is A/Ox4 and can pivot transfer on L foot.

## 2023-11-11 NOTE — ED PROVIDER NOTES
History     Chief Complaint   Patient presents with    Foot Pain     HPI  Sharlene Mccord is a 63 year old individual with history of GERD, hypothyroidism, chronic pain, major depressive disorder, generalized anxiety disorder, chronic diastolic heart failure, stage III chronic kidney disease, comes in for right foot injury.  Patient states was walking down the stairs and missed a step and stepped down on her right foot and it bent upwards causing pain in the arch and heel.  This reason patient comes in.    Denies fall with this.  States may have some pain in the leg with this.  No paresthesias reported.      Allergies:  Allergies   Allergen Reactions    Codeine     Isosorbide Mononitrate Other (See Comments)     Vomiting and headache      Lisinopril      [facility] scan    Paxil [Paroxetine] Headache       Problem List:    Patient Active Problem List    Diagnosis Date Noted    Relationship dysfunction 09/13/2023     Priority: Medium    Status post coronary angiogram 02/28/2023     Priority: Medium    Hypotension due to drugs 06/15/2022     Priority: Medium    Stage 3a chronic kidney disease (H) 06/13/2022     Priority: Medium    Persistent insomnia 06/13/2022     Priority: Medium    Chronic kidney disease, stage 1 11/28/2021     Priority: Medium    Diastolic heart failure, unspecified HF chronicity (H) 03/19/2021     Priority: Medium     Added automatically from request for surgery [id number]      Chest pain, unspecified type 03/19/2021     Priority: Medium     Added automatically from request for surgery [id number]      GUTIERREZ (dyspnea on exertion) 03/12/2021     Priority: Medium     Added automatically from request for surgery [id number]      Chronic diastolic heart failure (H) 03/12/2021     Priority: Medium     Added automatically from request for surgery [id number]      Obstruction of common bile duct (H28) 07/31/2020     Priority: Medium    Acute pancreatitis 07/30/2020     Priority: Medium    SHAKIR (generalized anxiety  disorder) 10/02/2019     Priority: Medium     Patient is followed by  for ongoing prescription of benzodiazepines.  All refills should be approved by this provider, or covering partner.    Medication(s): Klonopin 1 mg.   Maximum quantity per month: 60  Clinic visit frequency required: Q 3 months     Controlled substance agreement on file: Yes       Date(s): 9.25.19  Benzodiazepine use reviewed by psychiatry:  Yes       Date(s): 9.25.19    Last [place] website verification:  done on 9.25.19  [email or web address]          ACP (advance care planning) 09/11/2017     Priority: Medium     Advance Care Planning 9/11/2017: ACP Review of Chart / Resources Provided:  Reviewed chart for advance care plan.  Sharlene Mccord has been provided information and resources to begin or update their advance care plan.  Added by Melina Armas        Advance Care Planning 7/11/2016: ACP Review of Chart / Resources Provided:  Reviewed chart for advance care plan.  Sharlene Mccord has been provided information and resources to begin or update their advance care plan.  Added by Kortney Smalls            Mixed connective tissue disease (H24) 07/05/2017     Priority: Medium    GERD (gastroesophageal reflux disease) 03/13/2017     Priority: Medium    Tremor 03/13/2017     Priority: Medium    Gastroesophageal reflux disease with esophagitis 02/09/2017     Priority: Medium    Migraine without aura and responsive to treatment 11/23/2015     Priority: Medium    Major depressive disorder, recurrent episode (H24) 12/17/2014     Priority: Medium     Problem list name updated by automated process. Provider to review      Cognitive disorder 07/08/2014     Priority: Medium    Major depressive disorder 07/08/2014     Priority: Medium    Memory loss 07/02/2014     Priority: Medium    Constipation 04/28/2014     Priority: Medium    Chronic pain 03/12/2014     Priority: Medium    Aortic valve disorder 08/13/2013     Priority: Medium      Problem list name updated by automated process. Provider to review      Diverticulitis of sigmoid colon 05/11/2013     Priority: Medium    Diverticulitis 05/11/2013     Priority: Medium    Cardiac microvascular disease 04/10/2013     Priority: Medium     Based on Cardiac MRI done at        Hypothyroidism 03/01/2011     Priority: Medium     Problem list name updated by automated process. Provider to review      Anxiety 03/01/2011     Priority: Medium     Problem list name updated by automated process. Provider to review      Fatigue 07/01/2008     Priority: Medium    Atypical migraine 07/01/2008     Priority: Medium    Abnormal MRI of the head 07/01/2008     Priority: Medium     Overview:   Nonspecific WM changes small peripherally. Not c/w MS  IMO Update 10/11      Heart murmur 09/14/1999     Priority: Medium     Formatting of this note might be different from the original.  Previous hx of. [facility]          Past Medical History:    Past Medical History:   Diagnosis Date    Anxiety state, unspecified 03/01/2011    Aortic valve disorders 06/07/2000    Cardiac microvascular disease 4/10/2013    COPD (chronic obstructive pulmonary disease) (H) 7/8/2021    Depressive disorder 1997    Fatigue     Fatigue     Fibromyalgia 03/01/2011    Hypertension     Major depressive disorder, recurrent episode, unspecified 12/17/2014    Migraine, unspecified, without mention of intractable migraine without mention of status migrainosus 03/01/2011    Mitral valve disorders(424.0) 10/16/2007    Need for prophylactic hormone replacement therapy (postmenopausal)     PONV (postoperative nausea and vomiting)     Thyroid Nodule 03/01/2011    Tobacco use disorder 03/01/2011    Unspecified hypothyroidism 03/01/2011       Past Surgical History:    Past Surgical History:   Procedure Laterality Date    APPENDECTOMY  1977    BIOPSY  2017    taken at [facility] from Dr. Nadira cohen. Thyroid    CARDIAC SURGERY  2013    angiogram [facility]:   Normal coronary arteries.  Felt ot have some microvascular disease    CL AFF SURGICAL PATHOLOGY  01/02/2007    Thyroid Mass    COLONOSCOPY  1/13/2014    Procedure: COLONOSCOPY;  COLONOSCOPY ;  Surgeon: Chasidy Gee DO;  Location: [facility] OR    CV CORONARY ANGIOGRAM N/A 2/28/2023    Procedure: Coronary Angiogram;  Surgeon: Amol Bee MD;  Location:  HEART CARDIAC CATH LAB    CV RIGHT HEART CATH MEASUREMENTS RECORDED N/A 4/14/2021    Procedure: CV RIGHT HEART CATH;  Surgeon: Danna Clay MD;  Location: [facility]    CV RIGHT HEART EXERCISE STRESS STUDY N/A 4/14/2021    Procedure: Right Heart Exercise Stress Study;  Surgeon: Danna Clay MD;  [facility]    ESOPHAGOSCOPY, GASTROSCOPY, DUODENOSCOPY (EGD), COMBINED N/A 2/9/2017    Procedure: COMBINED ESOPHAGOSCOPY, GASTROSCOPY, DUODENOSCOPY (EGD);  Surgeon: Johnathan Ruff DO;  Location: [facility] OR    GYN SURGERY      c-sections x 3    HYSTERECTOMY TOTAL ABDOMINAL, BILATERAL SALPINGO-OOPHORECTOMY, COMBINED N/A 01/01/2001    LAPAROSCOPIC CHOLECYSTECTOMY  11/07/2003    Cholelithiasis    LAPAROTOMY EXPLORATORY      abdominal pain/fever    LARYNGOSCOPY      SPLENECTOMY         Family History:    Family History   Problem Relation Age of Onset    C.A.D. Father     Hypertension Father     C.A.D. Mother     Cerebrovascular Disease Mother         CVA    Hypertension Mother     Coronary Artery Disease Mother     Diabetes Paternal Grandmother     Diabetes Paternal Grandfather     Diabetes Sister     Breast Cancer Sister     Diabetes Maternal Grandmother     Diabetes Sister     Breast Cancer Sister     Hypertension Sister     Depression Sister     Anxiety Disorder Sister     Obesity Sister     Depression Sister     Breast Cancer Sister     Obesity Sister     Depression Brother     Depression Daughter     Obesity Daughter     Depression Daughter     Mental Illness Daughter         [id number]    Anxiety Disorder  Daughter     Mental Illness Daughter     Anxiety Disorder Other     Osteoporosis Other     Thyroid Disease Other     Diabetes Nephew        Social History:  Marital Status:   [2]  Social History     Tobacco Use    Smoking status: Every Day     Packs/day: 0.25     Years: 40.00     Additional pack years: 0.00     Total pack years: 10.00     Types: Cigarettes     Start date: 1975     Last attempt to quit: 2022     Years since quittin.1    Smokeless tobacco: Never   Vaping Use    Vaping Use: Never used   Substance Use Topics    Alcohol use: No    Drug use: No        Medications:    acetaminophen (TYLENOL) 325 MG tablet  calcium carbonate (TUMS) 500 MG chewable tablet  clonazePAM (KLONOPIN) 1 MG tablet  diltiazem ER COATED BEADS (CARDIZEM CD/CARTIA XT) 120 MG 24 hr capsule  esomeprazole (NEXIUM) 40 MG DR capsule  estradiol (ESTRACE) 0.5 MG tablet  FLUoxetine (PROZAC) 40 MG capsule  levothyroxine (SYNTHROID/LEVOTHROID) 75 MCG tablet  nitroGLYcerin (NITROSTAT) 0.4 MG sublingual tablet  PROCTO-MED HC 2.5 % cream  spironolactone (ALDACTONE) 25 MG tablet  traZODone (DESYREL) 100 MG tablet          Review of Systems   Constitutional: Negative.    HENT: Negative.     Eyes: Negative.    Respiratory: Negative.     Cardiovascular: Negative.    Gastrointestinal: Negative.    Endocrine: Negative.    Genitourinary: Negative.    Musculoskeletal:         Right foot and lateral lower leg pain   Skin: Negative.    Allergic/Immunologic: Negative.    Neurological: Negative.    Hematological: Negative.    Psychiatric/Behavioral: Negative.         Physical Exam   BP: 135/77  Pulse: 63  Temp: 97.9  F (36.6  C)  Resp: 16  SpO2: 97 %      GENERAL APPEARANCE:  The patient is a 63 year old well-developed, well-nourished individual in no acute distress that appears as stated age.  EXTREMITIES:  No cyanosis, clubbing, or edema.  Pedal and post-tibial pulses are 2+ to the right lower extremity..  MUSCULOSKELETAL: Tenderness to  palpation to right plantar surface of the left foot.  Tenderness to palpation to the lateral fibula.  No crepitus or deformities.  Flexion and extension present in knee, ankle, and digits of the foot.  NEUROLOGIC:  No focal sensory or motor deficits are noted.    PSYCHIATRIC:  The patient is awake, alert, and oriented x4.  Recent and remote memory is intact.  Appropriate mood and affect.  Calm and cooperative with history and physical exam.  SKIN:  Warm, dry, and well perfused.  Good turgor.  No lesions, nodules, or rashes are noted.  No bruising noted.      Comment: Discrepancies between my note and notes on behalf of the nursing team or other care providers are secondary to my findings reflecting my physical examination and questioning of the patient.  Any conflicting information provided is not in line with my examination of the patient.       ED Course              ED Course as of 11/10/23 2026   Fri Nov 10, 2023   1820 X-ray ordered while patient in triage.   1941 In to see patient and history/physical completed.    1941 X-ray of the right tip/fib ordered.   2025 No fracture noted of foot or leg.  Patient does have pain.  Likely foot strain.  We will discharge patient home on crutches and weightbearing as tolerated advance as tolerated.  Postop shoe given for pain control.  We will discharge patient home on PRICE therapy.  Acetaminophen/ibuprofen.  Follow-up with PCP for reevaluation.  Return if worsening.  Patient in agreement.                 Results for orders placed or performed during the hospital encounter of 11/10/23 (from the past 24 hour(s))   XR Foot Right G/E 3 Views    Narrative    Exam: XR FOOT RIGHT G/E 3 VIEWS     History:Female, age 63 years, Trauma    Comparison:  No relevant prior imaging.    Technique: Three views are submitted.    Findings: Bones are normally mineralized. No evidence of acute or  subacute fracture.  No evidence of dislocation.           Impression    Impression:  No evidence  of acute or subacute bony abnormality.    SHARMILA BAIG MD         SYSTEM ID:  [id number]   XR Tibia and Fibula Right 2 Views    Narrative    Exam: XR TIBIA AND FIBULA RIGHT 2 VIEWS     History:Female, age 63 years, Pain    Comparison:  No relevant prior imaging.    Technique: Two views are submitted    Findings: Bones are normally mineralized. No evidence of acute or  subacute fracture.  No evidence of dislocation.           Impression    Impression:  No evidence of acute or subacute bony abnormality.    SHARMILA BAIG MD         SYSTEM ID:  [id number]       Medications   acetaminophen (TYLENOL) tablet 650 mg (650 mg Oral $Given 11/10/23 1859)     Or   ibuprofen (ADVIL/MOTRIN) tablet 400 mg ( Oral See Alternative 11/10/23 1859)       Assessments & Plan (with Medical Decision Making)     I have reviewed the nursing notes.    I have reviewed the findings, diagnosis, plan and need for follow up with the patient.      Summary:  Patient presents to the ER today right foot injury.  Potential diagnosis which have been considered and evaluated include strain, fracture, contusion, dislocation as well as others. Many of these have been excluded using the various modalities and assessment as noted on the chart. At the present time, the diagnosis given seems to be the most likely right foot strain.  Upon arrival, vitals signs are normal.  The patient is alert and oriented.  No trauma other than to the foot reported by the patient.  No fall occurred.  No obvious deformities, crepitus, noted upon palpation.  CMS intact to the right lower extremity.  Tenderness to palpation to the heel and arch of the right foot and to the lateral fibula.  Patient was given acetaminophen for pain.  Deferred anything else for pain at this time.  X-ray of foot personally reviewed showing no fracture or dislocation.  X-ray of tib/fib personally reviewed showing no fracture.  Patient likely has strain of the right foot.  Will discharge home on  PRICE therapy.  Crutches given for weightbearing as tolerated.  Postop shoe given for pain control.  Follow-up with PCP for reevaluation.  Acetaminophen/ibuprofen for pain control.  Return if worsening.  Patient verbalized understanding agrees with plan of care.  Patient discharged home.      Critical Care Time: None    Impression and plan discussed with patient. Questions answered, concerns addressed, indications for urgent re-evaluation reviewed, and  given. Patient/Parent/Caregiver agree with treatment plan and have no further questions at this time.  AVS provided at discharge.    This note was created by the Dragon Voice Dictation System. Inadvertent typographical errors, due to software recognition problems, may still exist.             New Prescriptions    No medications on file       Final diagnoses:   Right foot strain, initial encounter       11/10/2023   [facility]       Jurgen Phillips, ENRIQUE CNP  11/10/23 2026

## 2023-11-11 NOTE — ED NOTES
M ortho shoe placed on R foot and adult crutches given to pt. Writer performed teaching on crutches use.

## 2023-11-19 DIAGNOSIS — I50.32 CHRONIC DIASTOLIC HEART FAILURE (H): ICD-10-CM

## 2023-11-20 RX ORDER — SPIRONOLACTONE 25 MG/1
25 TABLET ORAL DAILY
Qty: 30 TABLET | Refills: 0 | Status: SHIPPED | OUTPATIENT
Start: 2023-11-20 | End: 2023-12-22

## 2023-11-20 NOTE — TELEPHONE ENCOUNTER
Spironolactone      Last Written Prescription Date:  10/20/23  Last Fill Quantity: 30,   # refills: 0  Last Office Visit: 9/13/23  Future Office visit:    Next 5 appointments (look out 90 days)      Dec 13, 2023 10:15 AM  (Arrive by 10:00 AM)  SHORT with KEILY Fonseca  [facility] [place] ([facility] [place] ) [address]  [place] [address]  [phone]             Routing refill request to provider for review/approval because:

## 2023-12-04 ENCOUNTER — VIRTUAL VISIT (OUTPATIENT)
Dept: PSYCHIATRY | Facility: OTHER | Age: 63
End: 2023-12-04
Attending: PSYCHIATRY & NEUROLOGY
Payer: COMMERCIAL

## 2023-12-04 DIAGNOSIS — F41.1 GENERALIZED ANXIETY DISORDER: ICD-10-CM

## 2023-12-04 PROCEDURE — 99443 PR PHYSICIAN TELEPHONE EVALUATION 21-30 MIN: CPT | Mod: 95 | Performed by: PSYCHIATRY & NEUROLOGY

## 2023-12-04 RX ORDER — FLUOXETINE 40 MG/1
40 CAPSULE ORAL DAILY
Qty: 90 CAPSULE | Refills: 1 | Status: SHIPPED | OUTPATIENT
Start: 2023-12-04 | End: 2024-05-30

## 2023-12-04 ASSESSMENT — ANXIETY QUESTIONNAIRES
3. WORRYING TOO MUCH ABOUT DIFFERENT THINGS: NEARLY EVERY DAY
GAD7 TOTAL SCORE: 17
2. NOT BEING ABLE TO STOP OR CONTROL WORRYING: NEARLY EVERY DAY
1. FEELING NERVOUS, ANXIOUS, OR ON EDGE: NEARLY EVERY DAY
7. FEELING AFRAID AS IF SOMETHING AWFUL MIGHT HAPPEN: MORE THAN HALF THE DAYS
GAD7 TOTAL SCORE: 17
5. BEING SO RESTLESS THAT IT IS HARD TO SIT STILL: NEARLY EVERY DAY
6. BECOMING EASILY ANNOYED OR IRRITABLE: SEVERAL DAYS

## 2023-12-04 ASSESSMENT — PATIENT HEALTH QUESTIONNAIRE - PHQ9
5. POOR APPETITE OR OVEREATING: MORE THAN HALF THE DAYS
SUM OF ALL RESPONSES TO PHQ QUESTIONS 1-9: 23

## 2023-12-04 ASSESSMENT — PAIN SCALES - GENERAL: PAINLEVEL: NO PAIN (0)

## 2023-12-04 NOTE — PROGRESS NOTES
"  Phone call duration: 23 minutes. 5 minutes reviewing chart, documenting. Total visit time of 28 minutes.       SUBJECTIVE / INTERIM HISTORY                                                                       Last visit 11/6/23: Continue Klonopin 1 mg bid., Prozac 40 mg daily. Increase trazodone from 50 mg to take 50 to 100 mg HS prn insomnia    - increase in trazodone did help with sleep. Feels bit hangover effect morning but feels worth it to get good sleep  - found out she isn't listed on the title of the house nor the garage  - brother Giovani (in the [employer])has been trying to help with her things.   - found out Roney was having relationship (second in two years). Getting divorce. Was around Labor Day Sharlene had been out at the cabin for while and called Roney and he said he wasn't going to come to the cabin and at the point Sharlene figured things out  - called [facility] / legal advocates   - doesn't have enough $ to move out thus living with Roney.   - has been talking to her sister Alejandra which isn't always helpful. Alejandra can be very abrupt. Has been talking to her sister Patty whom lives few blocks away.  - dog Shanti    SUBSTANCE USE- no issues    SYMPTOMS- Depressed mood, low energy, anhedonia, feelings guilt & overwhelmed, poor sleep. cognitive issues including word finding  MEDICAL ROS- nausea and vomiting is better, with moving / exercise leg \"heaviness\" and pain,balance issues, Hypersomnia.  Pain: neck, DDD in neck. Numbness and tingling in legs and feet worse at times when sitting  MEDICAL / SURGICAL HISTORY                 Medical Team:     PMD- Concha Hare     Therapist-none   Patient Active Problem List   Diagnosis    Diverticulitis of sigmoid colon    Hypothyroidism    Anxiety    Cardiac microvascular disease    Diverticulitis    Aortic valve disorder    ACP (advance care planning)    Chronic pain    Constipation    Memory loss    Major depressive disorder, recurrent episode (H24)    Cognitive " disorder    Major depressive disorder    Fatigue    Migraine without aura and responsive to treatment    Atypical migraine    Mixed connective tissue disease (H24)    SHAKIR (generalized anxiety disorder)    Gastroesophageal reflux disease with esophagitis    Acute pancreatitis    Obstruction of common bile duct (H28)    Abnormal MRI of the head    GERD (gastroesophageal reflux disease)    Tremor    GUTIERREZ (dyspnea on exertion)    Chronic diastolic heart failure (H)    Diastolic heart failure, unspecified HF chronicity (H)    Chest pain, unspecified type    Heart murmur    Chronic kidney disease, stage 1    Stage 3a chronic kidney disease (H)    Persistent insomnia    Hypotension due to drugs    Status post coronary angiogram    Relationship dysfunction     ALLERGY   Codeine, Isosorbide mononitrate, Lisinopril, and Paxil [paroxetine]  MEDICATIONS                                                                                             Current Outpatient Medications   Medication Sig    acetaminophen (TYLENOL) 325 MG tablet Take 650 mg by mouth    calcium carbonate (TUMS) 500 MG chewable tablet Take 1 tablet (500 mg) by mouth 3 times daily    clonazePAM (KLONOPIN) 1 MG tablet TAKE 1/2 TO 1 (ONE-HALF TO ONE) TABLET BY MOUTH TWICE DAILY    esomeprazole (NEXIUM) 40 MG DR capsule TAKE 1 CAPSULE BY MOUTH IN THE MORNING BEFORE BREAKFAST TAKE  30-60  MINUTES  BEFORE  EATING    estradiol (ESTRACE) 0.5 MG tablet Take 1 tablet by mouth twice daily    FLUoxetine (PROZAC) 40 MG capsule Take 1 capsule (40 mg) by mouth daily    levothyroxine (SYNTHROID/LEVOTHROID) 75 MCG tablet Take 1 tablet by mouth once daily    nitroGLYcerin (NITROSTAT) 0.4 MG sublingual tablet DISSOLVE ONE TABLET UNDER THE TONGUE EVERY 5 MINUTES AS NEEDED FOR CHEST PAIN. DO NOT EXCEED A TOTAL OF 3 DOSES IN 15 MINUTES    PROCTO-MED HC 2.5 % cream APPLY CREAM RECTALLY TO AFFECTED AREA(S) THREE TIMES DAILY AS DIRECTED    spironolactone (ALDACTONE) 25 MG tablet Take 1  tablet by mouth once daily    traZODone (DESYREL) 100 MG tablet Take 1/2 to 1 tablet at bedtime as needed for insomnia    diltiazem ER COATED BEADS (CARDIZEM CD/CARTIA XT) 120 MG 24 hr capsule Take 1 capsule (120 mg) by mouth daily for 90 days     No current facility-administered medications for this visit.       VITALS   There were no vitals taken for this visit.    PHQ9                           9/13/2023    10:00 AM 11/6/2023     2:27 PM 12/4/2023     3:55 PM   PHQ-9 SCORE   PHQ-9 Total Score 17 25 23     Labs:  CBC RESULTS:   Recent Labs   Lab Test 06/07/23  1023   WBC 8.4   RBC 4.82   HGB 14.2   HCT 42.3   MCV 88   MCH 29.5   MCHC 33.6   RDW 13.1        Last Comprehensive Metabolic Panel:  Lab Results   Component Value Date     03/15/2023    POTASSIUM 4.6 03/15/2023    CHLORIDE 101 03/15/2023    CO2 24 03/15/2023    ANIONGAP 12 03/15/2023     (H) 03/15/2023    BUN 18.9 03/15/2023    CR 1.11 (H) 03/15/2023    GFRESTIMATED 56 (L) 03/15/2023    DAKSHA 9.7 03/15/2023            MENTAL STATUS EXAM                                                                                       Mood was anxious and depressed .   Thought process, including associations, was unremarkable and thought content was devoid of  homicidal ideation and psychotic thought. No SI. Insight was good. Judgment was intact and adequate for safety. Fund of knowledge was intact. Pt demonstrates no obvious problems with attention, concentration, language, recent or remote memory although these were not formally tested.     ASSESSMENT                                                                                                      HISTORICAL:  Initial psych consult 12/12/14            Notes:  Ritalin as adjunct to antidepressant: didn't like how made her feel.   Prozac, Celexa ineffective. Wellbutrin: weight gain. . Zoloft: weight gain. Effexor back while: didn't stay on long and can't recall why. Cymbalta: was on and didn't help.  She tried Provigil and SEs. Buspar: HAs.   Neuropsych testing summer 2015:  language: about same, working memory: better than last year. Attention / concentration worse than last year. Speed processing: improved. Executive function: didn't test well on that front also comparable to last year. Memory: worse off than last year. Depression: similar to last year. Anxiety / emotional anxiety worse. As part of assessment they think should possible have another MRI    Last visit we increased trazodone and it did help with insomnia. Sharlene does get hangover effect from trazodone couple hours the next morning. With this being said she feels the more restful sleep makes it worth keeping with current dose     TREATMENT RISK STATEMENT: The risks, benefits, alternatives and potential adverse effects have been explained and are understood by the pt. The pt agrees to the treatment plan with the ability to do so. The pt knows to call the clinic for any problems or access emergency care if needed.    DIAGNOSES             Major depressive disorder, recurrent, severe without psychosis  Mild neurocognitive disorder      PLAN                                                                                                                         1) MEDICATION:   -- Continue Klonopin 1 mg bid., Prozac 40 mg daily. Continue trazodone 50mg to 100 mg HS prn insomnia    2) THERAPY: no change.     3) LABS: none    4)  RTC: ~1 month

## 2023-12-13 ENCOUNTER — OFFICE VISIT (OUTPATIENT)
Dept: FAMILY MEDICINE | Facility: OTHER | Age: 63
End: 2023-12-13
Attending: PHYSICIAN ASSISTANT
Payer: COMMERCIAL

## 2023-12-13 VITALS
TEMPERATURE: 98.2 F | BODY MASS INDEX: 28.51 KG/M2 | HEART RATE: 67 BPM | WEIGHT: 182 LBS | RESPIRATION RATE: 16 BRPM | SYSTOLIC BLOOD PRESSURE: 110 MMHG | OXYGEN SATURATION: 98 % | DIASTOLIC BLOOD PRESSURE: 60 MMHG

## 2023-12-13 DIAGNOSIS — K64.4 EXTERNAL HEMORRHOIDS: Primary | ICD-10-CM

## 2023-12-13 DIAGNOSIS — M19.171 POST-TRAUMATIC OSTEOARTHRITIS OF RIGHT FOOT: ICD-10-CM

## 2023-12-13 DIAGNOSIS — F33.1 MODERATE EPISODE OF RECURRENT MAJOR DEPRESSIVE DISORDER (H): ICD-10-CM

## 2023-12-13 DIAGNOSIS — Z69.12: ICD-10-CM

## 2023-12-13 DIAGNOSIS — R22.1 NECK FULLNESS: ICD-10-CM

## 2023-12-13 LAB
ALBUMIN SERPL BCG-MCNC: 4.4 G/DL (ref 3.5–5.2)
ALP SERPL-CCNC: 87 U/L (ref 40–150)
ALT SERPL W P-5'-P-CCNC: 24 U/L (ref 0–50)
ANION GAP SERPL CALCULATED.3IONS-SCNC: 10 MMOL/L (ref 7–15)
AST SERPL W P-5'-P-CCNC: 18 U/L (ref 0–45)
BASOPHILS # BLD AUTO: 0.1 10E3/UL (ref 0–0.2)
BASOPHILS NFR BLD AUTO: 1 %
BILIRUB SERPL-MCNC: 0.3 MG/DL
BUN SERPL-MCNC: 11.8 MG/DL (ref 8–23)
CALCIUM SERPL-MCNC: 9.5 MG/DL (ref 8.8–10.2)
CHLORIDE SERPL-SCNC: 101 MMOL/L (ref 98–107)
CREAT SERPL-MCNC: 1.12 MG/DL (ref 0.51–0.95)
DEPRECATED HCO3 PLAS-SCNC: 26 MMOL/L (ref 22–29)
EGFRCR SERPLBLD CKD-EPI 2021: 55 ML/MIN/1.73M2
EOSINOPHIL # BLD AUTO: 0 10E3/UL (ref 0–0.7)
EOSINOPHIL NFR BLD AUTO: 0 %
ERYTHROCYTE [DISTWIDTH] IN BLOOD BY AUTOMATED COUNT: 12.9 % (ref 10–15)
GLUCOSE SERPL-MCNC: 108 MG/DL (ref 70–99)
HCT VFR BLD AUTO: 43.3 % (ref 35–47)
HGB BLD-MCNC: 14.8 G/DL (ref 11.7–15.7)
IMM GRANULOCYTES # BLD: 0 10E3/UL
IMM GRANULOCYTES NFR BLD: 0 %
LYMPHOCYTES # BLD AUTO: 2.1 10E3/UL (ref 0.8–5.3)
LYMPHOCYTES NFR BLD AUTO: 22 %
MCH RBC QN AUTO: 30.6 PG (ref 26.5–33)
MCHC RBC AUTO-ENTMCNC: 34.2 G/DL (ref 31.5–36.5)
MCV RBC AUTO: 90 FL (ref 78–100)
MONOCYTES # BLD AUTO: 0.5 10E3/UL (ref 0–1.3)
MONOCYTES NFR BLD AUTO: 5 %
NEUTROPHILS # BLD AUTO: 7.1 10E3/UL (ref 1.6–8.3)
NEUTROPHILS NFR BLD AUTO: 72 %
NRBC # BLD AUTO: 0 10E3/UL
NRBC BLD AUTO-RTO: 0 /100
PLATELET # BLD AUTO: 199 10E3/UL (ref 150–450)
POTASSIUM SERPL-SCNC: 4.9 MMOL/L (ref 3.4–5.3)
PROT SERPL-MCNC: 7.2 G/DL (ref 6.4–8.3)
RBC # BLD AUTO: 4.83 10E6/UL (ref 3.8–5.2)
SODIUM SERPL-SCNC: 137 MMOL/L (ref 135–145)
TSH SERPL DL<=0.005 MIU/L-ACNC: 1.07 UIU/ML (ref 0.3–4.2)
WBC # BLD AUTO: 9.9 10E3/UL (ref 4–11)

## 2023-12-13 PROCEDURE — 85025 COMPLETE CBC W/AUTO DIFF WBC: CPT | Mod: ZL | Performed by: PHYSICIAN ASSISTANT

## 2023-12-13 PROCEDURE — 36415 COLL VENOUS BLD VENIPUNCTURE: CPT | Mod: ZL | Performed by: PHYSICIAN ASSISTANT

## 2023-12-13 PROCEDURE — G0463 HOSPITAL OUTPT CLINIC VISIT: HCPCS

## 2023-12-13 PROCEDURE — 84443 ASSAY THYROID STIM HORMONE: CPT | Mod: ZL | Performed by: PHYSICIAN ASSISTANT

## 2023-12-13 PROCEDURE — 80053 COMPREHEN METABOLIC PANEL: CPT | Mod: ZL | Performed by: PHYSICIAN ASSISTANT

## 2023-12-13 PROCEDURE — 99214 OFFICE O/P EST MOD 30 MIN: CPT | Performed by: PHYSICIAN ASSISTANT

## 2023-12-13 RX ORDER — HYDROCORTISONE 25 MG/G
CREAM TOPICAL
Qty: 56 G | Refills: 0 | Status: SHIPPED | OUTPATIENT
Start: 2023-12-13 | End: 2024-05-31

## 2023-12-13 ASSESSMENT — PAIN SCALES - GENERAL: PAINLEVEL: MODERATE PAIN (5)
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
## 2023-12-13 NOTE — LETTER
[facility] [place]  [address] [address]  Phone: [phone]    December 13, 2023        Sharlene Mccord  [address] [place] [address]          To whom it may concern:    RE: Sharlene Su is a patient of mine and has been so for many years.  Sharlene has been Disability for 20 years.  It has been due to her medical conditions.  Has specialist in Cardiology and Mental health .  Has been consulted by a Rheumatologist due to her chronic pain.  The last 6 years her conditions have exacerbated and has been unable to work.  We see Sharlene in clinic approximately every 3 months for all her health related problems.      Please contact me for questions or concerns.      Sincerely,      Concha Hare RN,PA-C  [address] [place]  [address]

## 2023-12-14 DIAGNOSIS — I25.85 CARDIAC MICROVASCULAR DISEASE: ICD-10-CM

## 2023-12-14 DIAGNOSIS — I51.89 DIASTOLIC DYSFUNCTION: ICD-10-CM

## 2023-12-14 RX ORDER — DILTIAZEM HYDROCHLORIDE 120 MG/1
120 CAPSULE, COATED, EXTENDED RELEASE ORAL DAILY
Qty: 90 CAPSULE | Refills: 3 | Status: SHIPPED | OUTPATIENT
Start: 2023-12-14

## 2023-12-14 NOTE — TELEPHONE ENCOUNTER
CARDIZEM      Last Written Prescription Date:  1-4-23  Last Fill Quantity: 90,   # refills: 3  Last Office Visit: 5-3-23  Future Office visit:    Next 5 appointments (look out 90 days)      Deshaun 15, 2024 10:45 AM  (Arrive by 10:30 AM)  SHORT with KEILY Fonseca  [facility] ([facility] [place] ) [address]  [place] [address]  [phone]             Routing refill request to provider for review/approval because:  Drug not on the [facility], [facility] or  Health refill protocol or controlled substance

## 2023-12-20 ENCOUNTER — HOSPITAL ENCOUNTER (OUTPATIENT)
Dept: CT IMAGING | Facility: HOSPITAL | Age: 63
Discharge: HOME | End: 2023-12-20
Attending: PHYSICIAN ASSISTANT
Payer: MEDICARE

## 2023-12-20 ENCOUNTER — HOSPITAL ENCOUNTER (OUTPATIENT)
Dept: MRI IMAGING | Facility: HOSPITAL | Age: 63
Discharge: HOME | End: 2023-12-20
Attending: PHYSICIAN ASSISTANT
Payer: MEDICARE

## 2023-12-20 DIAGNOSIS — M19.171 POST-TRAUMATIC OSTEOARTHRITIS OF RIGHT FOOT: ICD-10-CM

## 2023-12-20 DIAGNOSIS — R22.1 NECK FULLNESS: ICD-10-CM

## 2023-12-20 DIAGNOSIS — M72.2 PLANTAR FASCIITIS: Primary | ICD-10-CM

## 2023-12-20 PROCEDURE — G1010 CDSM STANSON: HCPCS

## 2023-12-20 PROCEDURE — 250N000011 HC RX IP 250 OP 636: Performed by: RADIOLOGY

## 2023-12-20 RX ORDER — IOPAMIDOL 755 MG/ML
75 INJECTION, SOLUTION INTRAVASCULAR ONCE
Status: COMPLETED | OUTPATIENT
Start: 2023-12-20 | End: 2023-12-20

## 2023-12-20 RX ADMIN — IOPAMIDOL 75 ML: 755 INJECTION, SOLUTION INTRAVENOUS at 15:53

## 2023-12-21 DIAGNOSIS — I50.32 CHRONIC DIASTOLIC HEART FAILURE (H): ICD-10-CM

## 2023-12-21 NOTE — RESULT ENCOUNTER NOTE
She needs to see podiatry.  Has partial tearing of her tendon on the heel. Some bone marrow edema as well. We will send a referral to have them look at this.  Pain is pretty severe.

## 2023-12-21 NOTE — TELEPHONE ENCOUNTER
ALDACTONE      Last Written Prescription Date:  11-20-23  Last Fill Quantity: 30,   # refills: 0  Last Office Visit: 12-13-23  Future Office visit:    Next 5 appointments (look out 90 days)      Deshaun 15, 2024 10:45 AM  (Arrive by 10:30 AM)  SHORT with KEILY Fonseca  [facility] ([facility] [place] ) [address]  [place] [address]  [phone]             Routing refill request to provider for review/approval because:  Drug not on the [facility], [facility] or [facility] refill protocol or controlled substance

## 2023-12-22 ENCOUNTER — TELEPHONE (OUTPATIENT)
Dept: FAMILY MEDICINE | Facility: OTHER | Age: 63
End: 2023-12-22

## 2023-12-22 DIAGNOSIS — M72.2 PLANTAR FASCIITIS: Primary | ICD-10-CM

## 2023-12-22 RX ORDER — SPIRONOLACTONE 25 MG/1
25 TABLET ORAL DAILY
Qty: 30 TABLET | Refills: 0 | Status: SHIPPED | OUTPATIENT
Start: 2023-12-22 | End: 2024-01-17

## 2024-01-08 ENCOUNTER — VIRTUAL VISIT (OUTPATIENT)
Dept: PSYCHIATRY | Facility: OTHER | Age: 64
End: 2024-01-08
Attending: PSYCHIATRY & NEUROLOGY
Payer: COMMERCIAL

## 2024-01-08 DIAGNOSIS — F41.1 GENERALIZED ANXIETY DISORDER: ICD-10-CM

## 2024-01-08 PROCEDURE — 99443 PR PHYSICIAN TELEPHONE EVALUATION 21-30 MIN: CPT | Performed by: PSYCHIATRY & NEUROLOGY

## 2024-01-08 RX ORDER — CLONAZEPAM 1 MG/1
TABLET ORAL
Qty: 60 TABLET | Refills: 3 | Status: SHIPPED | OUTPATIENT
Start: 2024-01-26 | End: 2024-04-29

## 2024-01-08 RX ORDER — CLONAZEPAM 1 MG/1
TABLET ORAL
Qty: 60 TABLET | Refills: 3 | Status: SHIPPED | OUTPATIENT
Start: 2023-12-27 | End: 2024-01-08

## 2024-01-08 ASSESSMENT — ANXIETY QUESTIONNAIRES
7. FEELING AFRAID AS IF SOMETHING AWFUL MIGHT HAPPEN: MORE THAN HALF THE DAYS
1. FEELING NERVOUS, ANXIOUS, OR ON EDGE: NEARLY EVERY DAY
GAD7 TOTAL SCORE: 18
2. NOT BEING ABLE TO STOP OR CONTROL WORRYING: NEARLY EVERY DAY
6. BECOMING EASILY ANNOYED OR IRRITABLE: MORE THAN HALF THE DAYS
5. BEING SO RESTLESS THAT IT IS HARD TO SIT STILL: NEARLY EVERY DAY
3. WORRYING TOO MUCH ABOUT DIFFERENT THINGS: NEARLY EVERY DAY
GAD7 TOTAL SCORE: 18

## 2024-01-08 ASSESSMENT — PATIENT HEALTH QUESTIONNAIRE - PHQ9
5. POOR APPETITE OR OVEREATING: MORE THAN HALF THE DAYS
SUM OF ALL RESPONSES TO PHQ QUESTIONS 1-9: 18

## 2024-01-08 ASSESSMENT — PAIN SCALES - GENERAL: PAINLEVEL: SEVERE PAIN (6)
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
## 2024-01-08 NOTE — LETTER
January 8, 2024      To Who It May Concern:      I work with Sharlene Mccord in my psychiatry clinic at [facility] in [place]. I am writing to note her dog, Shanti, is her Emotional Support Animal (MON[place]). If you need additional information and / or have questions please do not hesitate to contact me at [phone] and our fax is [phone].       Sincerely,       Aurelia Odell MD

## 2024-01-15 ENCOUNTER — OFFICE VISIT (OUTPATIENT)
Dept: FAMILY MEDICINE | Facility: OTHER | Age: 64
End: 2024-01-15
Attending: PHYSICIAN ASSISTANT
Payer: COMMERCIAL

## 2024-01-15 VITALS
BODY MASS INDEX: 28.93 KG/M2 | WEIGHT: 184.3 LBS | TEMPERATURE: 98 F | SYSTOLIC BLOOD PRESSURE: 114 MMHG | DIASTOLIC BLOOD PRESSURE: 68 MMHG | RESPIRATION RATE: 18 BRPM | HEART RATE: 60 BPM | HEIGHT: 67 IN | OXYGEN SATURATION: 98 %

## 2024-01-15 DIAGNOSIS — M72.2 PLANTAR FASCIITIS: ICD-10-CM

## 2024-01-15 DIAGNOSIS — F33.1 MODERATE EPISODE OF RECURRENT MAJOR DEPRESSIVE DISORDER (H): ICD-10-CM

## 2024-01-15 DIAGNOSIS — F09 COGNITIVE DISORDER: ICD-10-CM

## 2024-01-15 DIAGNOSIS — E78.5 HYPERLIPIDEMIA LDL GOAL <100: ICD-10-CM

## 2024-01-15 DIAGNOSIS — K62.5 RECTAL BLEEDING: Primary | ICD-10-CM

## 2024-01-15 DIAGNOSIS — Z79.890 HORMONE REPLACEMENT THERAPY: ICD-10-CM

## 2024-01-15 LAB
BASOPHILS # BLD AUTO: 0.1 10E3/UL (ref 0–0.2)
BASOPHILS NFR BLD AUTO: 1 %
CHOLEST SERPL-MCNC: 247 MG/DL
EOSINOPHIL # BLD AUTO: 0.1 10E3/UL (ref 0–0.7)
EOSINOPHIL NFR BLD AUTO: 1 %
ERYTHROCYTE [DISTWIDTH] IN BLOOD BY AUTOMATED COUNT: 12.7 % (ref 10–15)
FASTING STATUS PATIENT QL REPORTED: YES
HCT VFR BLD AUTO: 43.5 % (ref 35–47)
HDLC SERPL-MCNC: 64 MG/DL
HGB BLD-MCNC: 14.5 G/DL (ref 11.7–15.7)
IMM GRANULOCYTES # BLD: 0 10E3/UL
IMM GRANULOCYTES NFR BLD: 0 %
LDLC SERPL CALC-MCNC: 152 MG/DL
LYMPHOCYTES # BLD AUTO: 2.4 10E3/UL (ref 0.8–5.3)
LYMPHOCYTES NFR BLD AUTO: 29 %
MCH RBC QN AUTO: 29.7 PG (ref 26.5–33)
MCHC RBC AUTO-ENTMCNC: 33.3 G/DL (ref 31.5–36.5)
MCV RBC AUTO: 89 FL (ref 78–100)
MONOCYTES # BLD AUTO: 0.5 10E3/UL (ref 0–1.3)
MONOCYTES NFR BLD AUTO: 7 %
NEUTROPHILS # BLD AUTO: 5.1 10E3/UL (ref 1.6–8.3)
NEUTROPHILS NFR BLD AUTO: 62 %
NONHDLC SERPL-MCNC: 183 MG/DL
NRBC # BLD AUTO: 0 10E3/UL
NRBC BLD AUTO-RTO: 0 /100
PLATELET # BLD AUTO: 210 10E3/UL (ref 150–450)
RBC # BLD AUTO: 4.88 10E6/UL (ref 3.8–5.2)
TRIGL SERPL-MCNC: 157 MG/DL
WBC # BLD AUTO: 8.3 10E3/UL (ref 4–11)

## 2024-01-15 PROCEDURE — 85025 COMPLETE CBC W/AUTO DIFF WBC: CPT | Mod: ZL | Performed by: PHYSICIAN ASSISTANT

## 2024-01-15 PROCEDURE — 82465 ASSAY BLD/SERUM CHOLESTEROL: CPT | Mod: ZL | Performed by: PHYSICIAN ASSISTANT

## 2024-01-15 PROCEDURE — 99214 OFFICE O/P EST MOD 30 MIN: CPT | Performed by: PHYSICIAN ASSISTANT

## 2024-01-15 PROCEDURE — 36415 COLL VENOUS BLD VENIPUNCTURE: CPT | Mod: ZL | Performed by: PHYSICIAN ASSISTANT

## 2024-01-15 PROCEDURE — 83718 ASSAY OF LIPOPROTEIN: CPT | Mod: ZL | Performed by: PHYSICIAN ASSISTANT

## 2024-01-15 PROCEDURE — G0463 HOSPITAL OUTPT CLINIC VISIT: HCPCS

## 2024-01-15 RX ORDER — ESTRADIOL 0.5 MG/1
0.5 TABLET ORAL DAILY
Qty: 30 TABLET | Refills: 1 | Status: SHIPPED | OUTPATIENT
Start: 2024-01-15 | End: 2024-05-31

## 2024-01-15 ASSESSMENT — ANXIETY QUESTIONNAIRES
7. FEELING AFRAID AS IF SOMETHING AWFUL MIGHT HAPPEN: SEVERAL DAYS
4. TROUBLE RELAXING: NEARLY EVERY DAY
IF YOU CHECKED OFF ANY PROBLEMS ON THIS QUESTIONNAIRE, HOW DIFFICULT HAVE THESE PROBLEMS MADE IT FOR YOU TO DO YOUR WORK, TAKE CARE OF THINGS AT HOME, OR GET ALONG WITH OTHER PEOPLE: SOMEWHAT DIFFICULT
GAD7 TOTAL SCORE: 18
3. WORRYING TOO MUCH ABOUT DIFFERENT THINGS: NEARLY EVERY DAY
1. FEELING NERVOUS, ANXIOUS, OR ON EDGE: NEARLY EVERY DAY
7. FEELING AFRAID AS IF SOMETHING AWFUL MIGHT HAPPEN: SEVERAL DAYS
2. NOT BEING ABLE TO STOP OR CONTROL WORRYING: NEARLY EVERY DAY
GAD7 TOTAL SCORE: 18
8. IF YOU CHECKED OFF ANY PROBLEMS, HOW DIFFICULT HAVE THESE MADE IT FOR YOU TO DO YOUR WORK, TAKE CARE OF THINGS AT HOME, OR GET ALONG WITH OTHER PEOPLE?: SOMEWHAT DIFFICULT
GAD7 TOTAL SCORE: 18
6. BECOMING EASILY ANNOYED OR IRRITABLE: MORE THAN HALF THE DAYS
5. BEING SO RESTLESS THAT IT IS HARD TO SIT STILL: NEARLY EVERY DAY

## 2024-01-15 ASSESSMENT — PATIENT HEALTH QUESTIONNAIRE - PHQ9
SUM OF ALL RESPONSES TO PHQ QUESTIONS 1-9: 18
SUM OF ALL RESPONSES TO PHQ QUESTIONS 1-9: 18

## 2024-01-15 ASSESSMENT — PAIN SCALES - GENERAL: PAINLEVEL: SEVERE PAIN (6)

## 2024-01-15 NOTE — LETTER
January 15, 2024      Sharlene FABRICIO Mccord  [address] [place] [id number]        Dear ,    We are writing to inform you of your test results.    Your Lipids are not great, would recommend something to add in to your program. Iron is fine with lower GI bleeding issues.     Resulted Orders   Lipid Profile (Chol, Trig, HDL, LDL calc)   Result Value Ref Range    Cholesterol 247 (H) <200 mg/dL    Triglycerides 157 (H) <150 mg/dL    Direct Measure HDL 64 >=50 mg/dL    LDL Cholesterol Calculated 152 (H) <=100 mg/dL    Non HDL Cholesterol 183 (H) <130 mg/dL    Patient Fasting > 8hrs? Yes     Narrative    Cholesterol  Desirable:  <200 mg/dL    Triglycerides  Normal:  Less than 150 mg/dL  Borderline High:  150-199 mg/dL  High:  200-499 mg/dL  Very High:  Greater than or equal to 500 mg/dL    Direct Measure HDL  Female:  Greater than or equal to 50 mg/dL   Male:  Greater than or equal to 40 mg/dL    LDL Cholesterol  Desirable:  <100mg/dL  Above Desirable:  100-129 mg/dL   Borderline High:  130-159 mg/dL   High:  160-189 mg/dL   Very High:  >= 190 mg/dL    Non HDL Cholesterol  Desirable:  130 mg/dL  Above Desirable:  130-159 mg/dL  Borderline High:  160-189 mg/dL  High:  190-219 mg/dL  Very High:  Greater than or equal to 220 mg/dL   CBC with platelets and differential   Result Value Ref Range    WBC Count 8.3 4.0 - 11.0 10e3/uL    RBC Count 4.88 3.80 - 5.20 10e6/uL    Hemoglobin 14.5 11.7 - 15.7 g/dL    Hematocrit 43.5 35.0 - 47.0 %    MCV 89 78 - 100 fL    MCH 29.7 26.5 - 33.0 pg    MCHC 33.3 31.5 - 36.5 g/dL    RDW 12.7 10.0 - 15.0 %    Platelet Count 210 150 - 450 10e3/uL    % Neutrophils 62 %    % Lymphocytes 29 %    % Monocytes 7 %    % Eosinophils 1 %    % Basophils 1 %    % Immature Granulocytes 0 %    NRBCs per 100 WBC 0 <1 /100    Absolute Neutrophils 5.1 1.6 - 8.3 10e3/uL    Absolute Lymphocytes 2.4 0.8 - 5.3 10e3/uL    Absolute Monocytes 0.5 0.0 - 1.3 10e3/uL    Absolute Eosinophils 0.1 0.0 - 0.7  10e3/uL    Absolute Basophils 0.1 0.0 - 0.2 10e3/uL    Absolute Immature Granulocytes 0.0 <=0.4 10e3/uL    Absolute NRBCs 0.0 10e3/uL       If you have any questions or concerns, please call the clinic at the number listed above.       Sincerely,      KEILY Fonseca

## 2024-01-15 NOTE — LETTER
[facility] [place]  [address]  [place] [address]  Phone: [phone]    January 15, 2024        Sharlene Mccord  [address]   [place] [address]          To whom it may concern:    RE: Sharlene Mccord    Patient was seen and treated today at our clinic.  She requires a support animal for her emotional support due to medical diagnosis.     Please contact me for questions or concerns.      Sincerely,      Concha Hare

## 2024-01-15 NOTE — PROGRESS NOTES
Assessment & Plan     Rectal bleeding  Persistent and this worsening with her lifting of boxes.  Is moving now and has gotten much worse.   - Adult General Surg Referral  - CBC with platelets and differential; Future  - CBC with platelets and differential    Moderate episode of recurrent major depressive disorder (H)  Very sad right now going through a voice.     Cognitive disorder  Longstanding. Needing her therapy animal for this.  Letter is written.     Hormone replacement therapy  Cutting back on this. With goal of being off by age 64.   - estradiol (ESTRACE) 0.5 MG tablet; Take 1 tablet (0.5 mg) by mouth daily    Plantar fasciitis  Severe foot pain. Over use and this has made this worse.     Hyperlipidemia LDL goal <100  Needing to come back fasting. Aware of this.   - Lipid Profile (Chol, Trig, HDL, LDL calc); Future  - Lipid Profile (Chol, Trig, HDL, LDL calc)    Review of external notes as documented elsewhere in note  Ordering of each unique test  Prescription drug management  10 minutes spent by me on the date of the encounter doing chart review, history and exam, documentation and further activities per the note       See Patient Instructions    Return in about 4 weeks (around 2/12/2024) for Follow up.    KEILY Cobos  [facility] Su is a 63 year old, presenting for the following health issues:  Follow Up        1/15/2024    10:51 AM   Additional Questions   Roomed by Saloni Arita   Accompanied by self         1/15/2024    10:51 AM   Patient Reported Additional Medications   Patient reports taking the following new medications none       HPI     Depression and Anxiety Follow-Up  How are you doing with your depression since your last visit? A tad better- out of the home she was living in. Now living alone with her dog.  How are you doing with your anxiety since your last visit?  No change  Are you having other symptoms that might be associated with  depression or anxiety? Yes:  off and on nausea, constipation then loose stools back and forth not sure if its due to having history IBS diverticulitis. Mentally and physically drained.    Have you had a significant life event? Relationship Concerns, Financial Concerns, Housing Concerns, Grief or Loss, and Health Concerns   Do you have any concerns with your use of alcohol or other drugs? No    Social History     Tobacco Use    Smoking status: Some Days     Packs/day: 0.50     Years: 40.00     Additional pack years: 0.00     Total pack years: 20.00     Types: Cigarettes     Start date: 1975     Last attempt to quit: 2021     Years since quittin.9    Smokeless tobacco: Never    Tobacco comments:     have quit on my own.   Vaping Use    Vaping Use: Every day   Substance Use Topics    Alcohol use: No    Drug use: No         2023     3:55 PM 2024     9:34 AM 1/15/2024    10:28 AM   PHQ   PHQ-9 Total Score 23 18 18   Q9: Thoughts of better off dead/self-harm past 2 weeks Not at all Not at all Not at all         2023     3:55 PM 2024     9:34 AM 1/15/2024    10:29 AM   SHAKIR-7 SCORE   Total Score   18 (severe anxiety)   Total Score 17 18 18         1/15/2024    10:28 AM   Last PHQ-9   1.  Little interest or pleasure in doing things 2   2.  Feeling down, depressed, or hopeless 2   3.  Trouble falling or staying asleep, or sleeping too much 2   4.  Feeling tired or having little energy 2   5.  Poor appetite or overeating 3   6.  Feeling bad about yourself 2   7.  Trouble concentrating 3   8.  Moving slowly or restless 2   Q9: Thoughts of better off dead/self-harm past 2 weeks 0   PHQ-9 Total Score 18         1/15/2024    10:29 AM   SHAKIR-7    1. Feeling nervous, anxious, or on edge 3   2. Not being able to stop or control worrying 3   3. Worrying too much about different things 3   4. Trouble relaxing 3   5. Being so restless that it is hard to sit still 3   6. Becoming easily annoyed or irritable  2   7. Feeling afraid, as if something awful might happen 1   SHAKIR-7 Total Score 18   If you checked any problems, how difficult have they made it for you to do your work, take care of things at home, or get along with other people? Somewhat difficult       Suicide Assessment Five-step Evaluation and Treatment (SAFE-T)          Review of Systems   Constitutional, HEENT, cardiovascular, pulmonary, gi and gu systems are negative, except as otherwise noted.      Objective    There were no vitals taken for this visit.  There is no height or weight on file to calculate BMI.  Physical Exam   GENERAL: healthy, alert and no distress  EYES: Eyes grossly normal to inspection, PERRL and conjunctivae and sclerae normal  HENT: ear canals and TM's normal, nose and mouth without ulcers or lesions  NECK: no adenopathy, no asymmetry, masses, or scars and thyroid normal to palpation  RESP: lungs clear to auscultation - no rales, rhonchi or wheezes  CV: regular rate and rhythm, normal S1 S2, no S3 or S4, no murmur, click or rub, no peripheral edema and peripheral pulses strong  ABDOMEN: soft, nontender, no hepatosplenomegaly, no masses and bowel sounds normal  MS: painful walking pain in lateral foot and near heel as well. Mid foot very tender.   SKIN: no suspicious lesions or rashes  NEURO: Normal strength and tone, mentation intact and speech normal  PSYCH: trouble keeping thoughts straight. Is very tired as she has been moving. Had a lot of help but states it is just all very overwhelming.  Living on very little money.  He is not helping her at all. His girlfriend has been in her home painted her living room. Very upset .  Tearful. She still feels in shock.     Office Visit on 12/13/2023   Component Date Value Ref Range Status    Sodium 12/13/2023 137  135 - 145 mmol/L Final    Reference intervals for this test were updated on 09/26/2023 to more accurately reflect our healthy population. There may be differences in the flagging of  prior results with similar values performed with this method. Interpretation of those prior results can be made in the context of the updated reference intervals.     Potassium 12/13/2023 4.9  3.4 - 5.3 mmol/L Final    Carbon Dioxide (CO2) 12/13/2023 26  22 - 29 mmol/L Final    Anion Gap 12/13/2023 10  7 - 15 mmol/L Final    Urea Nitrogen 12/13/2023 11.8  8.0 - 23.0 mg/dL Final    Creatinine 12/13/2023 1.12 (H)  0.51 - 0.95 mg/dL Final    GFR Estimate 12/13/2023 55 (L)  >60 mL/min/1.73m2 Final    Calcium 12/13/2023 9.5  8.8 - 10.2 mg/dL Final    Chloride 12/13/2023 101  98 - 107 mmol/L Final    Glucose 12/13/2023 108 (H)  70 - 99 mg/dL Final    Alkaline Phosphatase 12/13/2023 87  40 - 150 U/L Final    Reference intervals for this test were updated on 11/14/2023 to more accurately reflect our healthy population. There may be differences in the flagging of prior results with similar values performed with this method. Interpretation of those prior results can be made in the context of the updated reference intervals.    AST 12/13/2023 18  0 - 45 U/L Final    Reference intervals for this test were updated on 6/12/2023 to more accurately reflect our healthy population. There may be differences in the flagging of prior results with similar values performed with this method. Interpretation of those prior results can be made in the context of the updated reference intervals.    ALT 12/13/2023 24  0 - 50 U/L Final    Reference intervals for this test were updated on 6/12/2023 to more accurately reflect our healthy population. There may be differences in the flagging of prior results with similar values performed with this method. Interpretation of those prior results can be made in the context of the updated reference intervals.      Protein Total 12/13/2023 7.2  6.4 - 8.3 g/dL Final    Albumin 12/13/2023 4.4  3.5 - 5.2 g/dL Final    Bilirubin Total 12/13/2023 0.3  <=1.2 mg/dL Final    TSH 12/13/2023 1.07  0.30 - 4.20 uIU/mL  Final    WBC Count 12/13/2023 9.9  4.0 - 11.0 10e3/uL Final    RBC Count 12/13/2023 4.83  3.80 - 5.20 10e6/uL Final    Hemoglobin 12/13/2023 14.8  11.7 - 15.7 g/dL Final    Hematocrit 12/13/2023 43.3  35.0 - 47.0 % Final    MCV 12/13/2023 90  78 - 100 fL Final    MCH 12/13/2023 30.6  26.5 - 33.0 pg Final    MCHC 12/13/2023 34.2  31.5 - 36.5 g/dL Final    RDW 12/13/2023 12.9  10.0 - 15.0 % Final    Platelet Count 12/13/2023 199  150 - 450 10e3/uL Final    % Neutrophils 12/13/2023 72  % Final    % Lymphocytes 12/13/2023 22  % Final    % Monocytes 12/13/2023 5  % Final    % Eosinophils 12/13/2023 0  % Final    % Basophils 12/13/2023 1  % Final    % Immature Granulocytes 12/13/2023 0  % Final    NRBCs per 100 WBC 12/13/2023 0  <1 /100 Final    Absolute Neutrophils 12/13/2023 7.1  1.6 - 8.3 10e3/uL Final    Absolute Lymphocytes 12/13/2023 2.1  0.8 - 5.3 10e3/uL Final    Absolute Monocytes 12/13/2023 0.5  0.0 - 1.3 10e3/uL Final    Absolute Eosinophils 12/13/2023 0.0  0.0 - 0.7 10e3/uL Final    Absolute Basophils 12/13/2023 0.1  0.0 - 0.2 10e3/uL Final    Absolute Immature Granulocytes 12/13/2023 0.0  <=0.4 10e3/uL Final    Absolute NRBCs 12/13/2023 0.0  10e3/uL Final     Results for orders placed or performed in visit on 01/15/24   Lipid Profile (Chol, Trig, HDL, LDL calc)     Status: Abnormal   Result Value Ref Range    Cholesterol 247 (H) <200 mg/dL    Triglycerides 157 (H) <150 mg/dL    Direct Measure HDL 64 >=50 mg/dL    LDL Cholesterol Calculated 152 (H) <=100 mg/dL    Non HDL Cholesterol 183 (H) <130 mg/dL    Patient Fasting > 8hrs? Yes     Narrative    Cholesterol  Desirable:  <200 mg/dL    Triglycerides  Normal:  Less than 150 mg/dL  Borderline High:  150-199 mg/dL  High:  200-499 mg/dL  Very High:  Greater than or equal to 500 mg/dL    Direct Measure HDL  Female:  Greater than or equal to 50 mg/dL   Male:  Greater than or equal to 40 mg/dL    LDL Cholesterol  Desirable:  <100mg/dL  Above Desirable:  100-129  mg/dL   Borderline High:  130-159 mg/dL   High:  160-189 mg/dL   Very High:  >= 190 mg/dL    Non HDL Cholesterol  Desirable:  130 mg/dL  Above Desirable:  130-159 mg/dL  Borderline High:  160-189 mg/dL  High:  190-219 mg/dL  Very High:  Greater than or equal to 220 mg/dL   CBC with platelets and differential     Status: None   Result Value Ref Range    WBC Count 8.3 4.0 - 11.0 10e3/uL    RBC Count 4.88 3.80 - 5.20 10e6/uL    Hemoglobin 14.5 11.7 - 15.7 g/dL    Hematocrit 43.5 35.0 - 47.0 %    MCV 89 78 - 100 fL    MCH 29.7 26.5 - 33.0 pg    MCHC 33.3 31.5 - 36.5 g/dL    RDW 12.7 10.0 - 15.0 %    Platelet Count 210 150 - 450 10e3/uL    % Neutrophils 62 %    % Lymphocytes 29 %    % Monocytes 7 %    % Eosinophils 1 %    % Basophils 1 %    % Immature Granulocytes 0 %    NRBCs per 100 WBC 0 <1 /100    Absolute Neutrophils 5.1 1.6 - 8.3 10e3/uL    Absolute Lymphocytes 2.4 0.8 - 5.3 10e3/uL    Absolute Monocytes 0.5 0.0 - 1.3 10e3/uL    Absolute Eosinophils 0.1 0.0 - 0.7 10e3/uL    Absolute Basophils 0.1 0.0 - 0.2 10e3/uL    Absolute Immature Granulocytes 0.0 <=0.4 10e3/uL    Absolute NRBCs 0.0 10e3/uL   CBC with platelets and differential     Status: None    Narrative    The following orders were created for panel order CBC with platelets and differential.  Procedure                               Abnormality         Status                     ---------                               -----------         ------                     CBC with platelets and d...[[id number]]                      Final result                 Please view results for these tests on the individual orders.                     Answers submitted by the patient for this visit:  Patient Health Questionnaire (Submitted on 1/15/2024)  PHQ9 TOTAL SCORE: 18  SHAKIR-7 (Submitted on 1/15/2024)  SHAKIR 7 TOTAL SCORE: 18

## 2024-01-17 DIAGNOSIS — I50.32 CHRONIC DIASTOLIC HEART FAILURE (H): ICD-10-CM

## 2024-01-17 RX ORDER — SPIRONOLACTONE 25 MG/1
25 TABLET ORAL DAILY
Qty: 30 TABLET | Refills: 0 | Status: SHIPPED | OUTPATIENT
Start: 2024-01-17 | End: 2024-02-13

## 2024-01-17 NOTE — TELEPHONE ENCOUNTER
Spironolactone 25 MG Oral Tablet       Last Written Prescription Date:  12/22/2023  Last Fill Quantity: 30,   # refills: 0  Last Office Visit: 1/15/2024  Future Office visit:    Next 5 appointments (look out 90 days)      Feb 16, 2024  9:15 AM  (Arrive by 9:00 AM)  SHORT with KEILY Fonseca  [facility] ([facility] ) [address]  [place] [address]  [phone]             Routing refill request to provider for review/approval because:    Diuretics (Including Combos) Protocol Xuguib4601/17/2024 06:11 AM   Protocol Details Normal serum creatinine on file in past 12 months    Blood pressure under 140/90 in past 12 months    Recent (12 mo) or future (30 days) visit within the authorizing provider's specialty    Medication is active on med list    Patient is age 18 or older    No active pregancy on record    Normal serum potassium on file in past 12 months    Normal serum sodium on file in past 12 months    No positive pregnancy test in past 12 months          Kimberly Boecker, RN

## 2024-01-18 ENCOUNTER — PREP FOR PROCEDURE (OUTPATIENT)
Dept: SURGERY | Facility: OTHER | Age: 64
End: 2024-01-18

## 2024-01-18 ENCOUNTER — OFFICE VISIT (OUTPATIENT)
Dept: SURGERY | Facility: OTHER | Age: 64
End: 2024-01-18
Attending: CLINICAL NURSE SPECIALIST
Payer: COMMERCIAL

## 2024-01-18 VITALS
TEMPERATURE: 96 F | HEART RATE: 57 BPM | HEIGHT: 67 IN | BODY MASS INDEX: 28.88 KG/M2 | DIASTOLIC BLOOD PRESSURE: 84 MMHG | WEIGHT: 184 LBS | SYSTOLIC BLOOD PRESSURE: 128 MMHG | OXYGEN SATURATION: 97 %

## 2024-01-18 DIAGNOSIS — K64.4 EXTERNAL HEMORRHOIDS: ICD-10-CM

## 2024-01-18 DIAGNOSIS — R19.4 CHANGE IN BOWEL HABITS: ICD-10-CM

## 2024-01-18 DIAGNOSIS — K21.9 GASTROESOPHAGEAL REFLUX DISEASE, UNSPECIFIED WHETHER ESOPHAGITIS PRESENT: ICD-10-CM

## 2024-01-18 DIAGNOSIS — K21.9 ESOPHAGEAL REFLUX: ICD-10-CM

## 2024-01-18 DIAGNOSIS — K62.5 RECTAL BLEEDING: Primary | ICD-10-CM

## 2024-01-18 PROCEDURE — 99203 OFFICE O/P NEW LOW 30 MIN: CPT | Performed by: CLINICAL NURSE SPECIALIST

## 2024-01-18 PROCEDURE — G0463 HOSPITAL OUTPT CLINIC VISIT: HCPCS

## 2024-01-18 RX ORDER — BISACODYL 5 MG/1
10 TABLET, DELAYED RELEASE ORAL SEE ADMIN INSTRUCTIONS
Qty: 4 TABLET | Refills: 0 | Status: ON HOLD | OUTPATIENT
Start: 2024-01-18 | End: 2024-02-13

## 2024-01-18 ASSESSMENT — PAIN SCALES - GENERAL: PAINLEVEL: MILD PAIN (2)

## 2024-01-18 NOTE — PATIENT INSTRUCTIONS
Thank you for allowing Roxanna NUNEZ and our surgical team to participate in your care. Please call our health unit coordinator at [phone] with scheduling questions or the nurse at [phone] with any other questions or concerns.      You have been scheduled for: UPPER ENDOSCOPY AND COLONOSCOPY WITH HEMORRHOIDECTOMY with    You will use GOLYTELY bowel prep.  Please see handout for additional instruction.  You WILL need a pre-operative appointment with your primary care provider.  You may call [phone] or [phone] with any questions.

## 2024-01-18 NOTE — PROGRESS NOTES
Surgery Consult Clinic Note      RE: Sharlene Mccord  : 1960  ALISHA: 2024      Chief Complaint:  Rectal bleeding  Reflux  Abdominal bloating  Change in bowel habits - diarrhea/constipation    History of Present Illness:  I am seeing Sharlene Mccord at the request of Concha MICHAUD for evaluation regarding esophagogastroduodenoscopy, colonoscopy, and hemorrhoidectomy.     Current symptoms include rectal bleeding with bowel movements and lifting heavy objects, fluctuating between diarrhea/constipation, abdominal bloating, reflux.  She denies family history of colon or rectal cancer, weight loss, abdominal pain.     She reports acid taste in mouth, especially at night.  She reports occasionally waking from sleep with acid taste in mouth also.  She has been treated for reflux with esomeprazole.  Admits changes in voice, epigastric burning.  Reports many food/beverage triggers.      She reports all of the above symptoms have been ongoing for a long time and seem to be getting worse.      Previous abdominal surgeries include hysterectomy, , and cholecystectomy.     She specifically denies fevers, chills, nausea, vomiting, chest pain, shortness of breath, palpitations, sore throat, cough, or generalized feeling ill.       Medical history:  Past Medical History:   Diagnosis Date    Anxiety state, unspecified 2011    Aortic valve disorders 2000    Echocardiogram showin mild/moderate AI.  Normal LV function    Arthritis     Cardiac microvascular disease 04/10/2013    Based on Cardiac MRI done at      COPD (chronic obstructive pulmonary disease) (H) 2021    Depressive disorder 1997    Fatigue     Fatigue     Fibromyalgia 2011    Hypertension     Major depressive disorder, recurrent episode, unspecified 2014    Migraine, unspecified, without mention of intractable migraine without mention of status migrainosus 2011    Mitral valve disorders(424.0) 10/16/2007    Need for  prophylactic hormone replacement therapy (postmenopausal)     PONV (postoperative nausea and vomiting)     Status post coronary angiogram 02/28/2023    Thyroid Nodule 03/01/2011    Tobacco use disorder 03/01/2011    Unspecified hypothyroidism 03/01/2011       Surgical history:  Past Surgical History:   Procedure Laterality Date    APPENDECTOMY  1977    BIOPSY  2017    taken at [facility] from Dr. Nadira cohen. Thyroid    CARDIAC SURGERY  2013    angiogram [facility]:  Normal coronary arteries.  Felt ot have some microvascular disease    CL AFF SURGICAL PATHOLOGY  01/02/2007    Thyroid Mass    COLONOSCOPY  1/13/2014    Procedure: COLONOSCOPY;  COLONOSCOPY ;  Surgeon: Chasidy Gee DO;  Location: [facility] OR    CV CORONARY ANGIOGRAM N/A 2/28/2023    Procedure: Coronary Angiogram;  Surgeon: Amol Bee MD;  Location: [facility]    CV RIGHT HEART CATH MEASUREMENTS RECORDED N/A 4/14/2021    Procedure: CV RIGHT HEART CATH;  Surgeon: Danna Clay MD;  Location: [facility]    CV RIGHT HEART EXERCISE STRESS STUDY N/A 4/14/2021    Procedure: Right Heart Exercise Stress Study;  Surgeon: Danna Clay MD;  Location: [facility]    ESOPHAGOSCOPY, GASTROSCOPY, DUODENOSCOPY (EGD), COMBINED N/A 2/9/2017    Procedure: COMBINED ESOPHAGOSCOPY, GASTROSCOPY, DUODENOSCOPY (EGD);  Surgeon: Johnathan Ruff DO;  Location: [facility] OR    GYN SURGERY      c-sections x 3    HYSTERECTOMY TOTAL ABDOMINAL, BILATERAL SALPINGO-OOPHORECTOMY, COMBINED N/A 01/01/2001    LAPAROSCOPIC CHOLECYSTECTOMY  11/07/2003    Cholelithiasis    LAPAROTOMY EXPLORATORY      abdominal pain/fever    LARYNGOSCOPY      SPLENECTOMY         Family history:  Family History   Problem Relation Age of Onset    C.A.D. Father     Hypertension Father     C.A.D. Mother     Cerebrovascular Disease Mother         CVA    Hypertension Mother     Coronary Artery Disease Mother     Diabetes Paternal Grandmother     Diabetes  Paternal Grandfather     Diabetes Sister     Breast Cancer Sister     Diabetes Maternal Grandmother     Diabetes Sister     Breast Cancer Sister     Hypertension Sister     Osteoporosis Sister     Hypertension Sister     Other Cancer Sister         small cell lung cancer    Mental Illness Sister     Depression Sister     Anxiety Disorder Sister     Obesity Sister     Diabetes Sister     Mental Illness Sister     Osteoporosis Sister     Depression Sister     Breast Cancer Sister     Obesity Sister     Depression Brother     Mental Illness Brother     Depression Daughter     Obesity Daughter     Depression Daughter     Mental Illness Daughter         [id number]-[id number]    Anxiety Disorder Daughter     Mental Illness Daughter     Depression Daughter     Anxiety Disorder Other     Osteoporosis Other     Thyroid Disease Other     Obesity Other     Diabetes Nephew     Diabetes Brother         type 1    Prostate Cancer Brother     Colon Cancer Cousin         he passed at 42       Medications:  Prior to Admission medications    Medication Sig Start Date End Date Taking? Authorizing Provider   acetaminophen (TYLENOL) 325 MG tablet Take 650 mg by mouth 8/4/20   Reported, Patient   calcium carbonate (TUMS) 500 MG chewable tablet Take 1 tablet (500 mg) by mouth 3 times daily 9/14/22   Concha Hare PA   clonazePAM (KLONOPIN) 1 MG tablet TAKE 1/2 TO 1 (ONE-HALF TO ONE) TABLET BY MOUTH TWICE DAILY 1/26/24   Aurelia Odell MD   diltiazem ER COATED BEADS (CARDIZEM CD/CARTIA XT) 120 MG 24 hr capsule Take 1 capsule (120 mg) by mouth daily 12/14/23   Sydni Buckley, CNP   esomeprazole (NEXIUM) 40 MG DR capsule TAKE 1 CAPSULE BY MOUTH IN THE MORNING BEFORE BREAKFAST TAKE  30-60  MINUTES  BEFORE  EATING 10/19/23   Concha Hare PA   estradiol (ESTRACE) 0.5 MG tablet Take 1 tablet (0.5 mg) by mouth daily 1/15/24   Concha Hare PA   FLUoxetine (PROZAC) 40 MG capsule Take 1 capsule (40 mg) by mouth daily 12/4/23   Cass  "Aurelia BACA MD   hydrocortisone, Perianal, (PROCTO-MED HC) 2.5 % cream APPLY CREAM RECTALLY TO AFFECTED AREA(S) THREE TIMES DAILY AS DIRECTED 23   Concha Hare PA   levothyroxine (SYNTHROID/LEVOTHROID) 75 MCG tablet Take 1 tablet by mouth once daily 23   Concha Hare PA   nitroGLYcerin (NITROSTAT) 0.4 MG sublingual tablet DISSOLVE ONE TABLET UNDER THE TONGUE EVERY 5 MINUTES AS NEEDED FOR CHEST PAIN. DO NOT EXCEED A TOTAL OF 3 DOSES IN 15 MINUTES 21   Norman Nichole MD   spironolactone (ALDACTONE) 25 MG tablet Take 1 tablet by mouth once daily 24   Concha Hare PA   traZODone (DESYREL) 100 MG tablet Take 1/2 to 1 tablet at bedtime as needed for insomnia 23   Aurelia Odell MD       Allergies:  The patient is allergic to codeine, isosorbide mononitrate, lisinopril, and paxil [paroxetine].  .  Social history:  Social History     Tobacco Use    Smoking status: Some Days     Packs/day: 0.50     Years: 40.00     Additional pack years: 0.00     Total pack years: 20.00     Types: Cigarettes     Start date: 1975     Last attempt to quit: 2021     Years since quittin.9    Smokeless tobacco: Never    Tobacco comments:     have quit on my own.   Substance Use Topics    Alcohol use: No     Marital status: .    Review of Systems:    Constitutional, HEENT, cardiovascular, pulmonary, gi and gu systems are negative, except as otherwise noted.     Physical Examination:  /84 (BP Location: Right arm, Patient Position: Sitting, Cuff Size: Adult Regular)   Pulse 57   Temp (!) 96  F (35.6  C) (Tympanic)   Ht 1.702 m (5' 7\")   Wt 83.5 kg (184 lb)   SpO2 97%   BMI 28.82 kg/m    General: Alert and orientedx4, no acute distress, well-developed/well-nourished, ambulating without assistance  HEENT: normocephalic atraumatic, extraocular movements intact, sclerae anicteric, Trachea mideline  Chest:   Respirations even and unlabored  Abdomen: Skin tags noted " circumferentially around anus   Extremities: Cursory exam unremarkable.    Skin: Warm, dry  Neuro: CN 2-12 grossly intact, no focal deficit, GCS 15  Psych: Pleasant, calm, asks appropriate questions      Assessment/Plan:  #1 Colonoscopy  #2 Esophagogastroduodenoscopy  #3 Hemorrhoidectomy  #4 Rectal bleeding  #5 Reflux  #6 Change in bowel habits  #7 Aortic valve disorder      Exam performed in-conjunction with Dr. Roth, appreciate his help.    The indications, risks, benefits and technical aspects of esophagogastroduodenoscopy were reviewed with her, her questions were asked and answered. Antral biopsy for histologic examination will be performed and the place of H. pylori in gastritis was discussed. Preoperative preparation, npo after midnight preceding the date was discussed and a request made to hold aspirin containing agents one week prior to ameliorate antiplatelet effect.      Sharlene Mccord and I had a discussion about colonoscopies.  The indications, risks, benefits, althernatives and technical aspects of whole colon colonoscopy were outlined with risks including, but not limited to, perforation, bleeding and inability to visualize entire colon.  Management of each was reviewed including the risk for life saving surgery and possible admittance to the hospital.  The need of mechanical preparation of the colon was reviewed along with the use of monitored anesthetic care.  Her questions were asked and answered.    We will proceed with scheduling a esophagogastroduodenoscopy, colonoscopy, and hemorrhoidectomy with Dr. Roth.  She will require pre-operative clearance by her primary provider prior to these procedures.   Melony Jorge [facility]  [address] [place] [address]    [id number]    Referring Provider:  KEILY Fonseca  [address] [place] [id number]     Primary Care Provider:  Concha Hare

## 2024-01-24 NOTE — PROGRESS NOTES
Preoperative Evaluation  [facility] [place]  [address]  [place] [address]  Phone: [phone]  Primary Provider: Concha Llanos  Pre-op Performing Provider: CONCHA LLANOS  Jan 25, 2024   {Provider  Link to PREOP SmartSet  Use this to apply standard patient instructions to AVS; includes medication directions, common orders, guidelines for anemia, warfarin, additional testing   :927517}    Sharlene is a 63 year old, presenting for the following:  Pre-Op Exam    {(!) Visit Details have not yet been documented.  Please enter Visit Details and then use this list to pull in documentation. (Optional):547984}  Surgical Information  Surgery/Procedure: Colonoscopy  [place] [facility] [place]  [place]: Dr. Roth  Surgery Date: 2/13  Time of Surgery: TBD  Where patient plans to recover: Other: going to daughters to recover  Fax number for surgical facility: Note does not need to be faxed, will be available electronically in Epic.    {2021 Provider Charting Preference for Preop :573462}    Subjective       HPI related to upcoming procedure: ***        1/19/2024     4:12 PM   Preop Questions   1. Have you ever had a heart attack or stroke? No   2. Have you ever had surgery on your heart or blood vessels, such as a stent placement, a coronary artery bypass, or surgery on an artery in your head, neck, heart, or legs? No   3. Do you have chest pain with activity? YES -    4. Do you have a history of  heart failure? YES -    5. Do you currently have a cold, bronchitis or symptoms of other infection? No   6. Do you have a cough, shortness of breath, or wheezing? YES - cough now and then   7. Do you or anyone in your family have previous history of blood clots? YES - mother, brothers, sister   8. Do you or does anyone in your family have a serious bleeding problem such as prolonged bleeding following surgeries or cuts? No   9. Have you ever had problems with anemia or been told to take iron pills? YES - many  years ago   10. Have you had any abnormal blood loss such as black, tarry or bloody stools, or abnormal vaginal bleeding? UNKNOWN - bleeding through rectum    11. Have you ever had a blood transfusion? No   12. Are you willing to have a blood transfusion if it is medically needed before, during, or after your surgery? Yes   13. Have you or any of your relatives ever had problems with anesthesia? YES - self and 2 sisters get nausea from anesthesia    14. Do you have sleep apnea, excessive snoring or daytime drowsiness? YES - all of the above   14a. Do you have a CPAP machine? No   15. Do you have any artifical heart valves or other implanted medical devices like a pacemaker, defibrillator, or continuous glucose monitor? No   16. Do you have artificial joints? No   17. Are you allergic to latex? No       Health Care Directive  Patient does not have a Health Care Directive or Living Will: Discussed advance care planning with patient; information given to patient to review.    Preoperative Review of   {Mnpmpreport:006254}  {Review MNPMP for all patients per [facility] MNPMP Profile:126922}    {Chronic problem details (Optional) :235963}    Patient Active Problem List    Diagnosis Date Noted    Relationship dysfunction 09/13/2023     Priority: Medium    Status post coronary angiogram 02/28/2023     Priority: Medium    Hypotension due to drugs 06/15/2022     Priority: Medium    Stage 3a chronic kidney disease (H) 06/13/2022     Priority: Medium    Persistent insomnia 06/13/2022     Priority: Medium    Chronic kidney disease, stage 1 11/28/2021     Priority: Medium    Diastolic heart failure, unspecified HF chronicity (H) 03/19/2021     Priority: Medium     Added automatically from request for surgery [id number]      Chest pain, unspecified type 03/19/2021     Priority: Medium     Added automatically from request for surgery [id number]      GUTIERREZ (dyspnea on exertion) 03/12/2021     Priority: Medium     Added automatically from  request for surgery [id number]      Chronic diastolic heart failure (H) 03/12/2021     Priority: Medium     Added automatically from request for surgery [id number]      Obstruction of common bile duct (H28) 07/31/2020     Priority: Medium    Acute pancreatitis 07/30/2020     Priority: Medium    SHAKIR (generalized anxiety disorder) 10/02/2019     Priority: Medium     Patient is followed by  for ongoing prescription of benzodiazepines.  All refills should be approved by this provider, or covering partner.    Medication(s): Klonopin 1 mg.   Maximum quantity per month: 60  Clinic visit frequency required: Q 3 months     Controlled substance agreement on file: Yes       Date(s): 9.25.19  Benzodiazepine use reviewed by psychiatry:  Yes       Date(s): 9.25.19    Last MNPMP website verification:  done on 9.25.19  [email or web address]          ACP (advance care planning) 09/11/2017     Priority: Medium     Advance Care Planning 9/11/2017: ACP Review of Chart / Resources Provided:  Reviewed chart for advance care plan.  Sharlene Mccord has been provided information and resources to begin or update their advance care plan.  Added by Melina Armas        Advance Care Planning 7/11/2016: ACP Review of Chart / Resources Provided:  Reviewed chart for advance care plan.  Sharlene Mccord has been provided information and resources to begin or update their advance care plan.  Added by Kortney Smalls            Mixed connective tissue disease (H24) 07/05/2017     Priority: Medium    GERD (gastroesophageal reflux disease) 03/13/2017     Priority: Medium    Tremor 03/13/2017     Priority: Medium    Gastroesophageal reflux disease with esophagitis 02/09/2017     Priority: Medium    Migraine without aura and responsive to treatment 11/23/2015     Priority: Medium    Major depressive disorder, recurrent episode (H24) 12/17/2014     Priority: Medium     Problem list name updated by automated process. Provider to review       Cognitive disorder 07/08/2014     Priority: Medium    Major depressive disorder 07/08/2014     Priority: Medium    Memory loss 07/02/2014     Priority: Medium    Constipation 04/28/2014     Priority: Medium    Chronic pain 03/12/2014     Priority: Medium    Aortic valve disorder 08/13/2013     Priority: Medium     Problem list name updated by automated process. Provider to review      Diverticulitis of sigmoid colon 05/11/2013     Priority: Medium    Diverticulitis 05/11/2013     Priority: Medium    Cardiac microvascular disease 04/10/2013     Priority: Medium     Based on Cardiac MRI done at        Hypothyroidism 03/01/2011     Priority: Medium     Problem list name updated by automated process. Provider to review      Anxiety 03/01/2011     Priority: Medium     Problem list name updated by automated process. Provider to review      Fatigue 07/01/2008     Priority: Medium    Atypical migraine 07/01/2008     Priority: Medium    Abnormal MRI of the head 07/01/2008     Priority: Medium     Overview:   Nonspecific WM changes small peripherally. Not c/w MS  IMO Update 10/11      Heart murmur 09/14/1999     Priority: Medium     Formatting of this note might be different from the original.  Previous hx of. [facility]        Past Medical History:   Diagnosis Date    Anxiety state, unspecified 03/01/2011    Aortic valve disorders 06/07/2000    Echocardiogram showin mild/moderate AI.  Normal LV function    Arthritis     Cardiac microvascular disease 04/10/2013    Based on Cardiac MRI done at      COPD (chronic obstructive pulmonary disease) (H) 07/08/2021    Depressive disorder 1997    Fatigue     Fatigue     Fibromyalgia 03/01/2011    Hypertension     Major depressive disorder, recurrent episode, unspecified 12/17/2014    Migraine, unspecified, without mention of intractable migraine without mention of status migrainosus 03/01/2011    Mitral valve disorders(424.0) 10/16/2007    Need for prophylactic hormone  replacement therapy (postmenopausal)     PONV (postoperative nausea and vomiting)     Status post coronary angiogram 02/28/2023    Thyroid Nodule 03/01/2011    Tobacco use disorder 03/01/2011    Unspecified hypothyroidism 03/01/2011     Past Surgical History:   Procedure Laterality Date    APPENDECTOMY  1977    BIOPSY  2017    taken at [facility] from Dr. Nadira cohen. Thyroid    CARDIAC SURGERY  2013    angiogram [facility]:  Normal coronary arteries.  Felt ot have some microvascular disease    CL AFF SURGICAL PATHOLOGY  01/02/2007    Thyroid Mass    COLONOSCOPY  1/13/2014    Procedure: COLONOSCOPY;  COLONOSCOPY ;  Surgeon: Chasidy Gee DO;  Location: [facility] OR    CV CORONARY ANGIOGRAM N/A 2/28/2023    Procedure: Coronary Angiogram;  Surgeon: Amol Bee MD;  Location: [facility]    CV RIGHT HEART CATH MEASUREMENTS RECORDED N/A 4/14/2021    Procedure: CV RIGHT HEART CATH;  Surgeon: Danna Clay MD;  Location: [facility]    CV RIGHT HEART EXERCISE STRESS STUDY N/A 4/14/2021    Procedure: Right Heart Exercise Stress Study;  Surgeon: Danna Clay MD;  Location: [facility]    ESOPHAGOSCOPY, GASTROSCOPY, DUODENOSCOPY (EGD), COMBINED N/A 2/9/2017    Procedure: COMBINED ESOPHAGOSCOPY, GASTROSCOPY, DUODENOSCOPY (EGD);  Surgeon: Johnathan Ruff DO;  Location: [facility] OR    GYN SURGERY      c-sections x 3    HYSTERECTOMY TOTAL ABDOMINAL, BILATERAL SALPINGO-OOPHORECTOMY, COMBINED N/A 01/01/2001    LAPAROSCOPIC CHOLECYSTECTOMY  11/07/2003    Cholelithiasis    LAPAROTOMY EXPLORATORY      abdominal pain/fever    LARYNGOSCOPY      SPLENECTOMY       Current Outpatient Medications   Medication Sig Dispense Refill    acetaminophen (TYLENOL) 325 MG tablet Take 650 mg by mouth      bisacodyl (DULCOLAX) 5 MG EC tablet Take 2 tablets (10 mg) by mouth See Admin Instructions Use as instructed in surgery handout 4 tablet 0    calcium carbonate (TUMS) 500 MG chewable  tablet Take 1 tablet (500 mg) by mouth 3 times daily 180 tablet 3    [START ON 2024] clonazePAM (KLONOPIN) 1 MG tablet TAKE 1/2 TO 1 (ONE-HALF TO ONE) TABLET BY MOUTH TWICE DAILY 60 tablet 3    diltiazem ER COATED BEADS (CARDIZEM CD/CARTIA XT) 120 MG 24 hr capsule Take 1 capsule (120 mg) by mouth daily 90 capsule 3    esomeprazole (NEXIUM) 40 MG DR capsule TAKE 1 CAPSULE BY MOUTH IN THE MORNING BEFORE BREAKFAST TAKE  30-60  MINUTES  BEFORE  EATING 90 capsule 3    estradiol (ESTRACE) 0.5 MG tablet Take 1 tablet (0.5 mg) by mouth daily 30 tablet 1    FLUoxetine (PROZAC) 40 MG capsule Take 1 capsule (40 mg) by mouth daily 90 capsule 1    hydrocortisone, Perianal, (PROCTO-MED HC) 2.5 % cream APPLY CREAM RECTALLY TO AFFECTED AREA(S) THREE TIMES DAILY AS DIRECTED 56 g 0    levothyroxine (SYNTHROID/LEVOTHROID) 75 MCG tablet Take 1 tablet by mouth once daily 90 tablet 2    nitroGLYcerin (NITROSTAT) 0.4 MG sublingual tablet DISSOLVE ONE TABLET UNDER THE TONGUE EVERY 5 MINUTES AS NEEDED FOR CHEST PAIN. DO NOT EXCEED A TOTAL OF 3 DOSES IN 15 MINUTES 30 tablet 4    PEG 3350-KCl-NaBcb-NaCl-NaSulf (POLYETHYLENE GLYCOL) 236 g solution TAKE 4,000 ML BY MOUTH ONCE FOR 1 DOSE AS DIRECTED IN SURGERY HANDOUT      spironolactone (ALDACTONE) 25 MG tablet Take 1 tablet by mouth once daily 30 tablet 0    traZODone (DESYREL) 100 MG tablet Take 1/2 to 1 tablet at bedtime as needed for insomnia 30 tablet 4       Allergies   Allergen Reactions    Codeine     Isosorbide Mononitrate Other (See Comments)     Vomiting and headache      Lisinopril      [facility] scan    Paxil [Paroxetine] Headache        Social History     Tobacco Use    Smoking status: Some Days     Packs/day: 0.50     Years: 40.00     Additional pack years: 0.00     Total pack years: 20.00     Types: Cigarettes     Start date: 1975     Last attempt to quit: 2021     Years since quittin.9    Smokeless tobacco: Never    Tobacco comments:     have quit on my  "own.   Substance Use Topics    Alcohol use: No     {FAMILY HISTORY (Optional):053835645}  History   Drug Use No         Review of Systems  {ROS Picklists (Optional):488143}  Objective    /60 (BP Location: Left arm, Patient Position: Sitting, Cuff Size: Adult Regular)   Pulse 62   Temp 97.8  F (36.6  C) (Tympanic)   Resp 16   Wt 83.5 kg (184 lb)   SpO2 97%   BMI 28.82 kg/m     Estimated body mass index is 28.82 kg/m  as calculated from the following:    Height as of 24: 1.702 m (5' 7\").    Weight as of this encounter: 83.5 kg (184 lb).  Physical Exam  {Exam List (Optional):959487}    Recent Labs   Lab Test 01/15/24  1228 23  1201 23  1023 03/15/23  0930 23  0705 22  0849 22  1047   HGB 14.5 14.8   < >  --  13.8  --  13.8    199   < >  --  212  --  234   INR  --   --   --   --  0.98  --   --    NA  --  137  --  137 140   < > 135   POTASSIUM  --  4.9  --  4.6 4.8   < > 4.7   CR  --  1.12*  --  1.11* 1.20*   < > 1.22*   A1C  --   --   --  5.6  --   --  5.5    < > = values in this interval not displayed.        Diagnostics  {LABS:703268}   {EK}    Revised Cardiac Risk Index (RCRI)  The patient has the following serious cardiovascular risks for perioperative complications:  {PREOP REVISED CARDIAC RISK INDEX (RCRI) :021703}     RCRI Interpretation: {REVISED CARDIAC RISK INTERPRETATION :734173}         Signed Electronically by: KEILY Cobos  Copy of this evaluation report is provided to requesting physician.    {Provider Resources  Preop Diagnostic [employer] [facility] Preop Guidelines  Revised Cardiac Risk Index :118925}   {Email feedback regarding this note to [email or web address]   :458857}  "

## 2024-01-25 ENCOUNTER — OFFICE VISIT (OUTPATIENT)
Dept: FAMILY MEDICINE | Facility: OTHER | Age: 64
End: 2024-01-25
Attending: PHYSICIAN ASSISTANT
Payer: COMMERCIAL

## 2024-01-25 ENCOUNTER — RESULTS ONLY (OUTPATIENT)
Dept: FAMILY MEDICINE | Facility: OTHER | Age: 64
End: 2024-01-25

## 2024-01-25 VITALS
TEMPERATURE: 97.8 F | DIASTOLIC BLOOD PRESSURE: 60 MMHG | RESPIRATION RATE: 16 BRPM | OXYGEN SATURATION: 97 % | BODY MASS INDEX: 28.82 KG/M2 | WEIGHT: 184 LBS | HEART RATE: 62 BPM | SYSTOLIC BLOOD PRESSURE: 110 MMHG

## 2024-01-25 DIAGNOSIS — I25.85 MICROVASCULAR ISCHEMIA OF MYOCARDIUM: ICD-10-CM

## 2024-01-25 DIAGNOSIS — I25.85 CARDIAC MICROVASCULAR DISEASE: ICD-10-CM

## 2024-01-25 DIAGNOSIS — Z01.818 PREOP GENERAL PHYSICAL EXAM: Primary | ICD-10-CM

## 2024-01-25 LAB
ANION GAP SERPL CALCULATED.3IONS-SCNC: 9 MMOL/L (ref 7–15)
ATRIAL RATE - MUSE: 58 BPM
BUN SERPL-MCNC: 20.4 MG/DL (ref 8–23)
CALCIUM SERPL-MCNC: 9.3 MG/DL (ref 8.8–10.2)
CHLORIDE SERPL-SCNC: 104 MMOL/L (ref 98–107)
CREAT SERPL-MCNC: 1.06 MG/DL (ref 0.51–0.95)
DEPRECATED HCO3 PLAS-SCNC: 25 MMOL/L (ref 22–29)
DIASTOLIC BLOOD PRESSURE - MUSE: NORMAL MMHG
EGFRCR SERPLBLD CKD-EPI 2021: 59 ML/MIN/1.73M2
GLUCOSE SERPL-MCNC: 94 MG/DL (ref 70–99)
INTERPRETATION ECG - MUSE: NORMAL
P AXIS - MUSE: 147 DEGREES
POTASSIUM SERPL-SCNC: 4.6 MMOL/L (ref 3.4–5.3)
PR INTERVAL - MUSE: 158 MS
QRS DURATION - MUSE: 92 MS
QT - MUSE: 418 MS
QTC - MUSE: 410 MS
R AXIS - MUSE: -23 DEGREES
SODIUM SERPL-SCNC: 138 MMOL/L (ref 135–145)
SYSTOLIC BLOOD PRESSURE - MUSE: NORMAL MMHG
T AXIS - MUSE: 143 DEGREES
VENTRICULAR RATE- MUSE: 58 BPM

## 2024-01-25 PROCEDURE — 99214 OFFICE O/P EST MOD 30 MIN: CPT | Performed by: PHYSICIAN ASSISTANT

## 2024-01-25 PROCEDURE — 93005 ELECTROCARDIOGRAM TRACING: CPT | Performed by: PHYSICIAN ASSISTANT

## 2024-01-25 PROCEDURE — 36415 COLL VENOUS BLD VENIPUNCTURE: CPT | Mod: ZL | Performed by: PHYSICIAN ASSISTANT

## 2024-01-25 PROCEDURE — 93010 ELECTROCARDIOGRAM REPORT: CPT | Performed by: INTERNAL MEDICINE

## 2024-01-25 PROCEDURE — G0463 HOSPITAL OUTPT CLINIC VISIT: HCPCS

## 2024-01-25 PROCEDURE — 80048 BASIC METABOLIC PNL TOTAL CA: CPT | Mod: ZL | Performed by: PHYSICIAN ASSISTANT

## 2024-01-25 RX ORDER — POLYETHYLENE GLYCOL 3350, SODIUM SULFATE ANHYDROUS, SODIUM BICARBONATE, SODIUM CHLORIDE, POTASSIUM CHLORIDE 236; 22.74; 6.74; 5.86; 2.97 G/4L; G/4L; G/4L; G/4L; G/4L
POWDER, FOR SOLUTION ORAL
Status: ON HOLD | COMMUNITY
Start: 2024-01-18 | End: 2024-02-13

## 2024-01-25 RX ORDER — ATORVASTATIN CALCIUM 10 MG/1
10 TABLET, FILM COATED ORAL DAILY
Qty: 30 TABLET | Refills: 1 | Status: SHIPPED | OUTPATIENT
Start: 2024-01-25 | End: 2024-02-18

## 2024-01-25 ASSESSMENT — PAIN SCALES - GENERAL: PAINLEVEL: SEVERE PAIN (6)

## 2024-01-25 NOTE — PROGRESS NOTES
Preoperative Evaluation  [facility] [place]  [address]  [place] [address]  Phone: [phone]  Primary Provider: Concha Llanos  Pre-op Performing Provider: CONCHA LLANOS  Jan 25, 2024       Sharlene is a 63 year old, presenting for the following:  Pre-Op Exam      Surgical Information  Surgery/Procedure: Colonoscopy  Surgery Location: [facility] – [place]  Surgeon: Dr. Roth  Surgery Date: 2/13  Time of Surgery: TBD  Where patient plans to recover: Other: going to daughters to recover  Fax number for surgical facility: Note does not need to be faxed, will be available electronically in Epic.    Assessment & Plan     The proposed surgical procedure is considered LOW risk.    Preop general physical exam  She is optimized. Removal of tags and hemorrhoids  colon screening and upper endoscopy.    This will be done by Dr. Roth.       Possible Sleep Apnea: none.            - No identified additional risk factors other than previously addressed    Antiplatelet or Anticoagulation Medication Instructions   - Patient is on no antiplatelet or anticoagulation medications.    Additional Medication Instructions   - Calcium Channel Blockers: May be continued on the day of surgery.   - Diuretics: HOLD on the day of surgery.   - Statins: Continue taking on the day of surgery.    - ibuprofen (Advil, Motrin): HOLD 1 day before surgery.    - SSRIs, SNRIs, TCAs, Antipsychotics: Continue without modification.     Recommendation  APPROVAL GIVEN to proceed with proposed procedure, without further diagnostic evaluation.    Review of external notes as documented elsewhere in note  Ordering of each unique test  Prescription drug management  10 minutes spent by me on the date of the encounter doing chart review, history and exam, documentation and further activities per the note    Subjective       HPI related to upcoming procedure: has active hemorrhoids and very bothersome. Has rectal tags. And internal hemorrhoids. Will be having  colon screening and has been on PPI with break through so will be having an upper GI.  Stress has been more than usual these days.           1/19/2024     4:12 PM   Preop Questions   1. Have you ever had a heart attack or stroke? No   2. Have you ever had surgery on your heart or blood vessels, such as a stent placement, a coronary artery bypass, or surgery on an artery in your head, neck, heart, or legs? No   3. Do you have chest pain with activity? YES -    4. Do you have a history of  heart failure? YES -    5. Do you currently have a cold, bronchitis or symptoms of other infection? No   6. Do you have a cough, shortness of breath, or wheezing? YES - cough now and then   7. Do you or anyone in your family have previous history of blood clots? YES - mother, brothers, sister   8. Do you or does anyone in your family have a serious bleeding problem such as prolonged bleeding following surgeries or cuts? No   9. Have you ever had problems with anemia or been told to take iron pills? YES - many years ago   10. Have you had any abnormal blood loss such as black, tarry or bloody stools, or abnormal vaginal bleeding? UNKNOWN - bleeding through rectum    11. Have you ever had a blood transfusion? No   12. Are you willing to have a blood transfusion if it is medically needed before, during, or after your surgery? Yes   13. Have you or any of your relatives ever had problems with anesthesia? YES - self and 2 sisters get nausea from anesthesia    14. Do you have sleep apnea, excessive snoring or daytime drowsiness? YES - all of the above   14a. Do you have a CPAP machine? No   15. Do you have any artifical heart valves or other implanted medical devices like a pacemaker, defibrillator, or continuous glucose monitor? No   16. Do you have artificial joints? No   17. Are you allergic to latex? No       Health Care Directive  Patient does not have a Health Care Directive or Living Will: Discussed advance care planning with  patient; information given to patient to review.    Preoperative Review of    reviewed - controlled substances reflected in medication list.      Status of Chronic Conditions:  CAD - Patient has a longstanding history of moderate-severe CAD. Patient denies recent chest pain or NTG use, denies exercise induced dyspnea or PND. Last Stress test 1/4/23, EKG 01/25/24  .     DEPRESSION - Patient has a long history of Depression of moderate severity requiring medication for control with recent symptoms being waxing and waning..Current symptoms of depression include depressed mood, psychomotor agitation (restless and /or feeling on edge), anxiety, irritablility.     HYPERLIPIDEMIA - Patient has a long history of significant Hyperlipidemia requiring medication for treatment with recent poor control. Patient reports she stopped her medications and discussion to start them again after this procedure problems or side effects with the medication.     HYPERTENSION - Patient has longstanding history of HTN , currently denies any symptoms referable to elevated blood pressure. Specifically denies chest pain, palpitations, dyspnea, orthopnea, PND or peripheral edema. Blood pressure readings have been in normal range. Current medication regimen is as listed below. Patient denies any side effects of medication.     HYPOTHYROIDISM - Patient has a longstanding history of chronic Hypothyroidism. Patient has been doing well, noting no tremor, insomnia, hair loss or changes in skin texture. Continues to take medications as directed, without adverse reactions or side effects. Last TSH   Lab Results   Component Value Date    TSH 1.07 12/13/2023   .      RENAL INSUFFICIENCY - Patient has a longstanding history of moderate-severe chronic renal insufficiency. Last Cr 01/25/24 .     Patient Active Problem List    Diagnosis Date Noted    Relationship dysfunction 09/13/2023     Priority: Medium    Status post coronary angiogram 02/28/2023      Priority: Medium    Hypotension due to drugs 06/15/2022     Priority: Medium    Stage 3a chronic kidney disease (H) 06/13/2022     Priority: Medium    Persistent insomnia 06/13/2022     Priority: Medium    Chronic kidney disease, stage 1 11/28/2021     Priority: Medium    Diastolic heart failure, unspecified HF chronicity (H) 03/19/2021     Priority: Medium     Added automatically from request for surgery [id number]      Chest pain, unspecified type 03/19/2021     Priority: Medium     Added automatically from request for surgery [id number]      GUTIERREZ (dyspnea on exertion) 03/12/2021     Priority: Medium     Added automatically from request for surgery [id number]      Chronic diastolic heart failure (H) 03/12/2021     Priority: Medium     Added automatically from request for surgery [id number]      Obstruction of common bile duct (H28) 07/31/2020     Priority: Medium    Acute pancreatitis 07/30/2020     Priority: Medium    SHAKIR (generalized anxiety disorder) 10/02/2019     Priority: Medium     Patient is followed by  for ongoing prescription of benzodiazepines.  All refills should be approved by this provider, or covering partner.    Medication(s): Klonopin 1 mg.   Maximum quantity per month: 60  Clinic visit frequency required: Q 3 months     Controlled substance agreement on file: Yes       Date(s): 9.25.19  Benzodiazepine use reviewed by psychiatry:  Yes       Date(s): 9.25.19    Last [place] website verification:  done on 9.25.19  [email or web address]          ACP (advance care planning) 09/11/2017     Priority: Medium     Advance Care Planning 9/11/2017: ACP Review of Chart / Resources Provided:  Reviewed chart for advance care plan.  Sharlene Mccord has been provided information and resources to begin or update their advance care plan.  Added by Melina Armas        Advance Care Planning 7/11/2016: ACP Review of Chart / Resources Provided:  Reviewed chart for advance care plan.  Sharlene Mccord has been  provided information and resources to begin or update their advance care plan.  Added by Kortney Smalls            Mixed connective tissue disease (H24) 07/05/2017     Priority: Medium    GERD (gastroesophageal reflux disease) 03/13/2017     Priority: Medium    Tremor 03/13/2017     Priority: Medium    Gastroesophageal reflux disease with esophagitis 02/09/2017     Priority: Medium    Migraine without aura and responsive to treatment 11/23/2015     Priority: Medium    Major depressive disorder, recurrent episode (H24) 12/17/2014     Priority: Medium     Problem list name updated by automated process. Provider to review      Cognitive disorder 07/08/2014     Priority: Medium    Major depressive disorder 07/08/2014     Priority: Medium    Memory loss 07/02/2014     Priority: Medium    Constipation 04/28/2014     Priority: Medium    Chronic pain 03/12/2014     Priority: Medium    Aortic valve disorder 08/13/2013     Priority: Medium     Problem list name updated by automated process. Provider to review      Diverticulitis of sigmoid colon 05/11/2013     Priority: Medium    Diverticulitis 05/11/2013     Priority: Medium    Cardiac microvascular disease 04/10/2013     Priority: Medium     Based on Cardiac MRI done at        Hypothyroidism 03/01/2011     Priority: Medium     Problem list name updated by automated process. Provider to review      Anxiety 03/01/2011     Priority: Medium     Problem list name updated by automated process. Provider to review      Fatigue 07/01/2008     Priority: Medium    Atypical migraine 07/01/2008     Priority: Medium    Abnormal MRI of the head 07/01/2008     Priority: Medium     Overview:   Nonspecific WM changes small peripherally. Not c/w MS  IMO Update 10/11      Heart murmur 09/14/1999     Priority: Medium     Formatting of this note might be different from the original.  Previous hx of. [facility]        Past Medical History:   Diagnosis Date    Anxiety state, unspecified  03/01/2011    Aortic valve disorders 06/07/2000    Echocardiogram showin mild/moderate AI.  Normal LV function    Arthritis     Cardiac microvascular disease 04/10/2013    Based on Cardiac MRI done at      COPD (chronic obstructive pulmonary disease) (H) 07/08/2021    Depressive disorder 1997    Fatigue     Fatigue     Fibromyalgia 03/01/2011    Hypertension     Major depressive disorder, recurrent episode, unspecified 12/17/2014    Migraine, unspecified, without mention of intractable migraine without mention of status migrainosus 03/01/2011    Mitral valve disorders(424.0) 10/16/2007    Need for prophylactic hormone replacement therapy (postmenopausal)     PONV (postoperative nausea and vomiting)     Status post coronary angiogram 02/28/2023    Thyroid Nodule 03/01/2011    Tobacco use disorder 03/01/2011    Unspecified hypothyroidism 03/01/2011     Past Surgical History:   Procedure Laterality Date    APPENDECTOMY  1977    BIOPSY  2017    taken at [facility] from Dr. Nadira cohen. Thyroid    CARDIAC SURGERY  2013    angiogram [facility]:  Normal coronary arteries.  Felt ot have some microvascular disease    CL AFF SURGICAL PATHOLOGY  01/02/2007    Thyroid Mass    COLONOSCOPY  1/13/2014    Procedure: COLONOSCOPY;  COLONOSCOPY ;  Surgeon: Chasidy Gee DO;  Location: [facility]    CV CORONARY ANGIOGRAM N/A 2/28/2023    Procedure: Coronary Angiogram;  Surgeon: Amol Bee MD;  [facility]    CV RIGHT HEART CATH MEASUREMENTS RECORDED N/A 4/14/2021    Procedure: CV RIGHT HEART CATH;  Surgeon: Danna Clay MD;  [facility]    CV RIGHT HEART EXERCISE STRESS STUDY N/A 4/14/2021    Procedure: Right Heart Exercise Stress Study;  Surgeon: Danna Clay MD;  [facility]    ESOPHAGOSCOPY, GASTROSCOPY, DUODENOSCOPY (EGD), COMBINED N/A 2/9/2017    Procedure: COMBINED ESOPHAGOSCOPY, GASTROSCOPY, DUODENOSCOPY (EGD);  Surgeon: Speedy  Johnathan BACA, DO;  Location: [facility]    GYN SURGERY      c-sections x 3    HYSTERECTOMY TOTAL ABDOMINAL, BILATERAL SALPINGO-OOPHORECTOMY, COMBINED N/A 01/01/2001    LAPAROSCOPIC CHOLECYSTECTOMY  11/07/2003    Cholelithiasis    LAPAROTOMY EXPLORATORY      abdominal pain/fever    LARYNGOSCOPY      SPLENECTOMY       Current Outpatient Medications   Medication Sig Dispense Refill    acetaminophen (TYLENOL) 325 MG tablet Take 650 mg by mouth      bisacodyl (DULCOLAX) 5 MG EC tablet Take 2 tablets (10 mg) by mouth See Admin Instructions Use as instructed in surgery handout 4 tablet 0    calcium carbonate (TUMS) 500 MG chewable tablet Take 1 tablet (500 mg) by mouth 3 times daily 180 tablet 3    [START ON 1/26/2024] clonazePAM (KLONOPIN) 1 MG tablet TAKE 1/2 TO 1 (ONE-HALF TO ONE) TABLET BY MOUTH TWICE DAILY 60 tablet 3    diltiazem ER COATED BEADS (CARDIZEM CD/CARTIA XT) 120 MG 24 hr capsule Take 1 capsule (120 mg) by mouth daily 90 capsule 3    esomeprazole (NEXIUM) 40 MG DR capsule TAKE 1 CAPSULE BY MOUTH IN THE MORNING BEFORE BREAKFAST TAKE  30-60  MINUTES  BEFORE  EATING 90 capsule 3    estradiol (ESTRACE) 0.5 MG tablet Take 1 tablet (0.5 mg) by mouth daily 30 tablet 1    FLUoxetine (PROZAC) 40 MG capsule Take 1 capsule (40 mg) by mouth daily 90 capsule 1    hydrocortisone, Perianal, (PROCTO-MED HC) 2.5 % cream APPLY CREAM RECTALLY TO AFFECTED AREA(S) THREE TIMES DAILY AS DIRECTED 56 g 0    levothyroxine (SYNTHROID/LEVOTHROID) 75 MCG tablet Take 1 tablet by mouth once daily 90 tablet 2    nitroGLYcerin (NITROSTAT) 0.4 MG sublingual tablet DISSOLVE ONE TABLET UNDER THE TONGUE EVERY 5 MINUTES AS NEEDED FOR CHEST PAIN. DO NOT EXCEED A TOTAL OF 3 DOSES IN 15 MINUTES 30 tablet 4    PEG 3350-KCl-NaBcb-NaCl-NaSulf (POLYETHYLENE GLYCOL) 236 g solution TAKE 4,000 ML BY MOUTH ONCE FOR 1 DOSE AS DIRECTED IN SURGERY HANDOUT      spironolactone (ALDACTONE) 25 MG tablet Take 1 tablet by mouth once daily 30 tablet 0    traZODone  (DESYREL) 100 MG tablet Take 1/2 to 1 tablet at bedtime as needed for insomnia 30 tablet 4       Allergies   Allergen Reactions    Codeine     Isosorbide Mononitrate Other (See Comments)     Vomiting and headache      Lisinopril      [facility] scan    Paxil [Paroxetine] Headache        Social History     Tobacco Use    Smoking status: Some Days     Packs/day: 0.50     Years: 40.00     Additional pack years: 0.00     Total pack years: 20.00     Types: Cigarettes     Start date: 1975     Last attempt to quit: 2021     Years since quittin.9    Smokeless tobacco: Never    Tobacco comments:     have quit on my own.   Substance Use Topics    Alcohol use: No     Family History   Problem Relation Age of Onset    C.A.D. Father     Hypertension Father     C.A.D. Mother     Cerebrovascular Disease Mother         CVA    Hypertension Mother     Coronary Artery Disease Mother     Diabetes Paternal Grandmother     Diabetes Paternal Grandfather     Diabetes Sister     Breast Cancer Sister     Diabetes Maternal Grandmother     Diabetes Sister     Breast Cancer Sister     Hypertension Sister     Osteoporosis Sister     Hypertension Sister     Other Cancer Sister         small cell lung cancer    Mental Illness Sister     Depression Sister     Anxiety Disorder Sister     Obesity Sister     Diabetes Sister     Mental Illness Sister     Osteoporosis Sister     Depression Sister     Breast Cancer Sister     Obesity Sister     Depression Brother     Mental Illness Brother     Depression Daughter     Obesity Daughter     Depression Daughter     Mental Illness Daughter         [id number]    Anxiety Disorder Daughter     Mental Illness Daughter     Depression Daughter     Anxiety Disorder Other     Osteoporosis Other     Thyroid Disease Other     Obesity Other     Diabetes Nephew     Diabetes Brother         type 1    Prostate Cancer Brother     Colon Cancer Cousin         he passed at 42     History   Drug Use No        "  Review of Systems    Review of Systems  Constitutional, HEENT, cardiovascular, pulmonary, gi and gu systems are negative, except as otherwise noted.  Objective    /60 (BP Location: Left arm, Patient Position: Sitting, Cuff Size: Adult Regular)   Pulse 62   Temp 97.8  F (36.6  C) (Tympanic)   Resp 16   Wt 83.5 kg (184 lb)   SpO2 97%   BMI 28.82 kg/m     Estimated body mass index is 28.82 kg/m  as calculated from the following:    Height as of 1/18/24: 1.702 m (5' 7\").    Weight as of this encounter: 83.5 kg (184 lb).  Physical Exam  GENERAL: alert and no distress  EYES: Eyes grossly normal to inspection, PERRL and conjunctivae and sclerae normal  HENT: ear canals and TM's normal, nose and mouth without ulcers or lesions  NECK: no adenopathy, no asymmetry, masses, or scars  RESP: lungs clear to auscultation - no rales, rhonchi or wheezes  CV: regular rate and rhythm, normal S1 S2, no S3 or S4, no murmur, click or rub, no peripheral edema  ABDOMEN: soft, nontender, no hepatosplenomegaly, no masses and bowel sounds normal  MS: no gross musculoskeletal defects noted, no edema  SKIN: no suspicious lesions or rashes  NEURO: Normal strength and tone, mentation intact and speech normal  PSYCH: mentation appears normal, affect normal/bright  LYMPH: no cervical, supraclavicular, axillary, or inguinal adenopathy    Recent Labs   Lab Test 01/15/24  1228 12/13/23  1201 06/07/23  1023 03/15/23  0930 02/28/23  0705 07/13/22  0849 05/26/22  1047   HGB 14.5 14.8   < >  --  13.8  --  13.8    199   < >  --  212  --  234   INR  --   --   --   --  0.98  --   --    NA  --  137  --  137 140   < > 135   POTASSIUM  --  4.9  --  4.6 4.8   < > 4.7   CR  --  1.12*  --  1.11* 1.20*   < > 1.22*   A1C  --   --   --  5.6  --   --  5.5    < > = values in this interval not displayed.        Diagnostics  Recent Results (from the past 24 hour(s))   EKG 12-lead complete w/read - (Clinic Performed)    Collection Time: 01/25/24 10:29 " AM   Result Value Ref Range    Systolic Blood Pressure  mmHg    Diastolic Blood Pressure  mmHg    Ventricular Rate 58 BPM    Atrial Rate 58 BPM    NJ Interval 158 ms    QRS Duration 92 ms     ms    QTc 410 ms    P Axis 147 degrees    R AXIS -23 degrees    T Axis 143 degrees    Interpretation ECG       Unusual P axis, possible ectopic atrial bradycardia  Nonspecific T wave abnormality  Abnormal ECG  When compared with ECG of 28-FEB-2023 08:06,  Ectopic atrial rhythm has replaced Sinus rhythm  Nonspecific T wave abnormality now evident in Inferior leads  T wave inversion now evident in Lateral leads     Basic metabolic panel    Collection Time: 01/25/24 10:42 AM   Result Value Ref Range    Sodium 138 135 - 145 mmol/L    Potassium 4.6 3.4 - 5.3 mmol/L    Chloride 104 98 - 107 mmol/L    Carbon Dioxide (CO2) 25 22 - 29 mmol/L    Anion Gap 9 7 - 15 mmol/L    Urea Nitrogen 20.4 8.0 - 23.0 mg/dL    Creatinine 1.06 (H) 0.51 - 0.95 mg/dL    GFR Estimate 59 (L) >60 mL/min/1.73m2    Calcium 9.3 8.8 - 10.2 mg/dL    Glucose 94 70 - 99 mg/dL      EKG: appears normal, NSR, sinus bradycardia, normal axis, normal intervals, no acute ST/T changes c/w ischemia, no LVH by voltage criteria, unchanged from previous tracings    Revised Cardiac Risk Index (RCRI)  The patient has the following serious cardiovascular risks for perioperative complications:   - No serious cardiac risks = 0 points     RCRI Interpretation: 0 points: Class I (very low risk - 0.4% complication rate)         Signed Electronically by: KEILY Cobos  Copy of this evaluation report is provided to requesting physician.

## 2024-01-26 LAB
ATRIAL RATE - MUSE: 56 BPM
DIASTOLIC BLOOD PRESSURE - MUSE: NORMAL MMHG
INTERPRETATION ECG - MUSE: NORMAL
P AXIS - MUSE: 69 DEGREES
PR INTERVAL - MUSE: 162 MS
QRS DURATION - MUSE: 96 MS
QT - MUSE: 424 MS
QTC - MUSE: 409 MS
R AXIS - MUSE: 16 DEGREES
SYSTOLIC BLOOD PRESSURE - MUSE: NORMAL MMHG
T AXIS - MUSE: 48 DEGREES
VENTRICULAR RATE- MUSE: 56 BPM

## 2024-02-07 ENCOUNTER — ANESTHESIA EVENT (OUTPATIENT)
Dept: SURGERY | Facility: HOSPITAL | Age: 64
End: 2024-02-07
Payer: MEDICARE

## 2024-02-07 ASSESSMENT — COPD QUESTIONNAIRES: COPD: 1

## 2024-02-07 ASSESSMENT — LIFESTYLE VARIABLES: TOBACCO_USE: 1

## 2024-02-07 NOTE — ANESTHESIA PREPROCEDURE EVALUATION
Anesthesia Pre-Procedure Evaluation    Patient: Sharlene Mccord   MRN: [id number] : 1960        Procedure : Procedure(s):  upper endoscopy and colonoscopy with possible biopsy, possible polypectomy  HEMORRHOIDECTOMY, EXTERNAL          Past Medical History:   Diagnosis Date     Anxiety state, unspecified 2011     Aortic valve disorders 2000    Echocardiogram showin mild/moderate AI.  Normal LV function     Arthritis      Cardiac microvascular disease 04/10/2013    Based on Cardiac MRI done at       COPD (chronic obstructive pulmonary disease) (H) 2021     Depressive disorder      Fatigue      Fatigue      Fibromyalgia 2011     Hypertension      Major depressive disorder, recurrent episode, unspecified 2014     Migraine, unspecified, without mention of intractable migraine without mention of status migrainosus 2011     Mitral valve disorders(424.0) 10/16/2007     Need for prophylactic hormone replacement therapy (postmenopausal)      PONV (postoperative nausea and vomiting)      Status post coronary angiogram 2023     Thyroid Nodule 2011     Tobacco use disorder 2011     Unspecified hypothyroidism 2011      Past Surgical History:   Procedure Laterality Date     APPENDECTOMY  1977     BIOPSY  2017    taken at [facility] from Dr. Nadira cohen. Thyroid     CARDIAC SURGERY  2013    angiogram :  Normal coronary arteries.  Felt ot have some microvascular disease     CL AFF SURGICAL PATHOLOGY  2007    Thyroid Mass     COLONOSCOPY  2014    Procedure: COLONOSCOPY;  COLONOSCOPY ;  Surgeon: Chasidy Gee DO;  Location: [facility]     CV CORONARY ANGIOGRAM N/A 2023    Procedure: Coronary Angiogram;  Surgeon: Amol Bee MD;  Location:  [facility] CARD[facility]ATH LAB     CV RIGHT HEART CATH MEASUREMENTS RECORDED N/A 2021    Procedure: CV RIGHT HEART CATH;  Surgeon: Danna Clay MD;  [facility]      "CV RIGHT HEART EXERCISE STRESS STUDY N/A 4/14/2021    Procedure: Right Heart Exercise Stress Study;  Surgeon: Danna Clay MD;  Location:  [facility] CARD[facility] CATH LAB     ESOPHAGOSCOPY, GASTROSCOPY, DUODENOSCOPY (EGD), COMBINED N/A 2/9/2017    Procedure: COMBINED ESOPHAGOSCOPY, GASTROSCOPY, DUODENOSCOPY (EGD);  Surgeon: Johnathan Ruff DO;  Location: [facility]     GYN SURGERY      c-sections x 3     HYSTERECTOMY TOTAL ABDOMINAL, BILATERAL SALPINGO-OOPHORECTOMY, COMBINED N/A 01/01/2001     LAPAROSCOPIC CHOLECYSTECTOMY  11/07/2003    Cholelithiasis     LAPAROTOMY EXPLORATORY      abdominal pain/fever     LARYNGOSCOPY       SPLENECTOMY        Allergies   Allergen Reactions     Codeine      Isosorbide Mononitrate Other (See Comments)     Vomiting and headache       Lisinopril      [facility] scan     Paxil [Paroxetine] Headache      Social History     Tobacco Use     Smoking status: Some Days     Packs/day: 0.50     Years: 40.00     Additional pack years: 0.00     Total pack years: 20.00     Types: Cigarettes     Start date: 1/1/1975     Last attempt to quit: 1/27/2021     Years since quitting: 3.0     Smokeless tobacco: Never     Tobacco comments:     have quit on my own.   Substance Use Topics     Alcohol use: No      Wt Readings from Last 1 Encounters:   01/25/24 83.5 kg (184 lb)        Anesthesia Evaluation   Pt has had prior anesthetic. Type: General.    History of anesthetic complications  - PONV.      ROS/MED HX  ENT/Pulmonary: Comment: 1/25/24 Preop: \"cough now and then\"    Pulmonary function testing 6/14/2022 showed a mild diffusion defect    (+)     RADHA risk factors, snores loudly, hypertension,  daytime somnolence,       tobacco use, Current use,  20  Pack-Year Hx,        COPD (no daily or PRN inhalers),              Neurologic: Comment: Abnormal MRI of the head 2008: Nonspecific WM changes small peripherally. Not c/w MS    Memory loss  tremor    (+)      migraines (without aura),                    "    (-) no CVA   Cardiovascular: Comment: Cardiac microvascular disease - Based on Cardiac MRI done at  (2013)  1/25/24 preop - answered YES to chest pain with activity    1/25/25 preop: CAD - Patient has a longstanding history of moderate-severe CAD. Patient denies recent chest pain or NTG use, denies exercise induced dyspnea or PND. Last Stress test 1/4/23, EKG 01/25/24    Last cards visit 5/3/23  (recommended 1 year follow up)  Dyspnea on exertion   Symptoms improved but she will still experience intermittent chest discomfort of squeezing that radiates across her chest and is not necessarily associated with exertion   TTE 4/2023- aortic valve insufficiency stable, no significant findings   Cardiac catheterization 2/2023:  Normal Coronary Arteries.  Aortagram was done which demonstrated mild-moderate aortic regurgitation consistent with previous echocardiogram; LVEDP was 16 mmHg.  No cardiac pathology to explain patient's symptoms.      (+) Dyslipidemia hypertension-range: 110/60 (1/25/24)/ -  CAD -  - -      CHF etiology: diastolic  date: 4/2023  GUTIERREZ.               Irregular Heartbeat/Palpitations, valvular problems/murmurs (stable - moderate on 4/2023 echo - repeat echo in 1 year)  mitral and aortic valve disorder.    Previous cardiac testing   Echo: Date: 4/2023 Results:  Transthoracic Echocardiogram 4/2023  Interpretation Summary  Global and regional left ventricular function is normal with an EF of 55-60%.  Left ventricular size is normal. Left ventricular diastolic function is indeterminate.  The right ventricle is normal size. Global right ventricular function is normal.  Moderate aortic insufficiency is present.  The inferior vena cava was normal in size with preserved respiratory variability.  No pericardial effusion is present.     This study was compared with the study from 4/8/2022. No significant changes    Stress Test:  Date: 2/10/23 Results:     The nuclear stress test is abnormal.     There is a  medium sized area of a moderate degree of infarction in the anterior segment(s) of the left ventricle associated with a small area of a moderate degree of boyd-infarct ischemia.     The left ventricular ejection fraction at rest is 74%.  The left ventricular ejection fraction at stress is 75%.     A prior study was conducted on 3/27/2013.    ECG Reviewed:  Date: 1/25/24 Results:  HR 58, Unusual P axis, possible ectopic atrial bradycardia  Nonspecific T wave abnormality  Abnormal ECG  When compared with ECG of 28-FEB-2023 08:06,  Ectopic atrial rhythm has replaced Sinus rhythm  Nonspecific T wave abnormality now evident in Inferior leads  T wave inversion now evident in Lateral leads    Cath:  Date: 2/28/23 Results:  2023: normal coronary arteries  1. Normal Coronary Arteries.  2. Aortagram was done which demonstrated mild-moderate aortic regurgitation consistent with previous echocardiogram; LVEDP was 16 mmHg.  3. No cardiac pathology to explain patient's symptoms.   (-) taking anticoagulants/antiplateletsPacemaker: murmur hx. Pacemaker: murmur hx.   METS/Exercise Tolerance:     Hematologic: Comments: Mother, brothers and sister with hx of blood clots  Personal hx of anemia/iron pills many years ago  Current rectal bleeding  - reason for procedure (2024)      Musculoskeletal: Comment: fibromyalgia  (+)  arthritis,             GI/Hepatic: Comment: No current symptoms of GERD    (+) GERD (on meds), Symptomatic,     Inflammatory bowel disease (diverticulitis), bowel prep,            Renal/Genitourinary:     (+) renal disease,  Pt does not require dialysis,           Endo:     (+)          thyroid problem, hypothyroidism,           Psychiatric/Substance Use:     (+) psychiatric history anxiety and depression       Infectious Disease:  - neg infectious disease ROS     Malignancy:  - neg malignancy ROS     Other:  - neg other ROS          Physical Exam    Airway        Mallampati: I   TM distance: > 3 FB   Neck ROM: full  "  Mouth opening: > 3 cm    Respiratory Devices and Support         Dental       (+) Modest Abnormalities - crowns, retainers, 1 or 2 missing teeth      Cardiovascular          Rhythm and rate: regular     Pulmonary           breath sounds clear to auscultation       OUTSIDE LABS:  CBC:   Lab Results   Component Value Date    WBC 8.3 01/15/2024    WBC 9.9 12/13/2023    HGB 14.5 01/15/2024    HGB 14.8 12/13/2023    HCT 43.5 01/15/2024    HCT 43.3 12/13/2023     01/15/2024     12/13/2023     BMP:   Lab Results   Component Value Date     01/25/2024     12/13/2023    POTASSIUM 4.6 01/25/2024    POTASSIUM 4.9 12/13/2023    CHLORIDE 104 01/25/2024    CHLORIDE 101 12/13/2023    CO2 25 01/25/2024    CO2 26 12/13/2023    BUN 20.4 01/25/2024    BUN 11.8 12/13/2023    CR 1.06 (H) 01/25/2024    CR 1.12 (H) 12/13/2023    GLC 94 01/25/2024     (H) 12/13/2023     COAGS:   Lab Results   Component Value Date    INR 0.98 02/28/2023     POC: No results found for: \"BGM\", \"HCG\", \"HCGS\"  HEPATIC:   Lab Results   Component Value Date    ALBUMIN 4.4 12/13/2023    PROTTOTAL 7.2 12/13/2023    ALT 24 12/13/2023    AST 18 12/13/2023    ALKPHOS 87 12/13/2023    BILITOTAL 0.3 12/13/2023     OTHER:   Lab Results   Component Value Date    LACT 2.1 (H) 07/30/2020    A1C 5.6 03/15/2023    DAKSHA 9.3 01/25/2024    MAG 2.2 11/23/2021    LIPASE 70 (L) 02/17/2021    AMYLASE 44 02/17/2021    TSH 1.07 12/13/2023    T4 1.27 11/21/2018    CRP 6.1 07/08/2021    SED 13 07/08/2021       Anesthesia Plan    ASA Status:  3    NPO Status:  NPO Appropriate    Anesthesia Type: General.     - Airway: ETT   Induction: Intravenous.   Maintenance: Balanced.        Consents    Anesthesia Plan(s) and associated risks, benefits, and realistic alternatives discussed. Questions answered and patient/representative(s) expressed understanding.     - Discussed: Risks, Benefits and Alternatives for BOTH SEDATION and the PROCEDURE were discussed     - " "Discussed with:  Patient      - Specific Concerns: aspiration, awareness, dental damage, infection, stroke, heart attack, seizures, death.     - Extended Intubation/Ventilatory Support Discussed: No.      - Patient is DNR/DNI Status: No     Use of blood products discussed: No .     Postoperative Care            Comments:    Other Comments: Reviewed preop Ananya 1/25/24  Additional Medication Instructions   - Calcium Channel Blockers: May be continued on the day of surgery.   - Diuretics: HOLD on the day of surgery.   - Statins: Continue taking on the day of surgery.    - ibuprofen (Advil, Motrin): HOLD 1 day before surgery.    - SSRIs, SNRIs, TCAs, Antipsychotics: Continue without modification.              Jamaica Frye, ENRIQUE CNP    I have reviewed the pertinent notes and labs in the chart from the past 30 days and (re)examined the patient.  Any updates or changes from those notes are reflected in this note.              # Overweight: Estimated body mass index is 28.82 kg/m  as calculated from the following:    Height as of 1/18/24: 1.702 m (5' 7\").    Weight as of 1/25/24: 83.5 kg (184 lb).      "

## 2024-02-12 ENCOUNTER — VIRTUAL VISIT (OUTPATIENT)
Dept: PSYCHIATRY | Facility: OTHER | Age: 64
End: 2024-02-12
Attending: PSYCHIATRY & NEUROLOGY
Payer: COMMERCIAL

## 2024-02-12 DIAGNOSIS — G47.00 PERSISTENT INSOMNIA: ICD-10-CM

## 2024-02-12 PROCEDURE — 99443 PR PHYSICIAN TELEPHONE EVALUATION 21-30 MIN: CPT | Mod: 93 | Performed by: PSYCHIATRY & NEUROLOGY

## 2024-02-12 RX ORDER — TRAZODONE HYDROCHLORIDE 100 MG/1
TABLET ORAL
Qty: 30 TABLET | Refills: 4 | Status: SHIPPED | OUTPATIENT
Start: 2024-02-12 | End: 2024-06-03

## 2024-02-12 ASSESSMENT — ANXIETY QUESTIONNAIRES
1. FEELING NERVOUS, ANXIOUS, OR ON EDGE: NEARLY EVERY DAY
GAD7 TOTAL SCORE: 20
3. WORRYING TOO MUCH ABOUT DIFFERENT THINGS: NEARLY EVERY DAY
5. BEING SO RESTLESS THAT IT IS HARD TO SIT STILL: NEARLY EVERY DAY
7. FEELING AFRAID AS IF SOMETHING AWFUL MIGHT HAPPEN: MORE THAN HALF THE DAYS
GAD7 TOTAL SCORE: 20
2. NOT BEING ABLE TO STOP OR CONTROL WORRYING: NEARLY EVERY DAY
6. BECOMING EASILY ANNOYED OR IRRITABLE: NEARLY EVERY DAY

## 2024-02-12 ASSESSMENT — PATIENT HEALTH QUESTIONNAIRE - PHQ9
5. POOR APPETITE OR OVEREATING: NEARLY EVERY DAY
SUM OF ALL RESPONSES TO PHQ QUESTIONS 1-9: 21

## 2024-02-12 ASSESSMENT — PAIN SCALES - GENERAL: PAINLEVEL: SEVERE PAIN (7)

## 2024-02-12 NOTE — PROGRESS NOTES
"      Type of service: telephone visit 33 minutes    start time: 9:11 am     end time: 9:46 am    Originating location (pt location): home    Distant location (provider location): home          SUBJECTIVE / INTERIM HISTORY                                                                       Last visit 1/8/24: Continue Klonopin 1 mg 2 times daily set to refill on 1/26/24 with 3 refills. Continue Prozac 40 mg daily. Continue trazodone 50mg to 100 mg HS prn insomnia    - \"hanging in there\"  - went to the bank. Was going to stop at the house get box of dishes. Sent text to Roney she was going to stop in. Blue truck there (Roney indicated it was in fact Raiza). Raiza wouldn't answer / open the door  - went out to eat with Lucia this weekend  - moved out New Year's Day. Apartment through [address].  - still trying to adjust to living in the apartment  - when Sharlene moved from [place]. Sold her house [place] and used some of the money for her and Roney to build new double stall garage. Sharlene discovered in past couple months Roney at some point too Sharlene's name off the loan.  - brother Jhonny (in the [facility]) has been trying to help with her things.   - found out Roney was having relationship (second in two years). Getting divorce. Was around Labor Day Sharlene had been out at the cabin for while and called Roney and he said he wasn't going to come to the cabin and at the point Sharlene figured things out  - dog Shanti. Sharlene thinks Shanti is having some anxiety    SYMPTOMS- Depressed mood, low energy, anhedonia, feelings guilt & overwhelmed, poor sleep. cognitive issues including word finding    MEDICAL / SURGICAL HISTORY                 Medical Team:     PMD- Concha Hare     Therapist-none   Patient Active Problem List   Diagnosis    Diverticulitis of sigmoid colon    Hypothyroidism    Anxiety    Cardiac microvascular disease    Diverticulitis    Aortic valve disorder    ACP (advance care planning)    " Chronic pain    Constipation    Memory loss    Major depressive disorder, recurrent episode (H24)    Cognitive disorder    Major depressive disorder    Fatigue    Migraine without aura and responsive to treatment    Atypical migraine    Mixed connective tissue disease (H24)    SHAKIR (generalized anxiety disorder)    Gastroesophageal reflux disease with esophagitis    Acute pancreatitis    Obstruction of common bile duct (H28)    Abnormal MRI of the head    GERD (gastroesophageal reflux disease)    Tremor    GUTIERREZ (dyspnea on exertion)    Chronic diastolic heart failure (H)    Diastolic heart failure, unspecified HF chronicity (H)    Chest pain, unspecified type    Heart murmur    Chronic kidney disease, stage 1    Stage 3a chronic kidney disease (H)    Persistent insomnia    Hypotension due to drugs    Status post coronary angiogram    Relationship dysfunction     ALLERGY   Codeine, Isosorbide mononitrate, Lisinopril, and Paxil [paroxetine]  MEDICATIONS                                                                                             Current Outpatient Medications   Medication Sig    acetaminophen (TYLENOL) 325 MG tablet Take 650 mg by mouth    atorvastatin (LIPITOR) 10 MG tablet Take 1 tablet (10 mg) by mouth daily (Patient not taking: Reported on 2/12/2024)    bisacodyl (DULCOLAX) 5 MG EC tablet Take 2 tablets (10 mg) by mouth See Admin Instructions Use as instructed in surgery handout    calcium carbonate (TUMS) 500 MG chewable tablet Take 1 tablet (500 mg) by mouth 3 times daily    clonazePAM (KLONOPIN) 1 MG tablet TAKE 1/2 TO 1 (ONE-HALF TO ONE) TABLET BY MOUTH TWICE DAILY    diltiazem ER COATED BEADS (CARDIZEM CD/CARTIA XT) 120 MG 24 hr capsule Take 1 capsule (120 mg) by mouth daily    esomeprazole (NEXIUM) 40 MG DR capsule TAKE 1 CAPSULE BY MOUTH IN THE MORNING BEFORE BREAKFAST TAKE  30-60  MINUTES  BEFORE  EATING    estradiol (ESTRACE) 0.5 MG tablet Take 1 tablet (0.5 mg) by mouth daily    FLUoxetine  "(PROZAC) 40 MG capsule Take 1 capsule (40 mg) by mouth daily    hydrocortisone, Perianal, (PROCTO-MED HC) 2.5 % cream APPLY CREAM RECTALLY TO AFFECTED AREA(S) THREE TIMES DAILY AS DIRECTED    levothyroxine (SYNTHROID/LEVOTHROID) 75 MCG tablet Take 1 tablet by mouth once daily    nitroGLYcerin (NITROSTAT) 0.4 MG sublingual tablet DISSOLVE ONE TABLET UNDER THE TONGUE EVERY 5 MINUTES AS NEEDED FOR CHEST PAIN. DO NOT EXCEED A TOTAL OF 3 DOSES IN 15 MINUTES    PEG 3350-KCl-NaBcb-NaCl-NaSulf (POLYETHYLENE GLYCOL) 236 g solution TAKE 4,000 ML BY MOUTH ONCE FOR 1 DOSE AS DIRECTED IN SURGERY HANDOUT    spironolactone (ALDACTONE) 25 MG tablet Take 1 tablet by mouth once daily    traZODone (DESYREL) 100 MG tablet Take 1/2 to 1 tablet at bedtime as needed for insomnia     No current facility-administered medications for this visit.       VITALS   There were no vitals taken for this visit.    PHQ9                       Depression Screening Follow-up        2/12/2024     9:04 AM   PHQ   PHQ-9 Total Score 21   Q9: Thoughts of better off dead/self-harm past 2 weeks Not at all       Does the patient currently have a mental health provider? Yes: Aurelia Odell MD       Labs:  CBC RESULTS:   Recent Labs   Lab Test 06/07/23  1023   WBC 8.4   RBC 4.82   HGB 14.2   HCT 42.3   MCV 88   MCH 29.5   MCHC 33.6   RDW 13.1        Last Comprehensive Metabolic Panel:  Lab Results   Component Value Date     01/25/2024    POTASSIUM 4.6 01/25/2024    CHLORIDE 104 01/25/2024    CO2 25 01/25/2024    ANIONGAP 9 01/25/2024    GLC 94 01/25/2024    BUN 20.4 01/25/2024    CR 1.06 (H) 01/25/2024    GFRESTIMATED 59 (L) 01/25/2024    DAKSHA 9.3 01/25/2024          MENTAL STATUS EXAM                                                                                       Mood \"hanging in there\" Thought process, including associations, was unremarkable and thought content was devoid of suicidal ideation Insight was good. Judgment was intact and " adequate for safety. Fund of knowledge was intact. Pt demonstrates no obvious problems with attention, concentration, language, recent or remote memory although these were not formally tested.     ASSESSMENT                                                                                                      HISTORICAL:  Initial psych consult 12/12/14            Notes:  Ritalin as adjunct to antidepressant: didn't like how made her feel.   Prozac, Celexa ineffective. Wellbutrin: weight gain. . Zoloft: weight gain. Effexor back while: didn't stay on long and can't recall why. Cymbalta: was on and didn't help. She tried Provigil and SEs. Buspar: HAs.   Neuropsych testing summer 2015:  language: about same, working memory: better than last year. Attention / concentration worse than last year. Speed processing: improved. Executive function: didn't test well on that front also comparable to last year. Memory: worse off than last year. Depression: similar to last year. Anxiety / emotional anxiety worse.     Ongoing stress for Sharlene as her and Roney going through divorce and had taken her name off (without her knowing) several major items. Her and Melodoy her 8 yo [employer] / Lab mix continue to try to adjust to their new home in their apartment. Will refill trazodone given will up for fill in March. Klonopin has 3 refils left and refills left on PRozac.     TREATMENT RISK STATEMENT: The risks, benefits, alternatives and potential adverse effects have been explained and are understood by the pt. The pt agrees to the treatment plan with the ability to do so. The pt knows to call the clinic for any problems or access emergency care if needed.    DIAGNOSES             Major depressive disorder, recurrent, severe without psychosis  Mild neurocognitive disorder      PLAN                                                                                                                         1) MEDICATION:   -- Continue Klonopin 1  mg 2 times daily filled end Jan and has 3 refills. Continue Prozac 40 mg daily. Continue trazodone 50mg to 100 mg HS prn insomnia    2) THERAPY: no change.     3) LABS: none    4)  RTC: ~2-3 months

## 2024-02-13 ENCOUNTER — ANESTHESIA (OUTPATIENT)
Dept: SURGERY | Facility: HOSPITAL | Age: 64
End: 2024-02-13
Payer: MEDICARE

## 2024-02-13 ENCOUNTER — HOSPITAL ENCOUNTER (OUTPATIENT)
Facility: HOSPITAL | Age: 64
Discharge: HOME | End: 2024-02-13
Attending: SURGERY | Admitting: SURGERY
Payer: MEDICARE

## 2024-02-13 VITALS
HEIGHT: 67 IN | DIASTOLIC BLOOD PRESSURE: 54 MMHG | SYSTOLIC BLOOD PRESSURE: 108 MMHG | TEMPERATURE: 97 F | OXYGEN SATURATION: 96 % | HEART RATE: 74 BPM | WEIGHT: 179 LBS | RESPIRATION RATE: 16 BRPM | BODY MASS INDEX: 28.09 KG/M2

## 2024-02-13 DIAGNOSIS — I50.32 CHRONIC DIASTOLIC HEART FAILURE (H): ICD-10-CM

## 2024-02-13 DIAGNOSIS — G89.18 ACUTE POST-OPERATIVE PAIN: Primary | ICD-10-CM

## 2024-02-13 PROCEDURE — 250N000011 HC RX IP 250 OP 636: Performed by: NURSE ANESTHETIST, CERTIFIED REGISTERED

## 2024-02-13 PROCEDURE — 88305 TISSUE EXAM BY PATHOLOGIST: CPT | Mod: 26 | Performed by: PATHOLOGY

## 2024-02-13 PROCEDURE — 370N000017 HC ANESTHESIA TECHNICAL FEE, PER MIN: Performed by: SURGERY

## 2024-02-13 PROCEDURE — 250N000025 HC SEVOFLURANE, PER MIN: Performed by: SURGERY

## 2024-02-13 PROCEDURE — 258N000003 HC RX IP 258 OP 636: Performed by: NURSE ANESTHETIST, CERTIFIED REGISTERED

## 2024-02-13 PROCEDURE — 45380 COLONOSCOPY AND BIOPSY: CPT | Performed by: SURGERY

## 2024-02-13 PROCEDURE — 46260 REMOVE IN/EX HEM GROUPS 2+: CPT | Performed by: SURGERY

## 2024-02-13 PROCEDURE — 272N000001 HC OR GENERAL SUPPLY STERILE: Performed by: SURGERY

## 2024-02-13 PROCEDURE — 88305 TISSUE EXAM BY PATHOLOGIST: CPT | Mod: TC | Performed by: SURGERY

## 2024-02-13 PROCEDURE — 43239 EGD BIOPSY SINGLE/MULTIPLE: CPT | Performed by: SURGERY

## 2024-02-13 PROCEDURE — 999N000141 HC STATISTIC PRE-PROCEDURE NURSING ASSESSMENT: Performed by: SURGERY

## 2024-02-13 PROCEDURE — 88304 TISSUE EXAM BY PATHOLOGIST: CPT | Mod: 26 | Performed by: PATHOLOGY

## 2024-02-13 PROCEDURE — 250N000009 HC RX 250: Performed by: NURSE ANESTHETIST, CERTIFIED REGISTERED

## 2024-02-13 PROCEDURE — 250N000011 HC RX IP 250 OP 636: Performed by: SURGERY

## 2024-02-13 PROCEDURE — 250N000013 HC RX MED GY IP 250 OP 250 PS 637: Performed by: SURGERY

## 2024-02-13 PROCEDURE — 710N000010 HC RECOVERY PHASE 1, LEVEL 2, PER MIN: Performed by: SURGERY

## 2024-02-13 PROCEDURE — 46260 REMOVE IN/EX HEM GROUPS 2+: CPT | Performed by: NURSE ANESTHETIST, CERTIFIED REGISTERED

## 2024-02-13 PROCEDURE — 710N000012 HC RECOVERY PHASE 2, PER MINUTE: Performed by: SURGERY

## 2024-02-13 PROCEDURE — 360N000075 HC SURGERY LEVEL 2, PER MIN: Performed by: SURGERY

## 2024-02-13 RX ORDER — LIDOCAINE HYDROCHLORIDE 20 MG/ML
INJECTION, SOLUTION INFILTRATION; PERINEURAL PRN
Status: DISCONTINUED | OUTPATIENT
Start: 2024-02-13 | End: 2024-02-13

## 2024-02-13 RX ORDER — POLYETHYLENE GLYCOL 3350 17 G/17G
1 POWDER, FOR SOLUTION ORAL DAILY PRN
Qty: 500 G | Refills: 0 | Status: SHIPPED | OUTPATIENT
Start: 2024-02-13 | End: 2024-02-28

## 2024-02-13 RX ORDER — BUPIVACAINE HYDROCHLORIDE 2.5 MG/ML
INJECTION, SOLUTION EPIDURAL; INFILTRATION; INTRACAUDAL
Status: DISCONTINUED
Start: 2024-02-13 | End: 2024-02-13 | Stop reason: HOSPADM

## 2024-02-13 RX ORDER — PROPOFOL 10 MG/ML
INJECTION, EMULSION INTRAVENOUS PRN
Status: DISCONTINUED | OUTPATIENT
Start: 2024-02-13 | End: 2024-02-13

## 2024-02-13 RX ORDER — SPIRONOLACTONE 25 MG/1
25 TABLET ORAL DAILY
Qty: 30 TABLET | Refills: 0 | Status: SHIPPED | OUTPATIENT
Start: 2024-02-13 | End: 2024-03-13

## 2024-02-13 RX ORDER — OXYCODONE HYDROCHLORIDE 5 MG/1
5 TABLET ORAL EVERY 6 HOURS PRN
Qty: 13 TABLET | Refills: 0 | Status: SHIPPED | OUTPATIENT
Start: 2024-02-13 | End: 2024-02-19

## 2024-02-13 RX ORDER — NALOXONE HYDROCHLORIDE 0.4 MG/ML
0.4 INJECTION, SOLUTION INTRAMUSCULAR; INTRAVENOUS; SUBCUTANEOUS
Status: DISCONTINUED | OUTPATIENT
Start: 2024-02-13 | End: 2024-02-13 | Stop reason: HOSPADM

## 2024-02-13 RX ORDER — LABETALOL 20 MG/4 ML (5 MG/ML) INTRAVENOUS SYRINGE
10
Status: DISCONTINUED | OUTPATIENT
Start: 2024-02-13 | End: 2024-02-13 | Stop reason: HOSPADM

## 2024-02-13 RX ORDER — LIDOCAINE 40 MG/G
CREAM TOPICAL
Status: DISCONTINUED | OUTPATIENT
Start: 2024-02-13 | End: 2024-02-13 | Stop reason: HOSPADM

## 2024-02-13 RX ORDER — ONDANSETRON 2 MG/ML
INJECTION INTRAMUSCULAR; INTRAVENOUS PRN
Status: DISCONTINUED | OUTPATIENT
Start: 2024-02-13 | End: 2024-02-13

## 2024-02-13 RX ORDER — DEXAMETHASONE SODIUM PHOSPHATE 4 MG/ML
INJECTION, SOLUTION INTRA-ARTICULAR; INTRALESIONAL; INTRAMUSCULAR; INTRAVENOUS; SOFT TISSUE PRN
Status: DISCONTINUED | OUTPATIENT
Start: 2024-02-13 | End: 2024-02-13

## 2024-02-13 RX ORDER — HYDRALAZINE HYDROCHLORIDE 20 MG/ML
2.5-5 INJECTION INTRAMUSCULAR; INTRAVENOUS EVERY 10 MIN PRN
Status: DISCONTINUED | OUTPATIENT
Start: 2024-02-13 | End: 2024-02-13 | Stop reason: HOSPADM

## 2024-02-13 RX ORDER — SODIUM CHLORIDE, SODIUM LACTATE, POTASSIUM CHLORIDE, CALCIUM CHLORIDE 600; 310; 30; 20 MG/100ML; MG/100ML; MG/100ML; MG/100ML
INJECTION, SOLUTION INTRAVENOUS CONTINUOUS
Status: DISCONTINUED | OUTPATIENT
Start: 2024-02-13 | End: 2024-02-13 | Stop reason: HOSPADM

## 2024-02-13 RX ORDER — FENTANYL CITRATE 50 UG/ML
INJECTION, SOLUTION INTRAMUSCULAR; INTRAVENOUS PRN
Status: DISCONTINUED | OUTPATIENT
Start: 2024-02-13 | End: 2024-02-13

## 2024-02-13 RX ORDER — EPHEDRINE SULFATE 50 MG/ML
INJECTION, SOLUTION INTRAMUSCULAR; INTRAVENOUS; SUBCUTANEOUS PRN
Status: DISCONTINUED | OUTPATIENT
Start: 2024-02-13 | End: 2024-02-13

## 2024-02-13 RX ORDER — ONDANSETRON 4 MG/1
4 TABLET, ORALLY DISINTEGRATING ORAL
Status: DISCONTINUED | OUTPATIENT
Start: 2024-02-13 | End: 2024-02-13 | Stop reason: HOSPADM

## 2024-02-13 RX ORDER — NALOXONE HYDROCHLORIDE 0.4 MG/ML
0.2 INJECTION, SOLUTION INTRAMUSCULAR; INTRAVENOUS; SUBCUTANEOUS
Status: DISCONTINUED | OUTPATIENT
Start: 2024-02-13 | End: 2024-02-13 | Stop reason: HOSPADM

## 2024-02-13 RX ORDER — IBUPROFEN 800 MG/1
800 TABLET, FILM COATED ORAL 3 TIMES DAILY
Qty: 42 TABLET | Refills: 0 | Status: SHIPPED | OUTPATIENT
Start: 2024-02-13 | End: 2024-02-27

## 2024-02-13 RX ORDER — ONDANSETRON 4 MG/1
4 TABLET, ORALLY DISINTEGRATING ORAL EVERY 30 MIN PRN
Status: DISCONTINUED | OUTPATIENT
Start: 2024-02-13 | End: 2024-02-13 | Stop reason: HOSPADM

## 2024-02-13 RX ORDER — HYDROMORPHONE HYDROCHLORIDE 1 MG/ML
0.4 INJECTION, SOLUTION INTRAMUSCULAR; INTRAVENOUS; SUBCUTANEOUS EVERY 5 MIN PRN
Status: DISCONTINUED | OUTPATIENT
Start: 2024-02-13 | End: 2024-02-13 | Stop reason: HOSPADM

## 2024-02-13 RX ORDER — CEFAZOLIN SODIUM/WATER 2 G/20 ML
2 SYRINGE (ML) INTRAVENOUS SEE ADMIN INSTRUCTIONS
Status: DISCONTINUED | OUTPATIENT
Start: 2024-02-13 | End: 2024-02-13 | Stop reason: HOSPADM

## 2024-02-13 RX ORDER — IBUPROFEN 200 MG
600 TABLET ORAL
Status: DISCONTINUED | OUTPATIENT
Start: 2024-02-13 | End: 2024-02-13 | Stop reason: HOSPADM

## 2024-02-13 RX ORDER — ONDANSETRON 2 MG/ML
4 INJECTION INTRAMUSCULAR; INTRAVENOUS EVERY 30 MIN PRN
Status: DISCONTINUED | OUTPATIENT
Start: 2024-02-13 | End: 2024-02-13 | Stop reason: HOSPADM

## 2024-02-13 RX ORDER — GLYCOPYRROLATE 0.2 MG/ML
INJECTION, SOLUTION INTRAMUSCULAR; INTRAVENOUS PRN
Status: DISCONTINUED | OUTPATIENT
Start: 2024-02-13 | End: 2024-02-13

## 2024-02-13 RX ORDER — OXYCODONE HYDROCHLORIDE 5 MG/1
5 TABLET ORAL ONCE
Status: COMPLETED | OUTPATIENT
Start: 2024-02-13 | End: 2024-02-13

## 2024-02-13 RX ORDER — FENTANYL CITRATE 50 UG/ML
50 INJECTION, SOLUTION INTRAMUSCULAR; INTRAVENOUS EVERY 5 MIN PRN
Status: DISCONTINUED | OUTPATIENT
Start: 2024-02-13 | End: 2024-02-13 | Stop reason: HOSPADM

## 2024-02-13 RX ORDER — HYDROMORPHONE HYDROCHLORIDE 1 MG/ML
0.2 INJECTION, SOLUTION INTRAMUSCULAR; INTRAVENOUS; SUBCUTANEOUS EVERY 5 MIN PRN
Status: DISCONTINUED | OUTPATIENT
Start: 2024-02-13 | End: 2024-02-13 | Stop reason: HOSPADM

## 2024-02-13 RX ORDER — AMOXICILLIN 250 MG
1-2 CAPSULE ORAL 2 TIMES DAILY
Qty: 30 TABLET | Refills: 0 | Status: SHIPPED | OUTPATIENT
Start: 2024-02-13 | End: 2024-04-29

## 2024-02-13 RX ORDER — METRONIDAZOLE 500 MG/100ML
500 INJECTION, SOLUTION INTRAVENOUS ONCE
Status: COMPLETED | OUTPATIENT
Start: 2024-02-13 | End: 2024-02-13

## 2024-02-13 RX ORDER — BUPIVACAINE HYDROCHLORIDE 2.5 MG/ML
INJECTION, SOLUTION INFILTRATION; PERINEURAL PRN
Status: DISCONTINUED | OUTPATIENT
Start: 2024-02-13 | End: 2024-02-13 | Stop reason: HOSPADM

## 2024-02-13 RX ORDER — CEFAZOLIN SODIUM/WATER 2 G/20 ML
2 SYRINGE (ML) INTRAVENOUS
Status: COMPLETED | OUTPATIENT
Start: 2024-02-13 | End: 2024-02-13

## 2024-02-13 RX ADMIN — METRONIDAZOLE 500 MG: 500 INJECTION, SOLUTION INTRAVENOUS at 08:46

## 2024-02-13 RX ADMIN — LIDOCAINE HYDROCHLORIDE 100 MG: 20 INJECTION, SOLUTION INFILTRATION; PERINEURAL at 09:44

## 2024-02-13 RX ADMIN — ONDANSETRON 4 MG: 2 INJECTION INTRAMUSCULAR; INTRAVENOUS at 12:09

## 2024-02-13 RX ADMIN — Medication 100 MG: at 09:44

## 2024-02-13 RX ADMIN — Medication 5 MG: at 10:28

## 2024-02-13 RX ADMIN — GLYCOPYRROLATE 0.2 MG: 0.2 INJECTION, SOLUTION INTRAMUSCULAR; INTRAVENOUS at 09:47

## 2024-02-13 RX ADMIN — FENTANYL CITRATE 50 MCG: 50 INJECTION INTRAMUSCULAR; INTRAVENOUS at 11:22

## 2024-02-13 RX ADMIN — HYDROMORPHONE HYDROCHLORIDE 0.4 MG: 1 INJECTION, SOLUTION INTRAMUSCULAR; INTRAVENOUS; SUBCUTANEOUS at 11:52

## 2024-02-13 RX ADMIN — Medication 10 MG: at 10:30

## 2024-02-13 RX ADMIN — FENTANYL CITRATE 50 MCG: 50 INJECTION INTRAMUSCULAR; INTRAVENOUS at 09:44

## 2024-02-13 RX ADMIN — ROCURONIUM BROMIDE 10 MG: 10 INJECTION INTRAVENOUS at 09:53

## 2024-02-13 RX ADMIN — DEXAMETHASONE SODIUM PHOSPHATE 10 MG: 4 INJECTION, SOLUTION INTRA-ARTICULAR; INTRALESIONAL; INTRAMUSCULAR; INTRAVENOUS; SOFT TISSUE at 09:56

## 2024-02-13 RX ADMIN — FENTANYL CITRATE 50 MCG: 50 INJECTION INTRAMUSCULAR; INTRAVENOUS at 11:30

## 2024-02-13 RX ADMIN — ROCURONIUM BROMIDE 10 MG: 10 INJECTION INTRAVENOUS at 10:12

## 2024-02-13 RX ADMIN — PROPOFOL 50 MG: 10 INJECTION, EMULSION INTRAVENOUS at 09:53

## 2024-02-13 RX ADMIN — SODIUM CHLORIDE, POTASSIUM CHLORIDE, SODIUM LACTATE AND CALCIUM CHLORIDE: 600; 310; 30; 20 INJECTION, SOLUTION INTRAVENOUS at 08:42

## 2024-02-13 RX ADMIN — ONDANSETRON 4 MG: 2 INJECTION INTRAMUSCULAR; INTRAVENOUS at 09:56

## 2024-02-13 RX ADMIN — OXYCODONE HYDROCHLORIDE 5 MG: 5 TABLET ORAL at 13:09

## 2024-02-13 RX ADMIN — SUGAMMADEX 200 MG: 100 INJECTION, SOLUTION INTRAVENOUS at 10:50

## 2024-02-13 RX ADMIN — Medication 2 G: at 08:42

## 2024-02-13 RX ADMIN — FENTANYL CITRATE 50 MCG: 50 INJECTION INTRAMUSCULAR; INTRAVENOUS at 10:18

## 2024-02-13 RX ADMIN — ROCURONIUM BROMIDE 10 MG: 10 INJECTION INTRAVENOUS at 10:17

## 2024-02-13 RX ADMIN — MIDAZOLAM 2 MG: 1 INJECTION INTRAMUSCULAR; INTRAVENOUS at 09:32

## 2024-02-13 RX ADMIN — PROPOFOL 150 MG: 10 INJECTION, EMULSION INTRAVENOUS at 09:44

## 2024-02-13 RX ADMIN — HYDROMORPHONE HYDROCHLORIDE 0.4 MG: 1 INJECTION, SOLUTION INTRAMUSCULAR; INTRAVENOUS; SUBCUTANEOUS at 11:35

## 2024-02-13 RX ADMIN — FENTANYL CITRATE 50 MCG: 50 INJECTION INTRAMUSCULAR; INTRAVENOUS at 11:12

## 2024-02-13 RX ADMIN — HYDROMORPHONE HYDROCHLORIDE 0.4 MG: 1 INJECTION, SOLUTION INTRAMUSCULAR; INTRAVENOUS; SUBCUTANEOUS at 11:44

## 2024-02-13 ASSESSMENT — ACTIVITIES OF DAILY LIVING (ADL)
ADLS_ACUITY_SCORE: 22

## 2024-02-13 NOTE — ANESTHESIA PROCEDURE NOTES
Airway         Procedure Start/Stop Times: 2/13/2024 9:45 AM  Staff -        CRNA: Michelle Catherine APRN CRNA       Performed By: CRNAIndications and Patient Condition       Induction type:intravenous       Mask difficulty assessment: 1 - vent by mask    Final Airway Details       Final airway type: endotracheal airway       Successful airway: ETT - single  Endotracheal Airway Details        ETT size (mm): 7.0       Successful intubation technique: direct laryngoscopy       DL Blade Type: Foreman 2       Grade View of Cords: 1       Adjucts: stylet       Position: Right       Measured from: lips       Secured at (cm): 22       Bite block used: None    Post intubation assessment        Placement verified by: capnometry, equal breath sounds and chest rise        Number of attempts at approach: 1       Secured with: plastic tape       Ease of procedure: easy       Dentition: Intact and Unchanged    Medication(s) Administered   Medication Administration Time: 2/13/2024 9:45 AM

## 2024-02-13 NOTE — DISCHARGE INSTRUCTIONS
After Anesthesia (Sleep Medicine)  What should I do after anesthesia?  You should rest and relax for the next 24 hours. Avoid risky or difficult (strenuous) activity. A responsible adult should stay with you overnight.  Don't drive or use any heavy equipment for 24 hours. Even if you feel normal, your reactions may be affected by the sleep medicine given to you.  Don't drink alcohol or make any important decisions for 24 hours.  Slowly get back to your regular diet, as you feel able.  How should I expect to feel?  It's normal to feel dizzy, light-headed, or faint for up to a full day after anesthesia or while taking pain medicine. If this happens:   Sit down for a few minutes before standing.  Have someone help you when you get up to walk or use the bathroom.  If you have nausea (feel sick to your stomach) or vomit (throw up):   Drink clear liquids (such as apple juice, ginger ale, broth, or 7UP) until you feel better.  If you feel sick to your stomach, or you keep vomiting for 24 hours, please call the doctor.  What else should I know?  You might have a dry mouth, sore throat, muscle aches, or trouble sleeping. These should go away after 24 hours.  Please contact your doctor if you have any other symptoms that concern you, such as fever, pain, bleeding, fluid drainage, swelling, or headache, or if it's been over 8 to 10 hours and you still aren't able to pee (urinate).  If you have a history of sleep apnea, it's very important to use your CPAP machine for the next 24 hours when you nap or sleep.   For informational purposes only. Not to replace the advice of your health care provider. Copyright   2023 [employer]. All rights reserved. Clinically reviewed by Allen Zuniga MD. [employer] [id number] - REV 09/23.      UPPER ENDOSCOPY    AFTER THE PROCEDURE  You will return to Same Day Surgery to rest for about an hour before you go home.  The doctor will talk with you and your family.  A family  member/friend may visit with you.  You may burp up any air remaining in your stomach.  You may feel dizzy or light-headed from the medicine.  Your nurse will go over the discharge instructions with you and your caregiver and answer any of your questions.    You will be contacted the next day to check on how you are doing.  If biopsies were taken, you will be contacted with the results usually within 7-10 days.  BACK AT HOME  Rest for an hour or two after you get home.  When your throat is no longer numb and you have a gag reflex, take a few sips of cool water.  If you can swallow comfortably, you may start eating again.  You may have a mild sore throat for the rest of the day.  You will be contacted with results by the surgeons office within a week. If not contacted in one week, call the surgeons office.  WHAT TO WATCH FOR:  Problems rarely occur after the exam, but it is important to be aware of the early signs of a complication.  Call your doctor immediately if you have:  Difficulty swallowing or breathing  Unusual pain in your stomach or chest  Vomiting blood or dark material that looks like coffee grounds  Black or tarry stools  Temperature over 101.5 degrees    MORE QUESTIONS?  Please ask your doctor or nurse before the exam begins  or call your doctor at the clinic.    IF YOU MUST CANCEL YOUR PROCEDURE THE EVENING/NIGHT BEFORE, PLEASE CALL HOSPITAL ADMITTING -[phone] OR TOLL FREE [phone], [phone].    Phone Numbers:  [facility] - [phone] [phone]  [facility] - [phone]  Surgery Patient Education - [phone] or toll free [phone]    INSTRUCTIONS AFTER COLONOSCOPY    WHEN YOU ARE BACK HOME:  Plan to rest for an hour or two after you get home.  You may have some cramping or pressure until you pass gas.  You may resume your regular medications.  Eat a small, light meal at first, and then gradually return to normal meal sizes.  You will be contacted the next day to see how  you are doing.  If you had a polyp removed:  Slight bleeding may occur.  You may have a slight blood stain on the toilet paper after a bowel movement.  To lessen the chance of bleeding, avoid heavy exercise for ONE WEEK.  This includes heavy lifting, vigorous sport activities, and heavy physical labor.  You may resume your normal sexual activity.    Avoid aspirin or aspirin products if instructed by your doctor.  If there is a polyp or biopsy, you will be contacted with results within 7-10 days.     WHAT TO WATCH FOR:  Problems rarely occur after the exam; however, it is important for you to watch for early signs of possible problems.  If you have   Unusual pain in your abdomen  Nausea and vomiting that persists  Excessive bleeding  Black or bloody bowel movements  Fever or temperature above 100.6 F  Please call your doctor ([facility] [phone]) or go to the nearest hospital emergency room.

## 2024-02-13 NOTE — ANESTHESIA CARE TRANSFER NOTE
Patient: Sharlene Mccord    Procedure: Procedure(s):  upper endoscopy with biopsy and colonoscopy with polypectomy  HEMORRHOIDECTOMY, EXTERNAL       Diagnosis: Rectal bleeding [K62.5]  External hemorrhoids [K64.4]  Esophageal reflux [K21.9]  Diagnosis Additional Information: No value filed.    Anesthesia Type:   General     Note:    Oropharynx: spontaneously breathing and oropharynx clear of all foreign objects  Level of Consciousness: drowsy  Oxygen Supplementation: nasal cannula  Level of Supplemental Oxygen (L/min / FiO2): 2  Independent Airway: airway patency satisfactory and stable  Dentition: dentition unchanged  Vital Signs Stable: post-procedure vital signs reviewed and stable  Report to RN Given: handoff report given  Patient transferred to: PACU    Handoff Report: Identifed the Patient, Identified the Reponsible Provider, Reviewed the pertinent medical history, Discussed the surgical course, Reviewed Intra-OP anesthesia mangement and issues during anesthesia, Set expectations for post-procedure period and Allowed opportunity for questions and acknowledgement of understanding    Vitals:  Vitals Value Taken Time   /68 02/13/24 1059   Temp 95.36  F (35.2  C) 02/13/24 1101   Pulse 70 02/13/24 1101   Resp 24 02/13/24 1101   SpO2 99 % 02/13/24 1101   Vitals shown include unfiled device data.    Electronically Signed By: ENRIQUE Ahuja CRNA  February 13, 2024  11:02 AM

## 2024-02-13 NOTE — BRIEF OP NOTE
[facility]    Brief Operative Note    Pre-operative diagnosis: Rectal bleeding [K62.5]  External hemorrhoids [K64.4]  Esophageal reflux [K21.9]  Post-operative diagnosis Rectal polyps x 2, 3 columns hemorrhoids    Procedure: upper endoscopy with biopsy and colonoscopy with polypectomy, N/A - Rectum  HEMORRHOIDECTOMY, EXTERNAL, N/A - Anus    Surgeon: Surgeon(s) and Role:     * Hernando Roth MD - Primary     * Melony Jorge APRN CNS - Assisting  Anesthesia: MAC   Estimated Blood Loss: Less than 50 ml    Drains: None  Specimens:   ID Type Source Tests Collected by Time Destination   1 :  Biopsy Stomach, Antrum SURGICAL PATHOLOGY EXAM Hernando Roth MD 2/13/2024  9:51 AM    2 : rectal polyp x2 Polyp Rectum SURGICAL PATHOLOGY EXAM Hernando Roth MD 2/13/2024 10:08 AM    3 :  Tissue Hemorrhoids SURGICAL PATHOLOGY EXAM Hernando Roth MD 2/13/2024 10:26 AM      Findings:   Three columns of hemorhroids, GE junction 37cm. Rectal polyps x2.  Complications: None.  Implants: * No implants in log *

## 2024-02-13 NOTE — TELEPHONE ENCOUNTER
Spironolactone 25 MG Oral Tablet       Last Written Prescription Date:  1/17/2024  Last Fill Quantity: 30,   # refills: 0  Last Office Visit: 1/25/2024  Future Office visit:    Next 5 appointments (look out 90 days)      Apr 25, 2024  9:15 AM  (Arrive by 9:00 AM)  [facility]  [facility] [place] [address] [place] [facility] [address]  [phone]     May 03, 2024  8:30 AM  (Arrive by 8:15 AM)  Return Visit with Sydni Buckley CNP  [facility] [place] [facility] [address] [place] [facility] [address]  [phone]             Routing refill request to provider for review/approval because:    Diuretics (Including Combos) Protocol [id number]/13/2024 06:10 AM   Protocol Details Has GFR on file in past 12 months and most recent value is normal        Kimberly Boecker, RN

## 2024-02-13 NOTE — OR NURSING
"Patient and responsible adult given discharge instructions with no questions regarding instructions. Petty score 19/20. Pain level 7/10, pt reported \"pain's probably not going to get any better.\"   Discharged from unit via wheelchair with daughter. Patient discharged to home, copy of AVS sent with pt, reviewed prior.    "

## 2024-02-13 NOTE — OP NOTE
Sharlene Mccord MRN# [id number]   YOB: 1960 Age: 63 year old      Date of Admission:  2/13/2024  Date of Service:   2/13/24    Primary care provider: Concha Hare    PREOPERATIVE DIAGNOSIS:  Colon Cancer Screening, reflux, symptomatic hemorrhoids        POSTOPERATIVE DIAGNOSIS:  normal appearing upper Gi tract, mild diverticulosis, rectal polyps x 2, three columns of hemorrhoids removed.          PROCEDURE:  Colonoscopy with polypectomy, Upper endoscopy with biopsy, hemorrhoidectomy            INDICATIONS:  See clinic notes.      Specimen:   ID Type Source Tests Collected by Time Destination   1 :  Biopsy Stomach, Antrum SURGICAL PATHOLOGY EXAM Hernando Roth MD 2/13/2024  9:51 AM    2 : rectal polyp x2 Polyp Rectum SURGICAL PATHOLOGY EXAM Hernando Roth MD 2/13/2024 10:08 AM    3 :  Tissue Hemorrhoids SURGICAL PATHOLOGY EXAM Hernando Roth MD 2/13/2024 10:26 AM        SURGEON: Hernando Roth MD      PROCEDURE:  With the patient in the supine position on the transport cart General anesthesia was administered sedation was administered by the nurse anesthetist.  The patient's correct identity and planned procedure were confirmed during a requisite timeout pause.  A bite block was placed and the fiberoptic endoscope was introduced and negotiated through the cricopharyngeus without difficulty.  The length of the esophagus was examined without findings.  The gastroesophageal junction was noted 37 cm from the incisors; the Z line was regular without ulceration, bleeding source or stricture.  There was no  evidence of Lopez's change.  The stomach was entered and the pylorus was traversed easily.  The gastric mucosa was normal; there was no evidence of gastric polyp, ulcer or bleeding source.  The duodenum was similarly without finding.  The endoscope was returned to the body of the stomach and retroflex confirmed the absence of abnormality in the cardia.      Random cold forceps biopsies were  obtained of the antrum for H. pylori.  Hemostasis was judged adequate on visualization.   The endoscope was returned to the GE junction.  A second circumferential examination of the esophagus was performed on slow withdrawal of the endoscope without additional finding.      Our attention then turned to the colonoscopy  the external anus was inspected and was noted to have enlarged hemorrhoids. Digital rectal exam was normal. The colonoscope was inserted and advanced under direct visualization to the level of the cecum which was identified by the appendiceal orifice and the ileocecal valve. The prep was excellent.. Upon slow withdrawal of the colonoscope, approximately 95% of the mucosa was directly visualized. There were two polyps in the rectum that were removed with biopsy forceps. There was mild sigmoid diverticulosis.  The rest of the colon was without mucosal abnormality. There was no evidence of further polyps, inflammation, bleeding or AVMs. Retroflexion of the rectum was normal. The extra air was removed from the colon, and the colonoscope withdrawn.     We then placed the patient in a prone jackknife position ensuring all pressure points were padded. The perianal skin was injected with 0.25% marcaine. Then using a urena-bivalve there was noted to be left lateral and posterior and anterior right columns with the anterior right being the most significant. Using cautery the external skin tag was taken down and then then internal hemorrhoid removed with ligasure. The resection site was then oversewn with 2-0 chromic suture. This was repeated for all three columns. Hemostasis was adequate. A folded up gauze pad was placed in anus and she was awoken form anesthesia without complication.     We invite the patient to return in 5-10 years for follow up screening evaluation, pending pathology related to polyps.    Hernando Roth MD

## 2024-02-14 NOTE — ANESTHESIA POSTPROCEDURE EVALUATION
Patient: Sharlene Mccord    Procedure: Procedure(s):  upper endoscopy with biopsy and colonoscopy with polypectomy  HEMORRHOIDECTOMY, EXTERNAL       Anesthesia Type:  General    Note:  Disposition: Outpatient   Postop Pain Control: Uneventful            Sign Out: Well controlled pain   PONV: No   Neuro/Psych: Uneventful            Sign Out: Acceptable/Baseline neuro status   Airway/Respiratory: Uneventful            Sign Out: Acceptable/Baseline resp. status   CV/Hemodynamics: Uneventful            Sign Out: Acceptable CV status; No obvious hypovolemia; No obvious fluid overload   Other NRE: NONE   DID A NON-ROUTINE EVENT OCCUR? No         Last vitals:  Vitals Value Taken Time   /44 02/13/24 1230   Temp 96.98  F (36.1  C) 02/13/24 1242   Pulse 76 02/13/24 1231   Resp 10 02/13/24 1231   SpO2 99 % 02/13/24 1241   Vitals shown include unfiled device data.    Electronically Signed By: ENRIQUE Ahuja CRNA  February 14, 2024  11:58 AM

## 2024-02-15 ENCOUNTER — TELEPHONE (OUTPATIENT)
Dept: FAMILY MEDICINE | Facility: OTHER | Age: 64
End: 2024-02-15

## 2024-02-15 LAB
PATH REPORT.COMMENTS IMP SPEC: NORMAL
PATH REPORT.FINAL DX SPEC: NORMAL
PATH REPORT.GROSS SPEC: NORMAL
PATH REPORT.MICROSCOPIC SPEC OTHER STN: NORMAL
PATH REPORT.RELEVANT HX SPEC: NORMAL
PHOTO IMAGE: NORMAL

## 2024-02-15 NOTE — TELEPHONE ENCOUNTER
Reason for call:  Medication      Have you contacted your pharmacy? No  - needs to be approved   Medication: Oxycodone   What Pharmacy do you use? [facility] in [place]

## 2024-02-16 ENCOUNTER — PATIENT OUTREACH (OUTPATIENT)
Dept: CARE COORDINATION | Facility: OTHER | Age: 64
End: 2024-02-16

## 2024-02-16 NOTE — Clinical Note
Clinic Care Coordination Contact Care Team Conversations  RN CC received message that patient has a surgery a couple days ago and she is in a lot of pain. She was given 13 oxycodone on 2/13/2024 and she has 4 left. She is requesting more if possible to get her through the weekend.  RN to notify provider and follow up if needed.  Kimberly Boecker, RN Care Coordination

## 2024-02-19 ENCOUNTER — PATIENT OUTREACH (OUTPATIENT)
Dept: CARE COORDINATION | Facility: OTHER | Age: 64
End: 2024-02-19

## 2024-02-19 DIAGNOSIS — G89.18 ACUTE POST-OPERATIVE PAIN: ICD-10-CM

## 2024-02-19 RX ORDER — OXYCODONE HYDROCHLORIDE 5 MG/1
5 TABLET ORAL EVERY 6 HOURS PRN
Qty: 11 TABLET | Refills: 0 | Status: SHIPPED | OUTPATIENT
Start: 2024-02-19 | End: 2024-04-29

## 2024-02-19 RX ORDER — OXYCODONE HYDROCHLORIDE 5 MG/1
5 TABLET ORAL EVERY 6 HOURS PRN
Qty: 11 TABLET | Refills: 0 | Status: CANCELLED | OUTPATIENT
Start: 2024-02-19

## 2024-02-19 NOTE — PROGRESS NOTES
Clinic Care Coordination Contact  Care Team Conversations    RN CC called and spoke to patient to inform her if she needed a refill on her oxycodone that she will need to contact her surgeon. Patient stated that she already did last week.She said she is still in a lot of pain and the tylenol  is just not working. RN again advised patient to call surgeon and she said ok.    RN to notify provider    Kimberly Boecker, RN  Care Coordination

## 2024-02-19 NOTE — TELEPHONE ENCOUNTER
Patient called surgery dept today requesting a refill of her oxycodone after her colonoscopy/Hemorrhoidectomy with Dr. Roth 02/13/2024. Refill ok'd by Dr. Roth and pended below. RN will notify patient.     IRMA Garnica Care Coordinator with General Surgery 2/19/2024 at 9:32 AM

## 2024-02-19 NOTE — PROGRESS NOTES
If there is a surgery that needs to go through the surgeon. Not me     Concha Hare RN,PA-C  [address]  [id number]

## 2024-02-19 NOTE — TELEPHONE ENCOUNTER
Taken care of by RN Coordinator in a separate encounter on 2/19/2024. Please see encounter.  Juan Leggett LPN

## 2024-02-28 ENCOUNTER — OFFICE VISIT (OUTPATIENT)
Dept: SURGERY | Facility: OTHER | Age: 64
End: 2024-02-28
Attending: SURGERY
Payer: MEDICARE

## 2024-02-28 VITALS
OXYGEN SATURATION: 98 % | TEMPERATURE: 96.4 F | HEIGHT: 67 IN | SYSTOLIC BLOOD PRESSURE: 130 MMHG | HEART RATE: 66 BPM | DIASTOLIC BLOOD PRESSURE: 62 MMHG | RESPIRATION RATE: 18 BRPM | WEIGHT: 179 LBS | BODY MASS INDEX: 28.09 KG/M2

## 2024-02-28 DIAGNOSIS — Z98.890 POSTOPERATIVE STATE: Primary | ICD-10-CM

## 2024-02-28 PROCEDURE — G0463 HOSPITAL OUTPT CLINIC VISIT: HCPCS

## 2024-02-28 PROCEDURE — 99024 POSTOP FOLLOW-UP VISIT: CPT | Performed by: SURGERY

## 2024-02-28 ASSESSMENT — PAIN SCALES - GENERAL: PAINLEVEL: MILD PAIN (2)

## 2024-02-28 NOTE — PROGRESS NOTES
"[facility] Progress Note    HPI: Comes to clinic for follow up of hemorrhoidectomy and colonoscopy       S: She is getting better, bleeding has stopped, still has some control issues with bowel movements, admits to loose stools. Pain improving.     O:   Vitals:  /62 (BP Location: Right arm, Cuff Size: Adult Regular)   Pulse 66   Temp (!) 96.4  F (35.8  C) (Tympanic)   Resp 18   Ht 1.689 m (5' 6.5\")   Wt 81.2 kg (179 lb)   SpO2 98%   BMI 28.46 kg/m        Physical Exam:  G: alert oriented, no acute distress       Assessment/Plan:  Follow up form hemorrhoidectomy with colonoscopy, no need for colon cancer screening for 10 years, discussed adding back fiber to help with control of bowel movements. Follow up PRN     Hernando Roth MD     "

## 2024-03-10 DIAGNOSIS — I50.32 CHRONIC DIASTOLIC HEART FAILURE (H): ICD-10-CM

## 2024-03-11 NOTE — TELEPHONE ENCOUNTER
Spironolactone      Last Written Prescription Date:  2/13/24  Last Fill Quantity: 30,   # refills: 0  Last Office Visit: 1/25/24  Future Office visit:    Next 5 appointments (look out 90 days)      Apr 25, 2024  9:15 AM  (Arrive by 9:00 AM)  SHORT with KEILY Fonseca  [facility] [place] ([facility] [place] ) [address]  [place] [address]  [phone]     May 03, 2024  8:30 AM  (Arrive by 8:15 AM)  Return Visit with Sydni Buckley CNP  [facility] [place] ([facility] [place] ) [address]  [place] [address]  [phone]             Routing refill request to provider for review/approval because:

## 2024-03-12 NOTE — TELEPHONE ENCOUNTER
Spironolactone 25 MG Oral Tablet       Last Written Prescription Date:  2/13/2024  Last Fill Quantity: 30,   # refills: 0  Last Office Visit: 1/25/2024  Future Office visit:    Next 5 appointments (look out 90 days)      Apr 25, 2024  9:15 AM  (Arrive by 9:00 AM)  SHORT with KEILY Fonseca  [facility] [place] ([facility] [place] ) [address] [facility] [address] [place] [facility] [address]  [phone]     May 03, 2024  8:30 AM  (Arrive by 8:15 AM)  [facility]  [facility] [place] [facility] [address] [facility] [address]  [phone]             Routing refill request to provider for review/approval because:    Diuretics (Including Combos) Protocol [id number]/10/2024 07:11 AM   Protocol Details Has GFR on file in past 12 months and most recent value is normal        Kimberly Boecker, RN

## 2024-03-13 RX ORDER — SPIRONOLACTONE 25 MG/1
25 TABLET ORAL DAILY
Qty: 30 TABLET | Refills: 0 | Status: SHIPPED | OUTPATIENT
Start: 2024-03-13 | End: 2024-04-08

## 2024-04-08 DIAGNOSIS — I50.32 CHRONIC DIASTOLIC HEART FAILURE (H): ICD-10-CM

## 2024-04-08 RX ORDER — SPIRONOLACTONE 25 MG/1
25 TABLET ORAL DAILY
Qty: 90 TABLET | Refills: 0 | Status: SHIPPED | OUTPATIENT
Start: 2024-04-08 | End: 2024-07-02

## 2024-04-08 NOTE — TELEPHONE ENCOUNTER
Spironolactone      Last Written Prescription Date:  03/13/24  Last Fill Quantity: 30,   # refills: 0  Last Office Visit: 01/25/24  Future Office visit:    Next 5 appointments (look out 90 days)      Apr 25, 2024  9:15 AM  (Arrive by 9:00 AM)  SHORT with KEILY Fonseca  [facility] [place] ([facility] [place] ) [address]  [place] [address]  [phone]     May 03, 2024  8:30 AM  (Arrive by 8:15 AM)  Return Visit with Sydni Buckley CNP  [facility] [place] ([facility] [place] ) [address]  [place] [address]  [phone]

## 2024-04-17 ENCOUNTER — HOSPITAL ENCOUNTER (OUTPATIENT)
Dept: CARDIOLOGY | Facility: HOSPITAL | Age: 64
Discharge: HOME | End: 2024-04-17
Attending: NURSE PRACTITIONER | Admitting: INTERNAL MEDICINE
Payer: MEDICARE

## 2024-04-17 DIAGNOSIS — R06.09 DYSPNEA ON EXERTION: ICD-10-CM

## 2024-04-17 DIAGNOSIS — I35.1 AORTIC VALVE INSUFFICIENCY, ETIOLOGY OF CARDIAC VALVE DISEASE UNSPECIFIED: ICD-10-CM

## 2024-04-17 DIAGNOSIS — R60.0 BILATERAL LEG EDEMA: ICD-10-CM

## 2024-04-17 DIAGNOSIS — I50.30 HEART FAILURE WITH PRESERVED EJECTION FRACTION, NYHA CLASS II (H): ICD-10-CM

## 2024-04-17 LAB — LVEF ECHO: NORMAL

## 2024-04-17 PROCEDURE — 93306 TTE W/DOPPLER COMPLETE: CPT | Mod: 26 | Performed by: INTERNAL MEDICINE

## 2024-04-17 PROCEDURE — 93306 TTE W/DOPPLER COMPLETE: CPT

## 2024-04-29 ENCOUNTER — VIRTUAL VISIT (OUTPATIENT)
Dept: PSYCHIATRY | Facility: OTHER | Age: 64
End: 2024-04-29
Attending: PSYCHIATRY & NEUROLOGY
Payer: COMMERCIAL

## 2024-04-29 DIAGNOSIS — F41.1 GENERALIZED ANXIETY DISORDER: Primary | ICD-10-CM

## 2024-04-29 PROCEDURE — 99443 PR PHYSICIAN TELEPHONE EVALUATION 21-30 MIN: CPT | Mod: 93 | Performed by: PSYCHIATRY & NEUROLOGY

## 2024-04-29 RX ORDER — CLONAZEPAM 1 MG/1
TABLET ORAL
Qty: 60 TABLET | Refills: 3 | Status: SHIPPED | OUTPATIENT
Start: 2024-06-04 | End: 2024-09-19

## 2024-04-29 ASSESSMENT — PAIN SCALES - GENERAL: PAINLEVEL: NO PAIN (0)

## 2024-04-29 NOTE — PROGRESS NOTES
"  Virtual Visit Details    Type of service:  Telephone Visit   Phone call duration: 22 minutes   Originating Location (pt. Location): Home    Distant Location (provider location):  Off-site    Start time: 9:02 am  End time: 9:25 am        SUBJECTIVE / INTERIM HISTORY                                                                       Last visit 2/12/24: Continue Klonopin 1 mg 2 times daily filled end Jan and has 3 refills. Continue Prozac 40 mg daily. Continue trazodone 50mg to 100 mg HS prn insomnia    - had preliminary court hearing. Got a motion put in for temporary spousal maintenance until all this legal stuff is finished  - once Roney said \"my master plan might work\" he mentioned     - moved out New Year's Day. Apartment through [address] apartments.  - had lot of appointments last week.   - feels like \"internal shakes\" and has a noticeable tremor she feels is in relation to anxiety    - when Sharlene moved from [place]. Sold her house [employer] and used some of the money for her and Roney to build new double stall garage. Sharlene discovered in past couple months Roney at some point too Sharlene's name off the loan.  - brother Jhonny (in the [facility]) has been trying to help with her things.   - found out Roney was having relationship (second in two years). Getting divorce. Was around Labor Day Sharlene had been out at the cabin for while and called Roney and he said he wasn't going to come to the cabin and at the point Sharlene figured things out  - dog Shanti. Sharlene thinks Shanti is having some anxiety    SYMPTOMS- Depressed mood, low energy, anhedonia, feelings guilt & overwhelmed, poor sleep. cognitive issues including word finding    MEDICAL / SURGICAL HISTORY                 Medical Team:     TIEN- Concha Hare     Therapist-none   Patient Active Problem List   Diagnosis    Diverticulitis of sigmoid colon    Hypothyroidism    Anxiety    Cardiac microvascular disease    Diverticulitis    Aortic valve disorder    " ACP (advance care planning)    Chronic pain    Constipation    Memory loss    Major depressive disorder, recurrent episode (H24)    Cognitive disorder    Major depressive disorder    Fatigue    Migraine without aura and responsive to treatment    Atypical migraine    Mixed connective tissue disease (H24)    SHAKRI (generalized anxiety disorder)    Gastroesophageal reflux disease with esophagitis    Acute pancreatitis    Obstruction of common bile duct (H28)    Abnormal MRI of the head    GERD (gastroesophageal reflux disease)    Tremor    GUTIERREZ (dyspnea on exertion)    Chronic diastolic heart failure (H)    Diastolic heart failure, unspecified HF chronicity (H)    Chest pain, unspecified type    Heart murmur    Chronic kidney disease, stage 1    Stage 3a chronic kidney disease (H)    Persistent insomnia    Hypotension due to drugs    Status post coronary angiogram    Relationship dysfunction     ALLERGY   Codeine, Isosorbide mononitrate, Lisinopril, and Paxil [paroxetine]  MEDICATIONS                                                                                             Current Outpatient Medications   Medication Sig Dispense Refill    acetaminophen (TYLENOL) 325 MG tablet Take 650 mg by mouth      calcium carbonate (TUMS) 500 MG chewable tablet Take 1 tablet (500 mg) by mouth 3 times daily 180 tablet 3    clonazePAM (KLONOPIN) 1 MG tablet TAKE 1/2 TO 1 (ONE-HALF TO ONE) TABLET BY MOUTH TWICE DAILY 60 tablet 3    diltiazem ER COATED BEADS (CARDIZEM CD/CARTIA XT) 120 MG 24 hr capsule Take 1 capsule (120 mg) by mouth daily 90 capsule 3    esomeprazole (NEXIUM) 40 MG DR capsule TAKE 1 CAPSULE BY MOUTH IN THE MORNING BEFORE BREAKFAST TAKE  30-60  MINUTES  BEFORE  EATING 90 capsule 3    estradiol (ESTRACE) 0.5 MG tablet Take 1 tablet (0.5 mg) by mouth daily 30 tablet 1    FLUoxetine (PROZAC) 40 MG capsule Take 1 capsule (40 mg) by mouth daily 90 capsule 1    hydrocortisone, Perianal, (PROCTO-MED HC) 2.5 % cream APPLY  CREAM RECTALLY TO AFFECTED AREA(S) THREE TIMES DAILY AS DIRECTED 56 g 0    levothyroxine (SYNTHROID/LEVOTHROID) 75 MCG tablet Take 1 tablet by mouth once daily 90 tablet 2    nitroGLYcerin (NITROSTAT) 0.4 MG sublingual tablet DISSOLVE ONE TABLET UNDER THE TONGUE EVERY 5 MINUTES AS NEEDED FOR CHEST PAIN. DO NOT EXCEED A TOTAL OF 3 DOSES IN 15 MINUTES 30 tablet 4    spironolactone (ALDACTONE) 25 MG tablet Take 1 tablet by mouth once daily 90 tablet 0    traZODone (DESYREL) 100 MG tablet Take 1/2 to 1 tablet at bedtime as needed for insomnia 30 tablet 4     No current facility-administered medications for this visit.       VITALS   There were no vitals taken for this visit.    PHQ9                       Depression Screening Follow-up        4/24/2024     1:31 PM   PHQ   PHQ-9 Total Score 14   Q9: Thoughts of better off dead/self-harm past 2 weeks Not at all       Does the patient currently have a mental health provider? Yes: Aurelia Odell MD     basic metabolic panel     Last Comprehensive Metabolic Panel:  Lab Results   Component Value Date     01/25/2024    POTASSIUM 4.6 01/25/2024    CHLORIDE 104 01/25/2024    CO2 25 01/25/2024    ANIONGAP 9 01/25/2024    GLC 94 01/25/2024    BUN 20.4 01/25/2024    CR 1.06 (H) 01/25/2024    GFRESTIMATED 59 (L) 01/25/2024    DAKSHA 9.3 01/25/2024          MENTAL STATUS EXAM                                                                                       Mood anxious.  Thought process, including associations, was unremarkable and thought content was devoid of suicidal ideation Insight was good. Judgment was intact and adequate for safety. Fund of knowledge was intact. Pt demonstrates no obvious problems with attention, concentration, language, recent or remote memory although these were not formally tested.     ASSESSMENT                                                                                                      HISTORICAL:  Initial psych consult 12/12/14           "  Notes:  Ritalin as adjunct to antidepressant: didn't like how made her feel.   Prozac, Celexa ineffective. Wellbutrin: weight gain. . Zoloft: weight gain. Effexor back while: didn't stay on long and can't recall why. Cymbalta: was on and didn't help. She tried Provigil and SEs. Buspar: HAs.   Neuropsych testing summer 2015:  language: about same, working memory: better than last year. Attention / concentration worse than last year. Speed processing: improved. Executive function: didn't test well on that front also comparable to last year. Memory: worse off than last year. Depression: similar to last year. Anxiety / emotional anxiety worse.     Ongoing stress for Sharlene as her and Roney going through divorce and had taken her name off (without her knowing) several major items. Her and Melodoy her 8 yo [employer] / [employer] mix continue to try to adjust to their new home in their apartment. She has been feeling very anxious and along with this feels not only an \"internal shake\" but has a noticeable tremor of which she feels is 2/2 anxiety. Also has more difficulty lately with the stress focusing and keeping track of things. We agreed on trying an increase in Prozac.     TREATMENT RISK STATEMENT: The risks, benefits, alternatives and potential adverse effects have been explained and are understood by the pt. The pt agrees to the treatment plan with the ability to do so. The pt knows to call the clinic for any problems or access emergency care if needed.    DIAGNOSES             Major depressive disorder, recurrent, severe without psychosis  Mild neurocognitive disorder      PLAN                                                                                                                         1) MEDICATION:   -- Continue Klonopin 1 mg 2 times daily has 1 refill left will fill beginning of May. I set ot fill again 6/24/24 with 3 refills. Increase Prozac 40 mg daily to 60 mg daily. Continue trazodone 50mg to 100 mg HS prn " insomnia    2) THERAPY: no change.     3) LABS: none    4)  RTC: ~4-5 weeks

## 2024-05-01 ENCOUNTER — MYC MEDICAL ADVICE (OUTPATIENT)
Dept: FAMILY MEDICINE | Facility: OTHER | Age: 64
End: 2024-05-01

## 2024-05-01 NOTE — TELEPHONE ENCOUNTER
5/1/2024 3:48 PM  Called pt and scheduled.  Future Appointments 5/1/2024 - 10/28/2024        Date Visit Type Length Department Provider     5/6/2024  8:45 AM OFFICE VISIT 30 min [facility] Concha Hare PA    Location Instructions:     From [facility]: Take [address]. Turn left at [address]. Turn left at the first stoplight on [address]. At the first stop sign, take a right onto [address]. The upper level parking lot will be on the left. East Entrance Door number 10.   From [place]: Take [address]. Take a right at [address]. At the first stop sign, take a right onto [address]. The upper level parking lot will be on the left. East Entrance Door number 10.   From [place]: Take [address]. Take the [address] ramp towards [place]. Turn left onto [address]. Take a slight right onto [address]. Turn left at the first stoplight on [address]. At the first stop sign, take a right onto [address]. The upper level parking lot will be on the left. East Entrance Door number 10.              5/8/2024 10:00 AM RETURN 30 min [facility] Sydni Buckley CNP    Location Instructions:     From [facility]: Take [address]. Turn left at [address]. Turn left at the first stoplight on [address]. At the first stop sign, take a right onto [address]. The upper level parking lot will be on the left. East Entrance Door number 10.   From [place]: Take [address]. Take a right at [address]. At the first stop sign, take a right onto [address]. The upper level parking lot will be on the left. East Entrance Door number 10.   From [place]: Take [address]. Take the [address] ramp towards [place]. Turn left onto [address]. Take a slight right onto [address]. Turn left at the first stoplight on [address]. At the first stop sign, take a right onto  [address]. The upper level parking lot will be on the left. East Entrance Door number 10.              5/31/2024 10:45 AM OFFICE VISIT 30 min [facility] Concha Hare PA    Location Instructions:     From [facility]: Take [address]. Turn left at [address]. Turn left at the first stoplight on [address]. At the first stop sign, take a right onto [address]. The upper level parking lot will be on the left. East Entrance Door number 10.   From [place]: Take [address]. Take a right at [address]. At the first stop sign, take a right onto [address]. The upper level parking lot will be on the left. East Entrance Door number 10.   From [place]: Take [address]. Take the [address] ramp towards [place]. Turn left onto [address]. Take a slight right onto [address]. Turn left at the first stoplight on [address]. At the first stop sign, take a right onto [address]. The upper level parking lot will be on the left. East Entrance Door number 10.              6/3/2024  9:00 AM TELEPHONE VISIT RETURN 40 min [facility] Aurelia Odell MD    Location Instructions:     From [facility]: Take [address]. Turn left at [address]. Turn left at the first stoplight on [address]. At the first stop sign, take a right onto [address]. The upper level parking lot will be on the left. East Entrance Door number 10.   From [place]: Take [address]. Take a right at [address]. At the first stop sign, take a right onto [address]. The upper level parking lot will be on the left. East Entrance Door number 10.   From [place]: Take [address]. Take the [address] ramp towards [place]. Turn left onto [address]. Take a slight right onto [address]. Turn left at the first stoplight on [address]. At the first stop sign, take a right onto [address].  The upper level parking lot will be on the left. East Entrance Door number 10.  COVID Swabs, please proceed to the upper level of the [facility] and call [phone] and remain in your vehicle. A nurse will call you when she is ready for you to come in for your appointment.

## 2024-05-06 ENCOUNTER — OFFICE VISIT (OUTPATIENT)
Dept: FAMILY MEDICINE | Facility: OTHER | Age: 64
End: 2024-05-06
Attending: PHYSICIAN ASSISTANT
Payer: COMMERCIAL

## 2024-05-06 VITALS
DIASTOLIC BLOOD PRESSURE: 70 MMHG | BODY MASS INDEX: 29.13 KG/M2 | WEIGHT: 185.6 LBS | TEMPERATURE: 96.4 F | OXYGEN SATURATION: 99 % | HEART RATE: 57 BPM | RESPIRATION RATE: 15 BRPM | SYSTOLIC BLOOD PRESSURE: 126 MMHG | HEIGHT: 67 IN

## 2024-05-06 DIAGNOSIS — E03.9 ACQUIRED HYPOTHYROIDISM: Primary | ICD-10-CM

## 2024-05-06 DIAGNOSIS — L73.9 FOLLICULITIS: ICD-10-CM

## 2024-05-06 DIAGNOSIS — E78.5 HYPERLIPIDEMIA LDL GOAL <100: ICD-10-CM

## 2024-05-06 DIAGNOSIS — L30.9 NIPPLE DERMATITIS: ICD-10-CM

## 2024-05-06 DIAGNOSIS — M79.89 OTHER SPECIFIED SOFT TISSUE DISORDERS: ICD-10-CM

## 2024-05-06 DIAGNOSIS — L30.9 DERMATITIS: ICD-10-CM

## 2024-05-06 LAB — TSH SERPL DL<=0.005 MIU/L-ACNC: 0.76 UIU/ML (ref 0.3–4.2)

## 2024-05-06 PROCEDURE — 99214 OFFICE O/P EST MOD 30 MIN: CPT | Performed by: PHYSICIAN ASSISTANT

## 2024-05-06 PROCEDURE — G0463 HOSPITAL OUTPT CLINIC VISIT: HCPCS

## 2024-05-06 PROCEDURE — 36415 COLL VENOUS BLD VENIPUNCTURE: CPT | Mod: ZL | Performed by: PHYSICIAN ASSISTANT

## 2024-05-06 PROCEDURE — 84443 ASSAY THYROID STIM HORMONE: CPT | Mod: ZL | Performed by: PHYSICIAN ASSISTANT

## 2024-05-06 RX ORDER — ATORVASTATIN CALCIUM 10 MG/1
10 TABLET, FILM COATED ORAL DAILY
Qty: 90 TABLET | Refills: 3 | Status: SHIPPED | OUTPATIENT
Start: 2024-05-06

## 2024-05-06 RX ORDER — CLOBETASOL PROPIONATE 0.5 MG/G
CREAM TOPICAL 2 TIMES DAILY
Qty: 60 G | Refills: 1 | Status: SHIPPED | OUTPATIENT
Start: 2024-05-06

## 2024-05-06 RX ORDER — CEPHALEXIN 500 MG/1
500 CAPSULE ORAL 3 TIMES DAILY
Qty: 30 CAPSULE | Refills: 1 | Status: SHIPPED | OUTPATIENT
Start: 2024-05-06

## 2024-05-06 ASSESSMENT — ANXIETY QUESTIONNAIRES
7. FEELING AFRAID AS IF SOMETHING AWFUL MIGHT HAPPEN: SEVERAL DAYS
GAD7 TOTAL SCORE: 12
5. BEING SO RESTLESS THAT IT IS HARD TO SIT STILL: MORE THAN HALF THE DAYS
1. FEELING NERVOUS, ANXIOUS, OR ON EDGE: MORE THAN HALF THE DAYS
4. TROUBLE RELAXING: MORE THAN HALF THE DAYS
7. FEELING AFRAID AS IF SOMETHING AWFUL MIGHT HAPPEN: SEVERAL DAYS
GAD7 TOTAL SCORE: 12
8. IF YOU CHECKED OFF ANY PROBLEMS, HOW DIFFICULT HAVE THESE MADE IT FOR YOU TO DO YOUR WORK, TAKE CARE OF THINGS AT HOME, OR GET ALONG WITH OTHER PEOPLE?: SOMEWHAT DIFFICULT
2. NOT BEING ABLE TO STOP OR CONTROL WORRYING: MORE THAN HALF THE DAYS
IF YOU CHECKED OFF ANY PROBLEMS ON THIS QUESTIONNAIRE, HOW DIFFICULT HAVE THESE PROBLEMS MADE IT FOR YOU TO DO YOUR WORK, TAKE CARE OF THINGS AT HOME, OR GET ALONG WITH OTHER PEOPLE: SOMEWHAT DIFFICULT
6. BECOMING EASILY ANNOYED OR IRRITABLE: SEVERAL DAYS
3. WORRYING TOO MUCH ABOUT DIFFERENT THINGS: MORE THAN HALF THE DAYS

## 2024-05-06 ASSESSMENT — PAIN SCALES - GENERAL: PAINLEVEL: NO PAIN (0)

## 2024-05-06 NOTE — PROGRESS NOTES
"  Assessment & Plan     Acquired hypothyroidism  She needs labs done. We will get this done.     Hyperlipidemia LDL goal <100  She is given Lipitor back. Lipids went way.   - atorvastatin (LIPITOR) 10 MG tablet; Take 1 tablet (10 mg) by mouth daily    Dermatitis  Apply to dermatitis. Might be from over cleaning.   - clobetasol propionate (TEMOVATE) 0.05 % external cream; Apply topically 2 times daily    Folliculitis  In her right groin from shaving.   - cephALEXin (KEFLEX) 500 MG capsule; Take 1 capsule (500 mg) by mouth 3 times daily    Nipple dermatitis  Needing a mammogram and ultrasound midly elevated not a firm area but about the size dime.  Flesh colored and rough.  No AK appearing.   - MA DIAGNOSTIC DIGITAL BILATERAL ([place]); Future    Other specified soft tissue disorders  As above if needed we will ref to surgery.   - MA DIAGNOSTIC DIGITAL BILATERAL ([place]); Future    Review of external notes as documented elsewhere in note  Ordering of each unique test  Prescription drug management  10  minutes spent by me on the date of the encounter doing chart review, history and exam, documentation and further activities per the note      BMI  Estimated body mass index is 29.51 kg/m  as calculated from the following:    Height as of this encounter: 1.689 m (5' 6.5\").    Weight as of this encounter: 84.2 kg (185 lb 9.6 oz).   Weight management plan: Discussed healthy diet and exercise guidelines      See Patient Instructions    No follow-ups on file.    Beverley Su is a 63 year old, presenting for the following health issues:  Breast Problem        5/6/2024     8:27 AM   Additional Questions   Roomed by Saloni Arita   Accompanied by self         5/6/2024     8:27 AM   Patient Reported Additional Medications   Patient reports taking the following new medications none     History of Present Illness       Reason for visit:  Bump under left nipp;e  Symptom onset:  1-3 days ago  Symptoms include:  Found a patch of " "lumpy dry bump under left nipple.  Symptom intensity:  Mild  Symptom progression:  Staying the same  Had these symptoms before:  No    She eats 0-1 servings of fruits and vegetables daily.She consumes 1 sweetened beverage(s) daily.She exercises with enough effort to increase her heart rate 9 or less minutes per day.  She exercises with enough effort to increase her heart rate 3 or less days per week.   She is taking medications regularly.       Breast Concern  Onset/Duration: 1-3 days ago   Description:   Location: 4 o'clock  Pain or tenderness: burns and tender  Redness: YES- little redness   Intensity: mild  Progression of Symptoms: worsening  Accompanying Signs & Symptoms:  Any lumps in axillary region: No  Movable: No  Nipple discharge: none  Changes in the skin or nipple: redness  Raised area and dry   On Hormone therapy: YES- Estradiol   Does it change with menstrual cycle: N/A  Previous history of similar problem: no  First degree relative with breast cancer: a positive family history for breast cancer in her 2 sisters.  Precipitating factors:           Worsened by: none  Alleviating factors:            Improved by: none  Therapies tried and outcome: None  No LMP recorded. Patient has had a hysterectomy.        Review of Systems  Constitutional, HEENT, cardiovascular, pulmonary, gi and gu systems are negative, except as otherwise noted.      Objective    /70 (BP Location: Left arm, Patient Position: Sitting, Cuff Size: Adult Regular)   Pulse 57   Temp (!) 96.4  F (35.8  C) (Tympanic)   Resp 15   Ht 1.689 m (5' 6.5\")   Wt 84.2 kg (185 lb 9.6 oz)   SpO2 99%   BMI 29.51 kg/m    Body mass index is 29.51 kg/m .  Physical Exam   GENERAL: alert and no distress  EYES: Eyes grossly normal to inspection, PERRL and conjunctivae and sclerae normal  HENT: ear canals and TM's normal, nose and mouth without ulcers or lesions  NECK: no adenopathy, no asymmetry, masses, or scars  RESP: lungs clear to auscultation " - no rales, rhonchi or wheezes  BREAST: normal without masses, tenderness or nipple discharge and no palpable axillary masses or adenopathy  CV: regular rate and rhythm, normal S1 S2, no S3 or S4, no murmur, click or rub, no peripheral edema  ABDOMEN: soft, nontender, no hepatosplenomegaly, no masses and bowel sounds normal  MS: no gross musculoskeletal defects noted, no edema  SKIN: dime sized area on the left nipple. Area on leg about the size of a large marshmallow and oozing.     Hospital Outpatient Visit on 04/17/2024   Component Date Value Ref Range Status    LVEF  04/17/2024 55-60%   Final     No results found for any visits on 05/06/24.        Signed Electronically by: KEILY Cobos

## 2024-05-07 ENCOUNTER — ANCILLARY PROCEDURE (OUTPATIENT)
Dept: MAMMOGRAPHY | Facility: OTHER | Age: 64
End: 2024-05-07
Attending: PHYSICIAN ASSISTANT
Payer: MEDICARE

## 2024-05-07 ENCOUNTER — HOSPITAL ENCOUNTER (OUTPATIENT)
Dept: ULTRASOUND IMAGING | Facility: HOSPITAL | Age: 64
Discharge: HOME | End: 2024-05-07
Attending: PHYSICIAN ASSISTANT
Payer: MEDICARE

## 2024-05-07 DIAGNOSIS — M79.89 OTHER SPECIFIED SOFT TISSUE DISORDERS: ICD-10-CM

## 2024-05-07 DIAGNOSIS — L30.9 NIPPLE DERMATITIS: ICD-10-CM

## 2024-05-07 DIAGNOSIS — N63.23 UNSPECIFIED LUMP IN THE LEFT BREAST, LOWER OUTER QUADRANT: ICD-10-CM

## 2024-05-07 PROCEDURE — 77062 BREAST TOMOSYNTHESIS BI: CPT | Mod: TC

## 2024-05-07 PROCEDURE — 76642 ULTRASOUND BREAST LIMITED: CPT | Mod: LT

## 2024-05-10 ENCOUNTER — OFFICE VISIT (OUTPATIENT)
Dept: CARDIOLOGY | Facility: OTHER | Age: 64
End: 2024-05-10
Attending: NURSE PRACTITIONER
Payer: COMMERCIAL

## 2024-05-10 VITALS
SYSTOLIC BLOOD PRESSURE: 122 MMHG | RESPIRATION RATE: 16 BRPM | HEART RATE: 62 BPM | DIASTOLIC BLOOD PRESSURE: 70 MMHG | OXYGEN SATURATION: 98 % | HEIGHT: 67 IN | BODY MASS INDEX: 29.51 KG/M2 | WEIGHT: 188 LBS

## 2024-05-10 DIAGNOSIS — I51.89 DIASTOLIC DYSFUNCTION: ICD-10-CM

## 2024-05-10 DIAGNOSIS — I35.1 AORTIC VALVE INSUFFICIENCY, ETIOLOGY OF CARDIAC VALVE DISEASE UNSPECIFIED: Primary | ICD-10-CM

## 2024-05-10 DIAGNOSIS — R60.0 BILATERAL LEG EDEMA: ICD-10-CM

## 2024-05-10 DIAGNOSIS — R06.09 DYSPNEA ON EXERTION: ICD-10-CM

## 2024-05-10 DIAGNOSIS — I10 ESSENTIAL HYPERTENSION: ICD-10-CM

## 2024-05-10 DIAGNOSIS — E03.9 ACQUIRED HYPOTHYROIDISM: ICD-10-CM

## 2024-05-10 DIAGNOSIS — E78.2 MIXED HYPERLIPIDEMIA: ICD-10-CM

## 2024-05-10 DIAGNOSIS — I25.85 CARDIAC MICROVASCULAR DISEASE: ICD-10-CM

## 2024-05-10 PROCEDURE — 99214 OFFICE O/P EST MOD 30 MIN: CPT | Performed by: NURSE PRACTITIONER

## 2024-05-10 PROCEDURE — G0463 HOSPITAL OUTPT CLINIC VISIT: HCPCS

## 2024-05-10 RX ORDER — LEVOTHYROXINE SODIUM 75 UG/1
TABLET ORAL
Qty: 90 TABLET | Refills: 3 | Status: SHIPPED | OUTPATIENT
Start: 2024-05-10 | End: 2024-05-31

## 2024-05-10 ASSESSMENT — PAIN SCALES - GENERAL: PAINLEVEL: NO PAIN (0)

## 2024-05-10 NOTE — PATIENT INSTRUCTIONS
Thank you for allowing Sydni Buckley CNP and our  team to participate in your care. Please call our office at [phone] with scheduling questions or if you need to cancel or change your appointment. With any other questions or concerns you may call cardiology nurse at  [phone].       If you experience chest pain, chest pressure, chest tightness, shortness of breath, fainting, lightheadedness, nausea, vomiting, or other concerning symptoms, please report to the Emergency Department or call 911. These symptoms may be emergent, and best treated in the Emergency Department.    Take your weight first thing in the morning.     START furosemide 20 mg once every morning for 3-4 days    Kirstie, our care coordinator, will call later next week to follow-up on weights to see if there has been any improvement. If not, we can stop furosemide. If so, we can use furosemide 20 mg once daily as needed for increased LE swelling, weight gain of 3 pounds overnight or 5 pounds in a week.     If you are feeling easily fatigued with activities, more short of breath with activities, lightheadedness-- we could consider decreasing diltiazem 120 mg once daily to 60 mg once daily.     Follow-up with cardiology in 1 year, certainly sooner with acute concerns.    Sydni Buckley CNP

## 2024-05-10 NOTE — TELEPHONE ENCOUNTER
Synthroid      Last Written Prescription Date:  7/27/23  Last Fill Quantity: 90,   # refills: 2  Last Office Visit: 5/6/24  Future Office visit:    Next 5 appointments (look out 90 days)      May 31, 2024 10:45 AM  (Arrive by 10:30 AM)  Provider Visit with KEILY Fonseca  [facility] ([facility] [place] ) [address]  [place] [address]  [phone]             Routing refill request to provider for review/approval because:
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
## 2024-05-17 ENCOUNTER — TELEPHONE (OUTPATIENT)
Dept: CARDIOLOGY | Facility: OTHER | Age: 64
End: 2024-05-17

## 2024-05-17 NOTE — TELEPHONE ENCOUNTER
----- Message from Sydni Buckley CNP sent at 5/10/2024  9:12 AM CDT -----  Regarding: Follow-up  Can we follow-up with patient on weights, LE edema.  Reports a 1-2# loss, no change on leg swelling, with taking the lasix. Did have increased urination with lasix. As of today her weight increased a 1-2# with no change to leg swelling.     Thanks,    Sydni Buckley CNP on 5/10/2024 at 9:13 AM      From last visit:  Kirstie, our care coordinator, will call later next week to follow-up on weights to see if there has been any improvement. If not, we can stop furosemide. If so, we can use furosemide 20 mg once daily as needed for increased LE swelling, weight gain of 3 pounds overnight or 5 pounds in a week.

## 2024-05-20 NOTE — TELEPHONE ENCOUNTER
Sydni Buckley CNP  You43 minutes ago (10:50 AM)     MO  She can stop the lasix or just use it as needed    Thanks,    Sydni Buckley CNP on 5/20/2024 at 10:50 AM

## 2024-05-24 ENCOUNTER — PATIENT OUTREACH (OUTPATIENT)
Dept: CARE COORDINATION | Facility: OTHER | Age: 64
End: 2024-05-24

## 2024-05-24 NOTE — PROGRESS NOTES
Patient states she is doing well with the PRN furosemide.  Reports weight to be 188.  We discussed ongoing monitoring of her salt and fluid intake, she said she will continue to watch both.  Denies any significant BLE edema and feels her sx are stable. Education also provided on elevation to help with swelling.  Patient verbalized understanding.   Patient stated she is happy with her medications as they are currently prescribed and the results.   Also discussed following up next week with patient to check on CHF sx.  She verbalized understanding and agreeement with the plan.

## 2024-05-26 DIAGNOSIS — F41.1 GENERALIZED ANXIETY DISORDER: ICD-10-CM

## 2024-05-28 NOTE — TELEPHONE ENCOUNTER
Disp Refills Start End KASSANDRA   FLUoxetine (PROZAC) 20 MG capsule 90 capsule 3 4/29/2024 -- No     Last Office Visit: 05/06/2024  Future Office visit:    Next 5 appointments (look out 90 days)      May 31, 2024 10:45 AM  (Arrive by 10:30 AM)  Provider Visit with KEILY Fonseca  [facility] ([facility] [place] ) [address]  [place] [address]  [phone]             Routing refill request to provider for review/approval because:

## 2024-05-30 ENCOUNTER — TELEPHONE (OUTPATIENT)
Dept: CARE COORDINATION | Facility: OTHER | Age: 64
End: 2024-05-30

## 2024-05-30 RX ORDER — FLUOXETINE 40 MG/1
40 CAPSULE ORAL DAILY
Qty: 90 CAPSULE | Refills: 3 | Status: SHIPPED | OUTPATIENT
Start: 2024-05-30

## 2024-05-30 NOTE — TELEPHONE ENCOUNTER
Telephone call to patient.  No answer.  Left message, with phone number,  requesting return call to cardiology CC.   Jing Zhao RN Cardiology/CHF CC.....May 30, 2024...4:20 PM

## 2024-05-31 ENCOUNTER — OFFICE VISIT (OUTPATIENT)
Dept: FAMILY MEDICINE | Facility: OTHER | Age: 64
End: 2024-05-31
Attending: PHYSICIAN ASSISTANT
Payer: COMMERCIAL

## 2024-05-31 VITALS
BODY MASS INDEX: 29.25 KG/M2 | OXYGEN SATURATION: 97 % | HEART RATE: 68 BPM | TEMPERATURE: 98.2 F | DIASTOLIC BLOOD PRESSURE: 58 MMHG | RESPIRATION RATE: 16 BRPM | SYSTOLIC BLOOD PRESSURE: 100 MMHG | WEIGHT: 184 LBS

## 2024-05-31 DIAGNOSIS — E03.9 ACQUIRED HYPOTHYROIDISM: ICD-10-CM

## 2024-05-31 DIAGNOSIS — G43.009 ATYPICAL MIGRAINE: ICD-10-CM

## 2024-05-31 DIAGNOSIS — B35.0: Primary | ICD-10-CM

## 2024-05-31 DIAGNOSIS — Z92.29 HISTORY OF HORMONE THERAPY: ICD-10-CM

## 2024-05-31 DIAGNOSIS — L23.9 ALLERGIC DERMATITIS: ICD-10-CM

## 2024-05-31 PROCEDURE — G0463 HOSPITAL OUTPT CLINIC VISIT: HCPCS

## 2024-05-31 PROCEDURE — 99213 OFFICE O/P EST LOW 20 MIN: CPT | Performed by: PHYSICIAN ASSISTANT

## 2024-05-31 RX ORDER — CETIRIZINE HYDROCHLORIDE 10 MG/1
10 TABLET ORAL DAILY
Qty: 90 TABLET | Refills: 1 | Status: SHIPPED | OUTPATIENT
Start: 2024-05-31

## 2024-05-31 RX ORDER — SELENIUM SULFIDE 2.5 MG/100ML
LOTION TOPICAL DAILY PRN
Qty: 236 ML | Refills: 5 | Status: SHIPPED | OUTPATIENT
Start: 2024-05-31

## 2024-05-31 RX ORDER — LEVOTHYROXINE SODIUM 75 UG/1
75 TABLET ORAL DAILY
Qty: 90 TABLET | Refills: 3 | Status: SHIPPED | OUTPATIENT
Start: 2024-05-31

## 2024-05-31 RX ORDER — ESTRADIOL 0.5 MG/1
0.5 TABLET ORAL DAILY
Qty: 30 TABLET | Refills: 3 | Status: SHIPPED | OUTPATIENT
Start: 2024-05-31 | End: 2024-09-11

## 2024-05-31 RX ORDER — SUMATRIPTAN 50 MG/1
50 TABLET, FILM COATED ORAL
Qty: 18 TABLET | Refills: 1 | Status: SHIPPED | OUTPATIENT
Start: 2024-05-31

## 2024-05-31 ASSESSMENT — ANXIETY QUESTIONNAIRES
7. FEELING AFRAID AS IF SOMETHING AWFUL MIGHT HAPPEN: SEVERAL DAYS
GAD7 TOTAL SCORE: 13
6. BECOMING EASILY ANNOYED OR IRRITABLE: MORE THAN HALF THE DAYS
3. WORRYING TOO MUCH ABOUT DIFFERENT THINGS: MORE THAN HALF THE DAYS
8. IF YOU CHECKED OFF ANY PROBLEMS, HOW DIFFICULT HAVE THESE MADE IT FOR YOU TO DO YOUR WORK, TAKE CARE OF THINGS AT HOME, OR GET ALONG WITH OTHER PEOPLE?: SOMEWHAT DIFFICULT
7. FEELING AFRAID AS IF SOMETHING AWFUL MIGHT HAPPEN: SEVERAL DAYS
1. FEELING NERVOUS, ANXIOUS, OR ON EDGE: MORE THAN HALF THE DAYS
5. BEING SO RESTLESS THAT IT IS HARD TO SIT STILL: MORE THAN HALF THE DAYS
4. TROUBLE RELAXING: MORE THAN HALF THE DAYS
IF YOU CHECKED OFF ANY PROBLEMS ON THIS QUESTIONNAIRE, HOW DIFFICULT HAVE THESE PROBLEMS MADE IT FOR YOU TO DO YOUR WORK, TAKE CARE OF THINGS AT HOME, OR GET ALONG WITH OTHER PEOPLE: SOMEWHAT DIFFICULT
2. NOT BEING ABLE TO STOP OR CONTROL WORRYING: MORE THAN HALF THE DAYS
GAD7 TOTAL SCORE: 13

## 2024-05-31 ASSESSMENT — PAIN SCALES - GENERAL: PAINLEVEL: MILD PAIN (2)

## 2024-05-31 NOTE — PROGRESS NOTES
Assessment & Plan     Tinea barbae due to trichophyton  Between breast discoloration and fleshy look. Is funal tinea try Selsium blue.  Her lower back is a different rash.   - selenium sulfide (SELSUN) 2.5 % external lotion; Apply topically daily as needed for itching or irritation    History of hormone therapy  Normal mammogram.  Ok to be back on her hormones . Wants it back as she feels more balanced when on these.   - estradiol (ESTRACE) 0.5 MG tablet; Take 1 tablet (0.5 mg) by mouth daily          3. Allergic dermatitis  Start on Zyrtec   - cetirizine (ZYRTEC) 10 MG tablet; Take 1 tablet (10 mg) by mouth daily  Dispense: 90 tablet; Refill: 1    4. Acquired hypothyroidism  Ok for one year. Labs is great.   - levothyroxine (SYNTHROID/LEVOTHROID) 75 MCG tablet; Take 1 tablet (75 mcg) by mouth daily  Dispense: 90 tablet; Refill: 3    5. Atypical migraine  2 to 3 per week. Stress induced. Was on Imitrex. We will refill. May not have enough money to cover this.    - SUMAtriptan (IMITREX) 50 MG tablet; Take 1 tablet (50 mg) by mouth at onset of headache for migraine May repeat in 2 hours. Max 4 tablets/24 hours.  Dispense: 18 tablet; Refill: 1        See Patient Instructions    No follow-ups on file.    Subjective   Sharlene is a 63 year old, presenting for the following health issues:  Follow Up and Derm Problem      5/31/2024    10:26 AM   Additional Questions   Roomed by Juan Leggett LPN   Accompanied by self         5/31/2024    10:26 AM   Patient Reported Additional Medications   Patient reports taking the following new medications none     History of Present Illness       Reason for visit:  Follow up and a severe rash i started getting a week ago  Symptom onset:  3-7 days ago  Symptoms include:  Severe rash with light burning or itch  Symptom intensity:  Moderate  Symptom progression:  Worsening  Had these symptoms before:  Robin consumes 1 sweetened beverage(s) daily.She exercises with enough effort to  increase her heart rate 9 or less minutes per day.  She exercises with enough effort to increase her heart rate 4 days per week. She is missing 1 dose(s) of medications per week.  She is not taking prescribed medications regularly due to remembering to take.       Depression and Anxiety   How are you doing with your depression since your last visit? Improved slowly  How are you doing with your anxiety since your last visit?  No change  Are you having other symptoms that might be associated with depression or anxiety? Yes:  shakes in the morning feels it internally  Have you had a significant life event? OTHER: divorce ongoing    Do you have any concerns with your use of alcohol or other drugs? No    Social History     Tobacco Use    Smoking status: Some Days     Current packs/day: 0.00     Average packs/day: 0.5 packs/day for 46.1 years (23.0 ttl pk-yrs)     Types: Cigarettes     Start date: 1/1/1975     Last attempt to quit: 1/27/2021     Years since quitting: 3.3    Smokeless tobacco: Never    Tobacco comments:     have quit on my own.   Vaping Use    Vaping status: Every Day   Substance Use Topics    Alcohol use: No    Drug use: No         1/15/2024    10:28 AM 2/12/2024     9:04 AM 4/24/2024     1:31 PM   PHQ   PHQ-9 Total Score 18 21 14   Q9: Thoughts of better off dead/self-harm past 2 weeks Not at all Not at all Not at all         2/12/2024     9:04 AM 5/6/2024     8:24 AM 5/31/2024    10:23 AM   SHAKIR-7 SCORE   Total Score  12 (moderate anxiety) 13 (moderate anxiety)   Total Score 20 12 13         4/24/2024     1:31 PM   Last PHQ-9   1.  Little interest or pleasure in doing things 2   2.  Feeling down, depressed, or hopeless 1   3.  Trouble falling or staying asleep, or sleeping too much 1   4.  Feeling tired or having little energy 1   5.  Poor appetite or overeating 2   6.  Feeling bad about yourself 1   7.  Trouble concentrating 3   8.  Moving slowly or restless 3   Q9: Thoughts of better off dead/self-harm  past 2 weeks 0   PHQ-9 Total Score 14         5/31/2024    10:23 AM   SHAKIR-7    1. Feeling nervous, anxious, or on edge 2   2. Not being able to stop or control worrying 2   3. Worrying too much about different things 2   4. Trouble relaxing 2   5. Being so restless that it is hard to sit still 2   6. Becoming easily annoyed or irritable 2   7. Feeling afraid, as if something awful might happen 1   SHAKIR-7 Total Score 13   If you checked any problems, how difficult have they made it for you to do your work, take care of things at home, or get along with other people? Somewhat difficult       Suicide Assessment Five-step Evaluation and Treatment (SAFE-T)    Migraine   Since your last clinic visit, how have your headaches changed?  No change  How often are you getting headaches or migraines? 2 to 3 times a week   Are you able to do normal daily activities when you have a migraine? Yes  Are you taking rescue/relief medications? (Select all that apply) Tylenol  How helpful is your rescue/relief medication?  I get some relief  Are you taking any medications to prevent migraines? (Select all that apply)  No  In the past 4 weeks, how often have you gone to urgent care or the emergency room because of your headaches?  0    Hypothyroidism Follow-up    Since last visit, patient describes the following symptoms: weight gain of 10 lbs, dry skin, tremors, fatigue, and hair loss            Review of Systems  Constitutional, HEENT, cardiovascular, pulmonary, gi and gu systems are negative, except as otherwise noted.      Objective    /58 (BP Location: Right arm, Patient Position: Sitting, Cuff Size: Adult Regular)   Pulse 68   Temp 98.2  F (36.8  C) (Tympanic)   Resp 16   Wt 83.5 kg (184 lb)   SpO2 97%   BMI 29.25 kg/m    Body mass index is 29.25 kg/m .  Physical Exam   GENERAL: alert and no distress  EYES: Eyes grossly normal to inspection, PERRL and conjunctivae and sclerae normal  HENT: ear canals and TM's normal, nose  and mouth without ulcers or lesions  NECK: no adenopathy, no asymmetry, masses, or scars  RESP: lungs clear to auscultation - no rales, rhonchi or wheezes  CV: regular rate and rhythm, normal S1 S2, no S3 or S4, no murmur, click or rub, no peripheral edema  ABDOMEN: soft, nontender, no hepatosplenomegaly, no masses and bowel sounds normal  MS: no gross musculoskeletal defects noted, no edema  Skin:  has two kinds of rashes.  One in groin.  Red and raised.  Not itching.  Stop Lipitor Rash , now is seeing it get better.  Her chest is flesh that is slightly darker.  More tinea looking.  Not bathing daily. At a cabin without running water and shower.   Office Visit on 05/06/2024   Component Date Value Ref Range Status    TSH 05/06/2024 0.76  0.30 - 4.20 uIU/mL Final           Signed Electronically by: KEILY Cobos

## 2024-06-03 ENCOUNTER — VIRTUAL VISIT (OUTPATIENT)
Dept: PSYCHIATRY | Facility: OTHER | Age: 64
End: 2024-06-03
Attending: PSYCHIATRY & NEUROLOGY
Payer: COMMERCIAL

## 2024-06-03 DIAGNOSIS — G47.00 PERSISTENT INSOMNIA: ICD-10-CM

## 2024-06-03 PROCEDURE — 99443 PR PHYSICIAN TELEPHONE EVALUATION 21-30 MIN: CPT | Mod: 93 | Performed by: PSYCHIATRY & NEUROLOGY

## 2024-06-03 RX ORDER — TRAZODONE HYDROCHLORIDE 100 MG/1
TABLET ORAL
Qty: 30 TABLET | Refills: 4 | Status: SHIPPED | OUTPATIENT
Start: 2024-06-03

## 2024-06-03 ASSESSMENT — PAIN SCALES - GENERAL: PAINLEVEL: NO PAIN (0)

## 2024-06-03 NOTE — PROGRESS NOTES
"  Virtual Visit Details    Type of service:  Telephone Visit   Phone call duration: 24 minutes   Originating Location (pt. Location): Home    Distant Location (provider location):  Off-site    Start time: 9:19am  End time: 9:44 am        SUBJECTIVE / INTERIM HISTORY                                                                       Last visit 2/12/24: Continue Klonopin 1 mg 2 times daily filled end Jan and has 3 refills. Continue Prozac 40 mg daily. Continue trazodone 50mg to 100 mg HS prn insomnia    - cabin between [place] and [place]  - been spending more time at the cabin  - had preliminary court hearing. Got a motion put in for temporary spousal maintenance until all this legal stuff is finished. This is an ongoing issue attorneys negotiating. Meeting is June 13th so still not   - feels she is slowly getting better  - moved out New Year's Day. Apartment through [address] apartments.  - feels like \"internal shakes\" and has a noticeable tremor she feels is in relation to anxiety. Even people in her apartment notice it.  - when Sharlene moved from [place]. Sold her house [place] and used some of the money for her and Roney to build new double stall garage. Sharlene discovered in past couple months Roney at some point too Sharlene's name off the loan.  - brother Jhonny (in the [facility]) has been trying to help with her things.  - dog Shanti.       MEDICAL / SURGICAL HISTORY                 Medical Team:     PMD- Concha Hare     Therapist-none   Patient Active Problem List   Diagnosis    Diverticulitis of sigmoid colon    Hypothyroidism    Anxiety    Cardiac microvascular disease    Diverticulitis    Aortic valve disorder    ACP (advance care planning)    Chronic pain    Constipation    Memory loss    Major depressive disorder, recurrent episode (H24)    Cognitive disorder    Major depressive disorder    Fatigue    Migraine without aura and responsive to treatment    Atypical migraine    Mixed connective " tissue disease (H24)    SHAKIR (generalized anxiety disorder)    Gastroesophageal reflux disease with esophagitis    Acute pancreatitis    Obstruction of common bile duct (H28)    Abnormal MRI of the head    GERD (gastroesophageal reflux disease)    Tremor    GUTIERREZ (dyspnea on exertion)    Chronic diastolic heart failure (H)    Diastolic heart failure, unspecified HF chronicity (H)    Chest pain, unspecified type    Heart murmur    Chronic kidney disease, stage 1    Stage 3a chronic kidney disease (H)    Persistent insomnia    Hypotension due to drugs    Status post coronary angiogram    Relationship dysfunction     ALLERGY   Codeine, Isosorbide mononitrate, Lisinopril, and Paxil [paroxetine]  MEDICATIONS                                                                                             Current Outpatient Medications   Medication Sig Dispense Refill    acetaminophen (TYLENOL) 325 MG tablet Take 650 mg by mouth      atorvastatin (LIPITOR) 10 MG tablet Take 1 tablet (10 mg) by mouth daily (Patient taking differently: Take 5 mg by mouth daily) 90 tablet 3    calcium carbonate (TUMS) 500 MG chewable tablet Take 1 tablet (500 mg) by mouth 3 times daily 180 tablet 3    cephALEXin (KEFLEX) 500 MG capsule Take 1 capsule (500 mg) by mouth 3 times daily 30 capsule 1    cetirizine (ZYRTEC) 10 MG tablet Take 1 tablet (10 mg) by mouth daily 90 tablet 1    clobetasol propionate (TEMOVATE) 0.05 % external cream Apply topically 2 times daily 60 g 1    [START ON 6/4/2024] clonazePAM (KLONOPIN) 1 MG tablet TAKE 1/2 TO 1 (ONE-HALF TO ONE) TABLET BY MOUTH TWICE DAILY 60 tablet 3    diltiazem ER COATED BEADS (CARDIZEM CD/CARTIA XT) 120 MG 24 hr capsule Take 1 capsule (120 mg) by mouth daily 90 capsule 3    esomeprazole (NEXIUM) 40 MG DR capsule TAKE 1 CAPSULE BY MOUTH IN THE MORNING BEFORE BREAKFAST TAKE  30-60  MINUTES  BEFORE  EATING 90 capsule 3    estradiol (ESTRACE) 0.5 MG tablet Take 1 tablet (0.5 mg) by mouth daily 30 tablet  3    FLUoxetine (PROZAC) 20 MG capsule Take 1 capsule (20 mg) daily along with (40 mg) for total daily dose of 60 mg 90 capsule 3    FLUoxetine (PROZAC) 40 MG capsule Take 1 capsule by mouth once daily 90 capsule 3    levothyroxine (SYNTHROID/LEVOTHROID) 75 MCG tablet Take 1 tablet (75 mcg) by mouth daily 90 tablet 3    nitroGLYcerin (NITROSTAT) 0.4 MG sublingual tablet DISSOLVE ONE TABLET UNDER THE TONGUE EVERY 5 MINUTES AS NEEDED FOR CHEST PAIN. DO NOT EXCEED A TOTAL OF 3 DOSES IN 15 MINUTES 30 tablet 4    selenium sulfide (SELSUN) 2.5 % external lotion Apply topically daily as needed for itching or irritation 236 mL 5    spironolactone (ALDACTONE) 25 MG tablet Take 1 tablet by mouth once daily 90 tablet 0    SUMAtriptan (IMITREX) 50 MG tablet Take 1 tablet (50 mg) by mouth at onset of headache for migraine May repeat in 2 hours. Max 4 tablets/24 hours. 18 tablet 1    traZODone (DESYREL) 100 MG tablet Take 1/2 to 1 tablet at bedtime as needed for insomnia 30 tablet 4     No current facility-administered medications for this visit.       VITALS   There were no vitals taken for this visit.    PHQ9                       Depression Screening Follow-up        4/24/2024     1:31 PM   PHQ   PHQ-9 Total Score 14   Q9: Thoughts of better off dead/self-harm past 2 weeks Not at all       Does the patient currently have a mental health provider? Yes: Aurelia Odell MD     basic metabolic panel     Last Comprehensive Metabolic Panel:  Lab Results   Component Value Date     01/25/2024    POTASSIUM 4.6 01/25/2024    CHLORIDE 104 01/25/2024    CO2 25 01/25/2024    ANIONGAP 9 01/25/2024    GLC 94 01/25/2024    BUN 20.4 01/25/2024    CR 1.06 (H) 01/25/2024    GFRESTIMATED 59 (L) 01/25/2024    DAKSHA 9.3 01/25/2024          MENTAL STATUS EXAM                                                                                       Mood anxious, depressed. Thought process, including associations, was unremarkable and thought  content was devoid of suicidal ideation Insight was good. Judgment was intact and adequate for safety. Fund of knowledge was intact. Pt demonstrates no obvious problems with attention, concentration, language, recent or remote memory although these were not formally tested.     ASSESSMENT                                                                                                      HISTORICAL:  Initial psych consult 12/12/14            Notes:  Ritalin as adjunct to antidepressant: didn't like how made her feel.   Prozac, Celexa ineffective. Wellbutrin: weight gain. . Zoloft: weight gain. Effexor back while: didn't stay on long and can't recall why. Cymbalta: was on and didn't help. She tried Provigil and SEs. Buspar: HAs.   Neuropsych testing summer 2015:  language: about same, working memory: better than last year. Attention / concentration worse than last year. Speed processing: improved. Executive function: didn't test well on that front also comparable to last year. Memory: worse off than last year. Depression: similar to last year. Anxiety / emotional anxiety worse.     Ongoing stress for Sharlene as her and Roney going through divorce and had taken her name off (without her knowing) several major items. Her and Melodoy her 10 yo [employer] / Lab mix continue to try to adjust to their new home in their apartment. We agreed last visit increase Prozac.  Today she reports does feel it helped a bit. Also we agreed over time she is slowly growing stronger in her independence through the divorce process and this is likely part of her feeling better.    TREATMENT RISK STATEMENT: The risks, benefits, alternatives and potential adverse effects have been explained and are understood by the pt. The pt agrees to the treatment plan with the ability to do so. The pt knows to call the clinic for any problems or access emergency care if needed.    DIAGNOSES             Major depressive disorder, recurrent, severe without  psychosis  Mild neurocognitive disorder      PLAN                                                                                                                         1) MEDICATION:   -- Continue Klonopin 1 mg 2 times daily set to fill tomorrow 6/4/24. . Continue Prozac 60 mg daily. Continue trazodone 50mg to 100 mg HS prn insomnia    2) THERAPY: no change.     3) LABS: none    4)  RTC: ~2 months

## 2024-06-06 ENCOUNTER — PATIENT OUTREACH (OUTPATIENT)
Dept: CARE COORDINATION | Facility: OTHER | Age: 64
End: 2024-06-06

## 2024-06-06 ASSESSMENT — ACTIVITIES OF DAILY LIVING (ADL): DEPENDENT_IADLS:: INDEPENDENT

## 2024-06-06 NOTE — LETTER
My Heart Failure Action Plan  Name: Sharlene Mccord   YOB: 1960  Date: 6/7/2024   [facility]   [facility]   [address] [facility] [address] [phone] My Diagnosis: HF-pEF (EF > 40%)  My Ejection Fraction:   Lab Results   Component Value Date    LVEF 55-60% 04/17/2024     Over 50%  My Exercise Goal: Start exercise slowly - to begin, do a few minutes of exercise, several times a day. Increase your time and speed [id number] to build tolerance, with a goal of 30 minutes of exercise daily. Steady, slow, and consistent exercise is both safe and healthy. Stop and rest when you feel tired or become short of breath. Do not push yourself on days when you don t feel well.       My Weight Plan:   Wt Readings from Last 2 Encounters:   05/31/24 83.5 kg (184 lb)   05/10/24 85.3 kg (188 lb)     Weigh yourself daily using the same scale. If you gain more than 2 pounds in 24 hours or 5 pounds in 7 days. call the clinic    My Diet Goal: No added salt    Emergency Room Visits:    Our goal is to improve your quality of life and help you avoid a visit to the emergency room or hospital.  If we work together, we can achieve this goal. But, if you feel you need to call 911 or go to the emergency room, please do so.  If you go to the emergency room, please bring your list of medicines and your daily weight chart with you.                                                                  Each day ask yourself:  Is my weight up?  Do I have swelling?  Do I have trouble breathing?  How did I sleep?  Other problems?       GREEN ZONE     Weight gained is no more than 2 pounds a day or 5 pounds a in 7 days.  No swelling in feet, ankles, legs or stomach.  No more swelling than usual.  No more trouble breathing than usual.  No change in my sleep.  No other problems. What should I do?  I am doing fine. I will take my medicine, follow my diet, see my doctor, exercise, and watch for  symptoms.           YELLOW ZONE         Weight gain of more than 2 pounds in one day or 5 pounds in 7 days.  New swelling in ankle, leg, knee or thigh.  Bloating in belly, pants feel tighter.  Swelling in hands or face.  Coughing or trouble breathing while walking or talking.  Harder to breathe last night.  Have trouble sleeping, wake up short of breath.  Unusually tired.  Not eating.  Nausea, vomiting, or diarrhea  Pain in my chest or bad  leg cramps.  Feel weak or dizzy. What should I do?  I need to take action and call my doctor or nurse today.                 RED ZONE         Weight gain of 5 pounds overnight.  Chest pain or pressure that does not go away.  Feel less alert.  Wheezing or have trouble breathing when at rest.  Cannot sleep lying down.  Cannot take my medicines.  Pass out or faint. What should I do?  I need to call my doctor or nurse now!  Call 911 if I have chest pain or cannot breathe.

## 2024-06-06 NOTE — LETTER
June 7, 2024      Sharlene Mccord  [facility] [place] [id number]        Dear Sharlene,     I am an RN clinic care coordinator who works with Cardiology at [facility]. I wanted to take a minute to re-introduce myself and also thank you for spending the time to talk with me yesterday afternoon.  Below is a description of clinic care coordination and how I can further assist you.      The clinic care coordinator is a registered nurse and/or  who understand the health care system. The goal of clinic care coordination is to help you manage your health and improve access to the [facility] in the most efficient manner. The registered nurse can assist you in meeting your health care goals by providing education, coordinating services, and strengthening the communication among your providers.     Please feel free to contact me at [phone] with any questions or concerns. We at [facility] are focused on providing you with the highest-quality healthcare experience possible and that all starts with you.      Sincerely,          Caron MCCLURE RN  Cardiology/CHF Clinic Care Coordination      Enclosed: I have enclosed a copy of a Heart Failure Action Plan, Patient centered Plan of Care, and daily weight logs. Please keep this in an easy to access place to use as needed. Please review and call me with any questions.      If you experience chest pain, chest pressure, chest tightness, shortness of breath, fainting, lightheadedness, nausea, vomiting, or other concerning symptoms, please report to the Emergency Department or call 911. These symptoms may be emergent, and best treated in the Emergency Department.

## 2024-06-06 NOTE — LETTER
[facility] PR[facility] CARE CARE COORDINATION   [facility]  Patient Centered Plan of Care  About Me:        Patient Name:  Sharlene Mccord    YOB: 1960  Age:         63 year old   [place] MRN:    [id number] Telephone Information:  Home Phone [phone]   Mobile [phone]       Address:  [address] [place] [address] [address]  [place] [address] Email address:  [email or web address]      Emergency Contact(s)    Name Relationship Lgl Grd Work Phone Home Phone Mobile Phone   Anthony. PATRICIA CEDENO Sister   [phone]            Primary language:  English     needed? No   [facility] [employer]:  [phone] op. 1  Other communication barriers:None    Preferred Method of Communication:  Phone  Current living arrangement: I live alone    Mobility Status/ Medical Equipment: Independent        Health Maintenance  Health Maintenance Reviewed: Up to date      My Access Plan  Medical Emergency 911   Primary Clinic Line [phone] to speak with clinic staff or schedule appointment   24 Hour Nurse Line [phone] (toll-free)   Preferred Urgent Care [facility] [phone]             My Care Team Members  Patient Care Team         Relationship Specialty Notifications Start End    Concha Hare PA PCP - General   3/12/13     Phone: [phone] Fax: [phone]         [address] [place] [address] [address] [facility] [place] [address]    Concha Hare PA Assigned PCP   8/28/14     Phone: [phone] Fax: [phone]         [address] [place] [address] [address] [place] [address]    Norman Nichole MD MD Clinical Cardiac Electrophysiology  5/23/17     Raiza Alanis, RN Registered Nurse  All results, Admissions 6/5/17     Phone: [phone] Fax: [phone]        Sydni Buckley CNP Assigned Heart and Vascular Provider   5/6/23     Phone: [phone] Fax: [phone]         [facility] [place] [address]    Aurelia Odell MD Assigned Behavioral Health Provider    11/11/23     Phone: [phone] Fax: [phone] [address] [facility] [address]    Hernando Roth MD Assigned Surgical Provider   3/15/24     Phone: [phone] Fax: [phone] [address] [facility] [address]              My Care Plans  Self Management and Treatment Plan  Care Plan  Care Plan: Chronic Pain       Problem: Chronic Pain is Not Self-Managed       Goal: Demonstrate improved self-management of chronic pain                           Care Plan: Heart Failure       Problem: Heart Failure Management Needs Improvement       Goal: Consistently take heart failure medication as prescribed               Goal: Patient will be able to identify signs and symptoms of fluid retention               Long-Range Goal: Demonstrate improved heart failure management       Start Date: 6/6/2024    This Visit's Progress: 50%    Priority: Medium                             Action Plans on File:            Depression     Migraine    Advance Care Plans/Directives Type:   Advanced Directive - On File      My Medical and Care Information  Problem List   Patient Active Problem List   Diagnosis    Diverticulitis of sigmoid colon    Hypothyroidism    Anxiety    Cardiac microvascular disease    Diverticulitis    Aortic valve disorder    ACP (advance care planning)    Chronic pain    Constipation    Memory loss    Major depressive disorder, recurrent episode (H24)    Cognitive disorder    Major depressive disorder    Fatigue    Migraine without aura and responsive to treatment    Atypical migraine    Mixed connective tissue disease (H24)    SHAKIR (generalized anxiety disorder)    Gastroesophageal reflux disease with esophagitis    Acute pancreatitis    Obstruction of common bile duct (H28)    Abnormal MRI of the head    GERD (gastroesophageal reflux disease)    Tremor    GUTIERREZ (dyspnea on exertion)    Chronic diastolic heart failure (H)    Diastolic heart failure, unspecified HF chronicity (H)    Chest  pain, unspecified type    Heart murmur    Chronic kidney disease, stage 1    Stage 3a chronic kidney disease (H)    Persistent insomnia    Hypotension due to drugs    Status post coronary angiogram    Relationship dysfunction      Current Medications and Allergies:  See printed Medication Report.    Care Coordination Start Date: No linked episodes   Frequency of Care Coordination: twice weekly, more frequently as needed     Form Last Updated: 06/06/2024

## 2024-06-06 NOTE — PROGRESS NOTES
"Assessments/enrollment completed for care coordination.  Patient states that she doesn't feel she has really had any changes, and \"things feel pretty much the same, except the SOB has gotten better.\"  Still has c/o ongoing chest discomfort \"from time to time.\"  She states she takes nitroglycerin for this.   Fatigue has not changed.  Will get lightheaded \"every once and awhile\". She denies any change in appetite.  States she does not weigh daily.  Education provided on importance of daily weights with CHF.  Patient verbalized understanding. Reports current weight at 184#.   Patient states she does not remember if she was told to eat a low sodium diet or had fluid restrictions.  Education provided on no-salt added diet and how reducing sodium helps reduce fluid retention in the body, and it is always part of a heart healthy regimen.  Patient stated she understood.  Patient denies any significant BLE, stating it is \"very little.\"  Denies any recent falls or injuries.  Patient pleasant and cooperative throughout visit.   "

## 2024-06-06 NOTE — LETTER
"[facility] - HEART FAILURE CARE COORDINATION   [address] [facility] [address]    June 7, 2024    Sharlene Mccord  [address] [address]      Dear Sharlene,    Thank you for taking the time to speak withy me on th phone regarding your health yesterday  I hope you are finding benefits in your clinic-care coordination enrollment.  As  your heart failure care coordinator nurse, I will work with your cardiology team to support your heart health and help with any needs you may have.  I am also there to help you navigate complex healthcare systems, provide accurate information, health education and support.   We can help you understand more about your health challenges you might be experiencing and try to help you to manage them better.     I have enclosed some important health self-management information to help you care for yourself at home.  We'll go over some of these materials in our calls and talk about your health goals so we can creat a plan to address your needs and how I can better support you.    Please feel free to call me if you have any questions about the materials provided.    The care coordinator program is voluntary and offered to you at not cost. You may discontinue the program at any time.  Please call me at [phone] with any questions or concerns you may have.  If you reach my voicemail, please leave a message with your name, date of birth and your phone number.      Below is your plan of care as a patient in the heart failure care coordination program.  Also refer to your \"Self Help Guide for Patient's with Heart Failure\" booklet to additional information.  If you haven't received one, please ask for one at your next clinic visit.     Please let us know if we can help you with other questions, concerns, or goals.    Sincerely,     Caron MCCLURE RN  Cardiology/CHF Care Coordinator  [phone]      Enclosed:  Heart failure action plan, Patient centered plan of care, Daily " weight logs      [facility]  Patient-Centered Heart Failure Access Plan of Care    About Me:        Patient Name:  Sharlene Mccord    YOB: 1960  Age:         63 year old   [place] MRN:    [id number] Telephone Information:  Home Phone [phone]   Mobile [phone]       Address:  [address] [place] [address] [address]  [place] [address] Email address:  [email or web address]      Emergency Contact(s)    Name Relationship Lgl Grd Work Phone Home Phone Mobile Phone   1. PATRICIA CEDENO Sister   [phone]            Primary language:  English     needed? No   [place] [employer]:  [phone] op. 1  Other communication barriers:None    Preferred Method of Communication:  Phone  Current living arrangement: I live alone    Mobility Status/ Medical Equipment: Independent      My Access Plan  Medical Emergency 911   Cardiology Call Center (available 24/7) [phone]   Primary Clinic Line [facility] [place]  [phone]   Preferred Pharmacy   [id number] - [place], [place] - [address]   [id number]   [place] [address]  Phone: [phone] Fax: [phone]     Behavioral Health Crisis Line 988 Suicide and Crisis Lifeline       My Care Team Members  Patient Care Team         Relationship Specialty Notifications Start End    Concha Hare PA PCP - General   3/12/13     Phone: [phone] Fax: [phone]         [address] [place] [address] 2 [place] [address]    Concha Hare PA Assigned PCP   8/28/14     Phone: [phone] Fax: [phone]         [address] [place] [address] 2 [place] [address]    Norman Nichole MD MD Clinical Cardiac Electrophysiology  5/23/17     Raiza Alanis, RN Registered Nurse  All results, Admissions 6/5/17     Phone: [phone] Fax: [phone]        Sydni Buckley CNP Assigned Heart and Vascular Provider   5/6/23     Phone: [phone] Fax: [phone] [address] [facility] [place] [address]    Aurelia Odell,  MD Assigned Behavioral Health Provider   11/11/23     Phone: [phone] Fax: [phone] [address] [facility] [place] [address]    Hernando Roth MD Assigned Surgical Provider   3/15/24     Phone: [phone] Fax: [phone] [address] [facility] [place] [address]                My Care Plans

## 2024-06-06 NOTE — LETTER
[facility]: Care Plan    My Information     Name: Sharlene Mccord   YOB: 1960  Phone Number(s):  [phone] (home)   Address: [address] [place] [address]    My Access Plan     Who to contact when I need help:  During business hours, call the clinic at: [phone]  Preferred Hospital: [facility] [phone]  Preferred Pharmacy: [facility] [address] [place] [address]     Other [facility]:  [phone]    My Care Team Members     Patient Care Team         Relationship Specialty Notifications Start End    Concha Hare PA PCP - General   3/12/13     Phone: [phone] Fax: [phone]         [address] [place] [address]    Concha Hare PA Assigned PCP   8/28/14     Phone: [phone] Fax: [phone]         [address] [place] [address]    Norman Nichole MD MD Clinical Cardiac Electrophysiology  5/23/17     Raiza Alanis, RN Registered Nurse  All results, Admissions 6/5/17     Phone: [phone] Fax: [phone]        Sydni Buckley CNP Assigned Heart and Vascular Provider   5/6/23     Phone: [phone] Fax: [phone]         [address] [place] [address]    Aurelia Odell MD Assigned Behavioral Health Provider   11/11/23     Phone: [phone] Fax: [phone]         [address] [place] [address]    Hernando Roth MD Assigned Surgical Provider   3/15/24     Phone: [phone] Fax: [phone]         [address] [place] [address]              My Medications     Current Outpatient Medications   Medication Sig Dispense Refill    acetaminophen (TYLENOL) 325 MG tablet Take 650 mg by mouth      atorvastatin (LIPITOR) 10 MG tablet Take 1 tablet (10 mg) by mouth daily (Patient taking differently: Take 5 mg by mouth daily) 90 tablet 3    calcium carbonate (TUMS) 500 MG chewable tablet Take 1 tablet (500 mg) by mouth 3 times daily 180 tablet 3     cephALEXin (KEFLEX) 500 MG capsule Take 1 capsule (500 mg) by mouth 3 times daily 30 capsule 1    cetirizine (ZYRTEC) 10 MG tablet Take 1 tablet (10 mg) by mouth daily 90 tablet 1    clobetasol propionate (TEMOVATE) 0.05 % external cream Apply topically 2 times daily 60 g 1    clonazePAM (KLONOPIN) 1 MG tablet TAKE 1/2 TO 1 (ONE-HALF TO ONE) TABLET BY MOUTH TWICE DAILY 60 tablet 3    diltiazem ER COATED BEADS (CARDIZEM CD/CARTIA XT) 120 MG 24 hr capsule Take 1 capsule (120 mg) by mouth daily 90 capsule 3    esomeprazole (NEXIUM) 40 MG DR capsule TAKE 1 CAPSULE BY MOUTH IN THE MORNING BEFORE BREAKFAST TAKE  30-60  MINUTES  BEFORE  EATING 90 capsule 3    estradiol (ESTRACE) 0.5 MG tablet Take 1 tablet (0.5 mg) by mouth daily 30 tablet 3    FLUoxetine (PROZAC) 20 MG capsule Take 1 capsule (20 mg) daily along with (40 mg) for total daily dose of 60 mg 90 capsule 3    FLUoxetine (PROZAC) 40 MG capsule Take 1 capsule by mouth once daily 90 capsule 3    levothyroxine (SYNTHROID/LEVOTHROID) 75 MCG tablet Take 1 tablet (75 mcg) by mouth daily 90 tablet 3    nitroGLYcerin (NITROSTAT) 0.4 MG sublingual tablet DISSOLVE ONE TABLET UNDER THE TONGUE EVERY 5 MINUTES AS NEEDED FOR CHEST PAIN. DO NOT EXCEED A TOTAL OF 3 DOSES IN 15 MINUTES 30 tablet 4    selenium sulfide (SELSUN) 2.5 % external lotion Apply topically daily as needed for itching or irritation 236 mL 5    spironolactone (ALDACTONE) 25 MG tablet Take 1 tablet by mouth once daily 90 tablet 0    SUMAtriptan (IMITREX) 50 MG tablet Take 1 tablet (50 mg) by mouth at onset of headache for migraine May repeat in 2 hours. Max 4 tablets/24 hours. 18 tablet 1    traZODone (DESYREL) 100 MG tablet Take 1/2 to 1 tablet at bedtime as needed for insomnia 30 tablet 4       My Life     Current Living Arrangement:  Current living arrangement:: I live alone  Type of residence:: Apartment - handicap accessible   Type of residence: Apartment - handicap accessible    Resources:        Community Resources: None   Supplies Currently Used at Home: None   Equipment Currently Used at Home: none     Advance Care Plan/Directive  Advanced Care Plans/Directives on file:: Yes  Status of record:: On File and Validated  Type Advanced Care Plans/Directives: Advanced Directive - On File     Referrals Placed: None    Activities that can be difficult for me at times:   Dependent ADLs:  Dependent ADLs:: Independent   Depended IADLs: Dependent IADLs:: Independent   Mobility status: Mobility Status: Independent      My Health     Patient Active Problem List    Diagnosis Date Noted    Relationship dysfunction 09/13/2023     Priority: Medium    Status post coronary angiogram 02/28/2023     Priority: Medium    Hypotension due to drugs 06/15/2022     Priority: Medium    Stage 3a chronic kidney disease (H) 06/13/2022     Priority: Medium    Persistent insomnia 06/13/2022     Priority: Medium    Chronic kidney disease, stage 1 11/28/2021     Priority: Medium    Diastolic heart failure, unspecified HF chronicity (H) 03/19/2021     Priority: Medium     Added automatically from request for surgery [id number]      Chest pain, unspecified type 03/19/2021     Priority: Medium     Added automatically from request for surgery [id number]      GUTIERREZ (dyspnea on exertion) 03/12/2021     Priority: Medium     Added automatically from request for surgery [id number]      Chronic diastolic heart failure (H) 03/12/2021     Priority: Medium     Added automatically from request for surgery [id number]      Obstruction of common bile duct (H28) 07/31/2020     Priority: Medium    Acute pancreatitis 07/30/2020     Priority: Medium    SHAKIR (generalized anxiety disorder) 10/02/2019     Priority: Medium     Patient is followed by  for ongoing prescription of benzodiazepines.  All refills should be approved by this provider, or covering partner.    Medication(s): Klonopin 1 mg.   Maximum quantity per month: 60  Clinic visit frequency required: Q 3  months     Controlled substance agreement on file: Yes       Date(s): 9.25.19  Benzodiazepine use reviewed by psychiatry:  Yes       Date(s): 9.25.19    Last [facility] website verification:  done on 9.25.19  [email or web address]          ACP (advance care planning) 09/11/2017     Priority: Medium     Advance Care Planning 9/11/2017: ACP Review of Chart / Resources Provided:  Reviewed chart for advance care plan.  Sharlene FABRICIO Mccord has been provided information and resources to begin or update their advance care plan.  Added by Melina Armas        Advance Care Planning 7/11/2016: ACP Review of Chart / Resources Provided:  Reviewed chart for advance care plan.  Sharlene Mccord has been provided information and resources to begin or update their advance care plan.  Added by Kortney Smalls            Mixed connective tissue disease (H24) 07/05/2017     Priority: Medium    GERD (gastroesophageal reflux disease) 03/13/2017     Priority: Medium    Tremor 03/13/2017     Priority: Medium    Gastroesophageal reflux disease with esophagitis 02/09/2017     Priority: Medium    Migraine without aura and responsive to treatment 11/23/2015     Priority: Medium    Major depressive disorder, recurrent episode (H24) 12/17/2014     Priority: Medium     Problem list name updated by automated process. Provider to review      Cognitive disorder 07/08/2014     Priority: Medium    Major depressive disorder 07/08/2014     Priority: Medium    Memory loss 07/02/2014     Priority: Medium    Constipation 04/28/2014     Priority: Medium    Chronic pain 03/12/2014     Priority: Medium    Aortic valve disorder 08/13/2013     Priority: Medium     Problem list name updated by automated process. Provider to review      Diverticulitis of sigmoid colon 05/11/2013     Priority: Medium    Diverticulitis 05/11/2013     Priority: Medium    Cardiac microvascular disease 04/10/2013     Priority: Medium     Based on Cardiac MRI done at         Hypothyroidism 03/01/2011     Priority: Medium     Problem list name updated by automated process. Provider to review      Anxiety 03/01/2011     Priority: Medium     Problem list name updated by automated process. Provider to review      Fatigue 07/01/2008     Priority: Medium    Atypical migraine 07/01/2008     Priority: Medium    Abnormal MRI of the head 07/01/2008     Priority: Medium     Overview:   Nonspecific WM changes small peripherally. Not c/w MS  IMO Update 10/11      Heart murmur 09/14/1999     Priority: Medium     Formatting of this note might be different from the original.  Previous hx of. [facility]           My Goals         Goals          General    Consistently take heart failure medication as prescribed     Demonstrate continued heart failure management     Patient will be able to identify signs and symptoms of fluid retention     Patient will maintain management of sodium consumption               My Questions          I have questions about:

## 2024-06-16 ENCOUNTER — HEALTH MAINTENANCE LETTER (OUTPATIENT)
Age: 64
End: 2024-06-16

## 2024-07-02 DIAGNOSIS — I50.32 CHRONIC DIASTOLIC HEART FAILURE (H): ICD-10-CM

## 2024-07-02 RX ORDER — SPIRONOLACTONE 25 MG/1
25 TABLET ORAL DAILY
Qty: 90 TABLET | Refills: 0 | Status: SHIPPED | OUTPATIENT
Start: 2024-07-02 | End: 2024-10-03

## 2024-07-02 NOTE — TELEPHONE ENCOUNTER
Diuretics (Including Combos) Protocol [id number]/02/2024 06:12 AM   Protocol Details Has GFR on file in past 12 months and most recent value is normal

## 2024-07-02 NOTE — TELEPHONE ENCOUNTER
Disp Refills Start End KASSANDRA   spironolactone (ALDACTONE) 25 MG tablet 90 tablet 0 4/8/2024 -- No     Last Office Visit: 05/10/2024 (cardiology)  Future Office visit:    Next 5 appointments (look out 90 days)      Sep 23, 2024 11:15 AM  (Arrive by 11:00 AM)  Provider Visit with KEILY Fonseca  [facility] ([facility] [place] ) [address]  [place] [address]  [phone]             Routing refill request to provider for review/approval because:

## 2024-07-15 ENCOUNTER — PATIENT OUTREACH (OUTPATIENT)
Dept: CARDIOLOGY | Facility: OTHER | Age: 64
End: 2024-07-15

## 2024-07-15 NOTE — PROGRESS NOTES
Last HF Care Coordination Contact Date:  24     Self-Assessment:     Home scale available:  Yes     Home scale weight this mornin     Heart Failure Zones sheet on refrigerator or available: No     Current Heart Failure Zone: No Zone Color Given     Any increased shortness of breath (SOB) since last contact: Yes     Any increased edema/swelling since last contact: Yes     Any chest pain since last contact: Yes     What number to call for YELLOW Zones:        What number to call for RED Zones:       Medications:     How many of your current medicines changed (dose, timing, name, etc) since last contacted: 0 - 1     Having problems obtaining or taking  medications: No     Any missed medications this week:  No     Using medication reminders ( Dwain, Pill box, other):  Yes     Any questions about medications: No       Healthy Lifestyle:     Follows a low sodium diet: Yes     Daily activity routine:  Walking     Activity more limited since last contact: No     Fluid intake less than 2 liters  per day: Yes     Questions about low sodium diet or fluid restriction: No     Patient Centered Goals:     The patient will: Continue to monitor swelling/edema, SOB with exertion, increased fatigue     The patient will: Dig out stop light tool for daily use to gauge symptoms.      Appointments:     Next PCP appointment:  24     Next Cardiology appointment: 25      Care Coordinator following:  Caron Zhao RN CC      Patient states overall she feels her sx are unchanged/stable.  When reviewing her providers notes from her last visit to today, they are very similar.  Patient education provided on daily weights, using the stop light tool to gauge symptoms, medication education on loop diuretics, and ways to manage edema.  Patient verbalized understanding. Plan Is to call again in 30 days, unless the patient needs to contact cardiology for any other acute concerns.  Patient agreeable with plan.

## 2024-07-29 DIAGNOSIS — I20.89 OTHER FORMS OF ANGINA PECTORIS (H): ICD-10-CM

## 2024-07-29 RX ORDER — NITROGLYCERIN 0.4 MG/1
TABLET SUBLINGUAL
Qty: 30 TABLET | Refills: 0 | Status: SHIPPED | OUTPATIENT
Start: 2024-07-29

## 2024-07-29 NOTE — TELEPHONE ENCOUNTER
Disp Refills Start End KASSANDRA   nitroGLYcerin (NITROSTAT) 0.4 MG sublingual tablet 30 tablet 4 6/8/2021 -- No     Last Office Visit: 05/10/2024  Future Office visit:    Next 5 appointments (look out 90 days)      Sep 23, 2024 11:15 AM  (Arrive by 11:00 AM)  Provider Visit with KEILY Fonseca  [facility] ([facility] [place] ) [address]  [place] [address]  [phone]             Routing refill request to provider for review/approval because:

## 2024-08-28 ENCOUNTER — TELEPHONE (OUTPATIENT)
Dept: FAMILY MEDICINE | Facility: OTHER | Age: 64
End: 2024-08-28

## 2024-08-28 ENCOUNTER — PATIENT OUTREACH (OUTPATIENT)
Dept: CARE COORDINATION | Facility: OTHER | Age: 64
End: 2024-08-28

## 2024-08-28 NOTE — LETTER
[facility] - HEART FAILURE CARE COORDINATION   [address] [facility] [place] [address]        August 28, 2024        Sharlene Mccord  [address] [place] [address]          Dear Sharlene,    It was nice to be able to touch base and speak to you today.  Attached you will see your updated plan of care.  We complete an update every 90 days.   I forgot to ask you if you would like it printed and sent out in the mail, so as you can see, I sent to your mychart per your listed preference.  If you would like it printed and mailed, contact me and I would be happy to do that for you. .  If you have any questions or concerns you contact me at the number below.           Sincerely,     Caron Zhao RN  Cardiology/CHF Care Coordinator  [phone]              If you experience chest pain, chest pressure, chest tightness, shortness of breath, fainting, lightheadedness, nausea, vomiting, or other concerning symptoms, please report to the Emergency Department or call 911. These symptoms may be emergent, and best treated in the Emergency Department.          [facility]  Patient-Centered Heart Failure Access Plan of Care    About Me:        Patient Name:  Sharlene Mccord    YOB: 1960  Age:         63 year old   [place] MRN:    [id number] Telephone Information:  Home Phone [phone]   Mobile [phone]       Address:  [address] [place] [address] Email address:  [email or web address]      Emergency Contact(s)    Name Relationship Lgl Grd Work Phone Home Phone Mobile Phone   1. PATRICIA CEDENO Sister   [phone]            Primary language:  English     needed? No   [place] [employer]:  [phone] op. 1  Other communication barriers:None    Preferred Method of Communication:  Phone  Current living arrangement: I live alone    Mobility Status/ Medical Equipment: Independent      My Access Plan  Medical Emergency 911   Cardiology Call Center (available  24/7) [phone]   Primary Clinic Line [facility]  [phone]   Preferred Pharmacy No Pharmacies Listed   Behavioral Health Crisis Line 988 Suicide and Crisis Lifeline       My Care Team Members  Patient Care Team         Relationship Specialty Notifications Start End    Concha Hare PA PCP - General   3/12/13     Phone: [phone] Fax: [phone]         [address] [place] [address] 2 [place] [address]    Concha Hare PA Assigned PCP   8/28/14     Phone: [phone] Fax: [phone]         [address] [place] [address] 2 [place] [address]    Norman Nichole MD MD Clinical Cardiac Electrophysiology  5/23/17     Raiza Alanis, RN Registered Nurse  All results, Admissions 6/5/17     Phone: [phone] Fax: [phone]        Sydni Buckley CNP Assigned Heart and Vascular Provider   5/6/23     Phone: [phone] Fax: [phone]         [address] [place], [place] [address] [address]    Aurelia Odell MD Assigned Behavioral Health Provider   11/11/23     Phone: [phone] Fax: [phone]         [address] [place], [place] [address] [address]    Hernando Roth MD Assigned Surgical Provider   3/15/24     Phone: [phone] Fax: [phone]         [address] [place], [place] [address] [address]                My Care Plans   Goals          General    Consistently take heart failure medication as prescribed     Demonstrate continued heart failure management     Notes - Note edited  8/28/2024  2:48 PM by Jing Zhao RN    Patient states current weight is 182#.  Will fluxuate 3-4 lbs.      Patient will maintain management of sodium consumption     Notes - Note created  7/15/2024 12:15 PM by Jing Zhao RN    Patient states she does the best she can with her sodium intake.      Patient will verbalize understanding of how other conditions affect the heart

## 2024-08-28 NOTE — PROGRESS NOTES
"30 day outreach to patient.   Able to reach the patient today to complete her follow up.  States she is doing fine.  Reports that edema has gotten a \"little better.\"  States SOB and fatigue remain unchanged.  Reports medication adherence, except with Atorvastatin, which she feels was giving her diarrhea.  Update was provided to one her PCP's nurses, Sydni, so they could look into this further for the patient.  Patient did not have any other questions or concerns.  POC was updated and sent to patient via [facility].     "

## 2024-08-28 NOTE — TELEPHONE ENCOUNTER
Cardiology RN Jing reports that patient is only taking a sliver of her atorvastatin d/t side effects, had the same problem with crestor. Please advise.  Sydni Domingo LPN

## 2024-09-07 DIAGNOSIS — Z92.29 HISTORY OF HORMONE THERAPY: ICD-10-CM

## 2024-09-09 NOTE — TELEPHONE ENCOUNTER
Estrace      Last Written Prescription Date:  8.11.24  Last Fill Quantity: #30,   # refills: 0  Last Office Visit: 5.31.24  Future Office visit:    Next 5 appointments (look out 90 days)      Sep 23, 2024 11:15 AM  (Arrive by 11:00 AM)  Provider Visit with KEILY Fonseca  [facility] [place] ([facility] [place] ) [address]  [place] [address]  [phone]             Routing refill request to provider for review/approval because:

## 2024-09-11 RX ORDER — ESTRADIOL 0.5 MG/1
0.5 TABLET ORAL DAILY
Qty: 30 TABLET | Refills: 0 | Status: SHIPPED | OUTPATIENT
Start: 2024-09-11

## 2024-09-13 DIAGNOSIS — F41.1 GENERALIZED ANXIETY DISORDER: ICD-10-CM

## 2024-09-16 NOTE — TELEPHONE ENCOUNTER
Klonopin  Last Written Prescription Date: 6/4/24  Last Fill Quantity: 60 # of Refills: 3  Last Office Visit: 6/3/24

## 2024-09-19 RX ORDER — CLONAZEPAM 1 MG/1
TABLET ORAL
Qty: 60 TABLET | Refills: 4 | Status: SHIPPED | OUTPATIENT
Start: 2024-09-19

## 2024-09-26 NOTE — TELEPHONE ENCOUNTER
estradiol (ESTRACE) 0.5 MG tablet      Last Written Prescription Date:  5/05/2022  Last Fill Quantity: 60,   # refills: 11  Last Office Visit: 3/15/2023  Future Office visit:    Next 5 appointments (look out 90 days)    May 03, 2023  8:30 AM  (Arrive by 8:15 AM)  Return Visit with Sydni Buckley CNP  [facility] [place] ([facility] [place] ) [address]  [place] [address]  [phone]   Jun 07, 2023  9:15 AM  (Arrive by 9:00 AM)  SHORT with KEILY Fonseca  [facility] [place] ([facility] [place] ) [address]  [place] [address]  [phone]              Scheduled

## 2024-10-02 DIAGNOSIS — R12 HEARTBURN: ICD-10-CM

## 2024-10-02 DIAGNOSIS — I50.32 CHRONIC DIASTOLIC HEART FAILURE (H): ICD-10-CM

## 2024-10-02 NOTE — TELEPHONE ENCOUNTER
Spironolactone 25 MG Oral Tablet       Last Written Prescription Date:  07/02/2024  Last Fill Quantity: 90,   # refills: 0  Last Office Visit: 05/31/2024  Future Office visit:    Next 5 appointments (look out 90 days)      Nov 21, 2024 8:45 AM  (Arrive by 8:30 AM)  Provider Visit with KEILY Fonseca  [facility] ([facility] [place] ) [address]  [place] [address]  [phone]             Routing refill request to provider for review/approval because:    Diuretics (Including Combos) Protocol Hubuio41/02/2024 06:13 AM   Protocol Details Has GFR on file in past 12 months and most recent value is normal          Kimberly Boecker, RN

## 2024-10-03 RX ORDER — ESOMEPRAZOLE MAGNESIUM 40 MG/1
CAPSULE, DELAYED RELEASE ORAL
Qty: 90 CAPSULE | Refills: 0 | Status: SHIPPED | OUTPATIENT
Start: 2024-10-03

## 2024-10-03 RX ORDER — SPIRONOLACTONE 25 MG/1
25 TABLET ORAL DAILY
Qty: 90 TABLET | Refills: 0 | Status: SHIPPED | OUTPATIENT
Start: 2024-10-03

## 2024-10-11 DIAGNOSIS — Z92.29 HISTORY OF HORMONE THERAPY: ICD-10-CM

## 2024-10-11 NOTE — TELEPHONE ENCOUNTER
Estradiol 0.5 MG Oral Tablet       Last Written Prescription Date:  09/11/2024  Last Fill Quantity: 30,   # refills: 0  Last Office Visit: 05/31/2024  Future Office visit:    Next 5 appointments (look out 90 days)      Nov 21, 2024 8:45 AM  (Arrive by 8:30 AM)  Provider Visit with KEILY Fonseca  [facility] ([facility] ) [address]  [facility] [address]  [phone]             Routing refill request to provider for review/approval because:    Contraceptives Protocol Thcvtv35/11/2024 06:13 AM   Protocol Details Patient is not a current smoker if age is 35 or older    Medication indicated for associated diagnosis    Recent (12 mo) or future (30 days) visit within the authorizing provider's specialty    Medication is active on med list    No active pregnancy on record    No positive pregnancy test in past 12 months   Hormone Replacement Therapy Failed   Protocol Details Medication indicated for associated diagnosis    Most recent blood pressure under 140/90 in past 12 months    Medication is active on med list    Recent (12 mo) or future (90 days) visit within the authorizing provider's specialty    Patient is 18 years of age or older    No active pregnancy on record    No positive pregnancy test on record in past 12 months   Hormone Replacement Therapy (vaginal) Failed   Protocol Details Medication indicated for associated diagnosis        Kimberly Boecker, RN

## 2024-10-13 RX ORDER — ESTRADIOL 0.5 MG/1
0.5 TABLET ORAL DAILY
Qty: 30 TABLET | Refills: 0 | Status: SHIPPED | OUTPATIENT
Start: 2024-10-13 | End: 2024-11-11

## 2024-10-17 ENCOUNTER — TELEPHONE (OUTPATIENT)
Dept: CARE COORDINATION | Facility: OTHER | Age: 64
End: 2024-10-17

## 2024-10-17 ENCOUNTER — PATIENT OUTREACH (OUTPATIENT)
Dept: CARE COORDINATION | Facility: OTHER | Age: 64
End: 2024-10-17

## 2024-10-17 NOTE — PROGRESS NOTES
Unable to speak to patient.  No answer, no VM available to leave message.  Will try to follow up again in 2 weeks.  Reminder set.

## 2024-10-28 DIAGNOSIS — G47.00 PERSISTENT INSOMNIA: ICD-10-CM

## 2024-10-28 NOTE — TELEPHONE ENCOUNTER
Trazodone       Last Written Prescription Date:  6/03/2024  Last Fill Quantity: 30,   # refills: 4  Last Office Visit: 6/03/2024  Future Office visit:    Next 5 appointments (look out 90 days)      Nov 21, 2024 8:45 AM  (Arrive by 8:30 AM)  Provider Visit with KEILY Fonseca  [facility] ([facility] [place] ) [address]  [place] [address]  [phone]

## 2024-10-29 RX ORDER — TRAZODONE HYDROCHLORIDE 100 MG/1
TABLET ORAL
Qty: 30 TABLET | Refills: 11 | Status: SHIPPED | OUTPATIENT
Start: 2024-10-29

## 2024-10-30 NOTE — NURSING NOTE
"Chief Complaint   Patient presents with     RECHECK     Mental health.  Patient c/o trouble sleeping, being short tempered, and considering Cannibas.       Initial /70 (BP Location: Right arm, Patient Position: Sitting, Cuff Size: Adult Regular)  Pulse 67  Temp 97.3  F (36.3  C) (Tympanic)  Wt 205 lb (93 kg)  BMI 32.11 kg/m2 Estimated body mass index is 32.11 kg/(m^2) as calculated from the following:    Height as of 10/18/17: 5' 7\" (1.702 m).    Weight as of this encounter: 205 lb (93 kg).  Medication Reconciliation: complete     MARCELO DASILVA      " [FreeTextEntry1] : Mary Kate Valera is a 26 year M with no significant PMHx presenting for varicocele and hypogonadism on 10/30/2024  Hypogonadism  - TT LH FSH E2  SA no clear varicocele on exam if abnomral SA ocnsider sonogram

## 2024-11-01 ENCOUNTER — PATIENT OUTREACH (OUTPATIENT)
Dept: CARE COORDINATION | Facility: OTHER | Age: 64
End: 2024-11-01

## 2024-11-01 NOTE — LETTER
November 1, 2024      Sharlene Mccord  [address] [place] [id number]        Dear Sharlene,     I have been trying to reach you and unfortunately have not been successful.  The goal of care coordination is to help you manage your health and improve access to the [facility] system in the most efficient manner..  As an RN Care Coordinator, I understands the healthcare system and can try to assist you in improving your access to care.     And as the Cardiology Care Coordinator, I partner with your physician to help you achieve your health care goals.     The care coordination program is voluntary and offered to you at no cost. You may discontinue the program at anytime.  Please call me at [phone] with any questions or concerns you might have. If you reach my voicemail, leave a message and your phone number so I can return your call.     I will not attempt any further outreach calls at this time.   We at [facility] are focused on providing you with the highest-quality healthcare experience possible.        It is a pleasure to partner with you as we work towards achieving your optimal state of wellness.        Sincerely,      Caron Zhao RN   Cardiology/CHF Care Coordinator   [phone]

## 2024-11-01 NOTE — PROGRESS NOTES
3rd attempt:  Outreach call to patient.- 30 day call.   No answer, outgoing message does not allow for a incoming message to be left.    My chart letter sent discontinuing care coordination contact at this time until patient returns call to cardiology CC.

## 2024-11-11 DIAGNOSIS — Z92.29 HISTORY OF HORMONE THERAPY: ICD-10-CM

## 2024-11-11 RX ORDER — ESTRADIOL 0.5 MG/1
0.5 TABLET ORAL DAILY
Qty: 30 TABLET | Refills: 0 | Status: SHIPPED | OUTPATIENT
Start: 2024-11-11

## 2024-11-11 NOTE — TELEPHONE ENCOUNTER
Estradiol 0.5 MG Oral Tablet       Last Written Prescription Date:  10/13/2024  Last Fill Quantity: 30,   # refills: 0  Last Office Visit: 05/31/2024  Future Office visit:    Next 5 appointments (look out 90 days)      Nov 21, 2024 8:45 AM  (Arrive by 8:30 AM)  Provider Visit with KEILY Fonseca  [facility] ([facility] ) [address] [place] [address] [facility] [address]  [phone]             Routing refill request to provider for review/approval because:  Hormone Replacement Therapy Failed   Protocol Details Medication indicated for associated diagnosis    Most recent blood pressure under 140/90 in past 12 months    Medication is active on med list    Recent (12 mo) or future (90 days) visit within the authorizing provider's specialty    Patient is 18 years of age or older    No active pregnancy on record    No positive pregnancy test on record in past 12 months   Hormone Replacement Therapy (vaginal) Failed   Protocol Details Medication indicated for associated diagnosis         Kimberly Boecker, RN

## 2024-11-19 ASSESSMENT — ANXIETY QUESTIONNAIRES
7. FEELING AFRAID AS IF SOMETHING AWFUL MIGHT HAPPEN: MORE THAN HALF THE DAYS
6. BECOMING EASILY ANNOYED OR IRRITABLE: SEVERAL DAYS
GAD7 TOTAL SCORE: 12
IF YOU CHECKED OFF ANY PROBLEMS ON THIS QUESTIONNAIRE, HOW DIFFICULT HAVE THESE PROBLEMS MADE IT FOR YOU TO DO YOUR WORK, TAKE CARE OF THINGS AT HOME, OR GET ALONG WITH OTHER PEOPLE: VERY DIFFICULT
2. NOT BEING ABLE TO STOP OR CONTROL WORRYING: MORE THAN HALF THE DAYS
1. FEELING NERVOUS, ANXIOUS, OR ON EDGE: MORE THAN HALF THE DAYS
5. BEING SO RESTLESS THAT IT IS HARD TO SIT STILL: MORE THAN HALF THE DAYS
7. FEELING AFRAID AS IF SOMETHING AWFUL MIGHT HAPPEN: MORE THAN HALF THE DAYS
GAD7 TOTAL SCORE: 12
4. TROUBLE RELAXING: SEVERAL DAYS
GAD7 TOTAL SCORE: 12
3. WORRYING TOO MUCH ABOUT DIFFERENT THINGS: MORE THAN HALF THE DAYS
8. IF YOU CHECKED OFF ANY PROBLEMS, HOW DIFFICULT HAVE THESE MADE IT FOR YOU TO DO YOUR WORK, TAKE CARE OF THINGS AT HOME, OR GET ALONG WITH OTHER PEOPLE?: VERY DIFFICULT

## 2024-11-20 NOTE — PROGRESS NOTES
"  Assessment & Plan     Stage 3a chronic kidney disease (H)  ***  - Albumin Random Urine Quantitative with Creat Ratio; Future  - BASIC METABOLIC PANEL; Future  - Hemoglobin; Future  - CBC with platelets and differential; Future  - Comprehensive metabolic panel (BMP + Alb, Alk Phos, ALT, AST, Total. Bili, TP); Future  - UA Macroscopic with reflex to Microscopic and Culture; Future    Mixed hyperlipidemia  ***  - ALT; Future    Personal history of nicotine dependence  ***  - CT Chest Lung Cancer Screen Low Dose Without; Future    Mild episode of recurrent major depressive disorder (H)  ***    Acquired hypothyroidism  ***  - TSH with free T4 reflex; Future    Aortic valve disorder  Working with Cardiology. Seeing Sydni Mitchell.    - CBC with platelets and differential; Future  - Comprehensive metabolic panel (BMP + Alb, Alk Phos, ALT, AST, Total. Bili, TP); Future  - Lipid Profile (Chol, Trig, HDL, LDL calc); Future          Nicotine/Tobacco Cessation  She reports that she has been smoking cigarettes. She started smoking about 49 years ago. She has a 23 pack-year smoking history. She has never used smokeless tobacco.  Nicotine/Tobacco Cessation Plan  Information offered: Patient not interested at this time      BMI  Estimated body mass index is 30.18 kg/m  as calculated from the following:    Height as of this encounter: 1.689 m (5' 6.5\").    Weight as of this encounter: 86.1 kg (189 lb 12.8 oz).   Weight management plan: Discussed healthy diet and exercise guidelines      See Patient Instructions    No follow-ups on file.    Beverley Su is a 64 year old, presenting for the following health issues:  No chief complaint on file.        11/21/2024     8:23 AM   Additional Questions   Roomed by Saloni Arita   Accompanied by self         11/21/2024     8:23 AM   Patient Reported Additional Medications   Patient reports taking the following new medications none     History of Present Illness       CKD: She uses " "over the counter pain medication, including arthris strength aceamphetaphine, one time daily.    Mental Health Follow-up:  Patient presents to follow-up on Depression & Anxiety.Patient's depression since last visit has been:  Worse  The patient is having other symptoms associated with depression.  Patient's anxiety since last visit has been:  Worse  The patient is having other symptoms associated with anxiety.  Any significant life events: relationship concerns, financial concerns and grief or loss  Patient is feeling anxious or having panic attacks.  Patient has no concerns about alcohol or drug use.    Hyperlipidemia:  She presents for follow up of hyperlipidemia.   She is taking medication to lower cholesterol. She is having myalgia or other side effects to statin medications.    Headaches:   Since the patient's last clinic visit, headaches are: no change  The patient is getting headaches:  Few times a week  She is not able to do normal daily activities when she has a migraine.  The patient is taking the following rescue/relief medications:  Sumatriptan (Imitrex)   Patient states \"I get total relief\" from the rescue/relief medications.   The patient is taking the following medications to prevent migraines:  No medications to prevent migraines  In the past 4 weeks, the patient has gone to an Urgent Care or Emergency Room 0 times times due to headaches.She consumes 1 sweetened beverage(s) daily.She exercises with enough effort to increase her heart rate 9 or less minutes per day.  She exercises with enough effort to increase her heart rate 3 or less days per week. She is missing 1 dose(s) of medications per week.  She is not taking prescribed medications regularly due to remembering to take.       Depression and Anxiety   How are you doing with your depression since your last visit? Worsened   How are you doing with your anxiety since your last visit?  Worsened   Are you having other symptoms that might be " associated with depression or anxiety? Yes:  lying in bed with tears and so many thoughts in head. Trouble concentrating (worse than usual)   Have you had a significant life event? Relationship Concerns, Financial Concerns, and Grief or Loss Going through divorce   Do you have any concerns with your use of alcohol or other drugs? No    Social History     Tobacco Use    Smoking status: Some Days     Current packs/day: 0.00     Average packs/day: 0.5 packs/day for 46.1 years (23.0 ttl pk-yrs)     Types: Cigarettes     Start date: 1/1/1975     Last attempt to quit: 1/27/2021     Years since quitting: 3.8    Smokeless tobacco: Never    Tobacco comments:     have quit on my own.   Vaping Use    Vaping status: Every Day   Substance Use Topics    Alcohol use: No    Drug use: No         1/15/2024    10:28 AM 2/12/2024     9:04 AM 4/24/2024     1:31 PM   PHQ   PHQ-9 Total Score 18 21 14   Q9: Thoughts of better off dead/self-harm past 2 weeks Not at all  Not at all Not at all        Patient-reported         5/6/2024     8:24 AM 5/31/2024    10:23 AM 11/19/2024     2:04 PM   SHAKIR-7 SCORE   Total Score 12 (moderate anxiety) 13 (moderate anxiety) 12 (moderate anxiety)   Total Score 12 13 12        Patient-reported         11/21/2024     8:21 AM   Last PHQ-9   1.  Little interest or pleasure in doing things 2    2.  Feeling down, depressed, or hopeless 2    3.  Trouble falling or staying asleep, or sleeping too much 2    4.  Feeling tired or having little energy 2    5.  Poor appetite or overeating 2    6.  Feeling bad about yourself 1    7.  Trouble concentrating 2    8.  Moving slowly or restless 1    Q9: Thoughts of better off dead/self-harm past 2 weeks 0    PHQ-9 Total Score 14        Patient-reported         11/19/2024     2:04 PM   SHAKIR-7    1. Feeling nervous, anxious, or on edge 2    2. Not being able to stop or control worrying 2    3. Worrying too much about different things 2    4. Trouble relaxing 1    5. Being so  restless that it is hard to sit still 2    6. Becoming easily annoyed or irritable 1    7. Feeling afraid, as if something awful might happen 2    SHAKIR-7 Total Score 12    If you checked any problems, how difficult have they made it for you to do your work, take care of things at home, or get along with other people? Very difficult        Patient-reported       Suicide Assessment Five-step Evaluation and Treatment (SAFE-T)    Chronic Kidney Disease Follow-up  {Provider  Link to CKD SmartSet :728887}  Do you take any over the counter pain medicine?: Yes  What over the counter medicine are you taking for your pain?:  tylenol    How often do you take this medicine?:  One time daily    Migraine   Since your last clinic visit, how have your headaches changed?  No change  How often are you getting headaches or migraines? A few times a week   Are you able to do normal daily activities when you have a migraine? No  Are you taking rescue/relief medications? (Select all that apply) sumatriptan (Imitrex)  How helpful is your rescue/relief medication?  I get total relief  Are you taking any medications to prevent migraines? (Select all that apply)  No  In the past 4 weeks, how often have you gone to urgent care or the emergency room because of your headaches?  0    Hypothyroidism Follow-up    Since last visit, patient describes the following symptoms: weight gain of 5 lbs, dry skin, loose stools, anxiety, depression, and fatigue    Depression and Anxiety   How are you doing with your depression since your last visit? No change  How are you doing with your anxiety since your last visit?  No change  Are you having other symptoms that might be associated with depression or anxiety? No  Have you had a significant life event? OTHER: divorce    Do you have any concerns with your use of alcohol or other drugs? No    Social History     Tobacco Use    Smoking status: Some Days     Current packs/day: 0.00     Average packs/day: 0.5  packs/day for 46.1 years (23.0 ttl pk-yrs)     Types: Cigarettes     Start date: 1/1/1975     Last attempt to quit: 1/27/2021     Years since quitting: 3.8    Smokeless tobacco: Never    Tobacco comments:     have quit on my own.   Vaping Use    Vaping status: Every Day   Substance Use Topics    Alcohol use: No    Drug use: No         2/12/2024     9:04 AM 4/24/2024     1:31 PM 11/21/2024     8:21 AM   PHQ   PHQ-9 Total Score 21 14 14    Q9: Thoughts of better off dead/self-harm past 2 weeks Not at all Not at all  Not at all        Patient-reported         5/6/2024     8:24 AM 5/31/2024    10:23 AM 11/19/2024     2:04 PM   SHAKIR-7 SCORE   Total Score 12 (moderate anxiety) 13 (moderate anxiety) 12 (moderate anxiety)   Total Score 12 13 12        Patient-reported         11/21/2024     8:21 AM   Last PHQ-9   1.  Little interest or pleasure in doing things 2    2.  Feeling down, depressed, or hopeless 2    3.  Trouble falling or staying asleep, or sleeping too much 2    4.  Feeling tired or having little energy 2    5.  Poor appetite or overeating 2    6.  Feeling bad about yourself 1    7.  Trouble concentrating 2    8.  Moving slowly or restless 1    Q9: Thoughts of better off dead/self-harm past 2 weeks 0    PHQ-9 Total Score 14        Patient-reported         11/19/2024     2:04 PM   SHAKIR-7    1. Feeling nervous, anxious, or on edge 2    2. Not being able to stop or control worrying 2    3. Worrying too much about different things 2    4. Trouble relaxing 1    5. Being so restless that it is hard to sit still 2    6. Becoming easily annoyed or irritable 1    7. Feeling afraid, as if something awful might happen 2    SHAKIR-7 Total Score 12    If you checked any problems, how difficult have they made it for you to do your work, take care of things at home, or get along with other people? Very difficult        Patient-reported       Suicide Assessment Five-step Evaluation and Treatment (SAFE-T)  {Provider  Link to  Depression Care Package SmartSet :959155}        Review of Systems  Constitutional, neuro, ENT, endocrine, pulmonary, cardiac, gastrointestinal, genitourinary, musculoskeletal, integument and psychiatric systems are negative, except as otherwise noted.      Objective    There were no vitals taken for this visit.  There is no height or weight on file to calculate BMI.  Physical Exam   GENERAL: alert and no distress  EYES: Eyes grossly normal to inspection, PERRL and conjunctivae and sclerae normal  HENT: ear canals and TM's normal, nose and mouth without ulcers or lesions  NECK: no adenopathy, no asymmetry, masses, or scars  RESP: lungs clear to auscultation - no rales, rhonchi or wheezes  BREAST: normal without masses, tenderness or nipple discharge and no palpable axillary masses or adenopathy  CV: regular rate and rhythm, normal S1 S2, no S3 or S4, no murmur, click or rub, no peripheral edema  ABDOMEN: soft, nontender, no hepatosplenomegaly, no masses and bowel sounds normal  MS: no gross musculoskeletal defects noted, no edema  SKIN: no suspicious lesions or rashes  NEURO: Normal strength and tone, mentation intact and speech normal  PSYCH: mentation appears normal, affect normal/bright  LYMPH: no cervical, supraclavicular, axillary, or inguinal adenopathy    No results found for any visits on 11/21/24.        Signed Electronically by: KEILY Cobos  {Email feedback regarding this note to [email or web address]   :[id number]}   11/21/24.        Signed Electronically by: KEILY Cobos

## 2024-11-21 ENCOUNTER — OFFICE VISIT (OUTPATIENT)
Dept: FAMILY MEDICINE | Facility: OTHER | Age: 64
End: 2024-11-21
Attending: PHYSICIAN ASSISTANT
Payer: MEDICARE

## 2024-11-21 VITALS
HEIGHT: 67 IN | OXYGEN SATURATION: 99 % | RESPIRATION RATE: 16 BRPM | BODY MASS INDEX: 29.79 KG/M2 | DIASTOLIC BLOOD PRESSURE: 62 MMHG | SYSTOLIC BLOOD PRESSURE: 112 MMHG | TEMPERATURE: 97.9 F | HEART RATE: 62 BPM | WEIGHT: 189.8 LBS

## 2024-11-21 DIAGNOSIS — R53.82 CHRONIC FATIGUE: ICD-10-CM

## 2024-11-21 DIAGNOSIS — E78.2 MIXED HYPERLIPIDEMIA: ICD-10-CM

## 2024-11-21 DIAGNOSIS — F33.0 MILD EPISODE OF RECURRENT MAJOR DEPRESSIVE DISORDER (H): ICD-10-CM

## 2024-11-21 DIAGNOSIS — Z23 NEED FOR PROPHYLACTIC VACCINATION AND INOCULATION AGAINST INFLUENZA: ICD-10-CM

## 2024-11-21 DIAGNOSIS — I35.9 AORTIC VALVE DISORDER: ICD-10-CM

## 2024-11-21 DIAGNOSIS — E03.9 ACQUIRED HYPOTHYROIDISM: ICD-10-CM

## 2024-11-21 DIAGNOSIS — Z87.891 PERSONAL HISTORY OF NICOTINE DEPENDENCE: ICD-10-CM

## 2024-11-21 DIAGNOSIS — N18.31 STAGE 3A CHRONIC KIDNEY DISEASE (H): Primary | ICD-10-CM

## 2024-11-21 LAB
ALBUMIN SERPL BCG-MCNC: 4.4 G/DL (ref 3.5–5.2)
ALBUMIN UR-MCNC: NEGATIVE MG/DL
ALP SERPL-CCNC: 91 U/L (ref 40–150)
ALT SERPL W P-5'-P-CCNC: 38 U/L (ref 0–50)
ANION GAP SERPL CALCULATED.3IONS-SCNC: 12 MMOL/L (ref 7–15)
APPEARANCE UR: CLEAR
AST SERPL W P-5'-P-CCNC: 26 U/L (ref 0–45)
BACTERIA #/AREA URNS HPF: ABNORMAL /HPF
BASOPHILS # BLD AUTO: 0.1 10E3/UL (ref 0–0.2)
BASOPHILS NFR BLD AUTO: 1 %
BILIRUB SERPL-MCNC: 0.2 MG/DL
BILIRUB UR QL STRIP: NEGATIVE
BUN SERPL-MCNC: 24.3 MG/DL (ref 8–23)
CALCIUM SERPL-MCNC: 9.6 MG/DL (ref 8.8–10.4)
CHLORIDE SERPL-SCNC: 103 MMOL/L (ref 98–107)
CHOLEST SERPL-MCNC: 221 MG/DL
COLOR UR AUTO: ABNORMAL
CREAT SERPL-MCNC: 1.18 MG/DL (ref 0.51–0.95)
CREAT UR-MCNC: 137.3 MG/DL
EGFRCR SERPLBLD CKD-EPI 2021: 51 ML/MIN/1.73M2
EOSINOPHIL # BLD AUTO: 0.1 10E3/UL (ref 0–0.7)
EOSINOPHIL NFR BLD AUTO: 1 %
ERYTHROCYTE [DISTWIDTH] IN BLOOD BY AUTOMATED COUNT: 12.6 % (ref 10–15)
FASTING STATUS PATIENT QL REPORTED: YES
FASTING STATUS PATIENT QL REPORTED: YES
GLUCOSE SERPL-MCNC: 108 MG/DL (ref 70–99)
GLUCOSE UR STRIP-MCNC: NEGATIVE MG/DL
HCO3 SERPL-SCNC: 22 MMOL/L (ref 22–29)
HCT VFR BLD AUTO: 42.7 % (ref 35–47)
HDLC SERPL-MCNC: 82 MG/DL
HGB BLD-MCNC: 14.7 G/DL (ref 11.7–15.7)
HGB UR QL STRIP: ABNORMAL
HYALINE CASTS: 2 /LPF
IMM GRANULOCYTES # BLD: 0 10E3/UL
IMM GRANULOCYTES NFR BLD: 0 %
KETONES UR STRIP-MCNC: NEGATIVE MG/DL
LDLC SERPL CALC-MCNC: 117 MG/DL
LEUKOCYTE ESTERASE UR QL STRIP: NEGATIVE
LYMPHOCYTES # BLD AUTO: 2.2 10E3/UL (ref 0.8–5.3)
LYMPHOCYTES NFR BLD AUTO: 24 %
MCH RBC QN AUTO: 30.4 PG (ref 26.5–33)
MCHC RBC AUTO-ENTMCNC: 34.4 G/DL (ref 31.5–36.5)
MCV RBC AUTO: 88 FL (ref 78–100)
MICROALBUMIN UR-MCNC: <12 MG/L
MICROALBUMIN/CREAT UR: NORMAL MG/G{CREAT}
MONOCYTES # BLD AUTO: 0.6 10E3/UL (ref 0–1.3)
MONOCYTES NFR BLD AUTO: 6 %
MUCOUS THREADS #/AREA URNS LPF: PRESENT /LPF
NEUTROPHILS # BLD AUTO: 5.9 10E3/UL (ref 1.6–8.3)
NEUTROPHILS NFR BLD AUTO: 67 %
NITRATE UR QL: NEGATIVE
NONHDLC SERPL-MCNC: 139 MG/DL
NRBC # BLD AUTO: 0 10E3/UL
NRBC BLD AUTO-RTO: 0 /100
PH UR STRIP: 5 [PH] (ref 4.7–8)
PLATELET # BLD AUTO: 213 10E3/UL (ref 150–450)
POTASSIUM SERPL-SCNC: 5.5 MMOL/L (ref 3.4–5.3)
PROT SERPL-MCNC: 7.3 G/DL (ref 6.4–8.3)
RBC # BLD AUTO: 4.84 10E6/UL (ref 3.8–5.2)
RBC URINE: 2 /HPF
SODIUM SERPL-SCNC: 137 MMOL/L (ref 135–145)
SP GR UR STRIP: 1.02 (ref 1–1.03)
SQUAMOUS EPITHELIAL: 0 /HPF
TRIGL SERPL-MCNC: 112 MG/DL
TSH SERPL DL<=0.005 MIU/L-ACNC: 1.16 UIU/ML (ref 0.3–4.2)
UROBILINOGEN UR STRIP-MCNC: NORMAL MG/DL
WBC # BLD AUTO: 8.9 10E3/UL (ref 4–11)
WBC URINE: 1 /HPF

## 2024-11-21 PROCEDURE — 36415 COLL VENOUS BLD VENIPUNCTURE: CPT | Mod: ZL | Performed by: PHYSICIAN ASSISTANT

## 2024-11-21 PROCEDURE — 80061 LIPID PANEL: CPT | Mod: ZL | Performed by: PHYSICIAN ASSISTANT

## 2024-11-21 PROCEDURE — 82570 ASSAY OF URINE CREATININE: CPT | Mod: ZL | Performed by: PHYSICIAN ASSISTANT

## 2024-11-21 PROCEDURE — 81001 URINALYSIS AUTO W/SCOPE: CPT | Mod: ZL | Performed by: PHYSICIAN ASSISTANT

## 2024-11-21 PROCEDURE — 81003 URINALYSIS AUTO W/O SCOPE: CPT | Mod: ZL | Performed by: PHYSICIAN ASSISTANT

## 2024-11-21 PROCEDURE — 90673 RIV3 VACCINE NO PRESERV IM: CPT

## 2024-11-21 PROCEDURE — 82465 ASSAY BLD/SERUM CHOLESTEROL: CPT | Mod: ZL | Performed by: PHYSICIAN ASSISTANT

## 2024-11-21 PROCEDURE — 82247 BILIRUBIN TOTAL: CPT | Mod: ZL | Performed by: PHYSICIAN ASSISTANT

## 2024-11-21 PROCEDURE — 84295 ASSAY OF SERUM SODIUM: CPT | Mod: ZL | Performed by: PHYSICIAN ASSISTANT

## 2024-11-21 PROCEDURE — 82043 UR ALBUMIN QUANTITATIVE: CPT | Mod: ZL | Performed by: PHYSICIAN ASSISTANT

## 2024-11-21 PROCEDURE — G0008 ADMIN INFLUENZA VIRUS VAC: HCPCS

## 2024-11-21 PROCEDURE — 80053 COMPREHEN METABOLIC PANEL: CPT | Mod: ZL | Performed by: PHYSICIAN ASSISTANT

## 2024-11-21 PROCEDURE — 84443 ASSAY THYROID STIM HORMONE: CPT | Mod: ZL | Performed by: PHYSICIAN ASSISTANT

## 2024-11-21 PROCEDURE — 85025 COMPLETE CBC W/AUTO DIFF WBC: CPT | Mod: ZL | Performed by: PHYSICIAN ASSISTANT

## 2024-11-21 PROCEDURE — 99214 OFFICE O/P EST MOD 30 MIN: CPT | Performed by: PHYSICIAN ASSISTANT

## 2024-11-21 PROCEDURE — G0463 HOSPITAL OUTPT CLINIC VISIT: HCPCS | Mod: 25

## 2024-11-21 ASSESSMENT — PATIENT HEALTH QUESTIONNAIRE - PHQ9
SUM OF ALL RESPONSES TO PHQ QUESTIONS 1-9: 14
SUM OF ALL RESPONSES TO PHQ QUESTIONS 1-9: 14
10. IF YOU CHECKED OFF ANY PROBLEMS, HOW DIFFICULT HAVE THESE PROBLEMS MADE IT FOR YOU TO DO YOUR WORK, TAKE CARE OF THINGS AT HOME, OR GET ALONG WITH OTHER PEOPLE: VERY DIFFICULT

## 2024-11-21 ASSESSMENT — PAIN SCALES - GENERAL: PAINLEVEL_OUTOF10: NO PAIN (0)

## 2024-11-21 NOTE — LETTER
November 26, 2024      Sharlene Mccord  [address] [place] [id number]        Dear ,    We are writing to inform you of your test results.    Your kidney is stable. Lipids are better but still not where we want them. Diet and exercise . Recheck with you new MD>     Resulted Orders   UA Macroscopic with reflex to Microscopic and Culture   Result Value Ref Range    Color Urine Light Yellow Colorless, Straw, Light Yellow, Yellow    Appearance Urine Clear Clear    Glucose Urine Negative Negative mg/dL    Bilirubin Urine Negative Negative    Ketones Urine Negative Negative mg/dL    Specific Gravity Urine 1.024 1.003 - 1.035    Blood Urine Trace (A) Negative    pH Urine 5.0 4.7 - 8.0    Protein Albumin Urine Negative Negative mg/dL    Urobilinogen Urine Normal Normal, 2.0 mg/dL    Nitrite Urine Negative Negative    Leukocyte Esterase Urine Negative Negative    Bacteria Urine Few (A) None Seen /HPF    Mucus Urine Present (A) None Seen /LPF    RBC Urine 2 <=2 /HPF    WBC Urine 1 <=5 /HPF    Squamous Epithelials Urine 0 <=1 /HPF    Hyaline Casts Urine 2 <=2 /LPF    Narrative    Urine Culture not indicated   TSH with free T4 reflex   Result Value Ref Range    TSH 1.16 0.30 - 4.20 uIU/mL   Lipid Profile (Chol, Trig, HDL, LDL calc)   Result Value Ref Range    Cholesterol 221 (H) <200 mg/dL    Triglycerides 112 <150 mg/dL    Direct Measure HDL 82 >=50 mg/dL    LDL Cholesterol Calculated 117 (H) <100 mg/dL    Non HDL Cholesterol 139 (H) <130 mg/dL    Patient Fasting > 8hrs? Yes     Narrative    Cholesterol  Desirable: < 200 mg/dL  Borderline High: 200 - 239 mg/dL  High: >= 240 mg/dL    Triglycerides  Normal: < 150 mg/dL  Borderline High: 150 - 199 mg/dL  High: 200-499 mg/dL  Very High: >= 500 mg/dL    Direct Measure HDL  Female: >= 50 mg/dL   Male: >= 40 mg/dL    LDL Cholesterol  Desirable: < 100 mg/dL  Above Desirable: 100 - 129 mg/dL   Borderline High: 130 - 159 mg/dL   High:  160 - 189 mg/dL   Very High: >= 190  mg/dL    Non HDL Cholesterol  Desirable: < 130 mg/dL  Above Desirable: 130 - 159 mg/dL  Borderline High: 160 - 189 mg/dL  High: 190 - 219 mg/dL  Very High: >= 220 mg/dL   Comprehensive metabolic panel (BMP + Alb, Alk Phos, ALT, AST, Total. Bili, TP)   Result Value Ref Range    Sodium 137 135 - 145 mmol/L    Potassium 5.5 (H) 3.4 - 5.3 mmol/L    Carbon Dioxide (CO2) 22 22 - 29 mmol/L    Anion Gap 12 7 - 15 mmol/L    Urea Nitrogen 24.3 (H) 8.0 - 23.0 mg/dL    Creatinine 1.18 (H) 0.51 - 0.95 mg/dL    GFR Estimate 51 (L) >60 mL/min/1.73m2      Comment:      eGFR calculated using 2021 CKD-EPI equation.    Calcium 9.6 8.8 - 10.4 mg/dL      Comment:      Reference intervals for this test were updated on 7/16/2024 to reflect our healthy population more accurately. There may be differences in the flagging of prior results with similar values performed with this method. Those prior results can be interpreted in the context of the updated reference intervals.    Chloride 103 98 - 107 mmol/L    Glucose 108 (H) 70 - 99 mg/dL    Alkaline Phosphatase 91 40 - 150 U/L    AST 26 0 - 45 U/L    ALT 38 0 - 50 U/L    Protein Total 7.3 6.4 - 8.3 g/dL    Albumin 4.4 3.5 - 5.2 g/dL    Bilirubin Total 0.2 <=1.2 mg/dL    Patient Fasting > 8hrs? Yes    CBC with platelets and differential   Result Value Ref Range    WBC Count 8.9 4.0 - 11.0 10e3/uL    RBC Count 4.84 3.80 - 5.20 10e6/uL    Hemoglobin 14.7 11.7 - 15.7 g/dL    Hematocrit 42.7 35.0 - 47.0 %    MCV 88 78 - 100 fL    MCH 30.4 26.5 - 33.0 pg    MCHC 34.4 31.5 - 36.5 g/dL    RDW 12.6 10.0 - 15.0 %    Platelet Count 213 150 - 450 10e3/uL    % Neutrophils 67 %    % Lymphocytes 24 %    % Monocytes 6 %    % Eosinophils 1 %    % Basophils 1 %    % Immature Granulocytes 0 %    NRBCs per 100 WBC 0 <1 /100    Absolute Neutrophils 5.9 1.6 - 8.3 10e3/uL    Absolute Lymphocytes 2.2 0.8 - 5.3 10e3/uL    Absolute Monocytes 0.6 0.0 - 1.3 10e3/uL    Absolute Eosinophils 0.1 0.0 - 0.7 10e3/uL     Absolute Basophils 0.1 0.0 - 0.2 10e3/uL    Absolute Immature Granulocytes 0.0 <=0.4 10e3/uL    Absolute NRBCs 0.0 10e3/uL       If you have any questions or concerns, please call the clinic at the number listed above.       Sincerely,      KEILY Fonseca

## 2024-11-21 NOTE — RESULT ENCOUNTER NOTE
Your kidney is stable. Lipids are better but still not where we want them. Diet and exercise . Recheck with you new MD>

## 2024-11-26 ENCOUNTER — PATIENT OUTREACH (OUTPATIENT)
Dept: CARE COORDINATION | Facility: OTHER | Age: 64
End: 2024-11-26

## 2024-11-26 ENCOUNTER — HOSPITAL ENCOUNTER (OUTPATIENT)
Dept: CT IMAGING | Facility: HOSPITAL | Age: 64
Discharge: HOME | End: 2024-11-26
Attending: PHYSICIAN ASSISTANT
Payer: MEDICARE

## 2024-11-26 DIAGNOSIS — Z87.891 PERSONAL HISTORY OF NICOTINE DEPENDENCE: ICD-10-CM

## 2024-11-26 PROCEDURE — 71271 CT THORAX LUNG CANCER SCR C-: CPT

## 2024-12-02 ENCOUNTER — OFFICE VISIT (OUTPATIENT)
Dept: FAMILY MEDICINE | Facility: OTHER | Age: 64
End: 2024-12-02
Attending: STUDENT IN AN ORGANIZED HEALTH CARE EDUCATION/TRAINING PROGRAM
Payer: COMMERCIAL

## 2024-12-02 VITALS
WEIGHT: 189.75 LBS | RESPIRATION RATE: 16 BRPM | HEART RATE: 57 BPM | OXYGEN SATURATION: 98 % | HEIGHT: 67 IN | TEMPERATURE: 97.7 F | SYSTOLIC BLOOD PRESSURE: 130 MMHG | BODY MASS INDEX: 29.78 KG/M2 | DIASTOLIC BLOOD PRESSURE: 79 MMHG

## 2024-12-02 DIAGNOSIS — E78.2 MIXED HYPERLIPIDEMIA: ICD-10-CM

## 2024-12-02 DIAGNOSIS — Z76.89 ENCOUNTER TO ESTABLISH CARE: Primary | ICD-10-CM

## 2024-12-02 DIAGNOSIS — E03.9 ACQUIRED HYPOTHYROIDISM: ICD-10-CM

## 2024-12-02 DIAGNOSIS — N18.31 STAGE 3A CHRONIC KIDNEY DISEASE (H): ICD-10-CM

## 2024-12-02 DIAGNOSIS — I35.9 AORTIC VALVE DISORDER: ICD-10-CM

## 2024-12-02 PROCEDURE — 99214 OFFICE O/P EST MOD 30 MIN: CPT | Performed by: STUDENT IN AN ORGANIZED HEALTH CARE EDUCATION/TRAINING PROGRAM

## 2024-12-02 ASSESSMENT — PATIENT HEALTH QUESTIONNAIRE - PHQ9
10. IF YOU CHECKED OFF ANY PROBLEMS, HOW DIFFICULT HAVE THESE PROBLEMS MADE IT FOR YOU TO DO YOUR WORK, TAKE CARE OF THINGS AT HOME, OR GET ALONG WITH OTHER PEOPLE: VERY DIFFICULT
SUM OF ALL RESPONSES TO PHQ QUESTIONS 1-9: 11
SUM OF ALL RESPONSES TO PHQ QUESTIONS 1-9: 11

## 2024-12-02 ASSESSMENT — PAIN SCALES - GENERAL: PAINLEVEL_OUTOF10: MODERATE PAIN (5)

## 2024-12-02 NOTE — PROGRESS NOTES
Assessment & Plan     Encounter to establish care  Sharlene Mccord presents to clinic to establish care.  She was previously followed by Concha Hare.    We reviewed her medical, surgical, social and family history.      Stage 3a chronic kidney disease (H)  Clinically stable  Caution with NSAIDs, avoid if possible    Mixed hyperlipidemia  The 10-year ASCVD risk score (Eusebio GARCIA, et al., 2019) is: 11.3%    Values used to calculate the score:      Age: 64 years      Sex: Female      Is Non- : No      Diabetic: No      Tobacco smoker: Yes      Systolic Blood Pressure: 130 mmHg      Is BP treated: Yes      HDL Cholesterol: 82 mg/dL      Total Cholesterol: 221 mg/dL  On atorvastatin 5 mg daily (only able to tolerate 1/2 of the 10 mg dose due to side effects.  Consider hydrophilic statin instead  LDL of 117 on 11/2024    Acquired hypothyroidism  Stable on levothyroxine 75 with normal TSH of 1.16 on 11/21/2024    Aortic valve disorder  Follows with cardiology  Most recent echo shows stable cardiac function with normal EF, normal diastolic function, moderate aortic insufficiency  She is clinically stable.  Denies any exertional CP, SOB, palpitations, dizziness, lightheadedness or vision changes  She gets annual echocardiograms with next one due 04/2025.  Follow-up with Sydni on 5/16/2025 for her annual visit.      Essential HTN  BP in target range, continue present management with cardizem 120 mg daily, spironolactone 25 mg daily        No follow-ups on file.    Subjective   Sharlene is a 64 year old, presenting for the following health issues:  Establish Care        12/2/2024    10:33 AM   Additional Questions   Roomed by Janiya Marino   Accompanied by None         12/2/2024    10:33 AM   Patient Reported Additional Medications   Patient reports taking the following new medications None     HPI     [facility]    Has been with Concha for many years.  Going through divorce, papers have been  "signed last week.    Has 3 children of her own and 3 step children  Suffers from panic attacks  Fibromylagia  Chronic pain  Heart disease  Osteoporosis  CKD  Hyperlipidemia    She has no acute concerns today.        Review of Systems  Constitutional, HEENT, cardiovascular, pulmonary, GI, , musculoskeletal, neuro, skin, endocrine and psych systems are negative, except as otherwise noted.      Objective    /79 (BP Location: Left arm, Patient Position: Sitting, Cuff Size: Adult Regular)   Pulse 57   Temp 97.7  F (36.5  C) (Tympanic)   Resp 16   Ht 1.689 m (5' 6.5\")   Wt 86.1 kg (189 lb 12 oz)   SpO2 98%   BMI 30.17 kg/m    Body mass index is 30.17 kg/m .  Physical Exam   GENERAL: alert and no distress  EYES: Eyes grossly normal to inspection, PERRL and conjunctivae and sclerae normal  HENT: ear canals and TM's normal, nose and mouth without ulcers or lesions  NECK: no adenopathy, no asymmetry, masses, or scars  RESP: lungs clear to auscultation - no rales, rhonchi or wheezes  CV: regular rate and rhythm, normal S1 S2, no S3 or S4, no murmur, click or rub, no peripheral edema  ABDOMEN: soft, nontender, no hepatosplenomegaly, no masses and bowel sounds normal  MS: no gross musculoskeletal defects noted, no edema  SKIN: no suspicious lesions or rashes  NEURO: Normal strength and tone, mentation intact and speech normal  PSYCH: mentation appears normal, affect normal/bright          Signed Electronically by: Dionne Jackson MD    Answers submitted by the patient for this visit:  Patient Health Questionnaire (Submitted on 12/2/2024)  If you checked off any problems, how difficult have these problems made it for you to do your work, take care of things at home, or get along with other people?: Very difficult  PHQ9 TOTAL SCORE: 11    "

## 2024-12-03 DIAGNOSIS — I51.89 DIASTOLIC DYSFUNCTION: ICD-10-CM

## 2024-12-03 DIAGNOSIS — I25.85 CARDIAC MICROVASCULAR DISEASE: ICD-10-CM

## 2024-12-03 RX ORDER — DILTIAZEM HYDROCHLORIDE 120 MG/1
120 CAPSULE, COATED, EXTENDED RELEASE ORAL DAILY
Qty: 90 CAPSULE | Refills: 0 | Status: SHIPPED | OUTPATIENT
Start: 2024-12-03

## 2024-12-03 NOTE — TELEPHONE ENCOUNTER
diltiazem ER COATED BEADS (CARDIZEM CD/CARTIA XT) 120 MG 24 hr capsule      Last Written Prescription Date:  12-  Last Fill Quantity: 90,   # refills: 3  Last Office Visit: 5-10-24  Future Office visit:    Next 5 appointments (look out 90 days)      Mar 03, 2025 10:30 AM  (Arrive by 10:15 AM)  Provider Visit with Dionne Jackson MD  [facility] ([facility] [place] ) [address]  [place] [address]  [phone]             Routing refill request to provider for review/approval because:  Drug not on the [facility], P or [facility] refill protocol or controlled substance

## 2024-12-09 DIAGNOSIS — Z92.29 HISTORY OF HORMONE THERAPY: ICD-10-CM

## 2024-12-09 RX ORDER — ESTRADIOL 0.5 MG/1
0.5 TABLET ORAL DAILY
Qty: 30 TABLET | Refills: 0 | Status: SHIPPED | OUTPATIENT
Start: 2024-12-09

## 2024-12-09 NOTE — PROGRESS NOTES
Estrace  Last signed 11/11 #30, 0 R  Last office visit 12/2    Luz Maria Villasenor, RN  Care Coordination    Protocol failed for:  Patient is not a current smoker if age is 35 or older      Medication indicated for associated diagnosis    Medication is associated with one or more of the following diagnoses:  Contraception  Acne  Dysmenorrhea  Menorrhagia  Amenorrhea  PCOS  Premenstrual Dysphoric Disorder  Irregular menses  Endometriosis  Contraceptive counseling  Finding of menstrual bleeding  Education about oral contraception  Uses contraception  Initial prescription of oral contraception  Oral contraception-no problem  Oral contraceptive repeat

## 2024-12-27 DIAGNOSIS — I50.32 CHRONIC DIASTOLIC HEART FAILURE (H): ICD-10-CM

## 2024-12-27 DIAGNOSIS — R12 HEARTBURN: ICD-10-CM

## 2024-12-27 RX ORDER — ESOMEPRAZOLE MAGNESIUM 40 MG/1
CAPSULE, DELAYED RELEASE ORAL
Qty: 90 CAPSULE | Refills: 3 | Status: SHIPPED | OUTPATIENT
Start: 2024-12-27

## 2024-12-27 NOTE — TELEPHONE ENCOUNTER
Spironolactone 25 MG Oral Tablet       Last Written Prescription Date:  10/03/2024  Last Fill Quantity: 90,   # refills: 0  Last Office Visit: 12/02/2024  Future Office visit:    Next 5 appointments (look out 90 days)      Mar 03, 2025 10:30 AM  (Arrive by 10:15 AM)  Provider Visit with Dionne Jackson MD  [facility] ) [facility] [address]  [place] [address]  [phone]             Routing refill request to provider for review/approval because:    Diuretics (Including Combos) Protocol Qoctmg2812/27/2024 06:14 AM   Protocol Details Has GFR on file in past 12 months and most recent value is normal        Kimberly Boecker, RN

## 2024-12-29 RX ORDER — SPIRONOLACTONE 25 MG/1
25 TABLET ORAL DAILY
Qty: 90 TABLET | Refills: 0 | Status: SHIPPED | OUTPATIENT
Start: 2024-12-29

## 2025-01-04 DIAGNOSIS — Z92.29 HISTORY OF HORMONE THERAPY: ICD-10-CM

## 2025-01-06 RX ORDER — ESTRADIOL 0.5 MG/1
0.5 TABLET ORAL DAILY
Qty: 30 TABLET | Refills: 0 | Status: SHIPPED | OUTPATIENT
Start: 2025-01-06

## 2025-01-06 NOTE — TELEPHONE ENCOUNTER
Estradiol 0.5 mg      Last Written Prescription Date:  12/09/2024  Last Fill Quantity: 30,   # refills: 0  Last Office Visit: 12/02/2024  Future Office visit:    Next 5 appointments (look out 90 days)      Mar 03, 2025 10:30 AM  (Arrive by 10:15 AM)  Provider Visit with Dionne Jackson MD  [facility] ([facility] ) [address]  [place] [address]  [phone]             Routing refill request to provider for review/approval because:  Hormone Replacement Therapy Failed    Rerun Protocol (1/4/2025 6:12 AM)    Medication indicated for associated diagnosis    The medication is prescribed for one or more of the following conditions:               Menopause              Vulva/Vaginal atrophy              Low Estrogen              Gender Dysphoria              Male to Female transgender     right abnormal...

## 2025-02-03 DIAGNOSIS — Z92.29 HISTORY OF HORMONE THERAPY: ICD-10-CM

## 2025-02-03 NOTE — TELEPHONE ENCOUNTER
Estradiol      Last Written Prescription Date:  1/6/25  Last Fill Quantity: 30,   # refills: 0  Last Office Visit: 12/2/24  Future Office visit:    Next 5 appointments (look out 90 days)      Mar 03, 2025 10:30 AM  (Arrive by 10:15 AM)  Provider Visit with Dionne Jackson MD  [facility] [place] ([facility] ) [address]  [place] [address]  [phone]             Routing refill request to provider for review/approval because:

## 2025-02-04 RX ORDER — ESTRADIOL 0.5 MG/1
0.5 TABLET ORAL DAILY
Qty: 30 TABLET | Refills: 0 | Status: SHIPPED | OUTPATIENT
Start: 2025-02-04

## 2025-03-02 DIAGNOSIS — Z92.29 HISTORY OF HORMONE THERAPY: ICD-10-CM

## 2025-03-03 ENCOUNTER — OFFICE VISIT (OUTPATIENT)
Dept: FAMILY MEDICINE | Facility: OTHER | Age: 65
End: 2025-03-03
Attending: STUDENT IN AN ORGANIZED HEALTH CARE EDUCATION/TRAINING PROGRAM
Payer: MEDICARE

## 2025-03-03 VITALS
BODY MASS INDEX: 30.79 KG/M2 | DIASTOLIC BLOOD PRESSURE: 78 MMHG | HEART RATE: 55 BPM | HEIGHT: 67 IN | TEMPERATURE: 97.8 F | WEIGHT: 196.19 LBS | SYSTOLIC BLOOD PRESSURE: 125 MMHG | OXYGEN SATURATION: 98 % | RESPIRATION RATE: 16 BRPM

## 2025-03-03 DIAGNOSIS — E66.01 MORBID OBESITY (H): ICD-10-CM

## 2025-03-03 DIAGNOSIS — N18.31 STAGE 3A CHRONIC KIDNEY DISEASE (H): Primary | ICD-10-CM

## 2025-03-03 DIAGNOSIS — E03.9 ACQUIRED HYPOTHYROIDISM: ICD-10-CM

## 2025-03-03 PROCEDURE — G0463 HOSPITAL OUTPT CLINIC VISIT: HCPCS

## 2025-03-03 PROCEDURE — G0463 HOSPITAL OUTPT CLINIC VISIT: HCPCS | Mod: 25

## 2025-03-03 RX ORDER — ESTRADIOL 0.5 MG/1
0.5 TABLET ORAL DAILY
Qty: 30 TABLET | Refills: 0 | Status: SHIPPED | OUTPATIENT
Start: 2025-03-03

## 2025-03-03 ASSESSMENT — ANXIETY QUESTIONNAIRES
3. WORRYING TOO MUCH ABOUT DIFFERENT THINGS: MORE THAN HALF THE DAYS
GAD7 TOTAL SCORE: 10
7. FEELING AFRAID AS IF SOMETHING AWFUL MIGHT HAPPEN: NOT AT ALL
IF YOU CHECKED OFF ANY PROBLEMS ON THIS QUESTIONNAIRE, HOW DIFFICULT HAVE THESE PROBLEMS MADE IT FOR YOU TO DO YOUR WORK, TAKE CARE OF THINGS AT HOME, OR GET ALONG WITH OTHER PEOPLE: SOMEWHAT DIFFICULT
1. FEELING NERVOUS, ANXIOUS, OR ON EDGE: MORE THAN HALF THE DAYS
6. BECOMING EASILY ANNOYED OR IRRITABLE: SEVERAL DAYS
GAD7 TOTAL SCORE: 10
2. NOT BEING ABLE TO STOP OR CONTROL WORRYING: MORE THAN HALF THE DAYS
8. IF YOU CHECKED OFF ANY PROBLEMS, HOW DIFFICULT HAVE THESE MADE IT FOR YOU TO DO YOUR WORK, TAKE CARE OF THINGS AT HOME, OR GET ALONG WITH OTHER PEOPLE?: SOMEWHAT DIFFICULT
5. BEING SO RESTLESS THAT IT IS HARD TO SIT STILL: MORE THAN HALF THE DAYS
GAD7 TOTAL SCORE: 10
4. TROUBLE RELAXING: SEVERAL DAYS
7. FEELING AFRAID AS IF SOMETHING AWFUL MIGHT HAPPEN: NOT AT ALL

## 2025-03-03 ASSESSMENT — PATIENT HEALTH QUESTIONNAIRE - PHQ9
SUM OF ALL RESPONSES TO PHQ QUESTIONS 1-9: 17
10. IF YOU CHECKED OFF ANY PROBLEMS, HOW DIFFICULT HAVE THESE PROBLEMS MADE IT FOR YOU TO DO YOUR WORK, TAKE CARE OF THINGS AT HOME, OR GET ALONG WITH OTHER PEOPLE: SOMEWHAT DIFFICULT
SUM OF ALL RESPONSES TO PHQ QUESTIONS 1-9: 17

## 2025-03-03 ASSESSMENT — PAIN SCALES - GENERAL: PAINLEVEL_OUTOF10: MODERATE PAIN (4)

## 2025-03-03 NOTE — PROGRESS NOTES
Assessment & Plan     Stage 3a chronic kidney disease (H)  CKD has been stable.    Emphasized caution with NSAIDs  Will continue to monitor at regular intervals    Acquired hypothyroidism  On levothyroxine 75 mcg and tolerating well.  Continue current dose    Morbid obesity (H)  BMI of 31.19  Emphasized importance of lifestyle and dietary changes first before starting weight loss medication  Extensive counseling on the risks and benefits of weight loss medication including black box warning, common and less common side effects and concerns for possible sarcopenia  Recommend increasing exercise to 30 minutes 5x a week  Recommend food diary  Will send to nutrition counseling  - Adult Nutrition  Referral          Return in about 3 months (around 6/3/2025).    Subjective   Sharlene is a 64 year old, presenting for the following health issues:  Thyroid Problem, Lipids, and Chronic Disease Management    Gagging when brushing her teeth  Had EGD and showed hiatal hernia and esophageal ulcers  Last week 3 nights that she was vomiting in the middle of the night.  Takes PPI every day and as needed TUMS.   Wants to lose weight- has been trying to diet- eating salads and meat.  Has been doing low carb and eliminating junk food.  Is on Prozac 60 mg and feels that this is not working well for her.  Sister Alejandra found out her cancer got worse.    Patient's divorce is finalized.    Takes trazodone 100 mg at bedtime.  Feels groggy the next day.  Describes this akin to hangover feeling.  Wants to try taking 50 mg at bedtime to see if she still gets effective sleep without the groggy and hangover feeling the next day.     3/2-  No breakfast  12: BLT at home- white bread- 2 slices of tee.  Water and 3 cups of coffee  [employer]- 5 pm  Dinner: Beef stew- one bowl of beef stew.        History of Present Illness       CKD: She is not using over the counter pain medicine.     Hyperlipidemia:  She presents for follow up of  "hyperlipidemia.   She is taking medication to lower cholesterol. She is having myalgia or other side effects to statin medications.    Hypothyroidism:     Since last visit, patient describes the following symptoms::  Anxiety, Constipation, Depression, Fatigue, Loose stools and Weight gain    Weight gain::  6-10 lbs.    Vascular Disease:  She presents for follow up of vascular disease.      She is not taking daily aspirin.She exercises with enough effort to increase her heart rate 9 or less minutes per day.  She exercises with enough effort to increase her heart rate 3 or less days per week.         Hyperlipidemia Follow-Up    Are you regularly taking any medication or supplement to lower your cholesterol?   Yes- Atorvastatin 10 MG   Are you having muscle aches or other side effects that you think could be caused by your cholesterol lowering medication?  Yes- leg cramps     Vascular Disease Follow-up    How often do you take nitroglycerin? Occasionally  Do you take an aspirin every day? No    Chronic Kidney Disease Follow-up    Do you take any over the counter pain medicine?: Yes  What over the counter medicine are you taking for your pain?:  Tylenol     How often do you take this medicine?:  A few times a month  Hypothyroidism Follow-up    Since last visit, patient describes the following symptoms: weight gain of 7lb 1.8oz lbs, constipation, loose stools, anxiety, depression, and fatigue        Review of Systems  Constitutional, HEENT, cardiovascular, pulmonary, GI, , musculoskeletal, neuro, skin, endocrine and psych systems are negative, except as otherwise noted.      Objective    /78 (BP Location: Right arm, Patient Position: Sitting, Cuff Size: Adult Regular)   Pulse 55   Temp 97.8  F (36.6  C) (Tympanic)   Resp 16   Ht 1.689 m (5' 6.5\")   Wt 89 kg (196 lb 3 oz)   SpO2 98%   BMI 31.19 kg/m    Body mass index is 31.19 kg/m .  Physical Exam   GENERAL: alert and no distress  EYES: Eyes grossly normal " to inspection, PERRL and conjunctivae and sclerae normal  HENT: ear canals and TM's normal, nose and mouth without ulcers or lesions  NECK: no adenopathy, no asymmetry, masses, or scars  RESP: lungs clear to auscultation - no rales, rhonchi or wheezes  CV: regular rate and rhythm, normal S1 S2, no S3 or S4, no murmur, click or rub, no peripheral edema  ABDOMEN: soft, nontender, no hepatosplenomegaly, no masses and bowel sounds normal  MS: no gross musculoskeletal defects noted, no edema  SKIN: no suspicious lesions or rashes  NEURO: Normal strength and tone, mentation intact and speech normal  PSYCH: mentation appears normal, affect normal/bright        Signed Electronically by: Dionne Jackson MD

## 2025-03-03 NOTE — TELEPHONE ENCOUNTER
estradiol (ESTRACE) 0.5 MG       Last Written Prescription Date:  02/04/2025  Last Fill Quantity: 30,   # refills: 0  Last Office Visit: 12/02/2024  Future Office visit:    Next 5 appointments (look out 90 days)      Mar 03, 2025 10:30 AM  (Arrive by 10:15 AM)  Provider Visit with Dionne Jackson MD  [facility] ([facility] ) [address]  [place] [address]  [phone]     May 16, 2025 11:00 AM  (Arrive by 10:45 AM)  Return Visit with Sydni Buckley CNP  [facility] ([facility] [place] ) [address] [place] [address]  [phone]             Routing refill request to provider for review/approval because:  Hormone Replacement Therapy Failed    Rerun Protocol (3/2/2025 7:12 AM)    Medication indicated for associated diagnosis    The medication is prescribed for one or more of the following conditions:               Menopause              Vulva/Vaginal atrophy              Low Estrogen              Gender Dysphoria              Male to Female transgender

## 2025-03-18 DIAGNOSIS — F41.1 GENERALIZED ANXIETY DISORDER: ICD-10-CM

## 2025-03-18 NOTE — TELEPHONE ENCOUNTER
clonazePAM (KLONOPIN) 1 MG tablet 60 tablet 4 9/19/2024     Last Office Visit: 6/3/2024  Future Office visit:    Next 5 appointments (look out 90 days)      May 16, 2025 11:00 AM  (Arrive by 10:45 AM)  Return Visit with Sydni Buckley CNP  [facility] [place] ([facility] [place] ) [address]  [place] [address]  [phone]     Jun 03, 2025 10:30 AM  (Arrive by 10:15 AM)  Provider Visit with Dionne Jackson MD  [facility] [place] ([facility] [place] ) [address]  [facility] [place] [address]  [phone]             Routing refill request to provider for review/approval because:

## 2025-03-19 RX ORDER — CLONAZEPAM 1 MG/1
TABLET ORAL
Qty: 60 TABLET | Refills: 3 | Status: SHIPPED | OUTPATIENT
Start: 2025-03-19

## 2025-03-30 DIAGNOSIS — Z92.29 HISTORY OF HORMONE THERAPY: ICD-10-CM

## 2025-03-31 ENCOUNTER — HOSPITAL ENCOUNTER (OUTPATIENT)
Dept: EDUCATION SERVICES | Facility: HOSPITAL | Age: 65
Discharge: HOME | End: 2025-03-31
Attending: STUDENT IN AN ORGANIZED HEALTH CARE EDUCATION/TRAINING PROGRAM | Admitting: STUDENT IN AN ORGANIZED HEALTH CARE EDUCATION/TRAINING PROGRAM
Payer: MEDICARE

## 2025-03-31 PROCEDURE — 97802 MEDICAL NUTRITION INDIV IN: CPT | Performed by: DIETITIAN, REGISTERED

## 2025-03-31 RX ORDER — ESTRADIOL 0.5 MG/1
0.5 TABLET ORAL DAILY
Qty: 30 TABLET | Refills: 5 | Status: SHIPPED | OUTPATIENT
Start: 2025-03-31

## 2025-03-31 NOTE — PROGRESS NOTES
[place] [facility]  Medical Nutrition Therapy    Visit Type: Initial Visit/New Problem    Patient Referred by: Dionne Jackson MD     Referred Diagnosis:   E66.01 (ICD-10-CM) - Morbid obesity (H)   N18.31 (ICD-10-CM) - Chronic kidney disease, stage 3a (H)         Nutrition Assessment  Anthropometrics:  Height: Data Unavailable  BMI: There is no height or weight on file to calculate BMI.    Weight: 0 lbs 0 oz  Weight Change: trending up     Wt Readings from Last 10 Encounters:   03/03/25 89 kg (196 lb 3 oz)   12/02/24 86.1 kg (189 lb 12 oz)   11/21/24 86.1 kg (189 lb 12.8 oz)   05/31/24 83.5 kg (184 lb)   05/10/24 85.3 kg (188 lb)   05/06/24 84.2 kg (185 lb 9.6 oz)   02/28/24 81.2 kg (179 lb)   02/13/24 81.2 kg (179 lb)   01/25/24 83.5 kg (184 lb)   01/18/24 83.5 kg (184 lb)        Nutrition History:  Nutrition for CKD, would like to work on weight loss as well. Overwhelmed with kidney nutrition recommendations.  Does not eat 3 meals a day. Doesn't eat too many veggies. Eating out/ordering in, less than 1x month      Food Record:  Breakfast: skips  Lunch: salad with cucs and tomatoes sometimes shredded cheese, sometimes with deli meat, dressing, or tomato soup and crackers, or eggs, tee and toast.  Snack: crackers maybe cheese  Dinner: goulash (~2 cups, egg bake, simmered chicken breast (marinated with italian dressing with potatoes), supreme frozen pizza (Lotza Motza) once a week (half at a time)  Snack: No  Beverages: coffee (4 cups), 1 bottle coke and water (1-1.5 bottles per day)    Physical Activity:  Little walking in the am to let out the dog. Fibromyalgia, osteo, pain, edema.     Medical History/Family History:  COPD, fibromyalgia, HTN, MDD, hypothyroidism    Medications:  Current Outpatient Medications   Medication Sig Dispense Refill    acetaminophen (TYLENOL) 325 MG tablet Take 650 mg by mouth      atorvastatin (LIPITOR) 10 MG tablet Take 1 tablet (10 mg) by mouth daily (Patient taking  differently: Take 10 mg by mouth daily. Taking half a tablet daily due to body aches, uncontrolled bowel movements) 90 tablet 3    calcium carbonate (TUMS) 500 MG chewable tablet Take 1 tablet (500 mg) by mouth 3 times daily 180 tablet 3    clonazePAM (KLONOPIN) 1 MG tablet TAKE 1/2 TO 1 (ONE-HALF TO ONE) TABLET BY MOUTH TWICE DAILY 60 tablet 3    diltiazem ER COATED BEADS (CARDIZEM CD/CARTIA XT) 120 MG 24 hr capsule Take 1 capsule (120 mg) by mouth daily. 90 capsule 0    esomeprazole (NEXIUM) 40 MG DR capsule TAKE 1 CAPSULE BY MOUTH ONCE DAILY IN THE MORNING 30-60 MINUTES BEFORE BREAKFAST 90 capsule 3    estradiol (ESTRACE) 0.5 MG tablet Take 1 tablet by mouth once daily 30 tablet 0    FLUoxetine (PROZAC) 20 MG capsule Take 1 capsule (20 mg) daily along with (40 mg) for total daily dose of 60 mg 90 capsule 3    FLUoxetine (PROZAC) 40 MG capsule Take 1 capsule by mouth once daily 90 capsule 3    levothyroxine (SYNTHROID/LEVOTHROID) 75 MCG tablet Take 1 tablet (75 mcg) by mouth daily 90 tablet 3    nitroGLYcerin (NITROSTAT) 0.4 MG sublingual tablet DISSOLVE ONE TABLET UNDER THE TONGUE EVERY 5 MINUTES AS NEEDED FOR CHEST PAIN. DO NOT EXCEED A TOTAL OF 3 DOSES IN 15 MINUTES 30 tablet 0    spironolactone (ALDACTONE) 25 MG tablet Take 1 tablet by mouth once daily 90 tablet 0    SUMAtriptan (IMITREX) 50 MG tablet Take 1 tablet (50 mg) by mouth at onset of headache for migraine May repeat in 2 hours. Max 4 tablets/24 hours. 18 tablet 1    traZODone (DESYREL) 100 MG tablet TAKE 1/2 TO 1 (ONE-HALF TO ONE) TABLET BY MOUTH AT BEDTIME AS NEEDED FOR INSOMNIA 30 tablet 11     No current facility-administered medications for this encounter.        Allergies:     Allergies   Allergen Reactions    Codeine     Isosorbide Mononitrate Other (See Comments)     Vomiting and headache      Lisinopril      [facility] scan    Paxil [Paroxetine] Headache        Nutrition Education:  Renal: portions, low salt, moderate protein, when to  restrict potassium and phosphorus, food label, ingredient list, setting goals    Nutrition Goals:  Focus on low salt and moderate protein portions    Nutrition Follow-up/ Monitoring:  Food recall, weight, labs    Time spent with pt - 60 min  Follow up with RD in 4/25/25.   Patient has RD's contact information to call/email if needed.      Sheree Roblero RD

## 2025-04-20 DIAGNOSIS — E78.5 HYPERLIPIDEMIA LDL GOAL <100: ICD-10-CM

## 2025-04-20 DIAGNOSIS — F41.1 GENERALIZED ANXIETY DISORDER: ICD-10-CM

## 2025-04-21 ENCOUNTER — OFFICE VISIT (OUTPATIENT)
Dept: FAMILY MEDICINE | Facility: OTHER | Age: 65
End: 2025-04-21
Attending: STUDENT IN AN ORGANIZED HEALTH CARE EDUCATION/TRAINING PROGRAM
Payer: COMMERCIAL

## 2025-04-21 ENCOUNTER — TELEPHONE (OUTPATIENT)
Dept: FAMILY MEDICINE | Facility: OTHER | Age: 65
End: 2025-04-21

## 2025-04-21 VITALS
SYSTOLIC BLOOD PRESSURE: 122 MMHG | OXYGEN SATURATION: 98 % | BODY MASS INDEX: 31.11 KG/M2 | TEMPERATURE: 97.9 F | WEIGHT: 195.7 LBS | RESPIRATION RATE: 12 BRPM | HEART RATE: 59 BPM | DIASTOLIC BLOOD PRESSURE: 64 MMHG

## 2025-04-21 DIAGNOSIS — L30.9 DERMATITIS: Primary | ICD-10-CM

## 2025-04-21 PROCEDURE — 3074F SYST BP LT 130 MM HG: CPT | Performed by: STUDENT IN AN ORGANIZED HEALTH CARE EDUCATION/TRAINING PROGRAM

## 2025-04-21 PROCEDURE — 3078F DIAST BP <80 MM HG: CPT | Performed by: STUDENT IN AN ORGANIZED HEALTH CARE EDUCATION/TRAINING PROGRAM

## 2025-04-21 PROCEDURE — 99213 OFFICE O/P EST LOW 20 MIN: CPT | Performed by: STUDENT IN AN ORGANIZED HEALTH CARE EDUCATION/TRAINING PROGRAM

## 2025-04-21 PROCEDURE — 1126F AMNT PAIN NOTED NONE PRSNT: CPT | Performed by: STUDENT IN AN ORGANIZED HEALTH CARE EDUCATION/TRAINING PROGRAM

## 2025-04-21 PROCEDURE — G0463 HOSPITAL OUTPT CLINIC VISIT: HCPCS

## 2025-04-21 RX ORDER — DILTIAZEM HYDROCHLORIDE 120 MG/1
120 CAPSULE, EXTENDED RELEASE ORAL DAILY
COMMUNITY
Start: 2024-09-01

## 2025-04-21 RX ORDER — ATORVASTATIN CALCIUM 10 MG/1
10 TABLET, FILM COATED ORAL DAILY
Qty: 90 TABLET | Refills: 2 | Status: SHIPPED | OUTPATIENT
Start: 2025-04-21

## 2025-04-21 ASSESSMENT — PATIENT HEALTH QUESTIONNAIRE - PHQ9
SUM OF ALL RESPONSES TO PHQ QUESTIONS 1-9: 13
10. IF YOU CHECKED OFF ANY PROBLEMS, HOW DIFFICULT HAVE THESE PROBLEMS MADE IT FOR YOU TO DO YOUR WORK, TAKE CARE OF THINGS AT HOME, OR GET ALONG WITH OTHER PEOPLE: SOMEWHAT DIFFICULT
SUM OF ALL RESPONSES TO PHQ QUESTIONS 1-9: 13

## 2025-04-21 ASSESSMENT — PAIN SCALES - GENERAL: PAINLEVEL_OUTOF10: NO PAIN (0)

## 2025-04-21 NOTE — TELEPHONE ENCOUNTER
Lipitor  Last Written Prescription Date: 5/6/24  Last Fill Quantity: 90 # of Refills: 3  Last Office Visit: 3/3/25

## 2025-04-21 NOTE — TELEPHONE ENCOUNTER
11:42 AM    Reason for Call: OVERBOOK    Patient is having the following symptoms: Rash around mouth going up to nose and its burns and itches for a good month has been trying to treat at home and it is dark red and getting worse.    The patient is requesting an appointment for same day with Dr Jackson.    Was an appointment offered for this call? Yes, patient took the opening for next week 4/28/25 arriving at 8:15 but she really wants to get in today to see Dr Jackson this is really bothering her     If yes : Appointment type              Date    Preferred method for responding to this message: Telephone Call  What is your phone number ? [phone]    If we cannot reach you directly, may we leave a detailed response at the number you provided? Yes    Can this message wait until your PCP/provider returns, if unavailable today? Not applicable,

## 2025-04-21 NOTE — TELEPHONE ENCOUNTER
Prozac  Last Written Prescription Date: 5/30/24  Last Fill Quantity: 90 # of Refills: 3  Last Office Visit: 3/3/25

## 2025-04-21 NOTE — PROGRESS NOTES
Assessment & Plan     Dermatitis  Rash consistent with rosacea.  Will trial metronidazole to see if it helps  Patient will let us know if it doesn't clear or if it gets worse.  - metroNIDAZOLE (METROCREAM) 0.75 % external cream; Apply topically 2 times daily. Apply thin layer to lesions around mouth and nose twice daily until resolution.          Subjective   Sharlene is a 64 year old, presenting for the following health issues:  Derm Problem        4/21/2025     4:05 PM   Additional Questions   Roomed by Monae LAZARO   Accompanied by Self     History of Present Illness       Reason for visit:  Rash around face and nose  Symptom onset:  More than a month  Symptom intensity:  Moderate  Symptom progression:  Worsening  Had these symptoms before:  No She is missing 2 dose(s) of medications per week.  She is not taking prescribed medications regularly due to other.        Rash  Onset/Duration: 1 month ago  Description  Location: Around mouth and nose  Character: blotchy, raised, painful, burning, red  Itching: moderate  Intensity:  moderate  Progression of Symptoms:  worsening  Accompanying signs and symptoms:   Fever: No  Body aches or joint pain: No  Sore throat symptoms: No  Recent cold symptoms: No  History:           Previous episodes of similar rash: None  New exposures:  None  Recent travel: No  Exposure to similar rash: No  Precipitating or alleviating factors: cool water, Eucerin baby eczema non steroid cream - somewhat helps  Therapies tried and outcome: calamine lotion and aquafor     Itches and burns- nothing   No new medications  No new personal hygiene products    Review of Systems  Constitutional, HEENT, cardiovascular, pulmonary, GI, , musculoskeletal, neuro, skin, endocrine and psych systems are negative, except as otherwise noted.      Objective    /64 (BP Location: Right arm, Patient Position: Sitting, Cuff Size: Adult Regular)   Pulse 59   Temp 97.9  F (36.6  C) (Tympanic)   Resp 12   Wt  88.8 kg (195 lb 11.2 oz)   SpO2 98%   BMI 31.11 kg/m    Body mass index is 31.11 kg/m .  Physical Exam   GENERAL: alert and no distress  EYES: Eyes grossly normal to inspection, PERRL and conjunctivae and sclerae normal  HENT: ear canals and TM's normal, nose and mouth without ulcers or lesions  NECK: no adenopathy, no asymmetry, masses, or scars  RESP: lungs clear to auscultation - no rales, rhonchi or wheezes  CV: regular rate and rhythm, normal S1 S2, no S3 or S4, no murmur, click or rub, no peripheral edema  ABDOMEN: soft, nontender, no hepatosplenomegaly, no masses and bowel sounds normal  MS: no gross musculoskeletal defects noted, no edema  SKIN: no suspicious lesions or rashes  NEURO: Normal strength and tone, mentation intact and speech normal  PSYCH: mentation appears normal, affect normal/bright    Signed Electronically by: Dionne Jackson MD

## 2025-04-22 ENCOUNTER — HOSPITAL ENCOUNTER (OUTPATIENT)
Dept: CARDIOLOGY | Facility: HOSPITAL | Age: 65
Discharge: HOME | End: 2025-04-22
Attending: NURSE PRACTITIONER
Payer: MEDICARE

## 2025-04-22 DIAGNOSIS — I35.1 AORTIC VALVE INSUFFICIENCY, ETIOLOGY OF CARDIAC VALVE DISEASE UNSPECIFIED: ICD-10-CM

## 2025-04-22 DIAGNOSIS — R06.09 DYSPNEA ON EXERTION: ICD-10-CM

## 2025-04-22 DIAGNOSIS — R60.0 BILATERAL LEG EDEMA: ICD-10-CM

## 2025-04-22 DIAGNOSIS — I10 ESSENTIAL HYPERTENSION: ICD-10-CM

## 2025-04-22 LAB — LVEF ECHO: NORMAL

## 2025-04-22 PROCEDURE — 93306 TTE W/DOPPLER COMPLETE: CPT

## 2025-04-22 RX ORDER — FLUOXETINE HYDROCHLORIDE 40 MG/1
40 CAPSULE ORAL DAILY
Qty: 90 CAPSULE | Refills: 3 | Status: SHIPPED | OUTPATIENT
Start: 2025-04-22

## 2025-05-16 ENCOUNTER — RESULTS FOLLOW-UP (OUTPATIENT)
Dept: CARDIOLOGY | Facility: OTHER | Age: 65
End: 2025-05-16

## 2025-06-03 ENCOUNTER — OFFICE VISIT (OUTPATIENT)
Dept: FAMILY MEDICINE | Facility: OTHER | Age: 65
End: 2025-06-03
Attending: STUDENT IN AN ORGANIZED HEALTH CARE EDUCATION/TRAINING PROGRAM
Payer: MEDICARE

## 2025-06-03 VITALS
BODY MASS INDEX: 31.23 KG/M2 | HEIGHT: 67 IN | TEMPERATURE: 97.8 F | OXYGEN SATURATION: 98 % | HEART RATE: 58 BPM | SYSTOLIC BLOOD PRESSURE: 138 MMHG | WEIGHT: 199 LBS | RESPIRATION RATE: 16 BRPM | DIASTOLIC BLOOD PRESSURE: 76 MMHG

## 2025-06-03 DIAGNOSIS — I10 BENIGN ESSENTIAL HYPERTENSION: ICD-10-CM

## 2025-06-03 DIAGNOSIS — E66.01 MORBID OBESITY (H): Primary | ICD-10-CM

## 2025-06-03 DIAGNOSIS — E03.9 ACQUIRED HYPOTHYROIDISM: ICD-10-CM

## 2025-06-03 PROCEDURE — 3078F DIAST BP <80 MM HG: CPT | Performed by: STUDENT IN AN ORGANIZED HEALTH CARE EDUCATION/TRAINING PROGRAM

## 2025-06-03 PROCEDURE — 99214 OFFICE O/P EST MOD 30 MIN: CPT | Performed by: STUDENT IN AN ORGANIZED HEALTH CARE EDUCATION/TRAINING PROGRAM

## 2025-06-03 PROCEDURE — 3075F SYST BP GE 130 - 139MM HG: CPT | Performed by: STUDENT IN AN ORGANIZED HEALTH CARE EDUCATION/TRAINING PROGRAM

## 2025-06-03 PROCEDURE — G2211 COMPLEX E/M VISIT ADD ON: HCPCS | Performed by: STUDENT IN AN ORGANIZED HEALTH CARE EDUCATION/TRAINING PROGRAM

## 2025-06-03 PROCEDURE — 1125F AMNT PAIN NOTED PAIN PRSNT: CPT | Performed by: STUDENT IN AN ORGANIZED HEALTH CARE EDUCATION/TRAINING PROGRAM

## 2025-06-03 PROCEDURE — G0463 HOSPITAL OUTPT CLINIC VISIT: HCPCS

## 2025-06-03 RX ORDER — DILTIAZEM HYDROCHLORIDE 120 MG/1
120 CAPSULE, EXTENDED RELEASE ORAL DAILY
Qty: 90 CAPSULE | Refills: 0 | Status: SHIPPED | OUTPATIENT
Start: 2025-06-03

## 2025-06-03 ASSESSMENT — ANXIETY QUESTIONNAIRES
4. TROUBLE RELAXING: SEVERAL DAYS
7. FEELING AFRAID AS IF SOMETHING AWFUL MIGHT HAPPEN: MORE THAN HALF THE DAYS
2. NOT BEING ABLE TO STOP OR CONTROL WORRYING: MORE THAN HALF THE DAYS
1. FEELING NERVOUS, ANXIOUS, OR ON EDGE: MORE THAN HALF THE DAYS
GAD7 TOTAL SCORE: 15
GAD7 TOTAL SCORE: 15
6. BECOMING EASILY ANNOYED OR IRRITABLE: NEARLY EVERY DAY
3. WORRYING TOO MUCH ABOUT DIFFERENT THINGS: NEARLY EVERY DAY
IF YOU CHECKED OFF ANY PROBLEMS ON THIS QUESTIONNAIRE, HOW DIFFICULT HAVE THESE PROBLEMS MADE IT FOR YOU TO DO YOUR WORK, TAKE CARE OF THINGS AT HOME, OR GET ALONG WITH OTHER PEOPLE: SOMEWHAT DIFFICULT
5. BEING SO RESTLESS THAT IT IS HARD TO SIT STILL: MORE THAN HALF THE DAYS

## 2025-06-03 ASSESSMENT — PAIN SCALES - GENERAL: PAINLEVEL_OUTOF10: MODERATE PAIN (6)

## 2025-06-03 ASSESSMENT — PATIENT HEALTH QUESTIONNAIRE - PHQ9: SUM OF ALL RESPONSES TO PHQ QUESTIONS 1-9: 21

## 2025-06-03 NOTE — PROGRESS NOTES
{PROVIDER CHARTING PREFERENCE:938902}    Subjective   Sharlene is a 64 year old, presenting for the following health issues:  Depression and Anxiety        6/3/2025    10:36 AM   Additional Questions   Roomed by Nano GILLILAND   Accompanied by self     HPI      Cardiologist wanted her to go off the diltiazeam for 2 weeks to see if this helps her heart rate  Tried for 2 days cold turkey- didn't feel good.  More lightheaded off of it.    Feels dizzy and lightheaded on standing up.  Felt more dizzy when she was off the medication.        Depression and Anxiety   How are you doing with your depression since your last visit? Worsened few family members that are battling some things, weight gain   How are you doing with your anxiety since your last visit?  Worsened little bit  Are you having other symptoms that might be associated with depression or anxiety? Yes:  heart palpitations, sweaty, dizziness in head,   Have you had a significant life event? OTHER: sister has cancer    Do you have any concerns with your use of alcohol or other drugs? No    Social History     Tobacco Use    Smoking status: Former     Current packs/day: 0.00     Average packs/day: 0.5 packs/day for 46.1 years (23.0 ttl pk-yrs)     Types: Cigarettes     Start date: 1975     Quit date: 2021     Years since quittin.3    Smokeless tobacco: Never    Tobacco comments:     have quit on my own.   Vaping Use    Vaping status: Former   Substance Use Topics    Alcohol use: No    Drug use: No         3/3/2025    10:20 AM 2025     3:30 PM 6/3/2025    10:43 AM   PHQ   PHQ-9 Total Score 17  13  21   Q9: Thoughts of better off dead/self-harm past 2 weeks Not at all Not at all Not at all       Patient-reported         2024     2:04 PM 3/3/2025    10:21 AM 6/3/2025    10:43 AM   SHAKIR-7 SCORE   Total Score 12 (moderate anxiety) 10 (moderate anxiety)    Total Score 12  10  15       Patient-reported         6/3/2025    10:43 AM   Last PHQ-9   1.   "Little interest or pleasure in doing things 2   2.  Feeling down, depressed, or hopeless 2   3.  Trouble falling or staying asleep, or sleeping too much 3   4.  Feeling tired or having little energy 3   5.  Poor appetite or overeating 3   6.  Feeling bad about yourself 2   7.  Trouble concentrating 3   8.  Moving slowly or restless 3   Q9: Thoughts of better off dead/self-harm past 2 weeks 0   PHQ-9 Total Score 21   Difficulty at work, home, or with people Somewhat difficult         6/3/2025    10:43 AM   SHAKIR-7    1. Feeling nervous, anxious, or on edge 2   2. Not being able to stop or control worrying 2   3. Worrying too much about different things 3   4. Trouble relaxing 1   5. Being so restless that it is hard to sit still 2   6. Becoming easily annoyed or irritable 3   7. Feeling afraid, as if something awful might happen 2   SHAKIR-7 Total Score 15   If you checked any problems, how difficult have they made it for you to do your work, take care of things at home, or get along with other people? Somewhat difficult       Suicide Assessment Five-step Evaluation and Treatment (SAFE-T)  {Provider  Link to Depression Care Package SmartSet :665679}    Concern - weight   Description: wanting to discuss options for weight loss, saw a dietitian   Therapies tried and outcome: phentermine years ago, walking every morning, watching salt intake, cut back on creams, chocolate, chips and breads.     {ROS Picklists (Optional):574711}      Objective    /76 (BP Location: Right arm, Patient Position: Sitting, Cuff Size: Adult Regular)   Pulse 58   Temp 97.8  F (36.6  C) (Tympanic)   Resp 16   Ht 1.689 m (5' 6.5\")   Wt 90.3 kg (199 lb)   SpO2 98%   BMI 31.64 kg/m    Body mass index is 31.64 kg/m .  Physical Exam   {Exam List (Optional):688459}    {Diagnostic Test Results (Optional):719559}        Signed Electronically by: Dionne Jackson MD  {Email feedback regarding this note to " [email or web address]   :[id number]}

## 2025-06-09 ENCOUNTER — MYC MEDICAL ADVICE (OUTPATIENT)
Dept: FAMILY MEDICINE | Facility: OTHER | Age: 65
End: 2025-06-09

## 2025-06-21 ENCOUNTER — HEALTH MAINTENANCE LETTER (OUTPATIENT)
Age: 65
End: 2025-06-21

## 2025-06-24 DIAGNOSIS — I50.32 CHRONIC DIASTOLIC HEART FAILURE (H): ICD-10-CM

## 2025-06-24 RX ORDER — SPIRONOLACTONE 25 MG/1
25 TABLET ORAL DAILY
Qty: 90 TABLET | Refills: 0 | Status: SHIPPED | OUTPATIENT
Start: 2025-06-24

## 2025-07-12 ENCOUNTER — HEALTH MAINTENANCE LETTER (OUTPATIENT)
Age: 65
End: 2025-07-12

## 2025-07-15 ENCOUNTER — OFFICE VISIT (OUTPATIENT)
Dept: FAMILY MEDICINE | Facility: OTHER | Age: 65
End: 2025-07-15
Attending: STUDENT IN AN ORGANIZED HEALTH CARE EDUCATION/TRAINING PROGRAM
Payer: COMMERCIAL

## 2025-07-15 VITALS
OXYGEN SATURATION: 97 % | BODY MASS INDEX: 30.62 KG/M2 | WEIGHT: 195.1 LBS | RESPIRATION RATE: 16 BRPM | HEART RATE: 53 BPM | HEIGHT: 67 IN | SYSTOLIC BLOOD PRESSURE: 138 MMHG | DIASTOLIC BLOOD PRESSURE: 64 MMHG | TEMPERATURE: 97.5 F

## 2025-07-15 DIAGNOSIS — I10 BENIGN ESSENTIAL HYPERTENSION: ICD-10-CM

## 2025-07-15 DIAGNOSIS — E03.9 ACQUIRED HYPOTHYROIDISM: ICD-10-CM

## 2025-07-15 DIAGNOSIS — E66.01 MORBID OBESITY (H): ICD-10-CM

## 2025-07-15 DIAGNOSIS — R53.82 CHRONIC FATIGUE: Primary | ICD-10-CM

## 2025-07-15 PROCEDURE — 99213 OFFICE O/P EST LOW 20 MIN: CPT | Performed by: STUDENT IN AN ORGANIZED HEALTH CARE EDUCATION/TRAINING PROGRAM

## 2025-07-15 PROCEDURE — 1126F AMNT PAIN NOTED NONE PRSNT: CPT | Performed by: STUDENT IN AN ORGANIZED HEALTH CARE EDUCATION/TRAINING PROGRAM

## 2025-07-15 PROCEDURE — G0463 HOSPITAL OUTPT CLINIC VISIT: HCPCS

## 2025-07-15 PROCEDURE — 3075F SYST BP GE 130 - 139MM HG: CPT | Performed by: STUDENT IN AN ORGANIZED HEALTH CARE EDUCATION/TRAINING PROGRAM

## 2025-07-15 PROCEDURE — 3078F DIAST BP <80 MM HG: CPT | Performed by: STUDENT IN AN ORGANIZED HEALTH CARE EDUCATION/TRAINING PROGRAM

## 2025-07-15 PROCEDURE — G2211 COMPLEX E/M VISIT ADD ON: HCPCS | Performed by: STUDENT IN AN ORGANIZED HEALTH CARE EDUCATION/TRAINING PROGRAM

## 2025-07-15 ASSESSMENT — PAIN SCALES - GENERAL: PAINLEVEL_OUTOF10: NO PAIN (0)

## 2025-07-15 NOTE — PROGRESS NOTES
"   {PROVIDER CHARTING PREFERENCE:163712}    Subjective   Sharlene is a 64 year old, presenting for the following health issues:  Weight Management and Medication Follow-up        7/15/2025    10:22 AM   Additional Questions   Roomed by Evie duvall   Accompanied by Self         7/15/2025    10:22 AM   Patient Reported Additional Medications   Patient reports taking the following new medications None       Via the Health Maintenance questionnaire, the patient has reported the following services have been completed -Mammogram: oyn vizcarra 2024-09-08, this information has not been sent to the abstraction team.  History of Present Illness       Reason for visit:  Follow up for zeepbound which insurance denied She is missing 2 dose(s) of medications per week.  She is not taking prescribed medications regularly due to remembering to take.      Has been walking, boat rowing,  Has been tracking her calories- [id number].      Medication Followup of Zepbound  Taking Medication as prescribed: NO denied by insurance  Side Effects:  N/A  Medication Helping Symptoms:  not applicable      Review of Systems  Constitutional, HEENT, cardiovascular, pulmonary, GI, , musculoskeletal, neuro, skin, endocrine and psych systems are negative, except as otherwise noted.      Objective    /64 (BP Location: Left arm, Patient Position: Sitting, Cuff Size: Adult Regular)   Pulse 53   Temp 97.5  F (36.4  C) (Tympanic)   Resp 16   Ht 1.689 m (5' 6.5\")   Wt 88.5 kg (195 lb 1.6 oz)   SpO2 97%   BMI 31.02 kg/m    Body mass index is 31.02 kg/m .  Physical Exam   GENERAL: alert and no distress  EYES: Eyes grossly normal to inspection, PERRL and conjunctivae and sclerae normal  HENT: ear canals and TM's normal, nose and mouth without ulcers or lesions  NECK: no adenopathy, no asymmetry, masses, or scars  RESP: lungs clear to auscultation - no rales, rhonchi or wheezes  CV: regular rate and rhythm, normal S1 S2, no S3 or S4, no murmur, " click or rub, no peripheral edema  ABDOMEN: soft, nontender, no hepatosplenomegaly, no masses and bowel sounds normal  MS: no gross musculoskeletal defects noted, no edema  SKIN: no suspicious lesions or rashes  NEURO: Normal strength and tone, mentation intact and speech normal  PSYCH: mentation appears normal, affect normal/bright        Signed Electronically by: Dionne Jackson MD

## 2025-08-05 DIAGNOSIS — F41.1 GENERALIZED ANXIETY DISORDER: ICD-10-CM

## 2025-08-06 RX ORDER — CLONAZEPAM 1 MG/1
TABLET ORAL
Qty: 60 TABLET | Refills: 3 | Status: SHIPPED | OUTPATIENT
Start: 2025-08-06

## 2025-08-09 DIAGNOSIS — E03.9 ACQUIRED HYPOTHYROIDISM: ICD-10-CM

## 2025-08-11 RX ORDER — LEVOTHYROXINE SODIUM 75 UG/1
75 TABLET ORAL DAILY
Qty: 90 TABLET | Refills: 3 | Status: SHIPPED | OUTPATIENT
Start: 2025-08-11

## 2025-08-12 ENCOUNTER — HOSPITAL ENCOUNTER (EMERGENCY)
Facility: HOSPITAL | Age: 65
Discharge: HOME | End: 2025-08-12
Attending: NURSE PRACTITIONER
Payer: MEDICARE

## 2025-08-12 ENCOUNTER — APPOINTMENT (OUTPATIENT)
Dept: CT IMAGING | Facility: HOSPITAL | Age: 65
End: 2025-08-12
Attending: NURSE PRACTITIONER
Payer: MEDICARE

## 2025-08-12 VITALS
BODY MASS INDEX: 29.68 KG/M2 | OXYGEN SATURATION: 98 % | WEIGHT: 189.1 LBS | DIASTOLIC BLOOD PRESSURE: 76 MMHG | HEIGHT: 67 IN | RESPIRATION RATE: 16 BRPM | SYSTOLIC BLOOD PRESSURE: 118 MMHG | TEMPERATURE: 97.3 F | HEART RATE: 70 BPM

## 2025-08-12 DIAGNOSIS — K57.32 SIGMOID DIVERTICULITIS: Primary | ICD-10-CM

## 2025-08-12 LAB
ALBUMIN SERPL BCG-MCNC: 4.2 G/DL (ref 3.5–5.2)
ALBUMIN UR-MCNC: 10 MG/DL
ALP SERPL-CCNC: 89 U/L (ref 40–150)
ALT SERPL W P-5'-P-CCNC: 28 U/L (ref 0–50)
ANION GAP SERPL CALCULATED.3IONS-SCNC: 13 MMOL/L (ref 7–15)
APPEARANCE UR: CLEAR
AST SERPL W P-5'-P-CCNC: 23 U/L (ref 0–45)
BASOPHILS # BLD AUTO: 0.06 10E3/UL (ref 0–0.2)
BASOPHILS NFR BLD AUTO: 0.8 %
BILIRUB SERPL-MCNC: 0.3 MG/DL
BILIRUB UR QL STRIP: NEGATIVE
BUN SERPL-MCNC: 22 MG/DL (ref 8–23)
CALCIUM SERPL-MCNC: 9.6 MG/DL (ref 8.8–10.4)
CHLORIDE SERPL-SCNC: 103 MMOL/L (ref 98–107)
COLOR UR AUTO: YELLOW
CREAT SERPL-MCNC: 1.22 MG/DL (ref 0.51–0.95)
EGFRCR SERPLBLD CKD-EPI 2021: 49 ML/MIN/1.73M2
EOSINOPHIL # BLD AUTO: 0.11 10E3/UL (ref 0–0.7)
EOSINOPHIL NFR BLD AUTO: 1.5 %
ERYTHROCYTE [DISTWIDTH] IN BLOOD BY AUTOMATED COUNT: 12.5 % (ref 10–15)
GLUCOSE SERPL-MCNC: 95 MG/DL (ref 70–99)
GLUCOSE UR STRIP-MCNC: NEGATIVE MG/DL
HCO3 SERPL-SCNC: 20 MMOL/L (ref 22–29)
HCT VFR BLD AUTO: 38.6 % (ref 35–47)
HGB BLD-MCNC: 13.1 G/DL (ref 11.7–15.7)
HGB UR QL STRIP: NEGATIVE
HOLD SPECIMEN: NORMAL
HYALINE CASTS: 2 /LPF
IMM GRANULOCYTES # BLD: 0.02 10E3/UL
IMM GRANULOCYTES NFR BLD: 0.3 %
KETONES UR STRIP-MCNC: 10 MG/DL
LEUKOCYTE ESTERASE UR QL STRIP: NEGATIVE
LIPASE SERPL-CCNC: 18 U/L (ref 13–60)
LYMPHOCYTES # BLD AUTO: 1.93 10E3/UL (ref 0.8–5.3)
LYMPHOCYTES NFR BLD AUTO: 26.3 %
MCH RBC QN AUTO: 30 PG (ref 26.5–33)
MCHC RBC AUTO-ENTMCNC: 33.9 G/DL (ref 31.5–36.5)
MCV RBC AUTO: 88.5 FL (ref 78–100)
MONOCYTES # BLD AUTO: 0.59 10E3/UL (ref 0–1.3)
MONOCYTES NFR BLD AUTO: 8 %
MUCOUS THREADS #/AREA URNS LPF: PRESENT /LPF
NEUTROPHILS # BLD AUTO: 4.64 10E3/UL (ref 1.6–8.3)
NEUTROPHILS NFR BLD AUTO: 63.1 %
NITRATE UR QL: NEGATIVE
NRBC # BLD AUTO: 0 10E3/UL
NRBC BLD AUTO-RTO: 0 /100
PH UR STRIP: 5.5 [PH] (ref 4.7–8)
PLATELET # BLD AUTO: 225 10E3/UL (ref 150–450)
POTASSIUM SERPL-SCNC: 4.8 MMOL/L (ref 3.4–5.3)
PROT SERPL-MCNC: 7.6 G/DL (ref 6.4–8.3)
RBC # BLD AUTO: 4.36 10E6/UL (ref 3.8–5.2)
RBC URINE: 1 /HPF
SODIUM SERPL-SCNC: 136 MMOL/L (ref 135–145)
SP GR UR STRIP: 1.02 (ref 1–1.03)
SQUAMOUS EPITHELIAL: 2 /HPF
UROBILINOGEN UR STRIP-MCNC: NORMAL MG/DL
WBC # BLD AUTO: 7.35 10E3/UL (ref 4–11)
WBC URINE: 1 /HPF

## 2025-08-12 PROCEDURE — 99285 EMERGENCY DEPT VISIT HI MDM: CPT | Mod: 25 | Performed by: NURSE PRACTITIONER

## 2025-08-12 PROCEDURE — 84155 ASSAY OF PROTEIN SERUM: CPT | Performed by: NURSE PRACTITIONER

## 2025-08-12 PROCEDURE — 96374 THER/PROPH/DIAG INJ IV PUSH: CPT | Performed by: NURSE PRACTITIONER

## 2025-08-12 PROCEDURE — 99284 EMERGENCY DEPT VISIT MOD MDM: CPT | Performed by: NURSE PRACTITIONER

## 2025-08-12 PROCEDURE — 83690 ASSAY OF LIPASE: CPT | Performed by: NURSE PRACTITIONER

## 2025-08-12 PROCEDURE — 81001 URINALYSIS AUTO W/SCOPE: CPT | Performed by: NURSE PRACTITIONER

## 2025-08-12 PROCEDURE — 96361 HYDRATE IV INFUSION ADD-ON: CPT | Performed by: NURSE PRACTITIONER

## 2025-08-12 PROCEDURE — 85004 AUTOMATED DIFF WBC COUNT: CPT | Performed by: NURSE PRACTITIONER

## 2025-08-12 PROCEDURE — 250N000011 HC RX IP 250 OP 636: Performed by: STUDENT IN AN ORGANIZED HEALTH CARE EDUCATION/TRAINING PROGRAM

## 2025-08-12 PROCEDURE — 96375 TX/PRO/DX INJ NEW DRUG ADDON: CPT | Performed by: NURSE PRACTITIONER

## 2025-08-12 PROCEDURE — 258N000003 HC RX IP 258 OP 636: Performed by: NURSE PRACTITIONER

## 2025-08-12 PROCEDURE — 74177 CT ABD & PELVIS W/CONTRAST: CPT | Mod: 26 | Performed by: RADIOLOGY

## 2025-08-12 PROCEDURE — 250N000011 HC RX IP 250 OP 636: Performed by: NURSE PRACTITIONER

## 2025-08-12 PROCEDURE — 36415 COLL VENOUS BLD VENIPUNCTURE: CPT | Performed by: NURSE PRACTITIONER

## 2025-08-12 PROCEDURE — 74177 CT ABD & PELVIS W/CONTRAST: CPT

## 2025-08-12 RX ORDER — OXYCODONE HYDROCHLORIDE 5 MG/1
2.5-5 TABLET ORAL EVERY 6 HOURS PRN
Qty: 6 TABLET | Refills: 0 | Status: SHIPPED | OUTPATIENT
Start: 2025-08-12 | End: 2025-08-25

## 2025-08-12 RX ORDER — IOPAMIDOL 755 MG/ML
93 INJECTION, SOLUTION INTRAVASCULAR ONCE
Status: COMPLETED | OUTPATIENT
Start: 2025-08-12 | End: 2025-08-12

## 2025-08-12 RX ORDER — METOCLOPRAMIDE HYDROCHLORIDE 5 MG/ML
10 INJECTION INTRAMUSCULAR; INTRAVENOUS ONCE
Status: COMPLETED | OUTPATIENT
Start: 2025-08-12 | End: 2025-08-12

## 2025-08-12 RX ORDER — HYDROMORPHONE HYDROCHLORIDE 1 MG/ML
0.5 INJECTION, SOLUTION INTRAMUSCULAR; INTRAVENOUS; SUBCUTANEOUS
Refills: 0 | Status: COMPLETED | OUTPATIENT
Start: 2025-08-12 | End: 2025-08-12

## 2025-08-12 RX ADMIN — SODIUM CHLORIDE, SODIUM LACTATE, POTASSIUM CHLORIDE, AND CALCIUM CHLORIDE 1000 ML: .6; .31; .03; .02 INJECTION, SOLUTION INTRAVENOUS at 16:23

## 2025-08-12 RX ADMIN — METOCLOPRAMIDE 10 MG: 5 INJECTION, SOLUTION INTRAMUSCULAR; INTRAVENOUS at 16:23

## 2025-08-12 RX ADMIN — HYDROMORPHONE HYDROCHLORIDE 0.5 MG: 1 INJECTION, SOLUTION INTRAMUSCULAR; INTRAVENOUS; SUBCUTANEOUS at 16:25

## 2025-08-12 RX ADMIN — IOPAMIDOL 93 ML: 755 INJECTION, SOLUTION INTRAVENOUS at 16:44

## 2025-08-12 ASSESSMENT — ENCOUNTER SYMPTOMS
NAUSEA: 1
ABDOMINAL DISTENTION: 1
BLOOD IN STOOL: 0
PSYCHIATRIC NEGATIVE: 1
CARDIOVASCULAR NEGATIVE: 1
NEUROLOGICAL NEGATIVE: 1
ABDOMINAL PAIN: 1
RESPIRATORY NEGATIVE: 1
EYES NEGATIVE: 1
CONSTIPATION: 0
DYSURIA: 1
HEMATOLOGIC/LYMPHATIC NEGATIVE: 1
HEMATURIA: 0
MUSCULOSKELETAL NEGATIVE: 1
DIARRHEA: 1
VOMITING: 0
CONSTITUTIONAL NEGATIVE: 1
ALLERGIC/IMMUNOLOGIC NEGATIVE: 1
FLANK PAIN: 0

## 2025-08-12 ASSESSMENT — COLUMBIA-SUICIDE SEVERITY RATING SCALE - C-SSRS
2. HAVE YOU ACTUALLY HAD ANY THOUGHTS OF KILLING YOURSELF IN THE PAST MONTH?: NO
6. HAVE YOU EVER DONE ANYTHING, STARTED TO DO ANYTHING, OR PREPARED TO DO ANYTHING TO END YOUR LIFE?: NO
1. IN THE PAST MONTH, HAVE YOU WISHED YOU WERE DEAD OR WISHED YOU COULD GO TO SLEEP AND NOT WAKE UP?: NO

## 2025-08-12 ASSESSMENT — ACTIVITIES OF DAILY LIVING (ADL)
ADLS_ACUITY_SCORE: 52
ADLS_ACUITY_SCORE: 46

## 2025-08-15 ENCOUNTER — PREP FOR PROCEDURE (OUTPATIENT)
Dept: SURGERY | Facility: OTHER | Age: 65
End: 2025-08-15

## 2025-08-15 ENCOUNTER — TELEPHONE (OUTPATIENT)
Dept: FAMILY MEDICINE | Facility: OTHER | Age: 65
End: 2025-08-15

## 2025-08-15 DIAGNOSIS — K57.32 DIVERTICULITIS OF COLON: Primary | ICD-10-CM

## 2025-08-18 ASSESSMENT — PATIENT HEALTH QUESTIONNAIRE - PHQ9
SUM OF ALL RESPONSES TO PHQ QUESTIONS 1-9: 14
SUM OF ALL RESPONSES TO PHQ QUESTIONS 1-9: 14
10. IF YOU CHECKED OFF ANY PROBLEMS, HOW DIFFICULT HAVE THESE PROBLEMS MADE IT FOR YOU TO DO YOUR WORK, TAKE CARE OF THINGS AT HOME, OR GET ALONG WITH OTHER PEOPLE: SOMEWHAT DIFFICULT

## 2025-08-19 ENCOUNTER — OFFICE VISIT (OUTPATIENT)
Dept: FAMILY MEDICINE | Facility: OTHER | Age: 65
End: 2025-08-19
Attending: STUDENT IN AN ORGANIZED HEALTH CARE EDUCATION/TRAINING PROGRAM
Payer: COMMERCIAL

## 2025-08-19 VITALS
RESPIRATION RATE: 16 BRPM | HEART RATE: 57 BPM | SYSTOLIC BLOOD PRESSURE: 120 MMHG | BODY MASS INDEX: 29.68 KG/M2 | HEIGHT: 67 IN | WEIGHT: 189.1 LBS | OXYGEN SATURATION: 97 % | TEMPERATURE: 98.4 F | DIASTOLIC BLOOD PRESSURE: 60 MMHG

## 2025-08-19 DIAGNOSIS — I20.89 OTHER FORMS OF ANGINA PECTORIS: ICD-10-CM

## 2025-08-19 DIAGNOSIS — K57.32 SIGMOID DIVERTICULITIS: Primary | ICD-10-CM

## 2025-08-19 PROCEDURE — G0463 HOSPITAL OUTPT CLINIC VISIT: HCPCS

## 2025-08-19 PROCEDURE — 99213 OFFICE O/P EST LOW 20 MIN: CPT | Performed by: STUDENT IN AN ORGANIZED HEALTH CARE EDUCATION/TRAINING PROGRAM

## 2025-08-19 PROCEDURE — 3078F DIAST BP <80 MM HG: CPT | Performed by: STUDENT IN AN ORGANIZED HEALTH CARE EDUCATION/TRAINING PROGRAM

## 2025-08-19 PROCEDURE — 1126F AMNT PAIN NOTED NONE PRSNT: CPT | Performed by: STUDENT IN AN ORGANIZED HEALTH CARE EDUCATION/TRAINING PROGRAM

## 2025-08-19 PROCEDURE — 3074F SYST BP LT 130 MM HG: CPT | Performed by: STUDENT IN AN ORGANIZED HEALTH CARE EDUCATION/TRAINING PROGRAM

## 2025-08-19 PROCEDURE — G2211 COMPLEX E/M VISIT ADD ON: HCPCS | Performed by: STUDENT IN AN ORGANIZED HEALTH CARE EDUCATION/TRAINING PROGRAM

## 2025-08-19 RX ORDER — NITROGLYCERIN 0.4 MG/1
TABLET SUBLINGUAL
Qty: 30 TABLET | Refills: 0 | Status: SHIPPED | OUTPATIENT
Start: 2025-08-19

## 2025-08-19 ASSESSMENT — PAIN SCALES - GENERAL: PAINLEVEL_OUTOF10: NO PAIN (0)

## (undated) DEVICE — BLADE 15 RB BK SS STRL LF DISPLF DISP 371215

## (undated) DEVICE — SOL NACL 0.9% IRRIG 1000ML BOTTLE 2F7124

## (undated) DEVICE — Device

## (undated) DEVICE — MANIFOLD KIT ANGIO AUTOMATED 014613

## (undated) DEVICE — SCD SLEEVE-KNEE REG.

## (undated) DEVICE — MOUTHPIECE W/GUARD FOR ENDOSCOPY

## (undated) DEVICE — PACK BASIN SET UP SUTCNBSBBA

## (undated) DEVICE — PACK HEART RIGHT CUSTOM SAN32RHF18

## (undated) DEVICE — CONNECTOR ERBEFLO 2 PORT 20325-215

## (undated) DEVICE — KIT HAND CONTROL ACIST 016795

## (undated) DEVICE — SYRINGE-30CC SLIP TIP

## (undated) DEVICE — TUBING SUCTION 20FT N620A

## (undated) DEVICE — BLANKET BAIR HUGGER UPPER BODY 42268

## (undated) DEVICE — CAUTERY MICROFINE NEEDLE 0118

## (undated) DEVICE — FORCEPS BIOPSY RADIAL JAW 4 LARGE W/NEEDLE 240CM M00513332

## (undated) DEVICE — ESU LIGASURE DISSECTOR EXACT LF2019

## (undated) DEVICE — CATH ANGIO INFINITI PIGTAIL 145 6 SH 6FRX110CM  534-652S

## (undated) DEVICE — INTRO SHEATH 7FRX10CM PINNACLE RSS702

## (undated) DEVICE — FASTENER CATH BALLOON CLAMPX2 STATLOCK 0684-00-493

## (undated) DEVICE — SU CHROMIC 2-0 SH 27" G123H

## (undated) DEVICE — GOWN SURG XL LVL 3 REINFORCED 9541

## (undated) DEVICE — INTRODUCER SHEATH 4FRX40CM MICROPUNC PED G47946

## (undated) DEVICE — TUBING-SUCTION 20FT

## (undated) DEVICE — SOL WATER IRRIG 1000ML BOTTLE 2F7114

## (undated) DEVICE — CANISTER SUCTION MEDI-VAC GUARDIAN 2000ML 90D 65651-220

## (undated) DEVICE — LABEL STERILE PREPRINTED FOR OR FRRH01-2M

## (undated) DEVICE — IRRIGATION-H2O 1000ML

## (undated) DEVICE — SLEEVE TR BAND RADIAL COMPRESSION DEVICE 24CM TRB24-REG

## (undated) DEVICE — COVER LT HANDLE 2/PK 5160-2FG

## (undated) DEVICE — FORCEP-COLON C NEEDLE  SWING JAW  FB-220UA

## (undated) DEVICE — PANTIES MESH LG/XLG 2PK 706M2

## (undated) DEVICE — SHTH INTRO 0.021IN ID 6FR DIA

## (undated) DEVICE — DRSG KERLIX SUPER SPONGE 6X6.75" 2585

## (undated) DEVICE — INTRO SHEATH MICRO PLATINUM TIP 4FRX40CM 7274

## (undated) DEVICE — SUCTION MANIFOLD NEPTUNE 2 SYS 1 PORT 702-025-000

## (undated) DEVICE — KIT RIGHT HEART CATH 60130719

## (undated) DEVICE — GW VASC .035IN DIA 260CML 7CML 3 MM RADIUS J CURVE 502455

## (undated) DEVICE — ESU GROUND PAD ADULT W/CORD E7507

## (undated) DEVICE — WIRE GUIDE 0.035"X150CM EMERALD STR 502542

## (undated) DEVICE — TRAY SKIN PREP POVIDONE/IODINE DYND70372

## (undated) DEVICE — SUCTION TUBE YANKAUR K61

## (undated) DEVICE — FORCEP-COLON BIOPSY STD W/NEEDLE 160CM

## (undated) DEVICE — LUBRICANT JELLY 2OZ. TUBE

## (undated) DEVICE — SU CHROMIC 0 CT-1 27" 812H

## (undated) DEVICE — TUBING PRESSURE 30"

## (undated) DEVICE — CLIP-RESOLUTION CLIPPING DEVICE

## (undated) DEVICE — PACK LAPAROTOMY CUSTOM SBA32LPMBG

## (undated) RX ORDER — GLYCOPYRROLATE 0.2 MG/ML
INJECTION, SOLUTION INTRAMUSCULAR; INTRAVENOUS
Status: DISPENSED
Start: 2024-02-13

## (undated) RX ORDER — NICARDIPINE HCL-0.9% SOD CHLOR 1 MG/10 ML
SYRINGE (ML) INTRAVENOUS
Status: DISPENSED
Start: 2023-02-28

## (undated) RX ORDER — LIDOCAINE 40 MG/G
CREAM TOPICAL
Status: DISPENSED
Start: 2021-04-14

## (undated) RX ORDER — ONDANSETRON 2 MG/ML
INJECTION INTRAMUSCULAR; INTRAVENOUS
Status: DISPENSED
Start: 2023-02-28

## (undated) RX ORDER — EPHEDRINE SULFATE 50 MG/ML
INJECTION, SOLUTION INTRAMUSCULAR; INTRAVENOUS; SUBCUTANEOUS
Status: DISPENSED
Start: 2024-02-13

## (undated) RX ORDER — FENTANYL CITRATE 50 UG/ML
INJECTION, SOLUTION INTRAMUSCULAR; INTRAVENOUS
Status: DISPENSED
Start: 2023-02-28

## (undated) RX ORDER — PROPOFOL 10 MG/ML
INJECTION, EMULSION INTRAVENOUS
Status: DISPENSED
Start: 2017-02-09

## (undated) RX ORDER — FENTANYL CITRATE 50 UG/ML
INJECTION, SOLUTION INTRAMUSCULAR; INTRAVENOUS
Status: DISPENSED
Start: 2017-02-09

## (undated) RX ORDER — SODIUM CHLORIDE 9 MG/ML
INJECTION, SOLUTION INTRAVENOUS
Status: DISPENSED
Start: 2023-02-28

## (undated) RX ORDER — PROPOFOL 10 MG/ML
INJECTION, EMULSION INTRAVENOUS
Status: DISPENSED
Start: 2024-02-13

## (undated) RX ORDER — FENTANYL CITRATE 50 UG/ML
INJECTION, SOLUTION INTRAMUSCULAR; INTRAVENOUS
Status: DISPENSED
Start: 2024-02-13

## (undated) RX ORDER — HEPARIN SODIUM 1000 [USP'U]/ML
INJECTION, SOLUTION INTRAVENOUS; SUBCUTANEOUS
Status: DISPENSED
Start: 2023-02-28

## (undated) RX ORDER — DEXAMETHASONE SODIUM PHOSPHATE 10 MG/ML
INJECTION, SOLUTION INTRAMUSCULAR; INTRAVENOUS
Status: DISPENSED
Start: 2024-02-13

## (undated) RX ORDER — NITROGLYCERIN 5 MG/ML
VIAL (ML) INTRAVENOUS
Status: DISPENSED
Start: 2023-02-28

## (undated) RX ORDER — ONDANSETRON 2 MG/ML
INJECTION INTRAMUSCULAR; INTRAVENOUS
Status: DISPENSED
Start: 2024-02-13

## (undated) RX ORDER — LIDOCAINE HYDROCHLORIDE 20 MG/ML
INJECTION, SOLUTION EPIDURAL; INFILTRATION; INTRACAUDAL; PERINEURAL
Status: DISPENSED
Start: 2017-02-09